# Patient Record
Sex: MALE | Race: WHITE | NOT HISPANIC OR LATINO | Employment: OTHER | URBAN - NONMETROPOLITAN AREA
[De-identification: names, ages, dates, MRNs, and addresses within clinical notes are randomized per-mention and may not be internally consistent; named-entity substitution may affect disease eponyms.]

---

## 2017-01-03 ENCOUNTER — HOSPITAL ENCOUNTER (OUTPATIENT)
Dept: PHYSICAL THERAPY | Facility: HOSPITAL | Age: 62
Setting detail: THERAPIES SERIES
Discharge: HOME | End: 2017-01-03

## 2017-01-03 DIAGNOSIS — G89.29 CHRONIC BILATERAL LOW BACK PAIN WITH BILATERAL SCIATICA: Primary | ICD-10-CM

## 2017-01-03 DIAGNOSIS — M51.36 LUMBAR DEGENERATIVE DISC DISEASE: ICD-10-CM

## 2017-01-03 DIAGNOSIS — M54.41 CHRONIC BILATERAL LOW BACK PAIN WITH BILATERAL SCIATICA: Primary | ICD-10-CM

## 2017-01-03 DIAGNOSIS — M54.42 CHRONIC BILATERAL LOW BACK PAIN WITH BILATERAL SCIATICA: Primary | ICD-10-CM

## 2017-01-03 PROCEDURE — 97140 MANUAL THERAPY 1/> REGIONS: CPT | Performed by: PHYSICAL THERAPIST

## 2017-01-03 NOTE — PROGRESS NOTES
Outpatient Physical Therapy Ortho Treatment Note  [facility]     Patient Name: Carlos Hayes  : 1955  MRN: [id number]  Today's Date: 1/3/2017      Visit Date: 2017    Visit Dx:    ICD-10-CM ICD-9-CM   1. Chronic bilateral low back pain with bilateral sciatica M54.42 724.2    M54.41 724.3    G89.29 338.29   2. Lumbar degenerative disc disease M51.36 722.52       There is no problem list on file for this patient.       Past Medical History   Diagnosis Date   • Arthritis    • Back pain    • Concussion    • COPD (chronic obstructive pulmonary disease)    • Hearing impaired    • Hypertension    • Obesity         Past Surgical History   Procedure Laterality Date   • Thoracic laminectomy with placement of dorsal column stimulator     • Back surgery       L3-4 fusion   • Back surgery       L3-S1 fusion   • Carpal tunnel release Bilateral    • Knee surgery     • Cholecystectomy                               PT Assessment/Plan       17 1700          PT Assessment    Assessment Comments his right hip was more guarded today for some reason; he walked out with less pain and stood more erect  -TB      PT Plan    PT Plan Comments continue with MFR and TrP work and improving flexibility through his hip flexors and adductor  -TB        User Key  (r) = Recorded By, (t) = Taken By, (c) = Cosigned By    Initials Name Provider Type    SILVA Hartmann, PT Physical Therapist                    Exercises       17 1522          Subjective Pain    Able to rate subjective pain? yes  -TB      Pre-Treatment Pain Level 6  -TB        User Key  (r) = Recorded By, (t) = Taken By, (c) = Cosigned By    Initials Name Provider Type    SILVA Hartmann, PT Physical Therapist                        Manual Rx (last 36 hours)      Manual Treatments       17 1700          Manual Rx 1    Manual Rx 1 Location lower abdomen  -TB      Manual Rx 1 Type MFR oliver  -TB      Manual Rx 1 Duration 10  -TB       Manual Rx 2    Manual Rx 2 Location oliver IP through whole muscle   -TB      Manual Rx 2 Type TrP release  -TB      Manual Rx 2 Grade gentle progressing to deeper  -TB      Manual Rx 2 Duration 40  -TB      Manual Rx 3    Manual Rx 3 Location oliver hip short adductors  -TB      Manual Rx 3 Type TrP release  -TB      Manual Rx 3 Duration 10  -TB      Manual Rx 4    Manual Rx 4 Location passive hip abduction/extension stretch  -TB      Manual Rx 4 Type hooklying  -TB      Manual Rx 4 Grade sustained gentle with some LAD  -TB      Manual Rx 4 Duration 10  -TB        User Key  (r) = Recorded By, (t) = Taken By, (c) = Cosigned By    Initials Name Provider Type    TB Juarez Hartmann, PT Physical Therapist                PT OP Goals       01/03/17 1700 12/27/16 1700 12/27/16 1500    PT Short Term Goals    STG Date to Achieve 01/27/17  -TB  01/27/17  -TB    STG 1 Relax muscle guarding on oliver hip flexors  -TB  Relax muscle guarding on oliver hip flexors  -TB    STG 1 Progress Ongoing  -TB  New  -TB    STG 1 Progress Comments he was really tight and sore but after, his pain was down and he could stand more erect  -TB      STG 2 Improve mobility of thoracic into extension  -TB  Improve mobility of thoracic into extension  -TB    STG 2 Progress New  -TB  New  -TB    Long Term Goals    LTG Date to Achieve 02/27/17  -TB  02/27/17  -TB    LTG 1 Able to stand x 5 min before needing to sit  -TB  Able to stand x 5 min before needing to sit  -TB    LTG 1 Progress New  -TB  New  -TB    LTG 2 Improve hip passive extension to 0 degrees  -TB  Improve hip passive extension to 0 degrees  -TB    LTG 2 Progress New  -TB  New  -TB    LTG 3 Improved core contraction held during functional tasks.  -TB  Improved core contraction held during functional tasks.  -TB    LTG 3 Progress New  -TB  New  -TB    LTG 4 Independent with HEP for flexibility and stability.   -TB  Independent with HEP for flexibility and stability.   -TB    LTG 4 Progress New  -TB   New  -TB    Time Calculation    PT Goal Re-Cert Due Date 01/26/17  -TB --  -TB 01/26/17  -TB      User Key  (r) = Recorded By, (t) = Taken By, (c) = Cosigned By    Initials Name Provider Type    SILVA Hartmann, PT Physical Therapist                Therapy Education       01/03/17 1700    Therapy Education    Given HEP;Posture/body mechanics  -TB    Program Reinforced  -TB    How Provided Verbal  -TB    Provided to Patient  -TB    Level of Understanding Verbalized  -TB      User Key  (r) = Recorded By, (t) = Taken By, (c) = Cosigned By    Initials Name Provider Type    SILVA Hartmann, PT Physical Therapist                Time Calculation:   Start Time: 1520  Stop Time: 1630  Time Calculation (min): 70 min  Total Timed Code Minutes- PT: 70 minute(s)    Therapy Charges for Today     Code Description Service Date Service Provider Modifiers Qty    28660997980 HC PT MANUAL THERAPY EA 15 MIN 1/3/2017 Juarez Hartmann, PT GP 5                    Juarez Hartmann, PT  1/3/2017

## 2017-01-10 ENCOUNTER — HOSPITAL ENCOUNTER (OUTPATIENT)
Dept: PHYSICAL THERAPY | Facility: HOSPITAL | Age: 62
Discharge: HOME | End: 2017-01-10
Admitting: NURSE PRACTITIONER

## 2017-01-10 DIAGNOSIS — M51.36 LUMBAR DEGENERATIVE DISC DISEASE: ICD-10-CM

## 2017-01-10 DIAGNOSIS — M54.42 CHRONIC BILATERAL LOW BACK PAIN WITH BILATERAL SCIATICA: Primary | ICD-10-CM

## 2017-01-10 DIAGNOSIS — M54.41 CHRONIC BILATERAL LOW BACK PAIN WITH BILATERAL SCIATICA: Primary | ICD-10-CM

## 2017-01-10 DIAGNOSIS — G89.29 CHRONIC BILATERAL LOW BACK PAIN WITH BILATERAL SCIATICA: Primary | ICD-10-CM

## 2017-01-10 PROCEDURE — 97140 MANUAL THERAPY 1/> REGIONS: CPT | Performed by: PHYSICAL THERAPIST

## 2017-01-10 NOTE — PROGRESS NOTES
Outpatient Physical Therapy Ortho Treatment Note  [facility]     Patient Name: Carlos Hayes  : 1955  MRN: [id number]  Today's Date: 1/10/2017      Visit Date: 01/10/2017    Visit Dx:    ICD-10-CM ICD-9-CM   1. Chronic bilateral low back pain with bilateral sciatica M54.42 724.2    M54.41 724.3    G89.29 338.29   2. Lumbar degenerative disc disease M51.36 722.52       There is no problem list on file for this patient.       Past Medical History   Diagnosis Date   • Arthritis    • Back pain    • Concussion    • COPD (chronic obstructive pulmonary disease)    • Hearing impaired    • Hypertension    • Obesity         Past Surgical History   Procedure Laterality Date   • Thoracic laminectomy with placement of dorsal column stimulator     • Back surgery       L3-4 fusion   • Back surgery       L3-S1 fusion   • Carpal tunnel release Bilateral    • Knee surgery     • Cholecystectomy                               PT Assessment/Plan       01/10/17 1700          PT Assessment    Assessment Comments He is consistently responding well to the MFR and hip distraction with stretching of his hip flexors and adductors  -TB      PT Plan    PT Plan Comments Continue with current regimen.   -TB        User Key  (r) = Recorded By, (t) = Taken By, (c) = Cosigned By    Initials Name Provider Type    SILVA Hartmann, PT Physical Therapist                    Exercises       01/10/17 7885          Subjective Comments    Subjective Comments He says he did really well after his last treatment, but every day, he would tighten up some. He went for a walk when it was cold. He walked on the treadmill for a mile in 30 minutes and says he is still hurting.   -TB        User Key  (r) = Recorded By, (t) = Taken By, (c) = Cosigned By    Initials Name Provider Type    SILVA Hartmann, PT Physical Therapist                        Manual Rx (last 36 hours)      Manual Treatments       01/10/17 1700          Manual Rx 1     Manual Rx 1 Location lower abdomen  -TB      Manual Rx 1 Type MFR oliver  -TB      Manual Rx 1 Duration 10  -TB      Manual Rx 2    Manual Rx 2 Location oliver IP through whole muscle   -TB      Manual Rx 2 Type TrP release  -TB      Manual Rx 2 Grade gentle progressing to deeper  -TB      Manual Rx 2 Duration 40  -TB      Manual Rx 3    Manual Rx 3 Location oliver hip short adductors  -TB      Manual Rx 3 Type TrP release  -TB      Manual Rx 3 Duration 10  -TB      Manual Rx 4    Manual Rx 4 Location passive hip abduction/extension stretch  -TB      Manual Rx 4 Type hooklying  -TB      Manual Rx 4 Grade sustained gentle with some LAD  -TB      Manual Rx 4 Duration 10  -TB        User Key  (r) = Recorded By, (t) = Taken By, (c) = Cosigned By    Initials Name Provider Type    TB Juarez Hartmann, PT Physical Therapist                PT OP Goals       01/10/17 1700 01/03/17 1700       PT Short Term Goals    STG Date to Achieve 01/27/17  -TB 01/27/17  -TB     STG 1 Relax muscle guarding on oliver hip flexors  -TB Relax muscle guarding on oliver hip flexors  -TB     STG 1 Progress Ongoing  -TB Ongoing  -TB     STG 1 Progress Comments they did well after his last treatment but were more guarded after walking maybe too far  -TB he was really tight and sore but after, his pain was down and he could stand more erect  -TB     STG 2 Improve mobility of thoracic into extension  -TB Improve mobility of thoracic into extension  -TB     STG 2 Progress New  -TB New  -TB     Long Term Goals    LTG Date to Achieve 02/27/17  -TB 02/27/17  -TB     LTG 1 Able to stand x 5 min before needing to sit  -TB Able to stand x 5 min before needing to sit  -TB     LTG 1 Progress New  -TB New  -TB     LTG 2 Improve hip passive extension to 0 degrees  -TB Improve hip passive extension to 0 degrees  -TB     LTG 2 Progress New  -TB New  -TB     LTG 3 Improved core contraction held during functional tasks.  -TB Improved core contraction held during functional  tasks.  -TB     LTG 3 Progress New  -TB New  -TB     LTG 4 Independent with HEP for flexibility and stability.   -TB Independent with HEP for flexibility and stability.   -TB     LTG 4 Progress New  -TB New  -TB     Time Calculation    PT Goal Re-Cert Due Date 01/26/17  -TB 01/26/17  -TB       User Key  (r) = Recorded By, (t) = Taken By, (c) = Cosigned By    Initials Name Provider Type    TB Juarez Hartmann PT Physical Therapist                Therapy Education       01/10/17 1700    Therapy Education    Given HEP;Posture/body mechanics  -TB    Program Reinforced  -TB    How Provided Verbal  -TB    Provided to Patient  -TB    Level of Understanding Verbalized  -TB      User Key  (r) = Recorded By, (t) = Taken By, (c) = Cosigned By    Initials Name Provider Type    TB Juarez Hartmann PT Physical Therapist                Time Calculation:   Start Time: 1545  Stop Time: 1700  Time Calculation (min): 75 min  Total Timed Code Minutes- PT: 75 minute(s)    Therapy Charges for Today     Code Description Service Date Service Provider Modifiers Qty    08118350765 HC PT MANUAL THERAPY EA 15 MIN 1/10/2017 Juarez Hartmann, PT GP 5                    Juarez Hartmann, PT  1/10/2017

## 2017-01-17 ENCOUNTER — HOSPITAL ENCOUNTER (OUTPATIENT)
Dept: PHYSICAL THERAPY | Facility: HOSPITAL | Age: 62
Setting detail: THERAPIES SERIES
Discharge: HOME | End: 2017-01-17

## 2017-01-17 DIAGNOSIS — G89.29 CHRONIC BILATERAL LOW BACK PAIN WITH BILATERAL SCIATICA: Primary | ICD-10-CM

## 2017-01-17 DIAGNOSIS — M51.36 LUMBAR DEGENERATIVE DISC DISEASE: ICD-10-CM

## 2017-01-17 DIAGNOSIS — M54.42 CHRONIC BILATERAL LOW BACK PAIN WITH BILATERAL SCIATICA: Primary | ICD-10-CM

## 2017-01-17 DIAGNOSIS — M54.41 CHRONIC BILATERAL LOW BACK PAIN WITH BILATERAL SCIATICA: Primary | ICD-10-CM

## 2017-01-17 PROCEDURE — G8979 MOBILITY GOAL STATUS: HCPCS | Performed by: PHYSICAL THERAPIST

## 2017-01-17 PROCEDURE — G8980 MOBILITY D/C STATUS: HCPCS | Performed by: PHYSICAL THERAPIST

## 2017-01-17 PROCEDURE — 97140 MANUAL THERAPY 1/> REGIONS: CPT | Performed by: PHYSICAL THERAPIST

## 2017-01-17 NOTE — THERAPY DISCHARGE NOTE
Outpatient Physical Therapy Ortho Treatment Note/Discharge Summary  [facility]     Patient Name: Carlos Hayes  : 1955  MRN: [id number]  Today's Date: 2017      Visit Date: 2017    Visit Dx:    ICD-10-CM ICD-9-CM   1. Chronic bilateral low back pain with bilateral sciatica M54.42 724.2    M54.41 724.3    G89.29 338.29   2. Lumbar degenerative disc disease M51.36 722.52       There is no problem list on file for this patient.       Past Medical History   Diagnosis Date   • Arthritis    • Back pain    • Concussion    • COPD (chronic obstructive pulmonary disease)    • Hearing impaired    • Hypertension    • Obesity         Past Surgical History   Procedure Laterality Date   • Thoracic laminectomy with placement of dorsal column stimulator     • Back surgery       L3-4 fusion   • Back surgery       L3-S1 fusion   • Carpal tunnel release Bilateral    • Knee surgery     • Cholecystectomy                               PT Assessment/Plan       17 1700          PT Assessment    Assessment Comments He is going back to [facility] so we weren't able to finish his visits. We were successful in his hips not tightening up and him losing  any ground he might have gained there.   -TB      PT Plan    PT Plan Comments DC to the therapists at [facility] to continue working with him.  -TB        User Key  (r) = Recorded By, (t) = Taken By, (c) = Cosigned By    Initials Name Provider Type    SILVA Hartmann, PT Physical Therapist                    Exercises       17 5180          Subjective Comments    Subjective Comments He says he feels like we can't take his leg into as much extension because for 3 days, he was miserable. He almost fell this morning getting up and turning.   -TB        User Key  (r) = Recorded By, (t) = Taken By, (c) = Cosigned By    Initials Name Provider Type    SILVA Hartmann, PT Physical Therapist                        Manual Rx (last 36 hours)      Manual Treatments        01/17/17 1700          Manual Rx 1    Manual Rx 1 Location lower abdomen  -TB      Manual Rx 1 Type MFR oliver  -TB      Manual Rx 1 Duration 10  -TB      Manual Rx 2    Manual Rx 2 Location oliver IP through whole muscle   -TB      Manual Rx 2 Type TrP release  -TB      Manual Rx 2 Grade gentle progressing to deeper  -TB      Manual Rx 2 Duration 40  -TB      Manual Rx 3    Manual Rx 3 Location oliver hip short adductors  -TB      Manual Rx 3 Type TrP release  -TB      Manual Rx 3 Duration 10  -TB      Manual Rx 4    Manual Rx 4 Location supine manual cx traction sustained; this relieved his headache but his thoracic spasmed; he rolled to his side and I held pressure on it and this abolished this spasm  -TB        User Key  (r) = Recorded By, (t) = Taken By, (c) = Cosigned By    Initials Name Provider Type    TB Juarez Hartmann, PT Physical Therapist                PT OP Goals       01/17/17 1700 01/10/17 1700       PT Short Term Goals    STG Date to Achieve 01/27/17  -TB 01/27/17  -TB     STG 1 Relax muscle guarding on oliver hip flexors  -TB Relax muscle guarding on oliver hip flexors  -TB     STG 1 Progress Met  -TB Ongoing  -TB     STG 1 Progress Comments they were more relaxed than they had been; still guarded but better  -TB they did well after his last treatment but were more guarded after walking maybe too far  -TB     STG 2 Improve mobility of thoracic into extension  -TB Improve mobility of thoracic into extension  -TB     STG 2 Progress Not Met  -TB New  -TB     STG 2 Progress Comments did not focus on thoracic mobility this time due to focus on hip mobility  -TB      Long Term Goals    LTG Date to Achieve 02/27/17  -TB 02/27/17  -TB     LTG 1 Able to stand x 5 min before needing to sit  -TB Able to stand x 5 min before needing to sit  -TB     LTG 1 Progress Partially Met  -TB New  -TB     LTG 1 Progress Comments he tried at [employer] but his back pain was flared by the time he got to the checkout  -TB      LTG 2  Improve hip passive extension to 0 degrees  -TB Improve hip passive extension to 0 degrees  -TB     LTG 2 Progress Partially Met  -TB New  -TB     LTG 2 Progress Comments about -10 deg oliver  -TB      LTG 3 Improved core contraction held during functional tasks.  -TB Improved core contraction held during functional tasks.  -TB     LTG 3 Progress Met  -TB New  -TB     LTG 3 Progress Comments we discussed this and he was working on contaction of his core with deep breathing  -TB      LTG 4 Independent with HEP for flexibility and stability.   -TB Independent with HEP for flexibility and stability.   -TB     LTG 4 Progress Met  -TB New  -TB     Time Calculation    PT Goal Re-Cert Due Date  01/26/17  -TB       User Key  (r) = Recorded By, (t) = Taken By, (c) = Cosigned By    Initials Name Provider Type    SILVA Hartmann PT Physical Therapist                Therapy Education       01/17/17 1700    Therapy Education    Given HEP;Posture/body mechanics  -TB    Program Reinforced  -TB    How Provided Verbal  -TB    Provided to Patient  -TB    Level of Understanding Verbalized  -TB      User Key  (r) = Recorded By, (t) = Taken By, (c) = Cosigned By    Initials Name Provider Type    SILVA Hartmann PT Physical Therapist                Outcome Measures       01/17/17 1700          Modified Oswestry    Modified Oswestry Score/Comments 68%  -TB      Functional Assessment    Outcome Measure Options Modifed Owestry  -TB        User Key  (r) = Recorded By, (t) = Taken By, (c) = Cosigned By    Initials Name Provider Type    SILVA Hartmann PT Physical Therapist            Time Calculation:   Start Time: 0345  Stop Time: 0500  Time Calculation (min): 75 min  Total Timed Code Minutes- PT: 75 minute(s)    Therapy Charges for Today     Code Description Service Date Service Provider Modifiers Qty    58848188493 HC PT MOBILITY PROJECTED 1/17/2017 Juarez Hartmann, PT GP, CJ 1    36328696204 HC PT MOBILITY DISCHARGE 1/17/2017  Juarez Hartmann, PT GP, CL 1    1957323 HC PT MANUAL THERAPY EA 15 MIN 1/17/2017 Juarez Hartmann, PT GP 5          PT G-Codes  Outcome Measure Options: Yakov Alaniz  Score: 68%  Functional Limitation: Mobility: Walking and moving around  Mobility: Walking and Moving Around Goal Status (): At least 20 percent but less than 40 percent impaired, limited or restricted  Mobility: Walking and Moving Around Discharge Status (): At least 60 percent but less than 80 percent impaired, limited or restricted     OP PT Discharge Summary  Date of Discharge: 01/17/17  Reason for Discharge: Patient/Caregiver request  Outcomes Achieved: Patient able to partially acheive established goals  Discharge Destination: Home with home program  Discharge Instructions: He was seen here in the interim while home from his [facility] weight loss program that included PT. Our main focus was on his hip flexor spasms that he was having and trying to loosen his hip joints to allow him to stand more erect. We found a little stretching and LAD helped but too much made his back pain much worse.      Juarez Hartmann, PT  1/17/2017

## 2017-01-25 ENCOUNTER — APPOINTMENT (OUTPATIENT)
Dept: PHYSICAL THERAPY | Facility: HOSPITAL | Age: 62
End: 2017-01-25

## 2017-01-31 ENCOUNTER — APPOINTMENT (OUTPATIENT)
Dept: PHYSICAL THERAPY | Facility: HOSPITAL | Age: 62
End: 2017-01-31

## 2017-02-07 ENCOUNTER — APPOINTMENT (OUTPATIENT)
Dept: PHYSICAL THERAPY | Facility: HOSPITAL | Age: 62
End: 2017-02-07

## 2017-02-14 ENCOUNTER — APPOINTMENT (OUTPATIENT)
Dept: PHYSICAL THERAPY | Facility: HOSPITAL | Age: 62
End: 2017-02-14

## 2017-02-21 ENCOUNTER — APPOINTMENT (OUTPATIENT)
Dept: PHYSICAL THERAPY | Facility: HOSPITAL | Age: 62
End: 2017-02-21

## 2017-02-28 ENCOUNTER — APPOINTMENT (OUTPATIENT)
Dept: PHYSICAL THERAPY | Facility: HOSPITAL | Age: 62
End: 2017-02-28

## 2017-03-29 ENCOUNTER — HOSPITAL ENCOUNTER (OUTPATIENT)
Dept: PHYSICAL THERAPY | Facility: HOSPITAL | Age: 62
Setting detail: THERAPIES SERIES
Discharge: HOME | End: 2017-03-29

## 2017-03-29 ENCOUNTER — TRANSCRIBE ORDERS (OUTPATIENT)
Dept: PHYSICAL THERAPY | Facility: HOSPITAL | Age: 62
End: 2017-03-29

## 2017-03-29 DIAGNOSIS — M54.42 CHRONIC BILATERAL LOW BACK PAIN WITH BILATERAL SCIATICA: Primary | ICD-10-CM

## 2017-03-29 DIAGNOSIS — M54.5 LOW BACK PAIN, UNSPECIFIED BACK PAIN LATERALITY, UNSPECIFIED CHRONICITY, WITH SCIATICA PRESENCE UNSPECIFIED: Primary | ICD-10-CM

## 2017-03-29 DIAGNOSIS — G89.29 CHRONIC BILATERAL LOW BACK PAIN WITH BILATERAL SCIATICA: Primary | ICD-10-CM

## 2017-03-29 DIAGNOSIS — M51.36 LUMBAR DEGENERATIVE DISC DISEASE: ICD-10-CM

## 2017-03-29 DIAGNOSIS — M54.41 CHRONIC BILATERAL LOW BACK PAIN WITH BILATERAL SCIATICA: Primary | ICD-10-CM

## 2017-03-29 PROCEDURE — 97110 THERAPEUTIC EXERCISES: CPT | Performed by: PHYSICAL THERAPIST

## 2017-03-29 PROCEDURE — G8978 MOBILITY CURRENT STATUS: HCPCS | Performed by: PHYSICAL THERAPIST

## 2017-03-29 PROCEDURE — 97140 MANUAL THERAPY 1/> REGIONS: CPT | Performed by: PHYSICAL THERAPIST

## 2017-03-29 PROCEDURE — G8979 MOBILITY GOAL STATUS: HCPCS | Performed by: PHYSICAL THERAPIST

## 2017-03-29 PROCEDURE — 97163 PT EVAL HIGH COMPLEX 45 MIN: CPT | Performed by: PHYSICAL THERAPIST

## 2017-03-29 NOTE — PROGRESS NOTES
Outpatient Physical Therapy Ortho Initial Evaluation  [facility]     Patient Name: Carlos Hayes  : 1955  MRN: [id number]  Today's Date: 3/29/2017      Visit Date: 2017    There is no problem list on file for this patient.       Past Medical History:   Diagnosis Date   • Arthritis    • Back pain    • Concussion    • COPD (chronic obstructive pulmonary disease)    • Hearing impaired    • Hypertension    • Obesity         Past Surgical History:   Procedure Laterality Date   • BACK SURGERY      L3-4 fusion   • BACK SURGERY      L3-S1 fusion   • CARPAL TUNNEL RELEASE Bilateral    • CHOLECYSTECTOMY     • KNEE SURGERY     • THORACIC LAMINECTOMY WITH PLACEMENT OF DORSAL COLUMN STIMULATOR         Visit Dx:     ICD-10-CM ICD-9-CM   1. Chronic bilateral low back pain with bilateral sciatica M54.42 724.2    M54.41 724.3    G89.29 338.29   2. Lumbar degenerative disc disease M51.36 722.52             Patient History       17 1215          History    Chief Complaint Pain  -TB      Type of Pain Back pain  -TB      Date Current Problem(s) Began 17  -TB      Brief Description of Current Complaint We had seen Edin several times for back pain with radicular pain. He has been going to [facility] for medically supervised weight loss. His pain has been too bad so he hasn't been able to exercise there to lose weight. About a month ago, his left 5th toes was hurting and now it's numb. His left leg is now more numb than his right leg. They had given him meds for pain when he went to the hospital due to pain. Before coming to the hospital, he had reached a point he couldn't sit up after lying supine. He was walking between 1.5 to 3 miles a day. He was also walking in the pool and felt he had overdone it which might have flared his back.   -TB      What clinical tests have you had for this problem? Myelogram  -TB      Results of Clinical Tests stenosis in upper lumbar with osteophyte formation  bridging vertebra  -TB      Pain     Pain Location Back  -TB      Pain at Present 6  -TB      Pain at Best 5  -TB      Pain at Worst 10  -TB      Pain Frequency Constant/continuous  -TB      Pain Description Burning;Sharp;Spasm;Stabbing;Aching  -TB      What Performance Factors Make the Current Problem(s) WORSE? standing, walking  -TB      What Performance Factors Make the Current Problem(s) BETTER? lying down  -TB      Pain Comments pain radiates from upper lumbar around to flank and lower abdomen; occasionally into groin and upper thighs  -TB      Fall Risk Assessment    Any falls in the past year: Yes  -TB      Factors that contributed to the fall: Lost balance  -TB      Services    Prior Rehab/Home Health Experiences Yes  -TB      Where was the prior experience with Rehab/Home Health in [facility]  -TB      Do you plan to receive Home Health services in the near future No  -TB      Daily Activities    Primary Language English  -TB      How does patient learn best? Listening  -TB      Teaching needs identified Management of Condition  -TB      Patient is concerned about/has problems with Difficulty with self care (i.e. bathing, dressing, toileting:  -TB      Does patient have problems with the following? Depression;Anxiety  -TB      Barriers to learning Hearing  -TB      Pt Participated in POC and Goals Yes  -TB      Safety    Are you being hurt, hit, or frightened by anyone at home or in your life? No  -TB      Are you being neglected by a caregiver No  -TB        User Key  (r) = Recorded By, (t) = Taken By, (c) = Cosigned By    Initials Name Provider Type    TB Juarez Hartmann, PT Physical Therapist                PT Ortho       03/29/17 1200    Posture/Observations    Alignment Options Forward head;Thoracic kyphosis;Rounded shoulders  -TB    Forward Head Severe;Increased  -TB    Thoracic Kyphosis Severe;Increased  -TB    Rounded Shoulders Severe;Increased  -TB    Posture/Observations Comments He stands and  walks in a very severe kyphotic posture and struggles to keep his head up when sitting; he has to drop his head occasionally to rest it. His lumbar is fused in neutral. In standing his trunk is sidebent to the left  -TB    Special Tests/Palpation    Special Tests/Palpation Lumbar/SI  -TB    Thoracic Accessory Motions    Thoracic Accessory Motions Tested? Yes  -TB    Pa glide- Upper thoracic Hypomobile  -TB    Pa glide- Middle thoracic Hypomobile  -TB    Pa glide- Lower thoracic Hypomobile  -TB    Lumbosacral Palpation    SI Bilateral:;Tender  -TB    Piriformis Left:;Tender;Guarded/taut  -TB    Quadratus Lumborum Bilateral:;Guarded/taut  -TB    Pelvic Floor --   tender closer to pubis  -TB    Iliopsoas Bilateral:;Tender;Guarded/taut;Trigger point   left worse  -TB    Lumbosacral Palpation? Yes  -TB    Lumbosacral Accessory Motions    Lumbosacral Accessory Motions Tested? Yes  -TB    PA Glide- L1 Hypomobile  -TB    PA [place]- L2 Hypermobile  -TB    Lumbar/SI Special Tests    SLR (Neural Tension) Bilateral:;Negative  -TB    ROM (Range of Motion)    General ROM Detail unable to stand erect to reach neutral due to pain; flexion 50% of trunk; oliver hips lack 25% of mobility all planes  -TB    MMT (Manual Muscle Testing)    General MMT Assessment Detail grossly 4-/5 hip; knees 5/5  -TB    Pathomechanics    Spine Pathomechanics Bends knees with attempted lumbar extension;Hinges into extension at one segment in lumbar;Excessive thoracic kyphosis with forward bend  -TB      User Key  (r) = Recorded By, (t) = Taken By, (c) = Cosigned By    Initials Name Provider Type    TB Juarez Hartmann, PT Physical Therapist                            Therapy Education       03/29/17 1200          Therapy Education    Given HEP;Posture/body mechanics  -TB      Program Reinforced  -TB      How Provided Verbal  -TB      Provided to Patient  -TB      Level of Understanding Verbalized  -TB        User Key  (r) = Recorded By, (t) = Taken By, (c) =  Cosigned By    Initials Name Provider Type    TB Juarez Hartmann, PT Physical Therapist                PT OP Goals       03/29/17 1200       PT Short Term Goals    STG Date to Achieve 01/27/17  -TB     STG 1 Relax muscle guarding on oliver hip flexors  -TB     STG 1 Progress New  -TB     STG 2 Improve mobility of thoracic into extension  -TB     STG 2 Progress New  -TB     Long Term Goals    LTG Date to Achieve 02/27/17  -TB     LTG 1 Able to stand x 5 min before needing to sit  -TB     LTG 1 Progress New  -TB     LTG 2 Improve hip passive extension to 0 degrees  -TB     LTG 2 Progress New  -TB     LTG 3 Improved core contraction held during functional tasks.  -TB     LTG 3 Progress New  -TB     LTG 4 Independent with HEP for flexibility and stability.   -TB     LTG 4 Progress New  -TB     Time Calculation    PT Goal Re-Cert Due Date 04/28/17  -TB       User Key  (r) = Recorded By, (t) = Taken By, (c) = Cosigned By    Initials Name Provider Type    TB Juarez Hartmann, PT Physical Therapist                PT Assessment/Plan       03/29/17 1200       PT Assessment    Functional Limitations Impaired gait;Impaired locomotion;Limitation in home management;Performance in leisure activities;Limitations in functional capacity and performance  -TB     Impairments Balance;Gait;Pain;Muscle strength;Motor function;Joint mobility;Impaired muscle length;Range of motion;Posture;Impaired flexibility  -TB     Assessment Comments He is here to continue his PT while he is home to consult a back surgeon. He is still showing the difficulties with mobility as his back pain won't allow him to stand erect. I recommended he might get some comfort from wearing rigid back brace like an [place] LSO or even a TLSO to support his thorax as well. I had a brace here (that was too small for him) that I was able to hold against him for support and he said it felt good to have that pressure on it.   -TB     Please refer to paper survey for additional  self-reported information Yes  -TB     Rehab Potential Good  -TB     Patient/caregiver participated in establishment of treatment plan and goals Yes  -TB     Patient would benefit from skilled therapy intervention Yes  -TB     PT Plan    PT Frequency 1x/week;2x/week  -TB     Predicted Duration of Therapy Intervention (days/wks) 2 month  -TB     Planned CPT's? PT EVAL HIGH COMPLEXITY: 56544;PT THER PROC EA 15 MIN: 11190;PT THER ACT EA 15 MIN: 83638;PT MANUAL THERAPY EA 15 MIN: 35026;PT GAIT TRAINING EA 15 MIN: 90065;PT ELECTRICAL STIM UNATTEND: ;PT ELECTRICAL STIM ATTD EA 15 MIN: 06518;PT ULTRASOUND EA 15 MIN: 84544  -TB     PT Plan Comments We will continue with the manual therapy to relax muscle spasms and improve mobility through his posture and hips. We will progress to strengthening as he is able. If he does obtain an LSO/TLSO, we will work with fitting it to him.  -TB       User Key  (r) = Recorded By, (t) = Taken By, (c) = Cosigned By    Initials Name Provider Type    SILVA Hartmann PT Physical Therapist                  Exercises       03/29/17 1200          Exercise 1    Exercise Name 1 After evaluation, educated on bracing options and printed pictures for him to ask his primary care MD  -TB        User Key  (r) = Recorded By, (t) = Taken By, (c) = Cosigned By    Initials Name Provider Type    SIVLA Hartmann PT Physical Therapist           Manual Rx (last 36 hours)      Manual Treatments       03/29/17 1200          Manual Rx 1    Manual Rx 1 Location right sidelying STM left lumbar and flank  -TB      Manual Rx 2    Manual Rx 2 Location hooklying TrP oliver IP and pelvic floor  -TB        User Key  (r) = Recorded By, (t) = Taken By, (c) = Cosigned By    Initials Name Provider Type    SILVA Hartmann PT Physical Therapist                            Time Calculation:   Start Time: 1200  Stop Time: 1345  Time Calculation (min): 105 min  Total Timed Code Minutes- PT: 45 minute(s)     Therapy  Charges for Today     Code Description Service Date Service Provider Modifiers Qty    93156226711 HC PT MOBILITY CURRENT 3/29/2017 Juarez Hartmann, PT GP, CL 1    05006968801 HC PT MOBILITY PROJECTED 3/29/2017 Juarez Hartmann, PT GP, CJ 1    46288522418 HC PT EVAL HIGH COMPLEXITY 4 3/29/2017 Juarez Hartmann, PT GP 1    61023301559 HC PT THER PROC EA 15 MIN 3/29/2017 Juarez Hartmann, PT GP 1    34761084169 HC PT MANUAL THERAPY EA 15 MIN 3/29/2017 Juarez Hartmann, PT GP 2          PT G-Codes  PT Professional Judgement Used?: Yes  Functional Limitation: Mobility: Walking and moving around  Mobility: Walking and Moving Around Current Status (): At least 60 percent but less than 80 percent impaired, limited or restricted  Mobility: Walking and Moving Around Goal Status (): At least 20 percent but less than 40 percent impaired, limited or restricted         Juarez Hartmann, PT  3/29/2017

## 2017-03-30 ENCOUNTER — HOSPITAL ENCOUNTER (OUTPATIENT)
Dept: PHYSICAL THERAPY | Facility: HOSPITAL | Age: 62
Setting detail: THERAPIES SERIES
Discharge: HOME | End: 2017-03-30

## 2017-03-30 DIAGNOSIS — M51.36 LUMBAR DEGENERATIVE DISC DISEASE: ICD-10-CM

## 2017-03-30 DIAGNOSIS — M54.42 CHRONIC BILATERAL LOW BACK PAIN WITH BILATERAL SCIATICA: Primary | ICD-10-CM

## 2017-03-30 DIAGNOSIS — G89.29 CHRONIC BILATERAL LOW BACK PAIN WITH BILATERAL SCIATICA: Primary | ICD-10-CM

## 2017-03-30 DIAGNOSIS — M54.41 CHRONIC BILATERAL LOW BACK PAIN WITH BILATERAL SCIATICA: Primary | ICD-10-CM

## 2017-03-30 PROCEDURE — 97110 THERAPEUTIC EXERCISES: CPT | Performed by: PHYSICAL THERAPIST

## 2017-03-30 PROCEDURE — 97140 MANUAL THERAPY 1/> REGIONS: CPT | Performed by: PHYSICAL THERAPIST

## 2017-03-30 NOTE — PROGRESS NOTES
Outpatient Physical Therapy Ortho Treatment Note  [facility]     Patient Name: Carlos Hayes  : 1955  MRN: [id number]  Today's Date: 3/30/2017      Visit Date: 2017    Visit Dx:    ICD-10-CM ICD-9-CM   1. Chronic bilateral low back pain with bilateral sciatica M54.42 724.2    M54.41 724.3    G89.29 338.29   2. Lumbar degenerative disc disease M51.36 722.52       There is no problem list on file for this patient.       Past Medical History:   Diagnosis Date   • Arthritis    • Back pain    • Concussion    • COPD (chronic obstructive pulmonary disease)    • Hearing impaired    • Hypertension    • Obesity         Past Surgical History:   Procedure Laterality Date   • BACK SURGERY      L3-4 fusion   • BACK SURGERY      L3-S1 fusion   • CARPAL TUNNEL RELEASE Bilateral    • CHOLECYSTECTOMY     • KNEE SURGERY     • THORACIC LAMINECTOMY WITH PLACEMENT OF DORSAL COLUMN STIMULATOR               PT Ortho       17 1200    Posture/Observations    Alignment Options Forward head;Thoracic kyphosis;Rounded shoulders  -TB    Forward Head Severe;Increased  -TB    Thoracic Kyphosis Severe;Increased  -TB    Rounded Shoulders Severe;Increased  -TB    Posture/Observations Comments He stands and walks in a very severe kyphotic posture and struggles to keep his head up when sitting; he has to drop his head occasionally to rest it. His lumbar is fused in neutral. In standing his trunk is sidebent to the left  -TB    Special Tests/Palpation    Special Tests/Palpation Lumbar/SI  -TB    Thoracic Accessory Motions    Thoracic Accessory Motions Tested? Yes  -TB    Pa glide- Upper thoracic Hypomobile  -TB    Pa glide- Middle thoracic Hypomobile  -TB    Pa glide- Lower thoracic Hypomobile  -TB    Lumbosacral Palpation    SI Bilateral:;Tender  -TB    Piriformis Left:;Tender;Guarded/taut  -TB    Quadratus Lumborum Bilateral:;Guarded/taut  -TB    Pelvic Floor --   tender closer to pubis  -TB    Iliopsoas  Bilateral:;Tender;Guarded/taut;Trigger point   left worse  -TB    Lumbosacral Palpation? Yes  -TB    Lumbosacral Accessory Motions    Lumbosacral Accessory Motions Tested? Yes  -TB    PA Glide- L1 Hypomobile  -TB    PA [place]- L2 Hypermobile  -TB    Lumbar/SI Special Tests    SLR (Neural Tension) Bilateral:;Negative  -TB    ROM (Range of Motion)    General ROM Detail unable to stand erect to reach neutral due to pain; flexion 50% of trunk; oliver hips lack 25% of mobility all planes  -TB    MMT (Manual Muscle Testing)    General MMT Assessment Detail grossly 4-/5 hip; knees 5/5  -TB    Pathomechanics    Spine Pathomechanics Bends knees with attempted lumbar extension;Hinges into extension at one segment in lumbar;Excessive thoracic kyphosis with forward bend  -TB      User Key  (r) = Recorded By, (t) = Taken By, (c) = Cosigned By    Initials Name Provider Type    TB Juarez Hartmann, PT Physical Therapist                            PT Assessment/Plan       03/30/17 1515       PT Assessment    Assessment Comments He had alot of questions about the myelogram CT so I spent some time going over this with his to explain the anatomy of the report. I worked more on his thoracic to loose this to try to keep pressure off his lumbar.  -TB     PT Plan    PT Plan Comments may work on opening his left rib cage to make his trunk more symmetrical in standing  -TB       User Key  (r) = Recorded By, (t) = Taken By, (c) = Cosigned By    Initials Name Provider Type    TB Juarez Hartmann, PT Physical Therapist                    Exercises       03/30/17 1514          Subjective Comments    Subjective Comments He says his hips were really sore after yesterday.   -TB      Subjective Pain    Pre-Treatment Pain Level 6  -TB      Exercise 1    Exercise Name 1 began with going over his CT report and explaining the anatomy of what it was showing; I was able to get him signed up for [facility] to [facility] to find these results  -TB      Exercise 2     Exercise Name 2 went over different stretches he could try to open his left thoracic and side with right sidelying with left arm overhead and left leg dropped  -TB        User Key  (r) = Recorded By, (t) = Taken By, (c) = Cosigned By    Initials Name Provider Type    SILVA Hartmann, PT Physical Therapist                        Manual Rx (last 36 hours)      Manual Treatments       03/30/17 1515 03/29/17 1200       Manual Rx 1    Manual Rx 1 Location prone with 2 pillows under hips: thoracic foam roll  -TB right sidelying STM left lumbar and flank  -TB     Manual Rx 1 Type extension  -TB      Manual Rx 1 Duration 10  -TB      Manual Rx 2    Manual Rx 2 Location right T4-6  -TB hooklying TrP oliver IP and pelvic floor  -TB     Manual Rx 2 Type repetitive PA and TrP release  -TB      Manual Rx 2 Duration 10  -TB      Manual Rx 3    Manual Rx 3 Location thoracolumbar manual distraction  -TB      Manual Rx 3 Grade sustained 3  -TB      Manual Rx 3 Duration 5  -TB        User Key  (r) = Recorded By, (t) = Taken By, (c) = Cosigned By    Initials Name Provider Type    TB Juarez Hartmann, PT Physical Therapist                PT OP Goals       03/30/17 1515       PT Short Term Goals    STG Date to Achieve 01/27/17  -TB     STG 1 Relax muscle guarding on oliver hip flexors  -TB     STG 1 Progress Ongoing  -TB     STG 2 Improve mobility of thoracic into extension  -TB     STG 2 Progress Ongoing  -TB     STG 2 Progress Comments worked on this today; difficult because it tends to stress his lumbar  -TB     Long Term Goals    LTG Date to Achieve 02/27/17  -TB     LTG 1 Able to stand x 5 min before needing to sit  -TB     LTG 1 Progress New  -TB     LTG 2 Improve hip passive extension to 0 degrees  -TB     LTG 2 Progress New  -TB     LTG 3 Improved core contraction held during functional tasks.  -TB     LTG 3 Progress New  -TB     LTG 4 Independent with HEP for flexibility and stability.   -TB     LTG 4 Progress New  -TB     Time  Calculation    PT Goal Re-Cert Due Date 04/28/17  -TB       User Key  (r) = Recorded By, (t) = Taken By, (c) = Cosigned By    Initials Name Provider Type    SILVA Hartmann PT Physical Therapist                Therapy Education       03/30/17 1515          Therapy Education    Given HEP;Posture/body mechanics  -TB      Program Reinforced  -TB      How Provided Verbal  -TB      Provided to Patient  -TB      Level of Understanding Verbalized  -TB        User Key  (r) = Recorded By, (t) = Taken By, (c) = Cosigned By    Initials Name Provider Type    SILVA Hartmann PT Physical Therapist                Time Calculation:   Start Time: 1515  Stop Time: 1615  Time Calculation (min): 60 min  Total Timed Code Minutes- PT: 60 minute(s)    Therapy Charges for Today     Code Description Service Date Service Provider Modifiers Qty    78308922214 HC PT MANUAL THERAPY EA 15 MIN 3/30/2017 Juarez Hartmann, PT GP 2    81779767742 HC PT THER PROC EA 15 MIN 3/30/2017 Juarez Hartmann PT GP 2                    Juarez Hartmann, PT  3/30/2017

## 2017-04-04 ENCOUNTER — HOSPITAL ENCOUNTER (OUTPATIENT)
Dept: PHYSICAL THERAPY | Facility: HOSPITAL | Age: 62
Setting detail: THERAPIES SERIES
Discharge: HOME | End: 2017-04-04

## 2017-04-04 DIAGNOSIS — M51.36 LUMBAR DEGENERATIVE DISC DISEASE: ICD-10-CM

## 2017-04-04 DIAGNOSIS — G89.29 CHRONIC BILATERAL LOW BACK PAIN WITH BILATERAL SCIATICA: Primary | ICD-10-CM

## 2017-04-04 DIAGNOSIS — M54.41 CHRONIC BILATERAL LOW BACK PAIN WITH BILATERAL SCIATICA: Primary | ICD-10-CM

## 2017-04-04 DIAGNOSIS — M54.42 CHRONIC BILATERAL LOW BACK PAIN WITH BILATERAL SCIATICA: Primary | ICD-10-CM

## 2017-04-04 PROCEDURE — 97140 MANUAL THERAPY 1/> REGIONS: CPT | Performed by: PHYSICAL THERAPIST

## 2017-04-04 NOTE — PROGRESS NOTES
Outpatient Physical Therapy Ortho Treatment Note  [facility]     Patient Name: Carlos Hayes  : 1955  MRN: [id number]  Today's Date: 2017      Visit Date: 2017    Visit Dx:    ICD-10-CM ICD-9-CM   1. Chronic bilateral low back pain with bilateral sciatica M54.42 724.2    M54.41 724.3    G89.29 338.29   2. Lumbar degenerative disc disease M51.36 722.52       There is no problem list on file for this patient.       Past Medical History:   Diagnosis Date   • Arthritis    • Back pain    • Concussion    • COPD (chronic obstructive pulmonary disease)    • Hearing impaired    • Hypertension    • Obesity         Past Surgical History:   Procedure Laterality Date   • BACK SURGERY      L3-4 fusion   • BACK SURGERY      L3-S1 fusion   • CARPAL TUNNEL RELEASE Bilateral    • CHOLECYSTECTOMY     • KNEE SURGERY     • THORACIC LAMINECTOMY WITH PLACEMENT OF DORSAL COLUMN STIMULATOR                               PT Assessment/Plan       17 1405       PT Assessment    Assessment Comments he was struggling with constipation and difficulty urinating and said his abdomen felt really tight; after the MFR to this area, he said he felt much better; I encouraged him to walk to improve motility  -TB     PT Plan    PT Plan Comments Continue with hip mobility and thoracic mobility if able to the next visit  -TB       User Key  (r) = Recorded By, (t) = Taken By, (c) = Cosigned By    Initials Name Provider Type    SILVA Hartmann, PT Physical Therapist                    Exercises       17 1405          Subjective Comments    Subjective Comments He was 20 minutes late. He though his appt was at 2pm instead of 1:45. He says he's having trouble with urinating and BM's. He just had a massage.  -TB      Subjective Pain    Pre-Treatment Pain Level 8  -TB        User Key  (r) = Recorded By, (t) = Taken By, (c) = Cosigned By    Initials Name Provider Type    SILVA Hartmann, PT Physical  Therapist                        Manual Rx (last 36 hours)      Manual Treatments       04/04/17 1500          Manual Rx 1    Manual Rx 1 Location hookyling MFR to abdomen working superficially and then going deeper; worked along his colon to try to improve motility  -TB      Manual Rx 1 Duration 30  -TB      Manual Rx 2    Manual Rx 2 Location Pelvic floor deep pressure oliver  -TB      Manual Rx 2 Duration 15  -TB        User Key  (r) = Recorded By, (t) = Taken By, (c) = Cosigned By    Initials Name Provider Type    TB Juarez Hartmann, PT Physical Therapist                PT OP Goals       04/04/17 1405       PT Short Term Goals    STG Date to Achieve 01/27/17  -TB     STG 1 Relax muscle guarding on oliver hip flexors  -TB     STG 1 Progress Ongoing  -TB     STG 1 Progress Comments more guarded and tight through abdoment today  -TB     STG 2 Improve mobility of thoracic into extension  -TB     STG 2 Progress Ongoing  -TB     Long Term Goals    LTG Date to Achieve 02/27/17  -TB     LTG 1 Able to stand x 5 min before needing to sit  -TB     LTG 1 Progress New  -TB     LTG 2 Improve hip passive extension to 0 degrees  -TB     LTG 2 Progress New  -TB     LTG 3 Improved core contraction held during functional tasks.  -TB     LTG 3 Progress New  -TB     LTG 4 Independent with HEP for flexibility and stability.   -TB     LTG 4 Progress New  -TB     Time Calculation    PT Goal Re-Cert Due Date 04/28/17  -TB       User Key  (r) = Recorded By, (t) = Taken By, (c) = Cosigned By    Initials Name Provider Type    SILVA Hartmann, PT Physical Therapist                Therapy Education       04/04/17 1405          Therapy Education    Given HEP;Posture/body mechanics   encouraged to walk  -TB      Program Reinforced  -TB      How Provided Verbal  -TB      Provided to Patient  -TB      Level of Understanding Verbalized  -TB        User Key  (r) = Recorded By, (t) = Taken By, (c) = Cosigned By    Initials Name Provider Type    TB  Juarez Hartmann, PT Physical Therapist                Time Calculation:   Start Time: 1405  Stop Time: 1455  Time Calculation (min): 50 min  Total Timed Code Minutes- PT: 45 minute(s)    Therapy Charges for Today     Code Description Service Date Service Provider Modifiers Qty    49409948118 HC PT MANUAL THERAPY EA 15 MIN 4/4/2017 Juarez Hartmann, PT GP 3                    Juarez Hartmann, PT  4/4/2017

## 2017-04-11 ENCOUNTER — HOSPITAL ENCOUNTER (OUTPATIENT)
Dept: PHYSICAL THERAPY | Facility: HOSPITAL | Age: 62
Setting detail: THERAPIES SERIES
Discharge: HOME | End: 2017-04-11

## 2017-04-11 DIAGNOSIS — G89.29 CHRONIC BILATERAL LOW BACK PAIN WITH BILATERAL SCIATICA: Primary | ICD-10-CM

## 2017-04-11 DIAGNOSIS — M51.36 LUMBAR DEGENERATIVE DISC DISEASE: ICD-10-CM

## 2017-04-11 DIAGNOSIS — M54.42 CHRONIC BILATERAL LOW BACK PAIN WITH BILATERAL SCIATICA: Primary | ICD-10-CM

## 2017-04-11 DIAGNOSIS — M54.41 CHRONIC BILATERAL LOW BACK PAIN WITH BILATERAL SCIATICA: Primary | ICD-10-CM

## 2017-04-11 PROCEDURE — 97140 MANUAL THERAPY 1/> REGIONS: CPT | Performed by: PHYSICAL THERAPIST
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
## 2017-04-18 ENCOUNTER — HOSPITAL ENCOUNTER (OUTPATIENT)
Dept: PHYSICAL THERAPY | Facility: HOSPITAL | Age: 62
Setting detail: THERAPIES SERIES
Discharge: HOME | End: 2017-04-18

## 2017-04-18 DIAGNOSIS — M51.36 LUMBAR DEGENERATIVE DISC DISEASE: ICD-10-CM

## 2017-04-18 DIAGNOSIS — M54.42 CHRONIC BILATERAL LOW BACK PAIN WITH BILATERAL SCIATICA: Primary | ICD-10-CM

## 2017-04-18 DIAGNOSIS — M54.41 CHRONIC BILATERAL LOW BACK PAIN WITH BILATERAL SCIATICA: Primary | ICD-10-CM

## 2017-04-18 DIAGNOSIS — G89.29 CHRONIC BILATERAL LOW BACK PAIN WITH BILATERAL SCIATICA: Primary | ICD-10-CM

## 2017-04-18 PROCEDURE — G8979 MOBILITY GOAL STATUS: HCPCS | Performed by: PHYSICAL THERAPIST

## 2017-04-18 PROCEDURE — G8980 MOBILITY D/C STATUS: HCPCS | Performed by: PHYSICAL THERAPIST

## 2017-04-18 PROCEDURE — 97140 MANUAL THERAPY 1/> REGIONS: CPT | Performed by: PHYSICAL THERAPIST

## 2017-04-18 NOTE — THERAPY DISCHARGE NOTE
Outpatient Physical Therapy Ortho Treatment Note/Discharge Summary  [facility]     Patient Name: Carlos Hayes  : 1955  MRN: [id number]  Today's Date: 2017      Visit Date: 2017    Visit Dx:    ICD-10-CM ICD-9-CM   1. Chronic bilateral low back pain with bilateral sciatica M54.42 724.2    M54.41 724.3    G89.29 338.29   2. Lumbar degenerative disc disease M51.36 722.52       There is no problem list on file for this patient.       Past Medical History:   Diagnosis Date   • Arthritis    • Back pain    • Concussion    • COPD (chronic obstructive pulmonary disease)    • Hearing impaired    • Hypertension    • Obesity         Past Surgical History:   Procedure Laterality Date   • BACK SURGERY      L3-4 fusion   • BACK SURGERY      L3-S1 fusion   • CARPAL TUNNEL RELEASE Bilateral    • CHOLECYSTECTOMY     • KNEE SURGERY     • THORACIC LAMINECTOMY WITH PLACEMENT OF DORSAL COLUMN STIMULATOR                               PT Assessment/Plan       17 1500       PT Assessment    Assessment Comments He is returning to [facility] so we will d/c from PT. He did improve and meet most of his goals for this go around. He still is in much pain and has flares but hopefully he will be able to lose the weight and continue to get stronger.  -TB     PT Plan    PT Plan Comments d/c  -TB       User Key  (r) = Recorded By, (t) = Taken By, (c) = Cosigned By    Initials Name Provider Type    SILVA Hartmann, PT Physical Therapist                    Exercises       17 1500          Subjective Comments    Subjective Comments He is going to [place] tomorrow to work again on losing weight. He says he's been depressed and needs to get back to losing weight. He says he sleeping better since he got a new Tempur Pedic mattress.   -TB      Subjective Pain    Pre-Treatment Pain Level 5  -TB        User Key  (r) = Recorded By, (t) = Taken By, (c) = Cosigned By    Initials Name Provider Type    SILVA CORTEZ  Mary Kate, PT Physical Therapist                        Manual Rx (last 36 hours)      Manual Treatments       04/18/17 1500          Manual Rx 1    Manual Rx 1 Location right sidelying  -TB      Manual Rx 1 Type STM left thoracolumbar  -TB      Manual Rx 1 Duration 15  -TB      Manual Rx 2    Manual Rx 2 Location left ribs  -TB      Manual Rx 2 Type sidelying PA/AP mobs  -TB      Manual Rx 2 Grade repetitive  -TB      Manual Rx 2 Duration 10  -TB      Manual Rx 3    Manual Rx 3 Location right sidelying left LS/Upper trap  -TB      Manual Rx 3 Type TrP release  -TB      Manual Rx 3 Grade sustained 3   -TB      Manual Rx 3 Duration 10  -TB      Manual Rx 4    Manual Rx 4 Location prone right glute  -TB      Manual Rx 4 Type deep tissue release  -TB      Manual Rx 4 Duration 15  -TB        User Key  (r) = Recorded By, (t) = Taken By, (c) = Cosigned By    Initials Name Provider Type    TB Juarez Hartmann, PT Physical Therapist                PT OP Goals       04/18/17 1500       PT Short Term Goals    STG Date to Achieve 01/27/17  -TB     STG 1 Relax muscle guarding on oliver hip flexors  -TB     STG 1 Progress Met  -TB     STG 1 Progress Comments able to stand more erect with no pain or spasms into his hip flexors  -TB     STG 2 Improve mobility of thoracic into extension  -TB     STG 2 Progress Partially Met  -TB     STG 2 Progress Comments on the last visit able to stand more erect than he had the whole episode since returning; still SB to left but not as bad  -TB     Long Term Goals    LTG Date to Achieve 02/27/17  -TB     LTG 1 Able to stand x 5 min before needing to sit  -TB     LTG 1 Progress Met  -TB     LTG 1 Progress Comments able to walk from his mom's house to his own house >5  min  -TB     LTG 2 Improve hip passive extension to 0 degrees  -TB     LTG 2 Progress Partially Met  -TB     LTG 2 Progress Comments still about -10 deg but not painful  -TB     LTG 3 Improved core contraction held during functional  tasks.  -TB     LTG 3 Progress Met  -TB     LTG 3 Progress Comments he worked on holding his TA contraction with amb  -TB     LTG 4 Independent with HEP for flexibility and stability.   -TB     LTG 4 Progress Met  -TB     LTG 4 Progress Comments he understood his HEP for flexibility and posture  -TB       User Key  (r) = Recorded By, (t) = Taken By, (c) = Cosigned By    Initials Name Provider Type    TB Juarez Hartmann PT Physical Therapist                Therapy Education       04/18/17 1500          Therapy Education    Given HEP;Posture/body mechanics   encouraged to walk  -TB      Program Reinforced  -TB      How Provided Verbal  -TB      Provided to Patient  -TB      Level of Understanding Verbalized  -TB        User Key  (r) = Recorded By, (t) = Taken By, (c) = Cosigned By    Initials Name Provider Type    TB Juarez Hartmann PT Physical Therapist                Time Calculation:   Start Time: 1500  Stop Time: 1615  Time Calculation (min): 75 min  PT Non-Billable Time (min): 15 min  Total Timed Code Minutes- PT: 60 minute(s)    Therapy Charges for Today     Code Description Service Date Service Provider Modifiers Qty    59121782833 HC PT MOBILITY PROJECTED 4/18/2017 Juarez Hartmann, PT GP, CJ 1    39491772799 HC PT MOBILITY DISCHARGE 4/18/2017 Juarez Hartmann, PT GP, CK 1    88524257527 HC PT THER SUPP EA 15 MIN 4/18/2017 Juarez Hartmann PT GP, KX 1    31450088212 HC PT MANUAL THERAPY EA 15 MIN 4/18/2017 Juarez Hartmann, PT GP, KX 4          PT G-Codes  PT Professional Judgement Used?: Yes  Functional Limitation: Mobility: Walking and moving around  Mobility: Walking and Moving Around Goal Status (): At least 20 percent but less than 40 percent impaired, limited or restricted  Mobility: Walking and Moving Around Discharge Status (): At least 40 percent but less than 60 percent impaired, limited or restricted     OP PT Discharge Summary  Date of Discharge: 04/18/17  Reason for Discharge: Maximum  functional potential achieved, Patient/Caregiver request (flying back to [place] tomorrow)  Outcomes Achieved: Patient able to partially acheive established goals  Discharge Destination: Home with home program  Discharge Instructions: We worked initially on relaxing the spasms through his hip flexors. He struggled with some lower abdominal pain and constipation so I added some visceral MFR 1-2 times for relief. At the end, his pain and mobility started to improve. He switch beds to a memory foam which seemed to help. I had advised he try getting a LSO to support his back but insurance wouldn't cover it without him seeing an MD. He may pursue this in [place]..      Juarez Hartmann, PT  4/18/2017

## 2017-05-11 ENCOUNTER — HOSPITAL ENCOUNTER (OUTPATIENT)
Dept: PHYSICAL THERAPY | Facility: HOSPITAL | Age: 62
Setting detail: THERAPIES SERIES
Discharge: HOME | End: 2017-05-11

## 2017-05-11 DIAGNOSIS — M51.36 LUMBAR DEGENERATIVE DISC DISEASE: ICD-10-CM

## 2017-05-11 DIAGNOSIS — M54.41 CHRONIC BILATERAL LOW BACK PAIN WITH BILATERAL SCIATICA: Primary | ICD-10-CM

## 2017-05-11 DIAGNOSIS — G89.29 CHRONIC BILATERAL LOW BACK PAIN WITH BILATERAL SCIATICA: Primary | ICD-10-CM

## 2017-05-11 DIAGNOSIS — M54.42 CHRONIC BILATERAL LOW BACK PAIN WITH BILATERAL SCIATICA: Primary | ICD-10-CM

## 2017-05-11 PROCEDURE — 97140 MANUAL THERAPY 1/> REGIONS: CPT | Performed by: PHYSICAL THERAPIST

## 2017-05-11 PROCEDURE — G8979 MOBILITY GOAL STATUS: HCPCS | Performed by: PHYSICAL THERAPIST

## 2017-05-11 PROCEDURE — 97163 PT EVAL HIGH COMPLEX 45 MIN: CPT | Performed by: PHYSICAL THERAPIST

## 2017-05-11 PROCEDURE — G8978 MOBILITY CURRENT STATUS: HCPCS | Performed by: PHYSICAL THERAPIST

## 2017-05-12 ENCOUNTER — TRANSCRIBE ORDERS (OUTPATIENT)
Dept: PHYSICAL THERAPY | Facility: HOSPITAL | Age: 62
End: 2017-05-12

## 2017-05-12 DIAGNOSIS — M54.16 LUMBAR RADICULOPATHY: Primary | ICD-10-CM

## 2017-05-15 ENCOUNTER — HOSPITAL ENCOUNTER (OUTPATIENT)
Dept: PHYSICAL THERAPY | Facility: HOSPITAL | Age: 62
Setting detail: THERAPIES SERIES
Discharge: HOME | End: 2017-05-15

## 2017-05-15 DIAGNOSIS — M54.42 CHRONIC BILATERAL LOW BACK PAIN WITH BILATERAL SCIATICA: Primary | ICD-10-CM

## 2017-05-15 DIAGNOSIS — G89.29 CHRONIC BILATERAL LOW BACK PAIN WITH BILATERAL SCIATICA: Primary | ICD-10-CM

## 2017-05-15 DIAGNOSIS — M51.36 LUMBAR DEGENERATIVE DISC DISEASE: ICD-10-CM

## 2017-05-15 DIAGNOSIS — M54.41 CHRONIC BILATERAL LOW BACK PAIN WITH BILATERAL SCIATICA: Primary | ICD-10-CM

## 2017-05-15 PROCEDURE — 97140 MANUAL THERAPY 1/> REGIONS: CPT | Performed by: PHYSICAL THERAPIST

## 2017-05-19 ENCOUNTER — HOSPITAL ENCOUNTER (OUTPATIENT)
Dept: PHYSICAL THERAPY | Facility: HOSPITAL | Age: 62
Setting detail: THERAPIES SERIES
Discharge: HOME | End: 2017-05-19

## 2017-05-19 DIAGNOSIS — M54.41 CHRONIC BILATERAL LOW BACK PAIN WITH BILATERAL SCIATICA: Primary | ICD-10-CM

## 2017-05-19 DIAGNOSIS — G89.29 CHRONIC BILATERAL LOW BACK PAIN WITH BILATERAL SCIATICA: Primary | ICD-10-CM

## 2017-05-19 DIAGNOSIS — M51.36 LUMBAR DEGENERATIVE DISC DISEASE: ICD-10-CM

## 2017-05-19 DIAGNOSIS — M54.42 CHRONIC BILATERAL LOW BACK PAIN WITH BILATERAL SCIATICA: Primary | ICD-10-CM

## 2017-05-19 PROCEDURE — 97140 MANUAL THERAPY 1/> REGIONS: CPT | Performed by: PHYSICAL THERAPIST

## 2017-05-19 PROCEDURE — 97110 THERAPEUTIC EXERCISES: CPT | Performed by: PHYSICAL THERAPIST

## 2017-05-23 ENCOUNTER — HOSPITAL ENCOUNTER (OUTPATIENT)
Dept: PHYSICAL THERAPY | Facility: HOSPITAL | Age: 62
Setting detail: THERAPIES SERIES
Discharge: HOME | End: 2017-05-23

## 2017-05-23 DIAGNOSIS — M54.41 CHRONIC BILATERAL LOW BACK PAIN WITH BILATERAL SCIATICA: Primary | ICD-10-CM

## 2017-05-23 DIAGNOSIS — G89.29 CHRONIC BILATERAL LOW BACK PAIN WITH BILATERAL SCIATICA: Primary | ICD-10-CM

## 2017-05-23 DIAGNOSIS — M51.36 LUMBAR DEGENERATIVE DISC DISEASE: ICD-10-CM

## 2017-05-23 DIAGNOSIS — M54.42 CHRONIC BILATERAL LOW BACK PAIN WITH BILATERAL SCIATICA: Primary | ICD-10-CM

## 2017-05-23 PROCEDURE — 97140 MANUAL THERAPY 1/> REGIONS: CPT | Performed by: PHYSICAL THERAPIST

## 2017-05-31 ENCOUNTER — HOSPITAL ENCOUNTER (OUTPATIENT)
Dept: PHYSICAL THERAPY | Facility: HOSPITAL | Age: 62
Setting detail: THERAPIES SERIES
Discharge: HOME | End: 2017-05-31

## 2017-05-31 DIAGNOSIS — M54.42 CHRONIC BILATERAL LOW BACK PAIN WITH BILATERAL SCIATICA: Primary | ICD-10-CM

## 2017-05-31 DIAGNOSIS — M54.41 CHRONIC BILATERAL LOW BACK PAIN WITH BILATERAL SCIATICA: Primary | ICD-10-CM

## 2017-05-31 DIAGNOSIS — G89.29 CHRONIC BILATERAL LOW BACK PAIN WITH BILATERAL SCIATICA: Primary | ICD-10-CM

## 2017-05-31 DIAGNOSIS — M51.36 LUMBAR DEGENERATIVE DISC DISEASE: ICD-10-CM

## 2017-05-31 PROCEDURE — 97140 MANUAL THERAPY 1/> REGIONS: CPT | Performed by: PHYSICAL THERAPIST

## 2017-05-31 PROCEDURE — 97110 THERAPEUTIC EXERCISES: CPT | Performed by: PHYSICAL THERAPIST

## 2017-06-07 ENCOUNTER — HOSPITAL ENCOUNTER (OUTPATIENT)
Dept: PHYSICAL THERAPY | Facility: HOSPITAL | Age: 62
Setting detail: THERAPIES SERIES
Discharge: HOME | End: 2017-06-07

## 2017-06-07 DIAGNOSIS — M54.42 CHRONIC BILATERAL LOW BACK PAIN WITH BILATERAL SCIATICA: Primary | ICD-10-CM

## 2017-06-07 DIAGNOSIS — M51.36 LUMBAR DEGENERATIVE DISC DISEASE: ICD-10-CM

## 2017-06-07 DIAGNOSIS — G89.29 CHRONIC BILATERAL LOW BACK PAIN WITH BILATERAL SCIATICA: Primary | ICD-10-CM

## 2017-06-07 DIAGNOSIS — M54.41 CHRONIC BILATERAL LOW BACK PAIN WITH BILATERAL SCIATICA: Primary | ICD-10-CM

## 2017-06-07 PROCEDURE — G8979 MOBILITY GOAL STATUS: HCPCS | Performed by: PHYSICAL THERAPIST

## 2017-06-07 PROCEDURE — G8978 MOBILITY CURRENT STATUS: HCPCS | Performed by: PHYSICAL THERAPIST

## 2017-06-07 PROCEDURE — 97140 MANUAL THERAPY 1/> REGIONS: CPT | Performed by: PHYSICAL THERAPIST

## 2017-06-07 PROCEDURE — 97110 THERAPEUTIC EXERCISES: CPT | Performed by: PHYSICAL THERAPIST

## 2017-06-07 NOTE — THERAPY PROGRESS REPORT/RE-CERT
Outpatient Physical Therapy Ortho Progress Note  [facility]     Patient Name: Carlos Hayes  : 1955  MRN: [id number]  Today's Date: 2017      Visit Date: 2017    Visit Dx:    ICD-10-CM ICD-9-CM   1. Chronic bilateral low back pain with bilateral sciatica M54.42 724.2    M54.41 724.3    G89.29 338.29   2. Lumbar degenerative disc disease M51.36 722.52       There is no problem list on file for this patient.       Past Medical History:   Diagnosis Date   • Arthritis    • Back pain    • Concussion    • COPD (chronic obstructive pulmonary disease)    • Hearing impaired    • Hypertension    • Obesity         Past Surgical History:   Procedure Laterality Date   • BACK SURGERY      L3-4 fusion   • BACK SURGERY      L3-S1 fusion   • CARPAL TUNNEL RELEASE Bilateral    • CHOLECYSTECTOMY     • KNEE SURGERY     • THORACIC LAMINECTOMY WITH PLACEMENT OF DORSAL COLUMN STIMULATOR                               PT Assessment/Plan       17 1600       PT Assessment    Functional Limitations Impaired gait;Impaired locomotion;Limitation in home management;Performance in leisure activities;Limitations in functional capacity and performance  -TB     Impairments Balance;Gait;Pain;Muscle strength;Motor function;Joint mobility;Impaired muscle length;Range of motion;Posture;Impaired flexibility  -TB     Assessment Comments He is planning to go back to [facility] for his weight loss. He is better this week than he has been and was able to walk in without his cane but he is still using back brace which he feels helps him quite a bit. Our emphasis has been on improving his hip mobility. We have been limited on being able to progress prior to this because his radicular pain has been so high.   -TB     PT Plan    PT Frequency 1x/week  -TB     Predicted Duration of Therapy Intervention (days/wks) 4 weeks  -TB     Planned CPT's? PT THER PROC EA 15 MIN: 72335;PT THER ACT EA 15 MIN: 92573;PT MANUAL THERAPY EA 15  MIN: 98453;PT ELECTRICAL STIM UNATTEND: ;PT ELECTRICAL STIM ATTD EA 15 MIN: 28782;PT ULTRASOUND EA 15 MIN: 06880;PT GAIT TRAINING EA 15 MIN: 05729  -TB     PT Plan Comments See one more time and then probably d/c as he is planning to go to [facility] again. Hopefully be able to progress some more of his flexibility.  -TB       User Key  (r) = Recorded By, (t) = Taken By, (c) = Cosigned By    Initials Name Provider Type    TB Juarez Hartmann, PT Physical Therapist                    Exercises       06/07/17 1545          Subjective Comments    Subjective Comments He says he has noticed that his right knee has started grinding and hurting more. He notices it when he squats to sit and hurts in his posterior knee. He has pain under his patella when he does this. He walked in today without his cane but was wearing his back brace.  -TB      Subjective Pain    Pre-Treatment Pain Level 4  -TB      Exercise 1    Exercise Name 1 began with assessing the crepitus in his oliver knees; explained he may have patella chondromalasia as well as distal ITB friction syndrome. His right peroneals were guarded and tender--difficult to tell if it's from radicular symptoms vs stress from the eversion with his pes planus and toe out gait  -TB      Exercise 2    Exercise Name 2 prone T's with cues to just barely lifting chest and arms and head off table--his scapula and back would start to cramp so we would stop then try again  -TB      Reps 2 5  -TB      Time (Seconds) 2 10  -TB      Exercise 3    Exercise Name 3 prone hip extension active oliver  -TB      Reps 3 3  -TB      Time (Seconds) 3 5  -TB        User Key  (r) = Recorded By, (t) = Taken By, (c) = Cosigned By    Initials Name Provider Type    TB Juarez Hartmann PT Physical Therapist                        Manual Rx (last 36 hours)      Manual Treatments       06/07/17 1545          Manual Rx 1    Manual Rx 1 Location prone thoracic extension mobs   -TB      Manual Rx 1 Type manual  -TB       Manual Rx 1 Grade 2-3  -TB      Manual Rx 1 Duration 10  -TB      Manual Rx 2    Manual Rx 2 Location passive prone knee flexion stretch  -TB      Manual Rx 2 Type gentle repetitive OP oliver  -TB      Manual Rx 2 Duration 10  -TB      Manual Rx 3    Manual Rx 3 Location prone passive oliver hip extension with OP at posterior hip  -TB      Manual Rx 3 Duration 2 min each  -TB      Manual Rx 4    Manual Rx 4 Location trigger point release oliver hip flexors, adductors, and pelvic floor in LAUREL position  -TB        User Key  (r) = Recorded By, (t) = Taken By, (c) = Cosigned By    Initials Name Provider Type    TB Juarez Hartmann, PT Physical Therapist                PT OP Goals       06/07/17 1600       PT Short Term Goals    STG Date to Achieve 01/27/17  -TB     STG 1 Relax muscle guarding on oliver hip flexors  -TB     STG 1 Progress Progressing  -TB     STG 1 Progress Comments he wasn't nearly as guarded today as he has been; only a couple of minor tender spots  -TB     STG 2 Improve mobility of thoracic into extension  -TB     STG 2 Progress Progressing  -TB     STG 2 Progress Comments was able to work on thoracic mobility today as his pain was less  -TB     Long Term Goals    LTG Date to Achieve 02/27/17  -TB     LTG 1 Able to stand x 5 min before needing to sit  -TB     LTG 1 Progress Met  -TB     LTG 1 Progress Comments he has been able to walk more with the brace and now without his cane today  -TB     LTG 2 Improve hip passive extension to 0 degrees  -TB     LTG 2 Progress Ongoing  -TB     LTG 2 Progress Comments worked on trying to loosen today; he still struggles to get past neutral  -TB     LTG 3 Improved core contraction held during functional tasks.  -TB     LTG 3 Progress Progressing  -TB     LTG 3 Progress Comments He is working on tightening his TA while walking without his brace at home to keep control  -TB     LTG 4 Independent with HEP for flexibility and stability.   -TB     LTG 4 Progress Progressing   -TB     LTG 4 Progress Comments added prone thoracic and hip extension with scapular retraction  -TB     Time Calculation    PT Goal Re-Cert Due Date 07/07/17  -TB       User Key  (r) = Recorded By, (t) = Taken By, (c) = Cosigned By    Initials Name Provider Type    TB Juarez Hartmann PT Physical Therapist                Therapy Education       06/07/17 1700          Therapy Education    Given HEP;Posture/body mechanics   encouraged to walk  -TB      Program Progressed   prone I's, T's  -TB      How Provided Verbal;Written  -TB      Provided to Patient  -TB      Level of Understanding Verbalized  -TB        User Key  (r) = Recorded By, (t) = Taken By, (c) = Cosigned By    Initials Name Provider Type    TB Juarez Hartmann PT Physical Therapist                Time Calculation:   Start Time: 1545  Stop Time: 1645  Time Calculation (min): 60 min  Total Timed Code Minutes- PT: 60 minute(s)    Therapy Charges for Today     Code Description Service Date Service Provider Modifiers Qty    05294601182 HC PT MOBILITY CURRENT 6/7/2017 Juarez Hartmann, PT GP, CK 1    14286744841 HC PT MOBILITY PROJECTED 6/7/2017 Juarez Hartmann, PT GP, CJ 1    70894494393 HC PT MANUAL THERAPY EA 15 MIN 6/7/2017 Juarez Hartmann, PT GP 3    73960154656 HC PT THER PROC EA 15 MIN 6/7/2017 Juarez Hartmann, PT GP 1          PT G-Codes  PT Professional Judgement Used?: Yes  Functional Limitation: Mobility: Walking and moving around  Mobility: Walking and Moving Around Current Status (): At least 40 percent but less than 60 percent impaired, limited or restricted  Mobility: Walking and Moving Around Goal Status (): At least 20 percent but less than 40 percent impaired, limited or restricted         Juarez Hartmann, PT  6/7/2017

## 2017-06-14 ENCOUNTER — HOSPITAL ENCOUNTER (OUTPATIENT)
Dept: PHYSICAL THERAPY | Facility: HOSPITAL | Age: 62
Setting detail: THERAPIES SERIES
Discharge: HOME | End: 2017-06-14

## 2017-06-14 DIAGNOSIS — G89.29 CHRONIC BILATERAL LOW BACK PAIN WITH BILATERAL SCIATICA: Primary | ICD-10-CM

## 2017-06-14 DIAGNOSIS — M51.36 LUMBAR DEGENERATIVE DISC DISEASE: ICD-10-CM

## 2017-06-14 DIAGNOSIS — M54.42 CHRONIC BILATERAL LOW BACK PAIN WITH BILATERAL SCIATICA: Primary | ICD-10-CM

## 2017-06-14 DIAGNOSIS — M54.41 CHRONIC BILATERAL LOW BACK PAIN WITH BILATERAL SCIATICA: Primary | ICD-10-CM

## 2017-06-14 PROCEDURE — 97110 THERAPEUTIC EXERCISES: CPT | Performed by: PHYSICAL THERAPIST

## 2017-06-14 PROCEDURE — G8979 MOBILITY GOAL STATUS: HCPCS | Performed by: PHYSICAL THERAPIST

## 2017-06-14 PROCEDURE — G8980 MOBILITY D/C STATUS: HCPCS | Performed by: PHYSICAL THERAPIST

## 2017-06-14 PROCEDURE — 97140 MANUAL THERAPY 1/> REGIONS: CPT | Performed by: PHYSICAL THERAPIST

## 2017-06-14 NOTE — THERAPY DISCHARGE NOTE
Outpatient Physical Therapy Ortho Treatment Note/Discharge Summary  [facility]     Patient Name: Carlos Hayes  : 1955  MRN: [id number]  Today's Date: 2017      Visit Date: 2017    Visit Dx:    ICD-10-CM ICD-9-CM   1. Chronic bilateral low back pain with bilateral sciatica M54.42 724.2    M54.41 724.3    G89.29 338.29   2. Lumbar degenerative disc disease M51.36 722.52       There is no problem list on file for this patient.       Past Medical History:   Diagnosis Date   • Arthritis    • Back pain    • Concussion    • COPD (chronic obstructive pulmonary disease)    • Hearing impaired    • Hypertension    • Obesity         Past Surgical History:   Procedure Laterality Date   • BACK SURGERY      L3-4 fusion   • BACK SURGERY      L3-S1 fusion   • CARPAL TUNNEL RELEASE Bilateral    • CHOLECYSTECTOMY     • KNEE SURGERY     • THORACIC LAMINECTOMY WITH PLACEMENT OF DORSAL COLUMN STIMULATOR                               PT Assessment/Plan       17 2228       PT Assessment    Assessment Comments He is returning to [facility]. He has progressed with his mobility and his pain was less than when he started. He has met or partially met most of his goals.  -TB     PT Plan    PT Plan Comments We will d/c as he is returning to [facility] for up to 3 months.  -TB       User Key  (r) = Recorded By, (t) = Taken By, (c) = Cosigned By    Initials Name Provider Type    TB Juarez Hartmann, PT Physical Therapist                    Exercises       17 4021          Subjective Comments    Subjective Comments He says  he was hurting worse from being on his belly the last session. His thoracic was really sore. He is going to [facility] next week.   -TB      Subjective Pain    Subjective Pain Comment his right leg and glute have been tighter.   -TB      Exercise 1    Exercise Name 1 passive oliver piri stretch--gentle repetitive  -TB      Sets 1 2  -TB      Exercise 2    Exercise Name 2 passive oliver hamstring  stretch--gentle repetitive  -TB      Sets 2 2  -TB        User Key  (r) = Recorded By, (t) = Taken By, (c) = Cosigned By    Initials Name Provider Type    TB Juarez Hartmann, PT Physical Therapist                        Manual Rx (last 36 hours)      Manual Treatments       06/14/17 1535          Manual Rx 1    Manual Rx 1 Location left sidelying, oliver thoracic, right lumbar, right glute/piriformis, right hamstring  -TB      Manual Rx 1 Type STM  -TB      Manual Rx 1 Grade his hamstring started cramping toward the end to moved to supine  -TB      Manual Rx 1 Duration 45  -TB        User Key  (r) = Recorded By, (t) = Taken By, (c) = Cosigned By    Initials Name Provider Type    TB Juarez Hartmann, PT Physical Therapist                PT OP Goals       06/14/17 1535       PT Short Term Goals    STG Date to Achieve 01/27/17  -TB     STG 1 Relax muscle guarding on oliver hip flexors  -TB     STG 1 Progress Met  -TB     STG 1 Progress Comments his hip flexors weren't the problem the last couple of visits; it was more his glute  -TB     STG 2 Improve mobility of thoracic into extension  -TB     STG 2 Progress Not Met  -TB     STG 2 Progress Comments worked on mobilizations and stretching but it's still quite stiff; he was too sore to try prone again today  -TB     Long Term Goals    LTG Date to Achieve 02/27/17  -TB     LTG 1 Able to stand x 5 min before needing to sit  -TB     LTG 1 Progress Met  -TB     LTG 1 Progress Comments he has been able to walk more with the brace and now without his cane today  -TB     LTG 2 Improve hip passive extension to 0 degrees  -TB     LTG 2 Progress Partially Met  -TB     LTG 2 Progress Comments worked on loosening; he was able to stand more upright but hip flexors still tight  -TB     LTG 3 Improved core contraction held during functional tasks.  -TB     LTG 3 Progress Partially Met  -TB     LTG 3 Progress Comments educated and he demonstrated TA contracion with gait  -TB     LTG 4  Independent with HEP for flexibility and stability.   -TB     LTG 4 Progress Met  -TB     LTG 4 Progress Comments educated on hamstring and hip flexibility and core exercises through written HEP  -TB       User Key  (r) = Recorded By, (t) = Taken By, (c) = Cosigned By    Initials Name Provider Type    SILVA Hartmann PT Physical Therapist                Therapy Education       06/14/17 1600          Therapy Education    Given HEP;Posture/body mechanics   encouraged to walk  -TB      Program Reinforced   prone I's, T's  -TB      How Provided Verbal;Written  -TB      Provided to Patient  -TB      Level of Understanding Verbalized  -TB        User Key  (r) = Recorded By, (t) = Taken By, (c) = Cosigned By    Initials Name Provider Type    TB Juarez Hartmann PT Physical Therapist                Time Calculation:   Start Time: 1535  Stop Time: 1640  Time Calculation (min): 65 min  Total Timed Code Minutes- PT: 65 minute(s)    Therapy Charges for Today     Code Description Service Date Service Provider Modifiers Qty    19967368012 HC PT MOBILITY PROJECTED 6/14/2017 Juarez Hartmann, PT GP, CJ 1    35503459779 HC PT MOBILITY DISCHARGE 6/14/2017 Juarez Hartmann, PT GP, CK 1    40762766545 HC PT MANUAL THERAPY EA 15 MIN 6/14/2017 Juarez Hartmann PT GP 3    69703616322 HC PT THER PROC EA 15 MIN 6/14/2017 Juarez Hartmann, PT GP 1          PT G-Codes  PT Professional Judgement Used?: Yes  Functional Limitation: Mobility: Walking and moving around  Mobility: Walking and Moving Around Goal Status (): At least 20 percent but less than 40 percent impaired, limited or restricted  Mobility: Walking and Moving Around Discharge Status (): At least 40 percent but less than 60 percent impaired, limited or restricted     OP PT Discharge Summary  Date of Discharge: 06/14/17  Reason for Discharge: Maximum functional potential achieved, Patient/Caregiver request  Outcomes Achieved: Patient able to partially acheive established  goals  Discharge Destination: Home with home program  Discharge Instructions: We focused again on mobility of his thoracic and hips. His pain was quite high when he started and over time he was able to move better. The TLSO he obtained is helping quite a bit with stability and pain control. He is returning to [facility] for more weight loss.      Juarez Hartmann, PT  6/14/2017

## 2017-06-21 ENCOUNTER — APPOINTMENT (OUTPATIENT)
Dept: PHYSICAL THERAPY | Facility: HOSPITAL | Age: 62
End: 2017-06-21

## 2017-11-07 ENCOUNTER — TRANSCRIBE ORDERS (OUTPATIENT)
Dept: PHYSICAL THERAPY | Facility: HOSPITAL | Age: 62
End: 2017-11-07

## 2017-11-07 DIAGNOSIS — M51.37 DEGENERATION OF LUMBAR OR LUMBOSACRAL INTERVERTEBRAL DISC: Primary | ICD-10-CM

## 2017-11-14 ENCOUNTER — OFFICE VISIT (OUTPATIENT)
Dept: NEUROLOGY | Age: 62
End: 2017-11-14
Payer: MEDICARE

## 2017-11-14 VITALS
HEIGHT: 76 IN | BODY MASS INDEX: 34.7 KG/M2 | DIASTOLIC BLOOD PRESSURE: 75 MMHG | HEART RATE: 69 BPM | SYSTOLIC BLOOD PRESSURE: 123 MMHG | WEIGHT: 285 LBS

## 2017-11-14 DIAGNOSIS — G47.31 COMPLEX SLEEP APNEA SYNDROME: ICD-10-CM

## 2017-11-14 DIAGNOSIS — Z99.89 BIPAP (BIPHASIC POSITIVE AIRWAY PRESSURE) DEPENDENCE: ICD-10-CM

## 2017-11-14 PROCEDURE — G8427 DOCREV CUR MEDS BY ELIG CLIN: HCPCS | Performed by: PHYSICIAN ASSISTANT

## 2017-11-14 PROCEDURE — 3017F COLORECTAL CA SCREEN DOC REV: CPT | Performed by: PHYSICIAN ASSISTANT

## 2017-11-14 PROCEDURE — G8417 CALC BMI ABV UP PARAM F/U: HCPCS | Performed by: PHYSICIAN ASSISTANT

## 2017-11-14 PROCEDURE — 99213 OFFICE O/P EST LOW 20 MIN: CPT | Performed by: PHYSICIAN ASSISTANT

## 2017-11-14 PROCEDURE — 1036F TOBACCO NON-USER: CPT | Performed by: PHYSICIAN ASSISTANT

## 2017-11-14 PROCEDURE — G8484 FLU IMMUNIZE NO ADMIN: HCPCS | Performed by: PHYSICIAN ASSISTANT

## 2017-11-14 RX ORDER — OXYCODONE HCL 10 MG/1
15 TABLET, FILM COATED, EXTENDED RELEASE ORAL EVERY 8 HOURS
COMMUNITY
End: 2022-04-04

## 2017-11-14 RX ORDER — TIZANIDINE 4 MG/1
4 TABLET ORAL EVERY 8 HOURS PRN
COMMUNITY
End: 2021-01-29 | Stop reason: ALTCHOICE

## 2017-11-14 RX ORDER — ALBUTEROL SULFATE 2.5 MG/3ML
2.5 SOLUTION RESPIRATORY (INHALATION) EVERY 6 HOURS PRN
Status: ON HOLD | COMMUNITY
End: 2018-07-30

## 2017-11-14 RX ORDER — MECLIZINE HYDROCHLORIDE CHEWABLE TABLETS 25 MG/1
25 TABLET, CHEWABLE ORAL 3 TIMES DAILY PRN
Status: ON HOLD | COMMUNITY
End: 2018-07-30

## 2017-11-14 RX ORDER — PROCHLORPERAZINE MALEATE 5 MG/1
5 TABLET ORAL EVERY 6 HOURS PRN
Status: ON HOLD | COMMUNITY
End: 2018-07-30

## 2017-11-14 NOTE — PATIENT INSTRUCTIONS
Mask suggestions:  - [employer]med Airfit N20 (Nasal) or F20 (Full face mask). They conform to your face, thus decreasing the potential for mask leakage. You might like the [employer] (full face mask). It has a \"memory foam\" like cushion. You might also like to try a nasal mask called a Dreamwear nasal mask or the Dreamwear nasal pillow. One other suggestion is the [facility], it is a minimal contact full face mask. The Osorio [employer] incredible under the nose design makes it the only full face mask that won't cause red marks on the bridge of your nose when compared to other Full Face Masks    \"So Clean\" is the device that cleans the CPAP/BiPAP/NiPPV. What is sleep apnea?  Sleep apnea is a condition that makes you stop breathing for short periods while you are asleep. There are 2 types of sleep apnea. One is called \"obstructive sleep apnea,\" and the other is called \"central sleep apnea. \"  In obstructive sleep apnea, you stop breathing because your throat narrows or closes. In central sleep apnea, you stop breathing because your brain does not send the right signals to your muscles to make you breathe. When people talk about sleep apnea, they are usually referring to obstructive sleep apnea, which is what this article is about. People with sleep apnea do not know that they stop breathing when they are asleep. But they do sometimes wake up startled or gasping for breath. They also often hear from loved ones that they snore. What are the symptoms of sleep apnea?  The main symptoms of sleep apnea are loud snoring, tiredness, and daytime sleepiness. Other symptoms can include:  ?Restless sleep  ? Waking up choking or gasping  ? Morning headaches, dry mouth, or sore throat  ? Waking up often to urinate  ? Waking up feeling unrested or groggy  ? Trouble thinking clearly or remembering things  Some people with sleep apnea don't have symptoms, or they don't know they have them.  They might figure that it's normal to be tired airway open while you sleep. This device is only recommended for mild cases of sleep apnea. It may not be covered by your insurance. In rare cases, when nothing else helps, doctors recommend surgery to keep the airway open. Surgery to do this is not always effective, and even when it is, the problem can come back. Is sleep apnea dangerous?  It can be. People with sleep apnea do not get good-quality sleep, so they are often tired and not alert. This puts them at risk for car accidents and other types of accidents. Plus, studies show that people with sleep apnea are more likely than others to have high blood pressure, heart attacks, and other serious heart problems. In people with severe sleep apnea, getting treated (for example, with a CPAP machine) can help prevent some of these problems. Important information:  Medicare/private insurance CPAP/BiPAP/APAP requirements:  Medicare/private insurance has specific requirements for PAP compliance that must be met during the first 90 days of use to continue coverage for CPAP/BiPAP/APAP  from day 91 and beyond. The policy requires that patients use a PAP device 4 hours per 24 hour period, at least 70% of the time over a 30 day period. This data must be downloaded as a report direct from the PAP devices. This is called a compliance download. Your PAP supplier will assist you in this matter. Reminder:  Please bring a copy of the compliance download to your next office visit or have your supplier fax it to our office prior to your office visit. Note:  Where applicable, we will utilize PAP device efficiency reports, additional testing, and face-to-face  clinical evaluation subsequent to any treatment, changes in treatment, and continued treatment. Caution:  Please abstain from driving or engaging in other activities which may be hazardous in the presence of diminished alertness or daytime drowsiness.  And avoid the use of sedatives or alcohol, which can and go to all appointments, and call your doctor if you are having problems. It's also a good idea to keep a list of the medicines you take. Ask your doctor when you can expect to have your test results. Where can you learn more? Go to [email or web address] and sign in to your [employer] account. Enter V519 in the CreatorBox box to learn more about \"Sleep Studies: About This Test.\"     If you do not have an account, please click on the \"Sign Up Now\" link. Current as of: March 25, 2017  Content Version: 11.3  © 3189-2968 [employer]. Care instructions adapted under license by [facility] ([facility]). If you have questions about a medical condition or this instruction, always ask your healthcare professional. [address] any warranty or liability for your use of this information.

## 2017-11-14 NOTE — PROGRESS NOTES
simplex     History of Juan's esophagus     Hyperlipidemia     Hypersomnia     Hypertension     Hypotestosteronism     Lumbar stenosis     Memory loss     Obesity     Obstructive sleep apnea     Initial PS; AHI:  31.2 per split-night PSG, 10/2014    Pneumonia     Potential difficult airway on pre-intubation assessment     PTSD (post-traumatic stress disorder)     Sleep apnea     Bi-pap at night    Vertigo        Past Surgical History:   Procedure Laterality Date    APPENDECTOMY      BACK SURGERY      three    CARDIAC CATHETERIZATION      COLONOSCOPY      Vinnie Lasso    COLONOSCOPY  16    Dr Mary Jane Fish, normal, 5 yr recall    FRACTURE SURGERY      wrist    HERNIA REPAIR      KNEE ARTHROSCOPY      RHINOPLASTY      SPINE SURGERY      x 3/Stimulator implant    UPPER GASTROINTESTINAL ENDOSCOPY      Indira Jerez UPPER GASTROINTESTINAL ENDOSCOPY  2013    BE surveillance. pos BE / neg dysplasia. retained gastric contents    UPPER GASTROINTESTINAL ENDOSCOPY N/A 2016    Dr Mary Jane Fish, Susy (-), Barretts (+), 3 yr recall       Recent Hospitalizations  ·     Significant Injuries  ·     Family History   Problem Relation Age of Onset    Colon Polyps Mother     Colon Polyps Maternal Grandmother     Colon Cancer Neg Hx     Esophageal Cancer Neg Hx     Liver Cancer Neg Hx     Liver Disease Neg Hx     Rectal Cancer Neg Hx     Stomach Cancer Neg Hx        Social History  History   Smoking Status    Former Smoker    Packs/day: 1.00    Years: 35.00    Types: Cigarettes    Quit date: 3/16/2016   Smokeless Tobacco    Never Used     Comment: pt states he has quit smokimg again for 3 weeks     History   Alcohol Use    Yes     Comment: a drink once per month     History   Drug Use No         Current Outpatient Prescriptions   Medication Sig Dispense Refill    Lorcaserin HCl (BELVIQ) 10 MG TABS Take by mouth .       albuterol (PROVENTIL) (2.5 MG/3ML) 0.083% nebulizer solution Take 2.5 mg by nebulization every 6 hours as needed for Wheezing      Tiotropium Bromide Monohydrate (SPIRIVA HANDIHALER IN) Inhale into the lungs      tiZANidine (ZANAFLEX) 4 MG tablet Take 4 mg by mouth every 8 hours as needed      TADALAFIL PO Take 7 mg by mouth      meclizine (ANTIVERT) 25 MG CHEW Take 25 mg by mouth 3 times daily as needed      prochlorperazine (COMPAZINE) 5 MG tablet Take 5 mg by mouth every 6 hours as needed for Nausea      oxyCODONE (OXYCONTIN) 10 MG extended release tablet Take 10 mg by mouth every 12 hours .  omeprazole (PRILOSEC) 10 MG capsule Take 10 mg by mouth daily      ranitidine (ZANTAC) 150 MG tablet Take 2 tablets by mouth nightly as needed for Heartburn 30 tablet 5    Vardenafil HCl 10 MG TBDP Take by mouth      Krill Oil Omega-3 300 MG CAPS Take by mouth      ketorolac (TORADOL) 10 MG tablet Take 10 mg by mouth every 6 hours as needed for Pain      varenicline (CHANTIX) 1 MG tablet Take 1 mg by mouth 2 times daily      azelastine (ASTELIN) 0.1 % nasal spray 1 spray by Nasal route 2 times daily Use in each nostril as directed      ipratropium-albuterol (DUONEB) 0.5-2.5 (3) MG/3ML SOLN nebulizer solution Inhale 1 vial into the lungs every 4 hours.  testosterone cypionate (DEPOTESTOTERONE CYPIONATE) 200 MG/ML injection Inject 50 mg into the muscle once.  DULoxetine (CYMBALTA) 60 MG capsule Take 60 mg by mouth daily.  OxyCODONE HCl ER (OXYCONTIN) 60 MG T12A Take  by mouth.  ALPRAZolam (XANAX) 1 MG tablet Take 1 mg by mouth 2 times daily as needed.  gabapentin (NEURONTIN) 800 MG tablet Take 600 mg by mouth 3 times daily 600 four times a day      esomeprazole Magnesium (NEXIUM) 40 MG PACK Take 40 mg by mouth daily.  traZODone (DESYREL) 50 MG tablet Take 50 mg by mouth nightly.  oxyCODONE-acetaminophen (PERCOCET)  MG per tablet Take 1 tablet by mouth every 6 hours as needed.       cyclobenzaprine (FLEXERIL) 10 MG tablet Take 10 mg by and Neck supple with midline trachea  Heart[de-identified]  regular rate and rhythm, S1, S2 normal, no murmur, click, rub or gallop  Lungs:  clear to auscultation bilaterally  Extremities:  extremities normal, atraumatic, no cyanosis or edema  Neurologic:  Extraocular movements are intact without nystagmus. Visual fields are full to confrontation. Facial movements are symmetrical and normal.  Speech is precise. Extremity strength is normal in both uppers and lowers. Deep tendon reflexes are intact and symmetrical.  Rapid alternating movements are unimpaired. Finger-to-nose testing is performed well, without dysmetria. Gait is normal.    I reviewed the following studies:    Compliance download:  He has an NiPPV set at a pressure 23cm/16cm/10. Compliance download shows that he uses device:  86% of the time;  percentage of days with usage >=4 hours:  47%. AHI:  1.6 (9/18/17-10/17/17)    [place] Sleepiness Scale: 17    Assessment:       ICD-10-CM ICD-9-CM    1. Complex sleep apnea syndrome G47.31 327.21 Sleep Study with PAP Titration      [facility]   2. BiPAP (biphasic positive airway pressure) dependence Z99.89 V46.8 Sleep Study with PAP Titration      [facility]          []  :  Stable     []  :  Improved                       []  :  Well controlled              []  :  Resolving     []  :  Resolved     [x]  :  Inadequately controlled-due to non-compliance with NiPPV     []  :  Worsening      Plan:     No orders of the defined types were placed in this encounter. 1.   Patient advised of the etiology,  pathophysiology, diagnosis, treatment options, and risks of untreated KENNETH. Risks may include, but are not limited to  hypertension, coronary artery disease, diabetes, stroke, weight gain, impaired cognition, daytime somnolence,  and motor vehicle accidents. Advised to abstain from driving or operating heavy machinery when drowsy and the use of respiratory suppressants.  He is on chronic pain

## 2017-11-16 ENCOUNTER — APPOINTMENT (OUTPATIENT)
Dept: PHYSICAL THERAPY | Facility: HOSPITAL | Age: 62
End: 2017-11-16
Attending: FAMILY MEDICINE

## 2017-11-21 ENCOUNTER — APPOINTMENT (OUTPATIENT)
Dept: PHYSICAL THERAPY | Facility: HOSPITAL | Age: 62
End: 2017-11-21

## 2017-11-22 ENCOUNTER — HOSPITAL ENCOUNTER (OUTPATIENT)
Dept: PHYSICAL THERAPY | Facility: HOSPITAL | Age: 62
Setting detail: THERAPIES SERIES
Discharge: HOME | End: 2017-11-22
Attending: FAMILY MEDICINE

## 2017-11-22 DIAGNOSIS — M51.36 LUMBAR DEGENERATIVE DISC DISEASE: ICD-10-CM

## 2017-11-22 DIAGNOSIS — M54.42 CHRONIC BILATERAL LOW BACK PAIN WITH BILATERAL SCIATICA: Primary | ICD-10-CM

## 2017-11-22 DIAGNOSIS — M54.2 NECK PAIN: ICD-10-CM

## 2017-11-22 DIAGNOSIS — M54.41 CHRONIC BILATERAL LOW BACK PAIN WITH BILATERAL SCIATICA: Primary | ICD-10-CM

## 2017-11-22 DIAGNOSIS — G89.29 CHRONIC BILATERAL LOW BACK PAIN WITH BILATERAL SCIATICA: Primary | ICD-10-CM

## 2017-11-22 PROCEDURE — G8979 MOBILITY GOAL STATUS: HCPCS | Performed by: PHYSICAL THERAPIST

## 2017-11-22 PROCEDURE — 97163 PT EVAL HIGH COMPLEX 45 MIN: CPT | Performed by: PHYSICAL THERAPIST

## 2017-11-22 PROCEDURE — G8978 MOBILITY CURRENT STATUS: HCPCS | Performed by: PHYSICAL THERAPIST

## 2017-11-23 NOTE — THERAPY EVALUATION
Outpatient Physical Therapy Ortho Initial Evaluation  [facility]     Patient Name: Carlos Hayes  : 1955  MRN: [id number]  Today's Date: 2017      Visit Date: 2017    There is no problem list on file for this patient.       Past Medical History:   Diagnosis Date   • Arthritis    • Back pain    • Concussion    • COPD (chronic obstructive pulmonary disease)    • Hearing impaired    • Hypertension    • Obesity         Past Surgical History:   Procedure Laterality Date   • BACK SURGERY      L3-4 fusion   • BACK SURGERY      L3-S1 fusion   • CARPAL TUNNEL RELEASE Bilateral    • CHOLECYSTECTOMY     • KNEE SURGERY     • THORACIC LAMINECTOMY WITH PLACEMENT OF DORSAL COLUMN STIMULATOR         Visit Dx:     ICD-10-CM ICD-9-CM   1. Chronic bilateral low back pain with bilateral sciatica M54.42 724.2    M54.41 724.3    G89.29 338.29   2. Lumbar degenerative disc disease M51.36 722.52   3. Neck pain M54.2 723.1             Patient History       17 1100          History    Chief Complaint Pain  -TB      Type of Pain Back pain  -TB      Date Current Problem(s) Began 17  -TB      Brief Description of Current Complaint It's been several months since we have seen Edin. He has been in [place] for weight loss and has continued with PT while there. He has lost 106# and has progressed to the point of being able to walk a couple of miles a day and swim for a long time. He is able to walk without his Rollator for short distances. He had facet joint injections in his neck in early October. He says he had been having vertigo prior to this. He is still having pain in his back and right hip, but his headaches and neck pain have flared recently.  His vertigo flared recently as well to the point of needing someone to drive him. He feels it when he gets OOB to go to the bathroom. He has had falls several times. He can't remember if it's from the vertigo or the back problems. He still  struggles with his memory as well.   -TB      Previous treatment for THIS PROBLEM Massage;Pain Management;Rehabilitation;Medication;Injections  -TB      Patient/Caregiver Goals Relieve pain;Improve mobility;Improve strength  -TB      Patient's Rating of General Health Good  -TB      Hand Dominance right-handed  -TB      Occupation/sports/leisure activities disabled  -TB      Patient seeing anyone else for problem(s)? Yes  -TB      How has patient tried to help current problem? Pain management clinic, [facility] residential facit[facility], PT while in [place].  -TB      What clinical tests have you had for this problem? CT scan;X-ray;Nerve Conduction Test  -TB      Results of Clinical Tests stenosis in upper lumbar with osteophyte formation bridging vertebra; degeneration and stenosis of lower cervical spine  -TB      What clinical tests have you had for this problem? Myelogram   during his last episode of care; no new ones  -TB      Fall Risk Assessment    Any falls in the past year: Yes  -TB      Factors that contributed to the fall: Lost balance  -TB      Does patient have a fear of falling No  -TB      Services    Prior Rehab/Home Health Experiences Yes  -TB      Where was the prior experience with Rehab/[facility] Health here at [facility]  -TB      Do you plan to receive Home Health services in the near future No  -TB      Daily Activities    Primary Language English  -TB      How does patient learn best? Listening  -TB      Teaching needs identified Management of Condition  -TB      Patient is concerned about/has problems with Difficulty with self care (i.e. bathing, dressing, toileting:  -TB      Does patient have problems with the following? Depression;Anxiety  -TB      Barriers to learning Hearing  -TB      Pt Participated in POC and Goals Yes  -TB      Safety    Are you being hurt, hit, or frightened by anyone at home or in your life? No  -TB      Are you being neglected by a caregiver No  -TB        User Key  (r) =  Recorded By, (t) = Taken By, (c) = Cosigned By    Initials Name Provider Type    TB Juarez Hartmann, PT Physical Therapist                PT Ortho       11/22/17 1100    Pain Assessment    Pain Assessment 0-10  -TB    Pain Score 6  -TB    Pain Type Chronic pain  -TB    Pain Location Back  -TB    Pain Orientation Right;Left;Mid;Lower;Posterior  -TB    Pain Radiating Towards he has pain radiating down oliver legs and into his groin, right worse than the left; his feet will hurt  -TB    Multiple Pain Sites Yes  -TB    Pain 2    Pain Score 2 6  -TB    Pre Tx Pain Score 2 6  -TB    Pain Type 2 Chronic pain  -TB    Pain Location 2 Neck  -TB    Pain Orientation 2 Right;Left;Mid;Lower;Posterior  -TB    Pain Radiating Towards 2 pain radiates into head and occasionally into right arm  -TB    Pain Frequency 2 Constant/continuous  -TB    Response to Interventions 2 he was able to turn his head to the right and into extension better after the injections and was equal to the left; starting to stiffen up again  -TB    Posture/Observations    Alignment Options Forward head;Thoracic kyphosis;Rounded shoulders  -TB    Forward Head Severe;Increased  -TB    Thoracic Kyphosis Severe;Increased  -TB    Rounded Shoulders Severe;Increased  -TB    Posture/Observations Comments He stands and walks in a very severe kyphotic posture and struggles to keep his head up when sitting; he has to drop his head occasionally to rest it. His lumbar is fused in neutral. In standing his trunk is sidebent to the left  -TB    Quarter Clearing    Quarter Clearing Upper Quarter Clearing  -TB    Special Tests/Palpation    Special Tests/Palpation Lumbar/SI  -TB    Cervical Palpation    Cervical Palpation- Location? Suboccipital;Upper traps  -TB    Cervical Accessory Motions    Cervical Accessory Motions Tested? Yes  -TB    OA Flexion Hypomobile  -TB    Thoracic Accessory Motions    Thoracic Accessory Motions Tested? --  -TB    Pa glide- Upper thoracic Hypomobile   -TB    Pa glide- Middle thoracic Hypomobile  -TB    Pa glide- Lower thoracic Hypomobile  -TB    Lumbosacral Palpation    SI Tender;Right:  -TB    Piriformis --  -TB    Quadratus Lumborum Guarded/taut;Right:  -TB    Pelvic Floor Right:   tender closer to pubis and levator ani  -TB    Iliopsoas Right:;Tender  -TB    Lumbosacral Palpation? Yes  -TB    Lumbosacral Accessory Motions    Lumbosacral Accessory Motions Tested? Yes  -TB    PA Glide- L1 Hypomobile  -TB    PA [place]- L2 Hypermobile  -TB    PA Glide- L3 Immobile  -TB    PA Glide- L4 Immobile  -TB    PA Glide- L5 Immobile  -TB    PA glide- Sacral base Immobile  -TB    Lumbar/SI Special Tests    SLR (Neural Tension) Bilateral:;Negative  -TB    Left Hip    Flexion PROM Deficit 120 deg  -TB    Extension PROM Deficit -10 deg  -TB    Internal Rotation PROM Deficit 30  -TB    External Rotation PROM Deficit 45  -TB    Right Hip    Flexion PROM Deficit 110 deg  -TB    Extension PROM Deficit -10 deg  -TB    Internal Rotation PROM Deficit 5 with pain in posterior hip  -TB    External Rotation PROM Deficit 45  -TB    MMT (Manual Muscle Testing)    General MMT Assessment Detail 4/5 oliver hip abd  -TB    Pathomechanics    Spine Pathomechanics Bends knees with attempted lumbar extension;Hinges into extension at one segment in lumbar;Excessive thoracic kyphosis with forward bend;Hinges into extension in lower cervical;Limited upper thoracic motion with cervical ROM;Limited opposite AA rotation with cervical sidebending  -TB    Gait Assessment/Treatment    Gait, Comment walked in today with no assistive device; he uses his Rollator for longer distances when he might have to sit; he walks with a forward stooped posture  -TB      User Key  (r) = Recorded By, (t) = Taken By, (c) = Cosigned By    Initials Name Provider Type    TB Juarez Hartmann PT Physical Therapist                            Therapy Education       11/22/17 1100          Therapy Education    Education Details  anatomy of pelvic floor  -TB      Given HEP;Symptoms/condition management   encouraged to walk  -TB      Program New  -TB      How Provided Verbal;Written  -TB      Provided to Patient  -TB      Level of Understanding Verbalized  -TB        User Key  (r) = Recorded By, (t) = Taken By, (c) = Cosigned By    Initials Name Provider Type    TB Juarez Hartmann, PT Physical Therapist                PT OP Goals       11/22/17 1100       PT Short Term Goals    STG Date to Achieve 01/27/17  -TB     STG 1 Relax muscle guarding on oliver hip flexors  -TB     STG 1 Progress New  -TB     STG 2 Improve mobility of thoracic into extension  -TB     STG 2 Progress New  -TB     STG 3 Improve right hip IR to 30 deg  -TB     STG 3 Progress New  -TB     Long Term Goals    LTG Date to Achieve 02/27/17  -TB     LTG 1 Able to walk community distances without an assistive device or only a cane without severe flare of pain  -TB     LTG 1 Progress New  -TB     LTG 2 Improve hip passive extension to 10 degrees  -TB     LTG 2 Progress New  -TB     LTG 3 Improved core contraction held during functional tasks.  -TB     LTG 3 Progress New  -TB     LTG 4 Independent with HEP for flexibility and stability.   -TB     LTG 4 Progress New  -TB     LTG 5 Reports no difficulty with voiding bladder or BM's  -TB     LTG 5 Progress New  -TB     Time Calculation    PT Goal Re-Cert Due Date 12/22/17  -TB       User Key  (r) = Recorded By, (t) = Taken By, (c) = Cosigned By    Initials Name Provider Type    TB Juarez Hartmann PT Physical Therapist                PT Assessment/Plan       11/22/17 1100       PT Assessment    Functional Limitations Impaired gait;Impaired locomotion;Limitation in home management;Performance in leisure activities;Limitations in functional capacity and performance  -TB     Impairments Balance;Gait;Pain;Muscle strength;Motor function;Joint mobility;Impaired muscle length;Range of motion;Posture;Impaired flexibility  -TB     Assessment  Comments Edin has made so much progress while being in [place], especially with losing over a 100 pounds. He is walking farther and moving better. He still has some areas he is struggling with. His hips, particular his right hip are limited in PROM. He is starting to learn control over his core muscles from working with the PT at [facility] but struggles maintaining this contraction through functional movements as his TA mms tend to relax as he breathes. As we were talking, he mentioned how he struggles emptying his bladder and how walking or having a BM will help him to release this. He also struggles completely clearing a BM at times unless he sits and fully flexes. He also c/o intermittent numbness over his penis. This, along with the tenderness and pain around his right hip makes me think there could be a pelvic floor issue to go along with his back pain. His back pain focuses around L2/3 just above his fusion and he has shown radicular pain from here before into his groin and proximal thigh but this pain appears more local. It would not be unusual for there to be pelvic floor guarding considering his multiple back surgeries and the radicular pain he's had. Tightness over his obturator internus could cause some compression over the pudendal nerve which would lead to some of the numbness he has over his penis. He also c/o neck pain and headaches that would all be tied to his forward slouched posture which would perpetuate the stress to his low back. If we can improve his hip mobility and continue to strengthen his core, he has a chance to move better with less risk of falling and further injury. He doesn't plan to go back to [facility] so we will be seeing him for an extended period of time. It will be important that we are consistent with his PT to prevent him from weakening or stiffening up and reverting farther back. If we can stay consistent for an extended period of time, he has a better chance of avoided more issues later.     -TB     Please refer to paper survey for additional self-reported information Yes  -TB     Rehab Potential Good  -TB     Patient/caregiver participated in establishment of treatment plan and goals Yes  -TB     Patient would benefit from skilled therapy intervention Yes  -TB     PT Plan    PT Frequency 1x/week;2x/week  -TB     Predicted Duration of Therapy Intervention (days/wks) 3 months  -TB     Planned CPT's? PT EVAL HIGH COMPLEXITY: 77901;PT THER PROC EA 15 MIN: 42414;PT THER ACT EA 15 MIN: 86360;PT MANUAL THERAPY EA 15 MIN: 44707;PT GAIT TRAINING EA 15 MIN: 45895;PT ELECTRICAL STIM UNATTEND: ;PT ELECTRICAL STIM ATTD EA 15 MIN: 60679;PT ULTRASOUND EA 15 MIN: 46234  -TB     PT Plan Comments We will work on myofascial and soft tissue release early. We will work through his pelvic floor to see if a release her will help. I started some preliminary soft tissue release and he gained about 20 deg of IR of his right hip almost immediately. I spoke with him about the possibiltiy of an internal pelvic floor assessment particular to assess any trigger points through the levator ani or obturator internus. He is going to think about it but is open to it if it will help him progress farther ahead. As his mobility improves, we will progres his core stability and incorporate it into functional activiites. We will work on relaxation techniques for pain relief as well as how to relax for the pelvic floor to aid in voiding.   -TB       User Key  (r) = Recorded By, (t) = Taken By, (c) = Cosigned By    Initials Name Provider Type    TB Juarez Hartmann PT Physical Therapist                                    Time Calculation:   Start Time: 1100  Stop Time: 1300  Time Calculation (min): 120 min     Therapy Charges for Today     Code Description Service Date Service Provider Modifiers Qty    20530411616 HC PT MOBILITY CURRENT 11/22/2017 Juarez Hartmann PT GP, CK 1    12935190907 HC PT MOBILITY PROJECTED 11/22/2017 Juarez CORTEZ  Mary Kate, PT GP, CI 1    30699152316 HC PT EVAL HIGH COMPLEXITY 4 11/22/2017 Juarez Hartmann, PT GP 1          PT G-Codes  Functional Limitation: Mobility: Walking and moving around  Mobility: Walking and Moving Around Current Status (): At least 40 percent but less than 60 percent impaired, limited or restricted  Mobility: Walking and Moving Around Goal Status (): At least 1 percent but less than 20 percent impaired, limited or restricted         Juarez Hartmann, PT  11/22/2017

## 2017-11-29 ENCOUNTER — HOSPITAL ENCOUNTER (OUTPATIENT)
Dept: PHYSICAL THERAPY | Facility: HOSPITAL | Age: 62
Setting detail: THERAPIES SERIES
Discharge: HOME | End: 2017-11-29

## 2017-11-29 DIAGNOSIS — M51.36 LUMBAR DEGENERATIVE DISC DISEASE: ICD-10-CM

## 2017-11-29 DIAGNOSIS — M54.42 CHRONIC BILATERAL LOW BACK PAIN WITH BILATERAL SCIATICA: Primary | ICD-10-CM

## 2017-11-29 DIAGNOSIS — G89.29 CHRONIC BILATERAL LOW BACK PAIN WITH BILATERAL SCIATICA: Primary | ICD-10-CM

## 2017-11-29 DIAGNOSIS — M54.41 CHRONIC BILATERAL LOW BACK PAIN WITH BILATERAL SCIATICA: Primary | ICD-10-CM

## 2017-11-29 DIAGNOSIS — M54.2 NECK PAIN: ICD-10-CM

## 2017-11-29 PROCEDURE — 97140 MANUAL THERAPY 1/> REGIONS: CPT | Performed by: PHYSICAL THERAPIST

## 2017-11-29 NOTE — THERAPY TREATMENT NOTE
Outpatient Physical Therapy Ortho Treatment Note  [facility]     Patient Name: Carlos Hayes  : 1955  MRN: [id number]  Today's Date: 2017      Visit Date: 2017    Visit Dx:    ICD-10-CM ICD-9-CM   1. Chronic bilateral low back pain with bilateral sciatica M54.42 724.2    M54.41 724.3    G89.29 338.29   2. Lumbar degenerative disc disease M51.36 722.52   3. Neck pain M54.2 723.1       There is no problem list on file for this patient.       Past Medical History:   Diagnosis Date   • Arthritis    • Back pain    • Concussion    • COPD (chronic obstructive pulmonary disease)    • Hearing impaired    • Hypertension    • Obesity         Past Surgical History:   Procedure Laterality Date   • BACK SURGERY      L3-4 fusion   • BACK SURGERY      L3-S1 fusion   • CARPAL TUNNEL RELEASE Bilateral    • CHOLECYSTECTOMY     • KNEE SURGERY     • THORACIC LAMINECTOMY WITH PLACEMENT OF DORSAL COLUMN STIMULATOR                               PT Assessment/Plan       17 1115       PT Assessment    Assessment Comments Today I did an internal pelvic floor exam after explaining this thoroughly to him at the [facility] and asked him again today if he wanted to try it. I didn't feel any significant TrP and definitely referred pain that would reproduce his symptoms but we will see if he can tell a difference in voiding his bladder tonight if there was any relaxation from it. His neck and headaches were his biggest c/o today. He also c/o pain along his left L5 dermatome which he described as sharp intermittently. I would assume if he's fused that this level wouldn't impinge on the nerve in his back but he's concerned that his lumbar never fused as there was enough movement for the pedicle screw to break.  -TB     PT Plan    PT Plan Comments Assess his voiding and hip pain to see if any change. I don't anticipate needing to do another internal pelvic floor work but we may work externally if needed.    -TB       User Key  (r) = Recorded By, (t) = Taken By, (c) = Cosigned By    Initials Name Provider Type    SILVA Hartmann PT Physical Therapist                    Exercises       11/29/17 1115          Subjective Comments    Subjective Comments He c/c today is his neck pain and headaches. His pain worsens when he is up walking.  -TB      Subjective Pain    Pre-Treatment Pain Level 6  -TB        User Key  (r) = Recorded By, (t) = Taken By, (c) = Cosigned By    Initials Name Provider Type    SILVA Hartmann PT Physical Therapist                        Manual Rx (last 36 hours)      Manual Treatments       11/29/17 1115          Manual Rx 1    Manual Rx 1 Location hooklying manual cx traction and subocc release  -TB      Manual Rx 1 Type pressure and distraction on left relieved left eye pain  -TB      Manual Rx 1 Grade sustained  -TB      Manual Rx 1 Duration 20  -TB      Manual Rx 2    Manual Rx 2 Location hooklying right UT TrP pressure  -TB      Manual Rx 2 Duration 15  -TB      Manual Rx 3    Manual Rx 3 Location internal pelvic floor exam and TrP pressure  -TB      Manual Rx 3 Type left sidelying with pillow between knees  -TB      Manual Rx 3 Grade found tender point on pelvic floor close to left pubic that referred to his penis; right obturator internus did not have active TrP and no significant tenderness or referred pain. Coccygeal muscle on left some tender comparted to right; levator ani immediately surrounding rectum not tender  -TB      Manual Rx 3 Duration 20  -TB      Manual Rx 4    Manual Rx 4 Location PROM oliver hips all planes in hooklying with emphasis on hip flexion/add/ER and IR  -TB      Manual Rx 4 Type his hip IR was 20 degrees immediately following pelvic floor work  -TB      Manual Rx 4 Duration 10  -TB        User Key  (r) = Recorded By, (t) = Taken By, (c) = Cosigned By    Initials Name Provider Type    SILVA Hartmann PT Physical Therapist                PT OP Goals        11/29/17 1115       PT Short Term Goals    STG Date to Achieve 01/27/17  -TB     STG 1 Relax muscle guarding on oliver hip flexors  -TB     STG 1 Progress Ongoing  -TB     STG 1 Progress Comments not as tender today but does have intermittent guarding  -TB     STG 2 Improve mobility of thoracic into extension  -TB     STG 2 Progress Ongoing  -TB     STG 3 Improve right hip IR to 30 deg  -TB     STG 3 Progress Ongoing  -TB     STG 3 Progress Comments 20 deg after pelvic floor work  -TB     Long Term Goals    LTG Date to Achieve 02/27/17  -TB     LTG 1 Able to walk community distances without an assistive device or only a cane without severe flare of pain  -TB     LTG 1 Progress Ongoing  -TB     LTG 2 Improve hip passive extension to 10 degrees  -TB     LTG 2 Progress Ongoing  -TB     LTG 3 Improved core contraction held during functional tasks.  -TB     LTG 3 Progress Ongoing  -TB     LTG 4 Independent with HEP for flexibility and stability.   -TB     LTG 4 Progress Ongoing  -TB     LTG 5 Reports no difficulty with voiding bladder or BM's  -TB     LTG 5 Progress Ongoing  -TB     Time Calculation    PT Goal Re-Cert Due Date 12/22/17  -TB       User Key  (r) = Recorded By, (t) = Taken By, (c) = Cosigned By    Initials Name Provider Type    SILVA Hartmann, CHARITY Physical Therapist                Therapy Education       11/29/17 1115          Therapy Education    Given HEP;Symptoms/condition management   encouraged to walk  -TB      Program Reinforced  -TB      How Provided Verbal;Written  -TB      Provided to Patient  -TB      Level of Understanding Verbalized  -TB        User Key  (r) = Recorded By, (t) = Taken By, (c) = Cosigned By    Initials Name Provider Type    SILVA Hartmann PT Physical Therapist                Time Calculation:   Start Time: 1115  Stop Time: 1230  Time Calculation (min): 75 min  Total Timed Code Minutes- PT: 65 minute(s)    Therapy Charges for Today     Code Description Service Date Service  Provider Modifiers Qty    [id number]  PT MANUAL THERAPY EA 15 MIN 11/29/2017 Juarez Hartmann, PT KX, GP 4                    Juarez Hartmann, PT  11/29/2017

## 2017-12-06 ENCOUNTER — HOSPITAL ENCOUNTER (OUTPATIENT)
Dept: SLEEP CENTER | Age: 62
Discharge: HOME | End: 2017-12-06
Payer: MEDICARE

## 2017-12-06 ENCOUNTER — HOSPITAL ENCOUNTER (OUTPATIENT)
Dept: PHYSICAL THERAPY | Facility: HOSPITAL | Age: 62
Setting detail: THERAPIES SERIES
Discharge: HOME | End: 2017-12-06

## 2017-12-06 DIAGNOSIS — M54.41 CHRONIC BILATERAL LOW BACK PAIN WITH BILATERAL SCIATICA: Primary | ICD-10-CM

## 2017-12-06 DIAGNOSIS — M54.42 CHRONIC BILATERAL LOW BACK PAIN WITH BILATERAL SCIATICA: Primary | ICD-10-CM

## 2017-12-06 DIAGNOSIS — M54.2 NECK PAIN: ICD-10-CM

## 2017-12-06 DIAGNOSIS — M51.36 LUMBAR DEGENERATIVE DISC DISEASE: ICD-10-CM

## 2017-12-06 DIAGNOSIS — G89.29 CHRONIC BILATERAL LOW BACK PAIN WITH BILATERAL SCIATICA: Primary | ICD-10-CM

## 2017-12-06 PROCEDURE — 95811 POLYSOM 6/>YRS CPAP 4/> PARM: CPT | Performed by: PSYCHIATRY & NEUROLOGY

## 2017-12-06 PROCEDURE — 97140 MANUAL THERAPY 1/> REGIONS: CPT | Performed by: PHYSICAL THERAPIST

## 2017-12-06 PROCEDURE — 95811 POLYSOM 6/>YRS CPAP 4/> PARM: CPT

## 2017-12-06 PROCEDURE — 97110 THERAPEUTIC EXERCISES: CPT | Performed by: PHYSICAL THERAPIST

## 2017-12-06 NOTE — THERAPY TREATMENT NOTE
Outpatient Physical Therapy Ortho Treatment Note  [facility]     Patient Name: Carlos Hayes  : 1955  MRN: [id number]  Today's Date: 2017      Visit Date: 2017    Visit Dx:    ICD-10-CM ICD-9-CM   1. Chronic bilateral low back pain with bilateral sciatica M54.42 724.2    M54.41 724.3    G89.29 338.29   2. Lumbar degenerative disc disease M51.36 722.52   3. Neck pain M54.2 723.1       There is no problem list on file for this patient.       Past Medical History:   Diagnosis Date   • Arthritis    • Back pain    • Concussion    • COPD (chronic obstructive pulmonary disease)    • Hearing impaired    • Hypertension    • Obesity         Past Surgical History:   Procedure Laterality Date   • BACK SURGERY      L3-4 fusion   • BACK SURGERY      L3-S1 fusion   • CARPAL TUNNEL RELEASE Bilateral    • CHOLECYSTECTOMY     • KNEE SURGERY     • THORACIC LAMINECTOMY WITH PLACEMENT OF DORSAL COLUMN STIMULATOR                               PT Assessment/Plan       17 1115       PT Assessment    Assessment Comments The internal pelvic floor release appears it could have been beneficial to freeing up his urine stream. We can discuss other relaxation techiques for him for this area as well. He responded well to the manual traction for his neck and said it was the best he had felt in a week.   -TB     PT Plan    PT Plan Comments Continue to assess his hip mobility and work to improve core stability and tone of his abominals. Continue to try to steal mobility from his thoracic and hips to keep the stress off his L2/3 and lower cervical spine.   -TB       User Key  (r) = Recorded By, (t) = Taken By, (c) = Cosigned By    Initials Name Provider Type    TB Juarez Hartmann, PT Physical Therapist                    Exercises       17 1115          Subjective Comments    Subjective Comments He went to [place] and had to use a w/c to be able to walk through the airport. He had to come back early  because of a family emergency but says this past week, his neck has been really hurting him. He says after the last session where we did the internal assess and TrP release of his pelvic floor, he was able to urninate much easier without work to relax or have a BM first.   -TB      Subjective Pain    Pre-Treatment Pain Level 7  -TB      Exercise 1    Exercise Name 1 I spent time explaining why he needs a rolling walker at times to walk distances. I explained that because of the lumbar fusion from S1 up to L3 (as well as him being told by a surgeon that the rods were too straight and not lordotic enough), and the hyperkyphosis of his entire thoracic with the stiffness as well, that when he tries to stand erect, all of the extension was happening at L2/3. It was causing a retrolisthesis of L2 as increasing the amount of stenosis of the intervertebral foramen which was leading to worsening radicular symptoms. I had advised him  a couple of years ago to start using the rolling walking in order to allow him to walk but decrease the amount of stress and strain on his back. We have since focused on his hip mobility as this was his best chance to gain mobility and being able to walk better. His losing so much weight has also made a big difference for him and he is now able to walk short distances with a cane or no assistive device. I told him my preference would be to walk more frequent short distances with a cane, but if he wants to walk for distance for conditioning or to get his heart rate up, to use the rolling walker to prevent him from flaring his back pain. I also explained that the same type of hyperextension from the hyperkyphosis of his thoracic would also cause stress on his lower cervical and suboccipital which could lead to the neck pain and guarding, the headaches and some radicular symptoms if it progressed too far. The round posture from the hyperkyphosis could also cause shoulder impingement which he  reported having issues with in the past needing an injection and also started feeling more recently. I told him that looking at the Xray a couple of years ago of a lateral view of his thoracic spine with the amount of degenerative disc, he could be very limited on how much thoracic extension he might be able to get.   -TB      Time (Minutes) 1 25  -TB      Additional Comments I used a model of a spine to show him the anatomy of the stress to L2/3 and his lower neck and how there should be a 2:1 ratio of his thoracic kyphosis to his lumbar lordosis.   -TB        User Key  (r) = Recorded By, (t) = Taken By, (c) = Cosigned By    Initials Name Provider Type    TB Juarez Hartmann, PT Physical Therapist                        Manual Rx (last 36 hours)      Manual Treatments       12/06/17 1115          Manual Rx 1    Manual Rx 1 Location hooklying oliver lower cx extension mobs  -TB      Manual Rx 1 Type sustained  -TB      Manual Rx 1 Grade 3  -TB      Manual Rx 1 Duration 8 min each side  -TB      Manual Rx 2    Manual Rx 2 Location hooklying manual cervical traction  -TB      Manual Rx 2 Type alternated hands as needed due to fatigue  -TB      Manual Rx 2 Grade he fell asleep during this; I noted he was showing sleep apnea while asleep and every 1-2 minutes, he would stop breathing for about 5-10 seconds, then gasp for air  -TB      Manual Rx 2 Duration 20  -TB        User Key  (r) = Recorded By, (t) = Taken By, (c) = Cosigned By    Initials Name Provider Type    TB Juarez Hartmann, PT Physical Therapist                PT OP Goals       12/06/17 1115       PT Short Term Goals    STG Date to Achieve 01/27/17  -TB     STG 1 Relax muscle guarding on oliver hip flexors  -TB     STG 1 Progress Ongoing  -TB     STG 2 Improve mobility of thoracic into extension  -TB     STG 2 Progress Ongoing  -TB     STG 3 Improve right hip IR to 30 deg  -TB     STG 3 Progress Ongoing  -TB     Long Term Goals    LTG Date to Achieve 02/27/17  -TB      LTG 1 Able to walk community distances without an assistive device or only a cane without severe flare of pain  -TB     LTG 1 Progress Ongoing  -TB     LTG 2 Improve hip passive extension to 10 degrees  -TB     LTG 2 Progress Ongoing  -TB     LTG 3 Improved core contraction held during functional tasks.  -TB     LTG 3 Progress Ongoing  -TB     LTG 4 Independent with HEP for flexibility and stability.   -TB     LTG 4 Progress Ongoing  -TB     LTG 5 Reports no difficulty with voiding bladder or BM's  -TB     LTG 5 Progress Ongoing  -TB     LTG 5 Progress Comments he showed a significant improvement after the pelvic floor release  -TB     Time Calculation    PT Goal Re-Cert Due Date 12/22/17  -TB       User Key  (r) = Recorded By, (t) = Taken By, (c) = Cosigned By    Initials Name Provider Type    SILVA Hartmann PT Physical Therapist                Therapy Education       12/06/17 1115          Therapy Education    Given HEP;Symptoms/condition management  -TB      Program Reinforced  -TB      How Provided Verbal;Written  -TB      Provided to Patient  -TB      Level of Understanding Verbalized  -TB        User Key  (r) = Recorded By, (t) = Taken By, (c) = Cosigned By    Initials Name Provider Type    SILVA Hartmann PT Physical Therapist                Time Calculation:   Start Time: 1115  Stop Time: 1215  Time Calculation (min): 60 min  Total Timed Code Minutes- PT: 60 minute(s)    Therapy Charges for Today     Code Description Service Date Service Provider Modifiers Qty    00794087050 HC PT MANUAL THERAPY EA 15 MIN 12/6/2017 Juarez Hartmann PT KX, GP 2    77613287999 HC PT THER PROC EA 15 MIN 12/6/2017 Juarez Hartmann PT KX, GP 2                    Juarez Hartmann, PT  12/6/2017

## 2017-12-10 DIAGNOSIS — G47.33 OBSTRUCTIVE SLEEP APNEA: Primary | ICD-10-CM

## 2017-12-13 ENCOUNTER — HOSPITAL ENCOUNTER (OUTPATIENT)
Dept: PHYSICAL THERAPY | Facility: HOSPITAL | Age: 62
Setting detail: THERAPIES SERIES
Discharge: HOME | End: 2017-12-13

## 2017-12-13 DIAGNOSIS — G89.29 CHRONIC BILATERAL LOW BACK PAIN WITH BILATERAL SCIATICA: Primary | ICD-10-CM

## 2017-12-13 DIAGNOSIS — M54.41 CHRONIC BILATERAL LOW BACK PAIN WITH BILATERAL SCIATICA: Primary | ICD-10-CM

## 2017-12-13 DIAGNOSIS — M54.2 NECK PAIN: ICD-10-CM

## 2017-12-13 DIAGNOSIS — M51.36 LUMBAR DEGENERATIVE DISC DISEASE: ICD-10-CM

## 2017-12-13 DIAGNOSIS — M54.42 CHRONIC BILATERAL LOW BACK PAIN WITH BILATERAL SCIATICA: Primary | ICD-10-CM

## 2017-12-13 PROCEDURE — 97140 MANUAL THERAPY 1/> REGIONS: CPT | Performed by: PHYSICAL THERAPIST

## 2017-12-13 NOTE — THERAPY TREATMENT NOTE
Outpatient Physical Therapy Ortho Treatment Note  [facility]     Patient Name: Carlos Hayes  : 1955  MRN: [id number]  Today's Date: 2017      Visit Date: 2017    Visit Dx:    ICD-10-CM ICD-9-CM   1. Chronic bilateral low back pain with bilateral sciatica M54.42 724.2    M54.41 724.3    G89.29 338.29   2. Lumbar degenerative disc disease M51.36 722.52   3. Neck pain M54.2 723.1       There is no problem list on file for this patient.       Past Medical History:   Diagnosis Date   • Arthritis    • Back pain    • Concussion    • COPD (chronic obstructive pulmonary disease)    • Hearing impaired    • Hypertension    • Obesity         Past Surgical History:   Procedure Laterality Date   • BACK SURGERY      L3-4 fusion   • BACK SURGERY      L3-S1 fusion   • CARPAL TUNNEL RELEASE Bilateral    • CHOLECYSTECTOMY     • KNEE SURGERY     • THORACIC LAMINECTOMY WITH PLACEMENT OF DORSAL COLUMN STIMULATOR                               PT Assessment/Plan       17 1200       PT Assessment    Assessment Comments He had a flare likely from sitting in the hospital room with his mom for so long.   -TB     PT Plan    PT Plan Comments Continue to emphasize hip flexibility and manual traction and progress to standing core stabiltiy.  -TB       User Key  (r) = Recorded By, (t) = Taken By, (c) = Cosigned By    Initials Name Provider Type    TB Juarez Hartmann, PT Physical Therapist                    Exercises       17 1120          Subjective Comments    Subjective Comments He had 2 pain injections yesterday due to having a bad flare of neck and shoulder pain. He says it's been getting progressing worse since he came back home. There was no particular cause of the flare. He's been at the hospital the last couple of days with his mom. Most of his pain is in his lumbar and hips and headaches.   -TB      Subjective Pain    Pre-Treatment Pain Level 6  -TB        User Key  (r) = Recorded  By, (t) = Taken By, (c) = Cosigned By    Initials Name Provider Type    TB Juarez Hartmann, PT Physical Therapist                        Manual Rx (last 36 hours)      Manual Treatments       12/13/17 1120          Manual Rx 1    Manual Rx 1 Location hooklying oliver lower cx extension mobs  -TB      Manual Rx 1 Type sustained  -TB      Manual Rx 1 Grade 3  -TB      Manual Rx 1 Duration 5 min each side  -TB      Manual Rx 2    Manual Rx 2 Location hooklying manual cervical traction  -TB      Manual Rx 2 Type alternated hands as needed due to fatigue  -TB      Manual Rx 2 Grade he fell asleep during this; I noted he was showing sleep apnea while asleep and every 1-2 minutes, he would stop breathing for about 5-10 seconds, then gasp for air  -TB      Manual Rx 2 Duration 15  -TB      Manual Rx 3    Manual Rx 3 Location hooklying oliver UT STM  -TB      Manual Rx 3 Type also did passive oliver pect minor stretching  -TB      Manual Rx 3 Grade he c/o right arm numbness after lying here  -TB      Manual Rx 3 Duration 15  -TB      Manual Rx 4    Manual Rx 4 Location PROM oliver hips all planes in hooklying with emphasis on hip flexion/add/ER and IR  -TB      Manual Rx 4 Type his hip IR was 20 degrees immediately following pelvic floor work  -TB      Manual Rx 4 Grade sustained gentle with some LAD  -TB      Manual Rx 4 Duration 10 min each leg  -TB        User Key  (r) = Recorded By, (t) = Taken By, (c) = Cosigned By    Initials Name Provider Type    TB Juarez Hartmann, PT Physical Therapist                PT OP Goals       12/13/17 1200       PT Short Term Goals    STG Date to Achieve 01/27/17  -TB     STG 1 Relax muscle guarding on oliver hip flexors  -TB     STG 1 Progress Ongoing  -TB     STG 2 Improve mobility of thoracic into extension  -TB     STG 2 Progress Ongoing  -TB     STG 3 Improve right hip IR to 30 deg  -TB     STG 3 Progress Ongoing  -TB     STG 3 Progress Comments 15 deg without stretching  -TB     Long Term Goals     LTG Date to Achieve 02/27/17  -TB     LTG 1 Able to walk community distances without an assistive device or only a cane without severe flare of pain  -TB     LTG 1 Progress Ongoing  -TB     LTG 1 Progress Comments walking in the clinic without a cane or wx  -TB     LTG 2 Improve hip passive extension to 10 degrees  -TB     LTG 2 Progress Ongoing  -TB     LTG 3 Improved core contraction held during functional tasks.  -TB     LTG 3 Progress Ongoing  -TB     LTG 4 Independent with HEP for flexibility and stability.   -TB     LTG 4 Progress Ongoing  -TB     LTG 5 Reports no difficulty with voiding bladder or BM's  -TB     LTG 5 Progress Ongoing  -TB     LTG 5 Progress Comments he says that this is still easier since the pelvic floor release  -TB     Time Calculation    PT Goal Re-Cert Due Date 12/22/17  -TB       User Key  (r) = Recorded By, (t) = Taken By, (c) = Cosigned By    Initials Name Provider Type    TB Juarez Hartmann, PT Physical Therapist          Therapy Education  Given: HEP, Symptoms/condition management  Program: Reinforced  How Provided: Verbal, Written  Provided to: Patient  Level of Understanding: Verbalized              Time Calculation:   Start Time: 1120  Stop Time: 1215  Time Calculation (min): 55 min  Total Timed Code Minutes- PT: 55 minute(s)    Therapy Charges for Today     Code Description Service Date Service Provider Modifiers Qty    10485971826 HC PT MANUAL THERAPY EA 15 MIN 12/13/2017 Juarez Hartmann, PT KX, GP 4                    Juarez Hartmann, PT  12/13/2017

## 2017-12-19 ENCOUNTER — TELEPHONE (OUTPATIENT)
Dept: NEUROSURGERY | Age: 62
End: 2017-12-19

## 2017-12-19 NOTE — TELEPHONE ENCOUNTER
----- Message from Odalys Kamara MD sent at 12/18/2017  5:44 PM CST -----  That is correct. No central apnea documented so no need for NiPPV and no way to get insurance to cover one      ----- Message -----  From: Adele Lopez  Sent: 12/18/2017   9:53 AM  To: MD Dr. Howard Chavira wanted me to send you a message regarding this patient. She said patient is questioning the new DME order. Caesar Caraballo says that patient has been on a NIPPV for many years and the new study shows he only needed a Bipap. Just let me know. Thanks Annemarie Dailey

## 2017-12-19 NOTE — TELEPHONE ENCOUNTER
Called and spoke with Caesar Caraballo at [facility] to let her know what Dr. Reagan Diego had said about this patient DME machine order. She will call the patient to let him this information.

## 2017-12-20 ENCOUNTER — HOSPITAL ENCOUNTER (OUTPATIENT)
Dept: PHYSICAL THERAPY | Facility: HOSPITAL | Age: 62
Setting detail: THERAPIES SERIES
Discharge: HOME | End: 2017-12-20

## 2017-12-20 DIAGNOSIS — M51.36 LUMBAR DEGENERATIVE DISC DISEASE: ICD-10-CM

## 2017-12-20 DIAGNOSIS — G89.29 CHRONIC BILATERAL LOW BACK PAIN WITH BILATERAL SCIATICA: Primary | ICD-10-CM

## 2017-12-20 DIAGNOSIS — M54.2 NECK PAIN: ICD-10-CM

## 2017-12-20 DIAGNOSIS — M54.42 CHRONIC BILATERAL LOW BACK PAIN WITH BILATERAL SCIATICA: Primary | ICD-10-CM

## 2017-12-20 DIAGNOSIS — M54.41 CHRONIC BILATERAL LOW BACK PAIN WITH BILATERAL SCIATICA: Primary | ICD-10-CM

## 2017-12-20 PROCEDURE — 97140 MANUAL THERAPY 1/> REGIONS: CPT | Performed by: PHYSICAL THERAPIST

## 2017-12-20 NOTE — THERAPY PROGRESS REPORT/RE-CERT
Outpatient Physical Therapy Ortho Progress Note  [facility]     Patient Name: Carlos Hayes  : 1955  MRN: [id number]  Today's Date: 2017      Visit Date: 2017    Visit Dx:    ICD-10-CM ICD-9-CM   1. Chronic bilateral low back pain with bilateral sciatica M54.42 724.2    M54.41 724.3    G89.29 338.29   2. Lumbar degenerative disc disease M51.36 722.52   3. Neck pain M54.2 723.1       There is no problem list on file for this patient.       Past Medical History:   Diagnosis Date   • Arthritis    • Back pain    • Concussion    • COPD (chronic obstructive pulmonary disease)    • Hearing impaired    • Hypertension    • Obesity         Past Surgical History:   Procedure Laterality Date   • BACK SURGERY      L3-4 fusion   • BACK SURGERY      L3-S1 fusion   • CARPAL TUNNEL RELEASE Bilateral    • CHOLECYSTECTOMY     • KNEE SURGERY     • THORACIC LAMINECTOMY WITH PLACEMENT OF DORSAL COLUMN STIMULATOR                               PT Assessment/Plan       17 1130       PT Assessment    Functional Limitations Impaired gait;Impaired locomotion;Limitation in home management;Performance in leisure activities;Limitations in functional capacity and performance  -TB     Impairments Balance;Gait;Pain;Muscle strength;Motor function;Joint mobility;Impaired muscle length;Range of motion;Posture;Impaired flexibility  -TB     Assessment Comments He has been doing better, particularly in his neck since he got the injections. He continues to improve his walking distance while holding in a tight core. A couple of visits ago, I did an internal pelvic floor release as he was c/o difficulty voiding all his urine until he had a BM. This release helped his urine flow quite a bit. Today, we worked again more on his pelvic floor and he was able to stand and move easier.   -TB     Rehab Potential Good  -TB     Patient/caregiver participated in establishment of treatment plan and goals Yes  -TB      Patient would benefit from skilled therapy intervention Yes  -TB     PT Plan    PT Frequency 1x/week;2x/week  -TB     Predicted Duration of Therapy Intervention (days/wks) 2 months  -TB     Planned CPT's? PT THER PROC EA 15 MIN: 96383;PT THER ACT EA 15 MIN: 22347;PT MANUAL THERAPY EA 15 MIN: 14663;PT GAIT TRAINING EA 15 MIN: 00946;PT ELECTRICAL STIM UNATTEND: ;PT ELECTRICAL STIM ATTD EA 15 MIN: 35881;PT ULTRASOUND EA 15 MIN: 71900  -TB     PT Plan Comments We will continue to work on mobility through his upper thoracic and neck and shoulder girdle and hips to allow him to stand more erect and walk with less stress to his back. We will progress with more core stability as his flexibility improves and his pain diminishes.   -TB       User Key  (r) = Recorded By, (t) = Taken By, (c) = Cosigned By    Initials Name Provider Type    SILVA Hartmann, PT Physical Therapist                    Exercises       12/20/17 1115          Subjective Comments    Subjective Comments He says he's doing OK today. His mom has been put in a NH to recover from her pneumonia.   -TB      Subjective Pain    Pre-Treatment Pain Level 5  -TB        User Key  (r) = Recorded By, (t) = Taken By, (c) = Cosigned By    Initials Name Provider Type    SILVA Hartmann, PT Physical Therapist                        Manual Rx (last 36 hours)      Manual Treatments       12/20/17 1130          Manual Rx 1    Manual Rx 1 Location hooklying oliver lower cx extension mobs  -TB      Manual Rx 1 Type sustained  -TB      Manual Rx 1 Grade 3  -TB      Manual Rx 1 Duration 5 min each side  -TB      Manual Rx 3    Manual Rx 3 Location hooklying oliver UT STM  -TB      Manual Rx 3 Type also did passive oliver pect minor stretching  -TB      Manual Rx 3 Grade he c/o right arm numbness after lying here  -TB      Manual Rx 3 Duration 15  -TB      Manual Rx 4    Manual Rx 4 Location oliver piriformis/deep hip external rotators  -TB      Manual Rx 4 Type deep pressure  -TB       Manual Rx 4 Duration 5 min each  -TB      Manual Rx 5    Manual Rx 5 Location Pelvic floor oliver  -TB      Manual Rx 5 Type hooklying with hip externally rotated  -TB      Manual Rx 5 Grade deep pressure oliver   -TB      Manual Rx 5 Duration 15  -TB      Manual Rx 6    Manual Rx 6 Location lower abdomen  -TB      Manual Rx 6 Type MFR  -TB      Manual Rx 6 Duration 10  -TB        User Key  (r) = Recorded By, (t) = Taken By, (c) = Cosigned By    Initials Name Provider Type    TB Juarez Hartmann, PT Physical Therapist                PT OP Goals       12/20/17 1130       PT Short Term Goals    STG Date to Achieve 01/27/17  -TB     STG 1 Relax muscle guarding on oliver hip flexors  -TB     STG 1 Progress Ongoing  -TB     STG 1 Progress Comments his hip flexors are not as guarded  -TB     STG 2 Improve mobility of thoracic into extension  -TB     STG 2 Progress Ongoing  -TB     STG 2 Progress Comments working on CT junction extension mobility  -TB     STG 3 Improve right hip IR to 30 deg  -TB     STG 3 Progress Ongoing  -TB     STG 3 Progress Comments 15 deg without stretching  -TB     Long Term Goals    LTG Date to Achieve 02/27/17  -TB     LTG 1 Able to walk community distances without an assistive device or only a cane without severe flare of pain  -TB     LTG 1 Progress Ongoing  -TB     LTG 1 Progress Comments he is walking short distances without an assistive device; for longer distances (a mile), he uses a rwx  -TB     LTG 2 Improve hip passive extension to 10 degrees  -TB     LTG 2 Progress Ongoing  -TB     LTG 2 Progress Comments to neutral  -TB     LTG 3 Improved core contraction held during functional tasks.  -TB     LTG 3 Progress Ongoing  -TB     LTG 3 Progress Comments he is constantly working on this as he walks  -TB     LTG 4 Independent with HEP for flexibility and stability.   -TB     LTG 4 Progress Ongoing  -TB     LTG 4 Progress Comments he is working on core stability and posture daily  -TB     LTG 5  Reports no difficulty with voiding bladder or BM's  -TB     LTG 5 Progress Ongoing  -TB     LTG 5 Progress Comments he has done better since the internal pelvic floor release, it had started to tighten a bit but not as bad  -TB     Time Calculation    PT Goal Re-Cert Due Date 01/19/18  -TB       User Key  (r) = Recorded By, (t) = Taken By, (c) = Cosigned By    Initials Name Provider Type    TB Juarez Hartmann, PT Physical Therapist          Therapy Education  Given: HEP, Symptoms/condition management  Program: Reinforced  How Provided: Verbal, Written  Provided to: Patient  Level of Understanding: Verbalized              Time Calculation:   Start Time: 1130  Stop Time: 1230  Time Calculation (min): 60 min  Total Timed Code Minutes- PT: 60 minute(s)    Therapy Charges for Today     Code Description Service Date Service Provider Modifiers Qty    78339799398 HC PT MANUAL THERAPY EA 15 MIN 12/20/2017 Juarez Hartmann, PT KX, GP 4                    Juarez Hartmann, PT  12/20/2017

## 2017-12-27 ENCOUNTER — APPOINTMENT (OUTPATIENT)
Dept: PHYSICAL THERAPY | Facility: HOSPITAL | Age: 62
End: 2017-12-27

## 2017-12-29 ENCOUNTER — TELEPHONE (OUTPATIENT)
Dept: NEUROSURGERY | Age: 62
End: 2017-12-29

## 2017-12-29 NOTE — TELEPHONE ENCOUNTER
Called patient to let him know that his appointment for 01-16-18 has been rescheduled due to provider is on call. NO answer. Left a VM regarding when I have him rescheduled.

## 2018-01-03 ENCOUNTER — HOSPITAL ENCOUNTER (OUTPATIENT)
Dept: PHYSICAL THERAPY | Facility: HOSPITAL | Age: 63
Setting detail: THERAPIES SERIES
Discharge: HOME | End: 2018-01-03

## 2018-01-03 DIAGNOSIS — G89.29 CHRONIC BILATERAL LOW BACK PAIN WITH BILATERAL SCIATICA: Primary | ICD-10-CM

## 2018-01-03 DIAGNOSIS — M54.41 CHRONIC BILATERAL LOW BACK PAIN WITH BILATERAL SCIATICA: Primary | ICD-10-CM

## 2018-01-03 DIAGNOSIS — M51.36 LUMBAR DEGENERATIVE DISC DISEASE: ICD-10-CM

## 2018-01-03 DIAGNOSIS — M54.2 NECK PAIN: ICD-10-CM

## 2018-01-03 DIAGNOSIS — M54.42 CHRONIC BILATERAL LOW BACK PAIN WITH BILATERAL SCIATICA: Primary | ICD-10-CM

## 2018-01-03 PROCEDURE — 97140 MANUAL THERAPY 1/> REGIONS: CPT | Performed by: PHYSICAL THERAPIST

## 2018-01-03 NOTE — THERAPY TREATMENT NOTE
Outpatient Physical Therapy Ortho Treatment Note  [facility]     Patient Name: Carlos Hayes  : 1955  MRN: [id number]  Today's Date: 1/3/2018      Visit Date: 2018    Visit Dx:    ICD-10-CM ICD-9-CM   1. Chronic bilateral low back pain with bilateral sciatica M54.42 724.2    M54.41 724.3    G89.29 338.29   2. Lumbar degenerative disc disease M51.36 722.52   3. Neck pain M54.2 723.1       There is no problem list on file for this patient.       Past Medical History:   Diagnosis Date   • Arthritis    • Back pain    • Concussion    • COPD (chronic obstructive pulmonary disease)    • Hearing impaired    • Hypertension    • Obesity         Past Surgical History:   Procedure Laterality Date   • BACK SURGERY      L3-4 fusion   • BACK SURGERY      L3-S1 fusion   • CARPAL TUNNEL RELEASE Bilateral    • CHOLECYSTECTOMY     • KNEE SURGERY     • THORACIC LAMINECTOMY WITH PLACEMENT OF DORSAL COLUMN STIMULATOR                               PT Assessment/Plan       18 1200       PT Assessment    Assessment Comments He wasn't seen last week due to my being out of town which may explain why he tightened up more. He was also sick and had a lot of family business to deal with. He responded very well to the internal pelvic floor release so we will see if this relaxes it again.  -TB     PT Plan    PT Plan Comments Assess effectiveness of release and progress more to thoracic mobility and core stability.  -TB       User Key  (r) = Recorded By, (t) = Taken By, (c) = Cosigned By    Initials Name Provider Type    TB Juarez Hartmann, PT Physical Therapist                    Exercises       18 1115          Subjective Comments    Subjective Comments He says his left LB and flank have been his c/c of pain lately along with his pelvic floor tightening up again. He has noticed that during sex, he had pain in his pelvic floor and had to stop to get it to relax. He also noticed his urine stream has  slowed again unless he has a BM. His neck still hurts but his back and pelvis have been the bigger problem.  -TB      Subjective Pain    Pre-Treatment Pain Level 6  -TB        User Key  (r) = Recorded By, (t) = Taken By, (c) = Cosigned By    Initials Name Provider Type    SILVA Hartmann, PT Physical Therapist                        Manual Rx (last 36 hours)      Manual Treatments       01/03/18 1115          Manual Rx 3    Manual Rx 3 Location right sidelying left lumbar, upper glute and TL paraspinals  -TB      Manual Rx 3 Type STM and deep pressure  -TB      Manual Rx 3 Grade most tender in TL area on left which referred pain into his left lumbar and upper glute  -TB      Manual Rx 3 Duration 15  -TB      Manual Rx 4    Manual Rx 4 Location right sidelying internal pelvic floor MFR  -TB      Manual Rx 4 Type was guarded throughout his levator ani; tender over his left obturator internus  -TB      Manual Rx 4 Duration 15  -TB      Manual Rx 5    Manual Rx 5 Location Pelvic floor oliver  -TB      Manual Rx 5 Type hooklying with hip externally rotated  -TB      Manual Rx 5 Grade deep pressure oliver   -TB      Manual Rx 5 Duration 15  -TB      Manual Rx 6    Manual Rx 6 Location lower abdomen right at insertion onto pubis  -TB      Manual Rx 6 Type MFR  -TB      Manual Rx 6 Duration 5  -TB        User Key  (r) = Recorded By, (t) = Taken By, (c) = Cosigned By    Initials Name Provider Type    SILVA Hartmann, PT Physical Therapist                PT OP Goals       01/03/18 1200       PT Short Term Goals    STG Date to Achieve 01/27/17  -TB     STG 1 Relax muscle guarding on oliver hip flexors  -TB     STG 1 Progress Ongoing  -TB     STG 1 Progress Comments he was more guarded today than he's been in awhile.   -TB     STG 2 Improve mobility of thoracic into extension  -TB     STG 2 Progress Ongoing  -TB     STG 3 Improve right hip IR to 30 deg  -TB     STG 3 Progress Ongoing  -TB     Long Term Goals    LTG Date to Achieve  02/27/17  -TB     LTG 1 Able to walk community distances without an assistive device or only a cane without severe flare of pain  -TB     LTG 1 Progress Ongoing  -TB     LTG 2 Improve hip passive extension to 10 degrees  -TB     LTG 2 Progress Ongoing  -TB     LTG 3 Improved core contraction held during functional tasks.  -TB     LTG 3 Progress Ongoing  -TB     LTG 4 Independent with HEP for flexibility and stability.   -TB     LTG 4 Progress Ongoing  -TB     LTG 5 Reports no difficulty with voiding bladder or BM's  -TB     LTG 5 Progress Ongoing  -TB     LTG 5 Progress Comments his urine stream had slowed down this past week  -TB     Time Calculation    PT Goal Re-Cert Due Date 01/19/18  -TB       User Key  (r) = Recorded By, (t) = Taken By, (c) = Cosigned By    Initials Name Provider Type    TB Juarez Hartmann, PT Physical Therapist          Therapy Education  Given: HEP, Symptoms/condition management  Program: Reinforced  How Provided: Verbal, Written  Provided to: Patient  Level of Understanding: Verbalized              Time Calculation:   Start Time: 1115  Stop Time: 1215  Time Calculation (min): 60 min  Total Timed Code Minutes- PT: 60 minute(s)    Therapy Charges for Today     Code Description Service Date Service Provider Modifiers Qty    16300353876 HC PT MANUAL THERAPY EA 15 MIN 1/3/2018 Juarez Hartmann, PT GP 4                    Juarez Hartmann, PT  1/3/2018

## 2018-01-08 ENCOUNTER — APPOINTMENT (OUTPATIENT)
Dept: PHYSICAL THERAPY | Facility: HOSPITAL | Age: 63
End: 2018-01-08

## 2018-01-15 ENCOUNTER — HOSPITAL ENCOUNTER (OUTPATIENT)
Dept: PHYSICAL THERAPY | Facility: HOSPITAL | Age: 63
Setting detail: THERAPIES SERIES
Discharge: HOME | End: 2018-01-15

## 2018-01-15 DIAGNOSIS — M54.41 CHRONIC BILATERAL LOW BACK PAIN WITH BILATERAL SCIATICA: Primary | ICD-10-CM

## 2018-01-15 DIAGNOSIS — M54.42 CHRONIC BILATERAL LOW BACK PAIN WITH BILATERAL SCIATICA: Primary | ICD-10-CM

## 2018-01-15 DIAGNOSIS — M54.2 NECK PAIN: ICD-10-CM

## 2018-01-15 DIAGNOSIS — G89.29 CHRONIC BILATERAL LOW BACK PAIN WITH BILATERAL SCIATICA: Primary | ICD-10-CM

## 2018-01-15 DIAGNOSIS — M51.36 LUMBAR DEGENERATIVE DISC DISEASE: ICD-10-CM

## 2018-01-15 PROCEDURE — 97140 MANUAL THERAPY 1/> REGIONS: CPT | Performed by: PHYSICAL THERAPIST

## 2018-01-15 NOTE — THERAPY PROGRESS REPORT/RE-CERT
Outpatient Physical Therapy Ortho Progress Note  [facility]     Patient Name: Carlos Hayes  : 1955  MRN: [id number]  Today's Date: 1/15/2018      Visit Date: 01/15/2018    Visit Dx:    ICD-10-CM ICD-9-CM   1. Chronic bilateral low back pain with bilateral sciatica M54.42 724.2    M54.41 724.3    G89.29 338.29   2. Lumbar degenerative disc disease M51.36 722.52   3. Neck pain M54.2 723.1       There is no problem list on file for this patient.       Past Medical History:   Diagnosis Date   • Arthritis    • Back pain    • Concussion    • COPD (chronic obstructive pulmonary disease)    • Hearing impaired    • Hypertension    • Obesity         Past Surgical History:   Procedure Laterality Date   • BACK SURGERY      L3-4 fusion   • BACK SURGERY      L3-S1 fusion   • CARPAL TUNNEL RELEASE Bilateral    • CHOLECYSTECTOMY     • KNEE SURGERY     • THORACIC LAMINECTOMY WITH PLACEMENT OF DORSAL COLUMN STIMULATOR                               PT Assessment/Plan       01/15/18 1700       PT Assessment    Functional Limitations Impaired gait;Impaired locomotion;Limitation in home management;Performance in leisure activities;Limitations in functional capacity and performance  -TB     Impairments Balance;Gait;Pain;Muscle strength;Motor function;Joint mobility;Impaired muscle length;Range of motion;Posture;Impaired flexibility  -TB     Assessment Comments He is still moving better although today his neck was more flared from driving in snow for 2 hours and sitting [place] the [place]. He missed last week due to sickness. The time prior, he had noticed that his urine was starting to slow and be difficult again. We did another internal pelvic floor release and he said he's done very well in this area since.   -TB     Rehab Potential Good  -TB     Patient/caregiver participated in establishment of treatment plan and goals Yes  -TB     Patient would benefit from skilled therapy intervention Yes  -TB      PT Plan    PT Frequency 1x/week;2x/week  -TB     Predicted Duration of Therapy Intervention (days/wks) 2 months  -TB     Planned CPT's? PT THER PROC EA 15 MIN: 71729;PT THER ACT EA 15 MIN: 60162;PT MANUAL THERAPY EA 15 MIN: 80735;PT GAIT TRAINING EA 15 MIN: 88383;PT ELECTRICAL STIM UNATTEND: ;PT ELECTRICAL STIM ATTD EA 15 MIN: 81572;PT ULTRASOUND EA 15 MIN: 35839  -TB     PT Plan Comments We will continue to improve mobility of his hips and posture and progress his stability through his core as well as work on relaxation techniques and manual therapy for his pelvic floor.   -TB       User Key  (r) = Recorded By, (t) = Taken By, (c) = Cosigned By    Initials Name Provider Type    SILVA Hartmann, PT Physical Therapist                    Exercises       01/15/18 1544          Subjective Comments    Subjective Comments His neck is his c/c today. His back is still sore but his neck is the worst.   -TB      Subjective Pain    Pre-Treatment Pain Level 5  -TB        User Key  (r) = Recorded By, (t) = Taken By, (c) = Cosigned By    Initials Name Provider Type    TB Juarez Hartmann, PT Physical Therapist                        Manual Rx (last 36 hours)      Manual Treatments       01/15/18 1545          Manual Rx 1    Manual Rx 1 Location hooklying and left sidelying oliver lower cx extension mobs  -TB      Manual Rx 1 Type sustained  -TB      Manual Rx 1 Grade 3  -TB      Manual Rx 1 Duration 10 min each side; start  -TB      Manual Rx 2    Manual Rx 2 Location hooklying left pect minor stretch  -TB      Manual Rx 2 Type --  -TB      Manual Rx 2 Grade he fell asleep during this and started hurting  -TB      Manual Rx 2 Duration 10  -TB      Manual Rx 3    Manual Rx 3 Location hooklying and left sidelying left 1st rib depressioni  -TB      Manual Rx 3 Type --  -TB      Manual Rx 3 Grade 3 sustained  -TB      Manual Rx 3 Duration 10  -TB      Manual Rx 4    Manual Rx 4 Location left sidelying right UT/LS/rhomboid  -TB       Manual Rx 4 Type --  -TB      Manual Rx 4 Grade --  -TB      Manual Rx 4 Duration 15  -TB      Manual Rx 5    Manual Rx 5 Location --  -TB      Manual Rx 5 Type --  -TB      Manual Rx 5 Grade --  -TB      Manual Rx 5 Duration --  -TB      Manual Rx 6    Manual Rx 6 Location --  -TB      Manual Rx 6 Type --  -TB      Manual Rx 6 Duration --  -TB        User Key  (r) = Recorded By, (t) = Taken By, (c) = Cosigned By    Initials Name Provider Type    TB Juarez Hartmann, PT Physical Therapist                PT OP Goals       01/15/18 1600       PT Short Term Goals    STG Date to Achieve 01/27/17  -TB     STG 1 Relax muscle guarding on oliver hip flexors  -TB     STG 1 Progress Ongoing  -TB     STG 1 Progress Comments this area wasn't a primary pain for him today; his hip flexors haven't been as guarded  -TB     STG 2 Improve mobility of thoracic into extension  -TB     STG 2 Progress Ongoing  -TB     STG 2 Progress Comments he is working on stretching for this  -TB     STG 3 Improve right hip IR to 30 deg  -TB     STG 3 Progress Ongoing  -TB     Long Term Goals    LTG Date to Achieve 02/27/17  -TB     LTG 1 Able to walk community distances without an assistive device or only a cane without severe flare of pain  -TB     LTG 1 Progress Ongoing  -TB     LTG 1 Progress Comments still using his Rollator for longer distances but no asst device for short distances  -TB     LTG 2 Improve hip passive extension to 10 degrees  -TB     LTG 2 Progress Ongoing  -TB     LTG 2 Progress Comments to neutral  -TB     LTG 3 Improved core contraction held during functional tasks.  -TB     LTG 3 Progress Ongoing  -TB     LTG 3 Progress Comments he works on this daily when brushing his teeth  -TB     LTG 4 Independent with HEP for flexibility and stability.   -TB     LTG 4 Progress Ongoing  -TB     LTG 4 Progress Comments focusing on stability as flexibility through his hips  -TB     LTG 5 Reports no difficulty with voiding bladder or BM's   -TB     LTG 5 Progress Ongoing  -TB     LTG 5 Progress Comments this has improved with focus on the pelvic floor releases  -TB     Time Calculation    PT Goal Re-Cert Due Date 02/14/18  -TB       User Key  (r) = Recorded By, (t) = Taken By, (c) = Cosigned By    Initials Name Provider Type    TB Juarez Hartmann, PT Physical Therapist          Therapy Education  Given: HEP, Symptoms/condition management  Program: Reinforced  How Provided: Verbal, Written  Provided to: Patient  Level of Understanding: Verbalized              Time Calculation:   Start Time: 1545  Stop Time: 1645  Time Calculation (min): 60 min  Total Timed Code Minutes- PT: 60 minute(s)    Therapy Charges for Today     Code Description Service Date Service Provider Modifiers Qty    28608018889 HC PT MANUAL THERAPY EA 15 MIN 1/15/2018 Juarez Hartmann, PT GP 4                    Juarez Hartmann, PT  1/15/2018

## 2018-01-17 ENCOUNTER — OFFICE VISIT (OUTPATIENT)
Dept: NEUROSURGERY | Facility: CLINIC | Age: 63
End: 2018-01-17

## 2018-01-17 ENCOUNTER — PREP FOR SURGERY (OUTPATIENT)
Dept: OTHER | Facility: HOSPITAL | Age: 63
End: 2018-01-17

## 2018-01-17 VITALS
HEIGHT: 76 IN | BODY MASS INDEX: 35.31 KG/M2 | WEIGHT: 290 LBS | DIASTOLIC BLOOD PRESSURE: 80 MMHG | SYSTOLIC BLOOD PRESSURE: 158 MMHG

## 2018-01-17 DIAGNOSIS — Z45.42 BATTERY END OF LIFE OF SPINAL CORD STIMULATOR: Primary | ICD-10-CM

## 2018-01-17 DIAGNOSIS — Z78.9 NONSMOKER: ICD-10-CM

## 2018-01-17 DIAGNOSIS — T85.698A MALFUNCTION OF DEVICE, INITIAL ENCOUNTER: Primary | ICD-10-CM

## 2018-01-17 PROCEDURE — 99204 OFFICE O/P NEW MOD 45 MIN: CPT | Performed by: NURSE PRACTITIONER

## 2018-01-17 RX ORDER — GABAPENTIN 600 MG/1
600 TABLET ORAL
COMMUNITY
Start: 2017-02-16 | End: 2018-02-16

## 2018-01-17 RX ORDER — OMEGA-3 FATTY ACIDS/FISH OIL 300-1000MG
1 CAPSULE ORAL DAILY
COMMUNITY
End: 2021-11-29

## 2018-01-17 RX ORDER — ALBUTEROL SULFATE 90 UG/1
AEROSOL, METERED RESPIRATORY (INHALATION)
Status: ON HOLD | COMMUNITY
End: 2020-10-31

## 2018-01-17 RX ORDER — OXYCODONE HCL 10 MG/1
15 TABLET, FILM COATED, EXTENDED RELEASE ORAL EVERY 12 HOURS
COMMUNITY
Start: 2017-04-13 | End: 2018-05-18 | Stop reason: DRUGHIGH

## 2018-01-17 RX ORDER — ESOMEPRAZOLE MAGNESIUM 40 MG/1
40 FOR SUSPENSION ORAL 3 TIMES DAILY
COMMUNITY
Start: 2017-02-16

## 2018-01-17 RX ORDER — ALPRAZOLAM 1 MG/1
1 TABLET ORAL DAILY
COMMUNITY
Start: 2017-02-16

## 2018-01-17 RX ORDER — IPRATROPIUM BROMIDE AND ALBUTEROL SULFATE 2.5; .5 MG/3ML; MG/3ML
3 SOLUTION RESPIRATORY (INHALATION)
Status: ON HOLD | COMMUNITY
End: 2020-10-31 | Stop reason: SDUPTHER

## 2018-01-17 RX ORDER — AZELASTINE 1 MG/ML
1 SPRAY, METERED NASAL 2 TIMES DAILY
COMMUNITY
Start: 2017-02-16 | End: 2021-11-29

## 2018-01-17 RX ORDER — TIZANIDINE 4 MG/1
4 TABLET ORAL EVERY 8 HOURS PRN
COMMUNITY
Start: 2016-07-19 | End: 2022-02-07

## 2018-01-17 RX ORDER — TESTOSTERONE CYPIONATE 200 MG/ML
200 INJECTION, SOLUTION INTRAMUSCULAR
COMMUNITY
Start: 2017-02-16 | End: 2018-07-12

## 2018-01-17 RX ORDER — RANITIDINE 150 MG/1
150 TABLET ORAL 2 TIMES DAILY
Status: ON HOLD | COMMUNITY
Start: 2017-02-16 | End: 2020-10-31

## 2018-01-17 RX ORDER — TRAZODONE HYDROCHLORIDE 50 MG/1
300 TABLET ORAL NIGHTLY
COMMUNITY
Start: 2017-03-15

## 2018-01-17 RX ORDER — KETOROLAC TROMETHAMINE 10 MG/1
10 TABLET, FILM COATED ORAL EVERY 6 HOURS PRN
Status: ON HOLD | COMMUNITY
Start: 2016-10-24 | End: 2020-10-31

## 2018-01-17 NOTE — PROGRESS NOTES
Chief complaint:   Chief Complaint   Patient presents with   • BATTERY REPLACEMENT OF STIMULATOR       Subjective     HPI: This is a 62-year-old male gentleman who comes in today as a referral from Dr. Tito Elliott for end-of-life of his spinal cord stimulator.  He is here for evaluation today.  He says that he did have a spinal cord placement in 2011.  He says that he has gotten good relief with this stimulator and has noticed that his frequency of charging it has become more frequent.  He has the generator reviewed by [employer] rep and was found that the stimulator is ready for a generator change.  He says that he notices pain more in his legs whenever the battery does dye and as long as he can keep it in charge tingling he does like he does get a good benefit from the stimulator.  The patient does have chronic back pain.  Denies any bowel or bladder incontinence.  Rates the pain a scale 0-10 at a 6.  He does have leg pain as well but it is manageable with the stimulator.    Review of Systems   HENT: Positive for hearing loss and tinnitus.    Respiratory: Positive for apnea.    Genitourinary: Positive for difficulty urinating, flank pain, frequency and urgency.   Musculoskeletal: Positive for back pain, gait problem, neck pain and neck stiffness.   Neurological: Positive for dizziness, tremors, light-headedness, numbness and headaches.   Psychiatric/Behavioral: Positive for sleep disturbance. The patient is nervous/anxious.    All other systems reviewed and are negative.       Past Medical History:   Diagnosis Date   • Arthritis    • Back pain    • Concussion    • COPD (chronic obstructive pulmonary disease)    • Hearing impaired    • Hypertension    • Obesity      Past Surgical History:   Procedure Laterality Date   • BACK SURGERY  1994    L3-4 fusion   • BACK SURGERY  2009    L3-S1 fusion   • CARPAL TUNNEL RELEASE Bilateral    • CHOLECYSTECTOMY     • KNEE SURGERY     • THORACIC LAMINECTOMY WITH PLACEMENT OF  "DORSAL COLUMN STIMULATOR  2010     Family History   Problem Relation Age of Onset   • Stroke Mother      Social History   Substance Use Topics   • Smoking status: Former Smoker     Packs/day: 0.75     Types: Cigarettes     Start date: 1/1/1975     Quit date: 3/23/2016   • Smokeless tobacco: Never Used   • Alcohol use Yes      Comment: x1/month       (Not in a hospital admission)  Allergies:  Levofloxacin    Objective      Vital Signs  /80  Ht 193 cm (76\")  Wt 132 kg (290 lb)  BMI 35.3 kg/m2    Physical Exam   Constitutional: He is oriented to person, place, and time. He appears well-developed and well-nourished.   HENT:   Head: Normocephalic.   Eyes: Conjunctivae, EOM and lids are normal. Pupils are equal, round, and reactive to light.   Neck: Normal range of motion.   Cardiovascular: Normal rate, regular rhythm and normal heart sounds.    Pulmonary/Chest: Effort normal and breath sounds normal.   Abdominal: Normal appearance.   Musculoskeletal: Normal range of motion.        Lumbar back: He exhibits pain.   Neurological: He is alert and oriented to person, place, and time. He has normal strength and normal reflexes. He displays normal reflexes. No cranial nerve deficit or sensory deficit. GCS eye subscore is 4. GCS verbal subscore is 5. GCS motor subscore is 6.   Skin: Skin is warm.   Psychiatric: He has a normal mood and affect. His speech is normal and behavior is normal. Thought content normal. Cognition and memory are normal.       Results Review: No new imaging          Assessment/Plan: This patient was discussed with Dr. Hall.  It is felt patient would benefit from a generator change and leaving the leads.  Patient is agreeable to this.  Dr. Hall did come in and discuss the risks and benefits of the surgery with the patient.  I am also Hope the patient for an x-ray of his thoracic and lumbar spine.  BMI shows that is very overweight.  BMI chart was given the patient.  He is a " nonsmoker.      Carlos was seen today for battery replacement of stimulator.    Diagnoses and all orders for this visit:    Malfunction of device, initial encounter  -     XR Spine Thoracic 2 View  -     XR Spine Lumbar 4+ View    BMI 35.0-35.9,adult    Nonsmoker          I discussed the patients findings and my recommendations with patient    Luis Eduardo Coles, APRN  01/17/18  3:24 PM

## 2018-01-18 ENCOUNTER — HOSPITAL ENCOUNTER (OUTPATIENT)
Dept: GENERAL RADIOLOGY | Facility: HOSPITAL | Age: 63
Discharge: HOME | End: 2018-01-18
Admitting: NURSE PRACTITIONER

## 2018-01-18 DIAGNOSIS — Z45.42 BATTERY END OF LIFE OF SPINAL CORD STIMULATOR: ICD-10-CM

## 2018-01-18 PROCEDURE — 72110 X-RAY EXAM L-2 SPINE 4/>VWS: CPT

## 2018-01-18 PROCEDURE — 72070 X-RAY EXAM THORAC SPINE 2VWS: CPT

## 2018-01-18 PROCEDURE — 71046 X-RAY EXAM CHEST 2 VIEWS: CPT

## 2018-01-22 ENCOUNTER — HOSPITAL ENCOUNTER (OUTPATIENT)
Dept: PHYSICAL THERAPY | Facility: HOSPITAL | Age: 63
Setting detail: THERAPIES SERIES
Discharge: HOME | End: 2018-01-22

## 2018-01-22 DIAGNOSIS — M51.36 LUMBAR DEGENERATIVE DISC DISEASE: ICD-10-CM

## 2018-01-22 DIAGNOSIS — M54.42 CHRONIC BILATERAL LOW BACK PAIN WITH BILATERAL SCIATICA: Primary | ICD-10-CM

## 2018-01-22 DIAGNOSIS — G89.29 CHRONIC BILATERAL LOW BACK PAIN WITH BILATERAL SCIATICA: Primary | ICD-10-CM

## 2018-01-22 DIAGNOSIS — M54.2 NECK PAIN: ICD-10-CM

## 2018-01-22 DIAGNOSIS — M54.41 CHRONIC BILATERAL LOW BACK PAIN WITH BILATERAL SCIATICA: Primary | ICD-10-CM

## 2018-01-22 PROCEDURE — 97140 MANUAL THERAPY 1/> REGIONS: CPT | Performed by: PHYSICAL THERAPIST

## 2018-01-22 NOTE — THERAPY TREATMENT NOTE
Outpatient Physical Therapy Ortho Treatment Note  [facility]     Patient Name: Carlos Hayes  : 1955  MRN: [id number]  Today's Date: 2018      Visit Date: 2018    Visit Dx:    ICD-10-CM ICD-9-CM   1. Chronic bilateral low back pain with bilateral sciatica M54.42 724.2    M54.41 724.3    G89.29 338.29   2. Lumbar degenerative disc disease M51.36 722.52   3. Neck pain M54.2 723.1       There is no problem list on file for this patient.       Past Medical History:   Diagnosis Date   • Arthritis    • Back pain    • Concussion    • COPD (chronic obstructive pulmonary disease)    • Hearing impaired    • Hypertension    • Obesity         Past Surgical History:   Procedure Laterality Date   • BACK SURGERY      L3-4 fusion   • BACK SURGERY      L3-S1 fusion   • CARPAL TUNNEL RELEASE Bilateral    • CHOLECYSTECTOMY     • KNEE SURGERY     • THORACIC LAMINECTOMY WITH PLACEMENT OF DORSAL COLUMN STIMULATOR                               PT Assessment/Plan       18 1200       PT Assessment    Assessment Comments Our focus today was on his CT area and the muscle guarding in his UT/LS. This tends to stay guarding likely either from guarding against the strain in his neck or from his forward head posture where these muscles never tend to turn off. He is still pursuing having his pain stimulator replaced but wants to wait until he returns from his trip to [place].  -TB     PT Plan    PT Plan Comments Cont to work on pain relief and mobility through his hips and spine and progress with more stability. We may back off to once every 2 weeks if his pain stays stable.   -TB       User Key  (r) = Recorded By, (t) = Taken By, (c) = Cosigned By    Initials Name Provider Type    TB Juarez Hartmann, PT Physical Therapist                    Exercises       18 1117          Subjective Comments    Subjective Comments He says yesterday, his neck and legs were really flared. Today, it's not as bad.   -TB       Subjective Pain    Pre-Treatment Pain Level 5  -TB        User Key  (r) = Recorded By, (t) = Taken By, (c) = Cosigned By    Initials Name Provider Type    SILVA Hartmann, PT Physical Therapist                        Manual Rx (last 36 hours)      Manual Treatments       01/22/18 1115          Manual Rx 1    Manual Rx 1 Location hooklying oliver lower cx extension mobs  -TB      Manual Rx 1 Type sustained  -TB      Manual Rx 1 Grade 3  -TB      Manual Rx 1 Duration 10 min each side  -TB      Manual Rx 2    Manual Rx 2 Location --  -TB      Manual Rx 2 Type --  -TB      Manual Rx 2 Grade --  -TB      Manual Rx 2 Duration --  -TB      Manual Rx 3    Manual Rx 3 Location hooklying oliver UT/LS  -TB      Manual Rx 3 Type STM and deep pressure  -TB      Manual Rx 3 Grade 3 sustained  -TB      Manual Rx 3 Duration 10 min each side  -TB      Manual Rx 4    Manual Rx 4 Location --  -TB      Manual Rx 4 Type --  -TB      Manual Rx 4 Grade --  -TB      Manual Rx 4 Duration --  -TB      Manual Rx 5    Manual Rx 5 Location --  -TB      Manual Rx 5 Type --  -TB      Manual Rx 5 Grade --  -TB      Manual Rx 5 Duration --  -TB      Manual Rx 6    Manual Rx 6 Location --  -TB      Manual Rx 6 Type --  -TB      Manual Rx 6 Duration 5  -TB        User Key  (r) = Recorded By, (t) = Taken By, (c) = Cosigned By    Initials Name Provider Type    SILVA Hartmann, PT Physical Therapist                PT OP Goals       01/22/18 1115       PT Short Term Goals    STG Date to Achieve 01/27/17  -TB     STG 1 Relax muscle guarding on oliver hip flexors  -TB     STG 1 Progress Ongoing  -TB     STG 2 Improve mobility of thoracic into extension  -TB     STG 2 Progress Ongoing  -TB     STG 2 Progress Comments worked on upper thoracic and lower cx mostly today as this was his primary pain  -TB     STG 3 Improve right hip IR to 30 deg  -TB     STG 3 Progress Ongoing  -TB     Long Term Goals    LTG Date to Achieve 02/27/17  -TB     LTG 1 Able to walk  community distances without an assistive device or only a cane without severe flare of pain  -TB     LTG 1 Progress Ongoing  -TB     LTG 2 Improve hip passive extension to 10 degrees  -TB     LTG 2 Progress Ongoing  -TB     LTG 3 Improved core contraction held during functional tasks.  -TB     LTG 3 Progress Ongoing  -TB     LTG 4 Independent with HEP for flexibility and stability.   -TB     LTG 4 Progress Ongoing  -TB     LTG 5 Reports no difficulty with voiding bladder or BM's  -TB     LTG 5 Progress Ongoing  -TB     Time Calculation    PT Goal Re-Cert Due Date 02/14/18  -TB       User Key  (r) = Recorded By, (t) = Taken By, (c) = Cosigned By    Initials Name Provider Type    TB Juarez Hartmann, PT Physical Therapist          Therapy Education  Given: HEP, Symptoms/condition management  Program: Reinforced  How Provided: Verbal, Written  Provided to: Patient  Level of Understanding: Verbalized              Time Calculation:   Start Time: 1115  Stop Time: 1210  Time Calculation (min): 55 min  Total Timed Code Minutes- PT: 55 minute(s)    Therapy Charges for Today     Code Description Service Date Service Provider Modifiers Qty    44070097273 HC PT MANUAL THERAPY EA 15 MIN 1/22/2018 Juarez Hartmann, PT GP 4                    Juarez Hartmann, PT  1/22/2018

## 2018-01-24 PROBLEM — Z45.42 BATTERY END OF LIFE OF SPINAL CORD STIMULATOR: Status: ACTIVE | Noted: 2018-01-24

## 2018-01-29 ENCOUNTER — HOSPITAL ENCOUNTER (OUTPATIENT)
Dept: PHYSICAL THERAPY | Facility: HOSPITAL | Age: 63
Setting detail: THERAPIES SERIES
Discharge: HOME | End: 2018-01-29

## 2018-01-29 DIAGNOSIS — M51.36 LUMBAR DEGENERATIVE DISC DISEASE: ICD-10-CM

## 2018-01-29 DIAGNOSIS — G89.29 CHRONIC BILATERAL LOW BACK PAIN WITH BILATERAL SCIATICA: Primary | ICD-10-CM

## 2018-01-29 DIAGNOSIS — M54.2 NECK PAIN: ICD-10-CM

## 2018-01-29 DIAGNOSIS — M54.41 CHRONIC BILATERAL LOW BACK PAIN WITH BILATERAL SCIATICA: Primary | ICD-10-CM

## 2018-01-29 DIAGNOSIS — M54.42 CHRONIC BILATERAL LOW BACK PAIN WITH BILATERAL SCIATICA: Primary | ICD-10-CM

## 2018-01-29 PROCEDURE — 97140 MANUAL THERAPY 1/> REGIONS: CPT | Performed by: PHYSICAL THERAPIST

## 2018-01-29 NOTE — THERAPY TREATMENT NOTE
Outpatient Physical Therapy Ortho Treatment Note  [facility]     Patient Name: Carlos Hayes  : 1955  MRN: [id number]  Today's Date: 2018      Visit Date: 2018    Visit Dx:    ICD-10-CM ICD-9-CM   1. Chronic bilateral low back pain with bilateral sciatica M54.42 724.2    M54.41 724.3    G89.29 338.29   2. Lumbar degenerative disc disease M51.36 722.52   3. 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        Past Medical History:   Diagnosis Date   • Arthritis    • Back pain    • Concussion    • COPD (chronic obstructive pulmonary disease)    • Hearing impaired    • Hypertension    • Obesity         Past Surgical History:   Procedure Laterality Date   • BACK SURGERY      L3-4 fusion   • BACK SURGERY      L3-S1 fusion   • CARPAL TUNNEL RELEASE Bilateral    • CHOLECYSTECTOMY     • KNEE SURGERY     • THORACIC LAMINECTOMY WITH PLACEMENT OF DORSAL COLUMN STIMULATOR                               PT Assessment/Plan       18 1200       PT Assessment    Assessment Comments He says he's gained some weight so I cautioned him about not letting that get away from him. We discussed keeping active and mobility and not letting the pain overwhelm him again.  -TB     PT Plan    PT Plan Comments Cont to work on mobility.  -TB       User Key  (r) = Recorded By, (t) = Taken By, (c) = Cosigned By    Initials Name Provider Type    TB Juarez Hartmann, PT Physical Therapist                    Exercises       18 1110          Subjective Comments    Subjective Comments He says he's been feeling pain in both of his shoulders as well as leg pain. He was walking around the corner in his kitchen and his back locked up. He's been adjusting his stimulator  quite a bit. He started back on the treadmill once last week at a slow pace. He was cleaning out his closets and reaching overhead quite a bit which he thinks flared his  shoulder pain.He says his neck is doing better. He's having pain radiating into his left anterior thig.  -TB      Subjective Pain    Pre-Treatment Pain Level 6  -TB        User Key  (r) = Recorded By, (t) = Taken By, (c) = Cosigned By    Initials Name Provider Type    SILVA Hartmann, PT Physical Therapist                        Manual Rx (last 36 hours)      Manual Treatments       01/29/18 1110          Manual Rx 1    Manual Rx 1 Location sidelying oliver TL and lumbar paraspinals  -TB      Manual Rx 1 Type STM  -TB      Manual Rx 1 Grade started on right side and switched to left side due to arm pain  -TB      Manual Rx 1 Duration 20  -TB      Manual Rx 2    Manual Rx 2 Location left sidelying UT/LS and CT extension mob  -TB      Manual Rx 2 Type oliver  -TB      Manual Rx 2 Grade 3 sustained  -TB      Manual Rx 2 Duration 20  -TB      Manual Rx 3    Manual Rx 3 Location supine LAD each leg  -TB      Manual Rx 3 Type bolster under knees with gait belt   -TB      Manual Rx 3 Grade 3 sustained  -TB      Manual Rx 3 Duration 8 min each leg  -TB        User Key  (r) = Recorded By, (t) = Taken By, (c) = Cosigned By    Initials Name Provider Type    SILVA Hartmann, PT Physical Therapist                PT OP Goals       01/29/18 1200       PT Short Term Goals    STG Date to Achieve 01/27/17  -TB     STG 1 Relax muscle guarding on oliver hip flexors  -TB     STG 1 Progress Ongoing  -TB     STG 2 Improve mobility of thoracic into extension  -TB     STG 2 Progress Ongoing  -TB     STG 3 Improve right hip IR to 30 deg  -TB     STG 3 Progress Ongoing  -TB     Long Term Goals    LTG Date to Achieve 02/27/17  -TB     LTG 1 Able to walk community distances without an assistive device or only a cane without severe flare of pain  -TB     LTG 1 Progress Ongoing  -TB     LTG 2 Improve hip passive extension to 10 degrees  -TB     LTG 2 Progress Ongoing  -TB     LTG 3 Improved core contraction held during functional tasks.  -TB     LTG  3 Progress Ongoing  -TB     LTG 4 Independent with HEP for flexibility and stability.   -TB     LTG 4 Progress Ongoing  -TB     LTG 5 Reports no difficulty with voiding bladder or BM's  -TB     LTG 5 Progress Ongoing  -TB     LTG 5 Progress Comments had trouble today until he had a BM   -TB     Time Calculation    PT Goal Re-Cert Due Date 02/14/18  -TB       User Key  (r) = Recorded By, (t) = Taken By, (c) = Cosigned By    Initials Name Provider Type    TB Juarez Hartmann, PT Physical Therapist          Therapy Education  Education Details: keeping active and pain control  Given: HEP, Symptoms/condition management  Program: Reinforced  How Provided: Verbal  Provided to: Patient  Level of Understanding: Verbalized              Time Calculation:   Start Time: 1110  Stop Time: 1210  Time Calculation (min): 60 min  Total Timed Code Minutes- PT: 60 minute(s)    Therapy Charges for Today     Code Description Service Date Service Provider Modifiers Qty    76930572306 HC PT MANUAL THERAPY EA 15 MIN 1/29/2018 Juarez Hartmann, PT GP 4                    Juarez Hartmann, PT  1/29/2018

## 2018-02-01 DIAGNOSIS — Z45.42 BATTERY END OF LIFE OF SPINAL CORD STIMULATOR: Primary | ICD-10-CM

## 2018-02-05 ENCOUNTER — HOSPITAL ENCOUNTER (OUTPATIENT)
Dept: PHYSICAL THERAPY | Facility: HOSPITAL | Age: 63
Setting detail: THERAPIES SERIES
Discharge: HOME | End: 2018-02-05

## 2018-02-05 DIAGNOSIS — M54.41 CHRONIC BILATERAL LOW BACK PAIN WITH BILATERAL SCIATICA: Primary | ICD-10-CM

## 2018-02-05 DIAGNOSIS — M54.2 NECK PAIN: ICD-10-CM

## 2018-02-05 DIAGNOSIS — M54.42 CHRONIC BILATERAL LOW BACK PAIN WITH BILATERAL SCIATICA: Primary | ICD-10-CM

## 2018-02-05 DIAGNOSIS — G89.29 CHRONIC BILATERAL LOW BACK PAIN WITH BILATERAL SCIATICA: Primary | ICD-10-CM

## 2018-02-05 DIAGNOSIS — M51.36 LUMBAR DEGENERATIVE DISC DISEASE: ICD-10-CM

## 2018-02-05 PROCEDURE — 97140 MANUAL THERAPY 1/> REGIONS: CPT | Performed by: PHYSICAL THERAPIST

## 2018-02-05 PROCEDURE — G8979 MOBILITY GOAL STATUS: HCPCS | Performed by: PHYSICAL THERAPIST

## 2018-02-05 PROCEDURE — G8978 MOBILITY CURRENT STATUS: HCPCS | Performed by: PHYSICAL THERAPIST

## 2018-02-05 NOTE — THERAPY PROGRESS REPORT/RE-CERT
Outpatient Physical Therapy Ortho Progress Note/G code   [place]     Patient Name: Carlos Hayes  : 1955  MRN: [id number]  Today's Date: 2018      Visit Date: 2018    Visit Dx:    ICD-10-CM ICD-9-CM   1. Chronic bilateral low back pain with bilateral sciatica M54.42 724.2    M54.41 724.3    G89.29 338.29   2. Lumbar degenerative disc disease M51.36 722.52   3. 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        Past Medical History:   Diagnosis Date   • Arthritis    • Back pain    • Concussion    • COPD (chronic obstructive pulmonary disease)    • Hearing impaired    • Hypertension    • Obesity         Past Surgical History:   Procedure Laterality Date   • BACK SURGERY      L3-4 fusion   • BACK SURGERY      L3-S1 fusion   • CARPAL TUNNEL RELEASE Bilateral    • CHOLECYSTECTOMY     • KNEE SURGERY     • THORACIC LAMINECTOMY WITH PLACEMENT OF DORSAL COLUMN STIMULATOR                               PT Assessment/Plan       18 1700       PT Assessment    Functional Limitations Impaired gait;Impaired locomotion;Limitation in home management;Performance in leisure activities;Limitations in functional capacity and performance  -TB     Impairments Balance;Gait;Pain;Muscle strength;Motor function;Joint mobility;Impaired muscle length;Range of motion;Posture;Impaired flexibility  -TB     Assessment Comments He has resumed exercises. He had become more sedintary and then got sick last week. We have gone back and forth between his neck pain when it flares and working on his hip mobility and pelvic floor tone. Today his pain was 5/10 and he was moving better.   -TB     Rehab Potential Good  -TB     Patient/caregiver participated in establishment of treatment plan and goals Yes  -TB     Patient would benefit from skilled therapy intervention Yes  -TB     PT Plan    PT Frequency 1x/week;2x/week  -TB     Predicted  Duration of Therapy Intervention (days/wks) 2 month  -TB     Planned CPT's? PT THER PROC EA 15 MIN: 36964;PT THER ACT EA 15 MIN: 42457;PT MANUAL THERAPY EA 15 MIN: 49147;PT GAIT TRAINING EA 15 MIN: 94513;PT ELECTRICAL STIM UNATTEND: ;PT ELECTRICAL STIM ATTD EA 15 MIN: 42467;PT ULTRASOUND EA 15 MIN: 22848  -TB     PT Plan Comments If he continues to do well, progress with standing hip stability.   -TB       User Key  (r) = Recorded By, (t) = Taken By, (c) = Cosigned By    Initials Name Provider Type    TB Juarez Hartmann, PT Physical Therapist                    Exercises       02/05/18 1535          Subjective Comments    Subjective Comments He says he's doing OK today. He's been sick the last week with a sinus infection. He says his pelvic floor has been tighter lately and his urination has become more difficult. He has been resting more. Today he got on the treadmill for 15 minutes this morning.   -TB      Subjective Pain    Pre-Treatment Pain Level 5  -TB        User Key  (r) = Recorded By, (t) = Taken By, (c) = Cosigned By    Initials Name Provider Type    TB Juarez Hartmann, PT Physical Therapist                        Manual Rx (last 36 hours)      Manual Treatments       02/05/18 1700          Manual Rx 3    Manual Rx 3 Location supine LAD each leg  -TB      Manual Rx 3 Type bolster under knees with gait belt   -TB      Manual Rx 3 Grade 3 sustained  -TB      Manual Rx 3 Duration 3 min each leg  -TB      Manual Rx 4    Manual Rx 4 Location hooklying oliver hip flexor and short adductors  -TB      Manual Rx 4 Type MFR  -TB      Manual Rx 4 Duration 10 min each side  -TB      Manual Rx 5    Manual Rx 5 Location Pelvic floor oliver  -TB      Manual Rx 5 Type hooklying with hip externally rotated  -TB      Manual Rx 5 Grade deep pressure oliver   -TB      Manual Rx 5 Duration 25  -TB      Manual Rx 6    Manual Rx 6 Location lower abdomen right at insertion onto pubis  -TB      Manual Rx 6 Type MFR  -TB      Manual Rx  6 Duration 5  -TB        User Key  (r) = Recorded By, (t) = Taken By, (c) = Cosigned By    Initials Name Provider Type    TB Juarez Hartmann, PT Physical Therapist                PT OP Goals       02/05/18 1700       PT Short Term Goals    STG Date to Achieve 01/27/17  -TB     STG 1 Relax muscle guarding on oliver hip flexors  -TB     STG 1 Progress Ongoing  -TB     STG 1 Progress Comments he was more guarded today but not as bad as he has been  -TB     STG 2 Improve mobility of thoracic into extension  -TB     STG 2 Progress Ongoing  -TB     STG 2 Progress Comments he started stooping over more so is working to be more active  -TB     STG 3 Improve right hip IR to 30 deg  -TB     STG 3 Progress Ongoing  -TB     STG 3 Progress Comments to 15 deg oliver  -TB     Long Term Goals    LTG Date to Achieve 02/27/17  -TB     LTG 1 Able to walk community distances without an assistive device or only a cane without severe flare of pain  -TB     LTG 1 Progress Ongoing  -TB     LTG 1 Progress Comments walked 15 minutes today on treatment  -TB     LTG 2 Improve hip passive extension to 10 degrees  -TB     LTG 2 Progress Ongoing  -TB     LTG 2 Progress Comments to neutral  -TB     LTG 3 Improved core contraction held during functional tasks.  -TB     LTG 3 Progress Ongoing  -TB     LTG 3 Progress Comments he practices this throughout the day  -TB     LTG 4 Independent with HEP for flexibility and stability.   -TB     LTG 4 Progress Ongoing  -TB     LTG 4 Progress Comments he is working on conditioning and core stability  -TB     LTG 5 Reports no difficulty with voiding bladder or BM's  -TB     LTG 5 Progress Ongoing  -TB     LTG 5 Progress Comments he does better after the pelvic floor release consistently  -TB     Time Calculation    PT Goal Re-Cert Due Date 02/14/18  -TB       User Key  (r) = Recorded By, (t) = Taken By, (c) = Cosigned By    Initials Name Provider Type    SILVA Hartmann, PT Physical Therapist          Therapy  Education  Given: HEP, Symptoms/condition management  Program: Reinforced  How Provided: Verbal  Provided to: Patient  Level of Understanding: Verbalized              Time Calculation:   Start Time: 1540  Stop Time: 1635  Time Calculation (min): 55 min  Total Timed Code Minutes- PT: 55 minute(s)    Therapy Charges for Today     Code Description Service Date Service Provider Modifiers Qty    91515867026 HC PT MOBILITY CURRENT 2/5/2018 Juarez Hartmann, PT GP, CK 1    68762146913 HC PT MOBILITY PROJECTED 2/5/2018 Juarez Hartmann, PT GP, CI 1    75007717100 HC PT MANUAL THERAPY EA 15 MIN 2/5/2018 Juarez Hartmann, PT GP 4          PT G-Codes  PT Professional Judgement Used?: Yes  Functional Limitation: Mobility: Walking and moving around  Mobility: Walking and Moving Around Current Status (): At least 40 percent but less than 60 percent impaired, limited or restricted  Mobility: Walking and Moving Around Goal Status (): At least 1 percent but less than 20 percent impaired, limited or restricted         Juarez Hartmann, PT  2/5/2018

## 2018-02-07 ENCOUNTER — HOSPITAL ENCOUNTER (OUTPATIENT)
Dept: GENERAL RADIOLOGY | Facility: HOSPITAL | Age: 63
Discharge: HOME | End: 2018-02-07
Admitting: PHYSICIAN ASSISTANT

## 2018-02-07 ENCOUNTER — TRANSCRIBE ORDERS (OUTPATIENT)
Dept: ADMINISTRATIVE | Facility: HOSPITAL | Age: 63
End: 2018-02-07

## 2018-02-07 DIAGNOSIS — J06.9 UPPER RESPIRATORY INFECTION, ACUTE: ICD-10-CM

## 2018-02-07 DIAGNOSIS — J06.9 UPPER RESPIRATORY INFECTION, ACUTE: Primary | ICD-10-CM

## 2018-02-07 PROCEDURE — 71046 X-RAY EXAM CHEST 2 VIEWS: CPT

## 2018-02-12 ENCOUNTER — HOSPITAL ENCOUNTER (OUTPATIENT)
Dept: PHYSICAL THERAPY | Facility: HOSPITAL | Age: 63
Setting detail: THERAPIES SERIES
Discharge: HOME | End: 2018-02-12

## 2018-02-12 DIAGNOSIS — M54.41 CHRONIC BILATERAL LOW BACK PAIN WITH BILATERAL SCIATICA: Primary | ICD-10-CM

## 2018-02-12 DIAGNOSIS — M51.36 LUMBAR DEGENERATIVE DISC DISEASE: ICD-10-CM

## 2018-02-12 DIAGNOSIS — G89.29 CHRONIC BILATERAL LOW BACK PAIN WITH BILATERAL SCIATICA: Primary | ICD-10-CM

## 2018-02-12 DIAGNOSIS — M54.2 NECK PAIN: ICD-10-CM

## 2018-02-12 DIAGNOSIS — M54.42 CHRONIC BILATERAL LOW BACK PAIN WITH BILATERAL SCIATICA: Primary | ICD-10-CM

## 2018-02-12 PROCEDURE — 97110 THERAPEUTIC EXERCISES: CPT | Performed by: PHYSICAL THERAPIST

## 2018-02-12 NOTE — THERAPY TREATMENT NOTE
Outpatient Physical Therapy Ortho Treatment Note  [facility]     Patient Name: Carlos Hayes  : 1955  MRN: [id number]  Today's Date: 2018      Visit Date: 2018    Visit Dx:    ICD-10-CM ICD-9-CM   1. Chronic bilateral low back pain with bilateral sciatica M54.42 724.2    M54.41 724.3    G89.29 338.29   2. Lumbar degenerative disc disease M51.36 722.52   3. 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        Past Medical History:   Diagnosis Date   • Arthritis    • Back pain    • Concussion    • COPD (chronic obstructive pulmonary disease)    • Hearing impaired    • Hypertension    • Obesity         Past Surgical History:   Procedure Laterality Date   • BACK SURGERY      L3-4 fusion   • BACK SURGERY      L3-S1 fusion   • CARPAL TUNNEL RELEASE Bilateral    • CHOLECYSTECTOMY     • KNEE SURGERY     • THORACIC LAMINECTOMY WITH PLACEMENT OF DORSAL COLUMN STIMULATOR                               PT Assessment/Plan       18 1200       PT Assessment    Assessment Comments Today was the first day in a long time we have been able to work on this amount of stability. Usually his pain is so high we can't work on it. His shoulders are the biggest limitation on some exs but otherwise, he did well.   -TB     PT Plan    PT Plan Comments Cont to progress flexibility as core stability as long as he avoids flares.   -TB       User Key  (r) = Recorded By, (t) = Taken By, (c) = Cosigned By    Initials Name Provider Type    TB Juarez Hartmann, PT Physical Therapist                    Exercises       18 1115          Subjective Comments    Subjective Comments He says he thinks he was allergic to his sleeping medicine which had side effects that looked like flu symptoms. He has switched back and is sleeping OK. He is feeling better now.  He walked on the treadmill yesterday and walked a mile and a half so he's sore today.   -TB      Subjective Pain    Pre-Treatment Pain Level 6  -TB      Exercise 1    Exercise Name 1 seated Cybex lat pulls oliver  -TB      Sets 1 2  -TB      Time (Minutes) 1 2 min against 40#  -TB      Additional Comments seated on large swiss ball  -TB      Exercise 2    Exercise Name 2 seated Cybex rows oliver  -TB      Cueing 2 Verbal;Demo  -TB      Sets 2 2  -TB      Time (Minutes) 2 2 min against 40#  -TB      Additional Comments on large swiss ball; cued on posture and keeping scapula from rising  -TB      Exercise 3    Exercise Name 3 shoulder clocks standing against 45cm swiss ball  -TB      Time (Minutes) 3 5 min  -TB      Additional Comments against green tband  -TB      Exercise 4    Exercise Name 4 standing hip extension and abduction unilateral   -TB      Sets 4 2  -TB      Time (Minutes) 4 2 min each  -TB      Additional Comments against red tband; he c/o right hip fatigue faster than left  -TB      Exercise 5    Exercise Name 5 standing right hip airplane 50% of the motion with min HHA  -TB      Time (Minutes) 5 5  -TB      Exercise 6    Exercise Name 6 seated isometric trunk rotation against green tband first, then manual resistance with mod OP  -TB      Time (Minutes) 6 4  -TB      Time (Seconds) 6 held each direction 3-5 secs  -TB      Exercise 7    Exercise Name 7 UBE switch directions every 1 min  -TB      Cueing 7 Demo  -TB      Time (Minutes) 7 10 switching direction every min  -TB      Additional Comments L3; cues for moving his trunk and scapula and not just movingn his arms  -TB        User Key  (r) = Recorded By, (t) = Taken By, (c) = Cosigned By    Initials Name Provider Type    TB Juarez Hartmann, PT Physical Therapist                               PT OP Goals       02/12/18 1115       PT Short Term Goals    STG Date to Achieve 01/27/17  -TB     STG 1 Relax muscle guarding on oliver hip flexors  -TB     STG 1 Progress Ongoing  -TB     STG 2 Improve mobility of thoracic into extension  -TB     STG 2  Progress Ongoing  -TB     STG 3 Improve right hip IR to 30 deg  -TB     STG 3 Progress Ongoing  -TB     Long Term Goals    LTG Date to Achieve 02/27/17  -TB     LTG 1 Able to walk community distances without an assistive device or only a cane without severe flare of pain  -TB     LTG 1 Progress Ongoing  -TB     LTG 1 Progress Comments walking a mile at home  -TB     LTG 2 Improve hip passive extension to 10 degrees  -TB     LTG 2 Progress Ongoing  -TB     LTG 3 Improved core contraction held during functional tasks.  -TB     LTG 3 Progress Ongoing  -TB     LTG 4 Independent with HEP for flexibility and stability.   -TB     LTG 4 Progress Ongoing  -TB     LTG 4 Progress Comments added hip airplanes for hip stabiltiy  -TB     LTG 5 Reports no difficulty with voiding bladder or BM's  -TB     LTG 5 Progress Ongoing  -TB     Time Calculation    PT Goal Re-Cert Due Date 02/14/18  -TB       User Key  (r) = Recorded By, (t) = Taken By, (c) = Cosigned By    Initials Name Provider Type    TB Juarez Hartmann, PT Physical Therapist          Therapy Education  Education Details: add hip airplanes  Given: HEP, Symptoms/condition management  Program: Reinforced  How Provided: Verbal  Provided to: Patient  Level of Understanding: Verbalized              Time Calculation:   Start Time: 1115  Stop Time: 1215  Time Calculation (min): 60 min  Total Timed Code Minutes- PT: 60 minute(s)    Therapy Charges for Today     Code Description Service Date Service Provider Modifiers Qty    78075607640 HC PT THER PROC EA 15 MIN 2/12/2018 Juarez Hartmann, PT GP 4                    Juarez Hartmann, PT  2/12/2018

## 2018-02-13 ENCOUNTER — OFFICE VISIT (OUTPATIENT)
Dept: NEUROLOGY | Age: 63
End: 2018-02-13
Payer: MEDICARE

## 2018-02-13 VITALS
SYSTOLIC BLOOD PRESSURE: 137 MMHG | HEIGHT: 76 IN | DIASTOLIC BLOOD PRESSURE: 77 MMHG | WEIGHT: 311 LBS | BODY MASS INDEX: 37.87 KG/M2 | HEART RATE: 67 BPM | OXYGEN SATURATION: 96 %

## 2018-02-13 DIAGNOSIS — G47.33 OBSTRUCTIVE SLEEP APNEA: Primary | ICD-10-CM

## 2018-02-13 PROCEDURE — 99213 OFFICE O/P EST LOW 20 MIN: CPT | Performed by: PSYCHIATRY & NEUROLOGY

## 2018-02-13 PROCEDURE — G8417 CALC BMI ABV UP PARAM F/U: HCPCS | Performed by: PSYCHIATRY & NEUROLOGY

## 2018-02-13 PROCEDURE — 1036F TOBACCO NON-USER: CPT | Performed by: PSYCHIATRY & NEUROLOGY

## 2018-02-13 PROCEDURE — G8484 FLU IMMUNIZE NO ADMIN: HCPCS | Performed by: PSYCHIATRY & NEUROLOGY

## 2018-02-13 PROCEDURE — G8427 DOCREV CUR MEDS BY ELIG CLIN: HCPCS | Performed by: PSYCHIATRY & NEUROLOGY

## 2018-02-13 PROCEDURE — 3017F COLORECTAL CA SCREEN DOC REV: CPT | Performed by: PSYCHIATRY & NEUROLOGY

## 2018-02-13 NOTE — PROGRESS NOTES
traZODone (DESYREL) 50 MG tablet Take 50 mg by mouth nightly       oxyCODONE-acetaminophen (PERCOCET)  MG per tablet Take 1 tablet by mouth every 6 hours as needed. No current facility-administered medications for this visit. Allergies: Allergies as of 02/13/2018 - Review Complete 02/13/2018   Allergen Reaction Noted    Lunesta [eszopiclone] Other (See Comments) 02/13/2018    Levofloxacin  04/08/2017       Review of Systems:  General ROS: negative for - chills or fever  Psychological ROS: negative for - anxiety or depression  ENT ROS: negative for - headaches or sinus pain  Hematological and Lymphatic ROS: negative for - bleeding problems, bruising or swollen lymph nodes  Respiratory ROS: negative for - cough, hemoptysis or shortness of breath  Cardiovascular ROS: negative for - chest pain or palpitations  Gastrointestinal ROS: negative for - blood in stools, constipation, diarrhea or nausea/vomiting  Genito-Urinary ROS: negative for - hematuria or urinary frequency/urgency  Musculoskeletal ROS: negative for - joint pain, joint stiffness or joint swelling  Neurological ROS: negative for - memory loss, numbness/tingling or weakness     Examination:  Vitals:  /77   Pulse 67   Ht 6' 4\" (1.93 m)   Wt (!) 311 lb (141.1 kg)   SpO2 96%   BMI 37.86 kg/m²   General appearance:  alert and cooperative with exam  HEENT:  Sclera clear, anicteric, Oropharynx clear, no lesions, Neck supple with midline trachea, Thyroid without masses and Trachea midline  Mallampati 3  Heart[de-identified]  regular rate and rhythm, S1, S2 normal, no murmur, click, rub or gallop  Lungs:  clear to auscultation bilaterally  Extremities:  edema mild ankle  Neurologic:  Extraocular movements are intact without nystagmus. Visual fields are full to confrontation. Facial movements are symmetrical and normal.  Speech is precise. Extremity strength is normal in both uppers and lowers.   Deep tendon reflexes are intact and symmetrical.

## 2018-02-19 ENCOUNTER — APPOINTMENT (OUTPATIENT)
Dept: PHYSICAL THERAPY | Facility: HOSPITAL | Age: 63
End: 2018-02-19

## 2018-02-26 ENCOUNTER — APPOINTMENT (OUTPATIENT)
Dept: PHYSICAL THERAPY | Facility: HOSPITAL | Age: 63
End: 2018-02-26

## 2018-03-02 ENCOUNTER — APPOINTMENT (OUTPATIENT)
Dept: PREADMISSION TESTING | Facility: HOSPITAL | Age: 63
End: 2018-03-02

## 2018-03-06 ENCOUNTER — APPOINTMENT (OUTPATIENT)
Dept: PHYSICAL THERAPY | Facility: HOSPITAL | Age: 63
End: 2018-03-06

## 2018-03-12 ENCOUNTER — APPOINTMENT (OUTPATIENT)
Dept: PHYSICAL THERAPY | Facility: HOSPITAL | Age: 63
End: 2018-03-12

## 2018-03-12 ENCOUNTER — HOSPITAL ENCOUNTER (OUTPATIENT)
Dept: PHYSICAL THERAPY | Facility: HOSPITAL | Age: 63
Setting detail: THERAPIES SERIES
Discharge: HOME | End: 2018-03-12

## 2018-03-12 DIAGNOSIS — M54.41 CHRONIC BILATERAL LOW BACK PAIN WITH BILATERAL SCIATICA: Primary | ICD-10-CM

## 2018-03-12 DIAGNOSIS — M54.2 NECK PAIN: ICD-10-CM

## 2018-03-12 DIAGNOSIS — G89.29 CHRONIC BILATERAL LOW BACK PAIN WITH BILATERAL SCIATICA: Primary | ICD-10-CM

## 2018-03-12 DIAGNOSIS — M51.36 LUMBAR DEGENERATIVE DISC DISEASE: ICD-10-CM

## 2018-03-12 DIAGNOSIS — M54.42 CHRONIC BILATERAL LOW BACK PAIN WITH BILATERAL SCIATICA: Primary | ICD-10-CM

## 2018-03-12 PROCEDURE — 97140 MANUAL THERAPY 1/> REGIONS: CPT | Performed by: PHYSICAL THERAPIST

## 2018-03-12 NOTE — THERAPY PROGRESS REPORT/RE-CERT
Outpatient Physical Therapy Ortho Treatment Note  [facility]     Patient Name: Carlos Hayes  : 1955  MRN: [id number]  Today's Date: 3/12/2018      Visit Date: 2018    Visit Dx:    ICD-10-CM ICD-9-CM   1. Chronic bilateral low back pain with bilateral sciatica M54.42 724.2    M54.41 724.3    G89.29 338.29   2. Lumbar degenerative disc disease M51.36 722.52   3. 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        Past Medical History:   Diagnosis Date   • Arthritis    • Back pain    • Concussion    • COPD (chronic obstructive pulmonary disease)    • Hearing impaired    • Hypertension    • Obesity         Past Surgical History:   Procedure Laterality Date   • BACK SURGERY      L3-4 fusion   • BACK SURGERY      L3-S1 fusion   • CARPAL TUNNEL RELEASE Bilateral    • CHOLECYSTECTOMY     • KNEE SURGERY     • THORACIC LAMINECTOMY WITH PLACEMENT OF DORSAL COLUMN STIMULATOR                               PT Assessment/Plan     Row Name 18 1600          PT Assessment    Functional Limitations Impaired gait;Impaired locomotion;Limitation in home management;Performance in leisure activities;Limitations in functional capacity and performance  -TB     Impairments Balance;Gait;Pain;Muscle strength;Motor function;Joint mobility;Impaired muscle length;Range of motion;Posture;Impaired flexibility  -TB     Assessment Comments It's been 2.5 months since we did an internal pelvic floor release. He noticed that he was struggling more with  emptying his bladder and this has been successful so far. He has gained quite a bit of weight over the last month and is working to get that off. His losing the weight seemed to go a long way in improving his mobility and helping his pain. We had progressed to working in the gym on hip and postural stability. He is walking more at home.   -TB     Rehab Potential Good  -TB     Patient/caregiver  participated in establishment of treatment plan and goals Yes  -TB     Patient would benefit from skilled therapy intervention Yes  -TB        PT Plan    PT Frequency 1x/week;2x/week  -TB     Predicted Duration of Therapy Intervention (OT Eval) 3 months  -TB     Planned CPT's? PT THER PROC EA 15 MIN: 02965;PT THER ACT EA 15 MIN: 39447;PT MANUAL THERAPY EA 15 MIN: 72143;PT ELECTRICAL STIM UNATTEND: ;PT ELECTRICAL STIM ATTD EA 15 MIN: 08851;PT ULTRASOUND EA 15 MIN: 96347  -TB     PT Plan Comments We will resume hip mobility and trunk and core stability and progress him to a HEP working on a pelvic floor release as needed.  -TB       User Key  (r) = Recorded By, (t) = Taken By, (c) = Cosigned By    Initials Name Provider Type    SILVA Hartmann, PT Physical Therapist                    Exercises     Row Name 03/12/18 1440             Subjective Comments    Subjective Comments He says his right posterior forearm has been aching and going numb when he tries to sleep. He's been on vacation and has also been sick. He called today to see if he'd be able to get in saying he's having pelvic floor issues.He's noticed the longer he goes without the pelvic floor release, the harder it gets for him to start a flow urination or to empty his bladder. He's was put on steroids for his chest cold. He's gained 20# since being home from vacation.  -TB         Subjective Pain    Pre-Treatment Pain Level 6  -TB        User Key  (r) = Recorded By, (t) = Taken By, (c) = Cosigned By    Initials Name Provider Type    SILVA Hartmann, PT Physical Therapist                        Manual Rx (last 36 hours)      Manual Treatments     Row Name 03/12/18 1500             Manual Rx 1    Manual Rx 1 Location sidelying internal pelvic floor release  -TB      Manual Rx 1 Type left sidelying  -TB      Manual Rx 1 Grade included levator ani and obturator internus oliver  -TB      Manual Rx 1 Duration 25  -TB         Manual Rx 5    Manual Rx 5  Location Pelvic floor oliver and lower abdomen  -TB      Manual Rx 5 Type hooklying with hip externally rotated  -TB      Manual Rx 5 Grade deep pressure oliver   -TB      Manual Rx 5 Duration 25  -TB         Manual Rx 6    Manual Rx 6 Location supine manual cervical traction  -TB      Manual Rx 6 Type felt radicular pain down his right arm  -TB      Manual Rx 6 Grade I flexed his neck and this pain went away  -TB      Manual Rx 6 Duration 10  -TB        User Key  (r) = Recorded By, (t) = Taken By, (c) = Cosigned By    Initials Name Provider Type    TB Juarez Hartmann, PT Physical Therapist                PT OP Goals     Row Name 03/12/18 1440          PT Short Term Goals    STG Date to Achieve 01/27/17  -TB     STG 1 Relax muscle guarding on oliver hip flexors  -TB     STG 1 Progress Ongoing  -TB     STG 1 Progress Comments left not as guarded  -TB     STG 2 Improve mobility of thoracic into extension  -TB     STG 2 Progress Ongoing  -TB     STG 2 Progress Comments still kyphotic  -TB     STG 3 Improve right hip IR to 30 deg  -TB     STG 3 Progress Ongoing  -TB     STG 3 Progress Comments to 15 deg oliver  -TB        Long Term Goals    LTG Date to Achieve 02/27/17  -TB     LTG 1 Able to walk community distances without an assistive device or only a cane without severe flare of pain  -TB     LTG 1 Progress Ongoing  -TB     LTG 1 Progress Comments walked 3 miles on a treadmill yesterday  -TB     LTG 2 Improve hip passive extension to 10 degrees  -TB     LTG 2 Progress Ongoing  -TB     LTG 2 Progress Comments to neutral  -TB     LTG 3 Improved core contraction held during functional tasks.  -TB     LTG 3 Progress Ongoing  -TB     LTG 3 Progress Comments he works on this quite a bit  -TB     LTG 4 Independent with HEP for flexibility and stability.   -TB     LTG 4 Progress Ongoing  -TB     LTG 4 Progress Comments he has started walking again  -TB     LTG 5 Reports no difficulty with voiding bladder or BM's  -TB     LTG 5 Progress  Ongoing  -TB     LTG 5 Progress Comments he has started having more trouble voiding over the last couple of weeks  -TB        Time Calculation    PT Goal Re-Cert Due Date 02/14/18  -TB       User Key  (r) = Recorded By, (t) = Taken By, (c) = Cosigned By    Initials Name Provider Type    TB Juarez Hartmann, PT Physical Therapist          Therapy Education  Given: HEP, Symptoms/condition management  Program: Reinforced  How Provided: Verbal  Provided to: Patient  Level of Understanding: Verbalized              Time Calculation:   Start Time: 1440  Stop Time: 1540  Time Calculation (min): 60 min  Total Timed Code Minutes- PT: 60 minute(s)    Therapy Charges for Today     Code Description Service Date Service Provider Modifiers Qty    32531998980 HC PT MANUAL THERAPY EA 15 MIN 3/12/2018 Juarez Hartmann, PT GP 4                    Juarez Hartmann, PT  3/12/2018

## 2018-03-15 ENCOUNTER — TRANSCRIBE ORDERS (OUTPATIENT)
Dept: ADMINISTRATIVE | Facility: HOSPITAL | Age: 63
End: 2018-03-15

## 2018-03-15 ENCOUNTER — HOSPITAL ENCOUNTER (OUTPATIENT)
Dept: GENERAL RADIOLOGY | Facility: HOSPITAL | Age: 63
Discharge: HOME | End: 2018-03-15
Admitting: NURSE PRACTITIONER

## 2018-03-15 DIAGNOSIS — J40 BRONCHITIS: Primary | ICD-10-CM

## 2018-03-15 DIAGNOSIS — J40 BRONCHITIS: ICD-10-CM

## 2018-03-15 PROCEDURE — 71046 X-RAY EXAM CHEST 2 VIEWS: CPT

## 2018-03-19 ENCOUNTER — APPOINTMENT (OUTPATIENT)
Dept: PHYSICAL THERAPY | Facility: HOSPITAL | Age: 63
End: 2018-03-19

## 2018-03-26 ENCOUNTER — HOSPITAL ENCOUNTER (OUTPATIENT)
Dept: PHYSICAL THERAPY | Facility: HOSPITAL | Age: 63
Setting detail: THERAPIES SERIES
Discharge: HOME | End: 2018-03-26

## 2018-03-26 DIAGNOSIS — M54.2 NECK PAIN: ICD-10-CM

## 2018-03-26 DIAGNOSIS — M54.42 CHRONIC BILATERAL LOW BACK PAIN WITH BILATERAL SCIATICA: Primary | ICD-10-CM

## 2018-03-26 DIAGNOSIS — M51.36 LUMBAR DEGENERATIVE DISC DISEASE: ICD-10-CM

## 2018-03-26 DIAGNOSIS — M54.41 CHRONIC BILATERAL LOW BACK PAIN WITH BILATERAL SCIATICA: Primary | ICD-10-CM

## 2018-03-26 DIAGNOSIS — G89.29 CHRONIC BILATERAL LOW BACK PAIN WITH BILATERAL SCIATICA: Primary | ICD-10-CM

## 2018-03-26 PROCEDURE — 97140 MANUAL THERAPY 1/> REGIONS: CPT | Performed by: PHYSICAL THERAPIST

## 2018-03-26 NOTE — THERAPY TREATMENT NOTE
Outpatient Physical Therapy Ortho Treatment Note  [facility]     Patient Name: Carlos Hayes  : 1955  MRN: [id number]  Today's Date: 3/26/2018      Visit Date: 2018    Visit Dx:    ICD-10-CM ICD-9-CM   1. Chronic bilateral low back pain with bilateral sciatica M54.42 724.2    M54.41 724.3    G89.29 338.29   2. Lumbar degenerative disc disease M51.36 722.52   3. 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        Past Medical History:   Diagnosis Date   • Arthritis    • Back pain    • Concussion    • COPD (chronic obstructive pulmonary disease)    • Hearing impaired    • Hypertension    • Obesity         Past Surgical History:   Procedure Laterality Date   • BACK SURGERY      L3-4 fusion   • BACK SURGERY      L3-S1 fusion   • CARPAL TUNNEL RELEASE Bilateral    • CHOLECYSTECTOMY     • KNEE SURGERY     • THORACIC LAMINECTOMY WITH PLACEMENT OF DORSAL COLUMN STIMULATOR                               PT Assessment/Plan     Row Name 18 1600          PT Assessment    Assessment Comments He c/o some nausea and dizziness with some palpation and mobs of his upper neck but this subsided when I stopped.  I'm happy he is trying to walk more and be more active. He is also watching his diet closer knowing he's gained weight since taking the steroids. He might have overdone it a bit with the activity but it's better than the alternative of not moving.  -TB        PT Plan    PT Plan Comments Progress to more hip/lumbar and postural stability if his lungs are doing better.  -TB       User Key  (r) = Recorded By, (t) = Taken By, (c) = Cosigned By    Initials Name Provider Type    TB Juarez Hartmann, PT Physical Therapist                    Exercises     Row Name 18 4471             Subjective Comments    Subjective Comments He says he found out he has asthma which is why he was so sick. He walked 2 miles on the treadmill  in 30 minutes but today, his headache is more flared.  He says his back is sore but his hips feel OK today.  -TB         Subjective Pain    Pre-Treatment Pain Level 6  -TB      Subjective Pain Comment pain mostly neck and head and LB  -TB        User Key  (r) = Recorded By, (t) = Taken By, (c) = Cosigned By    Initials Name Provider Type    TB Juarez Hartmann, PT Physical Therapist                        Manual Rx (last 36 hours)      Manual Treatments     Row Name 03/26/18 1535 03/26/18 1500          Manual Rx 1    Manual Rx 1 Location right sidelying lower cervical extension mob  -TB --  -TB     Manual Rx 1 Type  -- --  -TB     Manual Rx 1 Grade 3 sustained  -TB  --     Manual Rx 1 Duration 15  -TB  --        Manual Rx 2    Manual Rx 2 Location lower cervical and subocc   -TB  --     Manual Rx 2 Type STM  -TB  --     Manual Rx 2 Duration 15  -TB  --        Manual Rx 4    Manual Rx 4 Location right sidelying subocc release  -TB  --     Manual Rx 4 Duration 10 sustained  -TB  --        Manual Rx 5    Manual Rx 5 Location right sidelying C1 anterior mob  -TB  --     Manual Rx 5 Grade 3 sustained  -TB  --     Manual Rx 5 Duration 15  -TB  --       User Key  (r) = Recorded By, (t) = Taken By, (c) = Cosigned By    Initials Name Provider Type    TB Juarez Hartmann, PT Physical Therapist                PT OP Goals     Row Name 03/26/18 1600          PT Short Term Goals    STG Date to Achieve 01/27/17  -TB     STG 1 Relax muscle guarding on oliver hip flexors  -TB     STG 1 Progress Ongoing  -TB     STG 2 Improve mobility of thoracic into extension  -TB     STG 2 Progress Ongoing  -TB     STG 3 Improve right hip IR to 30 deg  -TB     STG 3 Progress Ongoing  -TB        Long Term Goals    LTG Date to Achieve 02/27/17  -TB     LTG 1 Able to walk community distances without an assistive device or only a cane without severe flare of pain  -TB     LTG 1 Progress Ongoing  -TB     LTG 2 Improve hip passive extension to 10 degrees   -TB     LTG 2 Progress Ongoing  -TB     LTG 3 Improved core contraction held during functional tasks.  -TB     LTG 3 Progress Ongoing  -TB     LTG 4 Independent with HEP for flexibility and stability.   -TB     LTG 4 Progress Ongoing  -TB     LTG 4 Progress Comments trying to walk more  -TB     LTG 5 Reports no difficulty with voiding bladder or BM's  -TB     LTG 5 Progress Ongoing  -TB        Time Calculation    PT Goal Re-Cert Due Date 04/11/18  -TB       User Key  (r) = Recorded By, (t) = Taken By, (c) = Cosigned By    Initials Name Provider Type    TB Juarez Hartmann, PT Physical Therapist          Therapy Education  Given: HEP, Symptoms/condition management  Program: Reinforced  How Provided: Verbal  Provided to: Patient  Level of Understanding: Verbalized              Time Calculation:   Start Time: 1535  Stop Time: 1635  Time Calculation (min): 60 min  Total Timed Code Minutes- PT: 60 minute(s)    Therapy Charges for Today     Code Description Service Date Service Provider Modifiers Qty    65158574129 HC PT MANUAL THERAPY EA 15 MIN 3/26/2018 Juarez Hartmann, PT GP 4                    Juarez Hartmann, PT  3/26/2018

## 2018-04-02 ENCOUNTER — APPOINTMENT (OUTPATIENT)
Dept: PHYSICAL THERAPY | Facility: HOSPITAL | Age: 63
End: 2018-04-02

## 2018-04-02 ENCOUNTER — OFFICE VISIT (OUTPATIENT)
Dept: NEUROSURGERY | Facility: CLINIC | Age: 63
End: 2018-04-02

## 2018-04-02 VITALS
SYSTOLIC BLOOD PRESSURE: 164 MMHG | DIASTOLIC BLOOD PRESSURE: 110 MMHG | WEIGHT: 315 LBS | HEIGHT: 76 IN | BODY MASS INDEX: 38.36 KG/M2

## 2018-04-02 DIAGNOSIS — M54.12 CERVICAL RADICULOPATHY: ICD-10-CM

## 2018-04-02 DIAGNOSIS — Z78.9 NON-SMOKER: ICD-10-CM

## 2018-04-02 DIAGNOSIS — M54.2 CERVICALGIA: Primary | ICD-10-CM

## 2018-04-02 DIAGNOSIS — G56.01 CARPAL TUNNEL SYNDROME OF RIGHT WRIST: ICD-10-CM

## 2018-04-02 PROCEDURE — 99213 OFFICE O/P EST LOW 20 MIN: CPT | Performed by: NURSE PRACTITIONER

## 2018-04-02 NOTE — PATIENT INSTRUCTIONS

## 2018-04-02 NOTE — PROGRESS NOTES
"    Chief complaint:   Chief Complaint   Patient presents with   • Neck Pain     now complaining of neck pain, RUE pain, headaches; he states that he has numbness in his RUE when he stands, sits, lays, and the therapist can manipulate his neck and cause this discomfort.        Subjective     HPI: This is a 63-year-old mill gentleman who we are seeing today for neck and right arm pain.  He is here to be evaluated today.  We have been seeing the patient the past for end-of-life of his spinal cord stimulator and he is currently set up for a battery change.  He complains of chronic neck pain is been going on for several years.  Says the pain in his neck is constant.  Its worse with activity and better when is lying down.  Says he will a pain that radiates down into his right upper extremity in a radicular fashion however the pain has become significantly worse from his elbow down to his hand in the last 6 months.  He is also complaining numbness and tingling in the first 3 fingers of his right hand.  This is become constant as well.  Denies any bowel or bladder incontinence.  Rates the pain on scale 0-10 at a 7.  He says it does interfere with his activities of daily living.  He has been through a dedicated course of physical therapy without any meaningful relief in his pain.  He is also work with pain management and no recent chiropractic care.    Review of Systems   Musculoskeletal: Positive for arthralgias, back pain and neck pain.   Neurological: Positive for numbness.         Objective      Vital Signs  BP (!) 164/110   Ht 193 cm (76\")   Wt (!) 148 kg (326 lb 9.6 oz)   BMI 39.76 kg/m²     Physical Exam   Constitutional: He is oriented to person, place, and time. He appears well-developed and well-nourished.   HENT:   Head: Normocephalic.   Eyes: EOM are normal. Pupils are equal, round, and reactive to light.   Neck: Normal range of motion.   Pulmonary/Chest: Effort normal.   Musculoskeletal: Normal range of " motion.        Cervical back: He exhibits pain.        Lumbar back: He exhibits pain.   Neurological: He is alert and oriented to person, place, and time. He has normal strength and normal reflexes. No cranial nerve deficit or sensory deficit. Gait normal. GCS eye subscore is 4. GCS verbal subscore is 5. GCS motor subscore is 6.   Skin: Skin is warm.   Psychiatric: He has a normal mood and affect. His speech is normal and behavior is normal. Thought content normal.       Results Review: Previous CT scan of his cervical spine show degenerative changes in his cervical spine          Assessment/Plan: At this point I am going to send the patient for a CT myelogram of his cervical spine to see if there is anything from a surgical point that needs to be addressed.  In addition of that I will send him for an EMG/NCS of his right upper extremity to see why the pain has settled become worse from his elbow down a mature there is no ulnar neuropathy versus carpal tunnel syndrome.  We will have him follow-up with Dr. Hall once this imaging is completed.  BMI shows that is very overweight.  BMI chart was given the patient.  He is a nonsmoker.         Carlos was seen today for neck pain.    Diagnoses and all orders for this visit:    Cervicalgia  -     CT cervical spine w contrast; Future  -     CT cervical spine without contrast; Future  -     IR MYELOGRAM CERVICAL SPINE; Future    Cervical radiculopathy  -     CT cervical spine w contrast; Future  -     CT cervical spine without contrast; Future  -     IR MYELOGRAM CERVICAL SPINE; Future    Carpal tunnel syndrome of right wrist  -     EMG & Nerve Conduction Test; Future    Non-smoker    BMI 39.0-39.9,adult        I discussed the patients findings and my recommendations with patient  Luis Eduardo Coles, [email or web address]  04/02/18  3:38 PM

## 2018-04-03 ENCOUNTER — HOSPITAL ENCOUNTER (OUTPATIENT)
Dept: PAIN MANAGEMENT | Age: 63
Discharge: HOME | End: 2018-04-03
Payer: MEDICARE

## 2018-04-03 VITALS
OXYGEN SATURATION: 93 % | SYSTOLIC BLOOD PRESSURE: 165 MMHG | TEMPERATURE: 97.7 F | DIASTOLIC BLOOD PRESSURE: 93 MMHG | WEIGHT: 315 LBS | HEART RATE: 63 BPM | HEIGHT: 76 IN | BODY MASS INDEX: 38.36 KG/M2 | RESPIRATION RATE: 18 BRPM

## 2018-04-03 PROCEDURE — 99204 OFFICE O/P NEW MOD 45 MIN: CPT

## 2018-04-03 RX ORDER — VERAPAMIL HYDROCHLORIDE 240 MG/1
1 TABLET, FILM COATED, EXTENDED RELEASE ORAL DAILY
COMMUNITY
End: 2019-06-17

## 2018-04-03 RX ORDER — MONTELUKAST SODIUM 10 MG/1
10 TABLET ORAL NIGHTLY
COMMUNITY
End: 2019-06-17

## 2018-04-03 RX ORDER — LUBIPROSTONE 24 UG/1
24 CAPSULE, GELATIN COATED ORAL 2 TIMES DAILY WITH MEALS
Status: ON HOLD | COMMUNITY
End: 2018-07-30

## 2018-04-03 RX ORDER — DIPHENOXYLATE HYDROCHLORIDE AND ATROPINE SULFATE 2.5; .025 MG/1; MG/1
1 TABLET ORAL 4 TIMES DAILY PRN
COMMUNITY

## 2018-04-03 ASSESSMENT — PAIN DESCRIPTION - ORIENTATION
ORIENTATION_2: LOWER;LEFT;RIGHT
ORIENTATION: LOWER;UPPER

## 2018-04-03 ASSESSMENT — PAIN DESCRIPTION - LOCATION
LOCATION_2: FOOT
LOCATION: NECK;HEAD

## 2018-04-03 ASSESSMENT — PAIN DESCRIPTION - ONSET
ONSET: ON-GOING
ONSET_2: ON-GOING

## 2018-04-03 ASSESSMENT — PAIN DESCRIPTION - DURATION: DURATION_2: CONTINUOUS

## 2018-04-03 ASSESSMENT — PAIN DESCRIPTION - DESCRIPTORS
DESCRIPTORS_2: ACHING;CONSTANT;RADIATING
DESCRIPTORS: ACHING;CONSTANT;THROBBING;RADIATING

## 2018-04-03 ASSESSMENT — PAIN DESCRIPTION - INTENSITY: RATING_2: 6

## 2018-04-03 ASSESSMENT — PAIN DESCRIPTION - PROGRESSION
CLINICAL_PROGRESSION_2: NOT CHANGED
CLINICAL_PROGRESSION: NOT CHANGED

## 2018-04-03 ASSESSMENT — PAIN SCALES - GENERAL: PAINLEVEL_OUTOF10: 5

## 2018-04-03 ASSESSMENT — PAIN DESCRIPTION - FREQUENCY: FREQUENCY: CONTINUOUS

## 2018-04-03 ASSESSMENT — PAIN DESCRIPTION - PAIN TYPE
TYPE: CHRONIC PAIN
TYPE_2: CHRONIC PAIN

## 2018-04-03 ASSESSMENT — ACTIVITIES OF DAILY LIVING (ADL): EFFECT OF PAIN ON DAILY ACTIVITIES: LIMITS ACTIVITIES

## 2018-04-09 ENCOUNTER — APPOINTMENT (OUTPATIENT)
Dept: PHYSICAL THERAPY | Facility: HOSPITAL | Age: 63
End: 2018-04-09

## 2018-04-16 ENCOUNTER — HOSPITAL ENCOUNTER (OUTPATIENT)
Dept: PHYSICAL THERAPY | Facility: HOSPITAL | Age: 63
Setting detail: THERAPIES SERIES
Discharge: HOME | End: 2018-04-16

## 2018-04-16 DIAGNOSIS — M54.41 CHRONIC BILATERAL LOW BACK PAIN WITH BILATERAL SCIATICA: Primary | ICD-10-CM

## 2018-04-16 DIAGNOSIS — M54.2 NECK PAIN: ICD-10-CM

## 2018-04-16 DIAGNOSIS — M54.42 CHRONIC BILATERAL LOW BACK PAIN WITH BILATERAL SCIATICA: Primary | ICD-10-CM

## 2018-04-16 DIAGNOSIS — M51.36 LUMBAR DEGENERATIVE DISC DISEASE: ICD-10-CM

## 2018-04-16 DIAGNOSIS — G89.29 CHRONIC BILATERAL LOW BACK PAIN WITH BILATERAL SCIATICA: Primary | ICD-10-CM

## 2018-04-16 PROCEDURE — 97140 MANUAL THERAPY 1/> REGIONS: CPT | Performed by: PHYSICAL THERAPIST

## 2018-04-16 NOTE — THERAPY PROGRESS REPORT/RE-CERT
Outpatient Physical Therapy Ortho Progress Note   [facility]     Patient Name: Carlos Hayes  : 1955  MRN: [id number]  Today's Date: 2018      Visit Date: 2018    Visit Dx:    ICD-10-CM ICD-9-CM   1. Chronic bilateral low back pain with bilateral sciatica M54.42 724.2    M54.41 724.3    G89.29 338.29   2. Lumbar degenerative disc disease M51.36 722.52   3. 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   • BMI 39.0-39.9,adult   • Non-smoker   • Cervicalgia   • Cervical radiculopathy   • Carpal tunnel syndrome of right wrist        Past Medical History:   Diagnosis Date   • Arthritis    • Back pain    • Concussion    • COPD (chronic obstructive pulmonary disease)    • Hearing impaired    • Hypertension    • Obesity         Past Surgical History:   Procedure Laterality Date   • BACK SURGERY      L3-4 fusion   • BACK SURGERY      L3-S1 fusion   • CARPAL TUNNEL RELEASE Bilateral    • CHOLECYSTECTOMY     • KNEE SURGERY     • THORACIC LAMINECTOMY WITH PLACEMENT OF DORSAL COLUMN STIMULATOR                               PT Assessment/Plan     Row Name 18 1545          PT Assessment    Functional Limitations Impaired gait;Impaired locomotion;Limitation in home management;Performance in leisure activities;Limitations in functional capacity and performance  -TB     Impairments Balance;Gait;Pain;Muscle strength;Motor function;Joint mobility;Impaired muscle length;Range of motion;Posture;Impaired flexibility  -TB     Assessment Comments His back was much more flared today. He is still having right arm radicular symptoms. He hasn't been able to walk as far due to his increased pain since his vacation. His primary pain today appears to stem from his left iliopsoas which was in a spams with trigger points as well. He was able to walk better with less pain after the release here. He said that his pelvic floor was  "\"tightening\" up making BM's and sex more difficult while he was on vacation. He was doing really well until this flare and I was trying to decrease his frequency to more of a HEP but this may show the need to extend his visits to try to get it back under control.    -TB     Rehab Potential Good  -TB     Patient/caregiver participated in establishment of treatment plan and goals Yes  -TB     Patient would benefit from skilled therapy intervention Yes  -TB        PT Plan    PT Frequency 1x/week;2x/week  -TB     Predicted Duration of Therapy Intervention (OT Eval) 2 months  -TB     Planned CPT's? PT THER PROC EA 15 MIN: 40948;PT THER ACT EA 15 MIN: 76395;PT MANUAL THERAPY EA 15 MIN: 61115;PT ELECTRICAL STIM UNATTEND: ;PT ELECTRICAL STIM ATTD EA 15 MIN: 88176;PT ULTRASOUND EA 15 MIN: 61320  -TB     PT Plan Comments Work on pain relief and progress back to core and postural stability exercises. Assess the tightness of his PF again and if he needs an internal release again. Otherwise, try to progress to more active exercises again.  -TB       User Key  (r) = Recorded By, (t) = Taken By, (c) = Cosigned By    Initials Name Provider Type    TB Juarez Hartmann, PT Physical Therapist                    Exercises     Row Name 04/16/18 0771             Subjective Comments    Subjective Comments He says his back pain has been flared since he started his vacation. He needed a w/c to be able to walk back onto the airplane. He was walking in his house to get here and dragged his toe and pulled on his back. He says his back pain is really flared up. He's betting a myelogram of his neck.   -TB         Subjective Pain    Pre-Treatment Pain Level 8  -TB      Post-Treatment Pain Level 5  -TB         Exercise 1    Exercise Name 1 after pelvic floor release did oliver LAUREL stretch wtih knees on bolster  -TB      Time 1 3 min  -TB        User Key  (r) = Recorded By, (t) = Taken By, (c) = Cosigned By    Initials Name Provider Type    TB " Juarez Hartmann, PT Physical Therapist                        Manual Rx (last 36 hours)      Manual Treatments     Row Name 04/16/18 1700             Manual Rx 1    Manual Rx 1 Location right sidelying lumbar oliver  -TB      Manual Rx 1 Type focused mostly on mid to upper lumbar  -TB      Manual Rx 1 Grade STM  -TB      Manual Rx 1 Duration 25  -TB         Manual Rx 2    Manual Rx 2 Location hooklying left IP STM and trigger point release  -TB      Manual Rx 2 Type started at psoas and worked down to insertion  -TB      Manual Rx 2 Grade he had a couple of trigger points with referred pain  -TB      Manual Rx 2 Duration 20  -TB         Manual Rx 3    Manual Rx 3 Location hooklying left glute medius  -TB      Manual Rx 3 Type STM  -TB      Manual Rx 3 Duration 10  -TB         Manual Rx 4    Manual Rx 4 Location hooklying oliver pelvic floor  -TB      Manual Rx 4 Type deep pressure release  -TB      Manual Rx 4 Grade sustained pressure through levator ani as well as short hip adductors  -TB      Manual Rx 4 Duration 25  -TB         Manual Rx 5    Manual Rx 5 Location right sidelying C1 anterior mob  -TB      Manual Rx 5 Type hooklying with hip externally rotated  -TB      Manual Rx 5 Grade 3 sustained  -TB      Manual Rx 5 Duration 15  -TB        User Key  (r) = Recorded By, (t) = Taken By, (c) = Cosigned By    Initials Name Provider Type    TB Juarez Hartmann, PT Physical Therapist                PT OP Goals     Row Name 04/16/18 1545          PT Short Term Goals    STG Date to Achieve 01/27/17  -TB     STG 1 Relax muscle guarding on oliver hip flexors  -TB     STG 1 Progress Ongoing  -TB     STG 1 Progress Comments was doing well until left spasmed today while he was leaving to come here; he tripped on his left leg which flared it  -TB     STG 2 Improve mobility of thoracic into extension  -TB     STG 2 Progress Ongoing  -TB     STG 2 Progress Comments did not assess today due to pain  -TB     STG 3 Improve right hip IR to  30 deg  -TB     STG 3 Progress Ongoing  -TB     STG 3 Progress Comments 15 deg  -TB        Long Term Goals    LTG Date to Achieve 02/27/17  -TB     LTG 1 Able to walk community distances without an assistive device or only a cane without severe flare of pain  -TB     LTG 1 Progress Ongoing  -TB     LTG 1 Progress Comments flared and hasn't been able to walk as far due to pain.  -TB     LTG 2 Improve hip passive extension to 10 degrees  -TB     LTG 2 Progress Ongoing  -TB     LTG 2 Progress Comments still to neutral  -TB     LTG 3 Improved core contraction held during functional tasks.  -TB     LTG 3 Progress Ongoing  -TB     LTG 3 Progress Comments didn't not focus on this today due to pain  -TB     LTG 4 Independent with HEP for flexibility and stability.   -TB     LTG 4 Progress Ongoing  -TB     LTG 4 Progress Comments he is still trying to hold his core in with walking but he wasn't able to stretch much  -TB     LTG 5 Reports no difficulty with voiding bladder or BM's  -TB     LTG 5 Progress Ongoing  -TB     LTG 5 Progress Comments this has worsened since he's been away from PT for 3 weeks and on vacation  -TB        Time Calculation    PT Goal Re-Cert Due Date 05/16/18  -TB       User Key  (r) = Recorded By, (t) = Taken By, (c) = Cosigned By    Initials Name Provider Type    TB Juarez Hartmann, PT Physical Therapist          Therapy Education  Given: HEP, Symptoms/condition management  Program: Reinforced  How Provided: Verbal  Provided to: Patient  Level of Understanding: Verbalized              Time Calculation:   Start Time: 1545  Stop Time: 1715  Time Calculation (min): 90 min  Total Timed Code Minutes- PT: 85 minute(s)    Therapy Charges for Today     Code Description Service Date Service Provider Modifiers Qty    [id number]  PT MANUAL THERAPY EA 15 MIN 4/16/2018 Juarez Hartmann, PT GP 6                    Juarez Hartmann, PT  4/16/2018

## 2018-04-17 ENCOUNTER — TELEPHONE (OUTPATIENT)
Dept: NEUROSURGERY | Facility: CLINIC | Age: 63
End: 2018-04-17

## 2018-04-17 DIAGNOSIS — M51.37 DEGENERATION OF LUMBAR OR LUMBOSACRAL INTERVERTEBRAL DISC: Primary | ICD-10-CM

## 2018-04-17 NOTE — TELEPHONE ENCOUNTER
Edin called the office today stating he is down with his back again and is bed ridden, he is scheduled for a MYL/CT of the cervical on Thursday of this week and DEMANDED that at the same time he have a MYL/CT of his lumbar as well, after speaking with Luis Eduardo and he oked this and also called scheduling and spoke with Viv to see if they can do both on Thursday or if they were booked and couldn't do it, she stated they should be able to do both, so Luis Eduardo put the order in with special instructions that this myelogram and CT scan be done at the same day and time as the cervical.  Edin also stated if he gets there Thursday and they will not do both, he will leave and not have the test done.

## 2018-04-18 DIAGNOSIS — M54.50 CHRONIC BILATERAL LOW BACK PAIN WITHOUT SCIATICA: ICD-10-CM

## 2018-04-18 DIAGNOSIS — M54.12 CERVICAL RADICULOPATHY: Primary | ICD-10-CM

## 2018-04-18 DIAGNOSIS — G89.29 CHRONIC BILATERAL LOW BACK PAIN WITHOUT SCIATICA: ICD-10-CM

## 2018-04-19 ENCOUNTER — HOSPITAL ENCOUNTER (OUTPATIENT)
Dept: GENERAL RADIOLOGY | Facility: HOSPITAL | Age: 63
Discharge: HOME | End: 2018-04-19

## 2018-04-19 ENCOUNTER — APPOINTMENT (OUTPATIENT)
Dept: GENERAL RADIOLOGY | Facility: HOSPITAL | Age: 63
End: 2018-04-19

## 2018-04-19 ENCOUNTER — HOSPITAL ENCOUNTER (OUTPATIENT)
Dept: CT IMAGING | Facility: HOSPITAL | Age: 63
Discharge: HOME | End: 2018-04-19
Admitting: NURSE PRACTITIONER

## 2018-04-19 VITALS
DIASTOLIC BLOOD PRESSURE: 78 MMHG | TEMPERATURE: 97.8 F | WEIGHT: 315 LBS | BODY MASS INDEX: 47.74 KG/M2 | HEIGHT: 68 IN | RESPIRATION RATE: 16 BRPM | OXYGEN SATURATION: 94 % | HEART RATE: 54 BPM | SYSTOLIC BLOOD PRESSURE: 136 MMHG

## 2018-04-19 DIAGNOSIS — M54.50 CHRONIC BILATERAL LOW BACK PAIN WITHOUT SCIATICA: ICD-10-CM

## 2018-04-19 DIAGNOSIS — M54.12 CERVICAL RADICULOPATHY: ICD-10-CM

## 2018-04-19 DIAGNOSIS — G89.29 CHRONIC BILATERAL LOW BACK PAIN WITHOUT SCIATICA: ICD-10-CM

## 2018-04-19 DIAGNOSIS — M51.37 DEGENERATION OF LUMBAR OR LUMBOSACRAL INTERVERTEBRAL DISC: ICD-10-CM

## 2018-04-19 DIAGNOSIS — M54.2 CERVICALGIA: ICD-10-CM

## 2018-04-19 LAB
APTT PPP: 30 SECONDS (ref 24.1–34.8)
INR PPP: 1.03 (ref 0.91–1.09)
PLATELET # BLD AUTO: 178 10*3/MM3 (ref 130–400)
PROTHROMBIN TIME: 13.8 SECONDS (ref 11.9–14.6)

## 2018-04-19 PROCEDURE — 36415 COLL VENOUS BLD VENIPUNCTURE: CPT

## 2018-04-19 PROCEDURE — 72270 MYELOGPHY 2/> SPINE REGIONS: CPT

## 2018-04-19 PROCEDURE — 72132 CT LUMBAR SPINE W/DYE: CPT

## 2018-04-19 PROCEDURE — 85730 THROMBOPLASTIN TIME PARTIAL: CPT | Performed by: NEUROLOGICAL SURGERY

## 2018-04-19 PROCEDURE — 85049 AUTOMATED PLATELET COUNT: CPT | Performed by: NEUROLOGICAL SURGERY

## 2018-04-19 PROCEDURE — 85610 PROTHROMBIN TIME: CPT | Performed by: NEUROLOGICAL SURGERY

## 2018-04-19 PROCEDURE — 25010000002 IOPAMIDOL 61 % SOLUTION: Performed by: NEUROLOGICAL SURGERY

## 2018-04-19 PROCEDURE — 72126 CT NECK SPINE W/DYE: CPT

## 2018-04-19 RX ORDER — LIDOCAINE HYDROCHLORIDE 10 MG/ML
5 INJECTION, SOLUTION INFILTRATION; PERINEURAL ONCE
Status: COMPLETED | OUTPATIENT
Start: 2018-04-19 | End: 2018-04-19

## 2018-04-19 RX ADMIN — LIDOCAINE HYDROCHLORIDE 5 ML: 10 INJECTION, SOLUTION INFILTRATION; PERINEURAL at 10:30

## 2018-04-19 RX ADMIN — IOPAMIDOL 15 ML: 612 INJECTION, SOLUTION INTRATHECAL at 10:30

## 2018-04-20 ENCOUNTER — APPOINTMENT (OUTPATIENT)
Dept: PREADMISSION TESTING | Facility: HOSPITAL | Age: 63
End: 2018-04-20

## 2018-04-23 ENCOUNTER — APPOINTMENT (OUTPATIENT)
Dept: PREADMISSION TESTING | Facility: HOSPITAL | Age: 63
End: 2018-04-23

## 2018-04-23 ENCOUNTER — HOSPITAL ENCOUNTER (OUTPATIENT)
Dept: PHYSICAL THERAPY | Facility: HOSPITAL | Age: 63
Setting detail: THERAPIES SERIES
Discharge: HOME | End: 2018-04-23

## 2018-04-23 VITALS
BODY MASS INDEX: 41.75 KG/M2 | HEIGHT: 73 IN | DIASTOLIC BLOOD PRESSURE: 87 MMHG | OXYGEN SATURATION: 93 % | WEIGHT: 315 LBS | SYSTOLIC BLOOD PRESSURE: 175 MMHG | RESPIRATION RATE: 18 BRPM | HEART RATE: 62 BPM

## 2018-04-23 DIAGNOSIS — G89.29 CHRONIC BILATERAL LOW BACK PAIN WITH BILATERAL SCIATICA: Primary | ICD-10-CM

## 2018-04-23 DIAGNOSIS — M54.41 CHRONIC BILATERAL LOW BACK PAIN WITH BILATERAL SCIATICA: Primary | ICD-10-CM

## 2018-04-23 DIAGNOSIS — M54.2 NECK PAIN: ICD-10-CM

## 2018-04-23 DIAGNOSIS — M54.42 CHRONIC BILATERAL LOW BACK PAIN WITH BILATERAL SCIATICA: Primary | ICD-10-CM

## 2018-04-23 DIAGNOSIS — M51.36 LUMBAR DEGENERATIVE DISC DISEASE: ICD-10-CM

## 2018-04-23 LAB
ANION GAP SERPL CALCULATED.3IONS-SCNC: 9 MMOL/L (ref 4–13)
BUN BLD-MCNC: 20 MG/DL (ref 5–21)
BUN/CREAT SERPL: 17.5 (ref 7–25)
CALCIUM SPEC-SCNC: 9.8 MG/DL (ref 8.4–10.4)
CHLORIDE SERPL-SCNC: 99 MMOL/L (ref 98–110)
CO2 SERPL-SCNC: 31 MMOL/L (ref 24–31)
CREAT BLD-MCNC: 1.14 MG/DL (ref 0.5–1.4)
DEPRECATED RDW RBC AUTO: 41.8 FL (ref 40–54)
ERYTHROCYTE [DISTWIDTH] IN BLOOD BY AUTOMATED COUNT: 12.7 % (ref 12–15)
GFR SERPL CREATININE-BSD FRML MDRD: 65 ML/MIN/1.73
GLUCOSE BLD-MCNC: 87 MG/DL (ref 70–100)
HCT VFR BLD AUTO: 50.4 % (ref 40–52)
HGB BLD-MCNC: 17.1 G/DL (ref 14–18)
MCH RBC QN AUTO: 30.3 PG (ref 28–32)
MCHC RBC AUTO-ENTMCNC: 33.9 G/DL (ref 33–36)
MCV RBC AUTO: 89.4 FL (ref 82–95)
PLATELET # BLD AUTO: 183 10*3/MM3 (ref 130–400)
PMV BLD AUTO: 10 FL (ref 6–12)
POTASSIUM BLD-SCNC: 4.6 MMOL/L (ref 3.5–5.3)
RBC # BLD AUTO: 5.64 10*6/MM3 (ref 4.8–5.9)
SODIUM BLD-SCNC: 139 MMOL/L (ref 135–145)
WBC NRBC COR # BLD: 3.94 10*3/MM3 (ref 4.8–10.8)

## 2018-04-23 PROCEDURE — 80048 BASIC METABOLIC PNL TOTAL CA: CPT | Performed by: NEUROLOGICAL SURGERY

## 2018-04-23 PROCEDURE — 85027 COMPLETE CBC AUTOMATED: CPT | Performed by: NEUROLOGICAL SURGERY

## 2018-04-23 PROCEDURE — 97140 MANUAL THERAPY 1/> REGIONS: CPT | Performed by: PHYSICAL THERAPIST

## 2018-04-23 PROCEDURE — 93010 ELECTROCARDIOGRAM REPORT: CPT | Performed by: INTERNAL MEDICINE

## 2018-04-23 PROCEDURE — 36415 COLL VENOUS BLD VENIPUNCTURE: CPT

## 2018-04-23 PROCEDURE — 93005 ELECTROCARDIOGRAM TRACING: CPT

## 2018-04-23 RX ORDER — DULOXETIN HYDROCHLORIDE 60 MG/1
60 CAPSULE, DELAYED RELEASE ORAL DAILY
COMMUNITY

## 2018-04-23 RX ORDER — VERAPAMIL HYDROCHLORIDE 200 MG/1
200 CAPSULE, EXTENDED RELEASE ORAL NIGHTLY
COMMUNITY
End: 2018-05-18 | Stop reason: DRUGHIGH

## 2018-04-23 RX ORDER — PROCHLORPERAZINE MALEATE 10 MG
10 TABLET ORAL EVERY 6 HOURS
Status: ON HOLD | COMMUNITY
End: 2020-10-31

## 2018-04-23 RX ORDER — TADALAFIL 20 MG/1
10 TABLET ORAL DAILY PRN
COMMUNITY

## 2018-04-23 RX ORDER — DIPHENOXYLATE HYDROCHLORIDE AND ATROPINE SULFATE 2.5; .025 MG/1; MG/1
1 TABLET ORAL 4 TIMES DAILY PRN
Status: ON HOLD | COMMUNITY
End: 2020-10-31

## 2018-04-23 RX ORDER — OXYCODONE AND ACETAMINOPHEN 10; 325 MG/1; MG/1
1 TABLET ORAL EVERY 6 HOURS PRN
COMMUNITY
End: 2018-05-18 | Stop reason: DRUGHIGH

## 2018-04-23 RX ORDER — MONTELUKAST SODIUM 10 MG/1
10 TABLET ORAL NIGHTLY
COMMUNITY
End: 2018-07-12

## 2018-04-23 RX ORDER — GABAPENTIN 600 MG/1
600 TABLET ORAL 4 TIMES DAILY
COMMUNITY
End: 2018-05-18 | Stop reason: DRUGHIGH

## 2018-04-23 RX ORDER — FLUTICASONE PROPIONATE 50 MCG
1 SPRAY, SUSPENSION (ML) NASAL DAILY
COMMUNITY
End: 2018-07-12

## 2018-04-23 RX ORDER — OMEPRAZOLE 20 MG/1
20 CAPSULE, DELAYED RELEASE ORAL DAILY
COMMUNITY
End: 2020-10-31 | Stop reason: HOSPADM

## 2018-04-23 RX ORDER — SODIUM PHOSPHATE,MONO-DIBASIC 19G-7G/118
ENEMA (ML) RECTAL DAILY
COMMUNITY
End: 2021-12-14

## 2018-04-23 RX ORDER — MECLIZINE HYDROCHLORIDE 25 MG/1
25 TABLET ORAL 4 TIMES DAILY PRN
Status: ON HOLD | COMMUNITY
End: 2020-10-31

## 2018-04-23 RX ORDER — PROCHLORPERAZINE MALEATE 5 MG/1
5 TABLET ORAL DAILY PRN
COMMUNITY
End: 2018-05-24

## 2018-04-23 RX ORDER — CLARITHROMYCIN 500 MG/1
500 TABLET, COATED ORAL EVERY 12 HOURS SCHEDULED
Status: ON HOLD | COMMUNITY
End: 2018-05-02

## 2018-04-23 RX ORDER — VARENICLINE TARTRATE 1 MG/1
1 TABLET, FILM COATED ORAL 2 TIMES DAILY
COMMUNITY
End: 2021-12-14

## 2018-04-23 RX ORDER — LUBIPROSTONE 24 UG/1
24 CAPSULE ORAL 2 TIMES DAILY WITH MEALS
COMMUNITY
End: 2018-07-12

## 2018-04-23 NOTE — THERAPY TREATMENT NOTE
Outpatient Physical Therapy Ortho Treatment Note   [facility]     Patient Name: Carlos Hayes  : 1955  MRN: [id number]  Today's Date: 2018      Visit Date: 2018    Visit Dx:    ICD-10-CM ICD-9-CM   1. Chronic bilateral low back pain with bilateral sciatica M54.42 724.2    M54.41 724.3    G89.29 338.29   2. Lumbar degenerative disc disease M51.36 722.52   3. 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   • BMI 39.0-39.9,adult   • Non-smoker   • Cervicalgia   • Cervical radiculopathy   • Carpal tunnel syndrome of right wrist        Past Medical History:   Diagnosis Date   • Acute bronchitis    • Amnesia    • Anxiety    • Arthritis    • Asthma    • Back pain    • Chest pain    • CKD (chronic kidney disease)    • Concussion    • Contusion of chest wall    • COPD (chronic obstructive pulmonary disease)    • Degeneration of intervertebral disc of lumbar region    • Fall    • Gait disturbance    • Gastro-esophageal reflux    • Headache    • Hearing impaired    • Hypersomnia    • Hypertension    • Hypotestosteronemia    • Insomnia    • Leg cramp    • Lumbar stenosis    • Obesity    • Obesity    • KATLYN (obstructive sleep apnea)    • PTSD (post-traumatic stress disorder)    • Radiculopathy of lumbosacral region    • Sinusitis    • Testicular hypofunction    • Tremor         Past Surgical History:   Procedure Laterality Date   • BACK SURGERY      L3-4 fusion   • BACK SURGERY      L3-S1 fusion   • CARPAL TUNNEL RELEASE Bilateral    • CHOLECYSTECTOMY     • HERNIA REPAIR     • KNEE SURGERY     • THORACIC LAMINECTOMY WITH PLACEMENT OF DORSAL COLUMN STIMULATOR                               PT Assessment/Plan     Row Name 18 1700          PT Assessment    Assessment Comments His flare from last week appears to be related to an SI strain. This would make sense given it occured when he caught his left toe while walking and  immiately had pain all through his left pelvis, lumbar and down his leg. He may have pulled his left innomiate into an anterior tilt with secondary muscle spasms. By bringing his left knee to his chest, this reversed this and the SI belt stabilized it. His fusion would make his SI vulnerable to instability.   -TB        PT Plan    PT Plan Comments Assess how he did with the SI belt and hopefully progress back to hip stability and flexiblity like we were before his vacation.  -TB       User Key  (r) = Recorded By, (t) = Taken By, (c) = Cosigned By    Initials Name Provider Type    TB Juarez Hartmann, PT Physical Therapist                    Exercises     Row Name 04/23/18 1600             Subjective Comments    Subjective Comments He says his flare of his back pain finally settled down Sunday. He had a migraine after his last treatment. He had an EKG and was lying with his no pillow and this irritated his neck. He was late today because of finishing tests at the hospital. He says he can sometimes lie flat on his back and other times, he can't.   -TB         Subjective Pain    Pre-Treatment Pain Level 7  -TB      Subjective Pain Comment pain mostly in neck and interscapula; also with back pain  -TB         Exercise 1    Exercise Name 1 fit with SI belt and instructed on use and donning; he reported it felt better  -TB        User Key  (r) = Recorded By, (t) = Taken By, (c) = Cosigned By    Initials Name Provider Type    TB Juarez Hartmann, PT Physical Therapist                        Manual Rx (last 36 hours)      Manual Treatments     Row Name 04/23/18 1700             Manual Rx 1    Manual Rx 1 Location left sidelying right UT/LS  -TB      Manual Rx 1 Type TrP release    -TB      Manual Rx 1 Grade strong OP  -TB      Manual Rx 1 Duration 20  -TB         Manual Rx 2    Manual Rx 2 Location left sidelying CT junction  -TB      Manual Rx 2 Type extension mob  -TB      Manual Rx 2 Grade 3 sustained  -TB      Manual Rx 2  Duration 20  -TB         Manual Rx 3    Manual Rx 3 Location hooklying left IP   -TB      Manual Rx 3 Type Myofascial release  -TB      Manual Rx 3 Grade very guarded; pressure on pubic bone reproduced posterior pain  -TB      Manual Rx 3 Duration 15  -TB         Manual Rx 4    Manual Rx 4 Location left knee to chest  -TB      Manual Rx 4 Type gentle repetitive OP  -TB      Manual Rx 4 Grade eased left hip/back pain  -TB      Manual Rx 4 Duration 5  -TB        User Key  (r) = Recorded By, (t) = Taken By, (c) = Cosigned By    Initials Name Provider Type    TB Juraez Hartmann, PT Physical Therapist                PT OP Goals     Row Name 04/23/18 1700          PT Short Term Goals    STG Date to Achieve 01/27/17  -TB     STG 1 Relax muscle guarding on oliver hip flexors  -TB     STG 1 Progress Ongoing  -TB     STG 1 Progress Comments more guarded than it has been with spasms all through his left IP  -TB     STG 2 Improve mobility of thoracic into extension  -TB     STG 2 Progress Ongoing  -TB     STG 3 Improve right hip IR to 30 deg  -TB     STG 3 Progress Ongoing  -TB        Long Term Goals    LTG Date to Achieve 02/27/17  -TB     LTG 1 Able to walk community distances without an assistive device or only a cane without severe flare of pain  -TB     LTG 1 Progress Ongoing  -TB     LTG 2 Improve hip passive extension to 10 degrees  -TB     LTG 2 Progress Ongoing  -TB     LTG 3 Improved core contraction held during functional tasks.  -TB     LTG 3 Progress Ongoing  -TB     LTG 4 Independent with HEP for flexibility and stability.   -TB     LTG 4 Progress Ongoing  -TB     LTG 5 Reports no difficulty with voiding bladder or BM's  -TB     LTG 5 Progress Ongoing  -TB       User Key  (r) = Recorded By, (t) = Taken By, (c) = Cosigned By    Initials Name Provider Type    SILVA Hartmann, PT Physical Therapist          Therapy Education  Education Details: SI belt wearing schedule and donning  Given: HEP, Symptoms/condition  management  Program: Reinforced  How Provided: Verbal  Provided to: Patient  Level of Understanding: Verbalized              Time Calculation:   Start Time: 1600  Stop Time: 1705  Time Calculation (min): 65 min  Total Timed Code Minutes- PT: 65 minute(s)    Therapy Charges for Today     Code Description Service Date Service Provider Modifiers Qty    [id number] HC PT MANUAL THERAPY EA 15 MIN 4/23/2018 Juarez Hartmann, PT GP 4                    Juarez Hartmann, PT  4/23/2018

## 2018-04-23 NOTE — DISCHARGE INSTRUCTIONS
DAY OF SURGERY INSTRUCTIONS        YOUR SURGEON: Dr. Hall    PROCEDURE: Spinal Cord Stimulator Removal and Placement    DATE OF SURGERY: May 2, 2019    ARRIVAL TIME: AS DIRECTED BY OFFICE    DAY OF SURGERY TAKE ONLY THESE MEDICATIONS: NONE        MANAGING PAIN AFTER SURGERY    We know you are probably wondering what your pain will be like after surgery.  Following surgery it is unrealistic to expect you will not have pain.   Pain is how our bodies let us know that something is wrong or cautions us to be careful.  That said, our goal is to make your pain tolerable.    Methods we may use to treat your pain include (oral or IV medications, PCAs, epidurals, nerve blocks, etc.)   While some procedures require IV pain medications for a short time after surgery, transitioning to pain medications by mouth allows for better management of pain.   Your nurse will encourage you to take oral pain medications whenever possible.  IV medications work almost immediately, but only last a short while.  Taking medications by mouth allows for a more constant level of medication in your blood stream for a longer period of time.      Once your pain is out of control it is harder to get back under control.  It is important you are aware when your next dose of pain medication is due.  If you are admitted, your nurse may write the time of your next dose on the white board in your room to help you remember.      We are interested in your pain and encourage you to inform us about aggravating factors during your visit.   Many times a simple repositioning every few hours can make a big difference.    If your physician says it is okay, do not let your pain prevent you from getting out of bed. Be sure to call your nurse for assistance prior to getting up so you do not fall.      Before surgery, please decide your tolerable pain goal.  These faces help describe the pain ratings we use on a 0-10 scale.   Be prepared to tell us your goal and  whether or not you take pain or anxiety medications at home.          BEFORE YOU COME TO THE HOSPITAL  (Pre-op instructions)  • Do not eat, drink, smoke or chew gum after midnight the night before surgery.  This also includes no mints.  • Morning of surgery take only the medicines you have been instructed with a sip of water unless otherwise instructed  by your physician.  • Do not shave, wear makeup or dark nail polish.  • Remove all jewelry including rings.  • Leave anything you consider valuable at home.  • Leave your suitcase in the car until after your surgery.  • Bring the following with you if applicable:  o Picture ID and insurance, Medicare or Medicaid cards  o Co-pay/deductible required by insurance (cash, check, credit card)  o Copy of advance directive, living will or power-of- documents if not brought to PAT  o CPAP or BIPAP mask and tubing  o Relaxation aids (MP3 player, book, magazine)  • On the day of surgery check in at registration located at the main entrance of the hospital.       Outpatient Surgery Guidelines, Adult  Outpatient procedures are those for which the person having the procedure is allowed to go home the same day as the procedure. Various procedures are done on an outpatient basis. You should follow some general guidelines if you will be having an outpatient procedure.  LET YOUR HEALTH CARE PROVIDER KNOW ABOUT:  · Any allergies you have.  · All medicines you are taking, including vitamins, herbs, eye drops, creams, and over-the-counter medicines.  · Previous problems you or members of your family have had with the use of anesthetics.  · Any blood disorders you have.  · Previous surgeries you have had.  · Medical conditions you have.  RISKS AND COMPLICATIONS  Your health care provider will discuss possible risks and complications with you before surgery. Common risks and complications include:    · Problems due to the use of anesthetics.  · Blood loss and replacement (does not  apply to minor surgical procedures).  · Temporary increase in pain due to surgery.  · Uncorrected pain or problems that the surgery was meant to correct.  · Infection.  · New damage.  BEFORE THE PROCEDURE  · Ask your health care provider about changing or stopping your regular medicines. You may need to stop taking certain medicines in the days or weeks before the procedure.  · Stop smoking at least 2 weeks before surgery. This lowers your risk for complications during and after surgery. Ask your health care provider for help with this if needed.  · Eat your usual meals and a light supper the day before surgery. Continue fluid intake. Do not drink alcohol.  · Do not eat or drink after midnight the night before your surgery.   · Arrange for someone to take you home and to stay with you for 24 hours after the procedure. Medicine given for your procedure may affect your ability to drive or to care for yourself.  · Call your health care provider's office if you develop an illness or problem that may prevent you from safely having your procedure.  AFTER THE PROCEDURE  After surgery, you will be taken to a recovery area, where your progress will be monitored. If there are no complications, you will be allowed to go home when you are awake, stable, and taking fluids well. You may have numbness around the surgical site. Healing will take some time. You will have tenderness at the surgical site and may have some swelling and bruising. You may also have some nausea.  HOME CARE INSTRUCTIONS  · Do not drive for 24 hours, or as directed by your health care provider. Do not drive while taking prescription pain medicines.  · Do not drink alcohol for 24 hours.  · Do not make important decisions or sign legal documents for 24 hours.  · You may resume a normal diet and activities as directed.  · Do not lift anything heavier than 10 pounds (4.5 kg) or play contact sports until your health care provider says it is okay.  · Change your  bandages (dressings) as directed.  · Only take over-the-counter or prescription medicines as directed by your health care provider.  · Follow up with your health care provider as directed.  SEEK MEDICAL CARE IF:  · You have increased bleeding (more than a small spot) from the surgical site.  · You have redness, swelling, or increasing pain in the wound.  · You see pus coming from the wound.  · You have a fever.  · You notice a bad smell coming from the wound or dressing.  · You feel lightheaded or faint.  · You develop a rash.  · You have trouble breathing.  · You develop allergies.  MAKE SURE YOU:  · Understand these instructions.  · Will watch your condition.  · Will get help right away if you are not doing well or get worse.     This information is not intended to replace advice given to you by your health care provider. Make sure you discuss any questions you have with your health care provider.     Document Released: 09/12/2002 Document Revised: 05/03/2016 Document Reviewed: 05/22/2014  [employer] Interactive Patient Education ©2016 [employer]       Fall Prevention in Hospitals, Adult  As a hospital patient, your condition and the treatments you receive can increase your risk for falls. Some additional risk factors for falls in a hospital include:  · Being in an unfamiliar environment.  · Being on bed rest.  · Your surgery.  · Taking certain medicines.  · Your tubing requirements, such as intravenous (IV) therapy or catheters.  It is important that you learn how to decrease fall risks while at the hospital. Below are important tips that can help prevent falls.  SAFETY TIPS FOR PREVENTING FALLS  Talk about your risk of falling.  · Ask your health care provider why you are at risk for falling. Is it your medicine, illness, tubing placement, or something else?  · Make a plan with your health care provider to keep you safe from falls.  · Ask your health care provider or pharmacist about side effects of your  medicines. Some medicines can make you dizzy or affect your coordination.  Ask for help.  · Ask for help before getting out of bed. You may need to press your call button.  · Ask for assistance in getting safely to the toilet.  · Ask for a walker or cane to be put at your bedside. Ask that most of the side rails on your bed be placed up before your health care provider leaves the room.  · Ask family or friends to sit with you.  · Ask for things that are out of your reach, such as your glasses, hearing aids, telephone, bedside table, or call button.  Follow these tips to avoid falling:  · Stay lying or seated, rather than standing, while waiting for help.  · Wear rubber-soled slippers or shoes whenever you walk in the hospital.  · Avoid quick, sudden movements.  ¨ Change positions slowly.  ¨ Sit on the side of your bed before standing.  ¨ Stand up slowly and wait before you start to walk.  · Let your health care provider know if there is a spill on the floor.  · Pay careful attention to the medical equipment, electrical cords, and tubes around you.  · When you need help, use your call button by your bed or in the bathroom. Wait for one of your health care providers to help you.  · If you feel dizzy or unsure of your footing, return to bed and wait for assistance.  · Avoid being distracted by the TV, telephone, or another person in your room.  · Do not lean or support yourself on rolling objects, such as IV poles or bedside tables.     This information is not intended to replace advice given to you by your health care provider. Make sure you discuss any questions you have with your health care provider.     Document Released: 12/15/2001 Document Revised: 01/08/2016 Document Reviewed: 08/25/2013  [employer] Interactive Patient Education ©2016 [employer]       Surgical Site Infections FAQs  What is a Surgical Site Infection (SSI)?  A surgical site infection is an infection that occurs after surgery in the part of the  body where the surgery took place. Most patients who have surgery do not develop an infection. However, infections develop in about 1 to 3 out of every 100 patients who have surgery.  Some of the common symptoms of a surgical site infection are:  · Redness and pain around the area where you had surgery  · Drainage of cloudy fluid from your surgical wound  · Fever  Can SSIs be treated?  Yes. Most surgical site infections can be treated with antibiotics. The antibiotic given to you depends on the bacteria (germs) causing the infection. Sometimes patients with SSIs also need another surgery to treat the infection.  What are some of the things that hospitals are doing to prevent SSIs?  To prevent SSIs, doctors, nurses, and other healthcare providers:  · Clean their hands and arms up to their elbows with an antiseptic agent just before the surgery.  · Clean their hands with soap and water or an alcohol-based hand rub before and after caring for each patient.  · May remove some of your hair immediately before your surgery using electric clippers if the hair is in the same area where the procedure will occur. They should not shave you with a razor.  · Wear special hair covers, masks, gowns, and gloves during surgery to keep the surgery area clean.  · Give you antibiotics before your surgery starts. In most cases, you should get antibiotics within 60 minutes before the surgery starts and the antibiotics should be stopped within 24 hours after surgery.  · Clean the skin at the site of your surgery with a special soap that kills germs.  What can I do to help prevent SSIs?  Before your surgery:  · Tell your doctor about other medical problems you may have. Health problems such as allergies, diabetes, and obesity could affect your surgery and your treatment.  · Quit smoking. Patients who smoke get more infections. Talk to your doctor about how you can quit before your surgery.  · Do not shave near where you will have surgery.  Shaving with a razor can irritate your skin and make it easier to develop an infection.  At the time of your surgery:  · Speak up if someone tries to shave you with a razor before surgery. Ask why you need to be shaved and talk with your surgeon if you have any concerns.  · Ask if you will get antibiotics before surgery.  After your surgery:  · Make sure that your healthcare providers clean their hands before examining you, either with soap and water or an alcohol-based hand rub.  · If you do not see your providers clean their hands, please ask them to do so.  · Family and friends who visit you should not touch the surgical wound or dressings.  · Family and friends should clean their hands with soap and water or an alcohol-based hand rub before and after visiting you. If you do not see them clean their hands, ask them to clean their hands.  What do I need to do when I go home from the hospital?  · Before you go home, your doctor or nurse should explain everything you need to know about taking care of your wound. Make sure you understand how to care for your wound before you leave the hospital.  · Always clean your hands before and after caring for your wound.  · Before you go home, make sure you know who to contact if you have questions or problems after you get home.  · If you have any symptoms of an infection, such as redness and pain at the surgery site, drainage, or fever, call your doctor immediately.  If you have additional questions, please ask your doctor or nurse.  Developed and co-sponsored by The Society for Healthcare Epidemiology of [place] (SHEA); Infectious Diseases Society of [place] (IDSA); American Hospital Association; Association for Professionals in Infection Control and Epidemiology (APIC); Centers for Disease Control and Prevention (CDC); and The Joint Commission.     This information is not intended to replace advice given to you by your health care provider. Make sure you discuss any  questions you have with your health care provider.     Document Released: 12/23/2014 Document Revised: 01/08/2016 Document Reviewed: 03/02/2016  [employer] Interactive Patient Education ©2016 [employer].       [facility]  CHG 4% Patient Instruction Sheet    Preparing the Skin Before Surgery  Preparing or “prepping” skin before surgery can reduce the risk of infection at the surgical site. To make the process easier,[facility] has chosen 4% Chlorhexidine Gluconate (CHG) antiseptic solution.   The steps below outline the prepping process and should be carefully followed.                                                                                                                                                      Prep the skin at the following time(s):                                                      We recommend you shower the night before surgery, and again the morning of surgery with the 4% CHG antiseptic solution using half of the bottle and a cloth each time.  Dress in clean clothes/sleepwear after showering.  See instructions below for application.          Do not apply any lotions or moisturizers.       Do not shave the area to be prepped for at least 2 days prior to surgery.    Clipping the hair may be done immediately prior to your surgery at the hospital    if needed.    Directions:  Thoroughly rinse your body with water.  Apply 4% CHG to a cloth and wash skin gently, paying special attention to the operative site.  Rinse again thoroughly.  Once you have begun using this product do not apply anything else to your skin. If itching or redness persists, rinse affected areas and discontinue use.    When using this product:  • Keep out of eyes, ears, and mouth.  • If solution should contact these areas, rinse out promptly and thoroughly with water.  • For external use only.  • Do not use in genital area, on your face or head.      PATIENT/FAMILY/RESPONSIBLE PARTY VERBALIZES UNDERSTANDING OF ABOVE  EDUCATION.  COPY OF PAIN SCALE GIVEN AND REVIEWED WITH VERBALIZED UNDERSTANDING.

## 2018-04-24 ENCOUNTER — OFFICE VISIT (OUTPATIENT)
Dept: NEUROSURGERY | Facility: CLINIC | Age: 63
End: 2018-04-24

## 2018-04-24 VITALS
BODY MASS INDEX: 41.75 KG/M2 | WEIGHT: 315 LBS | HEIGHT: 73 IN | SYSTOLIC BLOOD PRESSURE: 160 MMHG | DIASTOLIC BLOOD PRESSURE: 90 MMHG

## 2018-04-24 DIAGNOSIS — Z78.9 NON-SMOKER: ICD-10-CM

## 2018-04-24 DIAGNOSIS — M48.062 SPINAL STENOSIS, LUMBAR REGION, WITH NEUROGENIC CLAUDICATION: ICD-10-CM

## 2018-04-24 DIAGNOSIS — M54.2 CERVICALGIA: Primary | ICD-10-CM

## 2018-04-24 DIAGNOSIS — M54.12 CERVICAL RADICULOPATHY: ICD-10-CM

## 2018-04-24 DIAGNOSIS — Z45.42 BATTERY END OF LIFE OF SPINAL CORD STIMULATOR: ICD-10-CM

## 2018-04-24 PROCEDURE — 99213 OFFICE O/P EST LOW 20 MIN: CPT | Performed by: NURSE PRACTITIONER

## 2018-04-24 NOTE — PROGRESS NOTES
"    Chief complaint:   Chief Complaint   Patient presents with   • Back and neck pain     Edin has returned today for the results of a recent myelogram and CT scan of his cervical and lumbar.  He states he has been in a lot of back pain but is some better today.  He is scheduled for a spinal cord stimulator replacement on May 2nd.          Subjective     HPI: This is a 63-year-old male gentleman who we have been following for neck and back pain.  He is here in evaluation today after having a CT myelogram done.  He says that he continues to have pain in his neck but his biggest complaint by far now is pain in his low back and that is going into his left lower extremity.  He does have pain in his right leg but is more prominent in his left lower extremity.  Pain is constant and worse when is walking.  It is interfering with his activities of daily living.  Rates the pain a scale 0-10 at a 9.  Denies any bowel or bladder incontinence.  He has noticed more issues with him standing up straighter because of the pain.  He is set up to have a spinal cord stimulator battery change done on May 2 of this year.  He had a CT myelogram done and is here for these results today.    Review of Systems   Musculoskeletal: Positive for back pain and myalgias.   Neurological: Positive for weakness and numbness.         Objective      Vital Signs  /90 (BP Location: Right arm, Patient Position: Sitting)   Ht 186 cm (73.23\")   Wt (!) 144 kg (318 lb 6.4 oz)   BMI 41.74 kg/m²     Physical Exam   Constitutional: He is oriented to person, place, and time. He appears well-developed and well-nourished.   HENT:   Head: Normocephalic.   Eyes: EOM are normal. Pupils are equal, round, and reactive to light.   Neck: Normal range of motion.   Pulmonary/Chest: Effort normal.   Musculoskeletal: Normal range of motion.        Cervical back: He exhibits pain.        Lumbar back: He exhibits pain.   Neurological: He is alert and oriented to " person, place, and time. He has normal strength and normal reflexes. No cranial nerve deficit or sensory deficit. Gait normal. GCS eye subscore is 4. GCS verbal subscore is 5. GCS motor subscore is 6.   Skin: Skin is warm.   Psychiatric: He has a normal mood and affect. His speech is normal and behavior is normal. Thought content normal.       Results Review: CT scan of the cervical spine shows some spondylosis throughout his cervical spine.  The dye was unable to reach the cervical region as the dye was hindered from severe lumbar stenosis and his lumbar spine at the L2-3 region.  There does appear to be a significant amount of adjacent level degeneration at this part and stenosis present.          Assessment/Plan: At this point we are going to continue with the current plan of having the battery change for the spinal cord stimulator and then follow-up the patient once this is completed to see how is doing regarding his back and leg pain.  Should he still have a tremendous amount of pain in his back and his leg we can talk about extending the fusion up to L2-3.  We will follow-up again with him after the battery has change and discuss further options at that time.  BMI shows that he is very overweight.  BMI chart was given the patient.  He is a nonsmoker.         Carlos was seen today for back and neck pain.    Diagnoses and all orders for this visit:    Cervicalgia    Cervical radiculopathy    Battery end of life of spinal cord stimulator    Spinal stenosis, lumbar region, with neurogenic claudication    Non-smoker    BMI 40.0-44.9, adult        I discussed the patients findings and my recommendations with patient  Luis Eduardo Coles, [email or web address]  04/24/18  11:42 AM

## 2018-05-02 ENCOUNTER — ANESTHESIA (OUTPATIENT)
Dept: PERIOP | Facility: HOSPITAL | Age: 63
End: 2018-05-02

## 2018-05-02 ENCOUNTER — ANESTHESIA EVENT (OUTPATIENT)
Dept: PERIOP | Facility: HOSPITAL | Age: 63
End: 2018-05-02

## 2018-05-02 ENCOUNTER — HOSPITAL ENCOUNTER (OUTPATIENT)
Facility: HOSPITAL | Age: 63
Discharge: HOME | End: 2018-05-02
Attending: NEUROLOGICAL SURGERY | Admitting: NEUROLOGICAL SURGERY

## 2018-05-02 VITALS
RESPIRATION RATE: 16 BRPM | DIASTOLIC BLOOD PRESSURE: 87 MMHG | HEART RATE: 48 BPM | OXYGEN SATURATION: 97 % | TEMPERATURE: 97.7 F | SYSTOLIC BLOOD PRESSURE: 155 MMHG

## 2018-05-02 DIAGNOSIS — G56.01 CARPAL TUNNEL SYNDROME OF RIGHT WRIST: ICD-10-CM

## 2018-05-02 LAB
ABO GROUP BLD: NORMAL
BLD GP AB SCN SERPL QL: NEGATIVE
RH BLD: NEGATIVE
T&S EXPIRATION DATE: NORMAL

## 2018-05-02 PROCEDURE — C1787 PATIENT PROGR, NEUROSTIM: HCPCS | Performed by: NEUROLOGICAL SURGERY

## 2018-05-02 PROCEDURE — 86900 BLOOD TYPING SEROLOGIC ABO: CPT | Performed by: NURSE PRACTITIONER

## 2018-05-02 PROCEDURE — 25010000002 ONDANSETRON PER 1 MG: Performed by: NURSE ANESTHETIST, CERTIFIED REGISTERED

## 2018-05-02 PROCEDURE — 25010000002 DEXAMETHASONE PER 1 MG: Performed by: ANESTHESIOLOGY

## 2018-05-02 PROCEDURE — 63685 INS/RPLC SPI NPG/RCVR POCKET: CPT | Performed by: NEUROLOGICAL SURGERY

## 2018-05-02 PROCEDURE — 25010000002 MIDAZOLAM PER 1 MG: Performed by: ANESTHESIOLOGY

## 2018-05-02 PROCEDURE — 25010000002 DEXAMETHASONE PER 1 MG: Performed by: NURSE ANESTHETIST, CERTIFIED REGISTERED

## 2018-05-02 PROCEDURE — 86850 RBC ANTIBODY SCREEN: CPT | Performed by: NURSE PRACTITIONER

## 2018-05-02 PROCEDURE — C1820 GENERATOR NEURO RECHG BAT SY: HCPCS | Performed by: NEUROLOGICAL SURGERY

## 2018-05-02 PROCEDURE — 25010000002 PROPOFOL 10 MG/ML EMULSION: Performed by: NURSE ANESTHETIST, CERTIFIED REGISTERED

## 2018-05-02 PROCEDURE — 25010000002 FENTANYL CITRATE (PF) 250 MCG/5ML SOLUTION: Performed by: NURSE ANESTHETIST, CERTIFIED REGISTERED

## 2018-05-02 PROCEDURE — 25010000002 VANCOMYCIN PER 500 MG: Performed by: NURSE ANESTHETIST, CERTIFIED REGISTERED

## 2018-05-02 PROCEDURE — 25010000002 SUCCINYLCHOLINE PER 20 MG: Performed by: NURSE ANESTHETIST, CERTIFIED REGISTERED

## 2018-05-02 PROCEDURE — L8689 EXTERNAL RECHARG SYS INTERN: HCPCS | Performed by: NEUROLOGICAL SURGERY

## 2018-05-02 PROCEDURE — 86901 BLOOD TYPING SEROLOGIC RH(D): CPT | Performed by: NURSE PRACTITIONER

## 2018-05-02 DEVICE — NEUROSTM INTELLIS SURESCAN MRI W/ADAPTIVE STIM RECHG: Type: IMPLANTABLE DEVICE | Status: FUNCTIONAL

## 2018-05-02 RX ORDER — FENTANYL CITRATE 50 UG/ML
INJECTION, SOLUTION INTRAMUSCULAR; INTRAVENOUS AS NEEDED
Status: DISCONTINUED | OUTPATIENT
Start: 2018-05-02 | End: 2018-05-02 | Stop reason: SURG

## 2018-05-02 RX ORDER — METOCLOPRAMIDE HYDROCHLORIDE 5 MG/ML
5 INJECTION INTRAMUSCULAR; INTRAVENOUS
Status: DISCONTINUED | OUTPATIENT
Start: 2018-05-02 | End: 2018-05-02 | Stop reason: HOSPADM

## 2018-05-02 RX ORDER — CLONIDINE HYDROCHLORIDE 0.1 MG/1
0.1 TABLET ORAL AS NEEDED
COMMUNITY
End: 2018-07-12

## 2018-05-02 RX ORDER — SODIUM CHLORIDE, SODIUM LACTATE, POTASSIUM CHLORIDE, CALCIUM CHLORIDE 600; 310; 30; 20 MG/100ML; MG/100ML; MG/100ML; MG/100ML
100 INJECTION, SOLUTION INTRAVENOUS CONTINUOUS
Status: DISCONTINUED | OUTPATIENT
Start: 2018-05-02 | End: 2018-05-02 | Stop reason: HOSPADM

## 2018-05-02 RX ORDER — MIDAZOLAM HYDROCHLORIDE 1 MG/ML
1 INJECTION INTRAMUSCULAR; INTRAVENOUS
Status: DISCONTINUED | OUTPATIENT
Start: 2018-05-02 | End: 2018-05-02 | Stop reason: HOSPADM

## 2018-05-02 RX ORDER — OXYCODONE AND ACETAMINOPHEN 7.5; 325 MG/1; MG/1
2 TABLET ORAL ONCE AS NEEDED
Status: DISCONTINUED | OUTPATIENT
Start: 2018-05-02 | End: 2018-05-02 | Stop reason: HOSPADM

## 2018-05-02 RX ORDER — SODIUM CHLORIDE 0.9 % (FLUSH) 0.9 %
1-10 SYRINGE (ML) INJECTION AS NEEDED
Status: DISCONTINUED | OUTPATIENT
Start: 2018-05-02 | End: 2018-05-02 | Stop reason: HOSPADM

## 2018-05-02 RX ORDER — FAMOTIDINE 10 MG/ML
20 INJECTION, SOLUTION INTRAVENOUS
Status: DISCONTINUED | OUTPATIENT
Start: 2018-05-02 | End: 2018-05-02 | Stop reason: HOSPADM

## 2018-05-02 RX ORDER — MAGNESIUM HYDROXIDE 1200 MG/15ML
LIQUID ORAL AS NEEDED
Status: DISCONTINUED | OUTPATIENT
Start: 2018-05-02 | End: 2018-05-02 | Stop reason: HOSPADM

## 2018-05-02 RX ORDER — SODIUM CHLORIDE, SODIUM LACTATE, POTASSIUM CHLORIDE, CALCIUM CHLORIDE 600; 310; 30; 20 MG/100ML; MG/100ML; MG/100ML; MG/100ML
1000 INJECTION, SOLUTION INTRAVENOUS CONTINUOUS
Status: DISCONTINUED | OUTPATIENT
Start: 2018-05-02 | End: 2018-05-02 | Stop reason: HOSPADM

## 2018-05-02 RX ORDER — MEPERIDINE HYDROCHLORIDE 25 MG/ML
12.5 INJECTION INTRAMUSCULAR; INTRAVENOUS; SUBCUTANEOUS
Status: DISCONTINUED | OUTPATIENT
Start: 2018-05-02 | End: 2018-05-02 | Stop reason: HOSPADM

## 2018-05-02 RX ORDER — LIDOCAINE HYDROCHLORIDE 40 MG/ML
SOLUTION TOPICAL AS NEEDED
Status: DISCONTINUED | OUTPATIENT
Start: 2018-05-02 | End: 2018-05-02 | Stop reason: SURG

## 2018-05-02 RX ORDER — MIDAZOLAM HYDROCHLORIDE 1 MG/ML
2 INJECTION INTRAMUSCULAR; INTRAVENOUS
Status: DISCONTINUED | OUTPATIENT
Start: 2018-05-02 | End: 2018-05-02 | Stop reason: HOSPADM

## 2018-05-02 RX ORDER — OXYCODONE AND ACETAMINOPHEN 10; 325 MG/1; MG/1
1 TABLET ORAL ONCE AS NEEDED
Status: COMPLETED | OUTPATIENT
Start: 2018-05-02 | End: 2018-05-02

## 2018-05-02 RX ORDER — ACETAMINOPHEN 500 MG
1000 TABLET ORAL ONCE
Status: COMPLETED | OUTPATIENT
Start: 2018-05-02 | End: 2018-05-02

## 2018-05-02 RX ORDER — DEXAMETHASONE SODIUM PHOSPHATE 4 MG/ML
INJECTION, SOLUTION INTRA-ARTICULAR; INTRALESIONAL; INTRAMUSCULAR; INTRAVENOUS; SOFT TISSUE AS NEEDED
Status: DISCONTINUED | OUTPATIENT
Start: 2018-05-02 | End: 2018-05-02 | Stop reason: SURG

## 2018-05-02 RX ORDER — IBUPROFEN 600 MG/1
600 TABLET ORAL ONCE AS NEEDED
Status: DISCONTINUED | OUTPATIENT
Start: 2018-05-02 | End: 2018-05-02 | Stop reason: HOSPADM

## 2018-05-02 RX ORDER — SODIUM CHLORIDE 9 MG/ML
INJECTION, SOLUTION INTRAVENOUS AS NEEDED
Status: DISCONTINUED | OUTPATIENT
Start: 2018-05-02 | End: 2018-05-02 | Stop reason: HOSPADM

## 2018-05-02 RX ORDER — VASOPRESSIN 20 U/ML
INJECTION PARENTERAL AS NEEDED
Status: DISCONTINUED | OUTPATIENT
Start: 2018-05-02 | End: 2018-05-02 | Stop reason: SURG

## 2018-05-02 RX ORDER — LABETALOL HYDROCHLORIDE 5 MG/ML
5 INJECTION, SOLUTION INTRAVENOUS
Status: DISCONTINUED | OUTPATIENT
Start: 2018-05-02 | End: 2018-05-02 | Stop reason: HOSPADM

## 2018-05-02 RX ORDER — OXYCODONE HYDROCHLORIDE 15 MG/1
15 TABLET, FILM COATED, EXTENDED RELEASE ORAL ONCE
Status: COMPLETED | OUTPATIENT
Start: 2018-05-02 | End: 2018-05-02

## 2018-05-02 RX ORDER — DEXAMETHASONE SODIUM PHOSPHATE 4 MG/ML
4 INJECTION, SOLUTION INTRA-ARTICULAR; INTRALESIONAL; INTRAMUSCULAR; INTRAVENOUS; SOFT TISSUE ONCE AS NEEDED
Status: COMPLETED | OUTPATIENT
Start: 2018-05-02 | End: 2018-05-02

## 2018-05-02 RX ORDER — ROCURONIUM BROMIDE 10 MG/ML
INJECTION, SOLUTION INTRAVENOUS AS NEEDED
Status: DISCONTINUED | OUTPATIENT
Start: 2018-05-02 | End: 2018-05-02 | Stop reason: SURG

## 2018-05-02 RX ORDER — GLYCOPYRROLATE 0.2 MG/ML
INJECTION INTRAMUSCULAR; INTRAVENOUS AS NEEDED
Status: DISCONTINUED | OUTPATIENT
Start: 2018-05-02 | End: 2018-05-02 | Stop reason: SURG

## 2018-05-02 RX ORDER — ONDANSETRON 2 MG/ML
4 INJECTION INTRAMUSCULAR; INTRAVENOUS ONCE AS NEEDED
Status: DISCONTINUED | OUTPATIENT
Start: 2018-05-02 | End: 2018-05-02 | Stop reason: HOSPADM

## 2018-05-02 RX ORDER — SODIUM CHLORIDE 0.9 % (FLUSH) 0.9 %
3 SYRINGE (ML) INJECTION AS NEEDED
Status: DISCONTINUED | OUTPATIENT
Start: 2018-05-02 | End: 2018-05-02 | Stop reason: HOSPADM

## 2018-05-02 RX ORDER — PROPOFOL 10 MG/ML
VIAL (ML) INTRAVENOUS AS NEEDED
Status: DISCONTINUED | OUTPATIENT
Start: 2018-05-02 | End: 2018-05-02 | Stop reason: SURG

## 2018-05-02 RX ORDER — LIDOCAINE HYDROCHLORIDE 20 MG/ML
INJECTION, SOLUTION INFILTRATION; PERINEURAL AS NEEDED
Status: DISCONTINUED | OUTPATIENT
Start: 2018-05-02 | End: 2018-05-02 | Stop reason: SURG

## 2018-05-02 RX ORDER — IPRATROPIUM BROMIDE AND ALBUTEROL SULFATE 2.5; .5 MG/3ML; MG/3ML
3 SOLUTION RESPIRATORY (INHALATION) ONCE AS NEEDED
Status: DISCONTINUED | OUTPATIENT
Start: 2018-05-02 | End: 2018-05-02 | Stop reason: HOSPADM

## 2018-05-02 RX ORDER — ONDANSETRON 2 MG/ML
INJECTION INTRAMUSCULAR; INTRAVENOUS AS NEEDED
Status: DISCONTINUED | OUTPATIENT
Start: 2018-05-02 | End: 2018-05-02 | Stop reason: SURG

## 2018-05-02 RX ORDER — FENTANYL CITRATE 50 UG/ML
25 INJECTION, SOLUTION INTRAMUSCULAR; INTRAVENOUS AS NEEDED
Status: DISCONTINUED | OUTPATIENT
Start: 2018-05-02 | End: 2018-05-02 | Stop reason: HOSPADM

## 2018-05-02 RX ORDER — SUCCINYLCHOLINE CHLORIDE 20 MG/ML
INJECTION INTRAMUSCULAR; INTRAVENOUS AS NEEDED
Status: DISCONTINUED | OUTPATIENT
Start: 2018-05-02 | End: 2018-05-02 | Stop reason: SURG

## 2018-05-02 RX ORDER — NALOXONE HCL 0.4 MG/ML
0.4 VIAL (ML) INJECTION AS NEEDED
Status: DISCONTINUED | OUTPATIENT
Start: 2018-05-02 | End: 2018-05-02 | Stop reason: HOSPADM

## 2018-05-02 RX ADMIN — VASOPRESSIN 1 UNITS: 20 INJECTION INTRAVENOUS at 08:00

## 2018-05-02 RX ADMIN — OXYCODONE HYDROCHLORIDE AND ACETAMINOPHEN 1 TABLET: 10; 325 TABLET ORAL at 09:34

## 2018-05-02 RX ADMIN — FENTANYL CITRATE 100 MCG: 50 INJECTION INTRAMUSCULAR; INTRAVENOUS at 08:15

## 2018-05-02 RX ADMIN — DEXAMETHASONE SODIUM PHOSPHATE 4 MG: 4 INJECTION, SOLUTION INTRAMUSCULAR; INTRAVENOUS at 07:53

## 2018-05-02 RX ADMIN — FAMOTIDINE 20 MG: 10 INJECTION INTRAVENOUS at 07:22

## 2018-05-02 RX ADMIN — VASOPRESSIN 1 UNITS: 20 INJECTION INTRAVENOUS at 08:20

## 2018-05-02 RX ADMIN — PROPOFOL 200 MG: 10 INJECTION, EMULSION INTRAVENOUS at 07:53

## 2018-05-02 RX ADMIN — GLYCOPYRROLATE 0.4 MG: 0.2 INJECTION, SOLUTION INTRAMUSCULAR; INTRAVENOUS at 07:57

## 2018-05-02 RX ADMIN — LIDOCAINE HYDROCHLORIDE 50 MG: 20 INJECTION, SOLUTION INFILTRATION; PERINEURAL at 07:53

## 2018-05-02 RX ADMIN — ONDANSETRON HYDROCHLORIDE 4 MG: 2 SOLUTION INTRAMUSCULAR; INTRAVENOUS at 07:53

## 2018-05-02 RX ADMIN — FENTANYL CITRATE 150 MCG: 50 INJECTION INTRAMUSCULAR; INTRAVENOUS at 07:53

## 2018-05-02 RX ADMIN — ACETAMINOPHEN 1000 MG: 500 TABLET ORAL at 07:21

## 2018-05-02 RX ADMIN — SODIUM CHLORIDE, POTASSIUM CHLORIDE, SODIUM LACTATE AND CALCIUM CHLORIDE 1000 ML: 600; 310; 30; 20 INJECTION, SOLUTION INTRAVENOUS at 06:51

## 2018-05-02 RX ADMIN — OXYCODONE HYDROCHLORIDE 15 MG: 15 TABLET, FILM COATED, EXTENDED RELEASE ORAL at 07:30

## 2018-05-02 RX ADMIN — VANCOMYCIN HYDROCHLORIDE 1 G: 1 INJECTION, POWDER, LYOPHILIZED, FOR SOLUTION INTRAVENOUS at 07:58

## 2018-05-02 RX ADMIN — MIDAZOLAM HYDROCHLORIDE 2 MG: 1 INJECTION, SOLUTION INTRAMUSCULAR; INTRAVENOUS at 07:43

## 2018-05-02 RX ADMIN — LIDOCAINE HYDROCHLORIDE 1 EACH: 40 SOLUTION TOPICAL at 07:55

## 2018-05-02 RX ADMIN — ROCURONIUM BROMIDE 10 MG: 10 INJECTION INTRAVENOUS at 07:53

## 2018-05-02 RX ADMIN — SUCCINYLCHOLINE CHLORIDE 100 MG: 20 INJECTION, SOLUTION INTRAMUSCULAR; INTRAVENOUS at 07:53

## 2018-05-02 RX ADMIN — DEXAMETHASONE SODIUM PHOSPHATE 4 MG: 4 INJECTION, SOLUTION INTRAMUSCULAR; INTRAVENOUS at 07:22

## 2018-05-02 NOTE — ANESTHESIA PROCEDURE NOTES
Airway  Urgency: elective    Airway not difficult    General Information and Staff    Patient location during procedure: OR  CRNA: NATHAN BARON    Indications and Patient Condition  Indications for airway management: airway protection    Preoxygenated: yes  MILS maintained throughout  Mask difficulty assessment: 1 - vent by mask    Final Airway Details  Final airway type: endotracheal airway      Successful airway: ETT  Cuffed: yes   Successful intubation technique: direct laryngoscopy  Endotracheal tube insertion site: oral  Blade: Fontana  Blade size: #2  ETT size: 8.0 mm  Cormack-Lehane Classification: grade I - full view of glottis  Placement verified by: chest auscultation and capnometry   Cuff volume (mL): 6  Measured from: lips  ETT to lips (cm): 22  Number of attempts at approach: 1

## 2018-05-02 NOTE — NURSING NOTE
Chrissy from [facility] at bedside talking to patient. Chrissy turned onspinal cord stimulator. Chrissy took  to wife in waiting room.

## 2018-05-02 NOTE — OP NOTE
Procedure Note  Preop Diagnosis: Battery end of life of spinal cord stimulator [Z46.2]    Post-Op Diagnosis Codes:     * Battery end of life of spinal cord stimulator [Z46.2]     Procedure Name: Replacement of dorsal column stimulator battery    Indications:  A clinical exam and telemetry revealed findings of end-of-life dorsal column stimulator battery. The patient now presents for replacement of battery after discussing therapeutic alternatives.          Surgeon: Miguel Hall MD     Assistants: None    Anesthesia: General endotracheal anesthesia    ASA Class: 3    Procedure Details   After obtaining informed consent, having the risks and benefits of the procedure explained including but not limited to infection, damage to the leads, bleeding, possible replacement of the entire system, stroke, coma, and death.  The patient was brought to the operating.  He was given general anesthesia via an endotracheal tube.  He was flipped prone onto a Karan axis table.  The previous battery system was identified using palpation in the left hip area.  The preplanned incision was marked with indelible marker.  The patient was then prepped and draped in standard sterile fashion.  The preplanned incision was infiltrated Marcaine and epinephrine.  A 10 blade scalpel used to make incisions the dermis and epidermis and scar tissue.  Metzenbaum scissors were then used to sharply and bluntly dissect around the previous battery and explanted from the pocket.  The battery was then detached the new battery was then attached the leads.  It was inserted into the pocket.  Telemetry demonstrated good signals in good resistance.  The of battery was then anchored to this running fashion using an Ethibond suture.  The wound was then copiously irrigated with an antibiotic solution, it  was inspected for hemostasis.  The subcutaneous tissues were closed using a series of inverted interrupted 2-0 Vicryl sutures.  The skin was closed using a  running 4-0 Monocryl subcuticular.  All sponge needle and insulin counts were correct at the end of the procedure.  The patient was extubated in stable condition returned recovery with 5 mL of blood loss    Findings:  End-of-life dorsal column stimulator battery]    Estimated Blood Loss:  5           Drains: None           Total IV Fluids: l           Specimens: None           Implants:   Implant Name Type Inv. Item Serial No.  Lot No. LRB No. Used   NEUROSTM INTELLIS SURESCAN MRI W/ADAPTIVE STIM RECHG - [id number] - [id number] Implant NEUROSTM INTELLIS SURESCAN MRI W/ADAPTIVE STIM RECHG [id number] [employer]   N/A 1              Complications:  None           Disposition: PACU - hemodynamically stable.           Condition: stable        Miguel Hall MD

## 2018-05-02 NOTE — ANESTHESIA POSTPROCEDURE EVALUATION
Patient: Carlos Hayes    Procedure Summary     Date:  05/02/18 Room / Location:  [facility] OR  /  [facility] OR    Anesthesia Start:  0751 Anesthesia Stop:  0904    Procedure:  SPINAL CORD STIMULATOR REMOVAL Removal of generator and placment of new generator (N/A Back) Diagnosis:       Battery end of life of spinal cord stimulator      (Battery end of life of spinal cord stimulator [Z46.2])    Surgeon:  Miguel Hall MD Provider:  Carlos Hawk CRNA    Anesthesia Type:  general ASA Status:  3          Anesthesia Type: general  Last vitals  BP   155/87 (05/02/18 1045)   Temp   97.7 °F (36.5 °C) (05/02/18 0930)   Pulse   (!) 48 (05/02/18 1045)   Resp   16 (05/02/18 1045)     SpO2   97 % (05/02/18 1045)     Post Anesthesia Care and Evaluation    Patient location during evaluation: PACU  Patient participation: complete - patient participated  Level of consciousness: awake and alert  Pain management: adequate  Airway patency: patent  Anesthetic complications: No anesthetic complications  PONV Status: none  Cardiovascular status: acceptable and hemodynamically stable  Respiratory status: acceptable  Hydration status: acceptable    Comments: Blood pressure 155/87, pulse (!) 48, temperature 97.7 °F (36.5 °C), temperature source Temporal Artery , resp. rate 16, SpO2 97 %.    Patient discharged from PACU based upon Lola score. Please see RN notes for further details

## 2018-05-02 NOTE — DISCHARGE INSTRUCTIONS

## 2018-05-02 NOTE — ANESTHESIA PREPROCEDURE EVALUATION
Anesthesia Evaluation     Patient summary reviewed and Nursing notes reviewed   no history of anesthetic complications:  NPO Solid Status: > 8 hours  NPO Liquid Status: > 8 hours           Airway   Mallampati: II  TM distance: >3 FB  Neck ROM: full  No difficulty expected  Dental      Pulmonary     breath sounds clear to auscultation  (+) a smoker (quit two years ago) Former, COPD, asthma, sleep apnea (uses bipap),   Cardiovascular   Exercise tolerance: poor (<4 METS) (Limited by back)    ECG reviewed  Rhythm: regular    (+) hypertension,     ROS comment: Pt reports h/o chemical stress test within past two years that was ok    Neuro/Psych  (-) seizures, TIA, CVA  GI/Hepatic/Renal/Endo    (+) morbid obesity, GERD,    (-) liver disease, no renal disease, diabetes    Musculoskeletal     (+) back pain, neck pain,   Abdominal    Substance History      OB/GYN          Other   (+) arthritis                     Anesthesia Plan    ASA 3     general     intravenous induction   Anesthetic plan and risks discussed with patient.

## 2018-05-07 ENCOUNTER — TELEPHONE (OUTPATIENT)
Dept: NEUROSURGERY | Facility: CLINIC | Age: 63
End: 2018-05-07

## 2018-05-07 NOTE — TELEPHONE ENCOUNTER
Patient calls stating he has mulitiple concerns and questions:    1. He is to have 5 levels of his cervical spine injected on Thursday 5/10/18 but is concerned b/c he isn't sure that he should be having them without having had a cervical myelogram (which he had on 4/19/18 @ [facility]).  He says he also looked at all his imaging and even though he isn't a doctor he has looked at enough images in his life to know his lumbar spine has significant changes that are going to require something to be done - they are bad.    2. Should he have the injections on Thursday?  Another concern is that he has is that if he has them he may not be able to proceed with his next surgery for his neck & back b/c of the steroids that will be used in the injections.    3. EMG/NCV results - he has this testing done at [facility] but states he requested the technician (Pablo) give him the results but the technician refused.      4. Follow up appt in our office - he states you told him in recovery that his follow up with only be with you and not with Luis Eduardo.  However, when he got home he realized that his paperwork states his appt for wound check is with Luis Eduardo and he knows Luis Eduardo cannot take care of every issue he has.    5. Trouble with urination since surgery - he says he can take him multiple trips and can take several mins to go - and then he doesn't feel like he emptied completely.  He does state that he urinated 8 pounds of urine after surgery due to all the fluid that he was given during surgery.  He goes on to state that he thinks they did something to him during surgery, possibly the way he laid on the bed or something and now he is having this problem.    6. He states that he knows spinal fluid in your lumbar spine is to flow b/c he has read up on this; however, he knows his fluid doesn't flow at all and thinks this could be causing him a problem.    Will forward all this to Dr Hall for his recommendations/instructions -      dinora  nuha [facility]

## 2018-05-08 NOTE — TELEPHONE ENCOUNTER
Per Dr Hall: will discuss all the patient's questions at his postop visit on 5/21/18 at 12:30 pm.    I called & spoke w/patient and he agreed.      dinora breen CMA

## 2018-05-10 ENCOUNTER — HOSPITAL ENCOUNTER (OUTPATIENT)
Dept: PAIN MANAGEMENT | Age: 63
Discharge: HOME | End: 2018-05-10
Payer: MEDICARE

## 2018-05-10 VITALS
OXYGEN SATURATION: 93 % | BODY MASS INDEX: 39.17 KG/M2 | WEIGHT: 315 LBS | SYSTOLIC BLOOD PRESSURE: 129 MMHG | TEMPERATURE: 97.7 F | DIASTOLIC BLOOD PRESSURE: 60 MMHG | HEIGHT: 75 IN | HEART RATE: 56 BPM | RESPIRATION RATE: 20 BRPM

## 2018-05-10 DIAGNOSIS — M47.812 CERVICAL FACET JOINT SYNDROME: Chronic | ICD-10-CM

## 2018-05-10 DIAGNOSIS — M54.12 CERVICAL NEURALGIA: ICD-10-CM

## 2018-05-10 PROCEDURE — 99152 MOD SED SAME PHYS/QHP 5/>YRS: CPT

## 2018-05-10 PROCEDURE — 64490 INJ PARAVERT F JNT C/T 1 LEV: CPT

## 2018-05-10 PROCEDURE — 2500000003 HC RX 250 WO HCPCS

## 2018-05-10 PROCEDURE — 6360000002 HC RX W HCPCS

## 2018-05-10 PROCEDURE — 64492 INJ PARAVERT F JNT C/T 3 LEV: CPT

## 2018-05-10 PROCEDURE — 3209999900 FLUORO FOR SURGICAL PROCEDURES

## 2018-05-10 PROCEDURE — 64491 INJ PARAVERT F JNT C/T 2 LEV: CPT

## 2018-05-10 RX ORDER — MIDAZOLAM HYDROCHLORIDE 1 MG/ML
INJECTION INTRAMUSCULAR; INTRAVENOUS
Status: COMPLETED | OUTPATIENT
Start: 2018-05-10 | End: 2018-05-10

## 2018-05-10 RX ORDER — FENTANYL CITRATE 50 UG/ML
INJECTION, SOLUTION INTRAMUSCULAR; INTRAVENOUS
Status: COMPLETED | OUTPATIENT
Start: 2018-05-10 | End: 2018-05-10

## 2018-05-10 RX ORDER — TRIAMCINOLONE ACETONIDE 40 MG/ML
INJECTION, SUSPENSION INTRA-ARTICULAR; INTRAMUSCULAR
Status: COMPLETED | OUTPATIENT
Start: 2018-05-10 | End: 2018-05-10

## 2018-05-10 RX ORDER — BUPIVACAINE HYDROCHLORIDE 5 MG/ML
INJECTION, SOLUTION EPIDURAL; INTRACAUDAL
Status: COMPLETED | OUTPATIENT
Start: 2018-05-10 | End: 2018-05-10

## 2018-05-10 RX ORDER — CLONIDINE HYDROCHLORIDE 0.1 MG/1
0.1 TABLET ORAL EVERY 6 HOURS PRN
COMMUNITY

## 2018-05-10 RX ADMIN — MIDAZOLAM HYDROCHLORIDE 2 MG: 1 INJECTION INTRAMUSCULAR; INTRAVENOUS at 16:36

## 2018-05-10 RX ADMIN — TRIAMCINOLONE ACETONIDE 2 MG: 40 INJECTION, SUSPENSION INTRA-ARTICULAR; INTRAMUSCULAR at 16:47

## 2018-05-10 RX ADMIN — FENTANYL CITRATE 50 MCG: 50 INJECTION, SOLUTION INTRAMUSCULAR; INTRAVENOUS at 16:42

## 2018-05-10 RX ADMIN — BUPIVACAINE HYDROCHLORIDE 0.5 ML: 5 INJECTION, SOLUTION EPIDURAL; INTRACAUDAL at 16:46

## 2018-05-10 ASSESSMENT — ACTIVITIES OF DAILY LIVING (ADL): EFFECT OF PAIN ON DAILY ACTIVITIES: LIMITS ACTIVITIES

## 2018-05-10 ASSESSMENT — PAIN DESCRIPTION - DESCRIPTORS
DESCRIPTORS: CONSTANT
DESCRIPTORS: SHOOTING

## 2018-05-10 ASSESSMENT — PAIN - FUNCTIONAL ASSESSMENT: PAIN_FUNCTIONAL_ASSESSMENT: 0-10

## 2018-05-17 ENCOUNTER — HOSPITAL ENCOUNTER (OUTPATIENT)
Dept: PHYSICAL THERAPY | Facility: HOSPITAL | Age: 63
Setting detail: THERAPIES SERIES
Discharge: HOME | End: 2018-05-17

## 2018-05-17 DIAGNOSIS — G89.29 CHRONIC BILATERAL LOW BACK PAIN WITH BILATERAL SCIATICA: Primary | ICD-10-CM

## 2018-05-17 DIAGNOSIS — M54.2 CERVICALGIA: ICD-10-CM

## 2018-05-17 DIAGNOSIS — M48.062 SPINAL STENOSIS, LUMBAR REGION, WITH NEUROGENIC CLAUDICATION: ICD-10-CM

## 2018-05-17 DIAGNOSIS — G56.01 CARPAL TUNNEL SYNDROME OF RIGHT WRIST: ICD-10-CM

## 2018-05-17 DIAGNOSIS — M54.2 NECK PAIN: ICD-10-CM

## 2018-05-17 DIAGNOSIS — M54.42 CHRONIC BILATERAL LOW BACK PAIN WITH BILATERAL SCIATICA: Primary | ICD-10-CM

## 2018-05-17 DIAGNOSIS — M54.12 CERVICAL RADICULOPATHY: ICD-10-CM

## 2018-05-17 DIAGNOSIS — M51.36 LUMBAR DEGENERATIVE DISC DISEASE: ICD-10-CM

## 2018-05-17 DIAGNOSIS — M54.41 CHRONIC BILATERAL LOW BACK PAIN WITH BILATERAL SCIATICA: Primary | ICD-10-CM

## 2018-05-17 PROCEDURE — 97140 MANUAL THERAPY 1/> REGIONS: CPT | Performed by: PHYSICAL THERAPIST

## 2018-05-17 NOTE — THERAPY PROGRESS REPORT/RE-CERT
Outpatient Physical Therapy Ortho Progress Note   [place]     Patient Name: Carlos Hayes  : 1955  MRN: [id number]  Today's Date: 2018      Visit Date: 2018    Visit Dx:    ICD-10-CM ICD-9-CM   1. Chronic bilateral low back pain with bilateral sciatica M54.42 724.2    M54.41 724.3    G89.29 338.29   2. Lumbar degenerative disc disease M51.36 722.52   3. Neck pain M54.2 723.1   4. Spinal stenosis, lumbar region, with neurogenic claudication M48.062 724.03   5. Cervicalgia M54.2 723.1   6. Cervical radiculopathy M54.12 723.4   7. Carpal tunnel syndrome of right wrist G56.01 354.0       Patient Active Problem List   Diagnosis   • Battery end of life of spinal cord stimulator   • BMI 40.0-44.9, adult   • Non-smoker   • Cervicalgia   • Cervical radiculopathy   • Carpal tunnel syndrome of right wrist   • Spinal stenosis, lumbar region, with neurogenic claudication        Past Medical History:   Diagnosis Date   • Acute bronchitis    • Amnesia    • Anxiety    • Arthritis    • Asthma    • Back pain    • Chest pain    • CKD (chronic kidney disease)    • Concussion    • Contusion of chest wall    • COPD (chronic obstructive pulmonary disease)    • Degeneration of intervertebral disc of lumbar region    • Fall    • Gait disturbance    • Gastro-esophageal reflux    • Headache    • Hearing impaired    • Hypersomnia    • Hypertension    • Hypotestosteronemia    • Insomnia    • Leg cramp    • Lumbar stenosis    • Obesity    • Obesity    • KATLYN (obstructive sleep apnea)    • PTSD (post-traumatic stress disorder)    • Radiculopathy of lumbosacral region    • Sinusitis    • Testicular hypofunction    • Tremor         Past Surgical History:   Procedure Laterality Date   • BACK SURGERY      L3-4 fusion   • BACK SURGERY      L3-S1 fusion   • CARPAL TUNNEL RELEASE Bilateral    • CHOLECYSTECTOMY     • HERNIA REPAIR     • KNEE SURGERY     • SPINAL CORD STIMULATOR REMOVAL N/A 2018    Procedure: SPINAL  CORD STIMULATOR REMOVAL Removal of generator and placment of new generator;  Surgeon: Miguel Hall MD;  Location: [facility];  Service: Neurosurgery   • THORACIC LAMINECTOMY WITH PLACEMENT OF DORSAL COLUMN STIMULATOR  2010                             PT Assessment/Plan     Row Name 05/17/18 1800          PT Assessment    Functional Limitations Impaired gait;Impaired locomotion;Limitation in home management;Performance in leisure activities;Limitations in functional capacity and performance  -TB     Impairments Balance;Gait;Pain;Muscle strength;Motor function;Joint mobility;Impaired muscle length;Range of motion;Posture;Impaired flexibility  -TB     Assessment Comments His difficulties could be consistent wtih a cauda equina syndrome and/or pudendal nerve impingement. During sex, he may be tightening his OI impinging on the pudendal. We discussed relaxation techniques and strategies to try to help. He said he could feel his lower abdomen and PF relax and felt much better leaving today.   -TB     Rehab Potential Good  -TB     Patient/caregiver participated in establishment of treatment plan and goals Yes  -TB     Patient would benefit from skilled therapy intervention Yes  -TB        PT Plan    PT Frequency 1x/week;2x/week  -TB     Predicted Duration of Therapy Intervention (OT Eval) 2 months  -TB     Planned CPT's? PT THER PROC EA 15 MIN: 00980;PT THER ACT EA 15 MIN: 27973;PT MANUAL THERAPY EA 15 MIN: 62242;PT ELECTRICAL STIM UNATTEND: ;PT ELECTRICAL STIM ATTD EA 15 MIN: 16211;PT ULTRASOUND EA 15 MIN: 56772  -TB     PT Plan Comments Assess effects of PF release. Try to progress to more strengthening if his pain allow and depending on what Dr. Hall decides.   -TB       User Key  (r) = Recorded By, (t) = Taken By, (c) = Cosigned By    Initials Name Provider Type    TB Juarez Hartmann, PT Physical Therapist                    Exercises     Row Name 05/17/18 1600             Subjective Comments    Subjective  Comments He said he had injections in his thoracic spine. He also had his DCS replaced but is upset because it has to be charged everyday and he didn't know this. His old one had to be charged weekly until the battery weakened. He said that he is having difficult climaxing during sex because he will lose sensation in his penis. He also is having trouble voiding unless he has a BM.   -TB         Subjective Pain    Pre-Treatment Pain Level 7  -TB         Exercise 1    Exercise Name 1 spent a good deal of time discussing the difficulties he's having with sex including climaxing. He said he would lose sensation in his penis during sex. He showed me the his old tests and the results of the CT myelogram that showed the dye didn't go past L2/3.   -TB        User Key  (r) = Recorded By, (t) = Taken By, (c) = Cosigned By    Initials Name Provider Type    SILVA Hartmann, PT Physical Therapist                        Manual Rx (last 36 hours)      Manual Treatments     Row Name 05/17/18 1700             Manual Rx 1    Manual Rx 1 Location left sidelying internal PF release  -TB      Manual Rx 1 Type included levator ani and OI  -TB      Manual Rx 1 Duration 30  -TB         Manual Rx 2    Manual Rx 2 Location 3/4 supine oliver IP and hip adductors  -TB      Manual Rx 2 Type STM and deep pressure  -TB      Manual Rx 2 Duration 30  -TB        User Key  (r) = Recorded By, (t) = Taken By, (c) = Cosigned By    Initials Name Provider Type    SILVA Hartmann, PT Physical Therapist                PT OP Goals     Row Name 05/17/18 1800          PT Short Term Goals    STG Date to Achieve 01/27/17  -TB     STG 1 Relax muscle guarding on oliver hip flexors  -TB     STG 1 Progress Ongoing  -TB     STG 1 Progress Comments he was very tight today, but more relaxed after  -TB     STG 2 Improve mobility of thoracic into extension  -TB     STG 2 Progress Ongoing  -TB     STG 2 Progress Comments not trying this lately because of his  retrolisthesis  -TB     STG 3 Improve right hip IR to 30 deg  -TB     STG 3 Progress Ongoing  -TB     STG 3 Progress Comments limited more because of his flare  -TB        Long Term Goals    LTG Date to Achieve 02/27/17  -TB     LTG 1 Able to walk community distances without an assistive device or only a cane without severe flare of pain  -TB     LTG 1 Progress Ongoing  -TB     LTG 1 Progress Comments using a rwx and trying to walk more  -TB     LTG 2 Improve hip passive extension to 10 degrees  -TB     LTG 2 Progress Ongoing  -TB     LTG 2 Progress Comments not today due to flare  -TB     LTG 3 Improved core contraction held during functional tasks.  -TB     LTG 3 Progress Ongoing  -TB     LTG 4 Independent with HEP for flexibility and stability.   -TB     LTG 4 Progress Ongoing  -TB     LTG 4 Progress Comments hip stretching  -TB     LTG 5 Reports no difficulty with voiding bladder or BM's  -TB     LTG 5 Progress Ongoing  -TB     LTG 5 Progress Comments had a very difficult time  -TB        Time Calculation    PT Goal Re-Cert Due Date 06/16/18  -TB       User Key  (r) = Recorded By, (t) = Taken By, (c) = Cosigned By    Initials Name Provider Type    TB Juarez Hartmann, PT Physical Therapist          Therapy Education  Given: HEP, Symptoms/condition management  Program: Reinforced  How Provided: Verbal  Provided to: Patient  Level of Understanding: Verbalized              Time Calculation:   Start Time: 1545  Stop Time: 1745  Time Calculation (min): 120 min  Total Timed Code Minutes- PT: 60 minute(s)    Therapy Charges for Today     Code Description Service Date Service Provider Modifiers Qty    02204947953 HC PT MANUAL THERAPY EA 15 MIN 5/17/2018 Juarez Hartmann, PT GP 4    89152391278 HC PT THER SUPP EA 15 MIN 5/17/2018 Juarez Hartmann, PT GP 4                    Juarez Hartmann, PT  5/17/2018

## 2018-05-18 ENCOUNTER — OFFICE VISIT (OUTPATIENT)
Dept: NEUROSURGERY | Facility: CLINIC | Age: 63
End: 2018-05-18

## 2018-05-18 VITALS
WEIGHT: 315 LBS | DIASTOLIC BLOOD PRESSURE: 90 MMHG | HEIGHT: 76 IN | BODY MASS INDEX: 38.36 KG/M2 | SYSTOLIC BLOOD PRESSURE: 150 MMHG

## 2018-05-18 DIAGNOSIS — M54.2 CERVICALGIA: Primary | ICD-10-CM

## 2018-05-18 DIAGNOSIS — M48.062 SPINAL STENOSIS, LUMBAR REGION, WITH NEUROGENIC CLAUDICATION: ICD-10-CM

## 2018-05-18 DIAGNOSIS — M54.12 CERVICAL RADICULOPATHY: ICD-10-CM

## 2018-05-18 DIAGNOSIS — Z78.9 NON-SMOKER: ICD-10-CM

## 2018-05-18 DIAGNOSIS — N32.9 URINARY BLADDER DISORDER: ICD-10-CM

## 2018-05-18 PROCEDURE — 99214 OFFICE O/P EST MOD 30 MIN: CPT | Performed by: NEUROLOGICAL SURGERY

## 2018-05-18 RX ORDER — VERAPAMIL HYDROCHLORIDE 300 MG/1
CAPSULE, EXTENDED RELEASE ORAL
Refills: 3 | COMMUNITY
Start: 2018-03-30 | End: 2020-10-31 | Stop reason: HOSPADM

## 2018-05-18 RX ORDER — OXYCODONE AND ACETAMINOPHEN 10; 325 MG/1; MG/1
1 TABLET ORAL
Status: ON HOLD | COMMUNITY
Start: 2016-05-21 | End: 2020-10-31

## 2018-05-18 RX ORDER — GABAPENTIN 800 MG/1
600 TABLET ORAL 4 TIMES DAILY
COMMUNITY
End: 2022-02-07

## 2018-05-18 RX ORDER — OXYCODONE HYDROCHLORIDE 15 MG/1
15 TABLET, FILM COATED, EXTENDED RELEASE ORAL 3 TIMES DAILY
Refills: 0 | COMMUNITY
Start: 2018-04-27
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
## 2018-05-18 NOTE — PATIENT INSTRUCTIONS
PATIENT TO CONTINUE HIS YEARLY EXAMS WITH HIS PRIMARY CARE PROVIDER TO ENSURE COMPLETE HEALTH MAINTENANCE.      BMI for Adults  Body mass index (BMI) is a number that is calculated from a person's weight and height. In most adults, the number is used to find how much of an adult's weight is made up of fat. BMI is not as accurate as a direct measure of body fat.  How is BMI calculated?  BMI is calculated by dividing weight in kilograms by height in meters squared. It can also be calculated by dividing weight in pounds by height in inches squared, then multiplying the resulting number by 703. Charts are available to help you find your BMI quickly and easily without doing this calculation.  How is BMI interpreted?  Health care professionals use BMI charts to identify whether an adult is underweight, at a normal weight, or overweight based on the following guidelines:  · Underweight: BMI less than 18.5.  · Normal weight: BMI between 18.5 and 24.9.  · Overweight: BMI between 25 and 29.9.  · Obese: BMI of 30 and above.  BMI is usually interpreted the same for males and females.  Weight includes both fat and muscle, so someone with a muscular build, such as an athlete, may have a BMI that is higher than 24.9. In cases like these, BMI may not accurately depict body fat. To determine if excess body fat is the cause of a BMI of 25 or higher, further assessments may need to be done by a health care provider.  Why is BMI a useful tool?  BMI is used to identify a possible weight problem that may be related to a medical problem or may increase the risk for medical problems. BMI can also be used to promote changes to reach a healthy weight.  This information is not intended to replace advice given to you by your health care provider. Make sure you discuss any questions you have with your health care provider.  Document Released: 08/29/2005 Document Revised: 04/27/2017 Document Reviewed: 05/15/2015  Elsevier Interactive Patient  Education © 2017 [employer]

## 2018-05-21 ENCOUNTER — HOSPITAL ENCOUNTER (OUTPATIENT)
Dept: PHYSICAL THERAPY | Facility: HOSPITAL | Age: 63
Setting detail: THERAPIES SERIES
Discharge: HOME | End: 2018-05-21

## 2018-05-21 DIAGNOSIS — M54.41 CHRONIC BILATERAL LOW BACK PAIN WITH BILATERAL SCIATICA: Primary | ICD-10-CM

## 2018-05-21 DIAGNOSIS — M54.2 CERVICALGIA: ICD-10-CM

## 2018-05-21 DIAGNOSIS — M54.2 NECK PAIN: ICD-10-CM

## 2018-05-21 DIAGNOSIS — M48.062 SPINAL STENOSIS, LUMBAR REGION, WITH NEUROGENIC CLAUDICATION: ICD-10-CM

## 2018-05-21 DIAGNOSIS — M51.36 LUMBAR DEGENERATIVE DISC DISEASE: ICD-10-CM

## 2018-05-21 DIAGNOSIS — G89.29 CHRONIC BILATERAL LOW BACK PAIN WITH BILATERAL SCIATICA: Primary | ICD-10-CM

## 2018-05-21 DIAGNOSIS — M54.42 CHRONIC BILATERAL LOW BACK PAIN WITH BILATERAL SCIATICA: Primary | ICD-10-CM

## 2018-05-21 DIAGNOSIS — M54.12 CERVICAL RADICULOPATHY: ICD-10-CM

## 2018-05-21 PROCEDURE — 97140 MANUAL THERAPY 1/> REGIONS: CPT | Performed by: PHYSICAL THERAPIST

## 2018-05-21 NOTE — THERAPY TREATMENT NOTE
Outpatient Physical Therapy Ortho Treatment Note  [facility]     Patient Name: Carlos Hayes  : 1955  MRN: [id number]  Today's Date: 2018      Visit Date: 2018    Visit Dx:    ICD-10-CM ICD-9-CM   1. Chronic bilateral low back pain with bilateral sciatica M54.42 724.2    M54.41 724.3    G89.29 338.29   2. Lumbar degenerative disc disease M51.36 722.52   3. Neck pain M54.2 723.1   4. Spinal stenosis, lumbar region, with neurogenic claudication M48.062 724.03   5. Cervicalgia M54.2 723.1   6. Cervical radiculopathy M54.12 723.4       Patient Active Problem List   Diagnosis   • Battery end of life of spinal cord stimulator   • BMI 40.0-44.9, adult   • Non-smoker   • Cervicalgia   • Cervical radiculopathy   • Carpal tunnel syndrome of right wrist   • Spinal stenosis, lumbar region, with neurogenic claudication   • BMI 39.0-39.9,adult        Past Medical History:   Diagnosis Date   • Acute bronchitis    • Amnesia    • Anxiety    • Arthritis    • Asthma    • Back pain    • Chest pain    • CKD (chronic kidney disease)    • Concussion    • Contusion of chest wall    • COPD (chronic obstructive pulmonary disease)    • Degeneration of intervertebral disc of lumbar region    • Fall    • Gait disturbance    • Gastro-esophageal reflux    • Headache    • Hearing impaired    • Hypersomnia    • Hypertension    • Hypotestosteronemia    • Insomnia    • Leg cramp    • Lumbar stenosis    • Obesity    • Obesity    • KATLYN (obstructive sleep apnea)    • PTSD (post-traumatic stress disorder)    • Radiculopathy of lumbosacral region    • Sinusitis    • Testicular hypofunction    • Tremor         Past Surgical History:   Procedure Laterality Date   • BACK SURGERY      L3-4 fusion   • BACK SURGERY      L3-S1 fusion   • CARPAL TUNNEL RELEASE Bilateral    • CHOLECYSTECTOMY     • HERNIA REPAIR     • KNEE SURGERY     • SPINAL CORD STIMULATOR REMOVAL N/A 2018    Procedure: SPINAL CORD STIMULATOR REMOVAL Removal  of generator and placment of new generator;  Surgeon: Miguel Hall MD;  Location: [facility];  Service: Neurosurgery   • THORACIC LAMINECTOMY WITH PLACEMENT OF DORSAL COLUMN STIMULATOR  2010                             PT Assessment/Plan     Row Name 05/21/18 1700          PT Assessment    Assessment Comments His right hip loosened quite a bit after but his left was still tight when he walked out. He hasn't seen if the PF release would affect ejaculation yet but his urination was better. I concur it could be a pudendal nerve impingment but I'm not sure what keep exacerbating this other than the stress around his hips and pelvis.   -TB        PT Plan    PT Plan Comments Assess effects of stretching along with the STM.  -TB       User Key  (r) = Recorded By, (t) = Taken By, (c) = Cosigned By    Initials Name Provider Type    TB Juarez Hartmann, PT Physical Therapist                    Exercises     Row Name 05/21/18 1540             Subjective Comments    Subjective Comments He met with Dr. Hall. Edin felt he was told and what I gathered from the note was Dr. Hall says he may need surgery but Dr. Hall wouldn't be the one to do it given the complexity and high risk.  He says he's urinating better.   -TB         Subjective Pain    Pre-Treatment Pain Level 6  -TB        User Key  (r) = Recorded By, (t) = Taken By, (c) = Cosigned By    Initials Name Provider Type    SILVA Hartmann, PT Physical Therapist                        Manual Rx (last 36 hours)      Manual Treatments     Row Name 05/21/18 1535             Manual Rx 1    Manual Rx 1 Location hooklying oliver IP, lower abdominal, short hip adductors  -TB      Manual Rx 1 Type STM and deep pressure  -TB      Manual Rx 1 Grade positioned hip in LAUREL to stretch during hip addutor STM  -TB      Manual Rx 1 Duration 15 min each side  -TB         Manual Rx 2    Manual Rx 2 Location hooklying piriformis and short hip external rotators  -TB      Manual Rx 2 Type  deep pressure  -TB      Manual Rx 2 Duration 5 min each side  -TB         Manual Rx 3    Manual Rx 3 Location passive stretch oliver hips after STM  -TB      Manual Rx 3 Type piriformis, horizontal adduction, IR at 90  -TB      Manual Rx 3 Grade low load prolonged stretch oliver  -TB      Manual Rx 3 Duration 10 min each  -TB        User Key  (r) = Recorded By, (t) = Taken By, (c) = Cosigned By    Initials Name Provider Type    TB Juarez Hartmann, PT Physical Therapist                PT OP Goals     Row Name 05/21/18 1700          PT Short Term Goals    STG Date to Achieve 01/27/17  -TB     STG 1 Relax muscle guarding on oliver hip flexors  -TB     STG 1 Progress Ongoing  -TB     STG 1 Progress Comments tight but not as painful today  -TB     STG 2 Improve mobility of thoracic into extension  -TB     STG 2 Progress Ongoing  -TB     STG 3 Improve right hip IR to 30 deg  -TB     STG 3 Progress Ongoing  -TB        Long Term Goals    LTG Date to Achieve 02/27/17  -TB     LTG 1 Able to walk community distances without an assistive device or only a cane without severe flare of pain  -TB     LTG 1 Progress Ongoing  -TB     LTG 2 Improve hip passive extension to 10 degrees  -TB     LTG 2 Progress Ongoing  -TB     LTG 3 Improved core contraction held during functional tasks.  -TB     LTG 3 Progress Ongoing  -TB     LTG 4 Independent with HEP for flexibility and stability.   -TB     LTG 4 Progress Ongoing  -TB     LTG 5 Reports no difficulty with voiding bladder or BM's  -TB     LTG 5 Progress Ongoing  -TB        Time Calculation    PT Goal Re-Cert Due Date 06/16/18  -TB       User Key  (r) = Recorded By, (t) = Taken By, (c) = Cosigned By    Initials Name Provider Type    SILVA Hartmann, CHARITY Physical Therapist          Therapy Education  Given: HEP, Symptoms/condition management  Program: Reinforced  How Provided: Verbal  Provided to: Patient  Level of Understanding: Verbalized              Time Calculation:   Start Time:  1535  Stop Time: 1650  Time Calculation (min): 75 min  PT Non-Billable Time (min): 15 min  Total Timed Code Minutes- PT: 60 minute(s)    Therapy Charges for Today     Code Description Service Date Service Provider Modifiers Qty    38924420606 HC PT MANUAL THERAPY EA 15 MIN 5/21/2018 Juarez Hartmann, PT GP 4    89597807562 HC PT THER SUPP EA 15 MIN 5/21/2018 Juarez Hartmann, PT GP 1                    Juarez Hartmann, PT  5/21/2018

## 2018-05-23 NOTE — PROGRESS NOTES
Mr. Hayes is 63 y.o. male    CHIEF COMPLAINT: I am here for lower urinary tract symptoms.    HPI  He has multiple urologic complaints.    He complains of straining with urinary stream.  He has intermittency with this.  This is been going on for about 6 months.  The course is worsening.  The onset was acute.  There is no associated dysuria or hematuria.  He does say that he has improvement in his stream after bowel movements.  Context of this would be in a patient with severe lumbosacral disc disease.    He also has significant difficulties quality of erections.  This is also occurring with use of PDE 5 inhibitor such as Cialis.  He does say that his Cialis still works and he has improvement in his urine stream but it is becoming cost prohibitive.    He is also complaining of bilateral inguinal pain that responds to physical therapy the lower pelvis.  He is unable to ejaculate which he feels is secondary to his back injury.        The following portions of the patient's history were reviewed and updated as appropriate: allergies, current medications, past family history, past medical history, past social history, past surgical history and problem list.      Review of Systems   Constitutional: Negative for appetite change and fever.   HENT: Negative for hearing loss and sore throat.    Eyes: Negative for pain and redness.   Respiratory: Negative for cough and shortness of breath.    Cardiovascular: Negative for chest pain and leg swelling.   Gastrointestinal: Positive for constipation. Negative for anal bleeding, nausea and vomiting.   Endocrine: Negative for cold intolerance and heat intolerance.   Genitourinary: Negative for dysuria, flank pain and hematuria.   Musculoskeletal: Positive for back pain. Negative for joint swelling and myalgias.   Skin: Negative for color change and rash.   Allergic/Immunologic: Negative for immunocompromised state.   Neurological: Positive for weakness. Negative for dizziness and  speech difficulty.   Hematological: Negative for adenopathy. Does not bruise/bleed easily.   Psychiatric/Behavioral: Negative for dysphoric mood and suicidal ideas.           Current Outpatient Prescriptions:   •  ALPRAZolam (XANAX) 1 MG tablet, Take 1 mg by mouth 2 (Two) Times a Day., Disp: , Rfl:   •  azelastine (ASTELIN) 0.1 % nasal spray, 1 spray into each nostril., Disp: , Rfl:   •  clarithromycin (BIAXIN) 500 MG tablet, Take 500 mg by mouth 2 (Two) Times a Day., Disp: , Rfl:   •  CloNIDine (CATAPRES) 0.1 MG tablet, Take 0.1 mg by mouth As Needed for High Blood Pressure (for b/p > 160 q 6 hours)., Disp: , Rfl:   •  diphenoxylate-atropine (LOMOTIL) 2.5-0.025 MG per tablet, Take 1 tablet by mouth 4 (Four) Times a Day As Needed for Diarrhea., Disp: , Rfl:   •  DULoxetine (CYMBALTA) 60 MG capsule, Take 60 mg by mouth Daily., Disp: , Rfl:   •  esomeprazole (NEXIUM) 40 MG packet, Take 40 mg by mouth Daily., Disp: , Rfl:   •  fluticasone (FLONASE) 50 MCG/ACT nasal spray, 1 spray into each nostril Daily., Disp: , Rfl:   •  gabapentin (NEURONTIN) 800 MG tablet, Take 600 mg by mouth., Disp: , Rfl:   •  glucosamine sulfate 500 MG capsule capsule, Take  by mouth 3 (Three) Times a Day With Meals., Disp: , Rfl:   •  ketorolac (TORADOL) 10 MG tablet, Take 10 mg by mouth Every 6 (Six) Hours As Needed for Mild Pain ., Disp: , Rfl:   •  Lorcaserin HCl (BELVIQ) 10 MG tablet, Take  by mouth 2 (Two) Times a Day., Disp: , Rfl:   •  lubiprostone (AMITIZA) 24 MCG capsule, Take 24 mcg by mouth 2 (Two) Times a Day With Meals., Disp: , Rfl:   •  lubiprostone (AMITIZA) 24 MCG capsule, Take 24 mcg by mouth 2 (Two) Times a Day With Meals., Disp: , Rfl:   •  meclizine (ANTIVERT) 25 MG tablet, Take 25 mg by mouth 4 (Four) Times a Day As Needed for dizziness., Disp: , Rfl:   •  mometasone (ASMANEX TWISTHALER) inhaler 110 mcg/inhalation, Inhale 2 puffs Daily., Disp: , Rfl:   •  montelukast (SINGULAIR) 10 MG tablet, Take 10 mg by mouth Every  Night., Disp: , Rfl:   •  Multiple Vitamins-Minerals (MULTI COMPLETE PO), Take  by mouth Daily., Disp: , Rfl:   •  Omega 3 1000 MG capsule, Take 1 g by mouth Daily., Disp: , Rfl:   •  omeprazole (priLOSEC) 20 MG capsule, Take 20 mg by mouth Daily., Disp: , Rfl:   •  oxyCODONE-acetaminophen (PERCOCET)  MG per tablet, 1 tablet., Disp: , Rfl:   •  OXYCONTIN 15 MG tablet extended-release 12 hour, , Disp: , Rfl: 0  •  prochlorperazine (COMPAZINE) 10 MG tablet, Take 10 mg by mouth Every 6 (Six) Hours., Disp: , Rfl:   •  raNITIdine (ZANTAC) 150 MG tablet, Take 150 mg by mouth 2 (Two) Times a Day., Disp: , Rfl:   •  tadalafil (ADCIRCA) 20 MG tablet tablet, Take 7 mg by mouth Daily., Disp: , Rfl:   •  Testosterone Cypionate (DEPO-TESTOSTERONE) 200 MG/ML injection, Inject 200 mg into the shoulder, thigh, or buttocks Every 14 (Fourteen) Days., Disp: , Rfl:   •  tiZANidine (ZANAFLEX) 4 MG tablet, Take 4 mg by mouth Every 8 (Eight) Hours As Needed for Muscle Spasms., Disp: , Rfl:   •  traZODone (DESYREL) 50 MG tablet, 150 mg at bedtime, Disp: , Rfl:   •  varenicline (CHANTIX) 1 MG tablet, Take 1 mg by mouth 2 (Two) Times a Day., Disp: , Rfl:   •  verapamil (VERELAN) 300 MG capsule sustained-release 24 hr 24 hr capsule, , Disp: , Rfl: 3  •  albuterol (PROVENTIL HFA;VENTOLIN HFA) 108 (90 Base) MCG/ACT inhaler, Inhale., Disp: , Rfl:   •  ipratropium-albuterol (DUO-NEB) 0.5-2.5 mg/mL nebulizer, Inhale 3 mL 4 (Four) Times a Day., Disp: , Rfl:   •  tamsulosin (FLOMAX) 0.4 MG capsule 24 hr capsule, Take 1 capsule by mouth Every Night for 30 days., Disp: 30 capsule, Rfl: 11    Past Medical History:   Diagnosis Date   • Acute bronchitis    • Amnesia    • Anxiety    • Arthritis    • Asthma    • Back pain    • Chest pain    • CKD (chronic kidney disease)    • Concussion    • Contusion of chest wall    • COPD (chronic obstructive pulmonary disease)    • Degeneration of intervertebral disc of lumbar region    • Fall    • Gait  "disturbance    • Gastro-esophageal reflux    • Headache    • Hearing impaired    • Hypersomnia    • Hypertension    • Hypotestosteronemia    • Insomnia    • Leg cramp    • Lumbar stenosis    • Obesity    • Obesity    • KATLYN (obstructive sleep apnea)    • PTSD (post-traumatic stress disorder)    • Radiculopathy of lumbosacral region    • Sinusitis    • Testicular hypofunction    • Tremor        Past Surgical History:   Procedure Laterality Date   • BACK SURGERY  1994    L3-4 fusion   • BACK SURGERY  2009    L3-S1 fusion   • CARPAL TUNNEL RELEASE Bilateral    • CHOLECYSTECTOMY     • HERNIA REPAIR     • KNEE SURGERY     • SPINAL CORD STIMULATOR REMOVAL N/A 5/2/2018    Procedure: SPINAL CORD STIMULATOR REMOVAL Removal of generator and placment of new generator;  Surgeon: Miguel Hall MD;  Location: [facility] OR;  Service: Neurosurgery   • THORACIC LAMINECTOMY WITH PLACEMENT OF DORSAL COLUMN STIMULATOR  2010       Social History     Social History   • Marital status:      Social History Main Topics   • Smoking status: Former Smoker     Packs/day: 0.75     Types: Cigarettes     Start date: 1/1/1975     Quit date: 3/23/2016   • Smokeless tobacco: Never Used   • Alcohol use Yes      Comment: x1/month   • Drug use: No   • Sexual activity: Defer     Other Topics Concern   • Not on file       Family History   Problem Relation Age of Onset   • Stroke Mother          /70   Temp 97.4 °F (36.3 °C)   Ht 193 cm (76\")   Wt 99.3 kg (219 lb)   BMI 26.66 kg/m²       Physical Exam  Constitutional: Well nourished, Well developed; No apparent distress  Psychiatric: Appropriate affect; Alert and oriented  Eyes: Unremarkable  Musculoskeletal: Normal gait and station  GI: Abdomen is soft, non-tender  Respiratory: No distress; Unlabored movement; No accessory musculature needed with symmetric movements  Skin: No pallor or diaphoresis  ; Penis and testicles are normal; Prostate 25 mL without nodule          Data  Results for " orders placed or performed in visit on 05/24/18   POC Urinalysis Dipstick, Automated   Result Value Ref Range    Color Yellow Yellow, Straw, Dark Yellow, Diane    Clarity, UA Clear Clear    Glucose, UA Negative Negative, 1000 mg/dL (3+) mg/dL    Bilirubin Negative Negative    Ketones, UA Negative Negative    Specific Gravity  1.020 1.005 - 1.030    Blood, UA Negative Negative    pH, Urine 5.0 5.0 - 8.0    Protein, POC Negative Negative mg/dL    Urobilinogen, UA Normal Normal    Leukocytes Negative Negative    Nitrite, UA Negative Negative           Assessment and Plan  Diagnoses and all orders for this visit:    Testicular pain, Bilateral neuropathic  -     POC Urinalysis Dipstick, Automated    Poor urinary stream  -     tamsulosin (FLOMAX) 0.4 MG capsule 24 hr capsule; Take 1 capsule by mouth Every Night for 30 days.    Epididymal cyst, left    Ejaculation, retrograde      #1.  His testicular pain is neuropathic.  He responds to physical therapy and is on appropriate medication to manage this.  There is a small epididymal cyst which is a common finding in patients and typically asymptomatic.  He is not tender to palpation in fact I only can feel the area where the ultrasound describes it.  It certainly does not need surgical management.  #2.  I started him on tamsulosin this day to see if he gets any symptom relief.  I will reassess him in a month.  We will get a bladder scan at that point.  #3.  It is possible that he has a neurogenic retrograde ejaculation or an ejaculation.  There is really nothing that can be done about this.    This gentleman is voiding symptoms could be of a complex nature giving all of his spinal injury.  I would recommend that he be referred to an institution that is able to do sophisticated urodynamics on him that would include EMG and possibly using video with fluoroscopy.  This would be the only way dyssynergia could be ruled out.        Delfino Bassett MD  05/24/18  9:46 PM      Cc:  Carlos Elliott MD

## 2018-05-24 ENCOUNTER — OFFICE VISIT (OUTPATIENT)
Dept: UROLOGY | Facility: CLINIC | Age: 63
End: 2018-05-24

## 2018-05-24 ENCOUNTER — TELEPHONE (OUTPATIENT)
Dept: UROLOGY | Facility: CLINIC | Age: 63
End: 2018-05-24

## 2018-05-24 VITALS
TEMPERATURE: 97.4 F | HEIGHT: 76 IN | DIASTOLIC BLOOD PRESSURE: 70 MMHG | BODY MASS INDEX: 26.67 KG/M2 | WEIGHT: 219 LBS | SYSTOLIC BLOOD PRESSURE: 170 MMHG

## 2018-05-24 DIAGNOSIS — N53.14 EJACULATION, RETROGRADE: ICD-10-CM

## 2018-05-24 DIAGNOSIS — R39.12 POOR URINARY STREAM: ICD-10-CM

## 2018-05-24 DIAGNOSIS — N50.819 TESTICULAR PAIN, UNSPECIFIED: Primary | ICD-10-CM

## 2018-05-24 DIAGNOSIS — N50.3 EPIDIDYMAL CYST: ICD-10-CM

## 2018-05-24 LAB
BILIRUB BLD-MCNC: NEGATIVE MG/DL
CLARITY, POC: CLEAR
COLOR UR: YELLOW
GLUCOSE UR STRIP-MCNC: NEGATIVE MG/DL
KETONES UR QL: NEGATIVE
LEUKOCYTE EST, POC: NEGATIVE
NITRITE UR-MCNC: NEGATIVE MG/ML
PH UR: 5 [PH] (ref 5–8)
PROT UR STRIP-MCNC: NEGATIVE MG/DL
RBC # UR STRIP: NEGATIVE /UL
SP GR UR: 1.02 (ref 1–1.03)
UROBILINOGEN UR QL: NORMAL

## 2018-05-24 PROCEDURE — 99204 OFFICE O/P NEW MOD 45 MIN: CPT | Performed by: UROLOGY

## 2018-05-24 PROCEDURE — 81003 URINALYSIS AUTO W/O SCOPE: CPT | Performed by: UROLOGY

## 2018-05-24 RX ORDER — TAMSULOSIN HYDROCHLORIDE 0.4 MG/1
1 CAPSULE ORAL NIGHTLY
Qty: 30 CAPSULE | Refills: 11 | Status: SHIPPED | OUTPATIENT
Start: 2018-05-24 | End: 2018-06-23

## 2018-05-24 RX ORDER — LUBIPROSTONE 24 UG/1
24 CAPSULE ORAL 2 TIMES DAILY WITH MEALS
COMMUNITY
End: 2018-07-12

## 2018-05-24 RX ORDER — CLARITHROMYCIN 500 MG/1
500 TABLET, COATED ORAL 2 TIMES DAILY
COMMUNITY
End: 2018-07-12

## 2018-05-24 NOTE — TELEPHONE ENCOUNTER
"----- Message from Delfino Bassett MD sent at 5/24/2018 12:56 PM CDT -----  Regarding: Follow up appointment  I wanted to see him back in one month but I checked PRN. Please let him know I made a mistake and would like to see him after he tries the medication. Please put on his \"notes\" for the schedule \"needs bladder scan\" at next visit.   "

## 2018-05-24 NOTE — TELEPHONE ENCOUNTER
I spoke with the patient to schedule his appointment for in a month. He is going out of town and wasn't sure of his schedule. He said he will call back to schedule the appointment.

## 2018-05-24 NOTE — PATIENT INSTRUCTIONS

## 2018-05-31 ENCOUNTER — TELEPHONE (OUTPATIENT)
Dept: NEUROSURGERY | Facility: CLINIC | Age: 63
End: 2018-05-31

## 2018-05-31 NOTE — TELEPHONE ENCOUNTER
Patient called stating he hasn't been able to get in touch with Bambi about re-programming his DCS, etc.  I reached out to Bambi and found out she is out of town this week for business.  She is going to check her schedule and see what days she is available next week to meet with the patient & will contact him.  I called the patient back to let him know this but he didn't answer so I left him a voicemail.    dinora breen CMA

## 2018-06-04 ENCOUNTER — HOSPITAL ENCOUNTER (OUTPATIENT)
Dept: PHYSICAL THERAPY | Facility: HOSPITAL | Age: 63
Setting detail: THERAPIES SERIES
Discharge: HOME | End: 2018-06-04

## 2018-06-04 ENCOUNTER — TELEPHONE (OUTPATIENT)
Dept: NEUROSURGERY | Facility: CLINIC | Age: 63
End: 2018-06-04

## 2018-06-04 DIAGNOSIS — M54.41 CHRONIC BILATERAL LOW BACK PAIN WITH BILATERAL SCIATICA: Primary | ICD-10-CM

## 2018-06-04 DIAGNOSIS — G89.29 CHRONIC BILATERAL LOW BACK PAIN WITH BILATERAL SCIATICA: Primary | ICD-10-CM

## 2018-06-04 DIAGNOSIS — M51.36 LUMBAR DEGENERATIVE DISC DISEASE: ICD-10-CM

## 2018-06-04 DIAGNOSIS — M54.2 NECK PAIN: ICD-10-CM

## 2018-06-04 DIAGNOSIS — M48.062 SPINAL STENOSIS, LUMBAR REGION, WITH NEUROGENIC CLAUDICATION: ICD-10-CM

## 2018-06-04 DIAGNOSIS — M54.12 CERVICAL RADICULOPATHY: ICD-10-CM

## 2018-06-04 DIAGNOSIS — M54.2 CERVICALGIA: ICD-10-CM

## 2018-06-04 DIAGNOSIS — M54.42 CHRONIC BILATERAL LOW BACK PAIN WITH BILATERAL SCIATICA: Primary | ICD-10-CM

## 2018-06-04 PROCEDURE — 97140 MANUAL THERAPY 1/> REGIONS: CPT | Performed by: PHYSICAL THERAPIST

## 2018-06-04 NOTE — THERAPY TREATMENT NOTE
Outpatient Physical Therapy Ortho Treatment Note  [facility]     Patient Name: Carlos Hayes  : 1955  MRN: [id number]  Today's Date: 2018      Visit Date: 2018    Visit Dx:    ICD-10-CM ICD-9-CM   1. Chronic bilateral low back pain with bilateral sciatica M54.42 724.2    M54.41 724.3    G89.29 338.29   2. Lumbar degenerative disc disease M51.36 722.52   3. Neck pain M54.2 723.1   4. Spinal stenosis, lumbar region, with neurogenic claudication M48.062 724.03   5. Cervicalgia M54.2 723.1   6. Cervical radiculopathy M54.12 723.4       Patient Active Problem List   Diagnosis   • Battery end of life of spinal cord stimulator   • BMI 40.0-44.9, adult   • Non-smoker   • Cervicalgia   • Cervical radiculopathy   • Carpal tunnel syndrome of right wrist   • Spinal stenosis, lumbar region, with neurogenic claudication   • BMI 39.0-39.9,adult        Past Medical History:   Diagnosis Date   • Acute bronchitis    • Amnesia    • Anxiety    • Arthritis    • Asthma    • Back pain    • Chest pain    • CKD (chronic kidney disease)    • Concussion    • Contusion of chest wall    • COPD (chronic obstructive pulmonary disease)    • Degeneration of intervertebral disc of lumbar region    • Fall    • Gait disturbance    • Gastro-esophageal reflux    • Headache    • Hearing impaired    • Hypersomnia    • Hypertension    • Hypotestosteronemia    • Insomnia    • Leg cramp    • Lumbar stenosis    • Obesity    • Obesity    • KATLYN (obstructive sleep apnea)    • PTSD (post-traumatic stress disorder)    • Radiculopathy of lumbosacral region    • Sinusitis    • Testicular hypofunction    • Tremor         Past Surgical History:   Procedure Laterality Date   • BACK SURGERY      L3-4 fusion   • BACK SURGERY      L3-S1 fusion   • CARPAL TUNNEL RELEASE Bilateral    • CHOLECYSTECTOMY     • HERNIA REPAIR     • KNEE SURGERY     • SPINAL CORD STIMULATOR REMOVAL N/A 2018    Procedure: SPINAL CORD STIMULATOR REMOVAL Removal of  generator and placment of new generator;  Surgeon: Miguel Hall MD;  Location: [facility];  Service: Neurosurgery   • THORACIC LAMINECTOMY WITH PLACEMENT OF DORSAL COLUMN STIMULATOR  2010                             PT Assessment/Plan     Row Name 06/04/18 1500          PT Assessment    Assessment Comments He's still struggling with his DCS. We focused on his back pain as it was more flared today.   -TB        PT Plan    PT Plan Comments Progress to more flexibility as stability exercises as his pain diminishes and he can tolerate.  -TB       User Key  (r) = Recorded By, (t) = Taken By, (c) = Cosigned By    Initials Name Provider Type    SILVA Hartmann, PT Physical Therapist                    Exercises     Row Name 06/04/18 3625             Subjective Comments    Subjective Comments He says he's increased his pain meds from 15 to 20. He's adjusted to the increase and it helps him.  He says his DCS is not working well at all.   -TB         Subjective Pain    Subjective Pain Comment most of his pain is in his LB.   -TB        User Key  (r) = Recorded By, (t) = Taken By, (c) = Cosigned By    Initials Name Provider Type    SILVA Hartmann, PT Physical Therapist                        Manual Rx (last 36 hours)      Manual Treatments     Row Name 06/04/18 1500             Manual Rx 1    Manual Rx 1 Location left sidelying lumbar   -TB      Manual Rx 1 Type STM  -TB      Manual Rx 1 Duration 25  -TB         Manual Rx 2    Manual Rx 2 Location left sidelying left piriformis  -TB      Manual Rx 2 Type deep tissue release  -TB      Manual Rx 2 Duration 25  -TB        User Key  (r) = Recorded By, (t) = Taken By, (c) = Cosigned By    Initials Name Provider Type    SILVA Hartmann PT Physical Therapist                PT OP Goals     Row Name 06/04/18 1500          PT Short Term Goals    STG Date to Achieve 01/27/17  -TB     STG 1 Relax muscle guarding on oliver hip flexors  -TB     STG 1 Progress Ongoing  -TB     STG  2 Improve mobility of thoracic into extension  -TB     STG 2 Progress Ongoing  -TB     STG 3 Improve right hip IR to 30 deg  -TB     STG 3 Progress Ongoing  -TB        Long Term Goals    LTG Date to Achieve 02/27/17  -TB     LTG 1 Able to walk community distances without an assistive device or only a cane without severe flare of pain  -TB     LTG 1 Progress Ongoing  -TB     LTG 1 Progress Comments trying to walk more but pain increased today  -TB     LTG 2 Improve hip passive extension to 10 degrees  -TB     LTG 2 Progress Ongoing  -TB     LTG 3 Improved core contraction held during functional tasks.  -TB     LTG 3 Progress Ongoing  -TB     LTG 4 Independent with HEP for flexibility and stability.   -TB     LTG 4 Progress Ongoing  -TB     LTG 5 Reports no difficulty with voiding bladder or BM's  -TB     LTG 5 Progress Ongoing  -TB        Time Calculation    PT Goal Re-Cert Due Date 06/16/18  -TB       User Key  (r) = Recorded By, (t) = Taken By, (c) = Cosigned By    Initials Name Provider Type    TB Juarez Hartmann, PT Physical Therapist          Therapy Education  Given: HEP, Symptoms/condition management  Program: Reinforced  How Provided: Verbal  Provided to: Patient  Level of Understanding: Verbalized              Time Calculation:   Start Time: 1455  Stop Time: 1545  Time Calculation (min): 50 min  Total Timed Code Minutes- PT: 50 minute(s)    Therapy Charges for Today     Code Description Service Date Service Provider Modifiers Qty    60684952588 HC PT MANUAL THERAPY EA 15 MIN 6/4/2018 Juarez Hartmann, PT GP 3                    Juarez Hartmann, PT  6/4/2018

## 2018-06-04 NOTE — TELEPHONE ENCOUNTER
"Patient left a voicemail asking me to call him back.    He has several questions:  He wants to know what [employer] device there is that he can get in replace of the DCS he has now b/c this one doesn't stay charged long enough.    He also says he doesn't want to pay anything out of pocket for this DCS - he says he shouldn't have to b/c he wasn't told that this device didn't stay charged like his other one.  He also stated that when he couldn't get in touch w/Bambi he called the corporate office and they are \"throwing Dr Hall under the bus\" stating that he knew about this b/c he is the one who ordered the device.    He is going to meet with Bambi in our office on Thursday at 1:00 to discuss all of this.      dinora breen CMA      Of note: the patient stated that he \"was mad at me at his last office visit but he has gotten over it, he likes me now, and says that I have been nothing but very professional with him during all of this\".      dinora breen CMA  "

## 2018-06-11 ENCOUNTER — HOSPITAL ENCOUNTER (OUTPATIENT)
Dept: PHYSICAL THERAPY | Facility: HOSPITAL | Age: 63
Setting detail: THERAPIES SERIES
Discharge: HOME | End: 2018-06-11

## 2018-06-11 DIAGNOSIS — M51.36 LUMBAR DEGENERATIVE DISC DISEASE: ICD-10-CM

## 2018-06-11 DIAGNOSIS — M48.062 SPINAL STENOSIS, LUMBAR REGION, WITH NEUROGENIC CLAUDICATION: ICD-10-CM

## 2018-06-11 DIAGNOSIS — M54.2 CERVICALGIA: ICD-10-CM

## 2018-06-11 DIAGNOSIS — G89.29 CHRONIC BILATERAL LOW BACK PAIN WITH BILATERAL SCIATICA: Primary | ICD-10-CM

## 2018-06-11 DIAGNOSIS — M54.2 NECK PAIN: ICD-10-CM

## 2018-06-11 DIAGNOSIS — M54.41 CHRONIC BILATERAL LOW BACK PAIN WITH BILATERAL SCIATICA: Primary | ICD-10-CM

## 2018-06-11 DIAGNOSIS — M54.12 CERVICAL RADICULOPATHY: ICD-10-CM

## 2018-06-11 DIAGNOSIS — M54.42 CHRONIC BILATERAL LOW BACK PAIN WITH BILATERAL SCIATICA: Primary | ICD-10-CM

## 2018-06-11 PROCEDURE — G8979 MOBILITY GOAL STATUS: HCPCS | Performed by: PHYSICAL THERAPIST

## 2018-06-11 PROCEDURE — G8978 MOBILITY CURRENT STATUS: HCPCS | Performed by: PHYSICAL THERAPIST

## 2018-06-11 PROCEDURE — 97140 MANUAL THERAPY 1/> REGIONS: CPT | Performed by: PHYSICAL THERAPIST

## 2018-06-11 NOTE — THERAPY PROGRESS REPORT/RE-CERT
Outpatient Physical Therapy Ortho Treatment Note  [facility]     Patient Name: Carlos Hayes  : 1955  MRN: [id number]  Today's Date: 2018      Visit Date: 2018    Visit Dx:    ICD-10-CM ICD-9-CM   1. Chronic bilateral low back pain with bilateral sciatica M54.42 724.2    M54.41 724.3    G89.29 338.29   2. Lumbar degenerative disc disease M51.36 722.52   3. Neck pain M54.2 723.1   4. Spinal stenosis, lumbar region, with neurogenic claudication M48.062 724.03   5. Cervicalgia M54.2 723.1   6. Cervical radiculopathy M54.12 723.4       Patient Active Problem List   Diagnosis   • Battery end of life of spinal cord stimulator   • BMI 40.0-44.9, adult   • Non-smoker   • Cervicalgia   • Cervical radiculopathy   • Carpal tunnel syndrome of right wrist   • Spinal stenosis, lumbar region, with neurogenic claudication   • BMI 39.0-39.9,adult        Past Medical History:   Diagnosis Date   • Acute bronchitis    • Amnesia    • Anxiety    • Arthritis    • Asthma    • Back pain    • Chest pain    • CKD (chronic kidney disease)    • Concussion    • Contusion of chest wall    • COPD (chronic obstructive pulmonary disease)    • Degeneration of intervertebral disc of lumbar region    • Fall    • Gait disturbance    • Gastro-esophageal reflux    • Headache    • Hearing impaired    • Hypersomnia    • Hypertension    • Hypotestosteronemia    • Insomnia    • Leg cramp    • Lumbar stenosis    • Obesity    • Obesity    • KATLYN (obstructive sleep apnea)    • PTSD (post-traumatic stress disorder)    • Radiculopathy of lumbosacral region    • Sinusitis    • Testicular hypofunction    • Tremor         Past Surgical History:   Procedure Laterality Date   • BACK SURGERY      L3-4 fusion   • BACK SURGERY      L3-S1 fusion   • CARPAL TUNNEL RELEASE Bilateral    • CHOLECYSTECTOMY     • HERNIA REPAIR     • KNEE SURGERY     • SPINAL CORD STIMULATOR REMOVAL N/A 2018    Procedure: SPINAL CORD STIMULATOR REMOVAL Removal  of generator and placment of new generator;  Surgeon: Miguel Hall MD;  Location: [facility];  Service: Neurosurgery   • THORACIC LAMINECTOMY WITH PLACEMENT OF DORSAL COLUMN STIMULATOR  2010                             PT Assessment/Plan     Row Name 06/11/18 1400          PT Assessment    Functional Limitations Impaired gait;Impaired locomotion;Limitation in home management;Performance in leisure activities;Limitations in functional capacity and performance  -TB     Impairments Balance;Gait;Pain;Muscle strength;Motor function;Joint mobility;Impaired muscle length;Range of motion;Posture;Impaired flexibility  -TB     Assessment Comments He is still struggling with his DCS and pursuing getting it exchanged. He has only been able to walk a couple of time due to this not working well. He doesn't like that he has to charge every day. [place] He struggles standing still. He did have a successful ejaculation after his last treatment of his pelvic floor.   -TB     Rehab Potential Good  -TB     Patient/caregiver participated in establishment of treatment plan and goals Yes  -TB     Patient would benefit from skilled therapy intervention Yes  -TB        PT Plan    PT Frequency 1x/week;2x/week  -TB     Predicted Duration of Therapy Intervention (OT Eval) 1 month  -TB     Planned CPT's? PT THER PROC EA 15 MIN: 92241;PT THER ACT EA 15 MIN: 69402;PT MANUAL THERAPY EA 15 MIN: 40229;PT ELECTRICAL STIM UNATTEND: ;PT ELECTRICAL STIM ATTD EA 15 MIN: 06872;PT ULTRASOUND EA 15 MIN: 40817  -TB     PT Plan Comments Continue to work on improve mobility of his and pelvic floor and progress again to hip and core stability as he has his DCS adjusted and is able to tolerate.  -TB       User Key  (r) = Recorded By, (t) = Taken By, (c) = Cosigned By    Initials Name Provider Type    TB Juarez Hartmann, PT Physical Therapist                    Exercises     Row Name 06/11/18 6303              Subjective Comments    Subjective Comments He says he spent many hours with the [employer] rep trying to get his DCS adjusted. He struggled walking after this. He has an appointment with the surgeon to discuss changing it.   -TB         Subjective Pain    Pre-Treatment Pain Level 7  -TB        User Key  (r) = Recorded By, (t) = Taken By, (c) = Cosigned By    Initials Name Provider Type    TB Juarez Hartmann, PT Physical Therapist                        Manual Rx (last 36 hours)      Manual Treatments     Row Name 06/11/18 1435             Manual Rx 1    Manual Rx 1 Location oliver sidelying lumbar   -TB      Manual Rx 1 Type STM  -TB      Manual Rx 1 Grade --  -TB      Manual Rx 1 Duration 10 min oliver  -TB         Manual Rx 2    Manual Rx 2 Location left sidelying left piriformis and deep gluteals  -TB      Manual Rx 2 Type deep tissue release  -TB      Manual Rx 2 Grade --  -TB      Manual Rx 2 Duration 10 min each  -TB         Manual Rx 3    Manual Rx 3 Location passive stretch oliver hips after STM  -TB      Manual Rx 3 Type piriformis, horizontal adduction, IR at 90  -TB      Manual Rx 3 Grade low load prolonged stretch oliver  -TB      Manual Rx 3 Duration 3min ea  -TB         Manual Rx 4    Manual Rx 4 Location hooklying PF deep release  -TB      Manual Rx 4 Type to supine hip flexor release  -TB      Manual Rx 4 Grade more tenderand guarded on right  -TB      Manual Rx 4 Duration 10 mi each  -TB         Manual Rx 5    Manual Rx 5 Location --  -TB      Manual Rx 5 Type --  -TB      Manual Rx 5 Grade --  -TB      Manual Rx 5 Duration --  -TB         Manual Rx 6    Manual Rx 6 Location --  -TB      Manual Rx 6 Type --  -TB      Manual Rx 6 Grade --  -TB      Manual Rx 6 Duration --  -TB        User Key  (r) = Recorded By, (t) = Taken By, (c) = Cosigned By    Initials Name Provider Type    TB Juarez Hartmann, PT Physical Therapist                PT OP Goals     Row Name 06/11/18 1400          PT Short Term Goals    STG  Date to Achieve 01/27/17  -TB     STG 1 Relax muscle guarding on oliver hip flexors  -TB     STG 1 Progress Ongoing  -TB     STG 1 Progress Comments he has good days and other days when he's more flared.   -TB     STG 2 Improve mobility of thoracic into extension  -TB     STG 2 Progress Ongoing  -TB     STG 3 Improve right hip IR to 30 deg  -TB     STG 3 Progress Ongoing  -TB     STG 3 Progress Comments 25 deg oliver after hip release  -TB        Long Term Goals    LTG Date to Achieve 02/27/17  -TB     LTG 1 Able to walk community distances without an assistive device or only a cane without severe flare of pain  -TB     LTG 1 Progress Ongoing  -TB     LTG 1 Progress Comments hasn't been able to walk as far due to problems with his DCS, but is going to start again.  -TB     LTG 2 Improve hip passive extension to 10 degrees  -TB     LTG 2 Progress Ongoing  -TB     LTG 2 Progress Comments to 0 oliver  -TB     LTG 3 Improved core contraction held during functional tasks.  -TB     LTG 3 Progress Met  -TB     LTG 3 Progress Comments better at controlling TA  -TB     LTG 4 Independent with HEP for flexibility and stability.   -TB     LTG 4 Progress Ongoing  -TB     LTG 4 Progress Comments he's stretching his hips and working on core comtrol  -TB     LTG 5 Reports no difficulty with voiding bladder or BM's  -TB     LTG 5 Progress Ongoing  -TB     LTG 5 Progress Comments this has been better over the last couple of visits working on PF.   -TB        Time Calculation    PT Goal Re-Cert Due Date 07/11/18  -TB       User Key  (r) = Recorded By, (t) = Taken By, (c) = Cosigned By    Initials Name Provider Type    TB Juarez Hartmann, PT Physical Therapist          Therapy Education  Given: HEP, Symptoms/condition management  Program: Reinforced  How Provided: Verbal  Provided to: Patient  Level of Understanding: Verbalized              Time Calculation:   Start Time: 1435  Stop Time: 1545  Time Calculation (min): 70 min  Total Timed Code  Minutes- PT: 65 minute(s)    Therapy Charges for Today     Code Description Service Date Service Provider Modifiers Qty    15949233567 HC PT MOBILITY CURRENT 6/11/2018 Juarez Hartmann, PT GP, CK 1    96696530350 HC PT MOBILITY PROJECTED 6/11/2018 Juarez Hartmann, PT GP, CI 1    88684741117 HC PT MANUAL THERAPY EA 15 MIN 6/11/2018 Juarez Hartmann, PT GP, KX 4          PT G-Codes  PT Professional Judgement Used?: Yes  Functional Limitation: Mobility: Walking and moving around  Mobility: Walking and Moving Around Current Status (): At least 40 percent but less than 60 percent impaired, limited or restricted  Mobility: Walking and Moving Around Goal Status (): At least 1 percent but less than 20 percent impaired, limited or restricted         Juarez Hartmann, PT  6/11/2018

## 2018-06-18 ENCOUNTER — HOSPITAL ENCOUNTER (OUTPATIENT)
Dept: PHYSICAL THERAPY | Facility: HOSPITAL | Age: 63
Setting detail: THERAPIES SERIES
Discharge: HOME | End: 2018-06-18

## 2018-06-18 DIAGNOSIS — M54.2 CERVICALGIA: ICD-10-CM

## 2018-06-18 DIAGNOSIS — M48.062 SPINAL STENOSIS, LUMBAR REGION, WITH NEUROGENIC CLAUDICATION: ICD-10-CM

## 2018-06-18 DIAGNOSIS — M54.12 CERVICAL RADICULOPATHY: ICD-10-CM

## 2018-06-18 DIAGNOSIS — M54.42 CHRONIC BILATERAL LOW BACK PAIN WITH BILATERAL SCIATICA: Primary | ICD-10-CM

## 2018-06-18 DIAGNOSIS — M51.36 LUMBAR DEGENERATIVE DISC DISEASE: ICD-10-CM

## 2018-06-18 DIAGNOSIS — M54.2 NECK PAIN: ICD-10-CM

## 2018-06-18 DIAGNOSIS — G89.29 CHRONIC BILATERAL LOW BACK PAIN WITH BILATERAL SCIATICA: Primary | ICD-10-CM

## 2018-06-18 DIAGNOSIS — M54.41 CHRONIC BILATERAL LOW BACK PAIN WITH BILATERAL SCIATICA: Primary | ICD-10-CM

## 2018-06-18 PROCEDURE — 97140 MANUAL THERAPY 1/> REGIONS: CPT | Performed by: PHYSICAL THERAPIST

## 2018-06-18 NOTE — THERAPY TREATMENT NOTE
Outpatient Physical Therapy Ortho Treatment Note  [facility]     Patient Name: Carlos Hayes  : 1955  MRN: [id number]  Today's Date: 2018      Visit Date: 2018    Visit Dx:    ICD-10-CM ICD-9-CM   1. Chronic bilateral low back pain with bilateral sciatica M54.42 724.2    M54.41 724.3    G89.29 338.29   2. Lumbar degenerative disc disease M51.36 722.52   3. Neck pain M54.2 723.1   4. Spinal stenosis, lumbar region, with neurogenic claudication M48.062 724.03   5. Cervicalgia M54.2 723.1   6. Cervical radiculopathy M54.12 723.4       Patient Active Problem List   Diagnosis   • Battery end of life of spinal cord stimulator   • BMI 40.0-44.9, adult   • Non-smoker   • Cervicalgia   • Cervical radiculopathy   • Carpal tunnel syndrome of right wrist   • Spinal stenosis, lumbar region, with neurogenic claudication   • BMI 39.0-39.9,adult        Past Medical History:   Diagnosis Date   • Acute bronchitis    • Amnesia    • Anxiety    • Arthritis    • Asthma    • Back pain    • Chest pain    • CKD (chronic kidney disease)    • Concussion    • Contusion of chest wall    • COPD (chronic obstructive pulmonary disease)    • Degeneration of intervertebral disc of lumbar region    • Fall    • Gait disturbance    • Gastro-esophageal reflux    • Headache    • Hearing impaired    • Hypersomnia    • Hypertension    • Hypotestosteronemia    • Insomnia    • Leg cramp    • Lumbar stenosis    • Obesity    • Obesity    • KATLYN (obstructive sleep apnea)    • PTSD (post-traumatic stress disorder)    • Radiculopathy of lumbosacral region    • Sinusitis    • Testicular hypofunction    • Tremor         Past Surgical History:   Procedure Laterality Date   • BACK SURGERY      L3-4 fusion   • BACK SURGERY      L3-S1 fusion   • CARPAL TUNNEL RELEASE Bilateral    • CHOLECYSTECTOMY     • HERNIA REPAIR     • KNEE SURGERY     • SPINAL CORD STIMULATOR REMOVAL N/A 2018    Procedure: SPINAL CORD STIMULATOR REMOVAL Removal  of generator and placment of new generator;  Surgeon: Miguel Hall MD;  Location: [facility];  Service: Neurosurgery   • THORACIC LAMINECTOMY WITH PLACEMENT OF DORSAL COLUMN STIMULATOR  2010                             PT Assessment/Plan     Row Name 06/18/18 1700          PT Assessment    Assessment Comments He was quite tender over his greater trochanter and ITB with guarding around his hips. The foam roll relaxed hips quite a bit.  -TB        PT Plan    PT Plan Comments Continue with foam roll.   -TB       User Key  (r) = Recorded By, (t) = Taken By, (c) = Cosigned By    Initials Name Provider Type    TB Juarez Hartmann, PT Physical Therapist                    Exercises     Row Name 06/18/18 1700 06/18/18 1540          Subjective Comments    Subjective Comments  -- He says he's realized how much he can't function without pain meds. He struggles getting out of the bed in the morning when his meds have worn off. He says his right flank and hip are more tight today.   -TB        Subjective Pain    Pre-Treatment Pain Level  -- 6  -TB        Total Minutes    35721 - PT Manual Therapy Minutes 60  -TB  --       User Key  (r) = Recorded By, (t) = Taken By, (c) = Cosigned By    Initials Name Provider Type    TB Juarez Hartmann, PT Physical Therapist                        Manual Rx (last 36 hours)      Manual Treatments     Row Name 06/18/18 1700             Manual Rx 1    Manual Rx 1 Location left sidelying right upper glute, lower lumbar and piriformis  -TB      Manual Rx 1 Type STM and deep pressure  -TB      Manual Rx 1 Duration 30  -TB         Manual Rx 2    Manual Rx 2 Location left sidelying ITB oliver  -TB      Manual Rx 2 Type IASTM foam roll  -TB      Manual Rx 2 Duration 15 each side  -TB        User Key  (r) = Recorded By, (t) = Taken By, (c) = Cosigned By    Initials Name Provider Type    SILVA Hartmann, PT Physical Therapist                PT OP Goals     Row Name 06/18/18 1700          PT Short Term  Goals    STG Date to Achieve 01/27/17  -TB     STG 1 Relax muscle guarding on oliver hip flexors  -TB     STG 1 Progress Ongoing  -TB     STG 2 Improve mobility of thoracic into extension  -TB     STG 2 Progress Ongoing  -TB     STG 3 Improve right hip IR to 30 deg  -TB     STG 3 Progress Ongoing  -TB        Long Term Goals    LTG Date to Achieve 02/27/17  -TB     LTG 1 Able to walk community distances without an assistive device or only a cane without severe flare of pain  -TB     LTG 1 Progress Ongoing  -TB     LTG 1 Progress Comments moving slower today  -TB     LTG 2 Improve hip passive extension to 10 degrees  -TB     LTG 2 Progress Ongoing  -TB     LTG 3 Improved core contraction held during functional tasks.  -TB     LTG 3 Progress Met  -TB     LTG 4 Independent with HEP for flexibility and stability.   -TB     LTG 4 Progress Ongoing  -TB     LTG 5 Reports no difficulty with voiding bladder or BM's  -TB     LTG 5 Progress Ongoing  -TB        Time Calculation    PT Goal Re-Cert Due Date 07/11/18  -TB       User Key  (r) = Recorded By, (t) = Taken By, (c) = Cosigned By    Initials Name Provider Type    TB Juarez Hartmann, PT Physical Therapist          Therapy Education  Given: HEP, Symptoms/condition management  Program: Reinforced  How Provided: Verbal  Provided to: Patient  Level of Understanding: Verbalized              Time Calculation:   Start Time: 1535  Stop Time: 1635  Time Calculation (min): 60 min  Total Timed Code Minutes- PT: 60 minute(s)  Therapy Suggested Charges     Code   Minutes Charges    71250 (CPT®) Hc Pt Neuromusc Re Education Ea 15 Min      39067 (CPT®) Hc Pt Ther Proc Ea 15 Min      19458 (CPT®) Hc Gait Training Ea 15 Min      66072 (CPT®) Hc Pt Therapeutic Act Ea 15 Min      75637 (CPT®) Hc Pt Manual Therapy Ea 15 Min 60 4    87039 (CPT®) Hc Pt Ther Massage- Per 15 Min      60800 (CPT®) Hc Pt Iontophoresis Ea 15 Min      10624 (CPT®) Hc Pt Elec Stim Ea-Per 15 Min      50365 (CPT®) Hc Pt  Ultrasound Ea 15 Min      61561 (CPT®) Hc Pt Self Care/Mgmt/Train Ea 15 Min      Total  60 4        Therapy Charges for Today     Code Description Service Date Service Provider Modifiers Qty    26510129706 HC PT MANUAL THERAPY EA 15 MIN 6/18/2018 Juarez Hartmann, PT GP 4                    Juarez Hartmann, PT  6/18/2018

## 2018-06-20 ENCOUNTER — TRANSCRIBE ORDERS (OUTPATIENT)
Dept: ADMINISTRATIVE | Facility: HOSPITAL | Age: 63
End: 2018-06-20

## 2018-06-20 DIAGNOSIS — R79.82 ELEVATED C-REACTIVE PROTEIN (CRP): Primary | ICD-10-CM

## 2018-06-25 ENCOUNTER — APPOINTMENT (OUTPATIENT)
Dept: PHYSICAL THERAPY | Facility: HOSPITAL | Age: 63
End: 2018-06-25

## 2018-07-02 ENCOUNTER — APPOINTMENT (OUTPATIENT)
Dept: PHYSICAL THERAPY | Facility: HOSPITAL | Age: 63
End: 2018-07-02

## 2018-07-05 ENCOUNTER — OFFICE VISIT (OUTPATIENT)
Dept: NEUROSURGERY | Facility: CLINIC | Age: 63
End: 2018-07-05

## 2018-07-05 VITALS
SYSTOLIC BLOOD PRESSURE: 142 MMHG | WEIGHT: 315 LBS | DIASTOLIC BLOOD PRESSURE: 90 MMHG | BODY MASS INDEX: 38.36 KG/M2 | HEIGHT: 76 IN

## 2018-07-05 DIAGNOSIS — Z78.9 NON-SMOKER: ICD-10-CM

## 2018-07-05 DIAGNOSIS — M54.2 CERVICALGIA: ICD-10-CM

## 2018-07-05 DIAGNOSIS — M54.12 CERVICAL RADICULOPATHY: ICD-10-CM

## 2018-07-05 DIAGNOSIS — T85.192D MALFUNCTION OF SPINAL CORD STIMULATOR, SUBSEQUENT ENCOUNTER: Primary | ICD-10-CM

## 2018-07-05 DIAGNOSIS — M48.062 SPINAL STENOSIS, LUMBAR REGION, WITH NEUROGENIC CLAUDICATION: ICD-10-CM

## 2018-07-05 PROBLEM — T85.192A MALFUNCTION OF SPINAL CORD STIMULATOR: Status: ACTIVE | Noted: 2018-07-05

## 2018-07-05 PROCEDURE — 99213 OFFICE O/P EST LOW 20 MIN: CPT | Performed by: NEUROLOGICAL SURGERY

## 2018-07-05 NOTE — H&P
SUBJECTIVE:  Patient ID: Carlos Hayes is a 63 y.o. male is here today for follow-up.    Chief Complaint:  Chief Complaint   Patient presents with   • Pain     patient is here to discuss changing his DCS battery due to the new one not holding a charge for multiple days like his old one did.       HPI  64 yo male with history of chronic leg and back pain. He has a dorsal column stimulatory that was replaced due to battery end of life 05/02/2018.  The battery was replaced with a next generation latest model device.  Since the new device is been implanted he is been very clear that the charging has to be done daily almost hourly.  And he is very clear that this device does not give him the same coverage that his previous device stated.  His leads were not changed during the last surgery.  He has had multiple sessions with the [employer] technical support people to try to re-program the stimulator and they have been unable to satisfy his pain control and reproduce the coverage he was getting with his old device.  In addition they have been unable to program the device to extend his battery life.    The following portions of the patient's history were reviewed and updated as appropriate: allergies, current medications, past family history, past medical history, past social history, past surgical history and problem list.    OBJECTIVE:    Review of Systems   Musculoskeletal: Positive for back pain.   All other systems reviewed and are negative.         Physical Exam   Constitutional: He is oriented to person, place, and time. He appears well-developed and well-nourished.   HENT:   Head: Normocephalic and atraumatic.   Right Ear: Hearing normal.   Left Ear: Hearing normal.   Eyes: EOM are normal. Pupils are equal, round, and reactive to light.   Neck: Normal range of motion.   Neurological: He is alert and oriented to person, place, and time. He has normal strength and normal reflexes. No cranial nerve deficit or sensory  deficit. He displays a negative Romberg sign. GCS eye subscore is 4. GCS verbal subscore is 5. GCS motor subscore is 6. He displays no Babinski's sign on the right side. He displays no Babinski's sign on the left side.   Psychiatric: His speech is normal. Judgment normal. Cognition and memory are normal.       Neurologic Exam     Mental Status   Oriented to person, place, and time.   Speech: speech is normal     Cranial Nerves     CN III, IV, VI   Pupils are equal, round, and reactive to light.  Extraocular motions are normal.     Motor Exam     Strength   Strength 5/5 throughout.       Independent Review of Radiographic Studies:       ASSESSMENT/PLAN:  The patient likely has a malfunctioning spinal cord stimulator device.  We discussed the options with him which would include taking back to surgery and implanting an older model device that is more similar to his previous device.  The hope would be that this would give him a longer battery life and give us a programming option that would reproduce his previous coverage.  He is amenable to this.  We discussed the relative risks and benefits which include infection bleeding stroke coma and death.  In addition we are very clear with the patient that there is no guarantee of what his battery performance will be with a new device.  In addition we were very clear with the patient that there is no guarantee what his pain relief and when his pain stimulator coverage will be with the new device.  He acknowledged understanding of this.  His questions concerns were addressed.  We will get him prepped and scheduled as soon as possible for replacement of his malfunction device.      1. Malfunction of spinal cord stimulator, subsequent encounter    2. Cervicalgia    3. Cervical radiculopathy    4. Spinal stenosis, lumbar region, with neurogenic claudication    5. Non-smoker    6. BMI 39.0-39.9,adult            Return for postoperatively.      Miguel Hall MD

## 2018-07-05 NOTE — PROGRESS NOTES
SUBJECTIVE:  Patient ID: Carlos Hayes is a 63 y.o. male is here today for follow-up.    Chief Complaint:  Chief Complaint   Patient presents with   • Pain     patient is here to discuss changing his DCS battery due to the new one not holding a charge for multiple days like his old one did.       HPI  62 yo male with history of chronic leg and back pain. He has a dorsal column stimulatory that was replaced due to battery end of life 05/02/2018.  The battery was replaced with a next generation latest model device.  Since the new device is been implanted he is been very clear that the charging has to be done daily almost hourly.  And he is very clear that this device does not give him the same coverage that his previous device stated.  His leads were not changed during the last surgery.  He has had multiple sessions with the [employer] technical support people to try to re-program the stimulator and they have been unable to satisfy his pain control and reproduce the coverage he was getting with his old device.  In addition they have been unable to program the device to extend his battery life.    The following portions of the patient's history were reviewed and updated as appropriate: allergies, current medications, past family history, past medical history, past social history, past surgical history and problem list.    OBJECTIVE:    Review of Systems   Musculoskeletal: Positive for back pain.   All other systems reviewed and are negative.         Physical Exam   Constitutional: He is oriented to person, place, and time. He appears well-developed and well-nourished.   HENT:   Head: Normocephalic and atraumatic.   Right Ear: Hearing normal.   Left Ear: Hearing normal.   Eyes: EOM are normal. Pupils are equal, round, and reactive to light.   Neck: Normal range of motion.   Neurological: He is alert and oriented to person, place, and time. He has normal strength and normal reflexes. No cranial nerve deficit or sensory  deficit. He displays a negative Romberg sign. GCS eye subscore is 4. GCS verbal subscore is 5. GCS motor subscore is 6. He displays no Babinski's sign on the right side. He displays no Babinski's sign on the left side.   Psychiatric: His speech is normal. Judgment normal. Cognition and memory are normal.       Neurologic Exam     Mental Status   Oriented to person, place, and time.   Speech: speech is normal     Cranial Nerves     CN III, IV, VI   Pupils are equal, round, and reactive to light.  Extraocular motions are normal.     Motor Exam     Strength   Strength 5/5 throughout.       Independent Review of Radiographic Studies:       ASSESSMENT/PLAN:  The patient likely has a malfunctioning spinal cord stimulator device.  We discussed the options with him which would include taking back to surgery and implanting an older model device that is more similar to his previous device.  The hope would be that this would give him a longer battery life and give us a programming option that would reproduce his previous coverage.  He is amenable to this.  We discussed the relative risks and benefits which include infection bleeding stroke coma and death.  In addition we are very clear with the patient that there is no guarantee of what his battery performance will be with a new device.  In addition we were very clear with the patient that there is no guarantee what his pain relief and when his pain stimulator coverage will be with the new device.  He acknowledged understanding of this.  His questions concerns were addressed.  We will get him prepped and scheduled as soon as possible for replacement of his malfunction device.      1. Malfunction of spinal cord stimulator, subsequent encounter    2. Cervicalgia    3. Cervical radiculopathy    4. Spinal stenosis, lumbar region, with neurogenic claudication    5. Non-smoker    6. BMI 39.0-39.9,adult            Return for postoperatively.      Miguel Hall MD

## 2018-07-05 NOTE — PATIENT INSTRUCTIONS
PATIENT TO CONTINUE TO FOLLOW UP WITH HIS PRIMARY CARE PROVIDER FOR YEARLY PHYSICAL EXAMS TO ENSURE COMPLETE HEALTH MAINTENANCE.      BMI for Adults  Body mass index (BMI) is a number that is calculated from a person's weight and height. In most adults, the number is used to find how much of an adult's weight is made up of fat. BMI is not as accurate as a direct measure of body fat.  How is BMI calculated?  BMI is calculated by dividing weight in kilograms by height in meters squared. It can also be calculated by dividing weight in pounds by height in inches squared, then multiplying the resulting number by 703. Charts are available to help you find your BMI quickly and easily without doing this calculation.  How is BMI interpreted?  Health care professionals use BMI charts to identify whether an adult is underweight, at a normal weight, or overweight based on the following guidelines:  · Underweight: BMI less than 18.5.  · Normal weight: BMI between 18.5 and 24.9.  · Overweight: BMI between 25 and 29.9.  · Obese: BMI of 30 and above.    BMI is usually interpreted the same for males and females.  Weight includes both fat and muscle, so someone with a muscular build, such as an athlete, may have a BMI that is higher than 24.9. In cases like these, BMI may not accurately depict body fat. To determine if excess body fat is the cause of a BMI of 25 or higher, further assessments may need to be done by a health care provider.  Why is BMI a useful tool?  BMI is used to identify a possible weight problem that may be related to a medical problem or may increase the risk for medical problems. BMI can also be used to promote changes to reach a healthy weight.  This information is not intended to replace advice given to you by your health care provider. Make sure you discuss any questions you have with your health care provider.  Document Released: 08/29/2005 Document Revised: 04/27/2017 Document Reviewed: 05/15/2015  [employer]  Interactive Patient Education © 2018 [employer]

## 2018-07-09 ENCOUNTER — HOSPITAL ENCOUNTER (OUTPATIENT)
Dept: PHYSICAL THERAPY | Facility: HOSPITAL | Age: 63
Setting detail: THERAPIES SERIES
Discharge: HOME | End: 2018-07-09

## 2018-07-09 ENCOUNTER — TRANSCRIBE ORDERS (OUTPATIENT)
Dept: ADMINISTRATIVE | Facility: HOSPITAL | Age: 63
End: 2018-07-09

## 2018-07-09 DIAGNOSIS — M54.2 NECK PAIN: ICD-10-CM

## 2018-07-09 DIAGNOSIS — M54.41 CHRONIC BILATERAL LOW BACK PAIN WITH BILATERAL SCIATICA: Primary | ICD-10-CM

## 2018-07-09 DIAGNOSIS — R79.82 ELEVATED C-REACTIVE PROTEIN (CRP): Primary | ICD-10-CM

## 2018-07-09 DIAGNOSIS — M51.36 LUMBAR DEGENERATIVE DISC DISEASE: ICD-10-CM

## 2018-07-09 DIAGNOSIS — M48.062 SPINAL STENOSIS, LUMBAR REGION, WITH NEUROGENIC CLAUDICATION: ICD-10-CM

## 2018-07-09 DIAGNOSIS — M54.12 CERVICAL RADICULOPATHY: ICD-10-CM

## 2018-07-09 DIAGNOSIS — G89.29 CHRONIC BILATERAL LOW BACK PAIN WITH BILATERAL SCIATICA: Primary | ICD-10-CM

## 2018-07-09 DIAGNOSIS — M54.42 CHRONIC BILATERAL LOW BACK PAIN WITH BILATERAL SCIATICA: Primary | ICD-10-CM

## 2018-07-09 DIAGNOSIS — M54.2 CERVICALGIA: ICD-10-CM

## 2018-07-09 PROCEDURE — 97140 MANUAL THERAPY 1/> REGIONS: CPT | Performed by: PHYSICAL THERAPIST

## 2018-07-09 NOTE — THERAPY PROGRESS REPORT/RE-CERT
Outpatient Physical Therapy Ortho Progress Note  [facility]     Patient Name: Carlos Hayes  : 1955  MRN: [id number]  Today's Date: 2018      Visit Date: 2018    Visit Dx:    ICD-10-CM ICD-9-CM   1. Chronic bilateral low back pain with bilateral sciatica M54.42 724.2    M54.41 724.3    G89.29 338.29   2. Lumbar degenerative disc disease M51.36 722.52   3. Neck pain M54.2 723.1   4. Spinal stenosis, lumbar region, with neurogenic claudication M48.062 724.03   5. Cervicalgia M54.2 723.1   6. Cervical radiculopathy M54.12 723.4       Patient Active Problem List   Diagnosis   • Battery end of life of spinal cord stimulator   • BMI 40.0-44.9, adult (CMS/[facility])   • Non-smoker   • Cervicalgia   • Cervical radiculopathy   • Carpal tunnel syndrome of right wrist   • Spinal stenosis, lumbar region, with neurogenic claudication   • BMI 39.0-39.9,adult   • Malfunction of spinal cord stimulator (CMS/[facility])        Past Medical History:   Diagnosis Date   • Acute bronchitis    • Amnesia    • Anxiety    • Arthritis    • Asthma    • Back pain    • Chest pain    • CKD (chronic kidney disease)    • Concussion    • Contusion of chest wall    • COPD (chronic obstructive pulmonary disease) (CMS/[facility])    • Degeneration of intervertebral disc of lumbar region    • Fall    • Gait disturbance    • Gastro-esophageal reflux    • Headache    • Hearing impaired    • Hypersomnia    • Hypertension    • Hypotestosteronemia    • Insomnia    • Leg cramp    • Lumbar stenosis    • Obesity    • Obesity    • KATLYN (obstructive sleep apnea)    • PTSD (post-traumatic stress disorder)    • Radiculopathy of lumbosacral region    • Sinusitis    • Testicular hypofunction    • Tremor         Past Surgical History:   Procedure Laterality Date   • BACK SURGERY      L3-4 fusion   • BACK SURGERY      L3-S1 fusion   • CARPAL TUNNEL RELEASE Bilateral    • CHOLECYSTECTOMY     • HERNIA REPAIR     • KNEE SURGERY     • SPINAL CORD STIMULATOR  REMOVAL N/A 5/2/2018    Procedure: SPINAL CORD STIMULATOR REMOVAL Removal of generator and placment of new generator;  Surgeon: Miguel Hall MD;  Location: [facility];  Service: Neurosurgery   • THORACIC LAMINECTOMY WITH PLACEMENT OF DORSAL COLUMN STIMULATOR  2010                             PT Assessment/Plan     Row Name 07/09/18 1600 07/09/18 1535       PT Assessment    Functional Limitations Impaired gait;Impaired locomotion;Limitation in home management;Performance in leisure activities;Limitations in functional capacity and performance  -TB  --    Impairments Balance;Gait;Pain;Muscle strength;Motor function;Joint mobility;Impaired muscle length;Range of motion;Posture;Impaired flexibility  -TB  --    Assessment Comments His right hip and SI were quite flared and guarded after his deep squat on the boat. After today, he was able to stand more erect walking out than he could before. He was able to walk well before this flare. Hopefully getting his DCS changed out will help as well.    -TB He likely sprained his right SI/hip when he squatted so low so he is really sore now. Today, we focused more on pain relief.  He is getting the DCS replaced next week.   -TB    Rehab Potential Good  -TB  --    Patient/caregiver participated in establishment of treatment plan and goals Yes  -TB  --    Patient would benefit from skilled therapy intervention Yes  -TB  --       PT Plan    PT Frequency 1x/week;2x/week  -TB  --    Predicted Duration of Therapy Intervention (Therapy Eval) 1 month  -TB  --    Planned CPT's? PT THER PROC EA 15 MIN: 46089;PT THER ACT EA 15 MIN: 75771;PT MANUAL THERAPY EA 15 MIN: 40806;PT ELECTRICAL STIM UNATTEND: ;PT ELECTRICAL STIM ATTD EA 15 MIN: 31597;PT ULTRASOUND EA 15 MIN: 36376  -TB  --    PT Plan Comments Progress to more hip stability and flexibility as he is able.   -TB Resume mobility and hip stability.   -TB      User Key  (r) = Recorded By, (t) = Taken By, (c) = Cosigned By     Initials Name Provider Type    TB Juarez Hartmann, PT Physical Therapist                    Exercises     Row Name 07/09/18 1535             Subjective Comments    Subjective Comments His feet were so numb, he couldn't feel the blisters in his feet. He was on a dive boat and was trying to get off it. He was told to jump off and he couldn't do that and then told to sit and slide off. He squatted 1/2 down. The , pulled his hand off the rail and it pulled on his right knee. His right hip was hurting all night after that. Today's the first day, he's not taken muscle relaxers. This was last Monday July 2nd.   -TB         Subjective Pain    Pre-Treatment Pain Level 7  -TB         Total Minutes    14143 - PT Manual Therapy Minutes 55  -TB        User Key  (r) = Recorded By, (t) = Taken By, (c) = Cosigned By    Initials Name Provider Type    TB Juarez Hartmann PT Physical Therapist                        Manual Rx (last 36 hours)      Manual Treatments     Row Name 07/09/18 1535             Manual Rx 1    Manual Rx 1 Location left sidelying right glute/lumbar  -TB      Manual Rx 1 Type STM  -TB      Manual Rx 1 Duration 25  -TB         Manual Rx 2    Manual Rx 2 Location hooklying right hip flexor/pelvic floor  -TB      Manual Rx 2 Type STM/release  -TB      Manual Rx 2 Duration 20  -TB         Manual Rx 3    Manual Rx 3 Location hooklying  -TB      Manual Rx 3 Type right leg LAD  -TB      Manual Rx 3 Grade sustained  -TB      Manual Rx 3 Duration 5  -TB         Manual Rx 4    Manual Rx 4 Location supine right hip flexion/ER stretch  -TB      Manual Rx 4 Type gentle sustained  -TB      Manual Rx 4 Duration 5  -TB        User Key  (r) = Recorded By, (t) = Taken By, (c) = Cosigned By    Initials Name Provider Type    TB Juarez Hartmann PT Physical Therapist                PT OP Goals     Row Name 07/09/18 1535          PT Short Term Goals    STG Date to Achieve 01/27/17  -TB     STG 1 Relax muscle guarding on oliver  hip flexors  -TB     STG 1 Progress Ongoing  -TB     STG 1 Progress Comments more guarded today after doing the deep squat  -TB     STG 2 Improve mobility of thoracic into extension  -TB     STG 2 Progress Ongoing  -TB     STG 2 Progress Comments we've back off on this as it was irritating his upper lumbar  -TB     STG 3 Improve right hip IR to 30 deg  -TB     STG 3 Progress Ongoing  -TB     STG 3 Progress Comments 25 deg oliver  -TB        Long Term Goals    LTG Date to Achieve 02/27/17  -TB     LTG 1 Able to walk community distances without an assistive device or only a cane without severe flare of pain  -TB     LTG 1 Progress Ongoing  -TB     LTG 1 Progress Comments he hasn't been able to walk due to pain  -TB     LTG 2 Improve hip passive extension to 10 degrees  -TB     LTG 2 Progress Ongoing  -TB     LTG 2 Progress Comments to 0 deg  -TB     LTG 3 Improved core contraction held during functional tasks.  -TB     LTG 3 Progress Met  -TB     LTG 4 Independent with HEP for flexibility and stability.   -TB     LTG 4 Progress Ongoing  -TB     LTG 4 Progress Comments pain too high recently to do this  -TB     LTG 5 Reports no difficulty with voiding bladder or BM's  -TB     LTG 5 Progress Ongoing  -TB     LTG 5 Progress Comments this has not been good for him, even prior to his flare on the boat  -TB        Time Calculation    PT Goal Re-Cert Due Date 08/08/18  -TB       User Key  (r) = Recorded By, (t) = Taken By, (c) = Cosigned By    Initials Name Provider Type    TB Juarez Hartmann, PT Physical Therapist          Therapy Education  Given: HEP, Symptoms/condition management  Program: Reinforced  How Provided: Verbal  Provided to: Patient  Level of Understanding: Verbalized              Time Calculation:   Start Time: 1535  Stop Time: 1630  Time Calculation (min): 55 min  Total Timed Code Minutes- PT: 55 minute(s)  Therapy Suggested Charges     Code   Minutes Charges    37467 (CPT®) Hc Pt Neuromusc Re Education Ea 15  Min      31147 (CPT®) Hc Pt Ther Proc Ea 15 Min      82821 (CPT®) Hc Gait Training Ea 15 Min      67512 (CPT®) Hc Pt Therapeutic Act Ea 15 Min      10829 (CPT®) Hc Pt Manual Therapy Ea 15 Min 55 4    69795 (CPT®) Hc Pt Ther Massage- Per 15 Min      02193 (CPT®) Hc Pt Iontophoresis Ea 15 Min      99245 (CPT®) Hc Pt Elec Stim Ea-Per 15 Min      78047 (CPT®) Hc Pt Ultrasound Ea 15 Min      27500 (CPT®) Hc Pt Self Care/Mgmt/Train Ea 15 Min      Total  55 4        Therapy Charges for Today     Code Description Service Date Service Provider Modifiers Qty    [id number] HC PT MANUAL THERAPY EA 15 MIN 7/9/2018 Juarez Hartmann, PT GP 4                    Juarez Hartmann, PT  7/9/2018

## 2018-07-12 ENCOUNTER — TRANSCRIBE ORDERS (OUTPATIENT)
Dept: ADMINISTRATIVE | Facility: HOSPITAL | Age: 63
End: 2018-07-12

## 2018-07-12 ENCOUNTER — HOSPITAL ENCOUNTER (OUTPATIENT)
Dept: CARDIOLOGY | Facility: HOSPITAL | Age: 63
Discharge: HOME | End: 2018-07-12
Attending: FAMILY MEDICINE

## 2018-07-12 ENCOUNTER — APPOINTMENT (OUTPATIENT)
Dept: PREADMISSION TESTING | Facility: HOSPITAL | Age: 63
End: 2018-07-12

## 2018-07-12 VITALS
RESPIRATION RATE: 16 BRPM | WEIGHT: 315 LBS | HEART RATE: 62 BPM | SYSTOLIC BLOOD PRESSURE: 137 MMHG | OXYGEN SATURATION: 94 % | BODY MASS INDEX: 39.17 KG/M2 | HEIGHT: 75 IN | DIASTOLIC BLOOD PRESSURE: 71 MMHG

## 2018-07-12 VITALS — DIASTOLIC BLOOD PRESSURE: 62 MMHG | HEART RATE: 56 BPM | SYSTOLIC BLOOD PRESSURE: 130 MMHG

## 2018-07-12 DIAGNOSIS — Z45.42 BATTERY END OF LIFE OF SPINAL CORD STIMULATOR: ICD-10-CM

## 2018-07-12 DIAGNOSIS — T85.192D MALFUNCTION OF SPINAL CORD STIMULATOR, SUBSEQUENT ENCOUNTER: ICD-10-CM

## 2018-07-12 DIAGNOSIS — R79.82 ELEVATED C-REACTIVE PROTEIN (CRP): ICD-10-CM

## 2018-07-12 DIAGNOSIS — R07.9 CHEST PAIN, UNSPECIFIED TYPE: Primary | ICD-10-CM

## 2018-07-12 LAB
ALBUMIN SERPL-MCNC: 4.4 G/DL (ref 3.5–5)
ALBUMIN/GLOB SERPL: 1.8 G/DL (ref 1.1–2.5)
ALP SERPL-CCNC: 84 U/L (ref 24–120)
ALT SERPL W P-5'-P-CCNC: 55 U/L (ref 0–54)
ANION GAP SERPL CALCULATED.3IONS-SCNC: 11 MMOL/L (ref 4–13)
AST SERPL-CCNC: 50 U/L (ref 7–45)
BILIRUB SERPL-MCNC: 0.6 MG/DL (ref 0.1–1)
BILIRUB UR QL STRIP: NEGATIVE
BUN BLD-MCNC: 19 MG/DL (ref 5–21)
BUN/CREAT SERPL: 15.4 (ref 7–25)
CALCIUM SPEC-SCNC: 9.5 MG/DL (ref 8.4–10.4)
CHLORIDE SERPL-SCNC: 98 MMOL/L (ref 98–110)
CLARITY UR: CLEAR
CO2 SERPL-SCNC: 30 MMOL/L (ref 24–31)
COLOR UR: YELLOW
CREAT BLD-MCNC: 1.23 MG/DL (ref 0.5–1.4)
DEPRECATED RDW RBC AUTO: 41.5 FL (ref 40–54)
ERYTHROCYTE [DISTWIDTH] IN BLOOD BY AUTOMATED COUNT: 12.9 % (ref 12–15)
GFR SERPL CREATININE-BSD FRML MDRD: 60 ML/MIN/1.73
GLOBULIN UR ELPH-MCNC: 2.5 GM/DL
GLUCOSE BLD-MCNC: 110 MG/DL (ref 70–100)
GLUCOSE UR STRIP-MCNC: NEGATIVE MG/DL
HCT VFR BLD AUTO: 46.3 % (ref 40–52)
HGB BLD-MCNC: 15.4 G/DL (ref 14–18)
HGB UR QL STRIP.AUTO: NEGATIVE
KETONES UR QL STRIP: NEGATIVE
LEUKOCYTE ESTERASE UR QL STRIP.AUTO: NEGATIVE
MCH RBC QN AUTO: 29.6 PG (ref 28–32)
MCHC RBC AUTO-ENTMCNC: 33.3 G/DL (ref 33–36)
MCV RBC AUTO: 89 FL (ref 82–95)
NITRITE UR QL STRIP: NEGATIVE
PH UR STRIP.AUTO: <=5 [PH] (ref 5–8)
PLATELET # BLD AUTO: 142 10*3/MM3 (ref 130–400)
PMV BLD AUTO: 9.5 FL (ref 6–12)
POTASSIUM BLD-SCNC: 4.2 MMOL/L (ref 3.5–5.3)
PROT SERPL-MCNC: 6.9 G/DL (ref 6.3–8.7)
PROT UR QL STRIP: NEGATIVE
RBC # BLD AUTO: 5.2 10*6/MM3 (ref 4.8–5.9)
SODIUM BLD-SCNC: 139 MMOL/L (ref 135–145)
SP GR UR STRIP: 1.02 (ref 1–1.03)
UROBILINOGEN UR QL STRIP: NORMAL
WBC NRBC COR # BLD: 3.47 10*3/MM3 (ref 4.8–10.8)

## 2018-07-12 PROCEDURE — 80053 COMPREHEN METABOLIC PANEL: CPT | Performed by: NEUROLOGICAL SURGERY

## 2018-07-12 PROCEDURE — 36415 COLL VENOUS BLD VENIPUNCTURE: CPT

## 2018-07-12 PROCEDURE — 93017 CV STRESS TEST TRACING ONLY: CPT

## 2018-07-12 PROCEDURE — A9500 TC99M SESTAMIBI: HCPCS | Performed by: FAMILY MEDICINE

## 2018-07-12 PROCEDURE — 78452 HT MUSCLE IMAGE SPECT MULT: CPT | Performed by: INTERNAL MEDICINE

## 2018-07-12 PROCEDURE — 81003 URINALYSIS AUTO W/O SCOPE: CPT | Performed by: NEUROLOGICAL SURGERY

## 2018-07-12 PROCEDURE — 93010 ELECTROCARDIOGRAM REPORT: CPT | Performed by: INTERNAL MEDICINE

## 2018-07-12 PROCEDURE — 93018 CV STRESS TEST I&R ONLY: CPT | Performed by: INTERNAL MEDICINE

## 2018-07-12 PROCEDURE — 0 TECHNETIUM SESTAMIBI: Performed by: FAMILY MEDICINE

## 2018-07-12 PROCEDURE — 85027 COMPLETE CBC AUTOMATED: CPT | Performed by: NEUROLOGICAL SURGERY

## 2018-07-12 PROCEDURE — 78452 HT MUSCLE IMAGE SPECT MULT: CPT

## 2018-07-12 PROCEDURE — 93005 ELECTROCARDIOGRAM TRACING: CPT

## 2018-07-12 RX ADMIN — TECHNETIUM TC 99M SESTAMIBI 1 DOSE: 1 INJECTION INTRAVENOUS at 10:25

## 2018-07-12 NOTE — DISCHARGE INSTRUCTIONS
DAY OF SURGERY INSTRUCTIONS        YOUR SURGEON: ***RASHIDA LINCOLN     PROCEDURE: ***SPINAL CORD STIMULATOR REMOVAL AND REPLACEMENT    DATE OF SURGERY: ***JULY 18,2018    ARRIVAL TIME: AS DIRECTED BY OFFICE    DAY OF SURGERY TAKE ONLY THESE MEDICATIONS UNLESS OTHERWISE INSTRUCTED BY YOUR PHYSICIAN: ***NONE        MANAGING PAIN AFTER SURGERY    We know you are probably wondering what your pain will be like after surgery.  Following surgery it is unrealistic to expect you will not have pain.   Pain is how our bodies let us know that something is wrong or cautions us to be careful.  That said, our goal is to make your pain tolerable.    Methods we may use to treat your pain include (oral or IV medications, PCAs, epidurals, nerve blocks, etc.)   While some procedures require IV pain medications for a short time after surgery, transitioning to pain medications by mouth allows for better management of pain.   Your nurse will encourage you to take oral pain medications whenever possible.  IV medications work almost immediately, but only last a short while.  Taking medications by mouth allows for a more constant level of medication in your blood stream for a longer period of time.      Once your pain is out of control it is harder to get back under control.  It is important you are aware when your next dose of pain medication is due.  If you are admitted, your nurse may write the time of your next dose on the white board in your room to help you remember.      We are interested in your pain and encourage you to inform us about aggravating factors during your visit.   Many times a simple repositioning every few hours can make a big difference.    If your physician says it is okay, do not let your pain prevent you from getting out of bed. Be sure to call your nurse for assistance prior to getting up so you do not fall.      Before surgery, please decide your tolerable pain goal.  These faces help describe the pain ratings we  use on a 0-10 scale.   Be prepared to tell us your goal and whether or not you take pain or anxiety medications at home.          BEFORE YOU COME TO THE HOSPITAL  (Pre-op instructions)  • Do not eat, drink, smoke or chew gum after midnight the night before surgery.  This also includes no mints.  • Morning of surgery take only the medicines you have been instructed with a sip of water unless otherwise instructed  by your physician.  • Do not shave, wear makeup or dark nail polish.  • Remove all jewelry including rings.  • Leave anything you consider valuable at home.  • Leave your suitcase in the car until after your surgery.  • Bring the following with you if applicable:  o Picture ID and insurance, Medicare or Medicaid cards  o Co-pay/deductible required by insurance (cash, check, credit card)  o Copy of advance directive, living will or power-of- documents if not brought to PAT  o CPAP or BIPAP mask and tubing  o Relaxation aids (MP3 player, book, magazine)  • On the day of surgery check in at registration located at the main entrance of the hospital.       Outpatient Surgery Guidelines, Adult  Outpatient procedures are those for which the person having the procedure is allowed to go home the same day as the procedure. Various procedures are done on an outpatient basis. You should follow some general guidelines if you will be having an outpatient procedure.  LET YOUR HEALTH CARE PROVIDER KNOW ABOUT:  · Any allergies you have.  · All medicines you are taking, including vitamins, herbs, eye drops, creams, and over-the-counter medicines.  · Previous problems you or members of your family have had with the use of anesthetics.  · Any blood disorders you have.  · Previous surgeries you have had.  · Medical conditions you have.  RISKS AND COMPLICATIONS  Your health care provider will discuss possible risks and complications with you before surgery. Common risks and complications include:    · Problems due to the  use of anesthetics.  · Blood loss and replacement (does not apply to minor surgical procedures).  · Temporary increase in pain due to surgery.  · Uncorrected pain or problems that the surgery was meant to correct.  · Infection.  · New damage.  BEFORE THE PROCEDURE  · Ask your health care provider about changing or stopping your regular medicines. You may need to stop taking certain medicines in the days or weeks before the procedure.  · Stop smoking at least 2 weeks before surgery. This lowers your risk for complications during and after surgery. Ask your health care provider for help with this if needed.  · Eat your usual meals and a light supper the day before surgery. Continue fluid intake. Do not drink alcohol.  · Do not eat or drink after midnight the night before your surgery.   · Arrange for someone to take you home and to stay with you for 24 hours after the procedure. Medicine given for your procedure may affect your ability to drive or to care for yourself.  · Call your health care provider's office if you develop an illness or problem that may prevent you from safely having your procedure.  AFTER THE PROCEDURE  After surgery, you will be taken to a recovery area, where your progress will be monitored. If there are no complications, you will be allowed to go home when you are awake, stable, and taking fluids well. You may have numbness around the surgical site. Healing will take some time. You will have tenderness at the surgical site and may have some swelling and bruising. You may also have some nausea.  HOME CARE INSTRUCTIONS  · Do not drive for 24 hours, or as directed by your health care provider. Do not drive while taking prescription pain medicines.  · Do not drink alcohol for 24 hours.  · Do not make important decisions or sign legal documents for 24 hours.  · You may resume a normal diet and activities as directed.  · Do not lift anything heavier than 10 pounds (4.5 kg) or play contact sports  until your health care provider says it is okay.  · Change your bandages (dressings) as directed.  · Only take over-the-counter or prescription medicines as directed by your health care provider.  · Follow up with your health care provider as directed.  SEEK MEDICAL CARE IF:  · You have increased bleeding (more than a small spot) from the surgical site.  · You have redness, swelling, or increasing pain in the wound.  · You see pus coming from the wound.  · You have a fever.  · You notice a bad smell coming from the wound or dressing.  · You feel lightheaded or faint.  · You develop a rash.  · You have trouble breathing.  · You develop allergies.  MAKE SURE YOU:  · Understand these instructions.  · Will watch your condition.  · Will get help right away if you are not doing well or get worse.     This information is not intended to replace advice given to you by your health care provider. Make sure you discuss any questions you have with your health care provider.     Document Released: 09/12/2002 Document Revised: 05/03/2016 Document Reviewed: 05/22/2014  [employer] Interactive Patient Education ©2016 [employer]       Fall Prevention in Hospitals, Adult  As a hospital patient, your condition and the treatments you receive can increase your risk for falls. Some additional risk factors for falls in a hospital include:  · Being in an unfamiliar environment.  · Being on bed rest.  · Your surgery.  · Taking certain medicines.  · Your tubing requirements, such as intravenous (IV) therapy or catheters.  It is important that you learn how to decrease fall risks while at the hospital. Below are important tips that can help prevent falls.  SAFETY TIPS FOR PREVENTING FALLS  Talk about your risk of falling.  · Ask your health care provider why you are at risk for falling. Is it your medicine, illness, tubing placement, or something else?  · Make a plan with your health care provider to keep you safe from falls.  · Ask your  health care provider or pharmacist about side effects of your medicines. Some medicines can make you dizzy or affect your coordination.  Ask for help.  · Ask for help before getting out of bed. You may need to press your call button.  · Ask for assistance in getting safely to the toilet.  · Ask for a walker or cane to be put at your bedside. Ask that most of the side rails on your bed be placed up before your health care provider leaves the room.  · Ask family or friends to sit with you.  · Ask for things that are out of your reach, such as your glasses, hearing aids, telephone, bedside table, or call button.  Follow these tips to avoid falling:  · Stay lying or seated, rather than standing, while waiting for help.  · Wear rubber-soled slippers or shoes whenever you walk in the hospital.  · Avoid quick, sudden movements.  ¨ Change positions slowly.  ¨ Sit on the side of your bed before standing.  ¨ Stand up slowly and wait before you start to walk.  · Let your health care provider know if there is a spill on the floor.  · Pay careful attention to the medical equipment, electrical cords, and tubes around you.  · When you need help, use your call button by your bed or in the bathroom. Wait for one of your health care providers to help you.  · If you feel dizzy or unsure of your footing, return to bed and wait for assistance.  · Avoid being distracted by the TV, telephone, or another person in your room.  · Do not lean or support yourself on rolling objects, such as IV poles or bedside tables.     This information is not intended to replace advice given to you by your health care provider. Make sure you discuss any questions you have with your health care provider.     Document Released: 12/15/2001 Document Revised: 01/08/2016 Document Reviewed: 08/25/2013  [employer] Interactive Patient Education ©2016 [employer]       Surgical Site Infections FAQs  What is a Surgical Site Infection (SSI)?  A surgical site infection  is an infection that occurs after surgery in the part of the body where the surgery took place. Most patients who have surgery do not develop an infection. However, infections develop in about 1 to 3 out of every 100 patients who have surgery.  Some of the common symptoms of a surgical site infection are:  · Redness and pain around the area where you had surgery  · Drainage of cloudy fluid from your surgical wound  · Fever  Can SSIs be treated?  Yes. Most surgical site infections can be treated with antibiotics. The antibiotic given to you depends on the bacteria (germs) causing the infection. Sometimes patients with SSIs also need another surgery to treat the infection.  What are some of the things that hospitals are doing to prevent SSIs?  To prevent SSIs, doctors, nurses, and other healthcare providers:  · Clean their hands and arms up to their elbows with an antiseptic agent just before the surgery.  · Clean their hands with soap and water or an alcohol-based hand rub before and after caring for each patient.  · May remove some of your hair immediately before your surgery using electric clippers if the hair is in the same area where the procedure will occur. They should not shave you with a razor.  · Wear special hair covers, masks, gowns, and gloves during surgery to keep the surgery area clean.  · Give you antibiotics before your surgery starts. In most cases, you should get antibiotics within 60 minutes before the surgery starts and the antibiotics should be stopped within 24 hours after surgery.  · Clean the skin at the site of your surgery with a special soap that kills germs.  What can I do to help prevent SSIs?  Before your surgery:  · Tell your doctor about other medical problems you may have. Health problems such as allergies, diabetes, and obesity could affect your surgery and your treatment.  · Quit smoking. Patients who smoke get more infections. Talk to your doctor about how you can quit before your  surgery.  · Do not shave near where you will have surgery. Shaving with a razor can irritate your skin and make it easier to develop an infection.  At the time of your surgery:  · Speak up if someone tries to shave you with a razor before surgery. Ask why you need to be shaved and talk with your surgeon if you have any concerns.  · Ask if you will get antibiotics before surgery.  After your surgery:  · Make sure that your healthcare providers clean their hands before examining you, either with soap and water or an alcohol-based hand rub.  · If you do not see your providers clean their hands, please ask them to do so.  · Family and friends who visit you should not touch the surgical wound or dressings.  · Family and friends should clean their hands with soap and water or an alcohol-based hand rub before and after visiting you. If you do not see them clean their hands, ask them to clean their hands.  What do I need to do when I go home from the hospital?  · Before you go home, your doctor or nurse should explain everything you need to know about taking care of your wound. Make sure you understand how to care for your wound before you leave the hospital.  · Always clean your hands before and after caring for your wound.  · Before you go home, make sure you know who to contact if you have questions or problems after you get home.  · If you have any symptoms of an infection, such as redness and pain at the surgery site, drainage, or fever, call your doctor immediately.  If you have additional questions, please ask your doctor or nurse.  Developed and co-sponsored by The Society for Healthcare Epidemiology of [place] (SHEA); Infectious Diseases Society of [place] (IDSA); American Hospital Association; Association for Professionals in Infection Control and Epidemiology (APIC); Centers for Disease Control and Prevention (CDC); and The Joint Commission.     This information is not intended to replace advice given to you by  your health care provider. Make sure you discuss any questions you have with your health care provider.     Document Released: 12/23/2014 Document Revised: 01/08/2016 Document Reviewed: 03/02/2016  [employer] Interactive Patient Education ©2016 [employer]       [facility]  CHG 4% Patient Instruction Sheet    Preparing the Skin Before Surgery  Preparing or “prepping” skin before surgery can reduce the risk of infection at the surgical site. To make the process easier,[facility] has chosen 4% Chlorhexidine Gluconate (CHG) antiseptic solution.   The steps below outline the prepping process and should be carefully followed.                                                                                                                                                      Prep the skin at the following time(s):                                                      We recommend you shower the night before surgery, and again the morning of surgery with the 4% CHG antiseptic solution using half of the bottle and a cloth each time.  Dress in clean clothes/sleepwear after showering.  See instructions below for application.          Do not apply any lotions or moisturizers.       Do not shave the area to be prepped for at least 2 days prior to surgery.    Clipping the hair may be done immediately prior to your surgery at the hospital    if needed.    Directions:  Thoroughly rinse your body with water.  Apply 4% CHG to a cloth and wash skin gently, paying special attention to the operative site.  Rinse again thoroughly.  Once you have begun using this product do not apply anything else to your skin. If itching or redness persists, rinse affected areas and discontinue use.    When using this product:  • Keep out of eyes, ears, and mouth.  • If solution should contact these areas, rinse out promptly and thoroughly with water.  • For external use only.  • Do not use in genital area, on your face or  head.      PATIENT/FAMILY/RESPONSIBLE PARTY VERBALIZES UNDERSTANDING OF ABOVE EDUCATION.  COPY OF PAIN SCALE GIVEN AND REVIEWED WITH VERBALIZED UNDERSTANDING.

## 2018-07-13 ENCOUNTER — HOSPITAL ENCOUNTER (OUTPATIENT)
Dept: CARDIOLOGY | Facility: HOSPITAL | Age: 63
Discharge: HOME | End: 2018-07-13
Attending: FAMILY MEDICINE

## 2018-07-13 LAB
BH CV NUCLEAR PRIOR STUDY: 3
BH CV STRESS BP STAGE 1: NORMAL
BH CV STRESS COMMENTS STAGE 1: NORMAL
BH CV STRESS DOSE REGADENOSON STAGE 1: 0.4
BH CV STRESS DURATION MIN STAGE 1: 0
BH CV STRESS DURATION SEC STAGE 1: 10
BH CV STRESS HR STAGE 1: 74
BH CV STRESS PROTOCOL 1: NORMAL
BH CV STRESS RECOVERY BP: NORMAL MMHG
BH CV STRESS RECOVERY HR: 58 BPM
BH CV STRESS STAGE 1: 1
LV EF NUC BP: 62 %
MAXIMAL PREDICTED HEART RATE: 159 BPM
PERCENT MAX PREDICTED HR: 46.54 %
STRESS BASELINE BP: NORMAL MMHG
STRESS BASELINE HR: 56 BPM
STRESS PERCENT HR: 55 %
STRESS POST EXERCISE DUR MIN: 0 MIN
STRESS POST EXERCISE DUR SEC: 10 SEC
STRESS POST PEAK BP: NORMAL MMHG
STRESS POST PEAK HR: 74 BPM
STRESS TARGET HR: 135 BPM

## 2018-07-13 PROCEDURE — 78452 HT MUSCLE IMAGE SPECT MULT: CPT

## 2018-07-13 PROCEDURE — 0 TECHNETIUM SESTAMIBI: Performed by: FAMILY MEDICINE

## 2018-07-13 PROCEDURE — A9500 TC99M SESTAMIBI: HCPCS | Performed by: FAMILY MEDICINE

## 2018-07-13 PROCEDURE — 93017 CV STRESS TEST TRACING ONLY: CPT

## 2018-07-13 RX ADMIN — TECHNETIUM TC 99M SESTAMIBI 1 DOSE: 1 INJECTION INTRAVENOUS at 12:01

## 2018-07-16 ENCOUNTER — HOSPITAL ENCOUNTER (OUTPATIENT)
Dept: PHYSICAL THERAPY | Facility: HOSPITAL | Age: 63
Setting detail: THERAPIES SERIES
Discharge: HOME | End: 2018-07-16

## 2018-07-16 DIAGNOSIS — M54.2 CERVICALGIA: ICD-10-CM

## 2018-07-16 DIAGNOSIS — G89.29 CHRONIC BILATERAL LOW BACK PAIN WITH BILATERAL SCIATICA: Primary | ICD-10-CM

## 2018-07-16 DIAGNOSIS — M48.062 SPINAL STENOSIS, LUMBAR REGION, WITH NEUROGENIC CLAUDICATION: ICD-10-CM

## 2018-07-16 DIAGNOSIS — M54.12 CERVICAL RADICULOPATHY: ICD-10-CM

## 2018-07-16 DIAGNOSIS — M54.2 NECK PAIN: ICD-10-CM

## 2018-07-16 DIAGNOSIS — G56.01 CARPAL TUNNEL SYNDROME OF RIGHT WRIST: ICD-10-CM

## 2018-07-16 DIAGNOSIS — M54.41 CHRONIC BILATERAL LOW BACK PAIN WITH BILATERAL SCIATICA: Primary | ICD-10-CM

## 2018-07-16 DIAGNOSIS — M54.42 CHRONIC BILATERAL LOW BACK PAIN WITH BILATERAL SCIATICA: Primary | ICD-10-CM

## 2018-07-16 DIAGNOSIS — M51.36 LUMBAR DEGENERATIVE DISC DISEASE: ICD-10-CM

## 2018-07-16 PROCEDURE — 97140 MANUAL THERAPY 1/> REGIONS: CPT | Performed by: PHYSICAL THERAPIST

## 2018-07-17 NOTE — THERAPY TREATMENT NOTE
Outpatient Physical Therapy Ortho Treatment Note  [facility]     Patient Name: Carlos Hayes  : 1955  MRN: [id number]  Today's Date: 2018      Visit Date: 2018    Visit Dx:    ICD-10-CM ICD-9-CM   1. Chronic bilateral low back pain with bilateral sciatica M54.42 724.2    M54.41 724.3    G89.29 338.29   2. Lumbar degenerative disc disease M51.36 722.52   3. Neck pain M54.2 723.1   4. Spinal stenosis, lumbar region, with neurogenic claudication M48.062 724.03   5. Cervicalgia M54.2 723.1   6. Cervical radiculopathy M54.12 723.4   7. Carpal tunnel syndrome of right wrist G56.01 354.0       Patient Active Problem List   Diagnosis   • Battery end of life of spinal cord stimulator   • BMI 40.0-44.9, adult (CMS/[facility])   • Non-smoker   • Cervicalgia   • Cervical radiculopathy   • Carpal tunnel syndrome of right wrist   • Spinal stenosis, lumbar region, with neurogenic claudication   • BMI 39.0-39.9,adult   • Malfunction of spinal cord stimulator (CMS/[facility])        Past Medical History:   Diagnosis Date   • Acute bronchitis    • Amnesia    • Anxiety    • Arthritis    • Asthma    • Back pain    • Chest pain    • CKD (chronic kidney disease)    • Concussion    • Contusion of chest wall    • COPD (chronic obstructive pulmonary disease) (CMS/[facility])    • Degeneration of intervertebral disc of lumbar region    • Fall    • Gait disturbance    • Gastro-esophageal reflux    • Headache    • Hearing impaired    • Hypersomnia    • Hypertension    • Hypotestosteronemia    • Insomnia    • Leg cramp    • Lumbar stenosis    • Obesity    • Obesity    • KATLYN (obstructive sleep apnea)    • PTSD (post-traumatic stress disorder)    • Radiculopathy of lumbosacral region    • Sinusitis    • Testicular hypofunction    • Tremor         Past Surgical History:   Procedure Laterality Date   • BACK SURGERY      L3-4 fusion   • BACK SURGERY      L3-S1 fusion   • CARPAL TUNNEL RELEASE Bilateral    • CHOLECYSTECTOMY     • FRACTURE  SURGERY      LEFT HAND   • HERNIA REPAIR     • KNEE SURGERY     • SPINAL CORD STIMULATOR REMOVAL N/A 5/2/2018    Procedure: SPINAL CORD STIMULATOR REMOVAL Removal of generator and placment of new generator;  Surgeon: Miguel Hall MD;  Location: [facility];  Service: Neurosurgery   • THORACIC LAMINECTOMY WITH PLACEMENT OF DORSAL COLUMN STIMULATOR  2010                             PT Assessment/Plan     Row Name 07/16/18 1600          PT Assessment    Assessment Comments He was flared but he responded well to the distraction today. He was concerned about his shoulder. Distraction here helped as well.   -TB        PT Plan    PT Plan Comments Assess need for continued PT on next visit.   -TB       User Key  (r) = Recorded By, (t) = Taken By, (c) = Cosigned By    Initials Name Provider Type    SILVA Hartmann, PT Physical Therapist                    Exercises     Row Name 07/16/18 1544 07/16/18 1540          Subjective Comments    Subjective Comments He says he's hurting everywhere today. His right hip and shoulder are really bothering him. He hasn't slept the last 2 nights. He's going to ask his MD about inflammation.   -TB  --        Subjective Pain    Pre-Treatment Pain Level 7  -TB  --        Total Minutes    81831 - PT Manual Therapy Minutes  -- 50  -TB       User Key  (r) = Recorded By, (t) = Taken By, (c) = Cosigned By    Initials Name Provider Type    SILVA Hartmann, PT Physical Therapist                        Manual Rx (last 36 hours)      Manual Treatments     Row Name 07/16/18 1540             Manual Rx 1    Manual Rx 1 Location left sidelying right glute/ITB  -TB      Manual Rx 1 Type foam roll   -TB      Manual Rx 1 Grade beginning gentle and adding pressure as tolerated  -TB      Manual Rx 1 Duration 30  -TB         Manual Rx 2    Manual Rx 2 Location hooklying right hip   -TB      Manual Rx 2 Type LAD  -TB      Manual Rx 2 Grade low load sustained  -TB      Manual Rx 2 Duration 10  -TB         User Key  (r) = Recorded By, (t) = Taken By, (c) = Cosigned By    Initials Name Provider Type    TB Juarez Hartmann, PT Physical Therapist              Therapy Education  Education Details: seated right shoulder distraction  Given: HEP, Symptoms/condition management  Program: Modified  How Provided: Verbal  Provided to: Patient  Level of Understanding: Verbalized              Time Calculation:   Start Time: 1540  Stop Time: 1630  Time Calculation (min): 50 min  Therapy Suggested Charges     Code   Minutes Charges    56740 (CPT®) Hc Pt Neuromusc Re Education Ea 15 Min      87164 (CPT®) Hc Pt Ther Proc Ea 15 Min      68170 (CPT®) Hc Gait Training Ea 15 Min      15890 (CPT®) Hc Pt Therapeutic Act Ea 15 Min      82810 (CPT®) Hc Pt Manual Therapy Ea 15 Min 50 3    28662 (CPT®) Hc Pt Ther Massage- Per 15 Min      72120 (CPT®) Hc Pt Iontophoresis Ea 15 Min      17913 (CPT®) Hc Pt Elec Stim Ea-Per 15 Min      39063 (CPT®) Hc Pt Ultrasound Ea 15 Min      44754 (CPT®) Hc Pt Self Care/Mgmt/Train Ea 15 Min      Total  50 3        Therapy Charges for Today     Code Description Service Date Service Provider Modifiers Qty    [id number] HC PT MANUAL THERAPY EA 15 MIN 7/16/2018 Juarez Hartmann, PT GP 3                    Juarez Hartmann, PT  7/17/2018

## 2018-07-18 ENCOUNTER — HOSPITAL ENCOUNTER (OUTPATIENT)
Facility: HOSPITAL | Age: 63
Setting detail: SURGERY ADMIT
Discharge: HOME | End: 2018-07-18
Attending: NEUROLOGICAL SURGERY | Admitting: NEUROLOGICAL SURGERY

## 2018-07-18 ENCOUNTER — ANESTHESIA EVENT (OUTPATIENT)
Dept: PERIOP | Facility: HOSPITAL | Age: 63
End: 2018-07-18

## 2018-07-18 ENCOUNTER — ANESTHESIA (OUTPATIENT)
Dept: PERIOP | Facility: HOSPITAL | Age: 63
End: 2018-07-18

## 2018-07-18 ENCOUNTER — APPOINTMENT (OUTPATIENT)
Dept: GENERAL RADIOLOGY | Facility: HOSPITAL | Age: 63
End: 2018-07-18

## 2018-07-18 VITALS
RESPIRATION RATE: 18 BRPM | OXYGEN SATURATION: 99 % | DIASTOLIC BLOOD PRESSURE: 69 MMHG | TEMPERATURE: 97.2 F | HEART RATE: 62 BPM | SYSTOLIC BLOOD PRESSURE: 116 MMHG

## 2018-07-18 DIAGNOSIS — T85.192D MALFUNCTION OF SPINAL CORD STIMULATOR, SUBSEQUENT ENCOUNTER: ICD-10-CM

## 2018-07-18 PROCEDURE — 25010000002 SUCCINYLCHOLINE PER 20 MG: Performed by: NURSE ANESTHETIST, CERTIFIED REGISTERED

## 2018-07-18 PROCEDURE — 86901 BLOOD TYPING SEROLOGIC RH(D): CPT | Performed by: NEUROLOGICAL SURGERY

## 2018-07-18 PROCEDURE — 86850 RBC ANTIBODY SCREEN: CPT | Performed by: NEUROLOGICAL SURGERY

## 2018-07-18 PROCEDURE — 25010000002 PROPOFOL 10 MG/ML EMULSION: Performed by: NURSE ANESTHETIST, CERTIFIED REGISTERED

## 2018-07-18 PROCEDURE — C1787 PATIENT PROGR, NEUROSTIM: HCPCS | Performed by: NEUROLOGICAL SURGERY

## 2018-07-18 PROCEDURE — 25010000002 NEOSTIGMINE PER 0.5 MG: Performed by: NURSE ANESTHETIST, CERTIFIED REGISTERED

## 2018-07-18 PROCEDURE — L8689 EXTERNAL RECHARG SYS INTERN: HCPCS | Performed by: NEUROLOGICAL SURGERY

## 2018-07-18 PROCEDURE — 25010000002 DEXAMETHASONE PER 1 MG: Performed by: NURSE ANESTHETIST, CERTIFIED REGISTERED

## 2018-07-18 PROCEDURE — 25010000002 ONDANSETRON PER 1 MG: Performed by: NURSE ANESTHETIST, CERTIFIED REGISTERED

## 2018-07-18 PROCEDURE — 25010000002 MIDAZOLAM PER 1 MG: Performed by: ANESTHESIOLOGY

## 2018-07-18 PROCEDURE — C1767 GENERATOR, NEURO NON-RECHARG: HCPCS | Performed by: NEUROLOGICAL SURGERY

## 2018-07-18 PROCEDURE — 25010000002 FENTANYL CITRATE (PF) 250 MCG/5ML SOLUTION: Performed by: NURSE ANESTHETIST, CERTIFIED REGISTERED

## 2018-07-18 PROCEDURE — 86900 BLOOD TYPING SEROLOGIC ABO: CPT | Performed by: NEUROLOGICAL SURGERY

## 2018-07-18 PROCEDURE — 63685 INS/RPLC SPI NPG/RCVR POCKET: CPT | Performed by: NEUROLOGICAL SURGERY

## 2018-07-18 DEVICE — NEUROSTIM RESTORE SENSOR SURESCAN MRI: Type: IMPLANTABLE DEVICE | Site: BACK | Status: FUNCTIONAL

## 2018-07-18 RX ORDER — ROCURONIUM BROMIDE 10 MG/ML
INJECTION, SOLUTION INTRAVENOUS AS NEEDED
Status: DISCONTINUED | OUTPATIENT
Start: 2018-07-18 | End: 2018-07-18 | Stop reason: SURG

## 2018-07-18 RX ORDER — VASOPRESSIN 20 U/ML
INJECTION PARENTERAL AS NEEDED
Status: DISCONTINUED | OUTPATIENT
Start: 2018-07-18 | End: 2018-07-18 | Stop reason: SURG

## 2018-07-18 RX ORDER — MIDAZOLAM HYDROCHLORIDE 1 MG/ML
1 INJECTION INTRAMUSCULAR; INTRAVENOUS
Status: DISCONTINUED | OUTPATIENT
Start: 2018-07-18 | End: 2018-07-18 | Stop reason: HOSPADM

## 2018-07-18 RX ORDER — SUCCINYLCHOLINE CHLORIDE 20 MG/ML
INJECTION INTRAMUSCULAR; INTRAVENOUS AS NEEDED
Status: DISCONTINUED | OUTPATIENT
Start: 2018-07-18 | End: 2018-07-18 | Stop reason: SURG

## 2018-07-18 RX ORDER — SODIUM CHLORIDE, SODIUM LACTATE, POTASSIUM CHLORIDE, CALCIUM CHLORIDE 600; 310; 30; 20 MG/100ML; MG/100ML; MG/100ML; MG/100ML
1000 INJECTION, SOLUTION INTRAVENOUS CONTINUOUS
Status: DISCONTINUED | OUTPATIENT
Start: 2018-07-18 | End: 2018-07-18 | Stop reason: HOSPADM

## 2018-07-18 RX ORDER — LABETALOL HYDROCHLORIDE 5 MG/ML
5 INJECTION, SOLUTION INTRAVENOUS
Status: DISCONTINUED | OUTPATIENT
Start: 2018-07-18 | End: 2018-07-18 | Stop reason: HOSPADM

## 2018-07-18 RX ORDER — SODIUM CHLORIDE 9 MG/ML
INJECTION, SOLUTION INTRAVENOUS AS NEEDED
Status: DISCONTINUED | OUTPATIENT
Start: 2018-07-18 | End: 2018-07-18 | Stop reason: HOSPADM

## 2018-07-18 RX ORDER — LIDOCAINE HYDROCHLORIDE 40 MG/ML
SOLUTION TOPICAL AS NEEDED
Status: DISCONTINUED | OUTPATIENT
Start: 2018-07-18 | End: 2018-07-18 | Stop reason: SURG

## 2018-07-18 RX ORDER — ONDANSETRON 2 MG/ML
INJECTION INTRAMUSCULAR; INTRAVENOUS AS NEEDED
Status: DISCONTINUED | OUTPATIENT
Start: 2018-07-18 | End: 2018-07-18 | Stop reason: SURG

## 2018-07-18 RX ORDER — METOCLOPRAMIDE HYDROCHLORIDE 5 MG/ML
5 INJECTION INTRAMUSCULAR; INTRAVENOUS
Status: DISCONTINUED | OUTPATIENT
Start: 2018-07-18 | End: 2018-07-18 | Stop reason: HOSPADM

## 2018-07-18 RX ORDER — BUPIVACAINE HYDROCHLORIDE AND EPINEPHRINE 2.5; 5 MG/ML; UG/ML
INJECTION, SOLUTION EPIDURAL; INFILTRATION; INTRACAUDAL; PERINEURAL AS NEEDED
Status: DISCONTINUED | OUTPATIENT
Start: 2018-07-18 | End: 2018-07-18 | Stop reason: HOSPADM

## 2018-07-18 RX ORDER — SODIUM CHLORIDE 0.9 % (FLUSH) 0.9 %
3 SYRINGE (ML) INJECTION AS NEEDED
Status: DISCONTINUED | OUTPATIENT
Start: 2018-07-18 | End: 2018-07-18 | Stop reason: HOSPADM

## 2018-07-18 RX ORDER — SODIUM CHLORIDE 0.9 % (FLUSH) 0.9 %
1-10 SYRINGE (ML) INJECTION AS NEEDED
Status: DISCONTINUED | OUTPATIENT
Start: 2018-07-18 | End: 2018-07-18 | Stop reason: HOSPADM

## 2018-07-18 RX ORDER — DEXAMETHASONE SODIUM PHOSPHATE 4 MG/ML
INJECTION, SOLUTION INTRA-ARTICULAR; INTRALESIONAL; INTRAMUSCULAR; INTRAVENOUS; SOFT TISSUE AS NEEDED
Status: DISCONTINUED | OUTPATIENT
Start: 2018-07-18 | End: 2018-07-18 | Stop reason: SURG

## 2018-07-18 RX ORDER — IBUPROFEN 600 MG/1
600 TABLET ORAL ONCE AS NEEDED
Status: DISCONTINUED | OUTPATIENT
Start: 2018-07-18 | End: 2018-07-18 | Stop reason: HOSPADM

## 2018-07-18 RX ORDER — ONDANSETRON 2 MG/ML
4 INJECTION INTRAMUSCULAR; INTRAVENOUS ONCE AS NEEDED
Status: DISCONTINUED | OUTPATIENT
Start: 2018-07-18 | End: 2018-07-18 | Stop reason: HOSPADM

## 2018-07-18 RX ORDER — NALOXONE HCL 0.4 MG/ML
0.4 VIAL (ML) INJECTION AS NEEDED
Status: DISCONTINUED | OUTPATIENT
Start: 2018-07-18 | End: 2018-07-18 | Stop reason: HOSPADM

## 2018-07-18 RX ORDER — FAMOTIDINE 10 MG/ML
20 INJECTION, SOLUTION INTRAVENOUS
Status: DISCONTINUED | OUTPATIENT
Start: 2018-07-18 | End: 2018-07-18 | Stop reason: HOSPADM

## 2018-07-18 RX ORDER — SODIUM CHLORIDE, SODIUM LACTATE, POTASSIUM CHLORIDE, CALCIUM CHLORIDE 600; 310; 30; 20 MG/100ML; MG/100ML; MG/100ML; MG/100ML
100 INJECTION, SOLUTION INTRAVENOUS CONTINUOUS
Status: DISCONTINUED | OUTPATIENT
Start: 2018-07-18 | End: 2018-07-18 | Stop reason: HOSPADM

## 2018-07-18 RX ORDER — ACETAMINOPHEN 500 MG
1000 TABLET ORAL ONCE
Status: COMPLETED | OUTPATIENT
Start: 2018-07-18 | End: 2018-07-18

## 2018-07-18 RX ORDER — CIPROFLOXACIN 500 MG/1
500 TABLET, FILM COATED ORAL 2 TIMES DAILY
Status: ON HOLD | COMMUNITY
End: 2020-10-31

## 2018-07-18 RX ORDER — GLYCOPYRROLATE 0.2 MG/ML
INJECTION INTRAMUSCULAR; INTRAVENOUS AS NEEDED
Status: DISCONTINUED | OUTPATIENT
Start: 2018-07-18 | End: 2018-07-18 | Stop reason: SURG

## 2018-07-18 RX ORDER — OXYCODONE AND ACETAMINOPHEN 7.5; 325 MG/1; MG/1
2 TABLET ORAL ONCE AS NEEDED
Status: COMPLETED | OUTPATIENT
Start: 2018-07-18 | End: 2018-07-18

## 2018-07-18 RX ORDER — MAGNESIUM HYDROXIDE 1200 MG/15ML
LIQUID ORAL AS NEEDED
Status: DISCONTINUED | OUTPATIENT
Start: 2018-07-18 | End: 2018-07-18 | Stop reason: HOSPADM

## 2018-07-18 RX ORDER — OXYCODONE AND ACETAMINOPHEN 10; 325 MG/1; MG/1
1 TABLET ORAL ONCE AS NEEDED
Status: DISCONTINUED | OUTPATIENT
Start: 2018-07-18 | End: 2018-07-18 | Stop reason: HOSPADM

## 2018-07-18 RX ORDER — PROPOFOL 10 MG/ML
VIAL (ML) INTRAVENOUS AS NEEDED
Status: DISCONTINUED | OUTPATIENT
Start: 2018-07-18 | End: 2018-07-18 | Stop reason: SURG

## 2018-07-18 RX ORDER — LIDOCAINE HYDROCHLORIDE 20 MG/ML
INJECTION, SOLUTION INFILTRATION; PERINEURAL AS NEEDED
Status: DISCONTINUED | OUTPATIENT
Start: 2018-07-18 | End: 2018-07-18 | Stop reason: SURG

## 2018-07-18 RX ORDER — FENTANYL CITRATE 50 UG/ML
INJECTION, SOLUTION INTRAMUSCULAR; INTRAVENOUS AS NEEDED
Status: DISCONTINUED | OUTPATIENT
Start: 2018-07-18 | End: 2018-07-18 | Stop reason: SURG

## 2018-07-18 RX ORDER — MIDAZOLAM HYDROCHLORIDE 1 MG/ML
2 INJECTION INTRAMUSCULAR; INTRAVENOUS
Status: DISCONTINUED | OUTPATIENT
Start: 2018-07-18 | End: 2018-07-18 | Stop reason: HOSPADM

## 2018-07-18 RX ORDER — IPRATROPIUM BROMIDE AND ALBUTEROL SULFATE 2.5; .5 MG/3ML; MG/3ML
3 SOLUTION RESPIRATORY (INHALATION) ONCE AS NEEDED
Status: DISCONTINUED | OUTPATIENT
Start: 2018-07-18 | End: 2018-07-18 | Stop reason: HOSPADM

## 2018-07-18 RX ORDER — MEPERIDINE HYDROCHLORIDE 50 MG/ML
12.5 INJECTION INTRAMUSCULAR; INTRAVENOUS; SUBCUTANEOUS
Status: DISCONTINUED | OUTPATIENT
Start: 2018-07-18 | End: 2018-07-18 | Stop reason: HOSPADM

## 2018-07-18 RX ORDER — FENTANYL CITRATE 50 UG/ML
25 INJECTION, SOLUTION INTRAMUSCULAR; INTRAVENOUS AS NEEDED
Status: DISCONTINUED | OUTPATIENT
Start: 2018-07-18 | End: 2018-07-18 | Stop reason: HOSPADM

## 2018-07-18 RX ORDER — VASOPRESSIN 20 U/ML
INJECTION PARENTERAL AS NEEDED
Status: DISCONTINUED | OUTPATIENT
Start: 2018-07-18 | End: 2018-07-18

## 2018-07-18 RX ADMIN — ONDANSETRON HYDROCHLORIDE 4 MG: 2 SOLUTION INTRAMUSCULAR; INTRAVENOUS at 15:15

## 2018-07-18 RX ADMIN — EPHEDRINE SULFATE 20 MG: 50 INJECTION INTRAMUSCULAR; INTRAVENOUS; SUBCUTANEOUS at 15:12

## 2018-07-18 RX ADMIN — FAMOTIDINE 20 MG: 10 INJECTION, SOLUTION INTRAVENOUS at 14:23

## 2018-07-18 RX ADMIN — EPHEDRINE SULFATE 10 MG: 50 INJECTION INTRAMUSCULAR; INTRAVENOUS; SUBCUTANEOUS at 15:10

## 2018-07-18 RX ADMIN — ACETAMINOPHEN 1000 MG: 500 TABLET, FILM COATED ORAL at 14:23

## 2018-07-18 RX ADMIN — ROCURONIUM BROMIDE 30 MG: 10 INJECTION INTRAVENOUS at 15:00

## 2018-07-18 RX ADMIN — VASOPRESSIN 1 UNITS: 20 INJECTION INTRAVENOUS at 15:15

## 2018-07-18 RX ADMIN — PROPOFOL 200 MG: 10 INJECTION, EMULSION INTRAVENOUS at 14:52

## 2018-07-18 RX ADMIN — SUCCINYLCHOLINE CHLORIDE 140 MG: 20 INJECTION, SOLUTION INTRAMUSCULAR; INTRAVENOUS at 14:52

## 2018-07-18 RX ADMIN — LIDOCAINE HYDROCHLORIDE 0.5 ML: 10 INJECTION, SOLUTION EPIDURAL; INFILTRATION; INTRACAUDAL; PERINEURAL at 13:02

## 2018-07-18 RX ADMIN — FENTANYL CITRATE 100 MCG: 50 INJECTION INTRAMUSCULAR; INTRAVENOUS at 14:58

## 2018-07-18 RX ADMIN — OXYCODONE HYDROCHLORIDE AND ACETAMINOPHEN 2 TABLET: 7.5; 325 TABLET ORAL at 16:11

## 2018-07-18 RX ADMIN — SODIUM CHLORIDE, POTASSIUM CHLORIDE, SODIUM LACTATE AND CALCIUM CHLORIDE: 600; 310; 30; 20 INJECTION, SOLUTION INTRAVENOUS at 15:30

## 2018-07-18 RX ADMIN — ROCURONIUM BROMIDE 10 MG: 10 INJECTION INTRAVENOUS at 14:52

## 2018-07-18 RX ADMIN — GLYCOPYRROLATE 0.4 MG: 0.2 INJECTION, SOLUTION INTRAMUSCULAR; INTRAVENOUS at 15:30

## 2018-07-18 RX ADMIN — EPHEDRINE SULFATE 10 MG: 50 INJECTION INTRAMUSCULAR; INTRAVENOUS; SUBCUTANEOUS at 15:00

## 2018-07-18 RX ADMIN — DEXAMETHASONE SODIUM PHOSPHATE 4 MG: 4 INJECTION, SOLUTION INTRAMUSCULAR; INTRAVENOUS at 15:15

## 2018-07-18 RX ADMIN — EPHEDRINE SULFATE 10 MG: 50 INJECTION INTRAMUSCULAR; INTRAVENOUS; SUBCUTANEOUS at 15:05

## 2018-07-18 RX ADMIN — MIDAZOLAM 2 MG: 1 INJECTION INTRAMUSCULAR; INTRAVENOUS at 14:23

## 2018-07-18 RX ADMIN — VASOPRESSIN 1 UNITS: 20 INJECTION INTRAVENOUS at 15:22

## 2018-07-18 RX ADMIN — Medication 3 MG: at 15:30

## 2018-07-18 RX ADMIN — LIDOCAINE HYDROCHLORIDE 1 EACH: 40 SOLUTION TOPICAL at 14:54

## 2018-07-18 RX ADMIN — GLYCOPYRROLATE 0.2 MG: 0.2 INJECTION, SOLUTION INTRAMUSCULAR; INTRAVENOUS at 14:59

## 2018-07-18 RX ADMIN — LIDOCAINE HYDROCHLORIDE 100 MG: 20 INJECTION, SOLUTION INFILTRATION; PERINEURAL at 14:52

## 2018-07-18 RX ADMIN — FENTANYL CITRATE 150 MCG: 50 INJECTION INTRAMUSCULAR; INTRAVENOUS at 14:52

## 2018-07-18 RX ADMIN — SODIUM CHLORIDE, POTASSIUM CHLORIDE, SODIUM LACTATE AND CALCIUM CHLORIDE 1000 ML: 600; 310; 30; 20 INJECTION, SOLUTION INTRAVENOUS at 13:01

## 2018-07-18 RX ADMIN — MIDAZOLAM 2 MG: 1 INJECTION INTRAMUSCULAR; INTRAVENOUS at 14:29

## 2018-07-18 NOTE — ANESTHESIA POSTPROCEDURE EVALUATION
Patient: Carlos Hayes    Procedure Summary     Date:  07/18/18 Room / Location:   [facility] OR  /  [facility] OR    Anesthesia Start:  1449 Anesthesia Stop:  1543    Procedure:  SPINAL CORD STIMULATOR BATTERY CHANGE (N/A Back) Diagnosis:       Malfunction of spinal cord stimulator, subsequent encounter      (Malfunction of spinal cord stimulator, subsequent encounter [T85.192D])    Surgeon:  Miguel Hall MD Provider:  Tashi Palma CRNA    Anesthesia Type:  general ASA Status:  3          Anesthesia Type: general  Last vitals  BP   128/69 (07/18/18 1234)   Temp   98.6 °F (37 °C) (07/18/18 1234)   Pulse   52 (07/18/18 1402)   Resp   16 (07/18/18 1402)     SpO2   94 % (07/18/18 1402)     Post Anesthesia Care and Evaluation    Patient location during evaluation: PACU  Patient participation: complete - patient participated  Level of consciousness: awake and alert  Pain management: adequate  Airway patency: patent  Anesthetic complications: No anesthetic complications    Cardiovascular status: acceptable  Respiratory status: acceptable  Hydration status: acceptable    Comments: Blood pressure 128/69, pulse 52, temperature 98.6 °F (37 °C), temperature source Temporal Artery , resp. rate 16, SpO2 94 %.    Pt discharged from PACU based on rbandin score >8

## 2018-07-18 NOTE — DISCHARGE INSTRUCTIONS

## 2018-07-18 NOTE — ANESTHESIA PROCEDURE NOTES
Airway  Urgency: elective    Airway not difficult    General Information and Staff    Patient location during procedure: OR    Indications and Patient Condition  Indications for airway management: airway protection    Preoxygenated: yes  Mask difficulty assessment: 1 - vent by mask    Final Airway Details  Final airway type: endotracheal airway      Successful airway: ETT  Cuffed: yes   Successful intubation technique: direct laryngoscopy  Endotracheal tube insertion site: oral  Blade: Beatriz  Blade size: #3  ETT size: 7.5 mm  Cormack-Lehane Classification: grade IIa - partial view of glottis  Placement verified by: chest auscultation, capnometry and palpation of cuff   Measured from: teeth  ETT to teeth (cm): 21  Number of attempts at approach: 1    Additional Comments  Atraumatic intubation. Positive ETCO2

## 2018-07-18 NOTE — OP NOTE
Procedure Note  Preop Diagnosis: Malfunction of spinal cord stimulator, subsequent encounter [T85.192D]    Post-Op Diagnosis Codes:     * Malfunction of spinal cord stimulator, subsequent encounter [T85.192D]     Procedure Name:Replacement, revision dorsal column stimulator    Indications:  A clinical evaluation revealed findings of malfunctioned spinal cord stimulator battery. The patient now presents for placement/revision after discussing therapeutic alternatives.          Surgeon: Miguel Hall MD     Assistants: None    Anesthesia: General endotracheal anesthesia    ASA Class: 3    Procedure Details   After obtaining informed consent, having the risks and benefits of the procedure explained including but not limited to infection, bleeding, paralysis, spinal fluid leak, bowel bladder incontinence, stroke, coma, and death.  The patient was brought to the operating.  He is given general anesthesia and endotracheal tube.  He was flipped prone onto a series of gel rolls.  The previous incision was identified and marked with indelible marker.  The patient was then prepped and draped in a standard sterile fashion.  The preplanned incision was infiltrated Marcaine and epinephrine.  A 10 blade scalpel used to make incisions the nerves epidermis.  Metzenbaum scissors then used to dissect the spinal cord stimulating battery free from this running scar tissue.  It was then delivered out of the wound.  It was disconnected from the leads.  A new battery was reconnected.  The spinal cord stimulator battery was then implanted into the previous pocket impedances were checked and found to be satisfactory.  The stimulator was then anchored to the surrounding scar tissue using a 4-0 Ethibond.  The wound was then copiously irrigated with an antibiotic solution.  It was inspected for hemostasis.  The subcutaneous tissues were closed using a series of inverted interrupted 2-0 Vicryl sutures.  The skin was closed using a running  4 Monocryl subcuticular.  All sponge needle and instrument counts were correct at the end the procedure.  The patient was extubated in stable condition returned recovery with minimal blood loss    Findings:  Malfunctioned spinal cord stimulator battery]    Estimated Blood Loss:  minimal           Drains: None           Total IV Fluids: ml           Specimens: None           Implants:   Implant Name Type Inv. Item Serial No.  Lot No. LRB No. Used   NEUROSTM INTELLIS SURESCAN MRI W/ADAPTIVE STIM RECHG - [id number] - [id number] Implant NEUROSTM INTELLIS SURESCAN MRI W/ADAPTIVE STIM RECHG [id number] [employer]  N/A 1   NEUROSTIM RESTORE SENSOR SURESCAN MRI - [id number] - [id number] Implant NEUROSTIM RESTORE SENSOR SURESCAN MRI [id number] [employer]   N/A 1              Complications:  None           Disposition: PACU - hemodynamically stable.           Condition: stable        Miguel Hall MD

## 2018-07-18 NOTE — H&P (VIEW-ONLY)
SUBJECTIVE:  Patient ID: Carlos Hayes is a 63 y.o. male is here today for follow-up.    Chief Complaint:  Chief Complaint   Patient presents with   • Pain     patient is here to discuss changing his DCS battery due to the new one not holding a charge for multiple days like his old one did.       HPI  64 yo male with history of chronic leg and back pain. He has a dorsal column stimulatory that was replaced due to battery end of life 05/02/2018.  The battery was replaced with a next generation latest model device.  Since the new device is been implanted he is been very clear that the charging has to be done daily almost hourly.  And he is very clear that this device does not give him the same coverage that his previous device stated.  His leads were not changed during the last surgery.  He has had multiple sessions with the [employer] technical support people to try to re-program the stimulator and they have been unable to satisfy his pain control and reproduce the coverage he was getting with his old device.  In addition they have been unable to program the device to extend his battery life.    The following portions of the patient's history were reviewed and updated as appropriate: allergies, current medications, past family history, past medical history, past social history, past surgical history and problem list.    OBJECTIVE:    Review of Systems   Musculoskeletal: Positive for back pain.   All other systems reviewed and are negative.         Physical Exam   Constitutional: He is oriented to person, place, and time. He appears well-developed and well-nourished.   HENT:   Head: Normocephalic and atraumatic.   Right Ear: Hearing normal.   Left Ear: Hearing normal.   Eyes: EOM are normal. Pupils are equal, round, and reactive to light.   Neck: Normal range of motion.   Neurological: He is alert and oriented to person, place, and time. He has normal strength and normal reflexes. No cranial nerve deficit or sensory  deficit. He displays a negative Romberg sign. GCS eye subscore is 4. GCS verbal subscore is 5. GCS motor subscore is 6. He displays no Babinski's sign on the right side. He displays no Babinski's sign on the left side.   Psychiatric: His speech is normal. Judgment normal. Cognition and memory are normal.       Neurologic Exam     Mental Status   Oriented to person, place, and time.   Speech: speech is normal     Cranial Nerves     CN III, IV, VI   Pupils are equal, round, and reactive to light.  Extraocular motions are normal.     Motor Exam     Strength   Strength 5/5 throughout.       Independent Review of Radiographic Studies:       ASSESSMENT/PLAN:  The patient likely has a malfunctioning spinal cord stimulator device.  We discussed the options with him which would include taking back to surgery and implanting an older model device that is more similar to his previous device.  The hope would be that this would give him a longer battery life and give us a programming option that would reproduce his previous coverage.  He is amenable to this.  We discussed the relative risks and benefits which include infection bleeding stroke coma and death.  In addition we are very clear with the patient that there is no guarantee of what his battery performance will be with a new device.  In addition we were very clear with the patient that there is no guarantee what his pain relief and when his pain stimulator coverage will be with the new device.  He acknowledged understanding of this.  His questions concerns were addressed.  We will get him prepped and scheduled as soon as possible for replacement of his malfunction device.      1. Malfunction of spinal cord stimulator, subsequent encounter    2. Cervicalgia    3. Cervical radiculopathy    4. Spinal stenosis, lumbar region, with neurogenic claudication    5. Non-smoker    6. BMI 39.0-39.9,adult            Return for postoperatively.      Miguel Hall MD

## 2018-07-18 NOTE — ANESTHESIA PREPROCEDURE EVALUATION
Anesthesia Evaluation     Patient summary reviewed and Nursing notes reviewed   no history of anesthetic complications:  NPO Solid Status: > 8 hours  NPO Liquid Status: > 8 hours           Airway   Mallampati: II  TM distance: >3 FB  Neck ROM: full  No difficulty expected  Dental      Pulmonary     breath sounds clear to auscultation  (+) a smoker (quit two years ago) Former, COPD, asthma, sleep apnea (uses bipap) on CPAP,     ROS comment: Left ear external infection, saw Dr. Elliott two days ago, received antibiotic shot and home abx  Cardiovascular   Exercise tolerance: poor (<4 METS) (Limited by back)    ECG reviewed  Rhythm: regular    (+) hypertension,     ROS comment: Recent negative stress test    Neuro/Psych  (-) seizures, TIA, CVA  GI/Hepatic/Renal/Endo    (+) morbid obesity, GERD,    (-) liver disease, no renal disease, diabetes    Musculoskeletal     (+) back pain, neck pain,   Abdominal    Substance History      OB/GYN          Other   (+) arthritis                       Anesthesia Plan    ASA 3     general     intravenous induction   Anesthetic plan and risks discussed with patient.

## 2018-07-23 ENCOUNTER — TELEPHONE (OUTPATIENT)
Dept: NEUROSURGERY | Facility: CLINIC | Age: 63
End: 2018-07-23

## 2018-07-23 ENCOUNTER — APPOINTMENT (OUTPATIENT)
Dept: PHYSICAL THERAPY | Facility: HOSPITAL | Age: 63
End: 2018-07-23

## 2018-07-23 DIAGNOSIS — M48.062 SPINAL STENOSIS, LUMBAR REGION, WITH NEUROGENIC CLAUDICATION: Primary | ICD-10-CM

## 2018-07-23 NOTE — TELEPHONE ENCOUNTER
Patient called requesting that we put a referral in for him to see Dr Bebeto Jenkins at the [facility] to discuss having his lumbar spine surgery.      dinora Cardoso per Dr Hall

## 2018-07-30 ENCOUNTER — APPOINTMENT (OUTPATIENT)
Dept: PHYSICAL THERAPY | Facility: HOSPITAL | Age: 63
End: 2018-07-30

## 2018-07-30 ENCOUNTER — APPOINTMENT (OUTPATIENT)
Dept: GENERAL RADIOLOGY | Age: 63
DRG: 392 | End: 2018-07-30
Attending: FAMILY MEDICINE
Payer: MEDICARE

## 2018-07-30 ENCOUNTER — HOSPITAL ENCOUNTER (INPATIENT)
Age: 63
LOS: 1 days | Discharge: HOME | DRG: 392 | End: 2018-08-01
Attending: FAMILY MEDICINE | Admitting: FAMILY MEDICINE
Payer: MEDICARE

## 2018-07-30 PROBLEM — E86.0 DEHYDRATION: Status: ACTIVE | Noted: 2018-07-30

## 2018-07-30 LAB
ALBUMIN SERPL-MCNC: 4.3 G/DL (ref 3.5–5.2)
ALP BLD-CCNC: 64 U/L (ref 40–130)
ALT SERPL-CCNC: 26 U/L (ref 5–41)
AMYLASE: 24 U/L (ref 28–100)
ANION GAP SERPL CALCULATED.3IONS-SCNC: 14 MMOL/L (ref 7–19)
AST SERPL-CCNC: 23 U/L (ref 5–40)
ATYPICAL LYMPHOCYTE RELATIVE PERCENT: 5 % (ref 0–8)
BANDED NEUTROPHILS RELATIVE PERCENT: 2 % (ref 0–5)
BASOPHILS ABSOLUTE: 0 K/UL (ref 0–0.2)
BASOPHILS MANUAL: 1 %
BASOPHILS RELATIVE PERCENT: 1 % (ref 0–1)
BILIRUB SERPL-MCNC: 0.9 MG/DL (ref 0.2–1.2)
BUN BLDV-MCNC: 44 MG/DL (ref 8–23)
C-REACTIVE PROTEIN: 8.59 MG/DL (ref 0–0.5)
CALCIUM IONIZED: 1.07 MMOL/L (ref 1.12–1.32)
CALCIUM SERPL-MCNC: 9 MG/DL (ref 8.8–10.2)
CHLORIDE BLD-SCNC: 99 MMOL/L (ref 98–111)
CO2: 21 MMOL/L (ref 22–29)
CREAT SERPL-MCNC: 3.1 MG/DL (ref 0.5–1.2)
EOSINOPHILS ABSOLUTE: 0.05 K/UL (ref 0–0.6)
EOSINOPHILS RELATIVE PERCENT: 1 % (ref 0–5)
GFR NON-AFRICAN AMERICAN: 21
GLUCOSE BLD-MCNC: 91 MG/DL (ref 74–109)
HCT VFR BLD CALC: 45 % (ref 42–52)
HEMOGLOBIN: 14.8 G/DL (ref 14–18)
INR BLD: 1.16 (ref 0.88–1.18)
LIPASE: 19 U/L (ref 13–60)
LYMPHOCYTES ABSOLUTE: 1.7 K/UL (ref 1.1–4.5)
LYMPHOCYTES RELATIVE PERCENT: 31 % (ref 20–40)
MAGNESIUM: 2 MG/DL (ref 1.6–2.4)
MCH RBC QN AUTO: 30.4 PG (ref 27–31)
MCHC RBC AUTO-ENTMCNC: 32.9 G/DL (ref 33–37)
MCV RBC AUTO: 92.4 FL (ref 80–94)
MONOCYTES ABSOLUTE: 0.9 K/UL (ref 0–0.9)
MONOCYTES RELATIVE PERCENT: 18 % (ref 0–10)
NEUTROPHILS ABSOLUTE: 2.1 K/UL (ref 1.5–7.5)
NEUTROPHILS MANUAL: 42 %
NEUTROPHILS RELATIVE PERCENT: 42 % (ref 50–65)
PDW BLD-RTO: 13.8 % (ref 11.5–14.5)
PHOSPHORUS: 4 MG/DL (ref 2.5–4.5)
PLATELET # BLD: 141 K/UL (ref 130–400)
PLATELET SLIDE REVIEW: ADEQUATE
PMV BLD AUTO: 9.5 FL (ref 9.4–12.4)
POTASSIUM SERPL-SCNC: 4.2 MMOL/L (ref 3.5–5)
PROTHROMBIN TIME: 14.7 SEC (ref 12–14.6)
RBC # BLD: 4.87 M/UL (ref 4.7–6.1)
RBC # BLD: NORMAL 10*6/UL
SEDIMENTATION RATE, ERYTHROCYTE: 8 MM/HR (ref 0–15)
SODIUM BLD-SCNC: 134 MMOL/L (ref 136–145)
TOTAL PROTEIN: 7.1 G/DL (ref 6.6–8.7)
WBC # BLD: 4.8 K/UL (ref 4.8–10.8)

## 2018-07-30 PROCEDURE — 74018 RADEX ABDOMEN 1 VIEW: CPT

## 2018-07-30 PROCEDURE — 71046 X-RAY EXAM CHEST 2 VIEWS: CPT

## 2018-07-30 PROCEDURE — 85025 COMPLETE CBC W/AUTO DIFF WBC: CPT

## 2018-07-30 PROCEDURE — 83735 ASSAY OF MAGNESIUM: CPT

## 2018-07-30 PROCEDURE — 80053 COMPREHEN METABOLIC PANEL: CPT

## 2018-07-30 PROCEDURE — 84100 ASSAY OF PHOSPHORUS: CPT

## 2018-07-30 PROCEDURE — 82150 ASSAY OF AMYLASE: CPT

## 2018-07-30 PROCEDURE — 83690 ASSAY OF LIPASE: CPT

## 2018-07-30 PROCEDURE — 85610 PROTHROMBIN TIME: CPT

## 2018-07-30 PROCEDURE — G0378 HOSPITAL OBSERVATION PER HR: HCPCS

## 2018-07-30 PROCEDURE — 93005 ELECTROCARDIOGRAM TRACING: CPT

## 2018-07-30 PROCEDURE — 82330 ASSAY OF CALCIUM: CPT

## 2018-07-30 PROCEDURE — 87040 BLOOD CULTURE FOR BACTERIA: CPT

## 2018-07-30 PROCEDURE — 96360 HYDRATION IV INFUSION INIT: CPT

## 2018-07-30 PROCEDURE — 85652 RBC SED RATE AUTOMATED: CPT

## 2018-07-30 PROCEDURE — 6370000000 HC RX 637 (ALT 250 FOR IP): Performed by: FAMILY MEDICINE

## 2018-07-30 PROCEDURE — 86140 C-REACTIVE PROTEIN: CPT

## 2018-07-30 PROCEDURE — 2580000003 HC RX 258: Performed by: FAMILY MEDICINE

## 2018-07-30 PROCEDURE — 36415 COLL VENOUS BLD VENIPUNCTURE: CPT

## 2018-07-30 PROCEDURE — 96361 HYDRATE IV INFUSION ADD-ON: CPT

## 2018-07-30 RX ORDER — MONTELUKAST SODIUM 10 MG/1
10 TABLET ORAL NIGHTLY
Status: DISCONTINUED | OUTPATIENT
Start: 2018-07-30 | End: 2018-08-01 | Stop reason: HOSPADM

## 2018-07-30 RX ORDER — ONDANSETRON 2 MG/ML
4 INJECTION INTRAMUSCULAR; INTRAVENOUS EVERY 6 HOURS PRN
Status: DISCONTINUED | OUTPATIENT
Start: 2018-07-30 | End: 2018-07-30

## 2018-07-30 RX ORDER — CYCLOBENZAPRINE HCL 10 MG
5 TABLET ORAL 3 TIMES DAILY
Status: DISCONTINUED | OUTPATIENT
Start: 2018-07-30 | End: 2018-08-01 | Stop reason: HOSPADM

## 2018-07-30 RX ORDER — FAMOTIDINE 20 MG/1
40 TABLET, FILM COATED ORAL 2 TIMES DAILY
Status: DISCONTINUED | OUTPATIENT
Start: 2018-07-30 | End: 2018-08-01 | Stop reason: HOSPADM

## 2018-07-30 RX ORDER — MAGNESIUM SULFATE 1 G/100ML
1 INJECTION INTRAVENOUS PRN
Status: DISCONTINUED | OUTPATIENT
Start: 2018-07-30 | End: 2018-08-01 | Stop reason: HOSPADM

## 2018-07-30 RX ORDER — FLUTICASONE PROPIONATE 50 MCG
1 SPRAY, SUSPENSION (ML) NASAL DAILY
COMMUNITY
End: 2021-01-29 | Stop reason: ALTCHOICE

## 2018-07-30 RX ORDER — OXYCODONE HCL 20 MG/1
20 TABLET, FILM COATED, EXTENDED RELEASE ORAL EVERY 12 HOURS
Status: DISCONTINUED | OUTPATIENT
Start: 2018-07-30 | End: 2018-08-01 | Stop reason: HOSPADM

## 2018-07-30 RX ORDER — POTASSIUM CHLORIDE 20 MEQ/1
40 TABLET, EXTENDED RELEASE ORAL PRN
Status: DISCONTINUED | OUTPATIENT
Start: 2018-07-30 | End: 2018-08-01 | Stop reason: HOSPADM

## 2018-07-30 RX ORDER — LOSARTAN POTASSIUM AND HYDROCHLOROTHIAZIDE 25; 100 MG/1; MG/1
1 TABLET ORAL DAILY
Status: DISCONTINUED | OUTPATIENT
Start: 2018-07-30 | End: 2018-07-30 | Stop reason: SDUPTHER

## 2018-07-30 RX ORDER — LOSARTAN POTASSIUM 100 MG/1
100 TABLET ORAL DAILY
Status: DISCONTINUED | OUTPATIENT
Start: 2018-07-30 | End: 2018-08-01 | Stop reason: HOSPADM

## 2018-07-30 RX ORDER — DULOXETIN HYDROCHLORIDE 60 MG/1
60 CAPSULE, DELAYED RELEASE ORAL DAILY
Status: DISCONTINUED | OUTPATIENT
Start: 2018-07-30 | End: 2018-08-01 | Stop reason: HOSPADM

## 2018-07-30 RX ORDER — POTASSIUM CHLORIDE 7.45 MG/ML
10 INJECTION INTRAVENOUS PRN
Status: DISCONTINUED | OUTPATIENT
Start: 2018-07-30 | End: 2018-08-01 | Stop reason: HOSPADM

## 2018-07-30 RX ORDER — ESZOPICLONE 3 MG/1
3 TABLET, FILM COATED ORAL NIGHTLY
Status: ON HOLD | COMMUNITY
End: 2019-06-19 | Stop reason: ALTCHOICE

## 2018-07-30 RX ORDER — HYDROMORPHONE HCL IN 0.9% NACL 0.5 MG/ML
1 SYRINGE (ML) INTRAVENOUS EVERY 4 HOURS PRN
Status: DISCONTINUED | OUTPATIENT
Start: 2018-07-30 | End: 2018-08-01 | Stop reason: HOSPADM

## 2018-07-30 RX ORDER — VARENICLINE TARTRATE 0.5 MG/1
1 TABLET, FILM COATED ORAL 2 TIMES DAILY
Status: DISCONTINUED | OUTPATIENT
Start: 2018-07-30 | End: 2018-08-01 | Stop reason: HOSPADM

## 2018-07-30 RX ORDER — TIZANIDINE 4 MG/1
4 TABLET ORAL EVERY 8 HOURS PRN
Status: DISCONTINUED | OUTPATIENT
Start: 2018-07-30 | End: 2018-08-01 | Stop reason: HOSPADM

## 2018-07-30 RX ORDER — HYDROCHLOROTHIAZIDE 25 MG/1
25 TABLET ORAL DAILY
Status: DISCONTINUED | OUTPATIENT
Start: 2018-07-30 | End: 2018-08-01 | Stop reason: HOSPADM

## 2018-07-30 RX ORDER — SODIUM CHLORIDE 0.9 % (FLUSH) 0.9 %
10 SYRINGE (ML) INJECTION PRN
Status: DISCONTINUED | OUTPATIENT
Start: 2018-07-30 | End: 2018-08-01 | Stop reason: HOSPADM

## 2018-07-30 RX ORDER — DIPHENOXYLATE HYDROCHLORIDE AND ATROPINE SULFATE 2.5; .025 MG/1; MG/1
1 TABLET ORAL 4 TIMES DAILY PRN
Status: DISCONTINUED | OUTPATIENT
Start: 2018-07-30 | End: 2018-08-01 | Stop reason: HOSPADM

## 2018-07-30 RX ORDER — ONDANSETRON 2 MG/ML
8 INJECTION INTRAMUSCULAR; INTRAVENOUS EVERY 6 HOURS PRN
Status: DISCONTINUED | OUTPATIENT
Start: 2018-07-30 | End: 2018-08-01 | Stop reason: HOSPADM

## 2018-07-30 RX ORDER — LORAZEPAM 0.5 MG/1
0.5 TABLET ORAL NIGHTLY
Status: DISCONTINUED | OUTPATIENT
Start: 2018-07-30 | End: 2018-08-01 | Stop reason: HOSPADM

## 2018-07-30 RX ORDER — CYCLOBENZAPRINE HCL 5 MG
10 TABLET ORAL 3 TIMES DAILY
COMMUNITY
End: 2021-06-28

## 2018-07-30 RX ORDER — PROMETHAZINE HYDROCHLORIDE 25 MG/ML
25 INJECTION, SOLUTION INTRAMUSCULAR; INTRAVENOUS EVERY 6 HOURS PRN
Status: DISCONTINUED | OUTPATIENT
Start: 2018-07-30 | End: 2018-08-01 | Stop reason: HOSPADM

## 2018-07-30 RX ORDER — SODIUM CHLORIDE 0.9 % (FLUSH) 0.9 %
10 SYRINGE (ML) INJECTION EVERY 12 HOURS SCHEDULED
Status: DISCONTINUED | OUTPATIENT
Start: 2018-07-30 | End: 2018-08-01 | Stop reason: HOSPADM

## 2018-07-30 RX ORDER — ALPRAZOLAM 1 MG/1
1 TABLET ORAL 2 TIMES DAILY PRN
Status: DISCONTINUED | OUTPATIENT
Start: 2018-07-30 | End: 2018-08-01 | Stop reason: HOSPADM

## 2018-07-30 RX ORDER — NICOTINE 21 MG/24HR
1 PATCH, TRANSDERMAL 24 HOURS TRANSDERMAL DAILY PRN
Status: DISCONTINUED | OUTPATIENT
Start: 2018-07-30 | End: 2018-08-01 | Stop reason: HOSPADM

## 2018-07-30 RX ORDER — AZELASTINE 1 MG/ML
1 SPRAY, METERED NASAL 2 TIMES DAILY
Status: DISCONTINUED | OUTPATIENT
Start: 2018-07-30 | End: 2018-07-30 | Stop reason: RX

## 2018-07-30 RX ORDER — FLUTICASONE PROPIONATE 50 MCG
1 SPRAY, SUSPENSION (ML) NASAL DAILY
Status: DISCONTINUED | OUTPATIENT
Start: 2018-07-30 | End: 2018-08-01 | Stop reason: HOSPADM

## 2018-07-30 RX ORDER — OXYCODONE AND ACETAMINOPHEN 10; 325 MG/1; MG/1
1 TABLET ORAL EVERY 6 HOURS PRN
Status: DISCONTINUED | OUTPATIENT
Start: 2018-07-30 | End: 2018-08-01 | Stop reason: HOSPADM

## 2018-07-30 RX ORDER — LOSARTAN POTASSIUM AND HYDROCHLOROTHIAZIDE 25; 100 MG/1; MG/1
1 TABLET ORAL DAILY
Status: ON HOLD | COMMUNITY
End: 2022-05-09 | Stop reason: ALTCHOICE

## 2018-07-30 RX ORDER — 0.9 % SODIUM CHLORIDE 0.9 %
1000 INTRAVENOUS SOLUTION INTRAVENOUS ONCE
Status: COMPLETED | OUTPATIENT
Start: 2018-07-30 | End: 2018-07-30

## 2018-07-30 RX ORDER — VALACYCLOVIR HYDROCHLORIDE 500 MG/1
500 TABLET, FILM COATED ORAL DAILY
COMMUNITY

## 2018-07-30 RX ORDER — POTASSIUM CHLORIDE 20MEQ/15ML
40 LIQUID (ML) ORAL PRN
Status: DISCONTINUED | OUTPATIENT
Start: 2018-07-30 | End: 2018-08-01 | Stop reason: HOSPADM

## 2018-07-30 RX ORDER — TAMSULOSIN HYDROCHLORIDE 0.4 MG/1
0.4 CAPSULE ORAL DAILY
Status: DISCONTINUED | OUTPATIENT
Start: 2018-07-30 | End: 2018-08-01 | Stop reason: HOSPADM

## 2018-07-30 RX ORDER — CELECOXIB 200 MG/1
200 CAPSULE ORAL DAILY
COMMUNITY
End: 2022-04-04

## 2018-07-30 RX ORDER — PANTOPRAZOLE SODIUM 40 MG/1
40 TABLET, DELAYED RELEASE ORAL
Status: DISCONTINUED | OUTPATIENT
Start: 2018-07-30 | End: 2018-08-01 | Stop reason: HOSPADM

## 2018-07-30 RX ORDER — CELECOXIB 200 MG/1
200 CAPSULE ORAL DAILY
Status: DISCONTINUED | OUTPATIENT
Start: 2018-07-30 | End: 2018-08-01 | Stop reason: HOSPADM

## 2018-07-30 RX ORDER — CLONIDINE HYDROCHLORIDE 0.1 MG/1
0.1 TABLET ORAL EVERY 6 HOURS PRN
Status: DISCONTINUED | OUTPATIENT
Start: 2018-07-30 | End: 2018-08-01 | Stop reason: HOSPADM

## 2018-07-30 RX ORDER — ESOMEPRAZOLE MAGNESIUM 40 MG/1
40 FOR SUSPENSION ORAL DAILY
Status: DISCONTINUED | OUTPATIENT
Start: 2018-07-30 | End: 2018-07-30

## 2018-07-30 RX ORDER — TESTOSTERONE CYPIONATE 200 MG/ML
50 INJECTION INTRAMUSCULAR
Status: DISCONTINUED | OUTPATIENT
Start: 2018-08-12 | End: 2018-08-01 | Stop reason: HOSPADM

## 2018-07-30 RX ORDER — TAMSULOSIN HYDROCHLORIDE 0.4 MG/1
0.4 CAPSULE ORAL DAILY
COMMUNITY

## 2018-07-30 RX ORDER — GABAPENTIN 600 MG/1
600 TABLET ORAL 4 TIMES DAILY
Status: DISCONTINUED | OUTPATIENT
Start: 2018-07-30 | End: 2018-08-01 | Stop reason: HOSPADM

## 2018-07-30 RX ORDER — SODIUM CHLORIDE 9 MG/ML
INJECTION, SOLUTION INTRAVENOUS CONTINUOUS
Status: DISCONTINUED | OUTPATIENT
Start: 2018-07-30 | End: 2018-08-01 | Stop reason: HOSPADM

## 2018-07-30 RX ORDER — OXYCODONE AND ACETAMINOPHEN 10; 325 MG/1; MG/1
1 TABLET ORAL EVERY 6 HOURS PRN
COMMUNITY
End: 2021-01-29 | Stop reason: ALTCHOICE

## 2018-07-30 RX ADMIN — SODIUM CHLORIDE 1000 ML: 9 INJECTION, SOLUTION INTRAVENOUS at 17:43

## 2018-07-30 RX ADMIN — OXYCODONE HYDROCHLORIDE AND ACETAMINOPHEN 1 TABLET: 10; 325 TABLET ORAL at 23:00

## 2018-07-30 RX ADMIN — LORAZEPAM 0.5 MG: 0.5 TABLET ORAL at 20:47

## 2018-07-30 RX ADMIN — SODIUM CHLORIDE: 9 INJECTION, SOLUTION INTRAVENOUS at 19:50

## 2018-07-30 RX ADMIN — TRAZODONE HYDROCHLORIDE 150 MG: 100 TABLET ORAL at 20:50

## 2018-07-30 RX ADMIN — GABAPENTIN 600 MG: 600 TABLET, FILM COATED ORAL at 20:46

## 2018-07-30 RX ADMIN — BECLOMETHASONE DIPROPIONATE HFA 4 PUFF: 80 AEROSOL, METERED RESPIRATORY (INHALATION) at 20:42

## 2018-07-30 RX ADMIN — FAMOTIDINE 40 MG: 20 TABLET ORAL at 20:44

## 2018-07-30 ASSESSMENT — PAIN DESCRIPTION - PAIN TYPE
TYPE: CHRONIC PAIN
TYPE: CHRONIC PAIN

## 2018-07-30 ASSESSMENT — PAIN DESCRIPTION - LOCATION
LOCATION: BACK
LOCATION: BACK

## 2018-07-30 ASSESSMENT — PAIN SCALES - GENERAL
PAINLEVEL_OUTOF10: 3
PAINLEVEL_OUTOF10: 3
PAINLEVEL_OUTOF10: 6
PAINLEVEL_OUTOF10: 6

## 2018-07-30 NOTE — PROGRESS NOTES
Pt refuses bed alarm and socks. Went over the importance of calling for help before he gets up. Will monitor.  Electronically signed by Maynor Estes LPN on 4/98/6288 at 9:82 PM

## 2018-07-30 NOTE — PROGRESS NOTES
Patient admitted to floor. Oriented to room. Went over fall contracts with the patient and he signed them. The patient did refuse to have his bed alarm on because he said when he has to get up and go to the bathroom he has to go \"now. \" Patient stated that he has a DNR and he asked his wife to bring a copy in; waiting on that copy to be brought to the patient. Patient stated he was hungry and ready for his medications; I explained that we had called Dr. Iyer Cage office and that we were waiting on a call back. Patient then stated he would call himself. Patient is currently in bed; fall risk sign on the door; call light within reach. Patient is in stable condition; will continue to monitor.

## 2018-07-30 NOTE — PROGRESS NOTES
[facility]  History and Physical    Patient:  Diana Rodriguez  MRN: [id number]    CHIEF COMPLAINT:  N/v/d      PCP: Chen Paul MD    HISTORY OF PRESENT ILLNESS:   The patient is a 66 y.o. male who presents with complaints of 24 hrs of intractable nausea, vomiting and diarrhea. No blood in stool, no fever, no recent ill contacts. No exposure to known contaminated foods. +recent travel outside the [address] [place] [address],[address] to [facility]. REVIEW OF SYSTEMS:    Past Medical History:      Diagnosis Date    Abnormal gait     Amnesia     Anxiety     Asthma     BiPAP (biphasic positive airway pressure) dependence     NiPPV 23cm/16cm/12cm    Cervical facet syndrome     Chronic back pain     Complex sleep apnea syndrome     Initial PS; AHI:  31.2 per PSG, 10/2014    Concussion     COPD (chronic obstructive pulmonary disease) (HCC)     DDD (degenerative disc disease)     Facial ringworm     PERCY (generalized anxiety disorder)     GERD (gastroesophageal reflux disease)     Headache     Herniated disc     Herpes simplex     History of Juan's esophagus     Hyperlipidemia     Hypersomnia     Hypertension     Hypotestosteronism     Lumbar stenosis     Memory loss     Obesity     Obstructive sleep apnea     Initial PS; AHI:  31.2 per split-night PSG, 10/2014    Pneumonia     Potential difficult airway on pre-intubation assessment     PTSD (post-traumatic stress disorder)     Sleep apnea     Bi-pap at night    Vertigo        Past Surgical History:      Procedure Laterality Date    APPENDECTOMY      BACK SURGERY      Three lumbar fusions L3-4, L3-4-5 & S1    CARDIAC CATHETERIZATION      COLONOSCOPY      Fayrene Half    COLONOSCOPY  16    Dr Segundo Duggan, normal, 5 yr recall    ENDOSCOPY, COLON, DIAGNOSTIC      FRACTURE SURGERY      wrist    HERNIA REPAIR      KNEE ARTHROSCOPY      OTHER SURGICAL HISTORY      nerve stimulator.  dcs battery replacement 18    RHINOPLASTY      SKIN BIOPSY      SPINE SURGERY      x 3/Stimulator implant    UPPER GASTROINTESTINAL ENDOSCOPY  2009    Aristeo Ryan UPPER GASTROINTESTINAL ENDOSCOPY  12/2013    BE surveillance. pos BE / neg dysplasia. retained gastric contents    UPPER GASTROINTESTINAL ENDOSCOPY N/A 4/12/2016    Dr Curtis Vivar, Susy (-), Barretts (+), 3 yr recall       Medications Prior to Admission:    Prior to Admission medications    Medication Sig Start Date End Date Taking? Authorizing Provider   oxyCODONE-acetaminophen (PERCOCET)  MG per tablet Take 1 tablet by mouth every 6 hours as needed for Pain. .   Yes Historical Provider, MD   celecoxib (CELEBREX) 200 MG capsule Take 200 mg by mouth daily   Yes Historical Provider, MD   cyclobenzaprine (FLEXERIL) 5 MG tablet Take 5 mg by mouth 3 times daily   Yes Historical Provider, MD   valACYclovir (VALTREX) 500 MG tablet Take 500 mg by mouth daily   Yes Historical Provider, MD   tamsulosin (FLOMAX) 0.4 MG capsule Take 0.4 mg by mouth daily   Yes Historical Provider, MD   losartan-hydrochlorothiazide (HYZAAR) 100-25 MG per tablet Take 1 tablet by mouth daily   Yes Historical Provider, MD   fluticasone (FLONASE) 50 MCG/ACT nasal spray 1 spray by Nasal route daily   Yes Historical Provider, MD   eszopiclone (LUNESTA) 3 MG TABS Take 3 mg by mouth nightly. .   Yes Historical Provider, MD   cloNIDine (CATAPRES) 0.1 MG tablet Take 0.1 mg by mouth every 6 hours as needed for High Blood Pressure    Yes Historical Provider, MD   verapamil (CALAN SR) 240 MG extended release tablet Take 1 tablet by mouth daily   Yes Historical Provider, MD   mometasone (ASMANEX 7 METERED DOSES) 110 MCG/INH AEPB Inhale 2 puffs into the lungs daily   Yes Historical Provider, MD   montelukast (SINGULAIR) 10 MG tablet Take 10 mg by mouth nightly   Yes Historical Provider, MD   diphenoxylate-atropine (LOMOTIL) 2.5-0.025 MG per tablet Take 1 tablet by mouth 4 times daily as needed for Diarrhea.  Yana Vergara Historical Provider, MD N/A     Occupational History    Not on file. Social History Main Topics    Smoking status: Former Smoker     Packs/day: 1.00     Years: 35.00     Types: Cigarettes     Quit date: 3/16/2016    Smokeless tobacco: Never Used      Comment: pt states he has quit smokimg again for 3 weeks    Alcohol use Yes      Comment: 2-3 mixed drinks / month     Drug use: No    Sexual activity: Yes     Partners: Female     Other Topics Concern    Not on file     Social History Narrative    No narrative on file       Family History:   Family History   Problem Relation Age of Onset    Colon Polyps Mother     Heart Attack Mother     Arrhythmia Mother     Stroke Mother     Colon Polyps Maternal Grandmother     Arrhythmia Father     Obesity Sister     Stroke Paternal Grandmother     Colon Cancer Neg Hx     Esophageal Cancer Neg Hx     Liver Cancer Neg Hx     Liver Disease Neg Hx     Rectal Cancer Neg Hx     Stomach Cancer Neg Hx       Review of systems:    Con: no fever/chills or significant weight changes   Heent: denies HA, change in vision or hearing. No significant sinus drainage. No             difficulties swallowing. No recent trauma noted. CV: denies CP, dyspnea on exertion, Palpitations. No change in exercise tolerance  Pulm: No cough, sputum production, denies hemoptysis   GI: denies changes in bowel habits, no diarrhea or significant constipation. No BRBPR       or melena. : no dysuria, hesitancy or dysuria. Denies hematuria. Derm: no rashes or new lesions of concern. Psych: no signs of depression, anxiety or significant mood changes.   Neuro: denies focal weakness or change in mentation  A full 14 point review of systems is otherwise negative outside listed above and HPI      Physical Exam:    Vitals: BP (!) 93/55   Pulse 50   Temp 97.7 °F (36.5 °C) (Temporal)   Resp 16   Ht 6' 4\" (1.93 m)   Wt (!) 304 lb (137.9 kg)   SpO2 94%   BMI 37.00 kg/m²     GENERAL: WD/WN not in acute distress, resting well in bed  HEENT: NC/AT PERRL, EOMI grossly, TM's clear, OP negative without sig erythema or exudate, neck is supple without masses or carotid bruit noted    CV: normal S1 and S2, no sig murmur, lift or gallop, normal PMI  CHEST: clear to auscultation both anterior and posterior, no rales rhonchi or wheeze appreciated. ABD: soft NT/ND with normoactive BS noted. No guarding or rebounding noted  /rectal: deferred  EXT: no cyanosis or clubbing noted, normal ROM  DERM: skin is warm and dry without sig rashes on exposed areas  PSYCH: appears mentally stable with no obvious abnormalities  NEURO: CN 2-12 apper grossly intact without sig deficits in motor or sensory       CBC: No results for input(s): WBC, HGB, PLT in the last 72 hours. BMP:  No results for input(s): NA, K, CL, CO2, BUN, CREATININE, GLUCOSE in the last 72 hours. Hepatic: No results for input(s): AST, ALT, ALB, BILITOT, ALKPHOS in the last 72 hours. Troponin T: No results for input(s): TROPONINI in the last 72 hours. Pro-BNP: No results for input(s): BNP in the last 72 hours. Lipids: No results for input(s): CHOL, HDL in the last 72 hours. Invalid input(s): LDLCALCU  ABGs: No results found for: PHART, PO2ART, [id number]  INR: No results for input(s): INR in the last 72 hours. ---------------------------------------------------------------  Radiology:     No results found. Assessment and Plan   1. Active Problems:    Dehydration  Resolved Problems:    * No resolved hospital problems.  *  INTRACTABLE N/V  INTRACTABLE DIARRHEA    PLAN:     ADMIT   IVF  IV ANTIEMETICS  IV ANLAGESIA  WORK UP FOR INFECTIOUS ETIOLOGY    Sadia Taylor MD,MPH  .  7/30/2018

## 2018-07-30 NOTE — PROGRESS NOTES
PHARMACY NOTE  Tatyana Brantley was ordered Astelin nasal spray. Per the [address], this medication is non-formulary and not stocked by pharmacy. It has been discontinued. The medication can be reordered at discharge.      Electronically signed by Nazario [facility] on 7/30/2018 at 5:33 PM

## 2018-07-31 ENCOUNTER — LAB REQUISITION (OUTPATIENT)
Dept: LAB | Facility: HOSPITAL | Age: 63
End: 2018-07-31

## 2018-07-31 DIAGNOSIS — Z00.00 ROUTINE GENERAL MEDICAL EXAMINATION AT A HEALTH CARE FACILITY: ICD-10-CM

## 2018-07-31 LAB
ANION GAP SERPL CALCULATED.3IONS-SCNC: 13 MMOL/L (ref 7–19)
ATYPICAL LYMPHOCYTE RELATIVE PERCENT: 1 % (ref 0–8)
BACTERIA: ABNORMAL /HPF
BASOPHILS ABSOLUTE: 0 K/UL (ref 0–0.2)
BASOPHILS MANUAL: 0 %
BASOPHILS RELATIVE PERCENT: 0 % (ref 0–1)
BILIRUBIN URINE: ABNORMAL
BLOOD, URINE: NEGATIVE
BUN BLDV-MCNC: 39 MG/DL (ref 8–23)
C DIFF TOX GENS STL QL NAA+PROBE: NEGATIVE
C DIFFICILE TOXIN, EIA: NORMAL
CALCIUM SERPL-MCNC: 8.3 MG/DL (ref 8.8–10.2)
CASTS: ABNORMAL /LPF
CHLORIDE BLD-SCNC: 99 MMOL/L (ref 98–111)
CLARITY: ABNORMAL
CLOSTRIDIUM DIFFICILE DNA AMPLIFICATION: NORMAL
CO2: 22 MMOL/L (ref 22–29)
COLOR: ABNORMAL
CREAT SERPL-MCNC: 2.3 MG/DL (ref 0.5–1.2)
EKG P AXIS: 60 DEGREES
EKG P-R INTERVAL: 158 MS
EKG Q-T INTERVAL: 450 MS
EKG QRS DURATION: 100 MS
EKG QTC CALCULATION (BAZETT): 429 MS
EKG T AXIS: 19 DEGREES
EOSINOPHILS ABSOLUTE: 0.12 K/UL (ref 0–0.6)
EOSINOPHILS RELATIVE PERCENT: 3 % (ref 0–5)
EPITHELIAL CELLS, UA: 4 /HPF (ref 0–5)
GFR NON-AFRICAN AMERICAN: 29
GLUCOSE BLD-MCNC: 82 MG/DL (ref 74–109)
GLUCOSE URINE: NEGATIVE MG/DL
HCT VFR BLD CALC: 41.8 % (ref 42–52)
HEMOGLOBIN: 13.9 G/DL (ref 14–18)
HYALINE CASTS: >87 /HPF (ref 0–8)
KETONES, URINE: NEGATIVE MG/DL
LEUKOCYTE ESTERASE, URINE: NEGATIVE
LYMPHOCYTES ABSOLUTE: 2.2 K/UL (ref 1.1–4.5)
LYMPHOCYTES RELATIVE PERCENT: 55 % (ref 20–40)
MCH RBC QN AUTO: 30.5 PG (ref 27–31)
MCHC RBC AUTO-ENTMCNC: 33.3 G/DL (ref 33–37)
MCV RBC AUTO: 91.9 FL (ref 80–94)
MONOCYTES ABSOLUTE: 0 K/UL (ref 0–0.9)
MONOCYTES RELATIVE PERCENT: 1 % (ref 0–10)
NEUTROPHILS ABSOLUTE: 1.6 K/UL (ref 1.5–7.5)
NEUTROPHILS MANUAL: 40 %
NEUTROPHILS RELATIVE PERCENT: 40 % (ref 50–65)
NITRITE, URINE: NEGATIVE
PDW BLD-RTO: 13.7 % (ref 11.5–14.5)
PH UA: 5
PLATELET # BLD: 123 K/UL (ref 130–400)
PLATELET SLIDE REVIEW: ABNORMAL
PMV BLD AUTO: 9.4 FL (ref 9.4–12.4)
POTASSIUM SERPL-SCNC: 3.6 MMOL/L (ref 3.5–5)
PROTEIN UA: 30 MG/DL
RBC # BLD: 4.55 M/UL (ref 4.7–6.1)
RBC UA: 10 /HPF (ref 0–4)
RBC UA: ABNORMAL /HPF (ref 0–2)
ROTAVIRUS ANTIGEN: NORMAL
SLIDE REVIEW: ABNORMAL
SODIUM BLD-SCNC: 134 MMOL/L (ref 136–145)
SPECIFIC GRAVITY UA: 1.03
UROBILINOGEN, URINE: 0.2 E.U./DL
WBC # BLD: 3.9 K/UL (ref 4.8–10.8)
WBC UA: 2 /HPF (ref 0–5)

## 2018-07-31 PROCEDURE — 80048 BASIC METABOLIC PNL TOTAL CA: CPT

## 2018-07-31 PROCEDURE — 87046 STOOL CULTR AEROBIC BACT EA: CPT

## 2018-07-31 PROCEDURE — 6370000000 HC RX 637 (ALT 250 FOR IP): Performed by: FAMILY MEDICINE

## 2018-07-31 PROCEDURE — 2580000003 HC RX 258: Performed by: FAMILY MEDICINE

## 2018-07-31 PROCEDURE — 87045 FECES CULTURE AEROBIC BACT: CPT

## 2018-07-31 PROCEDURE — 1210000000 HC MED SURG R&B

## 2018-07-31 PROCEDURE — 87335 E COLI 0157 AG IA: CPT

## 2018-07-31 PROCEDURE — 81001 URINALYSIS AUTO W/SCOPE: CPT

## 2018-07-31 PROCEDURE — 87324 CLOSTRIDIUM AG IA: CPT

## 2018-07-31 PROCEDURE — 87493 C DIFF AMPLIFIED PROBE: CPT

## 2018-07-31 PROCEDURE — 87425 ROTAVIRUS AG IA: CPT

## 2018-07-31 PROCEDURE — 36415 COLL VENOUS BLD VENIPUNCTURE: CPT

## 2018-07-31 PROCEDURE — 85025 COMPLETE CBC W/AUTO DIFF WBC: CPT

## 2018-07-31 RX ADMIN — TAMSULOSIN HYDROCHLORIDE 0.4 MG: 0.4 CAPSULE ORAL at 07:42

## 2018-07-31 RX ADMIN — PANTOPRAZOLE SODIUM 40 MG: 40 TABLET, DELAYED RELEASE ORAL at 16:45

## 2018-07-31 RX ADMIN — SODIUM CHLORIDE: 9 INJECTION, SOLUTION INTRAVENOUS at 13:49

## 2018-07-31 RX ADMIN — FAMOTIDINE 40 MG: 20 TABLET ORAL at 07:43

## 2018-07-31 RX ADMIN — DULOXETINE HYDROCHLORIDE 60 MG: 60 CAPSULE, DELAYED RELEASE ORAL at 07:43

## 2018-07-31 RX ADMIN — GABAPENTIN 600 MG: 600 TABLET, FILM COATED ORAL at 12:06

## 2018-07-31 RX ADMIN — CYCLOBENZAPRINE HYDROCHLORIDE 5 MG: 10 TABLET, FILM COATED ORAL at 21:00

## 2018-07-31 RX ADMIN — PANTOPRAZOLE SODIUM 40 MG: 40 TABLET, DELAYED RELEASE ORAL at 06:24

## 2018-07-31 RX ADMIN — SODIUM CHLORIDE: 9 INJECTION, SOLUTION INTRAVENOUS at 16:47

## 2018-07-31 RX ADMIN — GABAPENTIN 600 MG: 600 TABLET, FILM COATED ORAL at 16:45

## 2018-07-31 RX ADMIN — SODIUM CHLORIDE: 9 INJECTION, SOLUTION INTRAVENOUS at 03:50

## 2018-07-31 RX ADMIN — FLUTICASONE PROPIONATE 1 SPRAY: 50 SPRAY, METERED NASAL at 07:41

## 2018-07-31 RX ADMIN — CYCLOBENZAPRINE HYDROCHLORIDE 5 MG: 10 TABLET, FILM COATED ORAL at 12:06

## 2018-07-31 RX ADMIN — FAMOTIDINE 40 MG: 20 TABLET ORAL at 20:58

## 2018-07-31 RX ADMIN — OXYCODONE HYDROCHLORIDE AND ACETAMINOPHEN 1 TABLET: 10; 325 TABLET ORAL at 19:05

## 2018-07-31 RX ADMIN — OXYCODONE HYDROCHLORIDE AND ACETAMINOPHEN 1 TABLET: 10; 325 TABLET ORAL at 07:40

## 2018-07-31 RX ADMIN — HYDROCHLOROTHIAZIDE 25 MG: 25 TABLET ORAL at 07:42

## 2018-07-31 RX ADMIN — BECLOMETHASONE DIPROPIONATE HFA 4 PUFF: 80 AEROSOL, METERED RESPIRATORY (INHALATION) at 20:59

## 2018-07-31 RX ADMIN — VERAPAMIL HYDROCHLORIDE 240 MG: 120 TABLET, FILM COATED, EXTENDED RELEASE ORAL at 07:42

## 2018-07-31 RX ADMIN — CYCLOBENZAPRINE HYDROCHLORIDE 5 MG: 10 TABLET, FILM COATED ORAL at 07:41

## 2018-07-31 RX ADMIN — TIZANIDINE 4 MG: 4 TABLET ORAL at 07:39

## 2018-07-31 RX ADMIN — BECLOMETHASONE DIPROPIONATE HFA 4 PUFF: 80 AEROSOL, METERED RESPIRATORY (INHALATION) at 07:41

## 2018-07-31 RX ADMIN — CELECOXIB 200 MG: 200 CAPSULE ORAL at 07:42

## 2018-07-31 RX ADMIN — OXYCODONE HYDROCHLORIDE 20 MG: 20 TABLET, FILM COATED, EXTENDED RELEASE ORAL at 16:45

## 2018-07-31 RX ADMIN — LOSARTAN POTASSIUM 100 MG: 100 TABLET ORAL at 07:43

## 2018-07-31 RX ADMIN — VARENICLINE TARTRATE 1 MG: 0.5 TABLET, FILM COATED ORAL at 07:42

## 2018-07-31 RX ADMIN — LORAZEPAM 0.5 MG: 0.5 TABLET ORAL at 20:58

## 2018-07-31 RX ADMIN — GABAPENTIN 600 MG: 600 TABLET, FILM COATED ORAL at 20:58

## 2018-07-31 RX ADMIN — OXYCODONE HYDROCHLORIDE 20 MG: 20 TABLET, FILM COATED, EXTENDED RELEASE ORAL at 06:24

## 2018-07-31 RX ADMIN — OXYCODONE HYDROCHLORIDE AND ACETAMINOPHEN 1 TABLET: 10; 325 TABLET ORAL at 13:49

## 2018-07-31 RX ADMIN — GABAPENTIN 600 MG: 600 TABLET, FILM COATED ORAL at 07:42

## 2018-07-31 RX ADMIN — TRAZODONE HYDROCHLORIDE 150 MG: 100 TABLET ORAL at 20:58

## 2018-07-31 ASSESSMENT — PAIN SCALES - GENERAL
PAINLEVEL_OUTOF10: 7
PAINLEVEL_OUTOF10: 3
PAINLEVEL_OUTOF10: 3
PAINLEVEL_OUTOF10: 8
PAINLEVEL_OUTOF10: 6
PAINLEVEL_OUTOF10: 7

## 2018-07-31 ASSESSMENT — PAIN DESCRIPTION - PAIN TYPE: TYPE: CHRONIC PAIN

## 2018-07-31 ASSESSMENT — PAIN DESCRIPTION - LOCATION: LOCATION: BACK

## 2018-08-01 VITALS
DIASTOLIC BLOOD PRESSURE: 71 MMHG | TEMPERATURE: 97.5 F | BODY MASS INDEX: 37.02 KG/M2 | OXYGEN SATURATION: 95 % | HEART RATE: 61 BPM | HEIGHT: 76 IN | WEIGHT: 304 LBS | SYSTOLIC BLOOD PRESSURE: 132 MMHG | RESPIRATION RATE: 18 BRPM

## 2018-08-01 LAB
ANION GAP SERPL CALCULATED.3IONS-SCNC: 12 MMOL/L (ref 7–19)
ATYPICAL LYMPHOCYTE RELATIVE PERCENT: 5 % (ref 0–8)
BASOPHILS ABSOLUTE: 0 K/UL (ref 0–0.2)
BASOPHILS MANUAL: 0 %
BASOPHILS RELATIVE PERCENT: 0 % (ref 0–1)
BUN BLDV-MCNC: 21 MG/DL (ref 8–23)
CALCIUM SERPL-MCNC: 8.8 MG/DL (ref 8.8–10.2)
CHLORIDE BLD-SCNC: 105 MMOL/L (ref 98–111)
CO2: 26 MMOL/L (ref 22–29)
CREAT SERPL-MCNC: 1.1 MG/DL (ref 0.5–1.2)
EOSINOPHILS ABSOLUTE: 0.08 K/UL (ref 0–0.6)
EOSINOPHILS RELATIVE PERCENT: 2 % (ref 0–5)
GFR NON-AFRICAN AMERICAN: >60
GLUCOSE BLD-MCNC: 77 MG/DL (ref 74–109)
HCT VFR BLD CALC: 41.6 % (ref 42–52)
HEMOGLOBIN: 13.8 G/DL (ref 14–18)
LYMPHOCYTES ABSOLUTE: 2.3 K/UL (ref 1.1–4.5)
LYMPHOCYTES RELATIVE PERCENT: 52 % (ref 20–40)
MCH RBC QN AUTO: 30.9 PG (ref 27–31)
MCHC RBC AUTO-ENTMCNC: 33.2 G/DL (ref 33–37)
MCV RBC AUTO: 93.1 FL (ref 80–94)
MONOCYTES ABSOLUTE: 0.2 K/UL (ref 0–0.9)
MONOCYTES RELATIVE PERCENT: 4 % (ref 0–10)
NEUTROPHILS ABSOLUTE: 1.5 K/UL (ref 1.5–7.5)
NEUTROPHILS MANUAL: 37 %
NEUTROPHILS RELATIVE PERCENT: 37 % (ref 50–65)
PDW BLD-RTO: 13.6 % (ref 11.5–14.5)
PLATELET # BLD: 138 K/UL (ref 130–400)
PLATELET SLIDE REVIEW: ABNORMAL
PMV BLD AUTO: 9.5 FL (ref 9.4–12.4)
POTASSIUM SERPL-SCNC: 4 MMOL/L (ref 3.5–5)
RBC # BLD: 4.47 M/UL (ref 4.7–6.1)
SLIDE REVIEW: ABNORMAL
SODIUM BLD-SCNC: 143 MMOL/L (ref 136–145)
WBC # BLD: 4 K/UL (ref 4.8–10.8)

## 2018-08-01 PROCEDURE — 80048 BASIC METABOLIC PNL TOTAL CA: CPT

## 2018-08-01 PROCEDURE — 6370000000 HC RX 637 (ALT 250 FOR IP): Performed by: FAMILY MEDICINE

## 2018-08-01 PROCEDURE — 2580000003 HC RX 258: Performed by: FAMILY MEDICINE

## 2018-08-01 PROCEDURE — 36415 COLL VENOUS BLD VENIPUNCTURE: CPT

## 2018-08-01 PROCEDURE — 85025 COMPLETE CBC W/AUTO DIFF WBC: CPT

## 2018-08-01 RX ADMIN — CELECOXIB 200 MG: 200 CAPSULE ORAL at 08:12

## 2018-08-01 RX ADMIN — OXYCODONE HYDROCHLORIDE AND ACETAMINOPHEN 1 TABLET: 10; 325 TABLET ORAL at 17:30

## 2018-08-01 RX ADMIN — PANTOPRAZOLE SODIUM 40 MG: 40 TABLET, DELAYED RELEASE ORAL at 05:52

## 2018-08-01 RX ADMIN — TAMSULOSIN HYDROCHLORIDE 0.4 MG: 0.4 CAPSULE ORAL at 08:12

## 2018-08-01 RX ADMIN — LOSARTAN POTASSIUM 100 MG: 100 TABLET ORAL at 08:13

## 2018-08-01 RX ADMIN — BECLOMETHASONE DIPROPIONATE HFA 4 PUFF: 80 AEROSOL, METERED RESPIRATORY (INHALATION) at 08:12

## 2018-08-01 RX ADMIN — GABAPENTIN 600 MG: 600 TABLET, FILM COATED ORAL at 08:13

## 2018-08-01 RX ADMIN — DULOXETINE HYDROCHLORIDE 60 MG: 60 CAPSULE, DELAYED RELEASE ORAL at 08:13

## 2018-08-01 RX ADMIN — GABAPENTIN 600 MG: 600 TABLET, FILM COATED ORAL at 12:32

## 2018-08-01 RX ADMIN — OXYCODONE HYDROCHLORIDE 20 MG: 20 TABLET, FILM COATED, EXTENDED RELEASE ORAL at 16:15

## 2018-08-01 RX ADMIN — HYDROCHLOROTHIAZIDE 25 MG: 25 TABLET ORAL at 08:13

## 2018-08-01 RX ADMIN — FLUTICASONE PROPIONATE 1 SPRAY: 50 SPRAY, METERED NASAL at 08:12

## 2018-08-01 RX ADMIN — OXYCODONE HYDROCHLORIDE 20 MG: 20 TABLET, FILM COATED, EXTENDED RELEASE ORAL at 05:52

## 2018-08-01 RX ADMIN — OXYCODONE HYDROCHLORIDE AND ACETAMINOPHEN 1 TABLET: 10; 325 TABLET ORAL at 11:33

## 2018-08-01 RX ADMIN — CYCLOBENZAPRINE HYDROCHLORIDE 5 MG: 10 TABLET, FILM COATED ORAL at 12:32

## 2018-08-01 RX ADMIN — OXYCODONE HYDROCHLORIDE AND ACETAMINOPHEN 1 TABLET: 10; 325 TABLET ORAL at 04:21

## 2018-08-01 RX ADMIN — SODIUM CHLORIDE: 9 INJECTION, SOLUTION INTRAVENOUS at 11:33

## 2018-08-01 RX ADMIN — CYCLOBENZAPRINE HYDROCHLORIDE 5 MG: 10 TABLET, FILM COATED ORAL at 08:12

## 2018-08-01 RX ADMIN — FAMOTIDINE 40 MG: 20 TABLET ORAL at 08:12

## 2018-08-01 RX ADMIN — VARENICLINE TARTRATE 1 MG: 0.5 TABLET, FILM COATED ORAL at 08:12

## 2018-08-01 RX ADMIN — VERAPAMIL HYDROCHLORIDE 240 MG: 120 TABLET, FILM COATED, EXTENDED RELEASE ORAL at 08:12

## 2018-08-01 RX ADMIN — SODIUM CHLORIDE: 9 INJECTION, SOLUTION INTRAVENOUS at 04:22

## 2018-08-01 RX ADMIN — PANTOPRAZOLE SODIUM 40 MG: 40 TABLET, DELAYED RELEASE ORAL at 16:15

## 2018-08-01 RX ADMIN — GABAPENTIN 600 MG: 600 TABLET, FILM COATED ORAL at 16:15

## 2018-08-01 ASSESSMENT — PAIN SCALES - GENERAL
PAINLEVEL_OUTOF10: 3
PAINLEVEL_OUTOF10: 6
PAINLEVEL_OUTOF10: 7

## 2018-08-01 NOTE — PROGRESS NOTES
Nutrition Assessment    Type and Reason for Visit: Initial, Positive Nutrition Screen    Nutrition Recommendations: continue POC    Malnutrition Assessment:  · Malnutrition Status: No malnutrition  · Context: Acute illness or injury  · Findings of the 6 clinical characteristics of malnutrition (Minimum of 2 out of 6 clinical characteristics is required to make the diagnosis of moderate or severe Protein Calorie Malnutrition based on AND/ASPEN Guidelines):  1. Energy Intake-Less than or equal to 50%,  (2 days)    2. Weight Loss-2% loss or greater, in 1 week  3. Fat Loss-Unable to assess,    4. Muscle Loss-Unable to assess,    5. Fluid Accumulation-Unable to assess,    6.  Strength-Not measured    Nutrition Diagnosis:   · Problem: Inadequate oral intake  · Etiology: related to Nausea, Vomiting, Diarrhea     Signs and symptoms:  as evidenced by Patient report of (dehydration)    Nutrition Assessment:  · Subjective Assessment: +NS for wt loss and decreased po. Pt. states N/V since Sunday, became dehydrated, lost 10#. Hx of intention wt loss of 100# over past 1 1/2 years. Appetite returning, tolerating general diet, hoping for discharge.   · Nutrition-Focused Physical Findings:    · Wound Type:    · Current Nutrition Therapies:  · Oral Diet Orders: General   · Oral Diet intake: 51-75%  · Anthropometric Measures:  · Ht: 6' 4\" (193 cm)   · Current Body Wt:    · Admission Body Wt: 304 lb (137.9 kg)  · Usual Body Wt: 313 lb (142 kg)  · % Weight Change: 4%,  2 days  · Ideal Body Wt: 202 lb (91.6 kg), % Ideal Body    · BMI Classification: BMI 35.0 - 39.9 Obese Class II    Estimated Intake vs Estimated Needs: Intake Improving    Nutrition Risk Level: Low    Nutrition Interventions:   Continue current diet, ONS not indicated  Continued Inpatient Monitoring    Nutrition Evaluation:   · Evaluation: Goals set   · Goals: PO 50% or more    · Monitoring: Meal Intake, Weight, Diet Tolerance, Fluid Balance, Pertinent Labs,

## 2018-08-04 LAB
BLOOD CULTURE, ROUTINE: NORMAL
CULTURE, BLOOD 2: NORMAL

## 2018-08-05 LAB
CULTURE, STOOL: NORMAL
E COLI SHIGA TOXIN ASSAY: NORMAL

## 2018-08-13 ENCOUNTER — HOSPITAL ENCOUNTER (OUTPATIENT)
Dept: PHYSICAL THERAPY | Facility: HOSPITAL | Age: 63
Setting detail: THERAPIES SERIES
Discharge: HOME | End: 2018-08-13

## 2018-08-13 DIAGNOSIS — M54.12 CERVICAL RADICULOPATHY: ICD-10-CM

## 2018-08-13 DIAGNOSIS — M54.2 NECK PAIN: ICD-10-CM

## 2018-08-13 DIAGNOSIS — M51.36 LUMBAR DEGENERATIVE DISC DISEASE: ICD-10-CM

## 2018-08-13 DIAGNOSIS — G56.01 CARPAL TUNNEL SYNDROME OF RIGHT WRIST: ICD-10-CM

## 2018-08-13 DIAGNOSIS — M54.2 CERVICALGIA: ICD-10-CM

## 2018-08-13 DIAGNOSIS — M54.42 CHRONIC BILATERAL LOW BACK PAIN WITH BILATERAL SCIATICA: Primary | ICD-10-CM

## 2018-08-13 DIAGNOSIS — M48.062 SPINAL STENOSIS, LUMBAR REGION, WITH NEUROGENIC CLAUDICATION: ICD-10-CM

## 2018-08-13 DIAGNOSIS — M54.41 CHRONIC BILATERAL LOW BACK PAIN WITH BILATERAL SCIATICA: Primary | ICD-10-CM

## 2018-08-13 DIAGNOSIS — G89.29 CHRONIC BILATERAL LOW BACK PAIN WITH BILATERAL SCIATICA: Primary | ICD-10-CM

## 2018-08-13 PROCEDURE — 97140 MANUAL THERAPY 1/> REGIONS: CPT | Performed by: PHYSICAL THERAPIST

## 2018-08-13 NOTE — THERAPY PROGRESS REPORT/RE-CERT
Outpatient Physical Therapy Ortho Progress Note  [facility]     Patient Name: Carlos Hayes  : 1955  MRN: [id number]  Today's Date: 2018      Visit Date: 2018    Visit Dx:    ICD-10-CM ICD-9-CM   1. Chronic bilateral low back pain with bilateral sciatica M54.42 724.2    M54.41 724.3    G89.29 338.29   2. Lumbar degenerative disc disease M51.36 722.52   3. Neck pain M54.2 723.1   4. Spinal stenosis, lumbar region, with neurogenic claudication M48.062 724.03   5. Cervicalgia M54.2 723.1   6. Cervical radiculopathy M54.12 723.4   7. Carpal tunnel syndrome of right wrist G56.01 354.0       Patient Active Problem List   Diagnosis   • Battery end of life of spinal cord stimulator   • BMI 40.0-44.9, adult (CMS/[facility])   • Non-smoker   • Cervicalgia   • Cervical radiculopathy   • Carpal tunnel syndrome of right wrist   • Spinal stenosis, lumbar region, with neurogenic claudication   • BMI 39.0-39.9,adult   • Malfunction of spinal cord stimulator (CMS/[facility])        Past Medical History:   Diagnosis Date   • Acute bronchitis    • Amnesia    • Anxiety    • Arthritis    • Asthma    • Back pain    • Chest pain    • CKD (chronic kidney disease)    • Concussion    • Contusion of chest wall    • COPD (chronic obstructive pulmonary disease) (CMS/[facility])    • Degeneration of intervertebral disc of lumbar region    • Fall    • Gait disturbance    • Gastro-esophageal reflux    • Headache    • Hearing impaired    • Hypersomnia    • Hypertension    • Hypotestosteronemia    • Insomnia    • Leg cramp    • Lumbar stenosis    • Obesity    • Obesity    • KATLYN (obstructive sleep apnea)    • PTSD (post-traumatic stress disorder)    • Radiculopathy of lumbosacral region    • Sinusitis    • Testicular hypofunction    • Tremor         Past Surgical History:   Procedure Laterality Date   • BACK SURGERY      L3-4 fusion   • BACK SURGERY      L3-S1 fusion   • CARPAL TUNNEL RELEASE Bilateral    • CHOLECYSTECTOMY     • FRACTURE  SURGERY      LEFT HAND   • HERNIA REPAIR     • KNEE SURGERY     • SPINAL CORD STIMULATOR REMOVAL N/A 5/2/2018    Procedure: SPINAL CORD STIMULATOR REMOVAL Removal of generator and placment of new generator;  Surgeon: Miguel Hall MD;  Location:  [facility];  Service: Neurosurgery   • SPINAL CORD STIMULATOR REMOVAL N/A 7/18/2018    Procedure: SPINAL CORD STIMULATOR BATTERY CHANGE;  Surgeon: Miguel Hall MD;  Location:  [facility] OR;  Service: Neurosurgery   • THORACIC LAMINECTOMY WITH PLACEMENT OF DORSAL COLUMN STIMULATOR  2010                             PT Assessment/Plan     Row Name 08/13/18 1700          PT Assessment    Functional Limitations Impaired gait;Impaired locomotion;Limitation in home management;Performance in leisure activities;Limitations in functional capacity and performance  -TB     Impairments Balance;Gait;Pain;Muscle strength;Motor function;Joint mobility;Impaired muscle length;Range of motion;Posture;Impaired flexibility  -TB     Assessment Comments I haven't seen Edin in the last month due to vacation time on both sides. He has struggled at times with mobility with car transfers and with walking but today, he's moving better. He is still having some trouble with his pelvic floor with emptying his bladder and also having trouble with having an orgasm. Deep release to his PF has helped this in the past.  Our emphasis is still on improving his hip mobility and trying to progress his core stability to try to protect his back and radicular symptoms. If he misses for an extended period of time, he will tend to decline.   -TB     Rehab Potential Good  -TB     Patient/caregiver participated in establishment of treatment plan and goals Yes  -TB     Patient would benefit from skilled therapy intervention Yes  -TB        PT Plan    PT Frequency 1x/week;2x/week  -TB     Predicted Duration of Therapy Intervention (Therapy Eval) 2 months  -TB     Planned CPT's? PT THER PROC EA 15 MIN: 20611;PT THER ACT  EA 15 MIN: 36426;PT MANUAL THERAPY EA 15 MIN: 71894;PT ELECTRICAL STIM UNATTEND: ;PT ELECTRICAL STIM ATTD EA 15 MIN: 76935;PT ULTRASOUND EA 15 MIN: 60949  -TB     PT Plan Comments Continue to assess his pelvic floor tone and work on releasing as needed. Work on hip flexibility, particularly his anterior hips and progress to core stability as he is able to tolerate.   -TB       User Key  (r) = Recorded By, (t) = Taken By, (c) = Cosigned By    Initials Name Provider Type    TB Juarez Hartmann, PT Physical Therapist                    Exercises     Row Name 08/13/18 1700 08/13/18 1540          Subjective Pain    Pre-Treatment Pain Level  -- 7  -TB        Total Minutes    56805 - PT Manual Therapy Minutes 60  -TB  --       User Key  (r) = Recorded By, (t) = Taken By, (c) = Cosigned By    Initials Name Provider Type    TB Juarez Hartmann, PT Physical Therapist                        Manual Rx (last 36 hours)      Manual Treatments     Row Name 08/13/18 1700             Manual Rx 1    Manual Rx 1 Location left sidelying, right glute/lumbar  -TB      Manual Rx 1 Type STM  -TB      Manual Rx 1 Duration 30  -TB         Manual Rx 2    Manual Rx 2 Location hooklying PF bi  -TB      Manual Rx 2 Type deep pressure to to PF including obturator internus externally under ischial tub  -TB      Manual Rx 2 Duration 15 min each side  -TB        User Key  (r) = Recorded By, (t) = Taken By, (c) = Cosigned By    Initials Name Provider Type    Juarez Franklin, PT Physical Therapist                PT OP Goals     Row Name 08/13/18 1700          PT Short Term Goals    STG Date to Achieve 09/13/18  -TB     STG 1 Relax muscle guarding on oliver hip flexors  -TB     STG 1 Progress Ongoing  -TB     STG 1 Progress Comments hip flexors aren't has guarded as they were  -TB     STG 2 Improve mobility of thoracic into extension  -TB     STG 2 Progress Ongoing  -TB     STG 2 Progress Comments haven't emphasized this as much due to rigidity  and risk to upper lumbar  -TB     STG 3 Improve right hip IR to 30 deg  -TB     STG 3 Progress Ongoing  -TB     STG 3 Progress Comments 25 deg oliver  -TB        Long Term Goals    LTG Date to Achieve 10/13/18  -TB     LTG 1 Able to walk community distances without an assistive device or only a cane without severe flare of pain  -TB     LTG 1 Progress Ongoing  -TB     LTG 1 Progress Comments needed a cane a couple of weeks ago due to flare of pain  -TB     LTG 2 Improve hip passive extension to 10 degrees  -TB     LTG 2 Progress Ongoing  -TB     LTG 2 Progress Comments to 0 deg  -TB     LTG 3 Improved core contraction held during functional tasks.  -TB     LTG 3 Progress Met  -TB     LTG 4 Independent with HEP for flexibility and stability.   -TB     LTG 4 Progress Ongoing  -TB     LTG 4 Progress Comments standing hip extension stretch  -TB     LTG 5 Reports no difficulty with voiding bladder or BM's  -TB     LTG 5 Progress Ongoing  -TB     LTG 5 Progress Comments at times, can void bladder but other times, takes several attempts  -TB        Time Calculation    PT Goal Re-Cert Due Date 09/12/18  -TB       User Key  (r) = Recorded By, (t) = Taken By, (c) = Cosigned By    Initials Name Provider Type    TB Juarez Hartmann, PT Physical Therapist          Therapy Education  Given: HEP, Symptoms/condition management  Program: Modified  How Provided: Verbal  Provided to: Patient  Level of Understanding: Verbalized              Time Calculation:   Start Time: 1540  Stop Time: 1650  Time Calculation (min): 70 min  Total Timed Code Minutes- PT: 60 minute(s)  Therapy Suggested Charges     Code   Minutes Charges    30402 (CPT®) Hc Pt Neuromusc Re Education Ea 15 Min      63233 (CPT®) Hc Pt Ther Proc Ea 15 Min      46269 (CPT®) Hc Gait Training Ea 15 Min      76818 (CPT®) Hc Pt Therapeutic Act Ea 15 Min      80739 (CPT®) Hc Pt Manual Therapy Ea 15 Min 60 4    19434 (CPT®) Hc Pt Ther Massage- Per 15 Min      76495 (CPT®) Hc Pt  Iontophoresis Ea 15 Min      54858 (CPT®) Hc Pt Elec Stim Ea-Per 15 Min      97767 (CPT®) Hc Pt Ultrasound Ea 15 Min      33627 (CPT®) Hc Pt Self Care/Mgmt/Train Ea 15 Min      Total  60 4        Therapy Charges for Today     Code Description Service Date Service Provider Modifiers Qty    23233059905 HC PT MANUAL THERAPY EA 15 MIN 8/13/2018 Juarez Hartmann, PT GP 4                    Juarez Hartmann, PT  8/13/2018

## 2018-08-14 ENCOUNTER — OFFICE VISIT (OUTPATIENT)
Dept: NEUROLOGY | Age: 63
End: 2018-08-14
Payer: MEDICARE

## 2018-08-14 VITALS
HEIGHT: 76 IN | HEART RATE: 52 BPM | BODY MASS INDEX: 38.36 KG/M2 | DIASTOLIC BLOOD PRESSURE: 68 MMHG | SYSTOLIC BLOOD PRESSURE: 123 MMHG | WEIGHT: 315 LBS

## 2018-08-14 DIAGNOSIS — G47.33 OBSTRUCTIVE SLEEP APNEA: Primary | ICD-10-CM

## 2018-08-14 PROCEDURE — 1111F DSCHRG MED/CURRENT MED MERGE: CPT | Performed by: PSYCHIATRY & NEUROLOGY

## 2018-08-14 PROCEDURE — 1036F TOBACCO NON-USER: CPT | Performed by: PSYCHIATRY & NEUROLOGY

## 2018-08-14 PROCEDURE — G8417 CALC BMI ABV UP PARAM F/U: HCPCS | Performed by: PSYCHIATRY & NEUROLOGY

## 2018-08-14 PROCEDURE — 99213 OFFICE O/P EST LOW 20 MIN: CPT | Performed by: PSYCHIATRY & NEUROLOGY

## 2018-08-14 PROCEDURE — G8427 DOCREV CUR MEDS BY ELIG CLIN: HCPCS | Performed by: PSYCHIATRY & NEUROLOGY

## 2018-08-14 PROCEDURE — 3017F COLORECTAL CA SCREEN DOC REV: CPT | Performed by: PSYCHIATRY & NEUROLOGY

## 2018-08-14 NOTE — PROGRESS NOTES
[facility]  [address] [place] [address] Drive, [address], [place]  Phone [phone]  Fax [phone]     [address], [address] Follow Up Encounter  2018 2:40 PM    Information:   Patient Name: Charlotte Angela  :   1955  Age:   66 y.o. MRN:   [id number]  Account #:  [de-identified]  Today:  18    Provider: Parker Rubio M.D. Chief Complaint:   Chief Complaint   Patient presents with    6 Month Follow-Up     not sleeping , wakes up alot at night       Subjective:   Charlotte Angela is a 66 y.o. man with a history of obstructive sleep apnea, multiple back surgeries, and chronic back pain who is following up. He has residual spinal stenosis and has an appointment in [place] to see a spine surgeon. He sleeps with his NiPPV device every night. He rests well.         Objective:     Past Medical History:  Past Medical History:   Diagnosis Date    Abnormal gait     Amnesia     Anxiety     Asthma     BiPAP (biphasic positive airway pressure) dependence     NiPPV 23cm/16cm/12cm    Cervical facet syndrome (HCC)     Chronic back pain     Complex sleep apnea syndrome     Initial PS; AHI:  31.2 per PSG, 10/2014    Concussion     COPD (chronic obstructive pulmonary disease) (HCC)     DDD (degenerative disc disease)     Facial ringworm     PERCY (generalized anxiety disorder)     GERD (gastroesophageal reflux disease)     Headache     Herniated disc     Herpes simplex     History of Juan's esophagus     Hyperlipidemia     Hypersomnia     Hypertension     Hypotestosteronism     Lumbar stenosis     Memory loss     Obesity     Obstructive sleep apnea     Initial PS; AHI:  31.2 per split-night PSG, 10/2014    Pneumonia     Potential difficult airway on pre-intubation assessment     PTSD (post-traumatic stress disorder)     Sleep apnea     Bi-pap at night    Vertigo        Past Surgical History:   Procedure Laterality Date    APPENDECTOMY      BACK SURGERY      Three lumbar fusions L3-4, L3-4-5 & S1    CARDIAC CATHETERIZATION      COLONOSCOPY  2009    Neena Guzman    COLONOSCOPY  5/16/16    Dr Estiven Valle, normal, 5 yr recall    ENDOSCOPY, COLON, DIAGNOSTIC      FRACTURE SURGERY      wrist    HERNIA REPAIR      KNEE ARTHROSCOPY      OTHER SURGICAL HISTORY      nerve stimulator. dcs battery replacement 5/2/18    RHINOPLASTY      SKIN BIOPSY      SPINE SURGERY      x 3/Stimulator implant    UPPER GASTROINTESTINAL ENDOSCOPY  2009    Neena Guzman    UPPER GASTROINTESTINAL ENDOSCOPY  12/2013    BE surveillance. pos BE / neg dysplasia. retained gastric contents    UPPER GASTROINTESTINAL ENDOSCOPY N/A 4/12/2016    Dr Estiven Valle, Susy (-), Barretts (+), 3 yr recall       Recent Hospitalizations  · None    Significant Injuries  · None    Habits  Linda Hdez reports that he quit smoking about 2 years ago. His smoking use included Cigarettes. He has a 35.00 pack-year smoking history. He has never used smokeless tobacco. He reports that he drinks alcohol. He reports that he does not use drugs. Family History   Problem Relation Age of Onset    Colon Polyps Mother     Heart Attack Mother     Arrhythmia Mother     Stroke Mother     Colon Polyps Maternal Grandmother     Arrhythmia Father     Obesity Sister     Stroke Paternal Grandmother     Colon Cancer Neg Hx     Esophageal Cancer Neg Hx     Liver Cancer Neg Hx     Liver Disease Neg Hx     Rectal Cancer Neg Hx     Stomach Cancer Neg Hx        Social History  Linda Hdez is , lives in [place], [place], and is retired. Medications:  Current Outpatient Prescriptions   Medication Sig Dispense Refill    oxyCODONE-acetaminophen (PERCOCET)  MG per tablet Take 1 tablet by mouth every 6 hours as needed for Pain. [facility] celecoxib (CELEBREX) 200 MG capsule Take 200 mg by mouth daily      cyclobenzaprine (FLEXERIL) 5 MG tablet Take 5 mg by mouth 3 times daily      valACYclovir (VALTREX) 500 MG tablet Take (NEURONTIN) 800 MG tablet Take 600 mg by mouth 4 times daily 600 four times a day.  esomeprazole Magnesium (NEXIUM) 40 MG PACK Take 40 mg by mouth daily.  traZODone (DESYREL) 50 MG tablet Take 150 mg by mouth nightly        No current facility-administered medications for this visit. Allergies: Allergies as of 08/14/2018 - Review Complete 08/14/2018   Allergen Reaction Noted    Lunesta [eszopiclone] Other (See Comments) 02/13/2018    Levofloxacin  04/08/2017       Review of Systems:  General ROS: negative for - chills or fever  Psychological ROS: negative for - anxiety or depression  ENT ROS: negative for - headaches or sinus pain  Hematological and Lymphatic ROS: negative for - bleeding problems, bruising or swollen lymph nodes  Respiratory ROS: negative for - cough, hemoptysis or shortness of breath  Cardiovascular ROS: negative for - chest pain or palpitations  Gastrointestinal ROS: negative for - blood in stools, constipation, diarrhea or nausea/vomiting  Genito-Urinary ROS: negative for - hematuria or urinary frequency/urgency  Musculoskeletal ROS: negative for - joint pain, joint stiffness or joint swelling  Neurological ROS: negative for - memory loss, numbness/tingling or weakness     Examination:  Vitals:  /68   Pulse 52   Ht 6' 4\" (1.93 m)   Wt (!) 318 lb 6.4 oz (144.4 kg)   BMI 38.76 kg/m²   General appearance:  alert and cooperative with exam  HEENT:  Sclera clear, anicteric, Oropharynx clear, no lesions, Neck supple with midline trachea, Thyroid without masses and Trachea midline  Mallampati 3  Heart[de-identified]  regular rate and rhythm, S1, S2 normal, no murmur, click, rub or gallop  Lungs:  clear to auscultation bilaterally  Extremities:  edema mild ankle  Neurologic:  Extraocular movements are intact without nystagmus. Visual fields are full to confrontation. Facial movements are symmetrical and normal.  Speech is precise. Extremity strength is normal in both uppers and lowers.

## 2018-08-20 ENCOUNTER — TELEPHONE (OUTPATIENT)
Dept: NEUROSURGERY | Facility: CLINIC | Age: 63
End: 2018-08-20

## 2018-08-20 NOTE — TELEPHONE ENCOUNTER
Patient left me a voicemail stating he was having surgery in [place] on 8/24/18 and won't be able to keep his appt on 30th with Dr Hall so he wanted to cancel for now and would call us later to reschedule.  He did also want to mention that the physicians in [place] told him that they may have to disturb the wiring to his DCS during the back surgery b/c the wires were in the way & he just wanted to let us know.      dinora breen CMA

## 2018-08-21 ENCOUNTER — HOSPITAL ENCOUNTER (OUTPATIENT)
Dept: PHYSICAL THERAPY | Facility: HOSPITAL | Age: 63
Setting detail: THERAPIES SERIES
Discharge: HOME | End: 2018-08-21

## 2018-08-21 DIAGNOSIS — M54.42 CHRONIC BILATERAL LOW BACK PAIN WITH BILATERAL SCIATICA: Primary | ICD-10-CM

## 2018-08-21 DIAGNOSIS — M54.12 CERVICAL RADICULOPATHY: ICD-10-CM

## 2018-08-21 DIAGNOSIS — G89.29 CHRONIC BILATERAL LOW BACK PAIN WITH BILATERAL SCIATICA: Primary | ICD-10-CM

## 2018-08-21 DIAGNOSIS — M54.2 NECK PAIN: ICD-10-CM

## 2018-08-21 DIAGNOSIS — M54.41 CHRONIC BILATERAL LOW BACK PAIN WITH BILATERAL SCIATICA: Primary | ICD-10-CM

## 2018-08-21 DIAGNOSIS — M54.2 CERVICALGIA: ICD-10-CM

## 2018-08-21 DIAGNOSIS — M48.062 SPINAL STENOSIS, LUMBAR REGION, WITH NEUROGENIC CLAUDICATION: ICD-10-CM

## 2018-08-21 DIAGNOSIS — M51.36 LUMBAR DEGENERATIVE DISC DISEASE: ICD-10-CM

## 2018-08-21 PROCEDURE — 97535 SELF CARE MNGMENT TRAINING: CPT | Performed by: PHYSICAL THERAPIST

## 2018-08-21 PROCEDURE — 97140 MANUAL THERAPY 1/> REGIONS: CPT | Performed by: PHYSICAL THERAPIST

## 2018-08-21 NOTE — THERAPY TREATMENT NOTE
Outpatient Physical Therapy Ortho Treatment Note  [facility]     Patient Name: Carlos Hayes  : 1955  MRN: [id number]  Today's Date: 2018      Visit Date: 2018    Visit Dx:    ICD-10-CM ICD-9-CM   1. Chronic bilateral low back pain with bilateral sciatica M54.42 724.2    M54.41 724.3    G89.29 338.29   2. Lumbar degenerative disc disease M51.36 722.52   3. Neck pain M54.2 723.1   4. Spinal stenosis, lumbar region, with neurogenic claudication M48.062 724.03   5. Cervicalgia M54.2 723.1   6. Cervical radiculopathy M54.12 723.4       Patient Active Problem List   Diagnosis   • Battery end of life of spinal cord stimulator   • BMI 40.0-44.9, adult (CMS/[facility])   • Non-smoker   • Cervicalgia   • Cervical radiculopathy   • Carpal tunnel syndrome of right wrist   • Spinal stenosis, lumbar region, with neurogenic claudication   • BMI 39.0-39.9,adult   • Malfunction of spinal cord stimulator (CMS/[facility])        Past Medical History:   Diagnosis Date   • Acute bronchitis    • Amnesia    • Anxiety    • Arthritis    • Asthma    • Back pain    • Chest pain    • CKD (chronic kidney disease)    • Concussion    • Contusion of chest wall    • COPD (chronic obstructive pulmonary disease) (CMS/[facility])    • Degeneration of intervertebral disc of lumbar region    • Fall    • Gait disturbance    • Gastro-esophageal reflux    • Headache    • Hearing impaired    • Hypersomnia    • Hypertension    • Hypotestosteronemia    • Insomnia    • Leg cramp    • Lumbar stenosis    • Obesity    • Obesity    • KATLYN (obstructive sleep apnea)    • PTSD (post-traumatic stress disorder)    • Radiculopathy of lumbosacral region    • Sinusitis    • Testicular hypofunction    • Tremor         Past Surgical History:   Procedure Laterality Date   • BACK SURGERY      L3-4 fusion   • BACK SURGERY      L3-S1 fusion   • CARPAL TUNNEL RELEASE Bilateral    • CHOLECYSTECTOMY     • FRACTURE SURGERY      LEFT HAND   • HERNIA REPAIR     • KNEE  SURGERY     • SPINAL CORD STIMULATOR REMOVAL N/A 5/2/2018    Procedure: SPINAL CORD STIMULATOR REMOVAL Removal of generator and placment of new generator;  Surgeon: Miguel Hall MD;  Location:  [facility];  Service: Neurosurgery   • SPINAL CORD STIMULATOR REMOVAL N/A 7/18/2018    Procedure: SPINAL CORD STIMULATOR BATTERY CHANGE;  Surgeon: Miguel Hall MD;  Location:  [facility];  Service: Neurosurgery   • THORACIC LAMINECTOMY WITH PLACEMENT OF DORSAL COLUMN STIMULATOR  2010                             PT Assessment/Plan     Row Name 08/21/18 1310          PT Assessment    Assessment Comments I'm glad he is having his fusion. His concern about losing more mobility is valid but the risk the MD explained of losing B/B control and other nerve damage is real.   -TB        PT Plan    PT Plan Comments Cont to help control pain and work to prepare for surgery.   -TB       User Key  (r) = Recorded By, (t) = Taken By, (c) = Cosigned By    Initials Name Provider Type    TB Juarez Hartmann, PT Physical Therapist                    Exercises     Row Name 08/21/18 1310             Subjective Comments    Subjective Comments He is having surgey next month. He was going to have surgery this week but he was on meds that needed to be cleared out. He had questions about a fusion vs a disc replacement  -TB         Subjective Pain    Pre-Treatment Pain Level 7  -TB         Total Minutes    35177 - PT Manual Therapy Minutes 60  -TB        User Key  (r) = Recorded By, (t) = Taken By, (c) = Cosigned By    Initials Name Provider Type    TB Juarez Hartmann, PT Physical Therapist                        Manual Rx (last 36 hours)      Manual Treatments     Row Name 08/21/18 1310             Manual Rx 1    Manual Rx 1 Location left sidelying, right glute/lumbar  -TB      Manual Rx 1 Type STM  -TB      Manual Rx 1 Duration 30  -TB         Manual Rx 2    Manual Rx 2 Location left sidelying oliver posterior pelvic floor  -TB      Manual Rx 2  Duration 30 min  -TB        User Key  (r) = Recorded By, (t) = Taken By, (c) = Cosigned By    Initials Name Provider Type    Juarez Franklin PT Physical Therapist                PT OP Goals     Row Name 08/21/18 1310          PT Short Term Goals    STG Date to Achieve 09/13/18  -TB     STG 1 Relax muscle guarding on oliver hip flexors  -TB     STG 1 Progress Ongoing  -TB     STG 2 Improve mobility of thoracic into extension  -TB     STG 2 Progress Ongoing  -TB     STG 3 Improve right hip IR to 30 deg  -TB     STG 3 Progress Ongoing  -TB        Long Term Goals    LTG Date to Achieve 10/13/18  -TB     LTG 1 Able to walk community distances without an assistive device or only a cane without severe flare of pain  -TB     LTG 1 Progress Ongoing  -TB     LTG 2 Improve hip passive extension to 10 degrees  -TB     LTG 2 Progress Ongoing  -TB     LTG 3 Improved core contraction held during functional tasks.  -TB     LTG 3 Progress Met  -TB     LTG 4 Independent with HEP for flexibility and stability.   -TB     LTG 4 Progress Ongoing  -TB     LTG 5 Reports no difficulty with voiding bladder or BM's  -TB     LTG 5 Progress Ongoing  -TB     LTG 5 Progress Comments has some difficulty at time voiding bladder  -TB        Time Calculation    PT Goal Re-Cert Due Date 09/12/18  -TB       User Key  (r) = Recorded By, (t) = Taken By, (c) = Cosigned By    Initials Name Provider Type    Juarez Franklin, PT Physical Therapist          Therapy Education  Education Details: educated on what a disc replacement was vs traditional fusions and that he should speak with his surgeon about any questions he might have  Given: HEP, Symptoms/condition management  Program: Modified  How Provided: Verbal  Provided to: Patient  Level of Understanding: Verbalized  09848 - PT Self Care/Mgmt Minutes: 10              Time Calculation:   Start Time: 1310  Stop Time: 1420  Time Calculation (min): 70 min  Total Timed Code Minutes- PT: 70  minute(s)  Therapy Suggested Charges     Code   Minutes Charges    33273 (CPT®) Hc Pt Neuromusc Re Education Ea 15 Min      43209 (CPT®) Hc Pt Ther Proc Ea 15 Min      02329 (CPT®) Hc Gait Training Ea 15 Min      33201 (CPT®) Hc Pt Therapeutic Act Ea 15 Min      71843 (CPT®) Hc Pt Manual Therapy Ea 15 Min 60 4    81122 (CPT®) Hc Pt Ther Massage- Per 15 Min      93475 (CPT®) Hc Pt Iontophoresis Ea 15 Min      78315 (CPT®) Hc Pt Elec Stim Ea-Per 15 Min      90243 (CPT®) Hc Pt Ultrasound Ea 15 Min      14097 (CPT®) Hc Pt Self Care/Mgmt/Train Ea 15 Min 10 1    12596 (CPT®) Hc Pt Prosthetic (S) Train Initial Encounter, Each 15 Min      48933 (CPT®) Hc Orthotic(S) Mgmt/Train Initial Encounter, Each 15min      07268 (CPT®) Hc Pt Aquatic Therapy Ea 15 Min      96442 (CPT®) Hc Pt Orthotic(S)/Prosthetic(S) Encounter, Each 15 Min      Total  70 5        Therapy Charges for Today     Code Description Service Date Service Provider Modifiers Qty    90153057395 HC PT MANUAL THERAPY EA 15 MIN 8/21/2018 Juarez Hartmann, PT GP 4    90794175540 HC PT SELF CARE/MGMT/TRAIN EA 15 MIN 8/21/2018 Juarez Hartmann, PT GP 1                    Juarez Hartmann, PT  8/21/2018

## 2018-08-28 ENCOUNTER — HOSPITAL ENCOUNTER (OUTPATIENT)
Dept: PHYSICAL THERAPY | Facility: HOSPITAL | Age: 63
Setting detail: THERAPIES SERIES
Discharge: HOME | End: 2018-08-28

## 2018-08-28 DIAGNOSIS — G89.29 CHRONIC BILATERAL LOW BACK PAIN WITH BILATERAL SCIATICA: Primary | ICD-10-CM

## 2018-08-28 DIAGNOSIS — M54.12 CERVICAL RADICULOPATHY: ICD-10-CM

## 2018-08-28 DIAGNOSIS — M54.2 CERVICALGIA: ICD-10-CM

## 2018-08-28 DIAGNOSIS — G56.01 CARPAL TUNNEL SYNDROME OF RIGHT WRIST: ICD-10-CM

## 2018-08-28 DIAGNOSIS — M54.42 CHRONIC BILATERAL LOW BACK PAIN WITH BILATERAL SCIATICA: Primary | ICD-10-CM

## 2018-08-28 DIAGNOSIS — M51.36 LUMBAR DEGENERATIVE DISC DISEASE: ICD-10-CM

## 2018-08-28 DIAGNOSIS — M48.062 SPINAL STENOSIS, LUMBAR REGION, WITH NEUROGENIC CLAUDICATION: ICD-10-CM

## 2018-08-28 DIAGNOSIS — M54.2 NECK PAIN: ICD-10-CM

## 2018-08-28 DIAGNOSIS — M54.41 CHRONIC BILATERAL LOW BACK PAIN WITH BILATERAL SCIATICA: Primary | ICD-10-CM

## 2018-08-28 PROCEDURE — 97140 MANUAL THERAPY 1/> REGIONS: CPT | Performed by: PHYSICAL THERAPIST

## 2018-08-28 NOTE — THERAPY TREATMENT NOTE
Outpatient Physical Therapy Ortho Treatment Note  [facility]     Patient Name: Carlos Hayes  : 1955  MRN: [id number]  Today's Date: 2018      Visit Date: 2018    Visit Dx:    ICD-10-CM ICD-9-CM   1. Chronic bilateral low back pain with bilateral sciatica M54.42 724.2    M54.41 724.3    G89.29 338.29   2. Lumbar degenerative disc disease M51.36 722.52   3. Neck pain M54.2 723.1   4. Spinal stenosis, lumbar region, with neurogenic claudication M48.062 724.03   5. Cervicalgia M54.2 723.1   6. Cervical radiculopathy M54.12 723.4   7. Carpal tunnel syndrome of right wrist G56.01 354.0       Patient Active Problem List   Diagnosis   • Battery end of life of spinal cord stimulator   • BMI 40.0-44.9, adult (CMS/[facility])   • Non-smoker   • Cervicalgia   • Cervical radiculopathy   • Carpal tunnel syndrome of right wrist   • Spinal stenosis, lumbar region, with neurogenic claudication   • BMI 39.0-39.9,adult   • Malfunction of spinal cord stimulator (CMS/[facility])        Past Medical History:   Diagnosis Date   • Acute bronchitis    • Amnesia    • Anxiety    • Arthritis    • Asthma    • Back pain    • Chest pain    • CKD (chronic kidney disease)    • Concussion    • Contusion of chest wall    • COPD (chronic obstructive pulmonary disease) (CMS/[facility])    • Degeneration of intervertebral disc of lumbar region    • Fall    • Gait disturbance    • Gastro-esophageal reflux    • Headache    • Hearing impaired    • Hypersomnia    • Hypertension    • Hypotestosteronemia    • Insomnia    • Leg cramp    • Lumbar stenosis    • Obesity    • Obesity    • KATLYN (obstructive sleep apnea)    • PTSD (post-traumatic stress disorder)    • Radiculopathy of lumbosacral region    • Sinusitis    • Testicular hypofunction    • Tremor         Past Surgical History:   Procedure Laterality Date   • BACK SURGERY      L3-4 fusion   • BACK SURGERY      L3-S1 fusion   • CARPAL TUNNEL RELEASE Bilateral    • CHOLECYSTECTOMY     • FRACTURE  "SURGERY      LEFT HAND   • HERNIA REPAIR     • KNEE SURGERY     • SPINAL CORD STIMULATOR REMOVAL N/A 5/2/2018    Procedure: SPINAL CORD STIMULATOR REMOVAL Removal of generator and placment of new generator;  Surgeon: Miguel Hall MD;  Location:  [facility] OR;  Service: Neurosurgery   • SPINAL CORD STIMULATOR REMOVAL N/A 7/18/2018    Procedure: SPINAL CORD STIMULATOR BATTERY CHANGE;  Surgeon: Miguel Hall MD;  Location:  [facility] OR;  Service: Neurosurgery   • THORACIC LAMINECTOMY WITH PLACEMENT OF DORSAL COLUMN STIMULATOR  2010                             PT Assessment/Plan     Row Name 08/28/18 1545          PT Assessment    Assessment Comments He felt better after loosening the muscle guarding  -TB        PT Plan    PT Plan Comments He goes to [place] next week and is having his fusion 9/17. Cont to work on pain relief and mobility to prepare for surgery.  -TB       User Key  (r) = Recorded By, (t) = Taken By, (c) = Cosigned By    Initials Name Provider Type    TB Juarez Hartmann, PT Physical Therapist                    Exercises     Row Name 08/28/18 1700 08/28/18 1534          Subjective Comments    Subjective Comments  -- His c/c today is a \"crick\" in his neck and his back.   -TB        Subjective Pain    Pre-Treatment Pain Level  -- 7  -TB        Total Minutes    17791 - PT Manual Therapy Minutes 60  -TB  --       User Key  (r) = Recorded By, (t) = Taken By, (c) = Cosigned By    Initials Name Provider Type    Juarez Franklin, PT Physical Therapist                        Manual Rx (last 36 hours)      Manual Treatments     Row Name 08/28/18 1545             Manual Rx 1    Manual Rx 1 Location left sidelying right cervical  -TB      Manual Rx 1 Type CT junction extension mob  -TB      Manual Rx 1 Grade right UT/LS STM  -TB      Manual Rx 1 Duration 30  -TB         Manual Rx 2    Manual Rx 2 Location left sidelying right lumbar STM  -TB      Manual Rx 2 Type deep pressure right piri  -TB      Manual Rx 2 " Grade felt his legs go numb with deep piri pressure  -TB      Manual Rx 2 Duration 30  -TB        User Key  (r) = Recorded By, (t) = Taken By, (c) = Cosigned By    Initials Name Provider Type    Juarez Franklin, PT Physical Therapist                PT OP Goals     Row Name 08/28/18 1700          PT Short Term Goals    STG Date to Achieve 09/13/18  -TB     STG 1 Relax muscle guarding on oliver hip flexors  -TB     STG 1 Progress Ongoing  -TB     STG 1 Progress Comments piri was more guarded today  -TB     STG 2 Improve mobility of thoracic into extension  -TB     STG 2 Progress Ongoing  -TB     STG 3 Improve right hip IR to 30 deg  -TB     STG 3 Progress Ongoing  -TB        Long Term Goals    LTG Date to Achieve 10/13/18  -TB     LTG 1 Able to walk community distances without an assistive device or only a cane without severe flare of pain  -TB     LTG 1 Progress Ongoing  -TB     LTG 2 Improve hip passive extension to 10 degrees  -TB     LTG 2 Progress Ongoing  -TB     LTG 3 Improved core contraction held during functional tasks.  -TB     LTG 3 Progress Met  -TB     LTG 4 Independent with HEP for flexibility and stability.   -TB     LTG 4 Progress Ongoing  -TB     LTG 5 Reports no difficulty with voiding bladder or BM's  -TB     LTG 5 Progress Ongoing  -TB        Time Calculation    PT Goal Re-Cert Due Date 09/12/18  -TB       User Key  (r) = Recorded By, (t) = Taken By, (c) = Cosigned By    Initials Name Provider Type    TB Juarez Hartmann, PT Physical Therapist          Therapy Education  Given: HEP, Symptoms/condition management  Program: Modified  How Provided: Verbal  Provided to: Patient  Level of Understanding: Verbalized              Time Calculation:   Start Time: 1545  Stop Time: 1645  Time Calculation (min): 60 min  Total Timed Code Minutes- PT: 60 minute(s)  Therapy Suggested Charges     Code   Minutes Charges    53848 (CPT®) Hc Pt Neuromusc Re Education Ea 15 Min      81441 (CPT®) Hc Pt Ther Proc Ea 15  Min      32059 (CPT®) Hc Gait Training Ea 15 Min      19297 (CPT®) Hc Pt Therapeutic Act Ea 15 Min      21043 (CPT®) Hc Pt Manual Therapy Ea 15 Min 60 4    81883 (CPT®) Hc Pt Ther Massage- Per 15 Min      42933 (CPT®) Hc Pt Iontophoresis Ea 15 Min      03007 (CPT®) Hc Pt Elec Stim Ea-Per 15 Min      53983 (CPT®) Hc Pt Ultrasound Ea 15 Min      52678 (CPT®) Hc Pt Self Care/Mgmt/Train Ea 15 Min      62313 (CPT®) Hc Pt Prosthetic (S) Train Initial Encounter, Each 15 Min      54142 (CPT®) Hc Orthotic(S) Mgmt/Train Initial Encounter, Each 15min      68194 (CPT®) Hc Pt Aquatic Therapy Ea 15 Min      73959 (CPT®) Hc Pt Orthotic(S)/Prosthetic(S) Encounter, Each 15 Min      Total  60 4        Therapy Charges for Today     Code Description Service Date Service Provider Modifiers Qty    27967762415 HC PT MANUAL THERAPY EA 15 MIN 8/28/2018 Juarez Hartmann, PT GP 4                    Juarez Hartmann, PT  8/28/2018

## 2018-08-29 PROBLEM — E86.0 DEHYDRATION: Status: RESOLVED | Noted: 2018-07-30 | Resolved: 2018-08-29

## 2018-09-04 ENCOUNTER — HOSPITAL ENCOUNTER (OUTPATIENT)
Dept: PHYSICAL THERAPY | Facility: HOSPITAL | Age: 63
Setting detail: THERAPIES SERIES
Discharge: HOME | End: 2018-09-04

## 2018-09-04 DIAGNOSIS — M54.41 CHRONIC BILATERAL LOW BACK PAIN WITH BILATERAL SCIATICA: Primary | ICD-10-CM

## 2018-09-04 DIAGNOSIS — G56.01 CARPAL TUNNEL SYNDROME OF RIGHT WRIST: ICD-10-CM

## 2018-09-04 DIAGNOSIS — M54.42 CHRONIC BILATERAL LOW BACK PAIN WITH BILATERAL SCIATICA: Primary | ICD-10-CM

## 2018-09-04 DIAGNOSIS — M51.36 LUMBAR DEGENERATIVE DISC DISEASE: ICD-10-CM

## 2018-09-04 DIAGNOSIS — M54.12 CERVICAL RADICULOPATHY: ICD-10-CM

## 2018-09-04 DIAGNOSIS — M54.2 NECK PAIN: ICD-10-CM

## 2018-09-04 DIAGNOSIS — M54.2 CERVICALGIA: ICD-10-CM

## 2018-09-04 DIAGNOSIS — G89.29 CHRONIC BILATERAL LOW BACK PAIN WITH BILATERAL SCIATICA: Primary | ICD-10-CM

## 2018-09-04 DIAGNOSIS — M48.062 SPINAL STENOSIS, LUMBAR REGION, WITH NEUROGENIC CLAUDICATION: ICD-10-CM

## 2018-09-04 PROCEDURE — 97140 MANUAL THERAPY 1/> REGIONS: CPT | Performed by: PHYSICAL THERAPIST

## 2018-09-04 NOTE — THERAPY TREATMENT NOTE
Outpatient Physical Therapy Ortho Treatment Note  [facility]     Patient Name: Carlos Hayes  : 1955  MRN: [id number]  Today's Date: 2018      Visit Date: 2018    Visit Dx:    ICD-10-CM ICD-9-CM   1. Chronic bilateral low back pain with bilateral sciatica M54.42 724.2    M54.41 724.3    G89.29 338.29   2. Lumbar degenerative disc disease M51.36 722.52   3. Neck pain M54.2 723.1   4. Spinal stenosis, lumbar region, with neurogenic claudication M48.062 724.03   5. Cervicalgia M54.2 723.1   6. Cervical radiculopathy M54.12 723.4   7. Carpal tunnel syndrome of right wrist G56.01 354.0       Patient Active Problem List   Diagnosis   • Battery end of life of spinal cord stimulator   • BMI 40.0-44.9, adult (CMS/[facility])   • Non-smoker   • Cervicalgia   • Cervical radiculopathy   • Carpal tunnel syndrome of right wrist   • Spinal stenosis, lumbar region, with neurogenic claudication   • BMI 39.0-39.9,adult   • Malfunction of spinal cord stimulator (CMS/[facility])        Past Medical History:   Diagnosis Date   • Acute bronchitis    • Amnesia    • Anxiety    • Arthritis    • Asthma    • Back pain    • Chest pain    • CKD (chronic kidney disease)    • Concussion    • Contusion of chest wall    • COPD (chronic obstructive pulmonary disease) (CMS/[facility])    • Degeneration of intervertebral disc of lumbar region    • Fall    • Gait disturbance    • Gastro-esophageal reflux    • Headache    • Hearing impaired    • Hypersomnia    • Hypertension    • Hypotestosteronemia    • Insomnia    • Leg cramp    • Lumbar stenosis    • Obesity    • Obesity    • KATLYN (obstructive sleep apnea)    • PTSD (post-traumatic stress disorder)    • Radiculopathy of lumbosacral region    • Sinusitis    • Testicular hypofunction    • Tremor         Past Surgical History:   Procedure Laterality Date   • BACK SURGERY      L3-4 fusion   • BACK SURGERY      L3-S1 fusion   • CARPAL TUNNEL RELEASE Bilateral    • CHOLECYSTECTOMY     • FRACTURE  SURGERY      LEFT HAND   • HERNIA REPAIR     • KNEE SURGERY     • SPINAL CORD STIMULATOR REMOVAL N/A 5/2/2018    Procedure: SPINAL CORD STIMULATOR REMOVAL Removal of generator and placment of new generator;  Surgeon: Miguel Hall MD;  Location:  [facility];  Service: Neurosurgery   • SPINAL CORD STIMULATOR REMOVAL N/A 7/18/2018    Procedure: SPINAL CORD STIMULATOR BATTERY CHANGE;  Surgeon: Miguel Hall MD;  Location:  [facility];  Service: Neurosurgery   • THORACIC LAMINECTOMY WITH PLACEMENT OF DORSAL COLUMN STIMULATOR  2010                             PT Assessment/Plan     Row Name 09/04/18 1600          PT Assessment    Assessment Comments He has cut back on the pain meds he's taking when he first gets up and this has helped him be able to orgasm when he wakes up. His spasms haven't been as bad and we are trying to not overdo it because I don't want to flare him before his surgery.  -TB        PT Plan    PT Plan Comments Cont to prepare him for surgery wtih pain control and mobility.  -TB       User Key  (r) = Recorded By, (t) = Taken By, (c) = Cosigned By    Initials Name Provider Type    TB Juarez Hartmann, PT Physical Therapist                    Exercises     Row Name 09/04/18 1600 09/04/18 1534          Subjective Comments    Subjective Comments  -- C/c is LBP, especially leaning over. His surgery is in 2 weeks (9/17).  He is still waking up with spasms and having to take muscle relaxers. The pain isn't as bad but more frequent.   -TB        Subjective Pain    Pre-Treatment Pain Level  -- 6  -TB        Total Minutes    83945 - PT Manual Therapy Minutes 50  -TB  --       User Key  (r) = Recorded By, (t) = Taken By, (c) = Cosigned By    Initials Name Provider Type    Juarez Franklin, PT Physical Therapist                        Manual Rx (last 36 hours)      Manual Treatments     Row Name 09/04/18 6335             Manual Rx 1    Manual Rx 1 Location sidelying oliver lumbar/upper glute  -TB      Manual Rx  1 Type STM  -TB      Manual Rx 1 Grade min to deep pressure; avoided left glute due to have testosterone pellets inserted  -TB      Manual Rx 1 Duration 30  -TB         Manual Rx 2    Manual Rx 2 Location hooklying oliver PF and short hip adductors  -TB      Manual Rx 2 Type deep pressure  -TB      Manual Rx 2 Duration 15  -TB        User Key  (r) = Recorded By, (t) = Taken By, (c) = Cosigned By    Initials Name Provider Type    TB Juarez Hartmann, PT Physical Therapist                PT OP Goals     Row Name 09/04/18 1600          PT Short Term Goals    STG Date to Achieve 09/13/18  -TB     STG 1 Relax muscle guarding on oliver hip flexors  -TB     STG 1 Progress Ongoing  -TB     STG 1 Progress Comments not as guarded today  -TB     STG 2 Improve mobility of thoracic into extension  -TB     STG 2 Progress Ongoing  -TB     STG 3 Improve right hip IR to 30 deg  -TB     STG 3 Progress Ongoing  -TB        Long Term Goals    LTG Date to Achieve 10/13/18  -TB     LTG 1 Able to walk community distances without an assistive device or only a cane without severe flare of pain  -TB     LTG 1 Progress Ongoing  -TB     LTG 2 Improve hip passive extension to 10 degrees  -TB     LTG 2 Progress Ongoing  -TB     LTG 3 Improved core contraction held during functional tasks.  -TB     LTG 3 Progress Met  -TB     LTG 4 Independent with HEP for flexibility and stability.   -TB     LTG 4 Progress Ongoing  -TB     LTG 5 Reports no difficulty with voiding bladder or BM's  -TB     LTG 5 Progress Ongoing  -TB        Time Calculation    PT Goal Re-Cert Due Date 09/12/18  -TB       User Key  (r) = Recorded By, (t) = Taken By, (c) = Cosigned By    Initials Name Provider Type    TB Juarez Hartmann, PT Physical Therapist          Therapy Education  Given: HEP, Symptoms/condition management  Program: Reinforced  How Provided: Verbal  Provided to: Patient  Level of Understanding: Verbalized              Time Calculation:   Start Time: 1545  Stop Time:  1635  Time Calculation (min): 50 min  Total Timed Code Minutes- PT: 50 minute(s)  Therapy Suggested Charges     Code   Minutes Charges    27296 (CPT®) Hc Pt Neuromusc Re Education Ea 15 Min      77688 (CPT®) Hc Pt Ther Proc Ea 15 Min      27596 (CPT®) Hc Gait Training Ea 15 Min      52491 (CPT®) Hc Pt Therapeutic Act Ea 15 Min      48533 (CPT®) Hc Pt Manual Therapy Ea 15 Min 50 3    24259 (CPT®) Hc Pt Ther Massage- Per 15 Min      50201 (CPT®) Hc Pt Iontophoresis Ea 15 Min      86950 (CPT®) Hc Pt Elec Stim Ea-Per 15 Min      84197 (CPT®) Hc Pt Ultrasound Ea 15 Min      14307 (CPT®) Hc Pt Self Care/Mgmt/Train Ea 15 Min      90396 (CPT®) Hc Pt Prosthetic (S) Train Initial Encounter, Each 15 Min      32601 (CPT®) Hc Orthotic(S) Mgmt/Train Initial Encounter, Each 15min      42537 (CPT®) Hc Pt Aquatic Therapy Ea 15 Min      28991 (CPT®) Hc Pt Orthotic(S)/Prosthetic(S) Encounter, Each 15 Min      Total  50 3        Therapy Charges for Today     Code Description Service Date Service Provider Modifiers Qty    13783959739 HC PT MANUAL THERAPY EA 15 MIN 9/4/2018 Juarez Hartmann, PT KX, GP 3                    Juarez Hartmann, PT  9/4/2018

## 2018-09-10 ENCOUNTER — HOSPITAL ENCOUNTER (OUTPATIENT)
Dept: PHYSICAL THERAPY | Facility: HOSPITAL | Age: 63
Setting detail: THERAPIES SERIES
Discharge: HOME | End: 2018-09-10

## 2018-09-10 DIAGNOSIS — M51.36 LUMBAR DEGENERATIVE DISC DISEASE: ICD-10-CM

## 2018-09-10 DIAGNOSIS — M54.42 CHRONIC BILATERAL LOW BACK PAIN WITH BILATERAL SCIATICA: Primary | ICD-10-CM

## 2018-09-10 DIAGNOSIS — M54.41 CHRONIC BILATERAL LOW BACK PAIN WITH BILATERAL SCIATICA: Primary | ICD-10-CM

## 2018-09-10 DIAGNOSIS — M54.2 NECK PAIN: ICD-10-CM

## 2018-09-10 DIAGNOSIS — G56.01 CARPAL TUNNEL SYNDROME OF RIGHT WRIST: ICD-10-CM

## 2018-09-10 DIAGNOSIS — M54.2 CERVICALGIA: ICD-10-CM

## 2018-09-10 DIAGNOSIS — M48.062 SPINAL STENOSIS, LUMBAR REGION, WITH NEUROGENIC CLAUDICATION: ICD-10-CM

## 2018-09-10 DIAGNOSIS — M54.12 CERVICAL RADICULOPATHY: ICD-10-CM

## 2018-09-10 DIAGNOSIS — G89.29 CHRONIC BILATERAL LOW BACK PAIN WITH BILATERAL SCIATICA: Primary | ICD-10-CM

## 2018-09-10 PROCEDURE — 97140 MANUAL THERAPY 1/> REGIONS: CPT | Performed by: PHYSICAL THERAPIST

## 2018-09-10 NOTE — THERAPY TREATMENT NOTE
Outpatient Physical Therapy Ortho Treatment Note  [facility]     Patient Name: Carlos Hayes  : 1955  MRN: [id number]  Today's Date: 9/10/2018      Visit Date: 09/10/2018    Visit Dx:    ICD-10-CM ICD-9-CM   1. Chronic bilateral low back pain with bilateral sciatica M54.42 724.2    M54.41 724.3    G89.29 338.29   2. Lumbar degenerative disc disease M51.36 722.52   3. Neck pain M54.2 723.1   4. Spinal stenosis, lumbar region, with neurogenic claudication M48.062 724.03   5. Cervical radiculopathy M54.12 723.4   6. Cervicalgia M54.2 723.1   7. Carpal tunnel syndrome of right wrist G56.01 354.0       Patient Active Problem List   Diagnosis   • Battery end of life of spinal cord stimulator   • BMI 40.0-44.9, adult (CMS/[facility])   • Non-smoker   • Cervicalgia   • Cervical radiculopathy   • Carpal tunnel syndrome of right wrist   • Spinal stenosis, lumbar region, with neurogenic claudication   • BMI 39.0-39.9,adult   • Malfunction of spinal cord stimulator (CMS/[facility])        Past Medical History:   Diagnosis Date   • Acute bronchitis    • Amnesia    • Anxiety    • Arthritis    • Asthma    • Back pain    • Chest pain    • CKD (chronic kidney disease)    • Concussion    • Contusion of chest wall    • COPD (chronic obstructive pulmonary disease) (CMS/[facility])    • Degeneration of intervertebral disc of lumbar region    • Fall    • Gait disturbance    • Gastro-esophageal reflux    • Headache    • Hearing impaired    • Hypersomnia    • Hypertension    • Hypotestosteronemia    • Insomnia    • Leg cramp    • Lumbar stenosis    • Obesity    • Obesity    • KATLYN (obstructive sleep apnea)    • PTSD (post-traumatic stress disorder)    • Radiculopathy of lumbosacral region    • Sinusitis    • Testicular hypofunction    • Tremor         Past Surgical History:   Procedure Laterality Date   • BACK SURGERY      L3-4 fusion   • BACK SURGERY      L3-S1 fusion   • CARPAL TUNNEL RELEASE Bilateral    • CHOLECYSTECTOMY     • FRACTURE  SURGERY      LEFT HAND   • HERNIA REPAIR     • KNEE SURGERY     • SPINAL CORD STIMULATOR REMOVAL N/A 5/2/2018    Procedure: SPINAL CORD STIMULATOR REMOVAL Removal of generator and placment of new generator;  Surgeon: Miguel Hall MD;  Location:  [facility];  Service: Neurosurgery   • SPINAL CORD STIMULATOR REMOVAL N/A 7/18/2018    Procedure: SPINAL CORD STIMULATOR BATTERY CHANGE;  Surgeon: Miguel Hall MD;  Location:  [facility];  Service: Neurosurgery   • THORACIC LAMINECTOMY WITH PLACEMENT OF DORSAL COLUMN STIMULATOR  2010                             PT Assessment/Plan     Row Name 09/10/18 1600          PT Assessment    Assessment Comments He is having surgery next week to fuse L2/3. I'm not sure when he might be returning. Overall, he is moving better but because of the increased stenosis and risk of loss of bladder control, it's time to get it fixed.   -TB        PT Plan    PT Plan Comments Hold on PT to make sure he is able to have the surgery and then discharge. Plan to do a new eval when the MD is ready for him to return to PT.  -TB       User Key  (r) = Recorded By, (t) = Taken By, (c) = Cosigned By    Initials Name Provider Type    TB Juarez Hartmann, PT Physical Therapist                    Exercises     Row Name 09/10/18 1600             Subjective Comments    Subjective Comments c/c today is his right lateral glute/hip. He also said his right foot and knee was hurting. I explained this could be more from mechanical stress from the way he's walking. He's having surgery next week on his back.   -TB         Subjective Pain    Pre-Treatment Pain Level 6  -TB         Total Minutes    93008 - PT Manual Therapy Minutes 55  -TB        User Key  (r) = Recorded By, (t) = Taken By, (c) = Cosigned By    Initials Name Provider Type    Juarez Franklin, PT Physical Therapist                        Manual Rx (last 36 hours)      Manual Treatments     Row Name 09/10/18 1500             Manual Rx 1    Manual Rx  1 Location right glute/ITB  -TB      Manual Rx 1 Type IASTM with foam roll, left sidelying  -TB      Manual Rx 1 Grade strong OP  -TB      Manual Rx 1 Duration 20  -TB         Manual Rx 2    Manual Rx 2 Location STM right lumbar  -TB      Manual Rx 2 Duration 15  -TB         Manual Rx 3    Manual Rx 3 Location deep tissue release right glute  -TB      Manual Rx 3 Type focused on lateral posterior hip  -TB      Manual Rx 3 Duration 20  -TB        User Key  (r) = Recorded By, (t) = Taken By, (c) = Cosigned By    Initials Name Provider Type    TB Juarez Hartmann, PT Physical Therapist                PT OP Goals     Row Name 09/10/18 1600          PT Short Term Goals    STG Date to Achieve 09/13/18  -TB     STG 1 Relax muscle guarding on oliver hip flexors  -TB     STG 1 Progress Ongoing  -TB     STG 1 Progress Comments right lateral hip/glute more guarded  -TB     STG 2 Improve mobility of thoracic into extension  -TB     STG 2 Progress Ongoing  -TB     STG 3 Improve right hip IR to 30 deg  -TB     STG 3 Progress Ongoing  -TB        Long Term Goals    LTG Date to Achieve 10/13/18  -TB     LTG 1 Able to walk community distances without an assistive device or only a cane without severe flare of pain  -TB     LTG 1 Progress Ongoing  -TB     LTG 2 Improve hip passive extension to 10 degrees  -TB     LTG 2 Progress Ongoing  -TB     LTG 3 Improved core contraction held during functional tasks.  -TB     LTG 3 Progress Met  -TB     LTG 4 Independent with HEP for flexibility and stability.   -TB     LTG 4 Progress Ongoing  -TB     LTG 5 Reports no difficulty with voiding bladder or BM's  -TB     LTG 5 Progress Ongoing  -TB        Time Calculation    PT Goal Re-Cert Due Date 09/12/18  -TB       User Key  (r) = Recorded By, (t) = Taken By, (c) = Cosigned By    Initials Name Provider Type    TB Juarez Harmtann, PT Physical Therapist          Therapy Education  Given: HEP, Symptoms/condition management  Program: Reinforced  How  Provided: Verbal  Provided to: Patient  Level of Understanding: Verbalized              Time Calculation:   Start Time: 1545  Stop Time: 1640  Time Calculation (min): 55 min  Total Timed Code Minutes- PT: 55 minute(s)  Therapy Suggested Charges     Code   Minutes Charges    06959 (CPT®) Hc Pt Neuromusc Re Education Ea 15 Min      27488 (CPT®) Hc Pt Ther Proc Ea 15 Min      57438 (CPT®) Hc Gait Training Ea 15 Min      80240 (CPT®) Hc Pt Therapeutic Act Ea 15 Min      69963 (CPT®) Hc Pt Manual Therapy Ea 15 Min 55 4    19657 (CPT®) Hc Pt Ther Massage- Per 15 Min      93219 (CPT®) Hc Pt Iontophoresis Ea 15 Min      38426 (CPT®) Hc Pt Elec Stim Ea-Per 15 Min      28456 (CPT®) Hc Pt Ultrasound Ea 15 Min      74126 (CPT®) Hc Pt Self Care/Mgmt/Train Ea 15 Min      38387 (CPT®) Hc Pt Prosthetic (S) Train Initial Encounter, Each 15 Min      54888 (CPT®) Hc Orthotic(S) Mgmt/Train Initial Encounter, Each 15min      46950 (CPT®) Hc Pt Aquatic Therapy Ea 15 Min      78705 (CPT®) Hc Pt Orthotic(S)/Prosthetic(S) Encounter, Each 15 Min      Total  55 4        Therapy Charges for Today     Code Description Service Date Service Provider Modifiers Qty    49161965294 HC PT MANUAL THERAPY EA 15 MIN 9/10/2018 Juarez Hartmann, PT GP 4                    Juarez Hartmann, PT  9/10/2018

## 2018-09-17 ENCOUNTER — APPOINTMENT (OUTPATIENT)
Dept: PHYSICAL THERAPY | Facility: HOSPITAL | Age: 63
End: 2018-09-17

## 2018-11-27 ENCOUNTER — DOCUMENTATION (OUTPATIENT)
Dept: PHYSICAL THERAPY | Facility: HOSPITAL | Age: 63
End: 2018-11-27

## 2018-11-27 DIAGNOSIS — M54.41 CHRONIC BILATERAL LOW BACK PAIN WITH BILATERAL SCIATICA: Primary | ICD-10-CM

## 2018-11-27 DIAGNOSIS — M54.2 NECK PAIN: ICD-10-CM

## 2018-11-27 DIAGNOSIS — G56.01 CARPAL TUNNEL SYNDROME OF RIGHT WRIST: ICD-10-CM

## 2018-11-27 DIAGNOSIS — M48.062 SPINAL STENOSIS, LUMBAR REGION, WITH NEUROGENIC CLAUDICATION: ICD-10-CM

## 2018-11-27 DIAGNOSIS — M54.12 CERVICAL RADICULOPATHY: ICD-10-CM

## 2018-11-27 DIAGNOSIS — M54.42 CHRONIC BILATERAL LOW BACK PAIN WITH BILATERAL SCIATICA: Primary | ICD-10-CM

## 2018-11-27 DIAGNOSIS — M51.36 LUMBAR DEGENERATIVE DISC DISEASE: ICD-10-CM

## 2018-11-27 DIAGNOSIS — G89.29 CHRONIC BILATERAL LOW BACK PAIN WITH BILATERAL SCIATICA: Primary | ICD-10-CM

## 2018-11-27 DIAGNOSIS — M54.2 CERVICALGIA: ICD-10-CM

## 2018-11-27 NOTE — THERAPY DISCHARGE NOTE
Outpatient Physical Therapy Discharge Summary         Patient Name: Carlos Hayes  : 1955  MRN: [id number]    Today's Date: 2018    Visit Dx:    ICD-10-CM ICD-9-CM   1. Chronic bilateral low back pain with bilateral sciatica M54.42 724.2    M54.41 724.3    G89.29 338.29   2. Lumbar degenerative disc disease M51.36 722.52   3. Neck pain M54.2 723.1   4. Spinal stenosis, lumbar region, with neurogenic claudication M48.062 724.03   5. Cervical radiculopathy M54.12 723.4   6. Cervicalgia M54.2 723.1   7. Carpal tunnel syndrome of right wrist G56.01 354.0       PT OP Goals     Row Name 18 1100          PT Short Term Goals    STG Date to Achieve  18  -TB     STG 1  Relax muscle guarding on oliver hip flexors  -TB     STG 1 Progress  Partially Met  -TB     STG 1 Progress Comments  right lateral hip/glute more guarded  -TB     STG 2  Improve mobility of thoracic into extension  -TB     STG 2 Progress  Not Met  -TB     STG 2 Progress Comments  haven't emphasized this as much due to rigidity and risk to upper lumbar  -TB     STG 3  Improve right hip IR to 30 deg  -TB     STG 3 Progress  Partially Met  -TB     STG 3 Progress Comments  25 deg oliver  -TB        Long Term Goals    LTG Date to Achieve  10/13/18  -TB     LTG 1  Able to walk community distances without an assistive device or only a cane without severe flare of pain  -TB     LTG 1 Progress  Partially Met  -TB     LTG 1 Progress Comments  was able to walk community distances at times but he would still have bouts of flares of pain and need a cane   -TB     LTG 2  Improve hip passive extension to 10 degrees  -TB     LTG 2 Progress  Partially Met  -TB     LTG 2 Progress Comments  to 0 deg  -TB     LTG 3  Improved core contraction held during functional tasks.  -TB     LTG 3 Progress  Met  -TB     LTG 3 Progress Comments  able to hold his TA core muscles for functional tasks, he made a point to try to do this  -TB     LTG 4  Independent with  HEP for flexibility and stability.   -TB     LTG 4 Progress  Met  -TB     LTG 4 Progress Comments  he worked on hip flexibility and core stability  -TB     LTG 5  Reports no difficulty with voiding bladder or BM's  -TB     LTG 5 Progress  Partially Met  -TB     LTG 5 Progress Comments  he would have times where he could void well, especially after a PF release. Other times, he would struggle particularly if his PF was more painful and guarded  -TB       User Key  (r) = Recorded By, (t) = Taken By, (c) = Cosigned By    Initials Name Provider Type    Juarez Franklin, PT Physical Therapist          OP PT Discharge Summary  Date of Discharge: 11/27/18  Reason for Discharge: Change in medical status  Outcomes Achieved: Patient able to partially acheive established goals  Discharge Destination: Home with home program  Discharge Instructions/Additional Comments: We saw Edin for a long time for his myriad of problems. He was better this round of PT compared to last time when could barely walk. He had lost weight which appeared to help. Our emphasis was on trying to improve his mobility through his thoracic and hips to try to distribute movement away from his lumbar just above his fusion where he was showing stenosis. This appeared to be affecting the tone of his pelvic floor causing problems with emptying his bladder. We would do a pelvic floor release to stretch it and try to relax it and this would help temporarily. We would work on his neck intermittently for neck pain and radicular pain. He had surgery on his lumbar to fusion his L2/3 segment which had been giving him problems. We had held PT in case he couldnt' have the surgery and needed to come back.       Time Calculation:        Therapy Suggested Charges     Code   Minutes Charges    None                       Juarez Hartmann, PT  11/27/2018

## 2018-11-30 ENCOUNTER — TRANSCRIBE ORDERS (OUTPATIENT)
Dept: ADMINISTRATIVE | Facility: HOSPITAL | Age: 63
End: 2018-11-30

## 2018-11-30 DIAGNOSIS — R06.09 DOE (DYSPNEA ON EXERTION): Primary | ICD-10-CM

## 2018-12-04 ENCOUNTER — TRANSCRIBE ORDERS (OUTPATIENT)
Dept: ADMINISTRATIVE | Facility: HOSPITAL | Age: 63
End: 2018-12-04

## 2018-12-04 DIAGNOSIS — R06.09 DOE (DYSPNEA ON EXERTION): Primary | ICD-10-CM

## 2018-12-06 ENCOUNTER — HOSPITAL ENCOUNTER (OUTPATIENT)
Dept: CARDIOLOGY | Facility: HOSPITAL | Age: 63
Discharge: HOME | End: 2018-12-06

## 2018-12-06 VITALS
RESPIRATION RATE: 20 BRPM | WEIGHT: 315 LBS | DIASTOLIC BLOOD PRESSURE: 70 MMHG | HEART RATE: 55 BPM | BODY MASS INDEX: 38.36 KG/M2 | SYSTOLIC BLOOD PRESSURE: 118 MMHG | HEIGHT: 76 IN

## 2018-12-06 DIAGNOSIS — R06.09 DOE (DYSPNEA ON EXERTION): ICD-10-CM

## 2018-12-06 PROCEDURE — 93017 CV STRESS TEST TRACING ONLY: CPT

## 2018-12-06 PROCEDURE — 0 TECHNETIUM SESTAMIBI: Performed by: FAMILY MEDICINE

## 2018-12-06 PROCEDURE — A9500 TC99M SESTAMIBI: HCPCS | Performed by: FAMILY MEDICINE

## 2018-12-06 PROCEDURE — 25010000002 REGADENOSON 0.4 MG/5ML SOLUTION: Performed by: INTERNAL MEDICINE

## 2018-12-06 PROCEDURE — 78452 HT MUSCLE IMAGE SPECT MULT: CPT | Performed by: INTERNAL MEDICINE

## 2018-12-06 PROCEDURE — 93018 CV STRESS TEST I&R ONLY: CPT | Performed by: INTERNAL MEDICINE

## 2018-12-06 PROCEDURE — 78452 HT MUSCLE IMAGE SPECT MULT: CPT

## 2018-12-06 RX ADMIN — TECHNETIUM TC 99M SESTAMIBI 1 DOSE: 1 INJECTION INTRAVENOUS at 11:20

## 2018-12-06 RX ADMIN — REGADENOSON 0.4 MG: 0.08 INJECTION, SOLUTION INTRAVENOUS at 11:17

## 2018-12-06 RX ADMIN — TECHNETIUM TC 99M SESTAMIBI 1 DOSE: 1 INJECTION INTRAVENOUS at 10:00

## 2018-12-07 ENCOUNTER — HOSPITAL ENCOUNTER (OUTPATIENT)
Dept: CT IMAGING | Facility: HOSPITAL | Age: 63
Discharge: HOME | End: 2018-12-07
Attending: FAMILY MEDICINE

## 2018-12-07 ENCOUNTER — HOSPITAL ENCOUNTER (OUTPATIENT)
Dept: CARDIOLOGY | Facility: HOSPITAL | Age: 63
Discharge: HOME | End: 2018-12-07
Attending: FAMILY MEDICINE | Admitting: FAMILY MEDICINE

## 2018-12-07 ENCOUNTER — HOSPITAL ENCOUNTER (OUTPATIENT)
Dept: PULMONOLOGY | Facility: HOSPITAL | Age: 63
Discharge: HOME | End: 2018-12-07
Attending: FAMILY MEDICINE

## 2018-12-07 VITALS
SYSTOLIC BLOOD PRESSURE: 125 MMHG | WEIGHT: 315 LBS | HEIGHT: 76 IN | DIASTOLIC BLOOD PRESSURE: 73 MMHG | BODY MASS INDEX: 38.36 KG/M2

## 2018-12-07 DIAGNOSIS — R06.09 DOE (DYSPNEA ON EXERTION): ICD-10-CM

## 2018-12-07 LAB
BH CV ECHO MEAS - AO MAX PG (FULL): 2.1 MMHG
BH CV ECHO MEAS - AO MAX PG: 8.2 MMHG
BH CV ECHO MEAS - AO MEAN PG (FULL): 1 MMHG
BH CV ECHO MEAS - AO MEAN PG: 4 MMHG
BH CV ECHO MEAS - AO ROOT AREA (BSA CORRECTED): 1.3
BH CV ECHO MEAS - AO ROOT AREA: 9.1 CM^2
BH CV ECHO MEAS - AO ROOT DIAM: 3.4 CM
BH CV ECHO MEAS - AO V2 MAX: 143 CM/SEC
BH CV ECHO MEAS - AO V2 MEAN: 98 CM/SEC
BH CV ECHO MEAS - AO V2 VTI: 31.6 CM
BH CV ECHO MEAS - AVA(I,A): 4.2 CM^2
BH CV ECHO MEAS - AVA(I,D): 4.2 CM^2
BH CV ECHO MEAS - AVA(V,A): 3.9 CM^2
BH CV ECHO MEAS - AVA(V,D): 3.9 CM^2
BH CV ECHO MEAS - BSA(HAYCOCK): 2.9 M^2
BH CV ECHO MEAS - BSA: 2.7 M^2
BH CV ECHO MEAS - BZI_BMI: 41 KILOGRAMS/M^2
BH CV ECHO MEAS - BZI_METRIC_HEIGHT: 190.5 CM
BH CV ECHO MEAS - BZI_METRIC_WEIGHT: 148.8 KG
BH CV ECHO MEAS - EDV(CUBED): 172.8 ML
BH CV ECHO MEAS - EDV(MOD-SP4): 168 ML
BH CV ECHO MEAS - EDV(TEICH): 151.8 ML
BH CV ECHO MEAS - EF(CUBED): 59.2 %
BH CV ECHO MEAS - EF(MOD-SP4): 68.8 %
BH CV ECHO MEAS - EF(TEICH): 50.2 %
BH CV ECHO MEAS - ESV(CUBED): 70.4 ML
BH CV ECHO MEAS - ESV(MOD-SP4): 52.5 ML
BH CV ECHO MEAS - ESV(TEICH): 75.5 ML
BH CV ECHO MEAS - FS: 25.9 %
BH CV ECHO MEAS - IVS/LVPW: 1.1
BH CV ECHO MEAS - IVSD: 1.5 CM
BH CV ECHO MEAS - LA DIMENSION: 4.1 CM
BH CV ECHO MEAS - LA/AO: 1.2
BH CV ECHO MEAS - LAT PEAK E' VEL: 8.6 CM/SEC
BH CV ECHO MEAS - LV DIASTOLIC VOL/BSA (35-75): 62 ML/M^2
BH CV ECHO MEAS - LV MASS(C)D: 350 GRAMS
BH CV ECHO MEAS - LV MASS(C)DI: 129.2 GRAMS/M^2
BH CV ECHO MEAS - LV MAX PG: 6.1 MMHG
BH CV ECHO MEAS - LV MEAN PG: 3 MMHG
BH CV ECHO MEAS - LV SYSTOLIC VOL/BSA (12-30): 19.4 ML/M^2
BH CV ECHO MEAS - LV V1 MAX: 123 CM/SEC
BH CV ECHO MEAS - LV V1 MEAN: 79.6 CM/SEC
BH CV ECHO MEAS - LV V1 VTI: 29.1 CM
BH CV ECHO MEAS - LVIDD: 5.6 CM
BH CV ECHO MEAS - LVIDS: 4.1 CM
BH CV ECHO MEAS - LVLD AP4: 10.1 CM
BH CV ECHO MEAS - LVLS AP4: 8 CM
BH CV ECHO MEAS - LVOT AREA (M): 4.5 CM^2
BH CV ECHO MEAS - LVOT AREA: 4.5 CM^2
BH CV ECHO MEAS - LVOT DIAM: 2.4 CM
BH CV ECHO MEAS - LVPWD: 1.4 CM
BH CV ECHO MEAS - MED PEAK E' VEL: 7.18 CM/SEC
BH CV ECHO MEAS - MV A MAX VEL: 64.1 CM/SEC
BH CV ECHO MEAS - MV DEC TIME: 0.41 SEC
BH CV ECHO MEAS - MV E MAX VEL: 54.9 CM/SEC
BH CV ECHO MEAS - MV E/A: 0.86
BH CV ECHO MEAS - PA MAX PG: 3.5 MMHG
BH CV ECHO MEAS - PA V2 MAX: 93.6 CM/SEC
BH CV ECHO MEAS - RAP SYSTOLE: 5 MMHG
BH CV ECHO MEAS - RVSP: 31.8 MMHG
BH CV ECHO MEAS - SI(AO): 106 ML/M^2
BH CV ECHO MEAS - SI(CUBED): 37.8 ML/M^2
BH CV ECHO MEAS - SI(LVOT): 48.6 ML/M^2
BH CV ECHO MEAS - SI(MOD-SP4): 42.7 ML/M^2
BH CV ECHO MEAS - SI(TEICH): 28.2 ML/M^2
BH CV ECHO MEAS - SV(AO): 286.9 ML
BH CV ECHO MEAS - SV(CUBED): 102.4 ML
BH CV ECHO MEAS - SV(LVOT): 131.6 ML
BH CV ECHO MEAS - SV(MOD-SP4): 115.5 ML
BH CV ECHO MEAS - SV(TEICH): 76.3 ML
BH CV ECHO MEAS - TR MAX VEL: 259 CM/SEC
BH CV ECHO MEASUREMENTS AVERAGE E/E' RATIO: 6.96
BH CV NUCLEAR PRIOR STUDY: 3
BH CV STRESS BP STAGE 1: NORMAL
BH CV STRESS COMMENTS STAGE 1: NORMAL
BH CV STRESS DOSE REGADENOSON STAGE 1: 0.4
BH CV STRESS DURATION MIN STAGE 1: 0
BH CV STRESS DURATION SEC STAGE 1: 10
BH CV STRESS HR STAGE 1: 70
BH CV STRESS PROTOCOL 1: NORMAL
BH CV STRESS RECOVERY BP: NORMAL MMHG
BH CV STRESS RECOVERY HR: 58 BPM
BH CV STRESS STAGE 1: 1
CREAT BLDA-MCNC: 1.6 MG/DL (ref 0.6–1.3)
LEFT ATRIUM VOLUME INDEX: 30.2 ML/M2
LEFT ATRIUM VOLUME: 81.9 CM3
LV EF NUC BP: 63 %
MAXIMAL PREDICTED HEART RATE: 159 BPM
MAXIMAL PREDICTED HEART RATE: 159 BPM
PERCENT MAX PREDICTED HR: 44.03 %
STRESS BASELINE BP: NORMAL MMHG
STRESS BASELINE HR: 60 BPM
STRESS PERCENT HR: 52 %
STRESS POST EXERCISE DUR MIN: 0 MIN
STRESS POST EXERCISE DUR SEC: 10 SEC
STRESS POST PEAK BP: NORMAL MMHG
STRESS POST PEAK HR: 70 BPM
STRESS TARGET HR: 135 BPM
STRESS TARGET HR: 135 BPM

## 2018-12-07 PROCEDURE — 94010 BREATHING CAPACITY TEST: CPT

## 2018-12-07 PROCEDURE — 94729 DIFFUSING CAPACITY: CPT | Performed by: INTERNAL MEDICINE

## 2018-12-07 PROCEDURE — 71275 CT ANGIOGRAPHY CHEST: CPT

## 2018-12-07 PROCEDURE — 25010000002 PERFLUTREN 6.52 MG/ML SUSPENSION: Performed by: FAMILY MEDICINE

## 2018-12-07 PROCEDURE — 93306 TTE W/DOPPLER COMPLETE: CPT

## 2018-12-07 PROCEDURE — 94010 BREATHING CAPACITY TEST: CPT | Performed by: INTERNAL MEDICINE

## 2018-12-07 PROCEDURE — 94727 GAS DIL/WSHOT DETER LNG VOL: CPT

## 2018-12-07 PROCEDURE — 94727 GAS DIL/WSHOT DETER LNG VOL: CPT | Performed by: INTERNAL MEDICINE

## 2018-12-07 PROCEDURE — 0 IOPAMIDOL PER 1 ML: Performed by: FAMILY MEDICINE

## 2018-12-07 PROCEDURE — 82565 ASSAY OF CREATININE: CPT

## 2018-12-07 PROCEDURE — 93306 TTE W/DOPPLER COMPLETE: CPT | Performed by: INTERNAL MEDICINE

## 2018-12-07 PROCEDURE — 94729 DIFFUSING CAPACITY: CPT

## 2018-12-07 RX ADMIN — IOPAMIDOL 150 ML: 755 INJECTION, SOLUTION INTRAVENOUS at 08:17

## 2018-12-07 RX ADMIN — PERFLUTREN 9.78 MG: 6.52 INJECTION, SUSPENSION INTRAVENOUS at 07:55

## 2018-12-11 ENCOUNTER — TELEPHONE (OUTPATIENT)
Dept: CARDIOLOGY | Facility: CLINIC | Age: 63
End: 2018-12-11

## 2018-12-11 NOTE — TELEPHONE ENCOUNTER
No - he has an appt to see me tomorrow and then once I've seen him will order heart cath if appropriate

## 2018-12-11 NOTE — TELEPHONE ENCOUNTER
Dr Elliott's office called and asked if you were still planning on ordering a cardiac cath to be done on this patient tomorrow. Please advise.   Thanks!

## 2018-12-12 ENCOUNTER — PREP FOR SURGERY (OUTPATIENT)
Dept: OTHER | Facility: HOSPITAL | Age: 63
End: 2018-12-12

## 2018-12-12 ENCOUNTER — OFFICE VISIT (OUTPATIENT)
Dept: CARDIOLOGY | Facility: CLINIC | Age: 63
End: 2018-12-12

## 2018-12-12 VITALS
HEIGHT: 76 IN | SYSTOLIC BLOOD PRESSURE: 124 MMHG | BODY MASS INDEX: 38.36 KG/M2 | HEART RATE: 55 BPM | WEIGHT: 315 LBS | DIASTOLIC BLOOD PRESSURE: 78 MMHG

## 2018-12-12 DIAGNOSIS — R07.89 OTHER CHEST PAIN: ICD-10-CM

## 2018-12-12 DIAGNOSIS — R94.39 ABNORMAL STRESS TEST: Primary | ICD-10-CM

## 2018-12-12 DIAGNOSIS — I10 ESSENTIAL HYPERTENSION: ICD-10-CM

## 2018-12-12 PROCEDURE — 93000 ELECTROCARDIOGRAM COMPLETE: CPT | Performed by: INTERNAL MEDICINE

## 2018-12-12 PROCEDURE — 99204 OFFICE O/P NEW MOD 45 MIN: CPT | Performed by: INTERNAL MEDICINE

## 2018-12-12 RX ORDER — LOSARTAN POTASSIUM AND HYDROCHLOROTHIAZIDE 25; 100 MG/1; MG/1
1 TABLET ORAL DAILY
COMMUNITY
End: 2022-02-07

## 2018-12-12 RX ORDER — NITROGLYCERIN 0.4 MG/1
TABLET SUBLINGUAL
Qty: 30 TABLET | Refills: 11 | Status: SHIPPED | OUTPATIENT
Start: 2018-12-12 | End: 2018-12-12

## 2018-12-12 RX ORDER — TAMSULOSIN HYDROCHLORIDE 0.4 MG/1
1 CAPSULE ORAL NIGHTLY
COMMUNITY
End: 2019-02-19 | Stop reason: SDUPTHER

## 2018-12-12 RX ORDER — DILTIAZEM HYDROCHLORIDE 240 MG/1
240 CAPSULE, COATED, EXTENDED RELEASE ORAL DAILY
COMMUNITY

## 2018-12-12 RX ORDER — SODIUM CHLORIDE 9 MG/ML
1-3 INJECTION, SOLUTION INTRAVENOUS CONTINUOUS
Status: CANCELLED | OUTPATIENT
Start: 2018-12-12

## 2018-12-12 RX ORDER — ASPIRIN 81 MG/1
81 TABLET ORAL DAILY
Status: CANCELLED | OUTPATIENT
Start: 2018-12-13

## 2018-12-12 RX ORDER — CELECOXIB 200 MG/1
200 CAPSULE ORAL DAILY
COMMUNITY

## 2018-12-12 RX ORDER — CYCLOBENZAPRINE HCL 5 MG
5 TABLET ORAL 3 TIMES DAILY PRN
Status: ON HOLD | COMMUNITY
End: 2020-10-31

## 2018-12-12 RX ORDER — SODIUM CHLORIDE 0.9 % (FLUSH) 0.9 %
3-10 SYRINGE (ML) INJECTION AS NEEDED
Status: CANCELLED | OUTPATIENT
Start: 2018-12-12

## 2018-12-12 RX ORDER — SODIUM CHLORIDE 0.9 % (FLUSH) 0.9 %
3 SYRINGE (ML) INJECTION EVERY 12 HOURS SCHEDULED
Status: CANCELLED | OUTPATIENT
Start: 2018-12-12

## 2018-12-12 RX ORDER — ASPIRIN 81 MG/1
324 TABLET, CHEWABLE ORAL ONCE
Status: CANCELLED | OUTPATIENT
Start: 2018-12-12 | End: 2018-12-12

## 2018-12-12 RX ORDER — FLUTICASONE PROPIONATE 50 MCG
2 SPRAY, SUSPENSION (ML) NASAL DAILY
Status: ON HOLD | COMMUNITY
End: 2020-10-31

## 2018-12-12 RX ORDER — VALACYCLOVIR HYDROCHLORIDE 500 MG/1
500 TABLET, FILM COATED ORAL DAILY
COMMUNITY
End: 2020-10-31 | Stop reason: HOSPADM

## 2018-12-12 NOTE — H&P (VIEW-ONLY)
"    [facility] - CARDIOLOGY  New Patient Initial Outpatient [facility]    Primary Care Physician: Carlos Elliott MD    Subjective     Chief Complaint: abnormal stress test    History of Present Illness  63-year-old male kindly referred to me by Dr. Elliott for a recent abnormal stress test.  Patient reports that he has started having chest pain after sex on 11/30. Associated with dyspnea. Pin-point location on left chest described as \"ache\" that is consistent that lasted for up to 20 minutes. No associated palpitations, nausea, or diaphoresis. Fiancee reports overall breathing has been worse since back surgery.   Patient reports he's also had discomfort the last few mornings that have woken him up from sleep - has hx of GERD but states this is distinctly different than his usual heartburn.  He walked 1/2 mile last night and reports having both the same chest discomfort and dyspnea. He has HTN but no hyperlipidemia or diabetes.    Reports having a heart cath with Dr. Garcia for CP about 10 years ago and was told it was ok.    Review of Systems   HENT: Negative for nosebleeds.    Cardiovascular: Positive for chest pain and dyspnea on exertion. Negative for claudication, irregular heartbeat, leg swelling, near-syncope, orthopnea, palpitations, paroxysmal nocturnal dyspnea and syncope.   Respiratory: Negative for cough, shortness of breath and wheezing.    Hematologic/Lymphatic: Negative for bleeding problem. Does not bruise/bleed easily.   Musculoskeletal: Positive for back pain and neck pain.   Gastrointestinal: Negative for dysphagia, hematemesis, hematochezia and melena.   Genitourinary: Negative for hematuria and non-menstrual bleeding.   Neurological:        +neuropathic BLE pain    Otherwise complete ROS reviewed and negative except as mentioned in the HPI.      Past Medical History:   Past Medical History:   Diagnosis Date   • Acute bronchitis    • Amnesia    • Anxiety    • Arthritis    • Asthma    • Back pain   "   • Chest pain    • CKD (chronic kidney disease)    • Concussion    • Contusion of chest wall    • COPD (chronic obstructive pulmonary disease) (CMS/[facility])    • Degeneration of intervertebral disc of lumbar region    • Fall    • Gait disturbance    • Gastro-esophageal reflux    • Headache    • Hearing impaired    • Hypersomnia    • Hypertension    • Hypotestosteronemia    • Insomnia    • Leg cramp    • Lumbar stenosis    • Obesity    • Obesity    • KATLYN (obstructive sleep apnea)    • PTSD (post-traumatic stress disorder)    • Radiculopathy of lumbosacral region    • Sinusitis    • Testicular hypofunction    • Tremor        Past Surgical History:  Past Surgical History:   Procedure Laterality Date   • BACK SURGERY  1994    L3-4 fusion   • BACK SURGERY  2009    L3-S1 fusion   • CARDIAC CATHETERIZATION     • CARPAL TUNNEL RELEASE Bilateral    • CHOLECYSTECTOMY     • FRACTURE SURGERY      LEFT HAND   • HERNIA REPAIR     • KNEE SURGERY     • THORACIC LAMINECTOMY WITH PLACEMENT OF DORSAL COLUMN STIMULATOR  2010       Family History: family history includes Arrhythmia in his father and mother; Stroke in his mother.    Social History:  reports that he quit smoking about 2 years ago. His smoking use included cigarettes. He started smoking about 43 years ago. He smoked 0.75 packs per day. he has never used smokeless tobacco. He reports that he drinks alcohol. He reports that he does not use drugs.    Medications:  Prior to Admission medications    Medication Sig Start Date End Date Taking? Authorizing Provider   albuterol (PROVENTIL HFA;VENTOLIN HFA) 108 (90 Base) MCG/ACT inhaler Inhale.    Aung Gonzalez MD   ALPRAZolam (XANAX) 1 MG tablet Take 1 mg by mouth 2 (Two) Times a Day. 2/16/17   Aung Gonzalez MD   azelastine (ASTELIN) 0.1 % nasal spray 1 spray into each nostril. 2/16/17   Aung Gonzalez MD   ciprofloxacin (CIPRO) 500 MG tablet Take 500 mg by mouth 2 (Two) Times a Day.    Aung Gonzalez MD    diphenoxylate-atropine (LOMOTIL) 2.5-0.025 MG per tablet Take 1 tablet by mouth 4 (Four) Times a Day As Needed for Diarrhea.    Aung Gonzalez MD   DULoxetine (CYMBALTA) 60 MG capsule Take 60 mg by mouth Daily.    Aung Gonzalez MD   esomeprazole (NEXIUM) 40 MG packet Take 40 mg by mouth Daily. 2/16/17   Aung Gonzalez MD   gabapentin (NEURONTIN) 800 MG tablet Take 600 mg by mouth.    Aung Gonzalez MD   glucosamine sulfate 500 MG capsule capsule Take  by mouth 3 (Three) Times a Day With Meals.    Aung Gonzalez MD   ipratropium-albuterol (DUO-NEB) 0.5-2.5 mg/mL nebulizer Inhale 3 mL 4 (Four) Times a Day.    Aung Gonzalez MD   ketorolac (TORADOL) 10 MG tablet Take 10 mg by mouth Every 6 (Six) Hours As Needed for Mild Pain . 10/24/16   Aung Gonzalez MD   Lorcaserin HCl (BELVIQ) 10 MG tablet Take  by mouth 2 (Two) Times a Day.    Aung Gonzalez MD   meclizine (ANTIVERT) 25 MG tablet Take 25 mg by mouth 4 (Four) Times a Day As Needed for dizziness.    Aung Gonzalez MD   mometasone (ASMANEX TWISTHALER) inhaler 110 mcg/inhalation Inhale 2 puffs Daily.    Aung Gonzalez MD   Multiple Vitamins-Minerals (MULTI COMPLETE PO) Take  by mouth Daily.    Aung Gonzalez MD   Omega 3 1000 MG capsule Take 1 g by mouth Daily.    Aung Gonzalez MD   omeprazole (priLOSEC) 20 MG capsule Take 20 mg by mouth Daily.    Aung Gonzalez MD   oxyCODONE-acetaminophen (PERCOCET)  MG per tablet 1 tablet. 5/21/16   Aung Gonzalez MD   OXYCONTIN 15 MG tablet extended-release 12 hour Take 20 mg by mouth Every 12 (Twelve) Hours. 4/27/18   Aung Gonzalez MD   prochlorperazine (COMPAZINE) 10 MG tablet Take 10 mg by mouth Every 6 (Six) Hours.    Aung Gonzalez MD   raNITIdine (ZANTAC) 150 MG tablet Take 150 mg by mouth 2 (Two) Times a Day. 2/16/17   Aung Gonzalez MD   tadalafil (ADCIRCA) 20 MG tablet tablet Take 7 mg by mouth  "Daily.    ProviderAung MD   tiZANidine (ZANAFLEX) 4 MG tablet Take 4 mg by mouth Every 8 (Eight) Hours As Needed for Muscle Spasms. 7/19/16   Aung Gonzalez MD   traZODone (DESYREL) 50 MG tablet 150 mg at bedtime 3/15/17   Aung Gonzalez MD   varenicline (CHANTIX) 1 MG tablet Take 1 mg by mouth 2 (Two) Times a Day.    Aung Gonzalez MD   verapamil (VERELAN) 300 MG capsule sustained-release 24 hr 24 hr capsule  3/30/18   Aung Gonzalez MD     Allergies:  Allergies   Allergen Reactions   • Eszopiclone Other (See Comments)     Flu like symptoms    • Levofloxacin Rash       Objective     Vital Signs: /78 (BP Location: Right arm, Patient Position: Sitting)   Pulse 55   Ht 193 cm (76\")   Wt (!) 150 kg (330 lb)   BMI 40.17 kg/m²     Physical Exam   Constitutional: No distress.   HENT:   Nose: Nose normal.   Mouth/Throat: Pharynx is normal.   Neck: Neck supple. No JVD present.   Cardiovascular: Normal rate, regular rhythm, S1 normal, S2 normal, normal heart sounds, intact distal pulses and normal pulses.  No extrasystoles are present. PMI is not displaced.   Pulmonary/Chest: Effort normal and breath sounds normal.   Abdominal: Soft. He exhibits no distension. There is no splenomegaly or hepatomegaly. There is no tenderness.   Musculoskeletal: He exhibits no edema or tenderness.   Neurological: He is alert and oriented to person, place, and time.   Skin: Skin is warm and dry.       Results Reviewed:      ECG 12 Lead  Date/Time: 12/12/2018 5:34 PM  Performed by: Lizandro Quiroz MD  Authorized by: Lizandro Quiroz MD   Comparison: not compared with previous ECG   Rhythm: sinus bradycardia  Rate: normal  BPM: 55  ST Segments: ST segments normal  T Waves: T waves normal  QRS axis: normal  Other: no other findings  Clinical impression: normal ECG                  Assessment / Plan        Problem List Items Addressed This Visit        Cardiovascular and Mediastinum    Abnormal stress " test - Primary    Relevant Orders    ECG 12 Lead    Hypertension    Relevant Medications    losartan-hydrochlorothiazide (HYZAAR) 100-25 MG per tablet    diltiaZEM CD (CARDIZEM CD) 240 MG 24 hr capsule       Digestive    BMI 40.0-44.9, adult (CMS/HCC)       Nervous and Auditory    Other chest pain        Plans: given symptoms and risk factors for CAD, along with abnormal stress, will proceed with cardiac catheterization via radial approach.  I advised that the patient start taking ASA 81mg daily. I was going to provide Rx for SL NTG to be used PRN, but he is on daily PDE5 inhibitor (tadalafil) so this was cancelled. I did advise him to seek treatment in the ED if pain returned and worsened prior to cath.    Lizandro Quiroz MD   12/12/18   9:59 PM

## 2018-12-12 NOTE — PROGRESS NOTES
"    [facility] - CARDIOLOGY  New Patient Initial Outpatient [facility]    Primary Care Physician: Carlos Elliott MD    Subjective     Chief Complaint: abnormal stress test    History of Present Illness  63-year-old male kindly referred to me by Dr. Elliott for a recent abnormal stress test.  Patient reports that he has started having chest pain after sex on 11/30. Associated with dyspnea. Pin-point location on left chest described as \"ache\" that is consistent that lasted for up to 20 minutes. No associated palpitations, nausea, or diaphoresis. Fiancee reports overall breathing has been worse since back surgery.   Patient reports he's also had discomfort the last few mornings that have woken him up from sleep - has hx of GERD but states this is distinctly different than his usual heartburn.  He walked 1/2 mile last night and reports having both the same chest discomfort and dyspnea. He has HTN but no hyperlipidemia or diabetes.    Reports having a heart cath with Dr. Garcia for CP about 10 years ago and was told it was ok.    Review of Systems   HENT: Negative for nosebleeds.    Cardiovascular: Positive for chest pain and dyspnea on exertion. Negative for claudication, irregular heartbeat, leg swelling, near-syncope, orthopnea, palpitations, paroxysmal nocturnal dyspnea and syncope.   Respiratory: Negative for cough, shortness of breath and wheezing.    Hematologic/Lymphatic: Negative for bleeding problem. Does not bruise/bleed easily.   Musculoskeletal: Positive for back pain and neck pain.   Gastrointestinal: Negative for dysphagia, hematemesis, hematochezia and melena.   Genitourinary: Negative for hematuria and non-menstrual bleeding.   Neurological:        +neuropathic BLE pain    Otherwise complete ROS reviewed and negative except as mentioned in the HPI.      Past Medical History:   Past Medical History:   Diagnosis Date   • Acute bronchitis    • Amnesia    • Anxiety    • Arthritis    • Asthma    • Back pain  "   • Chest pain    • CKD (chronic kidney disease)    • Concussion    • Contusion of chest wall    • COPD (chronic obstructive pulmonary disease) (CMS/[facility])    • Degeneration of intervertebral disc of lumbar region    • Fall    • Gait disturbance    • Gastro-esophageal reflux    • Headache    • Hearing impaired    • Hypersomnia    • Hypertension    • Hypotestosteronemia    • Insomnia    • Leg cramp    • Lumbar stenosis    • Obesity    • Obesity    • KATLYN (obstructive sleep apnea)    • PTSD (post-traumatic stress disorder)    • Radiculopathy of lumbosacral region    • Sinusitis    • Testicular hypofunction    • Tremor        Past Surgical History:  Past Surgical History:   Procedure Laterality Date   • BACK SURGERY  1994    L3-4 fusion   • BACK SURGERY  2009    L3-S1 fusion   • CARDIAC CATHETERIZATION     • CARPAL TUNNEL RELEASE Bilateral    • CHOLECYSTECTOMY     • FRACTURE SURGERY      LEFT HAND   • HERNIA REPAIR     • KNEE SURGERY     • THORACIC LAMINECTOMY WITH PLACEMENT OF DORSAL COLUMN STIMULATOR  2010       Family History: family history includes Arrhythmia in his father and mother; Stroke in his mother.    Social History:  reports that he quit smoking about 2 years ago. His smoking use included cigarettes. He started smoking about 43 years ago. He smoked 0.75 packs per day. he has never used smokeless tobacco. He reports that he drinks alcohol. He reports that he does not use drugs.    Medications:  Prior to Admission medications    Medication Sig Start Date End Date Taking? Authorizing Provider   albuterol (PROVENTIL HFA;VENTOLIN HFA) 108 (90 Base) MCG/ACT inhaler Inhale.    Aung Gonzalez MD   ALPRAZolam (XANAX) 1 MG tablet Take 1 mg by mouth 2 (Two) Times a Day. 2/16/17   Aung Gonzalez MD   azelastine (ASTELIN) 0.1 % nasal spray 1 spray into each nostril. 2/16/17   Aung Gonzalez MD   ciprofloxacin (CIPRO) 500 MG tablet Take 500 mg by mouth 2 (Two) Times a Day.    Aung Gonzalez MD    diphenoxylate-atropine (LOMOTIL) 2.5-0.025 MG per tablet Take 1 tablet by mouth 4 (Four) Times a Day As Needed for Diarrhea.    Aung Gonzalez MD   DULoxetine (CYMBALTA) 60 MG capsule Take 60 mg by mouth Daily.    Aung Gonzalez MD   esomeprazole (NEXIUM) 40 MG packet Take 40 mg by mouth Daily. 2/16/17   Aung Gonzalez MD   gabapentin (NEURONTIN) 800 MG tablet Take 600 mg by mouth.    Aung Gonzalez MD   glucosamine sulfate 500 MG capsule capsule Take  by mouth 3 (Three) Times a Day With Meals.    Aung Gonzalez MD   ipratropium-albuterol (DUO-NEB) 0.5-2.5 mg/mL nebulizer Inhale 3 mL 4 (Four) Times a Day.    Aung Gonzalez MD   ketorolac (TORADOL) 10 MG tablet Take 10 mg by mouth Every 6 (Six) Hours As Needed for Mild Pain . 10/24/16   Aung Gonzalez MD   Lorcaserin HCl (BELVIQ) 10 MG tablet Take  by mouth 2 (Two) Times a Day.    Aung Gonzalez MD   meclizine (ANTIVERT) 25 MG tablet Take 25 mg by mouth 4 (Four) Times a Day As Needed for dizziness.    Aung Gonzalez MD   mometasone (ASMANEX TWISTHALER) inhaler 110 mcg/inhalation Inhale 2 puffs Daily.    Aung Gonzalez MD   Multiple Vitamins-Minerals (MULTI COMPLETE PO) Take  by mouth Daily.    Aung Gonzalez MD   Omega 3 1000 MG capsule Take 1 g by mouth Daily.    Aung Gonzalez MD   omeprazole (priLOSEC) 20 MG capsule Take 20 mg by mouth Daily.    Aung Gonzalez MD   oxyCODONE-acetaminophen (PERCOCET)  MG per tablet 1 tablet. 5/21/16   Aung Gonzalez MD   OXYCONTIN 15 MG tablet extended-release 12 hour Take 20 mg by mouth Every 12 (Twelve) Hours. 4/27/18   Aung Gonzalez MD   prochlorperazine (COMPAZINE) 10 MG tablet Take 10 mg by mouth Every 6 (Six) Hours.    Aung Gonzalez MD   raNITIdine (ZANTAC) 150 MG tablet Take 150 mg by mouth 2 (Two) Times a Day. 2/16/17   Aung Gonzalez MD   tadalafil (ADCIRCA) 20 MG tablet tablet Take 7 mg by mouth  "Daily.    ProviderAung MD   tiZANidine (ZANAFLEX) 4 MG tablet Take 4 mg by mouth Every 8 (Eight) Hours As Needed for Muscle Spasms. 7/19/16   Aung Gonzalez MD   traZODone (DESYREL) 50 MG tablet 150 mg at bedtime 3/15/17   Aung Gonzalez MD   varenicline (CHANTIX) 1 MG tablet Take 1 mg by mouth 2 (Two) Times a Day.    Aung Gonzalez MD   verapamil (VERELAN) 300 MG capsule sustained-release 24 hr 24 hr capsule  3/30/18   Aung Gonzalez MD     Allergies:  Allergies   Allergen Reactions   • Eszopiclone Other (See Comments)     Flu like symptoms    • Levofloxacin Rash       Objective     Vital Signs: /78 (BP Location: Right arm, Patient Position: Sitting)   Pulse 55   Ht 193 cm (76\")   Wt (!) 150 kg (330 lb)   BMI 40.17 kg/m²     Physical Exam   Constitutional: No distress.   HENT:   Nose: Nose normal.   Mouth/Throat: Pharynx is normal.   Neck: Neck supple. No JVD present.   Cardiovascular: Normal rate, regular rhythm, S1 normal, S2 normal, normal heart sounds, intact distal pulses and normal pulses.  No extrasystoles are present. PMI is not displaced.   Pulmonary/Chest: Effort normal and breath sounds normal.   Abdominal: Soft. He exhibits no distension. There is no splenomegaly or hepatomegaly. There is no tenderness.   Musculoskeletal: He exhibits no edema or tenderness.   Neurological: He is alert and oriented to person, place, and time.   Skin: Skin is warm and dry.       Results Reviewed:      ECG 12 Lead  Date/Time: 12/12/2018 5:34 PM  Performed by: Lizandro Quiroz MD  Authorized by: Lizandro Quiroz MD   Comparison: not compared with previous ECG   Rhythm: sinus bradycardia  Rate: normal  BPM: 55  ST Segments: ST segments normal  T Waves: T waves normal  QRS axis: normal  Other: no other findings  Clinical impression: normal ECG                  Assessment / Plan        Problem List Items Addressed This Visit        Cardiovascular and Mediastinum    Abnormal stress " test - Primary    Relevant Orders    ECG 12 Lead    Hypertension    Relevant Medications    losartan-hydrochlorothiazide (HYZAAR) 100-25 MG per tablet    diltiaZEM CD (CARDIZEM CD) 240 MG 24 hr capsule       Digestive    BMI 40.0-44.9, adult (CMS/HCC)       Nervous and Auditory    Other chest pain        Plans: given symptoms and risk factors for CAD, along with abnormal stress, will proceed with cardiac catheterization via radial approach.  I advised that the patient start taking ASA 81mg daily. I was going to provide Rx for SL NTG to be used PRN, but he is on daily PDE5 inhibitor (tadalafil) so this was cancelled. I did advise him to seek treatment in the ED if pain returned and worsened prior to cath.    Lizandro Quiroz MD   12/12/18   9:59 PM

## 2018-12-14 ENCOUNTER — HOSPITAL ENCOUNTER (OUTPATIENT)
Facility: HOSPITAL | Age: 63
Discharge: HOME | End: 2018-12-14
Attending: INTERNAL MEDICINE | Admitting: INTERNAL MEDICINE

## 2018-12-14 VITALS
OXYGEN SATURATION: 96 % | SYSTOLIC BLOOD PRESSURE: 93 MMHG | HEIGHT: 70 IN | DIASTOLIC BLOOD PRESSURE: 48 MMHG | WEIGHT: 315 LBS | RESPIRATION RATE: 18 BRPM | TEMPERATURE: 98.3 F | BODY MASS INDEX: 45.1 KG/M2 | HEART RATE: 49 BPM

## 2018-12-14 DIAGNOSIS — R94.39 ABNORMAL STRESS TEST: ICD-10-CM

## 2018-12-14 LAB
ANION GAP SERPL CALCULATED.3IONS-SCNC: 11 MMOL/L (ref 4–13)
BUN BLD-MCNC: 19 MG/DL (ref 5–21)
BUN/CREAT SERPL: 14.5 (ref 7–25)
CALCIUM SPEC-SCNC: 8.9 MG/DL (ref 8.4–10.4)
CHLORIDE SERPL-SCNC: 99 MMOL/L (ref 98–110)
CO2 SERPL-SCNC: 29 MMOL/L (ref 24–31)
CREAT BLD-MCNC: 1.31 MG/DL (ref 0.5–1.4)
DEPRECATED RDW RBC AUTO: 41.8 FL (ref 40–54)
ERYTHROCYTE [DISTWIDTH] IN BLOOD BY AUTOMATED COUNT: 14.2 % (ref 12–15)
GFR SERPL CREATININE-BSD FRML MDRD: 56 ML/MIN/1.73
GLUCOSE BLD-MCNC: 95 MG/DL (ref 70–100)
HCT VFR BLD AUTO: 43.9 % (ref 40–52)
HGB BLD-MCNC: 14 G/DL (ref 14–18)
MCH RBC QN AUTO: 26.3 PG (ref 28–32)
MCHC RBC AUTO-ENTMCNC: 31.9 G/DL (ref 33–36)
MCV RBC AUTO: 82.4 FL (ref 82–95)
PLATELET # BLD AUTO: 202 10*3/MM3 (ref 130–400)
PMV BLD AUTO: 9.9 FL (ref 6–12)
POTASSIUM BLD-SCNC: 3.9 MMOL/L (ref 3.5–5.3)
RBC # BLD AUTO: 5.33 10*6/MM3 (ref 4.8–5.9)
SODIUM BLD-SCNC: 139 MMOL/L (ref 135–145)
WBC NRBC COR # BLD: 3.27 10*3/MM3 (ref 4.8–10.8)

## 2018-12-14 PROCEDURE — 25010000002 HEPARIN (PORCINE) PER 1000 UNITS: Performed by: INTERNAL MEDICINE

## 2018-12-14 PROCEDURE — 36415 COLL VENOUS BLD VENIPUNCTURE: CPT | Performed by: INTERNAL MEDICINE

## 2018-12-14 PROCEDURE — 25010000002 MIDAZOLAM PER 1 MG: Performed by: INTERNAL MEDICINE

## 2018-12-14 PROCEDURE — 93458 L HRT ARTERY/VENTRICLE ANGIO: CPT | Performed by: INTERNAL MEDICINE

## 2018-12-14 PROCEDURE — 80048 BASIC METABOLIC PNL TOTAL CA: CPT | Performed by: INTERNAL MEDICINE

## 2018-12-14 PROCEDURE — C1769 GUIDE WIRE: HCPCS | Performed by: INTERNAL MEDICINE

## 2018-12-14 PROCEDURE — 25010000002 FENTANYL CITRATE (PF) 100 MCG/2ML SOLUTION: Performed by: INTERNAL MEDICINE

## 2018-12-14 PROCEDURE — C1894 INTRO/SHEATH, NON-LASER: HCPCS | Performed by: INTERNAL MEDICINE

## 2018-12-14 PROCEDURE — 0 IOPAMIDOL PER 1 ML: Performed by: INTERNAL MEDICINE

## 2018-12-14 PROCEDURE — 25010000002 DIPHENHYDRAMINE PER 50 MG: Performed by: INTERNAL MEDICINE

## 2018-12-14 PROCEDURE — 85027 COMPLETE CBC AUTOMATED: CPT | Performed by: INTERNAL MEDICINE

## 2018-12-14 PROCEDURE — 99152 MOD SED SAME PHYS/QHP 5/>YRS: CPT | Performed by: INTERNAL MEDICINE

## 2018-12-14 RX ORDER — DIPHENHYDRAMINE HYDROCHLORIDE 50 MG/ML
INJECTION INTRAMUSCULAR; INTRAVENOUS AS NEEDED
Status: DISCONTINUED | OUTPATIENT
Start: 2018-12-14 | End: 2018-12-14 | Stop reason: HOSPADM

## 2018-12-14 RX ORDER — FENTANYL CITRATE 50 UG/ML
INJECTION, SOLUTION INTRAMUSCULAR; INTRAVENOUS AS NEEDED
Status: DISCONTINUED | OUTPATIENT
Start: 2018-12-14 | End: 2018-12-14 | Stop reason: HOSPADM

## 2018-12-14 RX ORDER — ASPIRIN 81 MG/1
81 TABLET ORAL DAILY
Status: DISCONTINUED | OUTPATIENT
Start: 2018-12-15 | End: 2018-12-14 | Stop reason: HOSPADM

## 2018-12-14 RX ORDER — SODIUM CHLORIDE 9 MG/ML
50 INJECTION, SOLUTION INTRAVENOUS CONTINUOUS
Status: DISCONTINUED | OUTPATIENT
Start: 2018-12-14 | End: 2018-12-14 | Stop reason: HOSPADM

## 2018-12-14 RX ORDER — SODIUM CHLORIDE 0.9 % (FLUSH) 0.9 %
3 SYRINGE (ML) INJECTION EVERY 12 HOURS SCHEDULED
Status: DISCONTINUED | OUTPATIENT
Start: 2018-12-14 | End: 2018-12-14 | Stop reason: HOSPADM

## 2018-12-14 RX ORDER — ASPIRIN 81 MG/1
324 TABLET, CHEWABLE ORAL ONCE
Status: COMPLETED | OUTPATIENT
Start: 2018-12-14 | End: 2018-12-14

## 2018-12-14 RX ORDER — SODIUM CHLORIDE 9 MG/ML
1-3 INJECTION, SOLUTION INTRAVENOUS CONTINUOUS
Status: DISCONTINUED | OUTPATIENT
Start: 2018-12-14 | End: 2018-12-14

## 2018-12-14 RX ORDER — SODIUM CHLORIDE 0.9 % (FLUSH) 0.9 %
3-10 SYRINGE (ML) INJECTION AS NEEDED
Status: DISCONTINUED | OUTPATIENT
Start: 2018-12-14 | End: 2018-12-14 | Stop reason: HOSPADM

## 2018-12-14 RX ORDER — LIDOCAINE HYDROCHLORIDE 20 MG/ML
INJECTION, SOLUTION INFILTRATION; PERINEURAL AS NEEDED
Status: DISCONTINUED | OUTPATIENT
Start: 2018-12-14 | End: 2018-12-14 | Stop reason: HOSPADM

## 2018-12-14 RX ORDER — SODIUM CHLORIDE 9 MG/ML
100 INJECTION, SOLUTION INTRAVENOUS CONTINUOUS
Status: CANCELLED | OUTPATIENT
Start: 2018-12-14

## 2018-12-14 RX ORDER — MIDAZOLAM HYDROCHLORIDE 1 MG/ML
INJECTION INTRAMUSCULAR; INTRAVENOUS AS NEEDED
Status: DISCONTINUED | OUTPATIENT
Start: 2018-12-14 | End: 2018-12-14 | Stop reason: HOSPADM

## 2018-12-14 RX ADMIN — SODIUM CHLORIDE 50 ML/HR: 9 INJECTION, SOLUTION INTRAVENOUS at 08:02

## 2018-12-14 RX ADMIN — TICAGRELOR 180 MG: 90 TABLET ORAL at 08:00

## 2018-12-14 RX ADMIN — ASPIRIN 81 MG 324 MG: 81 TABLET ORAL at 08:01

## 2018-12-14 NOTE — INTERVAL H&P NOTE
H&P reviewed. The patient was examined and there are no changes to the H&P.      I discussed cardiac catheterization, the procedure, risks (including bleeding, infection, vascular damage [including minor oozing, bruising, bleeding, and up to and including the need for vascular surgery], contrast reaction, renal failure, respiratory failure, heart attack, stroke, arrhythmia and even death), benefits, and alternatives and the patient has voiced understanding and is willing to proceed.

## 2018-12-19 ENCOUNTER — APPOINTMENT (OUTPATIENT)
Dept: CARDIOLOGY | Facility: HOSPITAL | Age: 63
End: 2018-12-19
Attending: FAMILY MEDICINE

## 2018-12-19 ENCOUNTER — APPOINTMENT (OUTPATIENT)
Dept: PULMONOLOGY | Facility: HOSPITAL | Age: 63
End: 2018-12-19
Attending: FAMILY MEDICINE

## 2018-12-19 ENCOUNTER — APPOINTMENT (OUTPATIENT)
Dept: CT IMAGING | Facility: HOSPITAL | Age: 63
End: 2018-12-19
Attending: FAMILY MEDICINE

## 2018-12-26 ENCOUNTER — TRANSCRIBE ORDERS (OUTPATIENT)
Dept: PHYSICAL THERAPY | Facility: HOSPITAL | Age: 63
End: 2018-12-26

## 2018-12-26 DIAGNOSIS — M50.30 DDD (DEGENERATIVE DISC DISEASE), CERVICAL: Primary | ICD-10-CM

## 2018-12-28 ENCOUNTER — HOSPITAL ENCOUNTER (OUTPATIENT)
Dept: PHYSICAL THERAPY | Facility: HOSPITAL | Age: 63
Setting detail: THERAPIES SERIES
Discharge: HOME | End: 2018-12-28

## 2018-12-28 DIAGNOSIS — G89.29 CHRONIC BILATERAL LOW BACK PAIN WITH BILATERAL SCIATICA: Primary | ICD-10-CM

## 2018-12-28 DIAGNOSIS — M51.36 LUMBAR DEGENERATIVE DISC DISEASE: ICD-10-CM

## 2018-12-28 DIAGNOSIS — M54.42 CHRONIC BILATERAL LOW BACK PAIN WITH BILATERAL SCIATICA: Primary | ICD-10-CM

## 2018-12-28 DIAGNOSIS — M48.062 SPINAL STENOSIS, LUMBAR REGION, WITH NEUROGENIC CLAUDICATION: ICD-10-CM

## 2018-12-28 DIAGNOSIS — M54.41 CHRONIC BILATERAL LOW BACK PAIN WITH BILATERAL SCIATICA: Primary | ICD-10-CM

## 2018-12-28 PROCEDURE — G8978 MOBILITY CURRENT STATUS: HCPCS | Performed by: PHYSICAL THERAPIST

## 2018-12-28 PROCEDURE — G8979 MOBILITY GOAL STATUS: HCPCS | Performed by: PHYSICAL THERAPIST

## 2018-12-28 PROCEDURE — 97162 PT EVAL MOD COMPLEX 30 MIN: CPT | Performed by: PHYSICAL THERAPIST

## 2018-12-28 NOTE — THERAPY EVALUATION
Outpatient Physical Therapy Ortho Initial Evaluation  [facility]     Patient Name: Carlos Hayes  : 1955  MRN: [id number]  Today's Date: 2018      Visit Date: 2018    Patient Active Problem List   Diagnosis   • Battery end of life of spinal cord stimulator   • BMI 40.0-44.9, adult (CMS/[facility])   • Non-smoker   • Cervicalgia   • Cervical radiculopathy   • Carpal tunnel syndrome of right wrist   • Spinal stenosis, lumbar region, with neurogenic claudication   • BMI 39.0-39.9,adult   • Malfunction of spinal cord stimulator (CMS/[facility])   • Abnormal stress test   • Other chest pain   • Hypertension        Past Medical History:   Diagnosis Date   • Acute bronchitis    • Amnesia    • Anxiety    • Arthritis    • Asthma    • Back pain    • Chest pain    • CKD (chronic kidney disease)    • Concussion    • Contusion of chest wall    • COPD (chronic obstructive pulmonary disease) (CMS/[facility])    • Degeneration of intervertebral disc of lumbar region    • Fall    • Gait disturbance    • Gastro-esophageal reflux    • Headache    • Hearing impaired    • Hypersomnia    • Hypertension    • Hypotestosteronemia    • Insomnia    • Leg cramp    • Lumbar stenosis    • Obesity    • Obesity    • KATLYN (obstructive sleep apnea)    • PTSD (post-traumatic stress disorder)    • Radiculopathy of lumbosacral region    • Sinusitis    • Testicular hypofunction    • Tremor         Past Surgical History:   Procedure Laterality Date   • BACK SURGERY      L3-4 fusion   • BACK SURGERY      L3-S1 fusion   • CARDIAC CATHETERIZATION     • CARDIAC CATHETERIZATION N/A 2018    Procedure: Coronary angiography;  Surgeon: Lizandro Quiroz MD;  Location:  [facility] CATH [facility];  Service: Cardiology   • CARDIAC CATHETERIZATION N/A 2018    Procedure: Percutaneous Coronary Intervention;  Surgeon: Lizandro Quiroz MD;  Location:  [facility] CATH INVASIVE [facility];  Service: Cardiology   • CARPAL TUNNEL RELEASE Bilateral    •  CHOLECYSTECTOMY     • FRACTURE SURGERY      LEFT HAND   • HERNIA REPAIR     • KNEE SURGERY     • SPINAL CORD STIMULATOR REMOVAL N/A 5/2/2018    Procedure: SPINAL CORD STIMULATOR REMOVAL Removal of generator and placment of new generator;  Surgeon: Miguel Hall MD;  Location:  [facility] OR;  Service: Neurosurgery   • SPINAL CORD STIMULATOR REMOVAL N/A 7/18/2018    Procedure: SPINAL CORD STIMULATOR BATTERY CHANGE;  Surgeon: Miguel Hall MD;  Location:  [facility] OR;  Service: Neurosurgery   • THORACIC LAMINECTOMY WITH PLACEMENT OF DORSAL COLUMN STIMULATOR  2010       Visit Dx:     ICD-10-CM ICD-9-CM   1. Chronic bilateral low back pain with bilateral sciatica M54.42 724.2    M54.41 724.3    G89.29 338.29   2. Lumbar degenerative disc disease M51.36 722.52   3. Spinal stenosis, lumbar region, with neurogenic claudication M48.062 724.03       Patient History     Row Name 12/28/18 1445             History    Chief Complaint  Pain  -TB      Type of Pain  Back pain  -TB      Date Current Problem(s) Began  12/14/18  -TB      Brief Description of Current Complaint  He had back surgery on 9/17/2018 to fuse the L2/3 level which had degenerated to the point of causing spinal stenosis and nerve problems. In order to keep the DCS in, they only puts rods on one side of his lumbar. He's done very well since the surgery until the last couple of weeks when he started having pain again radiating around his left flank. It's not into his left groin yet. Prior to his surgery, we had seen him in PT to try to work on flexibiliity and prepare him for surgery. He was told he has to wear a back brace a year (he believes). He says he is less sore when he does use the brace. He is also c/o right knee pain with walking.  -TB      Previous treatment for THIS PROBLEM  Massage;Pain Management;Rehabilitation;Medication;Injections;Surgery  -TB      Patient/Caregiver Goals  Relieve pain;Improve mobility;Improve strength  -TB      Patient's Rating of  General Health  Good  -TB      Hand Dominance  right-handed  -TB      Occupation/sports/leisure activities  disabled  -TB      How has patient tried to help current problem?  Pain management clinic, [facility] facitility, PT while in [place].  -TB      What clinical tests have you had for this problem?  CT scan;X-ray;Nerve Conduction Test  -TB      Results of Clinical Tests  stenosis in upper lumbar with osteophyte formation bridging vertebra; degeneration and stenosis of lower cervical spine  -TB      Patient seeing anyone else for problem(s)?  Yes  -TB      What clinical tests have you had for this problem?  -- during his last episode of care; no new ones  -TB         Pain     Pain Location  Back  -TB      Pain at Present  0  -TB      Pain at Best  0  -TB      Pain at Worst  5  -TB      Pain Frequency  Intermittent  -TB      What Performance Factors Make the Current Problem(s) WORSE?  reaching  -TB      What Performance Factors Make the Current Problem(s) BETTER?  lying with brace  -TB         Fall Risk Assessment    Any falls in the past year:  Yes  -TB      Factors that contributed to the fall:  Lost balance  -TB      Does patient have a fear of falling  No  -TB         Services    Prior Rehab/Home Health Experiences  Yes  -TB      When was the prior experience with Rehab/Home Health  summer into fall of 2018  -TB      Where was the prior experience with Rehab/Home Health  here at [facility]  -TB      Do you plan to receive Home Health services in the near future  No  -TB         Daily Activities    Primary Language  English  -TB      How does patient learn best?  Listening  -TB      Teaching needs identified  Management of Condition  -TB      Patient is concerned about/has problems with  Difficulty with self care (i.e. bathing, dressing, toileting:  -TB      Does patient have problems with the following?  Depression;Anxiety  -TB      Barriers to learning  Hearing  -TB      Pt Participated in POC and  Goals  Yes  -TB         Safety    Are you being hurt, hit, or frightened by anyone at home or in your life?  No  -TB      Are you being neglected by a caregiver  No  -TB        User Key  (r) = Recorded By, (t) = Taken By, (c) = Cosigned By    Initials Name Provider Type    TB Juarez Hartmann PT Physical Therapist          PT Ortho     Row Name 12/28/18 1600       Posture/Observations    Posture/Observations Comments  healed midline scar through lumbar; severe thoracic hyperkyphosis; wearing an Aspen LSO.  -TB       Quarter Clearing    Quarter Clearing  Lower Quarter Clearing  -TB       DTR- Lower Quarter Clearing    Patellar tendon (L2-4)  Right:;3- Slightly hyperactive response;Left:;2- Normal response  -TB    Achilles tendon (S1-2)  Right:;3- Slightly hyperactive response;Left:;2- Normal response  -TB       Sensory Screen for Light Touch- Lower Quarter Clearing    L1 (inguinal area)  Bilateral:;Intact  -TB    L2 (anterior mid thigh)  Bilateral:;Intact  -TB    L3 (distal anterior thigh)  Bilateral:;Intact  -TB    L4 (medial lower leg/foot)  Right:;Diminished;Left:;Intact  -TB    L5 (lateral lower leg/great toe)  Right:;Diminished;Left:;Intact  -TB    S1 (bottom of foot)  Right:;Diminished;Left:;Intact  -TB       Myotomal Screen- Lower Quarter Clearing    Hip flexion (L2)  -- not tested due to surgery  -TB    Knee extension (L3)  Bilateral:;WNL  -TB    Ankle DF (L4)  Bilateral:;WNL  -TB    Great toe extension (L5)  Bilateral:;WNL  -TB    Ankle PF (S1)  Bilateral:;WNL  -TB    Knee flexion (S2)  Bilateral:;WNL  -TB       Lumbar ROM Screen- Lower Quarter Clearing    Lumbar Flexion  Unable to perform  -TB    Lumbar Extension  Unable to perform  -TB       Thoracic Accessory Motions    Pa glide- Upper thoracic  Hypomobile  -TB    Pa glide- Middle thoracic  Hypomobile  -TB    Pa glide- Lower thoracic  Hypomobile  -TB       Lumbosacral Accessory Motions    PA Melbeta- L2  Immobile  -TB    PA Glide- L3  Immobile  -TB    PA  Glide- L4  Immobile  -TB    PA Glide- L5  Immobile  -TB    PA glide- Sacral base  Immobile  -TB       Lumbar/SI Special Tests    Trendelenburg Test (Gluteus Medius Weakness)  Bilateral:;Positive  -TB       Lumbosacral Palpation    SI  Right:;Bilateral:  -TB    Piriformis  -- not tender  -TB    Quadratus Lumborum  -- not guarded  -TB    Pelvic Floor  -- not tested  -TB    Iliopsoas  -- not tested  -TB       Knee Special Tests    Anterior drawer (ACL lesion)  Right:;Negative  -TB    Lachman’s (ACL lesion)  Right:;Negative  -TB    Valgus stress (MCL lesion)  Right:;Negative  -TB    Varus stress (LCL lesion)  Right:;Negative  -TB       General ROM    RT Lower Ext  Rt Hip Extension;Rt Hip External Rotation;Rt Hip Internal Rotation  -TB    LT Lower Ext  Lt Hip Extension;Lt Hip External Rotation;Lt Hip Internal Rotation  -TB       Right Lower Ext    Rt Hip Extension PROM  -25 deg  -TB    Rt Hip External Rotation PROM  40 deg  -TB    Rt Hip Internal Rotation PROM  15 deg  -TB       Left Lower Ext    Lt Hip Extension PROM  -25 deg  -TB    Lt Hip External Rotation PROM  40 deg  -TB    Lt Hip Internal Rotation PROM  15 deg  -TB       Pathomechanics    Lower Extremity Pathomechanics  Heel whip with terminal stance;Excessive hip flexion with swing through  -TB       Gait/Stairs Assessment/Training    Comment (Gait/Stairs)  He walks with a wide DIONY with his right foot in a toe out position. He walks stooped forward with oliver hips and knees staying in a flexed postion.   -TB      User Key  (r) = Recorded By, (t) = Taken By, (c) = Cosigned By    Initials Name Provider Type    TB Juarez Hartmann, PT Physical Therapist                      Therapy Education  Education Details: standing lunge hip flexor stretch; walk wtih shorter steps less distance  Given: HEP, Symptoms/condition management  Program: New  How Provided: Verbal, Demonstration  Provided to: Patient  Level of Understanding: Verbalized, Demonstrated     PT OP Goals      Row Name 12/28/18 1600          PT Short Term Goals    STG Date to Achieve  01/25/19  -TB     STG 1  Improve oliver hip ext to 0 deg  -TB     STG 1 Progress  New  -TB     STG 2  Able to walk with right foot position symmetrical to left  -TB     STG 2 Progress  New  -TB     STG 3  Improve oliver hip IR to 30 deg  -TB     STG 3 Progress  New  -TB        Long Term Goals    LTG Date to Achieve  10/13/18  -TB     LTG 1  Able to walk community distances without an assistive device or only a cane without severe flare of pain  -TB     LTG 1 Progress  New  -TB     LTG 2  Improve hip passive extension to 10 degrees  -TB     LTG 2 Progress  New  -TB     LTG 3  Improved core contraction held during functional tasks.  -TB     LTG 3 Progress  New  -TB     LTG 4  Independent with HEP for flexibility and stability.   -TB     LTG 4 Progress  New  -TB     LTG 5  -  -TB        Time Calculation    PT Goal Re-Cert Due Date  01/27/19  -TB       User Key  (r) = Recorded By, (t) = Taken By, (c) = Cosigned By    Initials Name Provider Type    TB Juarez Hartmann, PT Physical Therapist          PT Assessment/Plan     Row Name 12/28/18 1600          PT Assessment    Functional Limitations  Impaired gait;Impaired locomotion;Limitation in home management;Performance in leisure activities;Limitations in functional capacity and performance  -TB     Impairments  Balance;Gait;Pain;Muscle strength;Motor function;Joint mobility;Impaired muscle length;Range of motion;Posture;Impaired flexibility  -TB     Assessment Comments  He really looks great compared to how he was before his spinal fusion. The surgery appears to have made a significant difference in his pelvic floor issues as he is now able to empty his bladder easier and intercourse is more successful. His left flank pain may be more of rib pain where his rib is being pressed inside his pelvis. He says this pain doesn't feel like the radicular pain he was having before, and I didn't appreciate any  spasms in his back. The main issue we are dealing with now are the mechanics issues he has from trying to avoid pain for so long. He has oliver hip flexion contractures and the squatting he is doing with walking is causing extra stress in his right knee and weakness in his hips. I think he will do well with some work on flexibility through his hips which should improve his mechanics and allow him to be able to walk farther more efficiently.   -TB     Please refer to paper survey for additional self-reported information  Yes  -TB     Rehab Potential  Good  -TB     Patient/caregiver participated in establishment of treatment plan and goals  Yes  -TB     Patient would benefit from skilled therapy intervention  Yes  -TB        PT Plan    PT Frequency  1x/week;2x/week  -TB     Predicted Duration of Therapy Intervention (Therapy Eval)  6-8 weeks  -TB     Planned CPT's?  PT EVAL MOD COMPLELITY: 28432;PT THER PROC EA 15 MIN: 56408;PT THER ACT EA 15 MIN: 37828;PT MANUAL THERAPY EA 15 MIN: 04029;PT GAIT TRAINING EA 15 MIN: 30935;PT ELECTRICAL STIM UNATTEND: ;PT ELECTRICAL STIM ATTD EA 15 MIN: 69500;PT ULTRASOUND EA 15 MIN: 83787  -TB     PT Plan Comments  We will focus on mobility of his hips while still protecting his back fusion. As his hip flexibility improves, we will progress to more hip stability exercises and gait training to improve his gait mechanics. His HEP will reflect this as well.   -TB       User Key  (r) = Recorded By, (t) = Taken By, (c) = Cosigned By    Initials Name Provider Type    TB Juarez Hartmann, PT Physical Therapist                                        Time Calculation:     Therapy Suggested Charges     Code   Minutes Charges    None             Start Time: 1450  Stop Time: 1600  Time Calculation (min): 70 min     Therapy Charges for Today     Code Description Service Date Service Provider Modifiers Qty    62956626720 HC PT MOBILITY CURRENT 12/28/2018 Juarez Hartmann, PT GP, CJ 1     01944147741 HC PT MOBILITY PROJECTED 12/28/2018 Juarez Hartmann, PT GP, CI 1    23825578665 HC PT EVAL MOD COMPLEXITY 4 12/28/2018 Juarez Hartmann, PT KX, GP 1          PT G-Codes  PT Professional Judgement Used?: Yes  Functional Limitation: Mobility: Walking and moving around  Mobility: Walking and Moving Around Current Status (): At least 20 percent but less than 40 percent impaired, limited or restricted  Mobility: Walking and Moving Around Goal Status (): At least 1 percent but less than 20 percent impaired, limited or restricted         Juarez Hartmann, PT  12/28/2018

## 2019-01-03 ENCOUNTER — APPOINTMENT (OUTPATIENT)
Dept: PHYSICAL THERAPY | Facility: HOSPITAL | Age: 64
End: 2019-01-03

## 2019-01-04 ENCOUNTER — INFUSION (OUTPATIENT)
Dept: ONCOLOGY | Facility: HOSPITAL | Age: 64
End: 2019-01-04

## 2019-01-04 ENCOUNTER — TRANSCRIBE ORDERS (OUTPATIENT)
Dept: ADMINISTRATIVE | Facility: HOSPITAL | Age: 64
End: 2019-01-04

## 2019-01-04 ENCOUNTER — APPOINTMENT (OUTPATIENT)
Dept: LAB | Facility: HOSPITAL | Age: 64
End: 2019-01-04

## 2019-01-04 ENCOUNTER — HOSPITAL ENCOUNTER (OUTPATIENT)
Dept: CT IMAGING | Facility: HOSPITAL | Age: 64
Discharge: HOME | End: 2019-01-04
Attending: FAMILY MEDICINE | Admitting: FAMILY MEDICINE

## 2019-01-04 VITALS
BODY MASS INDEX: 38.36 KG/M2 | DIASTOLIC BLOOD PRESSURE: 84 MMHG | SYSTOLIC BLOOD PRESSURE: 164 MMHG | WEIGHT: 315 LBS | OXYGEN SATURATION: 96 % | RESPIRATION RATE: 20 BRPM | TEMPERATURE: 98.8 F | HEIGHT: 76 IN | HEART RATE: 57 BPM

## 2019-01-04 DIAGNOSIS — E86.0 DEHYDRATION: ICD-10-CM

## 2019-01-04 DIAGNOSIS — R11.2 NON-INTRACTABLE VOMITING WITH NAUSEA, UNSPECIFIED VOMITING TYPE: Primary | ICD-10-CM

## 2019-01-04 LAB
ALBUMIN SERPL-MCNC: 4.6 G/DL (ref 3.5–5)
ALBUMIN/GLOB SERPL: 1.5 G/DL (ref 1.1–2.5)
ALP SERPL-CCNC: 74 U/L (ref 24–120)
ALT SERPL W P-5'-P-CCNC: 38 U/L (ref 0–54)
AMYLASE SERPL-CCNC: 53 U/L (ref 30–110)
ANION GAP SERPL CALCULATED.3IONS-SCNC: 10 MMOL/L (ref 4–13)
AST SERPL-CCNC: 49 U/L (ref 7–45)
BASOPHILS # BLD AUTO: 0.02 10*3/MM3 (ref 0–0.2)
BASOPHILS NFR BLD AUTO: 0.5 % (ref 0–2)
BILIRUB SERPL-MCNC: 0.4 MG/DL (ref 0.1–1)
BUN BLD-MCNC: 21 MG/DL (ref 5–21)
BUN/CREAT SERPL: 16.3 (ref 7–25)
CALCIUM SPEC-SCNC: 9.2 MG/DL (ref 8.4–10.4)
CHLORIDE SERPL-SCNC: 99 MMOL/L (ref 98–110)
CO2 SERPL-SCNC: 30 MMOL/L (ref 24–31)
CREAT BLD-MCNC: 1.29 MG/DL (ref 0.5–1.4)
DEPRECATED RDW RBC AUTO: 46.1 FL (ref 40–54)
EOSINOPHIL # BLD AUTO: 0.08 10*3/MM3 (ref 0–0.7)
EOSINOPHIL NFR BLD AUTO: 1.8 % (ref 0–4)
ERYTHROCYTE [DISTWIDTH] IN BLOOD BY AUTOMATED COUNT: 15.9 % (ref 12–15)
GFR SERPL CREATININE-BSD FRML MDRD: 57 ML/MIN/1.73
GLOBULIN UR ELPH-MCNC: 3 GM/DL
GLUCOSE BLD-MCNC: 94 MG/DL (ref 70–100)
HCT VFR BLD AUTO: 44.6 % (ref 40–52)
HGB BLD-MCNC: 14.1 G/DL (ref 14–18)
IMM GRANULOCYTES # BLD AUTO: 0.01 10*3/MM3 (ref 0–0.03)
IMM GRANULOCYTES NFR BLD AUTO: 0.2 % (ref 0–5)
LIPASE SERPL-CCNC: 65 U/L (ref 23–203)
LYMPHOCYTES # BLD AUTO: 1.2 10*3/MM3 (ref 0.72–4.86)
LYMPHOCYTES NFR BLD AUTO: 27.3 % (ref 15–45)
MCH RBC QN AUTO: 25.8 PG (ref 28–32)
MCHC RBC AUTO-ENTMCNC: 31.6 G/DL (ref 33–36)
MCV RBC AUTO: 81.7 FL (ref 82–95)
MONOCYTES # BLD AUTO: 0.54 10*3/MM3 (ref 0.19–1.3)
MONOCYTES NFR BLD AUTO: 12.3 % (ref 4–12)
NEUTROPHILS # BLD AUTO: 2.55 10*3/MM3 (ref 1.87–8.4)
NEUTROPHILS NFR BLD AUTO: 57.9 % (ref 39–78)
NRBC BLD AUTO-RTO: 0 /100 WBC (ref 0–0)
PLATELET # BLD AUTO: 180 10*3/MM3 (ref 130–400)
PMV BLD AUTO: 9.8 FL (ref 6–12)
POTASSIUM BLD-SCNC: 4.5 MMOL/L (ref 3.5–5.3)
PROT SERPL-MCNC: 7.6 G/DL (ref 6.3–8.7)
RBC # BLD AUTO: 5.46 10*6/MM3 (ref 4.8–5.9)
SODIUM BLD-SCNC: 139 MMOL/L (ref 135–145)
WBC NRBC COR # BLD: 4.4 10*3/MM3 (ref 4.8–10.8)

## 2019-01-04 PROCEDURE — 82150 ASSAY OF AMYLASE: CPT | Performed by: FAMILY MEDICINE

## 2019-01-04 PROCEDURE — 85025 COMPLETE CBC W/AUTO DIFF WBC: CPT | Performed by: FAMILY MEDICINE

## 2019-01-04 PROCEDURE — 83690 ASSAY OF LIPASE: CPT | Performed by: FAMILY MEDICINE

## 2019-01-04 PROCEDURE — 96360 HYDRATION IV INFUSION INIT: CPT

## 2019-01-04 PROCEDURE — 25010000002 IOPAMIDOL 61 % SOLUTION: Performed by: FAMILY MEDICINE

## 2019-01-04 PROCEDURE — 80053 COMPREHEN METABOLIC PANEL: CPT | Performed by: FAMILY MEDICINE

## 2019-01-04 PROCEDURE — 74177 CT ABD & PELVIS W/CONTRAST: CPT

## 2019-01-04 PROCEDURE — 96361 HYDRATE IV INFUSION ADD-ON: CPT

## 2019-01-04 PROCEDURE — 36415 COLL VENOUS BLD VENIPUNCTURE: CPT

## 2019-01-04 RX ORDER — SODIUM CHLORIDE 9 MG/ML
500 INJECTION, SOLUTION INTRAVENOUS CONTINUOUS
Status: DISPENSED | OUTPATIENT
Start: 2019-01-04 | End: 2019-01-04

## 2019-01-04 RX ADMIN — IOPAMIDOL 98 ML: 612 INJECTION, SOLUTION INTRAVENOUS at 16:39

## 2019-01-04 RX ADMIN — SODIUM CHLORIDE 500 ML/HR: 9 INJECTION, SOLUTION INTRAVENOUS at 14:37

## 2019-01-04 NOTE — PROGRESS NOTES
Patient discharged to CT patient reports time to take his pain medicine. Patient has a bloodpressure cuff at home and can monitor. Dont know if elevated due to pain, ct scan contrast or other contributions. Instructed patient to call Dr. Taylor on call physician if doesn't resolve. Or can go to ER. Did tell Ct scan workers about this. Renata Emanuel RN

## 2019-01-11 ENCOUNTER — HOSPITAL ENCOUNTER (OUTPATIENT)
Dept: PHYSICAL THERAPY | Facility: HOSPITAL | Age: 64
Setting detail: THERAPIES SERIES
Discharge: HOME | End: 2019-01-11

## 2019-01-11 DIAGNOSIS — M48.062 SPINAL STENOSIS, LUMBAR REGION, WITH NEUROGENIC CLAUDICATION: ICD-10-CM

## 2019-01-11 DIAGNOSIS — M54.41 CHRONIC BILATERAL LOW BACK PAIN WITH BILATERAL SCIATICA: Primary | ICD-10-CM

## 2019-01-11 DIAGNOSIS — G56.01 CARPAL TUNNEL SYNDROME OF RIGHT WRIST: ICD-10-CM

## 2019-01-11 DIAGNOSIS — M54.2 CERVICALGIA: ICD-10-CM

## 2019-01-11 DIAGNOSIS — M51.36 LUMBAR DEGENERATIVE DISC DISEASE: ICD-10-CM

## 2019-01-11 DIAGNOSIS — M54.2 NECK PAIN: ICD-10-CM

## 2019-01-11 DIAGNOSIS — G89.29 CHRONIC BILATERAL LOW BACK PAIN WITH BILATERAL SCIATICA: Primary | ICD-10-CM

## 2019-01-11 DIAGNOSIS — M54.12 CERVICAL RADICULOPATHY: ICD-10-CM

## 2019-01-11 DIAGNOSIS — M54.42 CHRONIC BILATERAL LOW BACK PAIN WITH BILATERAL SCIATICA: Primary | ICD-10-CM

## 2019-01-11 PROCEDURE — 97140 MANUAL THERAPY 1/> REGIONS: CPT | Performed by: PHYSICAL THERAPIST

## 2019-01-11 NOTE — THERAPY TREATMENT NOTE
Outpatient Physical Therapy Ortho Treatment Note  [facility]     Patient Name: Carlos Hayes  : 1955  MRN: [id number]  Today's Date: 2019      Visit Date: 2019    Visit Dx:    ICD-10-CM ICD-9-CM   1. Chronic bilateral low back pain with bilateral sciatica M54.42 724.2    M54.41 724.3    G89.29 338.29   2. Lumbar degenerative disc disease M51.36 722.52   3. Spinal stenosis, lumbar region, with neurogenic claudication M48.062 724.03   4. Neck pain M54.2 723.1   5. Cervical radiculopathy M54.12 723.4   6. Cervicalgia M54.2 723.1   7. Carpal tunnel syndrome of right wrist G56.01 354.0       Patient Active Problem List   Diagnosis   • Battery end of life of spinal cord stimulator   • BMI 40.0-44.9, adult (CMS/[facility])   • Non-smoker   • Cervicalgia   • Cervical radiculopathy   • Carpal tunnel syndrome of right wrist   • Spinal stenosis, lumbar region, with neurogenic claudication   • BMI 39.0-39.9,adult   • Malfunction of spinal cord stimulator (CMS/[facility])   • Abnormal stress test   • Other chest pain   • Hypertension   • Dehydration        Past Medical History:   Diagnosis Date   • Acute bronchitis    • Amnesia    • Anxiety    • Arthritis    • Asthma    • Back pain    • Chest pain    • CKD (chronic kidney disease)    • Concussion    • Contusion of chest wall    • COPD (chronic obstructive pulmonary disease) (CMS/[facility])    • Degeneration of intervertebral disc of lumbar region    • Fall    • Gait disturbance    • Gastro-esophageal reflux    • Headache    • Hearing impaired    • Hypersomnia    • Hypertension    • Hypotestosteronemia    • Insomnia    • Leg cramp    • Lumbar stenosis    • Obesity    • Obesity    • KATLYN (obstructive sleep apnea)    • PTSD (post-traumatic stress disorder)    • Radiculopathy of lumbosacral region    • Sinusitis    • Testicular hypofunction    • Tremor         Past Surgical History:   Procedure Laterality Date   • BACK SURGERY      L3-4 fusion   • BACK SURGERY      L3-S1  fusion   • CARDIAC CATHETERIZATION     • CARDIAC CATHETERIZATION N/A 12/14/2018    Procedure: Coronary angiography;  Surgeon: Lizandro Quiroz MD;  Location:  [facility] CATH INV[facility];  Service: Cardiology   • CARDIAC CATHETERIZATION N/A 12/14/2018    Procedure: Percutaneous Coronary Intervention;  Surgeon: Lizandro Quiroz MD;  Location:  [facility] CATH INVASIVE LOC[facility];  Service: Cardiology   • CARPAL TUNNEL RELEASE Bilateral    • CHOLECYSTECTOMY     • FRACTURE SURGERY      LEFT HAND   • HERNIA REPAIR     • KNEE SURGERY     • SPINAL CORD STIMULATOR REMOVAL N/A 5/2/2018    Procedure: SPINAL CORD STIMULATOR REMOVAL Removal of generator and placment of new generator;  Surgeon: Miguel Hall MD;  Location:  [facility] OR;  Service: Neurosurgery   • SPINAL CORD STIMULATOR REMOVAL N/A 7/18/2018    Procedure: SPINAL CORD STIMULATOR BATTERY CHANGE;  Surgeon: Miguel Hall MD;  Location:  [facility] OR;  Service: Neurosurgery   • THORACIC LAMINECTOMY WITH PLACEMENT OF DORSAL COLUMN STIMULATOR  2010                       PT Assessment/Plan     Row Name 01/11/19 1400          PT Assessment    Assessment Comments  He is certainly moving better. Slowing down his gait has made a big difference on his back. His oliver hip flexors are still really tight. Loosening them some will help to have him stand more erect and take longer steps.   -TB        PT Plan    PT Plan Comments  Cont to work on hip mobiliyt.   -TB       User Key  (r) = Recorded By, (t) = Taken By, (c) = Cosigned By    Initials Name Provider Type    TB Juarez Hartmann, PT Physical Therapist              Exercises     Row Name 01/11/19 1400 01/11/19 1355          Subjective Comments    Subjective Comments  --  He's been losing weight and cutting back on his speed and distance and incline he's walking and feels better.   -TB        Subjective Pain    Pre-Treatment Pain Level  --  5  -TB        Total Minutes    34688 - PT Manual Therapy Minutes  60  -TB  --       User Key   (r) = Recorded By, (t) = Taken By, (c) = Cosigned By    Initials Name Provider Type    TB Juarez Hartmann, PT Physical Therapist                        Manual Rx (last 36 hours)      Manual Treatments     Row Name 01/11/19 1400             Manual Rx 1    Manual Rx 1 Location  prone oliver hamstrings  -TB      Manual Rx 1 Type  hard vibrating roller to inhibit  -TB      Manual Rx 1 Duration  10 min each  -TB         Manual Rx 2    Manual Rx 2 Location  prone passive quad stretch  -TB      Manual Rx 2 Type  gentle repetitive  -TB      Manual Rx 2 Grade  right knee limited by stiff knee joint more than quad  -TB      Manual Rx 2 Duration  5 min each  -TB         Manual Rx 3    Manual Rx 3 Location  supine oliver IP stretch  -TB      Manual Rx 3 Type  off edge of mat  -TB      Manual Rx 3 Grade  leg supported with gentle sustained stretch and other leg in hooklying  -TB      Manual Rx 3 Duration  10 min each hip  -TB         Manual Rx 4    Manual Rx 4 Location  supine oliver hamstring passive stretch  -TB      Manual Rx 4 Grade  sustained  -TB      Manual Rx 4 Duration  5 min each  -TB        User Key  (r) = Recorded By, (t) = Taken By, (c) = Cosigned By    Initials Name Provider Type    TB Juarez Hartmann, PT Physical Therapist          PT OP Goals     Row Name 01/11/19 1400          PT Short Term Goals    STG Date to Achieve  01/25/19  -TB     STG 1  Improve oliver hip ext to 0 deg  -TB     STG 1 Progress  Ongoing  -TB     STG 1 Progress Comments  no change  -TB     STG 2  Able to walk with right foot position symmetrical to left  -TB     STG 2 Progress  Ongoing  -TB     STG 3  Improve oliver hip IR to 30 deg  -TB     STG 3 Progress  Ongoing  -TB        Long Term Goals    LTG Date to Achieve  10/13/18  -TB     LTG 1  Able to walk community distances without an assistive device or only a cane without severe flare of pain  -TB     LTG 1 Progress  New  -TB     LTG 2  Improve hip passive extension to 10 degrees  -TB     LTG 2 Progress   New  -TB     LTG 3  Improved core contraction held during functional tasks.  -TB     LTG 3 Progress  New  -TB     LTG 4  Independent with HEP for flexibility and stability.   -TB     LTG 4 Progress  New  -TB     LTG 5  -  -TB     LTG 5 Progress  Partially Met  -TB        Time Calculation    PT Goal Re-Cert Due Date  01/27/19  -TB       User Key  (r) = Recorded By, (t) = Taken By, (c) = Cosigned By    Initials Name Provider Type    TB Juarez Hartmann, PT Physical Therapist          Therapy Education  Given: HEP, Symptoms/condition management  Program: Reinforced  How Provided: Verbal, Demonstration  Provided to: Patient  Level of Understanding: Verbalized, Demonstrated              Time Calculation:   Start Time: 1400  Stop Time: 1500  Time Calculation (min): 60 min  Therapy Suggested Charges     Code   Minutes Charges    24253 (CPT®) Hc Pt Neuromusc Re Education Ea 15 Min      79698 (CPT®) Hc Pt Ther Proc Ea 15 Min      83371 (CPT®) Hc Gait Training Ea 15 Min      51046 (CPT®) Hc Pt Therapeutic Act Ea 15 Min      79117 (CPT®) Hc Pt Manual Therapy Ea 15 Min 60 4    00709 (CPT®) Hc Pt Ther Massage- Per 15 Min      21598 (CPT®) Hc Pt Iontophoresis Ea 15 Min      46511 (CPT®) Hc Pt Elec Stim Ea-Per 15 Min      85556 (CPT®) Hc Pt Ultrasound Ea 15 Min      66667 (CPT®) Hc Pt Self Care/Mgmt/Train Ea 15 Min      26784 (CPT®) Hc Pt Prosthetic (S) Train Initial Encounter, Each 15 Min      72085 (CPT®) Hc Orthotic(S) Mgmt/Train Initial Encounter, Each 15min      74151 (CPT®) Hc Pt Aquatic Therapy Ea 15 Min      84255 (CPT®) Hc Pt Orthotic(S)/Prosthetic(S) Encounter, Each 15 Min       (CPT®) Hc Pt Electrical Stim Unattended      Total  60 4        Therapy Charges for Today     Code Description Service Date Service Provider Modifiers Qty    99790788373 HC PT MANUAL THERAPY EA 15 MIN 1/11/2019 Juarez Hartmann, PT GP 4                    Juarez Hartmann, PT  1/11/2019

## 2019-01-15 ENCOUNTER — HOSPITAL ENCOUNTER (OUTPATIENT)
Dept: PHYSICAL THERAPY | Facility: HOSPITAL | Age: 64
Setting detail: THERAPIES SERIES
Discharge: HOME | End: 2019-01-15

## 2019-01-15 DIAGNOSIS — M54.12 CERVICAL RADICULOPATHY: ICD-10-CM

## 2019-01-15 DIAGNOSIS — M54.41 CHRONIC BILATERAL LOW BACK PAIN WITH BILATERAL SCIATICA: Primary | ICD-10-CM

## 2019-01-15 DIAGNOSIS — M54.2 CERVICALGIA: ICD-10-CM

## 2019-01-15 DIAGNOSIS — M51.36 LUMBAR DEGENERATIVE DISC DISEASE: ICD-10-CM

## 2019-01-15 DIAGNOSIS — M54.42 CHRONIC BILATERAL LOW BACK PAIN WITH BILATERAL SCIATICA: Primary | ICD-10-CM

## 2019-01-15 DIAGNOSIS — M48.062 SPINAL STENOSIS, LUMBAR REGION, WITH NEUROGENIC CLAUDICATION: ICD-10-CM

## 2019-01-15 DIAGNOSIS — G56.01 CARPAL TUNNEL SYNDROME OF RIGHT WRIST: ICD-10-CM

## 2019-01-15 DIAGNOSIS — M54.2 NECK PAIN: ICD-10-CM

## 2019-01-15 DIAGNOSIS — G89.29 CHRONIC BILATERAL LOW BACK PAIN WITH BILATERAL SCIATICA: Primary | ICD-10-CM

## 2019-01-15 PROCEDURE — 97140 MANUAL THERAPY 1/> REGIONS: CPT | Performed by: PHYSICAL THERAPIST

## 2019-01-15 NOTE — THERAPY TREATMENT NOTE
Outpatient Physical Therapy Ortho Treatment Note  [facility]     Patient Name: Carlos Hayes  : 1955  MRN: [id number]  Today's Date: 1/15/2019      Visit Date: 01/15/2019    Visit Dx:    ICD-10-CM ICD-9-CM   1. Chronic bilateral low back pain with bilateral sciatica M54.42 724.2    M54.41 724.3    G89.29 338.29   2. Lumbar degenerative disc disease M51.36 722.52   3. Spinal stenosis, lumbar region, with neurogenic claudication M48.062 724.03   4. Neck pain M54.2 723.1   5. Cervical radiculopathy M54.12 723.4   6. Cervicalgia M54.2 723.1   7. Carpal tunnel syndrome of right wrist G56.01 354.0       Patient Active Problem List   Diagnosis   • Battery end of life of spinal cord stimulator   • BMI 40.0-44.9, adult (CMS/[facility])   • Non-smoker   • Cervicalgia   • Cervical radiculopathy   • Carpal tunnel syndrome of right wrist   • Spinal stenosis, lumbar region, with neurogenic claudication   • BMI 39.0-39.9,adult   • Malfunction of spinal cord stimulator (CMS/[facility])   • Abnormal stress test   • Other chest pain   • Hypertension   • Dehydration        Past Medical History:   Diagnosis Date   • Acute bronchitis    • Amnesia    • Anxiety    • Arthritis    • Asthma    • Back pain    • Chest pain    • CKD (chronic kidney disease)    • Concussion    • Contusion of chest wall    • COPD (chronic obstructive pulmonary disease) (CMS/[facility])    • Degeneration of intervertebral disc of lumbar region    • Fall    • Gait disturbance    • Gastro-esophageal reflux    • Headache    • Hearing impaired    • Hypersomnia    • Hypertension    • Hypotestosteronemia    • Insomnia    • Leg cramp    • Lumbar stenosis    • Obesity    • Obesity    • KATLYN (obstructive sleep apnea)    • PTSD (post-traumatic stress disorder)    • Radiculopathy of lumbosacral region    • Sinusitis    • Testicular hypofunction    • Tremor         Past Surgical History:   Procedure Laterality Date   • BACK SURGERY      L3-4 fusion   • BACK SURGERY      L3-S1  fusion   • CARDIAC CATHETERIZATION     • CARDIAC CATHETERIZATION N/A 12/14/2018    Procedure: Coronary angiography;  Surgeon: Lizandro Quiroz MD;  Location:  [facility]ATH [facility];  Service: Cardiology   • CARDIAC CATHETERIZATION N/A 12/14/2018    Procedure: Percutaneous Coronary Intervention;  Surgeon: Lizandro Quiroz MD;  Location:  [facility] CATH [facility];  Service: Cardiology   • CARPAL TUNNEL RELEASE Bilateral    • CHOLECYSTECTOMY     • FRACTURE SURGERY      LEFT HAND   • HERNIA REPAIR     • KNEE SURGERY     • SPINAL CORD STIMULATOR REMOVAL N/A 5/2/2018    Procedure: SPINAL CORD STIMULATOR REMOVAL Removal of generator and placment of new generator;  Surgeon: Miguel Hall MD;  Location:  [facility] OR;  Service: Neurosurgery   • SPINAL CORD STIMULATOR REMOVAL N/A 7/18/2018    Procedure: SPINAL CORD STIMULATOR BATTERY CHANGE;  Surgeon: Miguel Hall MD;  Location:  [facility] OR;  Service: Neurosurgery   • THORACIC LAMINECTOMY WITH PLACEMENT OF DORSAL COLUMN STIMULATOR  2010                       PT Assessment/Plan     Row Name 01/15/19 1000          PT Assessment    Assessment Comments  He is still moving better. His right groin pain was actually more of a quad tightness.  He is going out of town for a couple of weeks. He may not need PT when he returns if he continues to do well.   -TB        PT Plan    PT Plan Comments  Resume PT for focus on hip flexibility if he feels he needs to return, otherwise, DC.  -TB       User Key  (r) = Recorded By, (t) = Taken By, (c) = Cosigned By    Initials Name Provider Type    TB Juarez Hartmann, PT Physical Therapist              Exercises     Row Name 01/15/19 1600 01/15/19 1005          Subjective Comments    Subjective Comments  --  He says his right anterior hip is more sore today.   -TB        Subjective Pain    Pre-Treatment Pain Level  --  5  -TB        Total Minutes    80779 - PT Manual Therapy Minutes  60  -TB  --       User Key  (r) = Recorded By, (t) =  Taken By, (c) = Cosigned By    Initials Name Provider Type    TB Juarez Hartmann, PT Physical Therapist                        Manual Rx (last 36 hours)      Manual Treatments     Row Name 01/15/19 1000             Manual Rx 1    Manual Rx 1 Location  prone oliver hamstrings  -TB      Manual Rx 1 Type  hard vibrating roller to inhibit  -TB      Manual Rx 1 Grade  strong OP  -TB      Manual Rx 1 Duration  10 min each  -TB         Manual Rx 4    Manual Rx 4 Location  supine oliver hamstring passive stretch  -TB      Manual Rx 4 Type  gentle sustained  -TB      Manual Rx 4 Grade  sustained  -TB      Manual Rx 4 Duration  10 min each  -TB         Manual Rx 5    Manual Rx 5 Location  supine right IP/prox quad  -TB      Manual Rx 5 Type  started with manual, then changed to IASTM with hard vibrating roller  -TB      Manual Rx 5 Duration  15  -TB        User Key  (r) = Recorded By, (t) = Taken By, (c) = Cosigned By    Initials Name Provider Type    TB Juarez Hartmann, PT Physical Therapist          PT OP Goals     Row Name 01/15/19 1600          PT Short Term Goals    STG Date to Achieve  01/25/19  -TB     STG 1  Improve oliver hip ext to 0 deg  -TB     STG 1 Progress  Ongoing  -TB     STG 1 Progress Comments  no change  -TB     STG 2  Able to walk with right foot position symmetrical to left  -TB     STG 2 Progress  Ongoing  -TB     STG 3  Improve oliver hip IR to 30 deg  -TB     STG 3 Progress  Ongoing  -TB        Long Term Goals    LTG Date to Achieve  10/13/18  -TB     LTG 1  Able to walk community distances without an assistive device or only a cane without severe flare of pain  -TB     LTG 1 Progress  New  -TB     LTG 2  Improve hip passive extension to 10 degrees  -TB     LTG 2 Progress  New  -TB     LTG 3  Improved core contraction held during functional tasks.  -TB     LTG 3 Progress  New  -TB     LTG 4  Independent with HEP for flexibility and stability.   -TB     LTG 4 Progress  New  -TB     LTG 5  -  -TB     LTG 5  Progress  Partially Met  -TB        Time Calculation    PT Goal Re-Cert Due Date  01/27/19  -TB       User Key  (r) = Recorded By, (t) = Taken By, (c) = Cosigned By    Initials Name Provider Type    TB Juarez Hartmann, PT Physical Therapist          Therapy Education  Given: HEP, Symptoms/condition management  Program: Reinforced  How Provided: Verbal, Demonstration  Provided to: Patient  Level of Understanding: Verbalized, Demonstrated              Time Calculation:   Start Time: 1000  Stop Time: 1100  Time Calculation (min): 60 min  Total Timed Code Minutes- PT: 60 minute(s)  Therapy Suggested Charges     Code   Minutes Charges    12441 (CPT®) Hc Pt Neuromusc Re Education Ea 15 Min      77101 (CPT®) Hc Pt Ther Proc Ea 15 Min      96840 (CPT®) Hc Gait Training Ea 15 Min      44455 (CPT®) Hc Pt Therapeutic Act Ea 15 Min      97066 (CPT®) Hc Pt Manual Therapy Ea 15 Min 60 4    39471 (CPT®) Hc Pt Ther Massage- Per 15 Min      97065 (CPT®) Hc Pt Iontophoresis Ea 15 Min      43988 (CPT®) Hc Pt Elec Stim Ea-Per 15 Min      03299 (CPT®) Hc Pt Ultrasound Ea 15 Min      87313 (CPT®) Hc Pt Self Care/Mgmt/Train Ea 15 Min      59381 (CPT®) Hc Pt Prosthetic (S) Train Initial Encounter, Each 15 Min      41706 (CPT®) Hc Orthotic(S) Mgmt/Train Initial Encounter, Each 15min      45772 (CPT®) Hc Pt Aquatic Therapy Ea 15 Min      93390 (CPT®) Hc Pt Orthotic(S)/Prosthetic(S) Encounter, Each 15 Min       (CPT®) Hc Pt Electrical Stim Unattended      Total  60 4        Therapy Charges for Today     Code Description Service Date Service Provider Modifiers Qty    85442554688 HC PT MANUAL THERAPY EA 15 MIN 1/15/2019 Juarez Hartmann, PT GP 4                    Juarez Hartmann, PT  1/15/2019

## 2019-02-19 DIAGNOSIS — N40.1 BPH WITH OBSTRUCTION/LOWER URINARY TRACT SYMPTOMS: Primary | ICD-10-CM

## 2019-02-19 DIAGNOSIS — N13.8 BPH WITH OBSTRUCTION/LOWER URINARY TRACT SYMPTOMS: Primary | ICD-10-CM

## 2019-02-19 RX ORDER — TAMSULOSIN HYDROCHLORIDE 0.4 MG/1
1 CAPSULE ORAL NIGHTLY
Qty: 90 CAPSULE | Refills: 3 | Status: SHIPPED | OUTPATIENT
Start: 2019-02-19 | End: 2020-01-24

## 2019-03-25 ENCOUNTER — HOSPITAL ENCOUNTER (OUTPATIENT)
Dept: PHYSICAL THERAPY | Facility: HOSPITAL | Age: 64
Setting detail: THERAPIES SERIES
Discharge: HOME | End: 2019-03-25

## 2019-03-25 DIAGNOSIS — G56.01 CARPAL TUNNEL SYNDROME OF RIGHT WRIST: ICD-10-CM

## 2019-03-25 DIAGNOSIS — M54.2 CERVICALGIA: ICD-10-CM

## 2019-03-25 DIAGNOSIS — M51.36 LUMBAR DEGENERATIVE DISC DISEASE: ICD-10-CM

## 2019-03-25 DIAGNOSIS — M54.12 CERVICAL RADICULOPATHY: ICD-10-CM

## 2019-03-25 DIAGNOSIS — G89.29 CHRONIC BILATERAL LOW BACK PAIN WITH BILATERAL SCIATICA: Primary | ICD-10-CM

## 2019-03-25 DIAGNOSIS — M54.2 NECK PAIN: ICD-10-CM

## 2019-03-25 DIAGNOSIS — M48.062 SPINAL STENOSIS, LUMBAR REGION, WITH NEUROGENIC CLAUDICATION: ICD-10-CM

## 2019-03-25 DIAGNOSIS — M54.42 CHRONIC BILATERAL LOW BACK PAIN WITH BILATERAL SCIATICA: Primary | ICD-10-CM

## 2019-03-25 DIAGNOSIS — M54.41 CHRONIC BILATERAL LOW BACK PAIN WITH BILATERAL SCIATICA: Primary | ICD-10-CM

## 2019-03-25 PROCEDURE — 97140 MANUAL THERAPY 1/> REGIONS: CPT | Performed by: PHYSICAL THERAPIST

## 2019-03-25 PROCEDURE — 97164 PT RE-EVAL EST PLAN CARE: CPT | Performed by: PHYSICAL THERAPIST

## 2019-03-25 NOTE — THERAPY RE-EVALUATION
Outpatient Physical Therapy Ortho Re-Evaluation  [facility]     Patient Name: Carlos Hayes  : 1955  MRN: [id number]  Today's Date: 3/25/2019      Visit Date: 2019    Patient Active Problem List   Diagnosis   • Battery end of life of spinal cord stimulator   • BMI 40.0-44.9, adult (CMS/[facility])   • Non-smoker   • Cervicalgia   • Cervical radiculopathy   • Carpal tunnel syndrome of right wrist   • Spinal stenosis, lumbar region, with neurogenic claudication   • BMI 39.0-39.9,adult   • Malfunction of spinal cord stimulator (CMS/[facility])   • Abnormal stress test   • Other chest pain   • Hypertension   • Dehydration        Past Medical History:   Diagnosis Date   • Acute bronchitis    • Amnesia    • Anxiety    • Arthritis    • Asthma    • Back pain    • Chest pain    • CKD (chronic kidney disease)    • Concussion    • Contusion of chest wall    • COPD (chronic obstructive pulmonary disease) (CMS/[facility])    • Degeneration of intervertebral disc of lumbar region    • Fall    • Gait disturbance    • Gastro-esophageal reflux    • Headache    • Hearing impaired    • Hypersomnia    • Hypertension    • Hypotestosteronemia    • Insomnia    • Leg cramp    • Lumbar stenosis    • Obesity    • Obesity    • KATLYN (obstructive sleep apnea)    • PTSD (post-traumatic stress disorder)    • Radiculopathy of lumbosacral region    • Sinusitis    • Testicular hypofunction    • Tremor         Past Surgical History:   Procedure Laterality Date   • BACK SURGERY      L3-4 fusion   • BACK SURGERY      L3-S1 fusion   • CARDIAC CATHETERIZATION     • CARDIAC CATHETERIZATION N/A 2018    Procedure: Coronary angiography;  Surgeon: Lizandro Quiroz MD;  Location:  [facility] CATH [facility];  Service: Cardiology   • CARDIAC CATHETERIZATION N/A 2018    Procedure: Percutaneous Coronary Intervention;  Surgeon: Lizandro Quiroz MD;  Location:  [facility] CATH INV[facility];  Service: Cardiology   • CARPAL TUNNEL RELEASE Bilateral     • CHOLECYSTECTOMY     • FRACTURE SURGERY      LEFT HAND   • HERNIA REPAIR     • KNEE SURGERY     • SPINAL CORD STIMULATOR REMOVAL N/A 5/2/2018    Procedure: SPINAL CORD STIMULATOR REMOVAL Removal of generator and placment of new generator;  Surgeon: Miguel Hall MD;  Location:  [facility] OR;  Service: Neurosurgery   • SPINAL CORD STIMULATOR REMOVAL N/A 7/18/2018    Procedure: SPINAL CORD STIMULATOR BATTERY CHANGE;  Surgeon: Miguel Hall MD;  Location:  [facility] OR;  Service: Neurosurgery   • THORACIC LAMINECTOMY WITH PLACEMENT OF DORSAL COLUMN STIMULATOR  2010       Visit Dx:     ICD-10-CM ICD-9-CM   1. Chronic bilateral low back pain with bilateral sciatica M54.42 724.2    M54.41 724.3    G89.29 338.29   2. Lumbar degenerative disc disease M51.36 722.52   3. Spinal stenosis, lumbar region, with neurogenic claudication M48.062 724.03   4. Neck pain M54.2 723.1   5. Cervical radiculopathy M54.12 723.4   6. Cervicalgia M54.2 723.1   7. Carpal tunnel syndrome of right wrist G56.01 354.0             PT Ortho     Row Name 03/25/19 1245       DTR- Lower Quarter Clearing    Patellar tendon (L2-4)  Right:;3- Slightly hyperactive response;Left:;1- Minimal response  -TB       Sensory Screen for Light Touch- Lower Quarter Clearing    L1 (inguinal area)  Bilateral:;Diminished R: 40%, L: 80%  -TB    L2 (anterior mid thigh)  Bilateral:;Intact R: 40%, L: 80%  -TB    L3 (distal anterior thigh)  Bilateral:;Intact R: 40%, L80%  -TB    L4 (medial lower leg/foot)  Bilateral:;Intact R: 40%, L: 80%  -TB    L5 (lateral lower leg/great toe)  Bilateral:;Intact R: 50%, L: 80%  -TB    S1 (bottom of foot)  Bilateral:;Intact R: 40%, L: 80%  -TB       Myotomal Screen- Lower Quarter Clearing    Hip flexion (L2)  Bilateral:;WNL  -TB    Knee extension (L3)  Bilateral:;WNL  -TB    Ankle DF (L4)  Bilateral:;WNL  -TB    Great toe extension (L5)  Bilateral:;WNL  -TB    Ankle PF (S1)  Bilateral:;WNL  -TB    Knee flexion (S2)  Bilateral:;WNL  -TB        Lumbar ROM Screen- Lower Quarter Clearing    Lumbar Flexion  Unable to perform lumbar is fused  -TB       Lumbar/SI Special Tests    Standing Flexion Test (SI Dysfunction)  Bilateral:;Negative  -TB    Stork Test (SI Dysfunction)  Bilateral:;Negative  -TB    Thigh Thrust/Posterior Shear (SI Dysfunction)  Right:;Positive;Left:;Negative  -TB    LAUREL (hip vs. SI Dysfunction)  Right:;Positive;Left:;Negative  -TB    FAIR Test (Piriformis Syndrome)  Right:;Positive;Left:;Negative  -TB       Lumbosacral Palpation    SI  Right:;Tender  -TB    Piriformis  Right:;Tender;Guarded/taut  -TB    Pelvic Floor  -- not guarded  -TB    Iliopsoas  Right:;Tender;Guarded/taut  -TB      User Key  (r) = Recorded By, (t) = Taken By, (c) = Cosigned By    Initials Name Provider Type    TB Juarez Hartmann, PT Physical Therapist                  [unfilled]    Therapy Education  Given: HEP, Symptoms/condition management  Program: Reinforced  How Provided: Verbal, Demonstration  Provided to: Patient  Level of Understanding: Verbalized, Demonstrated     PT OP Goals     Row Name 03/25/19 1240          PT Short Term Goals    STG Date to Achieve  01/25/19  -TB     STG 1  Improve oliver hip ext to 0 deg  -TB     STG 1 Progress  Ongoing  -TB     STG 1 Progress Comments  just to neutral  -TB     STG 2  Able to walk with right foot position symmetrical to left  -TB     STG 2 Progress  Ongoing  -TB     STG 2 Progress Comments  improved and more symmetrical  -TB     STG 3  Improve oliver hip IR to 30 deg  -TB     STG 3 Progress  Ongoing  -TB     STG 3 Progress Comments  right hip limited with great exacerbation of right LS pain  -TB        Long Term Goals    LTG Date to Achieve  10/13/18  -TB     LTG 1  Able to walk community distances without an assistive device or only a cane without severe flare of pain  -TB     LTG 1 Progress  Ongoing  -TB     LTG 1 Progress Comments  not using a cane but since his flare, not able to walk community distances  -TB   "   LTG 2  Improve hip passive extension to 10 degrees  -TB     LTG 2 Progress  Ongoing  -TB     LTG 2 Progress Comments  to neutral  -TB     LTG 3  Improved core contraction held during functional tasks.  -TB     LTG 3 Progress  Progressing  -TB     LTG 3 Progress Comments  he had been doing very well until his falre  -TB     LTG 4  Independent with HEP for flexibility and stability.   -TB     LTG 4 Progress  Ongoing  -TB     LTG 4 Progress Comments  restarted piri stretch with SI belt  -TB     LTG 5  -  -TB     LTG 5 Progress  Partially Met  -TB        Time Calculation    PT Goal Re-Cert Due Date  04/24/19  -TB       User Key  (r) = Recorded By, (t) = Taken By, (c) = Cosigned By    Initials Name Provider Type    TB Juarez Hartmann, PT Physical Therapist          PT Assessment/Plan     Row Name 03/25/19 3080          PT Assessment    Functional Limitations  Impaired gait;Impaired locomotion;Limitation in home management;Performance in leisure activities;Limitations in functional capacity and performance  -TB     Impairments  Balance;Gait;Pain;Muscle strength;Motor function;Joint mobility;Impaired muscle length;Range of motion;Posture;Impaired flexibility  -TB     Assessment Comments  He had been doing exceptionally well after his fusion. He was getting stronger, his muscle spasms had relaxed and his pain was controlled. While in [place] a few weeks ago, he accidentally stepped off a 10\" step landing hard and losing his balance. This greatly exacerbated his pain. He now c/o pain around his LS area with radicular pain in the right worse than the left as well as weakness in his legs. He had no change in his DTR's and still shows good strength. He appears to have strained his right SI joint, possibly with an upslip with secondary muscle guarding around this, including his piriformis. His weakness may be more from a piriformis syndrome with compression of his sciatica. I work on mobilizing his pelvis and relaxation of " "muscle guarding and his pain was less. Hopefully, he will be able to continue to stretch this out and may not need PT after this.   -TB     Please refer to paper survey for additional self-reported information  Yes  -TB     Rehab Potential  Good  -TB     Patient/caregiver participated in establishment of treatment plan and goals  Yes  -TB     Patient would benefit from skilled therapy intervention  Yes  -TB        PT Plan    PT Frequency  1x/week;2x/week  -TB     Predicted Duration of Therapy Intervention (Therapy Eval)  4-6 weeks  -TB     Planned CPT's?  PT THER PROC EA 15 MIN: 83105;PT THER ACT EA 15 MIN: 96888;PT MANUAL THERAPY EA 15 MIN: 29577;PT GAIT TRAINING EA 15 MIN: 60705;PT ELECTRICAL STIM UNATTEND: ;PT ELECTRICAL STIM ATTD EA 15 MIN: 64211;PT ULTRASOUND EA 15 MIN: 20293  -TB     PT Plan Comments  He is going out of town so assess the need for further PT when he returns in 3-4 weeks. If he does, emphasize hip mobility and pelvic alignment.   -TB       User Key  (r) = Recorded By, (t) = Taken By, (c) = Cosigned By    Initials Name Provider Type    TB Juarez Hartmann, PT Physical Therapist            Exercises     Row Name 03/25/19 1245 03/25/19 1240          Subjective Comments    Subjective Comments  While he was in [place], he stepped awkardly off a step and landed hard dropping about 10-12\". He didn't fall but he hit the concrete wall to his right catching himself with arm and knocked the breath out of him. Prior to that, he was doing really well and his strength and pain was under control. He had immediate pain in his lower lumbar area and went down on his knees for a moment. He stood up and was able to walk after that. He says the pain has stayed mostly in his knees and his feet (more on the right). He is using his DCS more to try to control the pain. He has noticed he has had more trouble urinating again. This isn't as mad as before.   -TB  --        Subjective Pain    Pre-Treatment Pain Level  8 "  -TB  --        Total Minutes    63969 - PT Manual Therapy Minutes  --  45  -TB       User Key  (r) = Recorded By, (t) = Taken By, (c) = Cosigned By    Initials Name Provider Type    Juarez Franklin PT Physical Therapist           Manual Rx (last 36 hours)      Manual Treatments     Row Name 03/25/19 1240             Total Minutes    35636 - PT Manual Therapy Minutes  45  -TB         Manual Rx 1    Manual Rx 1 Location  right piri, OI, deep hip external rotators and upper glute  -TB      Manual Rx 1 Type  left sidelying with pillow between knees  -TB      Manual Rx 1 Duration  30  -TB         Manual Rx 2    Manual Rx 2 Location  supine right LAD  -TB      Manual Rx 2 Type  sustained with 3-4 short light HVLA  -TB      Manual Rx 2 Duration  5  -TB         Manual Rx 3    Manual Rx 3 Location  passive stretch right hip for piriformis and OI  -TB      Manual Rx 3 Duration  5  -TB        User Key  (r) = Recorded By, (t) = Taken By, (c) = Cosigned By    Initials Name Provider Type    TB Juarez Hartmann PT Physical Therapist                                Time Calculation:     Start Time: 1240  Stop Time: 1400  Time Calculation (min): 80 min     Therapy Charges for Today     Code Description Service Date Service Provider Modifiers Qty    28615127884 HC PT MANUAL THERAPY EA 15 MIN 3/25/2019 Juarez Hartmann, PT GP 3    19398456175 HC PT RE-EVAL ESTABLISHED PLAN 2 3/25/2019 Juarez Hartmann, PT GP 1                    Juarez Hartmann, PT  3/25/2019

## 2019-04-23 ENCOUNTER — HOSPITAL ENCOUNTER (OUTPATIENT)
Dept: PHYSICAL THERAPY | Facility: HOSPITAL | Age: 64
Setting detail: THERAPIES SERIES
Discharge: HOME | End: 2019-04-23

## 2019-04-23 DIAGNOSIS — M54.2 NECK PAIN: ICD-10-CM

## 2019-04-23 DIAGNOSIS — M54.42 CHRONIC BILATERAL LOW BACK PAIN WITH BILATERAL SCIATICA: Primary | ICD-10-CM

## 2019-04-23 DIAGNOSIS — M54.2 CERVICALGIA: ICD-10-CM

## 2019-04-23 DIAGNOSIS — G56.01 CARPAL TUNNEL SYNDROME OF RIGHT WRIST: ICD-10-CM

## 2019-04-23 DIAGNOSIS — M48.062 SPINAL STENOSIS, LUMBAR REGION, WITH NEUROGENIC CLAUDICATION: ICD-10-CM

## 2019-04-23 DIAGNOSIS — M54.41 CHRONIC BILATERAL LOW BACK PAIN WITH BILATERAL SCIATICA: Primary | ICD-10-CM

## 2019-04-23 DIAGNOSIS — G89.29 CHRONIC BILATERAL LOW BACK PAIN WITH BILATERAL SCIATICA: Primary | ICD-10-CM

## 2019-04-23 DIAGNOSIS — M54.12 CERVICAL RADICULOPATHY: ICD-10-CM

## 2019-04-23 DIAGNOSIS — M51.36 LUMBAR DEGENERATIVE DISC DISEASE: ICD-10-CM

## 2019-04-23 PROCEDURE — 97140 MANUAL THERAPY 1/> REGIONS: CPT | Performed by: PHYSICAL THERAPIST

## 2019-04-23 PROCEDURE — 97535 SELF CARE MNGMENT TRAINING: CPT | Performed by: PHYSICAL THERAPIST

## 2019-04-23 NOTE — THERAPY PROGRESS REPORT/RE-CERT
Outpatient Physical Therapy Ortho Progress Note   [place]     Patient Name: Carlos Hayes  : 1955  MRN: [id number]  Today's Date: 2019      Visit Date: 2019    Visit Dx:    ICD-10-CM ICD-9-CM   1. Chronic bilateral low back pain with bilateral sciatica M54.42 724.2    M54.41 724.3    G89.29 338.29   2. Lumbar degenerative disc disease M51.36 722.52   3. Spinal stenosis, lumbar region, with neurogenic claudication M48.062 724.03   4. Neck pain M54.2 723.1   5. Cervical radiculopathy M54.12 723.4   6. Cervicalgia M54.2 723.1   7. Carpal tunnel syndrome of right wrist G56.01 354.0       Patient Active Problem List   Diagnosis   • Battery end of life of spinal cord stimulator   • BMI 40.0-44.9, adult (CMS/[facility])   • Non-smoker   • Cervicalgia   • Cervical radiculopathy   • Carpal tunnel syndrome of right wrist   • Spinal stenosis, lumbar region, with neurogenic claudication   • BMI 39.0-39.9,adult   • Malfunction of spinal cord stimulator (CMS/[facility])   • Abnormal stress test   • Other chest pain   • Hypertension   • Dehydration        Past Medical History:   Diagnosis Date   • Acute bronchitis    • Amnesia    • Anxiety    • Arthritis    • Asthma    • Back pain    • Chest pain    • CKD (chronic kidney disease)    • Concussion    • Contusion of chest wall    • COPD (chronic obstructive pulmonary disease) (CMS/[facility])    • Degeneration of intervertebral disc of lumbar region    • Fall    • Gait disturbance    • Gastro-esophageal reflux    • Headache    • Hearing impaired    • Hypersomnia    • Hypertension    • Hypotestosteronemia    • Insomnia    • Leg cramp    • Lumbar stenosis    • Obesity    • Obesity    • KATLYN (obstructive sleep apnea)    • PTSD (post-traumatic stress disorder)    • Radiculopathy of lumbosacral region    • Sinusitis    • Testicular hypofunction    • Tremor         Past Surgical History:   Procedure Laterality Date   • BACK SURGERY      L3-4 fusion   • BACK SURGERY      L3-S1  fusion   • CARDIAC CATHETERIZATION     • CARDIAC CATHETERIZATION N/A 12/14/2018    Procedure: Coronary angiography;  Surgeon: Lizandro Quiroz MD;  Location:  [facility];  Service: Cardiology   • CARDIAC CATHETERIZATION N/A 12/14/2018    Procedure: Percutaneous Coronary Intervention;  Surgeon: Lizandro Quiroz MD;  Location:  [facility] CATH [facility];  Service: Cardiology   • CARPAL TUNNEL RELEASE Bilateral    • CHOLECYSTECTOMY     • FRACTURE SURGERY      LEFT HAND   • HERNIA REPAIR     • KNEE SURGERY     • SPINAL CORD STIMULATOR REMOVAL N/A 5/2/2018    Procedure: SPINAL CORD STIMULATOR REMOVAL Removal of generator and placment of new generator;  Surgeon: Miguel Hall MD;  Location:  [facility] OR;  Service: Neurosurgery   • SPINAL CORD STIMULATOR REMOVAL N/A 7/18/2018    Procedure: SPINAL CORD STIMULATOR BATTERY CHANGE;  Surgeon: Miguel Hall MD;  Location:  [facility] OR;  Service: Neurosurgery   • THORACIC LAMINECTOMY WITH PLACEMENT OF DORSAL COLUMN STIMULATOR  2010                       PT Assessment/Plan     Row Name 04/23/19 1709          PT Assessment    Functional Limitations  Impaired gait;Impaired locomotion;Limitation in home management;Performance in leisure activities;Limitations in functional capacity and performance  -TB     Impairments  Balance;Gait;Pain;Muscle strength;Motor function;Joint mobility;Impaired muscle length;Range of motion;Posture;Impaired flexibility  -TB     Assessment Comments  His pain appears to be a strain of his right SI and hip joint capsule. He had some muscle guarding but no significant spasms. He is already better and I anticipate him continue to loosen over the next couple of weeks. I applied an SI belt to stabilize and advised he try a cane until he recovers.   -TB     Please refer to paper survey for additional self-reported information  Yes  -TB     Rehab Potential  Good  -TB     Patient/caregiver participated in establishment of treatment plan and  "goals  Yes  -TB     Patient would benefit from skilled therapy intervention  Yes  -TB        PT Plan    Predicted Duration of Therapy Intervention (Therapy Eval)  2-4x/month  -TB     Planned CPT's?  PT THER PROC EA 15 MIN: 91053;PT THER ACT EA 15 MIN: 35240;PT MANUAL THERAPY EA 15 MIN: 58265;PT GAIT TRAINING EA 15 MIN: 03481;PT ELECTRICAL STIM UNATTEND: ;PT ELECTRICAL STIM ATTD EA 15 MIN: 31956;PT ULTRASOUND EA 15 MIN: 59684  -TB     PT Plan Comments  Hold on PT for now and see how he recovers. If he needs, he can return and we can work on loosening his hip joint. If he is able to work this out on his own, we can maybe discharge.   -TB       User Key  (r) = Recorded By, (t) = Taken By, (c) = Cosigned By    Initials Name Provider Type    Juarez Franklin, PT Physical Therapist            Exercises     Row Name 04/23/19 1615             Subjective Comments    Subjective Comments  He was doing well over the last month and moving well. Yesterday, he stepped over a log in his yard and his right foot slipped, sliding forward and causing him to do the splits. He had immediate pain in his right groin and today the pain is more in his right SI area. He's moving better today than he was but is still moving slow.   -TB         Subjective Pain    Pre-Treatment Pain Level  8  -TB      Subjective Pain Comment  pain is worse when it \"twinges\".   -TB         Total Minutes    88986 - PT Manual Therapy Minutes  55  -TB        User Key  (r) = Recorded By, (t) = Taken By, (c) = Cosigned By    Initials Name Provider Type    Juarez Franklin, PT Physical Therapist                      Manual Rx (last 36 hours)      Manual Treatments     Row Name 04/23/19 1615             Total Minutes    71891 - PT Manual Therapy Minutes  55  -TB         Manual Rx 1    Manual Rx 1 Location  right glute/piri  -TB      Manual Rx 1 Type  STM/TrP pressure mostly around right glute med  -TB      Manual Rx 1 Duration  15  -TB         Manual Rx 2    " Manual Rx 2 Location  hooklying  -TB      Manual Rx 2 Type  gentle passive hip ROM  -TB      Manual Rx 2 Grade  including flexion/abd; had pain with IR; able to do piri stretch without severe exacerbation of pain  -TB      Manual Rx 2 Duration  15  -TB         Manual Rx 3    Manual Rx 3 Location  hooklying deep pressure   -TB      Manual Rx 3 Type  right PF, OI and hip adductors;   -TB      Manual Rx 3 Grade  OI and PF not spasmed; hip adductor insertion tender  -TB      Manual Rx 3 Duration  15  -TB         Manual Rx 4    Manual Rx 4 Location  supine subocc release  -TB      Manual Rx 4 Type  added right rhomboid sustained pressure after spasm  -TB      Manual Rx 4 Duration  10  -TB        User Key  (r) = Recorded By, (t) = Taken By, (c) = Cosigned By    Initials Name Provider Type    TB Juarez Hartmann, PT Physical Therapist          PT OP Goals     Row Name 04/23/19 1615          PT Short Term Goals    STG Date to Achieve  01/25/19  -TB     STG 1  Improve oliver hip ext to 0 deg  -TB     STG 1 Progress  Ongoing  -TB     STG 1 Progress Comments  -10 today with flare of pain  -TB     STG 2  Able to walk with right foot position symmetrical to left  -TB     STG 2 Progress  Ongoing  -TB     STG 2 Progress Comments  walking with antalgic gait on right due to strain  -TB     STG 3  Improve oliver hip IR to 30 deg  -TB     STG 3 Progress  Ongoing  -TB     STG 3 Progress Comments  to 0 today due to flare  -TB        Long Term Goals    LTG Date to Achieve  10/13/18  -TB     LTG 1  Able to walk community distances without an assistive device or only a cane without severe flare of pain  -TB     LTG 1 Progress  Ongoing  -TB     LTG 1 Progress Comments  was doing well but only walking household distances since strain  -TB     LTG 2  Improve hip passive extension to 10 degrees  -TB     LTG 2 Progress  Ongoing  -TB     LTG 2 Progress Comments  see STG  -TB     LTG 3  Improved core contraction held during functional tasks.  -TB      LTG 3 Progress  Progressing  -TB     LTG 3 Progress Comments  doing OK with this  -TB     LTG 4  Independent with HEP for flexibility and stability.   -TB     LTG 4 Progress  Ongoing  -TB     LTG 4 Progress Comments  he had been walking and stretchiing until strain  -TB     LTG 5  No difficulty voiding his bladder  -TB     LTG 5 Progress  Progressing  -TB     LTG 5 Progress Comments  This has done well. he struggled ejaculating partway through his vacation; may have been from overuse without stretching  -TB        Time Calculation    PT Goal Re-Cert Due Date  05/23/19  -TB       User Key  (r) = Recorded By, (t) = Taken By, (c) = Cosigned By    Initials Name Provider Type    TB Juarez Hartmann, PT Physical Therapist          Therapy Education  Education Details: use a cane; possible etiology of pain with hip strain; stretch and deep pressure to PF if struggling with ejaculation; gentle hip AROM within painfree limits  Given: HEP, Symptoms/condition management  Program: Modified  How Provided: Verbal, Demonstration  Provided to: Patient  Level of Understanding: Verbalized, Demonstrated  94953 - PT Self Care/Mgmt Minutes: 20              Time Calculation:   Start Time: 1615  Stop Time: 1730  Time Calculation (min): 75 min  Therapy Charges for Today     Code Description Service Date Service Provider Modifiers Qty    38460805137 HC PT MANUAL THERAPY EA 15 MIN 4/23/2019 Juarez Hartmann, PT GP 4    61799871849 HC PT SELF CARE/MGMT/TRAIN EA 15 MIN 4/23/2019 Juarez Hartmann, PT GP 1                    Juarez Hartmann, PT  4/23/2019

## 2019-05-02 ENCOUNTER — HOSPITAL ENCOUNTER (OUTPATIENT)
Dept: GENERAL RADIOLOGY | Facility: HOSPITAL | Age: 64
Discharge: HOME | End: 2019-05-02
Admitting: FAMILY MEDICINE

## 2019-05-02 ENCOUNTER — TRANSCRIBE ORDERS (OUTPATIENT)
Dept: ADMINISTRATIVE | Facility: HOSPITAL | Age: 64
End: 2019-05-02

## 2019-05-02 ENCOUNTER — HOSPITAL ENCOUNTER (OUTPATIENT)
Dept: GENERAL RADIOLOGY | Facility: HOSPITAL | Age: 64
Discharge: HOME | End: 2019-05-02

## 2019-05-02 DIAGNOSIS — M54.50 ACUTE RIGHT-SIDED LOW BACK PAIN WITHOUT SCIATICA: ICD-10-CM

## 2019-05-02 DIAGNOSIS — M25.551 RIGHT HIP PAIN: ICD-10-CM

## 2019-05-02 DIAGNOSIS — M25.551 RIGHT HIP PAIN: Primary | ICD-10-CM

## 2019-05-02 PROCEDURE — 73502 X-RAY EXAM HIP UNI 2-3 VIEWS: CPT

## 2019-05-02 PROCEDURE — 72110 X-RAY EXAM L-2 SPINE 4/>VWS: CPT

## 2019-05-20 ENCOUNTER — APPOINTMENT (OUTPATIENT)
Dept: PHYSICAL THERAPY | Facility: HOSPITAL | Age: 64
End: 2019-05-20

## 2019-05-23 ENCOUNTER — HOSPITAL ENCOUNTER (OUTPATIENT)
Dept: PHYSICAL THERAPY | Facility: HOSPITAL | Age: 64
Setting detail: THERAPIES SERIES
Discharge: HOME | End: 2019-05-23

## 2019-05-23 DIAGNOSIS — M51.36 LUMBAR DEGENERATIVE DISC DISEASE: ICD-10-CM

## 2019-05-23 DIAGNOSIS — G89.29 CHRONIC BILATERAL LOW BACK PAIN WITH BILATERAL SCIATICA: Primary | ICD-10-CM

## 2019-05-23 DIAGNOSIS — M54.41 CHRONIC BILATERAL LOW BACK PAIN WITH BILATERAL SCIATICA: Primary | ICD-10-CM

## 2019-05-23 DIAGNOSIS — M54.2 NECK PAIN: ICD-10-CM

## 2019-05-23 DIAGNOSIS — M54.2 CERVICALGIA: ICD-10-CM

## 2019-05-23 DIAGNOSIS — M54.12 CERVICAL RADICULOPATHY: ICD-10-CM

## 2019-05-23 DIAGNOSIS — G56.01 CARPAL TUNNEL SYNDROME OF RIGHT WRIST: ICD-10-CM

## 2019-05-23 DIAGNOSIS — M48.062 SPINAL STENOSIS, LUMBAR REGION, WITH NEUROGENIC CLAUDICATION: ICD-10-CM

## 2019-05-23 DIAGNOSIS — M54.42 CHRONIC BILATERAL LOW BACK PAIN WITH BILATERAL SCIATICA: Primary | ICD-10-CM

## 2019-05-23 PROCEDURE — 97140 MANUAL THERAPY 1/> REGIONS: CPT | Performed by: PHYSICAL THERAPIST

## 2019-05-23 PROCEDURE — 97164 PT RE-EVAL EST PLAN CARE: CPT | Performed by: PHYSICAL THERAPIST

## 2019-05-23 NOTE — THERAPY PROGRESS REPORT/RE-CERT
Outpatient Physical Therapy Ortho Progress Note   [place]     Patient Name: Carlos Hayes  : 1955  MRN: [id number]  Today's Date: 2019      Visit Date: 2019    Visit Dx:    ICD-10-CM ICD-9-CM   1. Chronic bilateral low back pain with bilateral sciatica M54.42 724.2    M54.41 724.3    G89.29 338.29   2. Lumbar degenerative disc disease M51.36 722.52   3. Spinal stenosis, lumbar region, with neurogenic claudication M48.062 724.03   4. Neck pain M54.2 723.1   5. Cervical radiculopathy M54.12 723.4   6. Cervicalgia M54.2 723.1   7. Carpal tunnel syndrome of right wrist G56.01 354.0       Patient Active Problem List   Diagnosis   • Battery end of life of spinal cord stimulator   • BMI 40.0-44.9, adult (CMS/[facility])   • Non-smoker   • Cervicalgia   • Cervical radiculopathy   • Carpal tunnel syndrome of right wrist   • Spinal stenosis, lumbar region, with neurogenic claudication   • BMI 39.0-39.9,adult   • Malfunction of spinal cord stimulator (CMS/[facility])   • Abnormal stress test   • Other chest pain   • Hypertension   • Dehydration        Past Medical History:   Diagnosis Date   • Acute bronchitis    • Amnesia    • Anxiety    • Arthritis    • Asthma    • Back pain    • Chest pain    • CKD (chronic kidney disease)    • Concussion    • Contusion of chest wall    • COPD (chronic obstructive pulmonary disease) (CMS/[facility])    • Degeneration of intervertebral disc of lumbar region    • Fall    • Gait disturbance    • Gastro-esophageal reflux    • Headache    • Hearing impaired    • Hypersomnia    • Hypertension    • Hypotestosteronemia    • Insomnia    • Leg cramp    • Lumbar stenosis    • Obesity    • Obesity    • KATLYN (obstructive sleep apnea)    • PTSD (post-traumatic stress disorder)    • Radiculopathy of lumbosacral region    • Sinusitis    • Testicular hypofunction    • Tremor         Past Surgical History:   Procedure Laterality Date   • BACK SURGERY      L3-4 fusion   • BACK SURGERY      L3-S1  fusion   • CARDIAC CATHETERIZATION     • CARDIAC CATHETERIZATION N/A 12/14/2018    Procedure: Coronary angiography;  Surgeon: Lizandro Quiroz MD;  Location:  [facility] C[facility];  Service: Cardiology   • CARDIAC CATHETERIZATION N/A 12/14/2018    Procedure: Percutaneous Coronary Intervention;  Surgeon: Lizandro Quiroz MD;  Location:  [facility];  Service: Cardiology   • CARPAL TUNNEL RELEASE Bilateral    • CHOLECYSTECTOMY     • FRACTURE SURGERY      LEFT HAND   • HERNIA REPAIR     • KNEE SURGERY     • SPINAL CORD STIMULATOR REMOVAL N/A 5/2/2018    Procedure: SPINAL CORD STIMULATOR REMOVAL Removal of generator and placment of new generator;  Surgeon: Miguel Hall MD;  Location:  [facility] OR;  Service: Neurosurgery   • SPINAL CORD STIMULATOR REMOVAL N/A 7/18/2018    Procedure: SPINAL CORD STIMULATOR BATTERY CHANGE;  Surgeon: Miguel Hall MD;  Location:  [facility] OR;  Service: Neurosurgery   • THORACIC LAMINECTOMY WITH PLACEMENT OF DORSAL COLUMN STIMULATOR  2010       PT Ortho     Row Name 05/23/19 0800       Lumbar ROM Screen- Lower Quarter Clearing    Lumbar Flexion  Impaired 25%  -TB    Lumbar Extension  Unable to perform unable to extend to neutral  -TB       Lumbosacral Accessory Motions    PA [place]- L2  Immobile  -TB    PA Glide- L3  Immobile  -TB    PA Glide- L4  Immobile  -TB    PA Glide- L5  Immobile  -TB    PA glide- Sacral base  Immobile  -TB       Lumbar/SI Special Tests    Standing Flexion Test (SI Dysfunction)  Bilateral:;Positive  -TB    Stork Test (SI Dysfunction)  Right:;Positive  -TB    Thigh Thrust/Posterior Shear (SI Dysfunction)  Unable to test too painful  -TB    LAUREL (hip vs. SI Dysfunction)  Unable to test too painful  -TB    FAIR Test (Piriformis Syndrome)  Unable to test too painful  -TB       Lumbosacral Palpation    SI  Right:;Tender  -TB    Piriformis  Right:;Tender;Guarded/taut  -TB    Pelvic Floor  Right:;Tender;Guarded/taut;Trigger point  -TB    Iliopsoas   Right:;Tender;Guarded/taut;Trigger point  -TB      User Key  (r) = Recorded By, (t) = Taken By, (c) = Cosigned By    Initials Name Provider Type    TB Juarez Hartmann, PT Physical Therapist                      PT Assessment/Plan     Row Name 05/23/19 1200          PT Assessment    Functional Limitations  Impaired gait;Impaired locomotion;Limitation in home management;Performance in leisure activities;Limitations in functional capacity and performance  -TB     Impairments  Balance;Gait;Pain;Muscle strength;Motor function;Joint mobility;Impaired muscle length;Range of motion;Posture;Impaired flexibility  -TB     Assessment Comments  I was hoping that when he came last month that the strain of his SI would be short term and he would bounce back quickly. However, he has really struggled over the last few weeks. His problem appears to be a strained, stuck right SI with secondary muscle guarding throughout his right pelvis including his piriformis, iliopsoas, and pelvic floor--all with trigger points referring pain into his lower abdomen and lumbar. His fusion through his lumbosacrum and tight hips would tend to make the SI joints more stressed and easily strained. I think he can respond well to PT but he may also benefit from some pain management for a possible injection to allow him to participate in PT better.   -TB     Rehab Potential  Good  -TB     Patient/caregiver participated in establishment of treatment plan and goals  Yes  -TB     Patient would benefit from skilled therapy intervention  Yes  -TB        PT Plan    PT Frequency  1x/week;2x/week  -TB     Predicted Duration of Therapy Intervention (Therapy Eval)  6 weeks  -TB     Planned CPT's?  PT EVAL MOD COMPLELITY: 09996;PT THER PROC EA 15 MIN: 37882;PT THER ACT EA 15 MIN: 13755;PT MANUAL THERAPY EA 15 MIN: 16616;PT GAIT TRAINING EA 15 MIN: 51037  -TB     PT Plan Comments  Work on muscle guarding and trigger point relaxation and slowly improve right pelvis  and hip mobility. Progress his to flexibility and stability as he is able to tolerate.   -TB       User Key  (r) = Recorded By, (t) = Taken By, (c) = Cosigned By    Initials Name Provider Type    TB Juarez Hartmann, PT Physical Therapist            Exercises     Row Name 05/23/19 0800             Subjective Comments    Subjective Comments  He called me earlier this week says he had a bad flare of back pain. He is struggling walking and will be OK but then he will turn and have a sharp pain again. He feels like the hernia on his right abdomen. The movements that cause the flare are pretty random. He says when he has to have a BM, he may have to go 2-3 times to empty.   -TB         Subjective Pain    Pre-Treatment Pain Level  7  -TB      Subjective Pain Comment  pain may be in right groin and sometime the pain is around his right lumbosacral area  -TB        User Key  (r) = Recorded By, (t) = Taken By, (c) = Cosigned By    Initials Name Provider Type    Juarez Franklin, PT Physical Therapist                       PT OP Goals     Row Name 05/23/19 0800          PT Short Term Goals    STG Date to Achieve  06/13/19  -TB     STG 1  Improve oliver hip ext to 0 deg  -TB     STG 1 Progress  Ongoing  -TB     STG 1 Progress Comments  unable to fully assess due to pain  -TB     STG 2  Able to walk with right foot position symmetrical to left  -TB     STG 2 Progress  Met  -TB     STG 3  Improve oliver hip IR to 30 deg  -TB     STG 3 Progress  Ongoing  -TB     STG 3 Progress Comments  limited today due to pain  -TB        Long Term Goals    LTG Date to Achieve  10/13/18  -TB     LTG 1  Able to walk community distances without an assistive device or only a cane without severe flare of pain  -TB     LTG 1 Progress  Ongoing  -TB     LTG 1 Progress Comments  was doing very well until his slip and reinjury  -TB     LTG 2  Improve hip passive extension to 10 degrees  -TB     LTG 2 Progress  Ongoing  -TB     LTG 2 Progress Comments  see  STG  -TB     LTG 3  Improved core contraction held during functional tasks.  -TB     LTG 3 Progress  Ongoing  -TB     LTG 3 Progress Comments  he can contract his abdominals well but hips too painful to assess  -TB     LTG 4  Independent with HEP for flexibility and stability.   -TB     LTG 4 Progress  Ongoing  -TB     LTG 4 Progress Comments  had to back off these due to pain  -TB     LTG 5  No difficulty voiding his bladder  -TB     LTG 5 Progress  Progressing  -TB     LTG 5 Progress Comments  he's had difficulty emptying his bowels and needs multiple attempts  -TB        Time Calculation    PT Goal Re-Cert Due Date  06/22/19  -TB       User Key  (r) = Recorded By, (t) = Taken By, (c) = Cosigned By    Initials Name Provider Type    TB Juarez Hartmann, PT Physical Therapist          Therapy Education  Education Details: SI belt use  Given: HEP, Symptoms/condition management  Program: Modified  How Provided: Verbal, Demonstration  Provided to: Patient  Level of Understanding: Verbalized, Demonstrated              Time Calculation:   Start Time: 0810  Stop Time: 0915  Time Calculation (min): 65 min  Therapy Charges for Today     Code Description Service Date Service Provider Modifiers Qty    46974329595 HC PT MANUAL THERAPY EA 15 MIN 5/23/2019 Juarez Hartmann, PT KX, GP 4                    Juarez Hartmann, PT  5/23/2019

## 2019-05-23 NOTE — THERAPY RE-EVALUATION
Outpatient Physical Therapy Ortho Re-Evaluation  [facility]     Patient Name: Carlos Hayes  : 1955  MRN: [id number]  Today's Date: 2019      Visit Date: 2019    Patient Active Problem List   Diagnosis   • Battery end of life of spinal cord stimulator   • BMI 40.0-44.9, adult (CMS/[facility])   • Non-smoker   • Cervicalgia   • Cervical radiculopathy   • Carpal tunnel syndrome of right wrist   • Spinal stenosis, lumbar region, with neurogenic claudication   • BMI 39.0-39.9,adult   • Malfunction of spinal cord stimulator (CMS/[facility])   • Abnormal stress test   • Other chest pain   • Hypertension   • Dehydration        Past Medical History:   Diagnosis Date   • Acute bronchitis    • Amnesia    • Anxiety    • Arthritis    • Asthma    • Back pain    • Chest pain    • CKD (chronic kidney disease)    • Concussion    • Contusion of chest wall    • COPD (chronic obstructive pulmonary disease) (CMS/[facility])    • Degeneration of intervertebral disc of lumbar region    • Fall    • Gait disturbance    • Gastro-esophageal reflux    • Headache    • Hearing impaired    • Hypersomnia    • Hypertension    • Hypotestosteronemia    • Insomnia    • Leg cramp    • Lumbar stenosis    • Obesity    • Obesity    • KATLYN (obstructive sleep apnea)    • PTSD (post-traumatic stress disorder)    • Radiculopathy of lumbosacral region    • Sinusitis    • Testicular hypofunction    • Tremor         Past Surgical History:   Procedure Laterality Date   • BACK SURGERY      L3-4 fusion   • BACK SURGERY      L3-S1 fusion   • CARDIAC CATHETERIZATION     • CARDIAC CATHETERIZATION N/A 2018    Procedure: Coronary angiography;  Surgeon: Lizandro Quiroz MD;  Location:  [facility] CATH [facility];  Service: Cardiology   • CARDIAC CATHETERIZATION N/A 2018    Procedure: Percutaneous Coronary Intervention;  Surgeon: Lizandro Quiroz MD;  Location:  [facility] CATH INVASIVE [facility];  Service: Cardiology   • CARPAL TUNNEL RELEASE Bilateral     • CHOLECYSTECTOMY     • FRACTURE SURGERY      LEFT HAND   • HERNIA REPAIR     • KNEE SURGERY     • SPINAL CORD STIMULATOR REMOVAL N/A 5/2/2018    Procedure: SPINAL CORD STIMULATOR REMOVAL Removal of generator and placment of new generator;  Surgeon: Miguel Hall MD;  Location:  [facility] OR;  Service: Neurosurgery   • SPINAL CORD STIMULATOR REMOVAL N/A 7/18/2018    Procedure: SPINAL CORD STIMULATOR BATTERY CHANGE;  Surgeon: Miguel Hall MD;  Location:  [facility] OR;  Service: Neurosurgery   • THORACIC LAMINECTOMY WITH PLACEMENT OF DORSAL COLUMN STIMULATOR  2010       Visit Dx:     ICD-10-CM ICD-9-CM   1. Chronic bilateral low back pain with bilateral sciatica M54.42 724.2    M54.41 724.3    G89.29 338.29   2. Lumbar degenerative disc disease M51.36 722.52   3. Spinal stenosis, lumbar region, with neurogenic claudication M48.062 724.03   4. Neck pain M54.2 723.1   5. Cervical radiculopathy M54.12 723.4   6. Cervicalgia M54.2 723.1   7. Carpal tunnel syndrome of right wrist G56.01 354.0             PT Ortho     Row Name 05/23/19 0800       Lumbar ROM Screen- Lower Quarter Clearing    Lumbar Flexion  Impaired 25%  -TB    Lumbar Extension  Unable to perform unable to extend to neutral  -TB       Lumbosacral Accessory Motions    PA [place]- L2  Immobile  -TB    PA Glide- L3  Immobile  -TB    PA Glide- L4  Immobile  -TB    PA Glide- L5  Immobile  -TB    PA glide- Sacral base  Immobile  -TB       Lumbar/SI Special Tests    Standing Flexion Test (SI Dysfunction)  Bilateral:;Positive  -TB    Stork Test (SI Dysfunction)  Right:;Positive  -TB    Thigh Thrust/Posterior Shear (SI Dysfunction)  Unable to test too painful  -TB    LAUREL (hip vs. SI Dysfunction)  Unable to test too painful  -TB    FAIR Test (Piriformis Syndrome)  Unable to test too painful  -TB       Lumbosacral Palpation    SI  Right:;Tender  -TB    Piriformis  Right:;Tender;Guarded/taut  -TB    Pelvic Floor  Right:;Tender;Guarded/taut;Trigger point  -TB     Iliopsoas  Right:;Tender;Guarded/taut;Trigger point  -TB      User Key  (r) = Recorded By, (t) = Taken By, (c) = Cosigned By    Initials Name Provider Type    TB Juarez Hartmann, PT Physical Therapist                      Therapy Education  Education Details: SI belt use  Given: HEP, Symptoms/condition management  Program: Modified  How Provided: Verbal, Demonstration  Provided to: Patient  Level of Understanding: Verbalized, Demonstrated     PT OP Goals     Row Name 05/23/19 0800          PT Short Term Goals    STG Date to Achieve  06/13/19  -TB     STG 1  Improve oliver hip ext to 0 deg  -TB     STG 1 Progress  Ongoing  -TB     STG 1 Progress Comments  unable to fully assess due to pain  -TB     STG 2  Able to walk with right foot position symmetrical to left  -TB     STG 2 Progress  Met  -TB     STG 3  Improve oliver hip IR to 30 deg  -TB     STG 3 Progress  Ongoing  -TB     STG 3 Progress Comments  limited today due to pain  -TB        Long Term Goals    LTG Date to Achieve  10/13/18  -TB     LTG 1  Able to walk community distances without an assistive device or only a cane without severe flare of pain  -TB     LTG 1 Progress  Ongoing  -TB     LTG 1 Progress Comments  was doing very well until his slip and reinjury  -TB     LTG 2  Improve hip passive extension to 10 degrees  -TB     LTG 2 Progress  Ongoing  -TB     LTG 2 Progress Comments  see STG  -TB     LTG 3  Improved core contraction held during functional tasks.  -TB     LTG 3 Progress  Ongoing  -TB     LTG 3 Progress Comments  he can contract his abdominals well but hips too painful to assess  -TB     LTG 4  Independent with HEP for flexibility and stability.   -TB     LTG 4 Progress  Ongoing  -TB     LTG 4 Progress Comments  had to back off these due to pain  -TB     LTG 5  No difficulty voiding his bladder  -TB     LTG 5 Progress  Progressing  -TB     LTG 5 Progress Comments  he's had difficulty emptying his bowels and needs multiple attempts  -TB         Time Calculation    PT Goal Re-Cert Due Date  06/22/19  -TB       User Key  (r) = Recorded By, (t) = Taken By, (c) = Cosigned By    Initials Name Provider Type    TB Juarez Hartmann, PT Physical Therapist          PT Assessment/Plan     Row Name 05/23/19 1200          PT Assessment    Functional Limitations  Impaired gait;Impaired locomotion;Limitation in home management;Performance in leisure activities;Limitations in functional capacity and performance  -TB     Impairments  Balance;Gait;Pain;Muscle strength;Motor function;Joint mobility;Impaired muscle length;Range of motion;Posture;Impaired flexibility  -TB     Assessment Comments  I was hoping that when he came last month that the strain of his SI would be short term and he would bounce back quickly. However, he has really struggled over the last few weeks. His problem appears to be a strained, stuck right SI with secondary muscle guarding throughout his right pelvis including his piriformis, iliopsoas, and pelvic floor--all with trigger points referring pain into his lower abdomen and lumbar. His fusion through his lumbosacrum and tight hips would tend to make the SI joints more stressed and easily strained. I think he can respond well to PT but he may also benefit from some pain management for a possible injection to allow him to participate in PT better.   -TB     Rehab Potential  Good  -TB     Patient/caregiver participated in establishment of treatment plan and goals  Yes  -TB     Patient would benefit from skilled therapy intervention  Yes  -TB        PT Plan    PT Frequency  1x/week;2x/week  -TB     Predicted Duration of Therapy Intervention (Therapy Eval)  6 weeks  -TB     Planned CPT's?  PT EVAL MOD COMPLELITY: 20404;PT THER PROC EA 15 MIN: 74023;PT THER ACT EA 15 MIN: 23375;PT MANUAL THERAPY EA 15 MIN: 27026;PT GAIT TRAINING EA 15 MIN: 02574  -TB     PT Plan Comments  Work on muscle guarding and trigger point relaxation and slowly improve right pelvis  and hip mobility. Progress his to flexibility and stability as he is able to tolerate.   -TB       User Key  (r) = Recorded By, (t) = Taken By, (c) = Cosigned By    Initials Name Provider Type    Juarez Franklin PT Physical Therapist            Exercises     Row Name 05/23/19 0800             Subjective Comments    Subjective Comments  He called me earlier this week says he had a bad flare of back pain. He is struggling walking and will be OK but then he will turn and have a sharp pain again. He feels like the hernia on his right abdomen. The movements that cause the flare are pretty random. He says when he has to have a BM, he may have to go 2-3 times to empty.   -TB         Subjective Pain    Pre-Treatment Pain Level  7  -TB      Subjective Pain Comment  pain may be in right groin and sometime the pain is around his right lumbosacral area  -TB         Total Minutes    67223 - PT Manual Therapy Minutes  25  -TB        User Key  (r) = Recorded By, (t) = Taken By, (c) = Cosigned By    Initials Name Provider Type    Juarez Franklin PT Physical Therapist           Manual Rx (last 36 hours)      Manual Treatments     Row Name 05/23/19 0800             Total Minutes    73451 - PT Manual Therapy Minutes  25  -TB         Manual Rx 1    Manual Rx 1 Location  sidelying right piri, IP, PF  -TB      Manual Rx 1 Type  deep sustained pressure  -TB      Manual Rx 1 Duration  20  -TB         Manual Rx 2    Manual Rx 2 Location  supine right leg LAD  -TB      Manual Rx 2 Type  pulled in loose packed from the knee  -TB      Manual Rx 2 Duration  5  -TB        User Key  (r) = Recorded By, (t) = Taken By, (c) = Cosigned By    Initials Name Provider Type    Juarez Franklin PT Physical Therapist                                Time Calculation:     Start Time: 0810  Stop Time: 0915  Time Calculation (min): 65 min     Therapy Charges for Today     Code Description Service Date Service Provider Modifiers Qty    06076506501  HC PT RE-EVAL ESTABLISHED PLAN 2 5/23/2019 Juarez Hartmann, PT GP 1    15623064245 HC PT MANUAL THERAPY EA 15 MIN 5/23/2019 Juarez Hartmann, PT KX, GP 2                    Juarez Hartmann, PT  5/23/2019

## 2019-05-24 ENCOUNTER — HOSPITAL ENCOUNTER (OUTPATIENT)
Dept: PHYSICAL THERAPY | Facility: HOSPITAL | Age: 64
Setting detail: THERAPIES SERIES
Discharge: HOME | End: 2019-05-24

## 2019-05-24 DIAGNOSIS — M48.062 SPINAL STENOSIS, LUMBAR REGION, WITH NEUROGENIC CLAUDICATION: ICD-10-CM

## 2019-05-24 DIAGNOSIS — M54.42 CHRONIC BILATERAL LOW BACK PAIN WITH BILATERAL SCIATICA: Primary | ICD-10-CM

## 2019-05-24 DIAGNOSIS — M54.12 CERVICAL RADICULOPATHY: ICD-10-CM

## 2019-05-24 DIAGNOSIS — M54.41 CHRONIC BILATERAL LOW BACK PAIN WITH BILATERAL SCIATICA: Primary | ICD-10-CM

## 2019-05-24 DIAGNOSIS — M54.2 CERVICALGIA: ICD-10-CM

## 2019-05-24 DIAGNOSIS — M54.2 NECK PAIN: ICD-10-CM

## 2019-05-24 DIAGNOSIS — G56.01 CARPAL TUNNEL SYNDROME OF RIGHT WRIST: ICD-10-CM

## 2019-05-24 DIAGNOSIS — M51.36 LUMBAR DEGENERATIVE DISC DISEASE: ICD-10-CM

## 2019-05-24 DIAGNOSIS — G89.29 CHRONIC BILATERAL LOW BACK PAIN WITH BILATERAL SCIATICA: Primary | ICD-10-CM

## 2019-05-24 PROCEDURE — 97140 MANUAL THERAPY 1/> REGIONS: CPT | Performed by: PHYSICAL THERAPIST

## 2019-05-24 NOTE — THERAPY TREATMENT NOTE
Outpatient Physical Therapy Ortho Treatment Note  [facility]     Patient Name: Carlos Hayes  : 1955  MRN: [id number]  Today's Date: 2019      Visit Date: 2019    Visit Dx:    ICD-10-CM ICD-9-CM   1. Chronic bilateral low back pain with bilateral sciatica M54.42 724.2    M54.41 724.3    G89.29 338.29   2. Lumbar degenerative disc disease M51.36 722.52   3. Spinal stenosis, lumbar region, with neurogenic claudication M48.062 724.03   4. Neck pain M54.2 723.1   5. Cervical radiculopathy M54.12 723.4   6. Cervicalgia M54.2 723.1   7. Carpal tunnel syndrome of right wrist G56.01 354.0       Patient Active Problem List   Diagnosis   • Battery end of life of spinal cord stimulator   • BMI 40.0-44.9, adult (CMS/[facility])   • Non-smoker   • Cervicalgia   • Cervical radiculopathy   • Carpal tunnel syndrome of right wrist   • Spinal stenosis, lumbar region, with neurogenic claudication   • BMI 39.0-39.9,adult   • Malfunction of spinal cord stimulator (CMS/[facility])   • Abnormal stress test   • Other chest pain   • Hypertension   • Dehydration        Past Medical History:   Diagnosis Date   • Acute bronchitis    • Amnesia    • Anxiety    • Arthritis    • Asthma    • Back pain    • Chest pain    • CKD (chronic kidney disease)    • Concussion    • Contusion of chest wall    • COPD (chronic obstructive pulmonary disease) (CMS/[facility])    • Degeneration of intervertebral disc of lumbar region    • Fall    • Gait disturbance    • Gastro-esophageal reflux    • Headache    • Hearing impaired    • Hypersomnia    • Hypertension    • Hypotestosteronemia    • Insomnia    • Leg cramp    • Lumbar stenosis    • Obesity    • Obesity    • KATLYN (obstructive sleep apnea)    • PTSD (post-traumatic stress disorder)    • Radiculopathy of lumbosacral region    • Sinusitis    • Testicular hypofunction    • Tremor         Past Surgical History:   Procedure Laterality Date   • BACK SURGERY      L3-4 fusion   • BACK SURGERY      L3-S1  fusion   • CARDIAC CATHETERIZATION     • CARDIAC CATHETERIZATION N/A 12/14/2018    Procedure: Coronary angiography;  Surgeon: Lizandro Quiroz MD;  Location:  [facility] CATH INVAS[facility] LOC[facility];  Service: Cardiology   • CARDIAC CATHETERIZATION N/A 12/14/2018    Procedure: Percutaneous Coronary Intervention;  Surgeon: Lizandro Quiroz MD;  Location:  [facility] CATH INVASIVE [facility];  Service: Cardiology   • CARPAL TUNNEL RELEASE Bilateral    • CHOLECYSTECTOMY     • FRACTURE SURGERY      LEFT HAND   • HERNIA REPAIR     • KNEE SURGERY     • SPINAL CORD STIMULATOR REMOVAL N/A 5/2/2018    Procedure: SPINAL CORD STIMULATOR REMOVAL Removal of generator and placment of new generator;  Surgeon: Miguel Hall MD;  Location:  [facility] OR;  Service: Neurosurgery   • SPINAL CORD STIMULATOR REMOVAL N/A 7/18/2018    Procedure: SPINAL CORD STIMULATOR BATTERY CHANGE;  Surgeon: Miguel Hall MD;  Location:  [facility] OR;  Service: Neurosurgery   • THORACIC LAMINECTOMY WITH PLACEMENT OF DORSAL COLUMN STIMULATOR  2010       PT Ortho     Row Name 05/23/19 0800       Lumbar ROM Screen- Lower Quarter Clearing    Lumbar Flexion  Impaired 25%  -TB    Lumbar Extension  Unable to perform unable to extend to neutral  -TB       Lumbosacral Accessory Motions    PA [place]- L2  Immobile  -TB    PA Glide- L3  Immobile  -TB    PA Glide- L4  Immobile  -TB    PA Glide- L5  Immobile  -TB    PA glide- Sacral base  Immobile  -TB       Lumbar/SI Special Tests    Standing Flexion Test (SI Dysfunction)  Bilateral:;Positive  -TB    Stork Test (SI Dysfunction)  Right:;Positive  -TB    Thigh Thrust/Posterior Shear (SI Dysfunction)  Unable to test too painful  -TB    LAUREL (hip vs. SI Dysfunction)  Unable to test too painful  -TB    FAIR Test (Piriformis Syndrome)  Unable to test too painful  -TB       Lumbosacral Palpation    SI  Right:;Tender  -TB    Piriformis  Right:;Tender;Guarded/taut  -TB    Pelvic Floor  Right:;Tender;Guarded/taut;Trigger point  -TB    Iliopsoas   Right:;Tender;Guarded/taut;Trigger point  -TB      User Key  (r) = Recorded By, (t) = Taken By, (c) = Cosigned By    Initials Name Provider Type    TB Juarez Hartmann, PT Physical Therapist                      PT Assessment/Plan     Row Name 05/24/19 0900 05/23/19 1200       PT Assessment    Functional Limitations  --  Impaired gait;Impaired locomotion;Limitation in home management;Performance in leisure activities;Limitations in functional capacity and performance  -TB    Impairments  --  Balance;Gait;Pain;Muscle strength;Motor function;Joint mobility;Impaired muscle length;Range of motion;Posture;Impaired flexibility  -TB    Assessment Comments  He walked about much better today than he was initially. His pain was down to 4. I was more gentle today and kept him in left sidelying and did more low load prolonged stretching to his IP with anterior mobs of the right pelvis.   -TB  I was hoping that when he came last month that the strain of his SI would be short term and he would bounce back quickly. However, he has really struggled over the last few weeks. His problem appears to be a strained, stuck right SI with secondary muscle guarding throughout his right pelvis including his piriformis, iliopsoas, and pelvic floor--all with trigger points referring pain into his lower abdomen and lumbar. His fusion through his lumbosacrum and tight hips would tend to make the SI joints more stressed and easily strained. I think he can respond well to PT but he may also benefit from some pain management for a possible injection to allow him to participate in PT better.   -TB    Rehab Potential  --  Good  -TB    Patient/caregiver participated in establishment of treatment plan and goals  --  Yes  -TB    Patient would benefit from skilled therapy intervention  --  Yes  -TB       PT Plan    PT Frequency  --  1x/week;2x/week  -TB    Predicted Duration of Therapy Intervention (Therapy Eval)  --  6 weeks  -TB    Planned CPT's?  --   PT EVAL MOD COMPLELITY: 85954;PT THER PROC EA 15 MIN: 24560;PT THER ACT EA 15 MIN: 80859;PT MANUAL THERAPY EA 15 MIN: 68953;PT GAIT TRAINING EA 15 MIN: 29393  -TB    PT Plan Comments  Continue with gentle pelvis and hip stretching and mobs.  -TB  Work on muscle guarding and trigger point relaxation and slowly improve right pelvis and hip mobility. Progress his to flexibility and stability as he is able to tolerate.   -TB      User Key  (r) = Recorded By, (t) = Taken By, (c) = Cosigned By    Initials Name Provider Type    TB Juarez Hartmann, PT Physical Therapist            Exercises     Row Name 05/24/19 0900             Subjective Comments    Subjective Comments  He was really in pain after he left and it got progressively worse. He took some muscle relaxers and sat on ice and this helped.   -TB         Subjective Pain    Pre-Treatment Pain Level  6  -TB      Post-Treatment Pain Level  4  -TB         Total Minutes    91288 - PT Manual Therapy Minutes  55  -TB        User Key  (r) = Recorded By, (t) = Taken By, (c) = Cosigned By    Initials Name Provider Type    TB Juarez Hartmann, PT Physical Therapist                      Manual Rx (last 36 hours)      Manual Treatments     Row Name 05/24/19 0900 05/23/19 0800          Total Minutes    34491 - PT Manual Therapy Minutes  55  -TB  25  -TB        Manual Rx 1    Manual Rx 1 Location  sidelying right piri, glute and lumbar, right IP  -TB  sidelying right piri, IP, PF  -TB     Manual Rx 1 Type  STM  -TB  deep sustained pressure  -TB     Manual Rx 1 Duration  40  -TB  20  -TB        Manual Rx 2    Manual Rx 2 Location  gentle right hip passive extension, sidelying with right lower leg on bolster  -TB  supine right leg LAD  -TB     Manual Rx 2 Type  contract/relax from IP used to stretch into extension  -TB  pulled in loose packed from the knee  -TB     Manual Rx 2 Grade  gave gentle anterior mob to right pelvis after stretch with gr 2 OP  -TB  --     Manual Rx 2  Duration  15  -TB  5  -TB       User Key  (r) = Recorded By, (t) = Taken By, (c) = Cosigned By    Initials Name Provider Type    TB Juarez Hartmann, PT Physical Therapist          PT OP Goals     Row Name 05/24/19 0900          PT Short Term Goals    STG Date to Achieve  06/13/19  -TB     STG 1  Improve oliver hip ext to 0 deg  -TB     STG 1 Progress  Ongoing  -TB     STG 1 Progress Comments  -20 due to pain and spasm  -TB     STG 2  Able to walk with right foot position symmetrical to left  -TB     STG 2 Progress  Met  -TB     STG 3  Improve oliver hip IR to 30 deg  -TB     STG 3 Progress  Ongoing  -TB        Long Term Goals    LTG Date to Achieve  10/13/18  -TB     LTG 1  Able to walk community distances without an assistive device or only a cane without severe flare of pain  -TB     LTG 1 Progress  Ongoing  -TB     LTG 2  Improve hip passive extension to 10 degrees  -TB     LTG 2 Progress  Ongoing  -TB     LTG 3  Improved core contraction held during functional tasks.  -TB     LTG 3 Progress  Ongoing  -TB     LTG 4  Independent with HEP for flexibility and stability.   -TB     LTG 4 Progress  Ongoing  -TB     LTG 5  No difficulty voiding his bladder  -TB     LTG 5 Progress  Progressing  -TB        Time Calculation    PT Goal Re-Cert Due Date  06/22/19  -TB       User Key  (r) = Recorded By, (t) = Taken By, (c) = Cosigned By    Initials Name Provider Type    TB Juarez Hartmann PT Physical Therapist          Therapy Education  Given: HEP, Symptoms/condition management  Program: Modified  How Provided: Verbal, Demonstration  Provided to: Patient  Level of Understanding: Verbalized, Demonstrated              Time Calculation:   Start Time: 0900  Stop Time: 1000  Time Calculation (min): 60 min  Therapy Charges for Today     Code Description Service Date Service Provider Modifiers Qty    24410093009 HC PT MANUAL THERAPY EA 15 MIN 5/24/2019 Juarez Hartmann, PT GP 4                    Juarez Hartmann PT  5/24/2019

## 2019-05-30 ENCOUNTER — HOSPITAL ENCOUNTER (OUTPATIENT)
Dept: PHYSICAL THERAPY | Facility: HOSPITAL | Age: 64
Setting detail: THERAPIES SERIES
Discharge: HOME | End: 2019-05-30

## 2019-05-30 DIAGNOSIS — M51.36 LUMBAR DEGENERATIVE DISC DISEASE: ICD-10-CM

## 2019-05-30 DIAGNOSIS — M54.2 NECK PAIN: ICD-10-CM

## 2019-05-30 DIAGNOSIS — M54.2 CERVICALGIA: ICD-10-CM

## 2019-05-30 DIAGNOSIS — G56.01 CARPAL TUNNEL SYNDROME OF RIGHT WRIST: ICD-10-CM

## 2019-05-30 DIAGNOSIS — G89.29 CHRONIC BILATERAL LOW BACK PAIN WITH BILATERAL SCIATICA: Primary | ICD-10-CM

## 2019-05-30 DIAGNOSIS — M54.42 CHRONIC BILATERAL LOW BACK PAIN WITH BILATERAL SCIATICA: Primary | ICD-10-CM

## 2019-05-30 DIAGNOSIS — M48.062 SPINAL STENOSIS, LUMBAR REGION, WITH NEUROGENIC CLAUDICATION: ICD-10-CM

## 2019-05-30 DIAGNOSIS — M54.12 CERVICAL RADICULOPATHY: ICD-10-CM

## 2019-05-30 DIAGNOSIS — M54.41 CHRONIC BILATERAL LOW BACK PAIN WITH BILATERAL SCIATICA: Primary | ICD-10-CM

## 2019-05-30 PROCEDURE — 97140 MANUAL THERAPY 1/> REGIONS: CPT | Performed by: PHYSICAL THERAPIST

## 2019-05-30 NOTE — THERAPY TREATMENT NOTE
Outpatient Physical Therapy Ortho Treatment Note  [facility]     Patient Name: Carlos Hayes  : 1955  MRN: [id number]  Today's Date: 2019      Visit Date: 2019    Visit Dx:    ICD-10-CM ICD-9-CM   1. Chronic bilateral low back pain with bilateral sciatica M54.42 724.2    M54.41 724.3    G89.29 338.29   2. Lumbar degenerative disc disease M51.36 722.52   3. Spinal stenosis, lumbar region, with neurogenic claudication M48.062 724.03   4. Neck pain M54.2 723.1   5. Cervical radiculopathy M54.12 723.4   6. Cervicalgia M54.2 723.1   7. Carpal tunnel syndrome of right wrist G56.01 354.0       Patient Active Problem List   Diagnosis   • Battery end of life of spinal cord stimulator   • BMI 40.0-44.9, adult (CMS/[facility])   • Non-smoker   • Cervicalgia   • Cervical radiculopathy   • Carpal tunnel syndrome of right wrist   • Spinal stenosis, lumbar region, with neurogenic claudication   • BMI 39.0-39.9,adult   • Malfunction of spinal cord stimulator (CMS/[facility])   • Abnormal stress test   • Other chest pain   • Hypertension   • Dehydration        Past Medical History:   Diagnosis Date   • Acute bronchitis    • Amnesia    • Anxiety    • Arthritis    • Asthma    • Back pain    • Chest pain    • CKD (chronic kidney disease)    • Concussion    • Contusion of chest wall    • COPD (chronic obstructive pulmonary disease) (CMS/[facility])    • Degeneration of intervertebral disc of lumbar region    • Fall    • Gait disturbance    • Gastro-esophageal reflux    • Headache    • Hearing impaired    • Hypersomnia    • Hypertension    • Hypotestosteronemia    • Insomnia    • Leg cramp    • Lumbar stenosis    • Obesity    • Obesity    • KATLYN (obstructive sleep apnea)    • PTSD (post-traumatic stress disorder)    • Radiculopathy of lumbosacral region    • Sinusitis    • Testicular hypofunction    • Tremor         Past Surgical History:   Procedure Laterality Date   • BACK SURGERY      L3-4 fusion   • BACK SURGERY      L3-S1  fusion   • CARDIAC CATHETERIZATION     • CARDIAC CATHETERIZATION N/A 12/14/2018    Procedure: Coronary angiography;  Surgeon: Lizandro Quiroz MD;  Location:  [facility];  Service: Cardiology   • CARDIAC CATHETERIZATION N/A 12/14/2018    Procedure: Percutaneous Coronary Intervention;  Surgeon: Lizandro Quiroz MD;  Location:  [facility] CATH [facility];  Service: Cardiology   • CARPAL TUNNEL RELEASE Bilateral    • CHOLECYSTECTOMY     • FRACTURE SURGERY      LEFT HAND   • HERNIA REPAIR     • KNEE SURGERY     • SPINAL CORD STIMULATOR REMOVAL N/A 5/2/2018    Procedure: SPINAL CORD STIMULATOR REMOVAL Removal of generator and placment of new generator;  Surgeon: Miguel Hall MD;  Location:  [facility] OR;  Service: Neurosurgery   • SPINAL CORD STIMULATOR REMOVAL N/A 7/18/2018    Procedure: SPINAL CORD STIMULATOR BATTERY CHANGE;  Surgeon: Miguel Hall MD;  Location:  [facility] OR;  Service: Neurosurgery   • THORACIC LAMINECTOMY WITH PLACEMENT OF DORSAL COLUMN STIMULATOR  2010                       PT Assessment/Plan     Row Name 05/30/19 1300          PT Assessment    Assessment Comments  He continues to do better. I wonder if his right hip joint capsule might have been strained as well.  -TB        PT Plan    PT Plan Comments  cont STM and gentle joint mobs/stretching  -TB       User Key  (r) = Recorded By, (t) = Taken By, (c) = Cosigned By    Initials Name Provider Type    Juarez Franklin, PT Physical Therapist            Exercises     Row Name 05/30/19 1300             Subjective Comments    Subjective Comments  He says his right hip is still waking him at 4am.   -TB         Subjective Pain    Pre-Treatment Pain Level  5  -TB      Subjective Pain Comment  mostly right glute  -TB         Total Minutes    44181 - PT Manual Therapy Minutes  80  -TB        User Key  (r) = Recorded By, (t) = Taken By, (c) = Cosigned By    Initials Name Provider Type    Juarez Franklin, PT Physical Therapist                       Manual Rx (last 36 hours)      Manual Treatments     Row Name 05/30/19 1300             Total Minutes    42398 - PT Manual Therapy Minutes  80  -TB         Manual Rx 1    Manual Rx 1 Location  sidelying right piri, glute and lumbar, right IP  -TB      Manual Rx 1 Type  foam roll STM  -TB      Manual Rx 1 Duration  55  -TB         Manual Rx 2    Manual Rx 2 Location  gentle right hip passive extension, sidelying with right lower leg on bolster  -TB      Manual Rx 2 Type  also hip ER stretch  -TB      Manual Rx 2 Grade  gave gentle anterior mob to right pelvis after stretch with gr 2 OP  -TB      Manual Rx 2 Duration  15  -TB        User Key  (r) = Recorded By, (t) = Taken By, (c) = Cosigned By    Initials Name Provider Type    TB Juarez Hartmann, PT Physical Therapist          PT OP Goals     Row Name 05/30/19 1300          PT Short Term Goals    STG Date to Achieve  06/13/19  -TB     STG 1  Improve oliver hip ext to 0 deg  -TB     STG 1 Progress  Ongoing  -TB     STG 2  Able to walk with right foot position symmetrical to left  -TB     STG 2 Progress  Met  -TB     STG 3  Improve oliver hip IR to 30 deg  -TB     STG 3 Progress  Ongoing  -TB        Long Term Goals    LTG Date to Achieve  10/13/18  -TB     LTG 1  Able to walk community distances without an assistive device or only a cane without severe flare of pain  -TB     LTG 1 Progress  Ongoing  -TB     LTG 1 Progress Comments  walking better  -TB     LTG 2  Improve hip passive extension to 10 degrees  -TB     LTG 2 Progress  Ongoing  -TB     LTG 3  Improved core contraction held during functional tasks.  -TB     LTG 3 Progress  Ongoing  -TB     LTG 4  Independent with HEP for flexibility and stability.   -TB     LTG 4 Progress  Ongoing  -TB     LTG 5  No difficulty voiding his bladder  -TB     LTG 5 Progress  Progressing  -TB       User Key  (r) = Recorded By, (t) = Taken By, (c) = Cosigned By    Initials Name Provider Type    TB Juarez Hartmann, PT  Physical Therapist          Therapy Education  Given: HEP, Symptoms/condition management  Program: Modified  How Provided: Verbal, Demonstration  Provided to: Patient  Level of Understanding: Verbalized, Demonstrated              Time Calculation:   Start Time: 1300  Stop Time: 1420  Time Calculation (min): 80 min  Therapy Charges for Today     Code Description Service Date Service Provider Modifiers Qty    68527778964 HC PT MANUAL THERAPY EA 15 MIN 5/30/2019 Juarez Hartmann, PT KX, GP 5                    Juarez Hartmann, PT  5/30/2019

## 2019-06-05 ENCOUNTER — HOSPITAL ENCOUNTER (OUTPATIENT)
Dept: PHYSICAL THERAPY | Facility: HOSPITAL | Age: 64
Setting detail: THERAPIES SERIES
Discharge: HOME | End: 2019-06-05

## 2019-06-05 DIAGNOSIS — M54.12 CERVICAL RADICULOPATHY: ICD-10-CM

## 2019-06-05 DIAGNOSIS — M51.36 LUMBAR DEGENERATIVE DISC DISEASE: ICD-10-CM

## 2019-06-05 DIAGNOSIS — M54.2 CERVICALGIA: ICD-10-CM

## 2019-06-05 DIAGNOSIS — M48.062 SPINAL STENOSIS, LUMBAR REGION, WITH NEUROGENIC CLAUDICATION: ICD-10-CM

## 2019-06-05 DIAGNOSIS — G56.01 CARPAL TUNNEL SYNDROME OF RIGHT WRIST: ICD-10-CM

## 2019-06-05 DIAGNOSIS — G89.29 CHRONIC BILATERAL LOW BACK PAIN WITH BILATERAL SCIATICA: Primary | ICD-10-CM

## 2019-06-05 DIAGNOSIS — M54.42 CHRONIC BILATERAL LOW BACK PAIN WITH BILATERAL SCIATICA: Primary | ICD-10-CM

## 2019-06-05 DIAGNOSIS — M54.2 NECK PAIN: ICD-10-CM

## 2019-06-05 DIAGNOSIS — M54.41 CHRONIC BILATERAL LOW BACK PAIN WITH BILATERAL SCIATICA: Primary | ICD-10-CM

## 2019-06-05 PROCEDURE — 97140 MANUAL THERAPY 1/> REGIONS: CPT | Performed by: PHYSICAL THERAPIST

## 2019-06-05 NOTE — THERAPY TREATMENT NOTE
Outpatient Physical Therapy Ortho Treatment Note  [facility]     Patient Name: Carlos Hayes  : 1955  MRN: [id number]  Today's Date: 2019      Visit Date: 2019    Visit Dx:    ICD-10-CM ICD-9-CM   1. Chronic bilateral low back pain with bilateral sciatica M54.42 724.2    M54.41 724.3    G89.29 338.29   2. Lumbar degenerative disc disease M51.36 722.52   3. Spinal stenosis, lumbar region, with neurogenic claudication M48.062 724.03   4. Neck pain M54.2 723.1   5. Cervical radiculopathy M54.12 723.4   6. Cervicalgia M54.2 723.1   7. Carpal tunnel syndrome of right wrist G56.01 354.0       Patient Active Problem List   Diagnosis   • Battery end of life of spinal cord stimulator   • BMI 40.0-44.9, adult (CMS/[facility])   • Non-smoker   • Cervicalgia   • Cervical radiculopathy   • Carpal tunnel syndrome of right wrist   • Spinal stenosis, lumbar region, with neurogenic claudication   • BMI 39.0-39.9,adult   • Malfunction of spinal cord stimulator (CMS/[facility])   • Abnormal stress test   • Other chest pain   • Hypertension   • Dehydration        Past Medical History:   Diagnosis Date   • Acute bronchitis    • Amnesia    • Anxiety    • Arthritis    • Asthma    • Back pain    • Chest pain    • CKD (chronic kidney disease)    • Concussion    • Contusion of chest wall    • COPD (chronic obstructive pulmonary disease) (CMS/[facility])    • Degeneration of intervertebral disc of lumbar region    • Fall    • Gait disturbance    • Gastro-esophageal reflux    • Headache    • Hearing impaired    • Hypersomnia    • Hypertension    • Hypotestosteronemia    • Insomnia    • Leg cramp    • Lumbar stenosis    • Obesity    • Obesity    • KATLYN (obstructive sleep apnea)    • PTSD (post-traumatic stress disorder)    • Radiculopathy of lumbosacral region    • Sinusitis    • Testicular hypofunction    • Tremor         Past Surgical History:   Procedure Laterality Date   • BACK SURGERY      L3-4 fusion   • BACK SURGERY      L3-S1  fusion   • CARDIAC CATHETERIZATION     • CARDIAC CATHETERIZATION N/A 12/14/2018    Procedure: Coronary angiography;  Surgeon: Lizandro Quiroz MD;  Location:  [facility];  Service: Cardiology   • CARDIAC CATHETERIZATION N/A 12/14/2018    Procedure: Percutaneous Coronary Intervention;  Surgeon: Lizandro Quiroz MD;  Location:  [facility];  Service: Cardiology   • CARPAL TUNNEL RELEASE Bilateral    • CHOLECYSTECTOMY     • FRACTURE SURGERY      LEFT HAND   • HERNIA REPAIR     • KNEE SURGERY     • SPINAL CORD STIMULATOR REMOVAL N/A 5/2/2018    Procedure: SPINAL CORD STIMULATOR REMOVAL Removal of generator and placment of new generator;  Surgeon: Miguel Hall MD;  Location:  [facility] OR;  Service: Neurosurgery   • SPINAL CORD STIMULATOR REMOVAL N/A 7/18/2018    Procedure: SPINAL CORD STIMULATOR BATTERY CHANGE;  Surgeon: Miguel Hall MD;  Location:  [facility] OR;  Service: Neurosurgery   • THORACIC LAMINECTOMY WITH PLACEMENT OF DORSAL COLUMN STIMULATOR  2010                       PT Assessment/Plan     Row Name 06/05/19 1100          PT Assessment    Assessment Comments  He continues to improve every visit. His pain is lessening and he is moving less guarded.   -TB        PT Plan    PT Plan Comments  cont emphasis on mobility of his right hip  -TB       User Key  (r) = Recorded By, (t) = Taken By, (c) = Cosigned By    Initials Name Provider Type    TB Juarez Hartmann, PT Physical Therapist            Exercises     Row Name 06/05/19 1100 06/05/19 1000          Subjective Comments    Subjective Comments  --  He says overall, he's doing better. He's moving better with less pain.   -TB        Subjective Pain    Pre-Treatment Pain Level  --  4  -TB        Total Minutes    77029 - PT Manual Therapy Minutes  60  -TB  --       User Key  (r) = Recorded By, (t) = Taken By, (c) = Cosigned By    Initials Name Provider Type    TB Juarez Hartmann, PT Physical Therapist                      Manual  Rx (last 36 hours)      Manual Treatments     Row Name 06/05/19 1100 06/05/19 1000          Total Minutes    57777 - PT Manual Therapy Minutes  60  -TB  --        Manual Rx 1    Manual Rx 1 Location  --  sidelying right piri, glute and lumbar, right IP  -TB     Manual Rx 1 Type  --  foam roll and manual STM  -TB     Manual Rx 1 Duration  --  35  -TB        Manual Rx 2    Manual Rx 2 Location  --  gentle right hip passive extension, sidelying with right lower leg on bolster  -TB     Manual Rx 2 Type  --  also hip ER stretch  -TB     Manual Rx 2 Grade  --  gave gentle anterior mob to right pelvis after stretch with gr 2 OP  -TB     Manual Rx 2 Duration  --  15  -TB        Manual Rx 3    Manual Rx 3 Location  --  hooklying right hip lateral and caudal distractionn  -TB     Manual Rx 3 Type  --  with gait belt and stretch for OI and piri  -TB     Manual Rx 3 Duration  --  10  -TB       User Key  (r) = Recorded By, (t) = Taken By, (c) = Cosigned By    Initials Name Provider Type    TB Juarez Hartmann, PT Physical Therapist          PT OP Goals     Row Name 06/05/19 1100          PT Short Term Goals    STG Date to Achieve  06/13/19  -TB     STG 1  Improve oliver hip ext to 0 deg  -TB     STG 1 Progress  Ongoing  -TB     STG 2  Able to walk with right foot position symmetrical to left  -TB     STG 2 Progress  Met  -TB     STG 3  Improve oliver hip IR to 30 deg  -TB     STG 3 Progress  Ongoing  -TB        Long Term Goals    LTG Date to Achieve  10/13/18  -TB     LTG 1  Able to walk community distances without an assistive device or only a cane without severe flare of pain  -TB     LTG 1 Progress  Ongoing  -TB     LTG 2  Improve hip passive extension to 10 degrees  -TB     LTG 2 Progress  Ongoing  -TB     LTG 2 Progress Comments  improving on this  -TB     LTG 3  Improved core contraction held during functional tasks.  -TB     LTG 3 Progress  Ongoing  -TB     LTG 4  Independent with HEP for flexibility and stability.   -TB      LTG 4 Progress  Ongoing  -TB     LTG 5  No difficulty voiding his bladder  -TB     LTG 5 Progress  Progressing  -TB        Time Calculation    PT Goal Re-Cert Due Date  06/22/19  -TB       User Key  (r) = Recorded By, (t) = Taken By, (c) = Cosigned By    Initials Name Provider Type    TB Juarez Hartmann, PT Physical Therapist          Therapy Education  Given: HEP, Symptoms/condition management  Program: Modified  How Provided: Verbal, Demonstration  Provided to: Patient  Level of Understanding: Verbalized, Demonstrated              Time Calculation:   Start Time: 1000  Stop Time: 1100  Time Calculation (min): 60 min  Therapy Charges for Today     Code Description Service Date Service Provider Modifiers Qty    50879697798 HC PT MANUAL THERAPY EA 15 MIN 6/5/2019 Juarez Hartmann, PT GP 4                    Juarez Hartmann, PT  6/5/2019

## 2019-06-07 ENCOUNTER — HOSPITAL ENCOUNTER (OUTPATIENT)
Dept: PHYSICAL THERAPY | Facility: HOSPITAL | Age: 64
Setting detail: THERAPIES SERIES
Discharge: HOME | End: 2019-06-07

## 2019-06-07 DIAGNOSIS — M54.2 NECK PAIN: ICD-10-CM

## 2019-06-07 DIAGNOSIS — M51.36 LUMBAR DEGENERATIVE DISC DISEASE: ICD-10-CM

## 2019-06-07 DIAGNOSIS — M54.41 CHRONIC BILATERAL LOW BACK PAIN WITH BILATERAL SCIATICA: Primary | ICD-10-CM

## 2019-06-07 DIAGNOSIS — M54.2 CERVICALGIA: ICD-10-CM

## 2019-06-07 DIAGNOSIS — M54.42 CHRONIC BILATERAL LOW BACK PAIN WITH BILATERAL SCIATICA: Primary | ICD-10-CM

## 2019-06-07 DIAGNOSIS — M54.12 CERVICAL RADICULOPATHY: ICD-10-CM

## 2019-06-07 DIAGNOSIS — M48.062 SPINAL STENOSIS, LUMBAR REGION, WITH NEUROGENIC CLAUDICATION: ICD-10-CM

## 2019-06-07 DIAGNOSIS — G89.29 CHRONIC BILATERAL LOW BACK PAIN WITH BILATERAL SCIATICA: Primary | ICD-10-CM

## 2019-06-07 DIAGNOSIS — G56.01 CARPAL TUNNEL SYNDROME OF RIGHT WRIST: ICD-10-CM

## 2019-06-07 PROCEDURE — 97140 MANUAL THERAPY 1/> REGIONS: CPT | Performed by: PHYSICAL THERAPIST

## 2019-06-07 NOTE — THERAPY TREATMENT NOTE
Outpatient Physical Therapy Ortho Treatment Note  [facility]     Patient Name: Carlos Hayes  : 1955  MRN: [id number]  Today's Date: 2019      Visit Date: 2019    Visit Dx:    ICD-10-CM ICD-9-CM   1. Chronic bilateral low back pain with bilateral sciatica M54.42 724.2    M54.41 724.3    G89.29 338.29   2. Lumbar degenerative disc disease M51.36 722.52   3. Spinal stenosis, lumbar region, with neurogenic claudication M48.062 724.03   4. Neck pain M54.2 723.1   5. Cervical radiculopathy M54.12 723.4   6. Cervicalgia M54.2 723.1   7. Carpal tunnel syndrome of right wrist G56.01 354.0       Patient Active Problem List   Diagnosis   • Battery end of life of spinal cord stimulator   • BMI 40.0-44.9, adult (CMS/[facility])   • Non-smoker   • Cervicalgia   • Cervical radiculopathy   • Carpal tunnel syndrome of right wrist   • Spinal stenosis, lumbar region, with neurogenic claudication   • BMI 39.0-39.9,adult   • Malfunction of spinal cord stimulator (CMS/[facility])   • Abnormal stress test   • Other chest pain   • Hypertension   • Dehydration        Past Medical History:   Diagnosis Date   • Acute bronchitis    • Amnesia    • Anxiety    • Arthritis    • Asthma    • Back pain    • Chest pain    • CKD (chronic kidney disease)    • Concussion    • Contusion of chest wall    • COPD (chronic obstructive pulmonary disease) (CMS/[facility])    • Degeneration of intervertebral disc of lumbar region    • Fall    • Gait disturbance    • Gastro-esophageal reflux    • Headache    • Hearing impaired    • Hypersomnia    • Hypertension    • Hypotestosteronemia    • Insomnia    • Leg cramp    • Lumbar stenosis    • Obesity    • Obesity    • KATLYN (obstructive sleep apnea)    • PTSD (post-traumatic stress disorder)    • Radiculopathy of lumbosacral region    • Sinusitis    • Testicular hypofunction    • Tremor         Past Surgical History:   Procedure Laterality Date   • BACK SURGERY      L3-4 fusion   • BACK SURGERY      L3-S1  fusion   • CARDIAC CATHETERIZATION     • CARDIAC CATHETERIZATION N/A 12/14/2018    Procedure: Coronary angiography;  Surgeon: Lizandro Quiroz MD;  Location:  [facility];  Service: Cardiology   • CARDIAC CATHETERIZATION N/A 12/14/2018    Procedure: Percutaneous Coronary Intervention;  Surgeon: Lizandro Quiroz MD;  Location:  [facility]ATH [facility];  Service: Cardiology   • CARPAL TUNNEL RELEASE Bilateral    • CHOLECYSTECTOMY     • FRACTURE SURGERY      LEFT HAND   • HERNIA REPAIR     • KNEE SURGERY     • SPINAL CORD STIMULATOR REMOVAL N/A 5/2/2018    Procedure: SPINAL CORD STIMULATOR REMOVAL Removal of generator and placment of new generator;  Surgeon: Miguel Hall MD;  Location:  [facility] OR;  Service: Neurosurgery   • SPINAL CORD STIMULATOR REMOVAL N/A 7/18/2018    Procedure: SPINAL CORD STIMULATOR BATTERY CHANGE;  Surgeon: Miguel Hall MD;  Location:  [facility] OR;  Service: Neurosurgery   • THORACIC LAMINECTOMY WITH PLACEMENT OF DORSAL COLUMN STIMULATOR  2010                       PT Assessment/Plan     Row Name 06/07/19 1500          PT Assessment    Assessment Comments  He was flared after the last time so I backed off quite a bit on the stretches  -TB        PT Plan    PT Plan Comments  Resume stretching of his hip  -TB       User Key  (r) = Recorded By, (t) = Taken By, (c) = Cosigned By    Initials Name Provider Type    TB Juarez Hartmann, PT Physical Therapist            Exercises     Row Name 06/07/19 1500 06/07/19 1410          Subjective Comments    Subjective Comments  --  He was really sore after the last visit. He awoke early this morning with pain.   -TB        Subjective Pain    Pre-Treatment Pain Level  --  7  -TB        Total Minutes    64437 - PT Manual Therapy Minutes  5  -TB  --       User Key  (r) = Recorded By, (t) = Taken By, (c) = Cosigned By    Initials Name Provider Type    TB Juarez Hartmann, PT Physical Therapist                      Manual Rx (last 36 hours)       Manual Treatments     Row Name 06/07/19 1500 06/07/19 1300          Total Minutes    20171 - PT Manual Therapy Minutes  5  -TB  --        Manual Rx 1    Manual Rx 1 Location  --  sidelying right piri, glute and lumbar, right IP  -TB     Manual Rx 1 Type  --  foam roll and manual STM  -TB     Manual Rx 1 Duration  --  40  -TB        Manual Rx 2    Manual Rx 2 Location  --  gentle right hip passive extension, sidelying with right lower leg on bolster  -TB     Manual Rx 2 Type  --  with STM to IP  -TB     Manual Rx 2 Grade  --  gave gentle anterior mob to right pelvis after stretch with gr 2 OP  -TB     Manual Rx 2 Duration  --  15  -TB        Manual Rx 3    Manual Rx 3 Location  --  --  -TB     Manual Rx 3 Type  --  --  -TB     Manual Rx 3 Duration  --  --  -TB       User Key  (r) = Recorded By, (t) = Taken By, (c) = Cosigned By    Initials Name Provider Type    TB Juarez Hartmann, PT Physical Therapist          PT OP Goals     Row Name 06/07/19 1500          PT Short Term Goals    STG Date to Achieve  06/13/19  -TB     STG 1  Improve oliver hip ext to 0 deg  -TB     STG 1 Progress  Ongoing  -TB     STG 2  Able to walk with right foot position symmetrical to left  -TB     STG 2 Progress  Met  -TB     STG 3  Improve oliver hip IR to 30 deg  -TB     STG 3 Progress  Ongoing  -TB        Long Term Goals    LTG Date to Achieve  10/13/18  -TB     LTG 1  Able to walk community distances without an assistive device or only a cane without severe flare of pain  -TB     LTG 1 Progress  Ongoing  -TB     LTG 1 Progress Comments  slowly improving  -TB     LTG 2  Improve hip passive extension to 10 degrees  -TB     LTG 2 Progress  Ongoing  -TB     LTG 3  Improved core contraction held during functional tasks.  -TB     LTG 3 Progress  Ongoing  -TB     LTG 4  Independent with HEP for flexibility and stability.   -TB     LTG 4 Progress  Ongoing  -TB     LTG 5  No difficulty voiding his bladder  -TB     LTG 5 Progress  Progressing  -TB         Time Calculation    PT Goal Re-Cert Due Date  06/22/19  -TB       User Key  (r) = Recorded By, (t) = Taken By, (c) = Cosigned By    Initials Name Provider Type    TB Juarez Hartmann, PT Physical Therapist          Therapy Education  Given: HEP, Symptoms/condition management  Program: Modified  How Provided: Verbal, Demonstration  Provided to: Patient  Level of Understanding: Verbalized, Demonstrated              Time Calculation:   Start Time: 1410  Stop Time: 1505  Time Calculation (min): 55 min  Therapy Charges for Today     Code Description Service Date Service Provider Modifiers Qty    17423437981 HC PT MANUAL THERAPY EA 15 MIN 6/7/2019 Juarez Hartmann, PT GP 4                    Juarez Hartmann, PT  6/7/2019

## 2019-06-11 ENCOUNTER — HOSPITAL ENCOUNTER (OUTPATIENT)
Dept: PHYSICAL THERAPY | Facility: HOSPITAL | Age: 64
Setting detail: THERAPIES SERIES
Discharge: HOME | End: 2019-06-11

## 2019-06-11 DIAGNOSIS — M54.42 CHRONIC BILATERAL LOW BACK PAIN WITH BILATERAL SCIATICA: Primary | ICD-10-CM

## 2019-06-11 DIAGNOSIS — M54.2 NECK PAIN: ICD-10-CM

## 2019-06-11 DIAGNOSIS — G89.29 CHRONIC BILATERAL LOW BACK PAIN WITH BILATERAL SCIATICA: Primary | ICD-10-CM

## 2019-06-11 DIAGNOSIS — M48.062 SPINAL STENOSIS, LUMBAR REGION, WITH NEUROGENIC CLAUDICATION: ICD-10-CM

## 2019-06-11 DIAGNOSIS — M54.41 CHRONIC BILATERAL LOW BACK PAIN WITH BILATERAL SCIATICA: Primary | ICD-10-CM

## 2019-06-11 DIAGNOSIS — M51.36 LUMBAR DEGENERATIVE DISC DISEASE: ICD-10-CM

## 2019-06-11 DIAGNOSIS — G56.01 CARPAL TUNNEL SYNDROME OF RIGHT WRIST: ICD-10-CM

## 2019-06-11 DIAGNOSIS — M54.12 CERVICAL RADICULOPATHY: ICD-10-CM

## 2019-06-11 DIAGNOSIS — M54.2 CERVICALGIA: ICD-10-CM

## 2019-06-11 PROCEDURE — 97140 MANUAL THERAPY 1/> REGIONS: CPT | Performed by: PHYSICAL THERAPIST

## 2019-06-11 NOTE — THERAPY PROGRESS REPORT/RE-CERT
Outpatient Physical Therapy Ortho Progress Note   [place]     Patient Name: Carlos Hayes  : 1955  MRN: [id number]  Today's Date: 2019      Visit Date: 2019    Visit Dx:    ICD-10-CM ICD-9-CM   1. Chronic bilateral low back pain with bilateral sciatica M54.42 724.2    M54.41 724.3    G89.29 338.29   2. Lumbar degenerative disc disease M51.36 722.52   3. Spinal stenosis, lumbar region, with neurogenic claudication M48.062 724.03   4. Neck pain M54.2 723.1   5. Cervical radiculopathy M54.12 723.4   6. Cervicalgia M54.2 723.1   7. Carpal tunnel syndrome of right wrist G56.01 354.0       Patient Active Problem List   Diagnosis   • Battery end of life of spinal cord stimulator   • BMI 40.0-44.9, adult (CMS/[facility])   • Non-smoker   • Cervicalgia   • Cervical radiculopathy   • Carpal tunnel syndrome of right wrist   • Spinal stenosis, lumbar region, with neurogenic claudication   • BMI 39.0-39.9,adult   • Malfunction of spinal cord stimulator (CMS/[facility])   • Abnormal stress test   • Other chest pain   • Hypertension   • Dehydration        Past Medical History:   Diagnosis Date   • Acute bronchitis    • Amnesia    • Anxiety    • Arthritis    • Asthma    • Back pain    • Chest pain    • CKD (chronic kidney disease)    • Concussion    • Contusion of chest wall    • COPD (chronic obstructive pulmonary disease) (CMS/[facility])    • Degeneration of intervertebral disc of lumbar region    • Fall    • Gait disturbance    • Gastro-esophageal reflux    • Headache    • Hearing impaired    • Hypersomnia    • Hypertension    • Hypotestosteronemia    • Insomnia    • Leg cramp    • Lumbar stenosis    • Obesity    • Obesity    • KATLYN (obstructive sleep apnea)    • PTSD (post-traumatic stress disorder)    • Radiculopathy of lumbosacral region    • Sinusitis    • Testicular hypofunction    • Tremor         Past Surgical History:   Procedure Laterality Date   • BACK SURGERY      L3-4 fusion   • BACK SURGERY      L3-S1  fusion   • CARDIAC CATHETERIZATION     • CARDIAC CATHETERIZATION N/A 12/14/2018    Procedure: Coronary angiography;  Surgeon: Lizandro Quiroz MD;  Location:  [facility] CATH INVASIVE LOC[facility];  Service: Cardiology   • CARDIAC CATHETERIZATION N/A 12/14/2018    Procedure: Percutaneous Coronary Intervention;  Surgeon: Lizandro Quiroz MD;  Location:  [facility] CATH INVASIVE LOCATION;  Service: Cardiology   • CARPAL TUNNEL RELEASE Bilateral    • CHOLECYSTECTOMY     • FRACTURE SURGERY      LEFT HAND   • HERNIA REPAIR     • KNEE SURGERY     • SPINAL CORD STIMULATOR REMOVAL N/A 5/2/2018    Procedure: SPINAL CORD STIMULATOR REMOVAL Removal of generator and placment of new generator;  Surgeon: Miguel Hall MD;  Location:  [facility] OR;  Service: Neurosurgery   • SPINAL CORD STIMULATOR REMOVAL N/A 7/18/2018    Procedure: SPINAL CORD STIMULATOR BATTERY CHANGE;  Surgeon: Miguel Hall MD;  Location:  [facility] OR;  Service: Neurosurgery   • THORACIC LAMINECTOMY WITH PLACEMENT OF DORSAL COLUMN STIMULATOR  2010                       PT Assessment/Plan     Row Name 06/11/19 1200          PT Assessment    Functional Limitations  Impaired gait;Impaired locomotion;Limitation in home management;Performance in leisure activities;Limitations in functional capacity and performance  -TB     Impairments  Balance;Gait;Pain;Muscle strength;Motor function;Joint mobility;Impaired muscle length;Range of motion;Posture;Impaired flexibility  -TB     Assessment Comments  Edin is slowly improving since his slip and flare of his right hip/back pain. We have focused on relaxing spasms around his right glute and hip and gently working on flexibility. He is able to walk more upright.   -TB     Rehab Potential  Good  -TB     Patient/caregiver participated in establishment of treatment plan and goals  Yes  -TB     Patient would benefit from skilled therapy intervention  Yes  -TB        PT Plan    PT Frequency  1x/week;2x/week  -TB     Predicted Duration of  Therapy Intervention (Therapy Eval)  4 weeks  -TB     Planned CPT's?  PT THER PROC EA 15 MIN: 41106;PT THER ACT EA 15 MIN: 77194;PT MANUAL THERAPY EA 15 MIN: 69552;PT GAIT TRAINING EA 15 MIN: 08454  -TB     PT Plan Comments  Hold on PT for a couple of weeks and potentially discharge if he is doing well .  -TB       User Key  (r) = Recorded By, (t) = Taken By, (c) = Cosigned By    Initials Name Provider Type    TB Juarez Hartmann, PT Physical Therapist            Exercises     Row Name 06/11/19 1200 06/11/19 1114          Subjective Comments    Subjective Comments  --  His c/c is pain on his right lateral hip. He doesn't recall any significant flares after the last treatment.   -TB        Subjective Pain    Pre-Treatment Pain Level  --  6  -TB        Total Minutes    32170 - PT Manual Therapy Minutes  55  -TB  --       User Key  (r) = Recorded By, (t) = Taken By, (c) = Cosigned By    Initials Name Provider Type    Juarez Franklin, PT Physical Therapist                      Manual Rx (last 36 hours)      Manual Treatments     Row Name 06/11/19 1200 06/11/19 1100          Total Minutes    89008 - PT Manual Therapy Minutes  55  -TB  --        Manual Rx 1    Manual Rx 1 Location  --  sidelying right piri, glute and lumbar, right IP  -TB     Manual Rx 1 Type  --  foam roll and manual STM  -TB     Manual Rx 1 Duration  --  40  -TB        Manual Rx 2    Manual Rx 2 Location  --  right ITB  -TB     Manual Rx 2 Type  --  foam roll IASTM  -TB     Manual Rx 2 Duration  --  15  -TB       User Key  (r) = Recorded By, (t) = Taken By, (c) = Cosigned By    Initials Name Provider Type    Juarez Franklin, PT Physical Therapist          PT OP Goals     Row Name 06/11/19 1200          PT Short Term Goals    STG Date to Achieve  06/13/19  -TB     STG 1  Improve oliver hip ext to 0 deg  -TB     STG 1 Progress  Ongoing  -TB     STG 1 Progress Comments  right hip limited due to pain but improving  -TB     STG 2  Able to walk with  right foot position symmetrical to left  -TB     STG 2 Progress  Met  -TB     STG 3  Improve oliver hip IR to 30 deg  -TB     STG 3 Progress  Ongoing  -TB        Long Term Goals    LTG Date to Achieve  10/13/18  -TB     LTG 1  Able to walk community distances without an assistive device or only a cane without severe flare of pain  -TB     LTG 1 Progress  Progressing  -TB     LTG 1 Progress Comments  this is better; he is now walking without a cane; his pain may still flare some  -TB     LTG 2  Improve hip passive extension to 10 degrees  -TB     LTG 2 Progress  Ongoing  -TB     LTG 3  Improved core contraction held during functional tasks.  -TB     LTG 3 Progress  Ongoing  -TB     LTG 4  Independent with HEP for flexibility and stability.   -TB     LTG 4 Progress  Ongoing  -TB     LTG 5  No difficulty voiding his bladder  -TB     LTG 5 Progress  Progressing  -TB     LTG 5 Progress Comments  this is much better since his back surgery  -TB        Time Calculation    PT Goal Re-Cert Due Date  06/22/19  -TB       User Key  (r) = Recorded By, (t) = Taken By, (c) = Cosigned By    Initials Name Provider Type    TB Juarez Hartmann, PT Physical Therapist          Therapy Education  Given: HEP, Symptoms/condition management  Program: Modified  How Provided: Verbal, Demonstration  Provided to: Patient  Level of Understanding: Verbalized, Demonstrated              Time Calculation:   Start Time: 1115  Stop Time: 1210  Time Calculation (min): 55 min  Therapy Charges for Today     Code Description Service Date Service Provider Modifiers Qty    52411347064 HC PT MANUAL THERAPY EA 15 MIN 6/11/2019 Juarez Hartmann, PT GP 4                    Juarez Hartmann, PT  6/11/2019

## 2019-06-17 ENCOUNTER — OFFICE VISIT (OUTPATIENT)
Dept: GASTROENTEROLOGY | Age: 64
End: 2019-06-17
Payer: MEDICARE

## 2019-06-17 VITALS
WEIGHT: 315 LBS | HEART RATE: 65 BPM | OXYGEN SATURATION: 95 % | DIASTOLIC BLOOD PRESSURE: 80 MMHG | SYSTOLIC BLOOD PRESSURE: 132 MMHG | BODY MASS INDEX: 40.29 KG/M2

## 2019-06-17 DIAGNOSIS — R10.30 LOWER ABDOMINAL PAIN: ICD-10-CM

## 2019-06-17 DIAGNOSIS — K92.1 TARRY STOOL: Primary | ICD-10-CM

## 2019-06-17 DIAGNOSIS — K21.9 CHRONIC GERD: ICD-10-CM

## 2019-06-17 DIAGNOSIS — K22.70 BARRETT'S ESOPHAGUS DETERMINED BY BIOPSY: ICD-10-CM

## 2019-06-17 DIAGNOSIS — K58.0 IRRITABLE BOWEL SYNDROME WITH DIARRHEA: ICD-10-CM

## 2019-06-17 PROCEDURE — G8427 DOCREV CUR MEDS BY ELIG CLIN: HCPCS | Performed by: NURSE PRACTITIONER

## 2019-06-17 PROCEDURE — 99204 OFFICE O/P NEW MOD 45 MIN: CPT | Performed by: NURSE PRACTITIONER

## 2019-06-17 PROCEDURE — 3017F COLORECTAL CA SCREEN DOC REV: CPT | Performed by: NURSE PRACTITIONER

## 2019-06-17 PROCEDURE — G8417 CALC BMI ABV UP PARAM F/U: HCPCS | Performed by: NURSE PRACTITIONER

## 2019-06-17 PROCEDURE — 1036F TOBACCO NON-USER: CPT | Performed by: NURSE PRACTITIONER

## 2019-06-17 RX ORDER — TADALAFIL 2.5 MG/1
TABLET ORAL
Status: ON HOLD | COMMUNITY
End: 2021-05-10

## 2019-06-17 RX ORDER — MECLIZINE HYDROCHLORIDE 25 MG/1
25 TABLET ORAL 4 TIMES DAILY PRN
COMMUNITY

## 2019-06-17 RX ORDER — ALBUTEROL SULFATE 90 UG/1
2 AEROSOL, METERED RESPIRATORY (INHALATION) EVERY 6 HOURS PRN
COMMUNITY
End: 2022-04-04

## 2019-06-17 RX ORDER — PROCHLORPERAZINE MALEATE 10 MG
10 TABLET ORAL EVERY 6 HOURS PRN
COMMUNITY
End: 2021-06-28

## 2019-06-17 RX ORDER — IBUPROFEN 800 MG/1
800 TABLET ORAL 3 TIMES DAILY
COMMUNITY
End: 2020-10-08

## 2019-06-17 ASSESSMENT — ENCOUNTER SYMPTOMS
BACK PAIN: 1
SHORTNESS OF BREATH: 0
VOMITING: 0
RECTAL PAIN: 0
SORE THROAT: 0
CHEST TIGHTNESS: 0
CONSTIPATION: 0
ABDOMINAL DISTENTION: 0
NAUSEA: 1
VOICE CHANGE: 0
BLOOD IN STOOL: 0
DIARRHEA: 1
ABDOMINAL PAIN: 1
COUGH: 0

## 2019-06-17 NOTE — PROGRESS NOTES
Subjective:      Patient ID: Lloyd Henry is a 60 y.o. male  MD Brittany Lund MD    HPI  Chief Complaint   Patient presents with    Abdominal Pain    Diarrhea     Patient with history of chronic GERD and Juan's esophagus due for EGD with surveillance biopsies. Today he c/o pain from mid abdomen to lower abdomen. Pain is sometimes improved with BM. Pain is moderate severity. He has IBS-D. No change in bowel habit. He denies rectal bleeding. He says reflux is controlled adequately with medication. He denies dysphagia. He has some nausea if pain is severe, no vomiting.          Family History   Problem Relation Age of Onset    Colon Polyps Mother     Heart Attack Mother     Arrhythmia Mother     Stroke Mother     Colon Polyps Maternal Grandmother     Arrhythmia Father     Obesity Sister     Stroke Paternal Grandmother     Colon Cancer Neg Hx     Esophageal Cancer Neg Hx     Liver Cancer Neg Hx     Liver Disease Neg Hx     Rectal Cancer Neg Hx     Stomach Cancer Neg Hx        Past Medical History:   Diagnosis Date    Abnormal gait     Amnesia     Anxiety     Asthma     BiPAP (biphasic positive airway pressure) dependence     NiPPV 23cm/16cm/12cm    Cervical facet syndrome     Chronic back pain     Complex sleep apnea syndrome     Initial PS; AHI:  31.2 per PSG, 10/2014    Concussion     COPD (chronic obstructive pulmonary disease) (HCC)     DDD (degenerative disc disease)     Facial ringworm     PERCY (generalized anxiety disorder)     GERD (gastroesophageal reflux disease)     Headache     Herniated disc     Herpes simplex     History of Juan's esophagus     Hyperlipidemia     Hypersomnia     Hypertension     Hypotestosteronism     Lumbar stenosis     Memory loss     Obesity     Obstructive sleep apnea     Initial PS; AHI:  31.2 per split-night PSG, 10/2014    Pneumonia     Potential difficult airway on Reactions    Lunesta [Eszopiclone] Other (See Comments)     Flu like symptoms     Levofloxacin         reports that he quit smoking about 3 years ago. His smoking use included cigarettes. He smoked 0.00 packs per day for 35.00 years. He has never used smokeless tobacco. He reports that he drinks alcohol. He reports that he does not use drugs. Review of Systems   Constitutional: Negative for appetite change, fever and unexpected weight change. HENT: Negative for sore throat and voice change. Respiratory: Negative for cough, chest tightness and shortness of breath. Cardiovascular: Negative for chest pain, palpitations and leg swelling. Gastrointestinal: Positive for abdominal pain, diarrhea and nausea. Negative for abdominal distention, blood in stool, constipation, rectal pain and vomiting. Dark tarry stools, IBS   Musculoskeletal: Positive for back pain. Negative for arthralgias and gait problem. Skin: Negative for pallor, rash and wound. Neurological: Positive for headaches. Negative for dizziness, weakness and light-headedness. Hematological: Negative for adenopathy. Does not bruise/bleed easily. All other systems reviewed and are negative. Objective:   Physical Exam   Constitutional: He is oriented to person, place, and time. He appears well-developed and well-nourished. No distress. /80   Pulse 65   Wt (!) 331 lb (150.1 kg)   SpO2 95%   BMI 40.29 kg/m²      Eyes: Conjunctivae are normal. No scleral icterus. Neck: No tracheal deviation present. Cardiovascular: Normal rate and regular rhythm. Exam reveals no gallop and no friction rub. No murmur heard. Pulmonary/Chest: Effort normal and breath sounds normal. No respiratory distress. He has no wheezes. He has no rhonchi. He has no rales. Abdominal: Soft. Normal appearance and bowel sounds are normal. He exhibits no distension and no mass. There is no hepatomegaly. There is no tenderness.  There is no rebound and no guarding. Musculoskeletal: He exhibits no edema. Neurological: He is alert and oriented to person, place, and time. He has normal strength. Skin: Skin is warm, dry and intact. No cyanosis. No pallor. Psychiatric: He has a normal mood and affect. His behavior is normal. Thought content normal. Cognition and memory are normal.       Assessment:      1. Tarry stool    2. Lower abdominal pain    3. Juan's esophagus determined by biopsy    4. Chronic GERD    5. Irritable bowel syndrome with diarrhea         KENNETH in history. Uses bipap. Polypharmacy including narcotic dependence. Plan:      Schedule colonoscopy and endoscopy     Instruct on bowel prep. Nothing to eat or drink after midnight the day of the exam.  Unable to drive for 24 hours after the procedure. No aspirin or nonsteroidal anti-inflammatories for 5 days before procedure. I have discussed the benefits, alternatives, and risks (including bleeding, perforation and death)  for pursuing Endoscopy (EGD/Colonscopy/EUS/ERCP) with the patient and they are willing to continue. We also discussed the need for anesthesia, IV access, proper dietary changes, medication changes if necessary, and need for bowel prep (if ordered) prior to their Endoscopic procedure. They are aware they must have someone accompany them to their scheduled procedure to drive them home - they agree to the above and are willing to continue.       Plan for anesthesia: MAC  KENNETH - uses bipap  Narcotic dependence  Morbid obesity

## 2019-06-17 NOTE — PATIENT INSTRUCTIONS
Schedule colonoscopy and endoscopy. Do not eat or drink after midnight the day of the procedure. Allowed medications can be taken with a small sip of water. Please review your prep instructions for allowed medications. You will not be able to drive for 24 hours after the procedure due to sedation. Bring a  with you the day of the procedure. If you are on blood thinners, clearance from the prescribing physician will be obtained before your procedure is scheduled. If it is determined it is not safe to hold these medications for a short time an alternative procedure for evaluation may be recommended. No aspirin, ibuprofen, naproxen, fish oil or vitamin E for 5 days before procedure. Risks of colonoscopy and endoscopy include, but are not limited to, perforation, bleeding, and infection, Risk of perforation and bleeding are increased if there is a polyp removed or dilation completed. Anesthesia risks will be reviewed with you before the procedure by a member of the anesthesia department. Your physician may also schedule a follow up appointment with the nurse practitioner to discuss pathology, symptoms or to check if you have had any problems related to your procedure. If you prefer not to return to the office after your procedure please discuss this with your physician on the day of your colonoscopy. The physician will talk with you and/or your family after the procedure is completed. Final recommendations are based on the pathologist report if biopsies or specimens are taken. For Colonoscopy: You will be given specific directions regarding restrictions to diet and bowel prep instructions including laxatives. Please read these instructions one week prior to your scheduled procedure to ensure that you are prepared.  If you have any questions regarding these instructions please call our office Mon through Fri from 8:00 am to 4:00 pm.     Follow prep instructions provided for bowel prep. Take all of the bowel prep as directed. If you are having problems with nausea, stop your prep for 30-45 min to allow the nausea to subside before resuming your prep. It is important to drink plenty of fluids throughout the day before taking your laxatives. This will help to protect your kidneys, prevent dehydration and maximize the effect of the bowel prep. If polyps are removed during the procedure they will be sent to a pathologist for analysis. Unless you have a follow up appointment scheduled, you will be notified by mail of the pathology results within 4 weeks. If you have not received results after 4 weeks you may call the office to obtain this information. Your diet before a colonoscopy bowel preparation is very important to ensure a successful colon exam. It is recommended to consider certain changes to your diet three to four days prior to the procedure. Remember that your bowels need to be empty for the exam.    What foods are good to eat? Cut down on heavy solid foods three to four days before the procedure and start introducing lighter meals to your diet. The following food suggestions are a good part of your diet before a colonoscopy bowel preparation. Light meat that is easily digestible such as chicken (without the skin)   Potatoes without skin   Cheese   Eggs   A light meal of steamed white fish   Light clear soups    Foods and drinks to avoid  Avoid foods that contain too much fiber. Stay clear of dark colored beverages. They can stick to the walls of the digestive tract and make it difficult to differentiate from blood. Some of these foods are:  Red meat, rice, nuts and vegetables   Milk, other milk based fluids and cream   Most fruit and puddings   Whole grain pasta   Cereals, bran and seeds   Colored beverages, especially those that are red or purple in color   Red colored Jell-O   On the day before the colonoscopy, continue to drink plenty of clear fluids.  It is important to keep yourself hydrated before the exam.     Please follow all instructions as provided for cleansing the bowel. Failure to have an adequately prepped colon may cause you to have incomplete exam with further testing required.      [email or web address]      Stop Prevacid

## 2019-06-19 ENCOUNTER — ANESTHESIA (OUTPATIENT)
Dept: ENDOSCOPY | Age: 64
End: 2019-06-19
Payer: MEDICARE

## 2019-06-19 ENCOUNTER — ANESTHESIA EVENT (OUTPATIENT)
Dept: ENDOSCOPY | Age: 64
End: 2019-06-19
Payer: MEDICARE

## 2019-06-19 ENCOUNTER — HOSPITAL ENCOUNTER (OUTPATIENT)
Age: 64
Setting detail: OUTPATIENT SURGERY
Discharge: HOME | End: 2019-06-19
Attending: INTERNAL MEDICINE | Admitting: INTERNAL MEDICINE
Payer: MEDICARE

## 2019-06-19 VITALS
DIASTOLIC BLOOD PRESSURE: 71 MMHG | SYSTOLIC BLOOD PRESSURE: 107 MMHG | OXYGEN SATURATION: 98 % | RESPIRATION RATE: 11 BRPM

## 2019-06-19 VITALS
OXYGEN SATURATION: 93 % | RESPIRATION RATE: 18 BRPM | HEIGHT: 76 IN | TEMPERATURE: 98.2 F | BODY MASS INDEX: 38.36 KG/M2 | SYSTOLIC BLOOD PRESSURE: 128 MMHG | WEIGHT: 315 LBS | DIASTOLIC BLOOD PRESSURE: 54 MMHG | HEART RATE: 57 BPM

## 2019-06-19 PROCEDURE — 6360000002 HC RX W HCPCS: Performed by: NURSE ANESTHETIST, CERTIFIED REGISTERED

## 2019-06-19 PROCEDURE — 2709999900 HC NON-CHARGEABLE SUPPLY: Performed by: INTERNAL MEDICINE

## 2019-06-19 PROCEDURE — 3700000001 HC ADD 15 MINUTES (ANESTHESIA): Performed by: INTERNAL MEDICINE

## 2019-06-19 PROCEDURE — 7100000010 HC PHASE II RECOVERY - FIRST 15 MIN: Performed by: INTERNAL MEDICINE

## 2019-06-19 PROCEDURE — 7100000011 HC PHASE II RECOVERY - ADDTL 15 MIN: Performed by: INTERNAL MEDICINE

## 2019-06-19 PROCEDURE — 45378 DIAGNOSTIC COLONOSCOPY: CPT | Performed by: INTERNAL MEDICINE

## 2019-06-19 PROCEDURE — 3700000000 HC ANESTHESIA ATTENDED CARE: Performed by: INTERNAL MEDICINE

## 2019-06-19 PROCEDURE — 3609012400 HC EGD TRANSORAL BIOPSY SINGLE/MULTIPLE: Performed by: INTERNAL MEDICINE

## 2019-06-19 PROCEDURE — 2500000003 HC RX 250 WO HCPCS: Performed by: NURSE ANESTHETIST, CERTIFIED REGISTERED

## 2019-06-19 PROCEDURE — 88305 TISSUE EXAM BY PATHOLOGIST: CPT

## 2019-06-19 PROCEDURE — 2500000003 HC RX 250 WO HCPCS: Performed by: INTERNAL MEDICINE

## 2019-06-19 PROCEDURE — 2580000003 HC RX 258: Performed by: INTERNAL MEDICINE

## 2019-06-19 PROCEDURE — 43239 EGD BIOPSY SINGLE/MULTIPLE: CPT | Performed by: INTERNAL MEDICINE

## 2019-06-19 PROCEDURE — 3609009500 HC COLONOSCOPY DIAGNOSTIC OR SCREENING: Performed by: INTERNAL MEDICINE

## 2019-06-19 RX ORDER — PROPOFOL 10 MG/ML
INJECTION, EMULSION INTRAVENOUS PRN
Status: DISCONTINUED | OUTPATIENT
Start: 2019-06-19 | End: 2019-06-19 | Stop reason: SDUPTHER

## 2019-06-19 RX ORDER — MIDAZOLAM HYDROCHLORIDE 1 MG/ML
INJECTION INTRAMUSCULAR; INTRAVENOUS PRN
Status: DISCONTINUED | OUTPATIENT
Start: 2019-06-19 | End: 2019-06-19 | Stop reason: SDUPTHER

## 2019-06-19 RX ORDER — FENTANYL CITRATE 50 UG/ML
INJECTION, SOLUTION INTRAMUSCULAR; INTRAVENOUS PRN
Status: DISCONTINUED | OUTPATIENT
Start: 2019-06-19 | End: 2019-06-19 | Stop reason: SDUPTHER

## 2019-06-19 RX ORDER — LIDOCAINE HYDROCHLORIDE 10 MG/ML
INJECTION, SOLUTION INFILTRATION; PERINEURAL PRN
Status: DISCONTINUED | OUTPATIENT
Start: 2019-06-19 | End: 2019-06-19 | Stop reason: SDUPTHER

## 2019-06-19 RX ORDER — LIDOCAINE HYDROCHLORIDE 10 MG/ML
1 INJECTION, SOLUTION EPIDURAL; INFILTRATION; INTRACAUDAL; PERINEURAL ONCE
Status: COMPLETED | OUTPATIENT
Start: 2019-06-19 | End: 2019-06-19

## 2019-06-19 RX ORDER — SODIUM CHLORIDE, SODIUM LACTATE, POTASSIUM CHLORIDE, CALCIUM CHLORIDE 600; 310; 30; 20 MG/100ML; MG/100ML; MG/100ML; MG/100ML
INJECTION, SOLUTION INTRAVENOUS CONTINUOUS
Status: DISCONTINUED | OUTPATIENT
Start: 2019-06-19 | End: 2019-06-19 | Stop reason: HOSPADM

## 2019-06-19 RX ADMIN — LIDOCAINE HYDROCHLORIDE 1 ML: 10 INJECTION, SOLUTION EPIDURAL; INFILTRATION; INTRACAUDAL; PERINEURAL at 11:10

## 2019-06-19 RX ADMIN — PROPOFOL 300 MG: 10 INJECTION, EMULSION INTRAVENOUS at 12:25

## 2019-06-19 RX ADMIN — FENTANYL CITRATE 100 MCG: 50 INJECTION INTRAMUSCULAR; INTRAVENOUS at 12:25

## 2019-06-19 RX ADMIN — MIDAZOLAM 2 MG: 1 INJECTION INTRAMUSCULAR; INTRAVENOUS at 12:23

## 2019-06-19 RX ADMIN — LIDOCAINE HYDROCHLORIDE 40 MG: 10 INJECTION, SOLUTION INFILTRATION; PERINEURAL at 12:25

## 2019-06-19 RX ADMIN — SODIUM CHLORIDE, POTASSIUM CHLORIDE, SODIUM LACTATE AND CALCIUM CHLORIDE: 600; 310; 30; 20 INJECTION, SOLUTION INTRAVENOUS at 11:10

## 2019-06-19 ASSESSMENT — COPD QUESTIONNAIRES: CAT_SEVERITY: MODERATE

## 2019-06-19 ASSESSMENT — PAIN DESCRIPTION - DESCRIPTORS: DESCRIPTORS: ACHING;DULL

## 2019-06-19 ASSESSMENT — PAIN - FUNCTIONAL ASSESSMENT
PAIN_FUNCTIONAL_ASSESSMENT: PREVENTS OR INTERFERES SOME ACTIVE ACTIVITIES AND ADLS
PAIN_FUNCTIONAL_ASSESSMENT: 0-10

## 2019-06-19 ASSESSMENT — PAIN SCALES - GENERAL: PAINLEVEL_OUTOF10: 4

## 2019-06-19 ASSESSMENT — PAIN DESCRIPTION - LOCATION: LOCATION: ABDOMEN

## 2019-06-19 ASSESSMENT — ENCOUNTER SYMPTOMS: SHORTNESS OF BREATH: 1

## 2019-06-19 NOTE — OP NOTE
Endoscopic Procedure Note    Patient: Joanne August: 1955  Med Rec#: [id number] Acc#: [id number]     Primary Care Provider Ailyn Conway MD      Endoscopist: Joe Greco MD    Date of Procedure:  6/19/2019    Procedure:   1. EGD  With biopsies    Indications:   1. H/o Juan's Esophagus  2. Abdominal pain  3. Black tarry stools recently  4. Chronic GERD    Anesthesia:  Sedation was administered by anesthesia who monitored the patient during the procedure. Estimated Blood Loss: minimal    Procedure:   After reviewing the patient's chart and obtaining informed consent, the patient was placed in the left lateral decubitus position. A forward-viewing Olympus peds colonoscope was lubricated and inserted through the mouth into the oropharynx. Under direct visualization, the upper esophagus was intubated. The scope was advanced to the level of the Proximal Jejunum. Scope was slowly withdrawn with careful inspection of the mucosal surfaces. The scope was retroflexed for inspection of the gastric fundus and incisura. Findings and maneuvers are listed in impression below. The patient tolerated the procedure well. The scope was removed. There were no immediate complications. Findings:   Esophagus: abnormal: Short segment Juan's Esophagus noted in the lower third from 44-46cm. NO nodules or ulcers or masses or strictures. Biopsies were taken to check for dysplasia. There is no  hiatal hernia present. Stomach:  normal  Except for small 3-5 mm sessile polyps in body likely hyperplastic/fundic gland type      Duodenum: normal      IMPRESSION:  1. Short segment Juan's esophagus  2. Otherwise, normal EGD     RECOMMENDATIONS:    1. Await path results, the patient will be contacted in 7-10 days with biopsy results. 2.  Continue PPI and anti-GERD measures  3. Will benefit from losing weight. The results were discussed with the patient and family.   A copy of the images obtained were given to the patient.      Anushka Rai MD  6/19/2019  12:34 PM

## 2019-06-19 NOTE — ANESTHESIA PRE PROCEDURE
Department of Anesthesiology  Preprocedure Note       Name:  Niko Cheng   Age:  60 y.o.  :  1955                                          MRN:  [id number]         Date:  2019      Surgeon: Nicole Hernandez):  Hussein Caruso MD    Procedure: Procedure(s):  EGD ESOPHAGOGASTRODUODENOSCOPY    Medications prior to admission:   Prior to Admission medications    Medication Sig Start Date End Date Taking? Authorizing Provider   ibuprofen (ADVIL;MOTRIN) 800 MG tablet Take 800 mg by mouth three times daily    Historical Provider, MD   meclizine (ANTIVERT) 25 MG tablet Take 25 mg by mouth 4 times daily as needed    Historical Provider, MD   albuterol sulfate HFA (PROAIR HFA) 108 (90 Base) MCG/ACT inhaler Inhale 2 puffs into the lungs every 6 hours as needed for Wheezing    Historical Provider, MD   prochlorperazine (COMPAZINE) 10 MG tablet Take 10 mg by mouth every 6 hours as needed    Historical Provider, MD   Tadalafil 2.5 MG TABS Take by mouth    Historical Provider, MD   mometasone-formoterol (DULERA) 200-5 MCG/ACT inhaler Inhale 2 puffs into the lungs every 12 hours    Historical Provider, MD   oxyCODONE-acetaminophen (PERCOCET)  MG per tablet Take 1 tablet by mouth every 6 hours as needed for Pain. Troy Bryant     Historical Provider, MD   celecoxib (CELEBREX) 200 MG capsule Take 200 mg by mouth daily    Historical Provider, MD   cyclobenzaprine (FLEXERIL) 5 MG tablet Take 5 mg by mouth 3 times daily    Historical Provider, MD   valACYclovir (VALTREX) 500 MG tablet Take 500 mg by mouth daily    Historical Provider, MD   tamsulosin (FLOMAX) 0.4 MG capsule Take 0.4 mg by mouth daily    Historical Provider, MD   losartan-hydrochlorothiazide (HYZAAR) 100-25 MG per tablet Take 1 tablet by mouth daily    Historical Provider, MD   fluticasone (FLONASE) 50 MCG/ACT nasal spray 1 spray by Nasal route daily    Historical Provider, MD   cloNIDine (CATAPRES) 0.1 MG tablet Take 0.1 mg by mouth every 6 hours as needed for High Blood Pressure     Historical Provider, MD   mometasone (ASMANEX 7 METERED DOSES) 110 MCG/INH AEPB Inhale 2 puffs into the lungs daily    Historical Provider, MD   diphenoxylate-atropine (LOMOTIL) 2.5-0.025 MG per tablet Take 1 tablet by mouth 4 times daily as needed for Diarrhea. .    Historical Provider, MD   Lorcaserin HCl (BELVIQ) 10 MG TABS Take by mouth 2 times daily. Harpreet Vasquez Historical Provider, MD   tiZANidine (ZANAFLEX) 4 MG tablet Take 4 mg by mouth every 8 hours as needed    Historical Provider, MD   oxyCODONE (OXYCONTIN) 10 MG extended release tablet Take 20 mg by mouth every 12 hours. .    Historical Provider, MD   omeprazole (PRILOSEC) 10 MG capsule Take 20 mg by mouth daily     Historical Provider, MD   ranitidine (ZANTAC) 150 MG tablet Take 2 tablets by mouth nightly as needed for Heartburn  Patient taking differently: Take 150 mg by mouth 2 times daily  4/12/16   Thomas Celestin,    Krill Oil [place]-3 300 MG CAPS Take by mouth 2 times daily     Historical Provider, MD   ketorolac (TORADOL) 10 MG tablet Take 10 mg by mouth every 6 hours as needed for Pain    Historical Provider, MD   azelastine (ASTELIN) 0.1 % nasal spray 1 spray by Nasal route 2 times daily Use in each nostril as directed    Historical Provider, MD   testosterone cypionate (DEPOTESTOTERONE CYPIONATE) 200 MG/ML injection Inject 50 mg into the muscle every 14 days. Harpreet Vasquez Historical Provider, MD   DULoxetine (CYMBALTA) 60 MG capsule Take 60 mg by mouth daily. Historical Provider, MD   ALPRAZolam Estellemaykel Foreman) 1 MG tablet Take 1 mg by mouth 2 times daily as needed. Historical Provider, MD   gabapentin (NEURONTIN) 800 MG tablet Take 600 mg by mouth 4 times daily 600 four times a day. Historical Provider, MD   esomeprazole Magnesium (NEXIUM) 40 MG PACK Take 40 mg by mouth daily.     Historical Provider, MD   traZODone (DESYREL) 50 MG tablet Take 150 mg by mouth nightly     Historical Provider, MD       Current medications:    No current 31.2 per split-night PSG, 10/2014    Pneumonia     Potential difficult airway on pre-intubation assessment     PTSD (post-traumatic stress disorder)     Sleep apnea     Bi-pap at night    Vertigo        Past Surgical History:        Procedure Laterality Date    APPENDECTOMY      BACK SURGERY      Three lumbar fusions L3-4, L3-4-5 & S1    CARDIAC CATHETERIZATION      COLONOSCOPY  2009    Murali Level    COLONOSCOPY  5/16/16    Dr Prema Robbins, normal, 5 yr recall    ENDOSCOPY, COLON, DIAGNOSTIC      FRACTURE SURGERY      wrist    HERNIA REPAIR      KNEE ARTHROSCOPY      OTHER SURGICAL HISTORY      nerve stimulator. dcs battery replacement 5/2/18    RHINOPLASTY      SKIN BIOPSY      SPINE SURGERY      x 3/Stimulator implant    UPPER GASTROINTESTINAL ENDOSCOPY  2009    Murali Level    UPPER GASTROINTESTINAL ENDOSCOPY  12/2013    BE surveillance. pos BE / neg dysplasia. retained gastric contents    UPPER GASTROINTESTINAL ENDOSCOPY N/A 4/12/2016    Dr Prema Robbins, Susy (-), Barretts (+), 3 yr recall       Social History:    Social History     Tobacco Use    Smoking status: Former Smoker     Packs/day: 0.00     Years: 35.00     Pack years: 0.00     Types: Cigarettes     Last attempt to quit: 3/16/2016     Years since quitting: 3.2    Smokeless tobacco: Never Used    Tobacco comment: pt states he has quit smokimg again for 3 weeks   Substance Use Topics    Alcohol use: Yes     Comment: 2-3 mixed drinks / month                                 Counseling given: Not Answered  Comment: pt states he has quit smokimg again for 3 weeks      Vital Signs (Current): There were no vitals filed for this visit.                                            BP Readings from Last 3 Encounters:   06/19/19 137/74   06/17/19 132/80   08/14/18 123/68       NPO Status:                                                                                 BMI:   Wt Readings from Last 3 Encounters:   06/19/19 (!) 323 lb (146.5 kg)   06/17/19 (!) 331 lb Numbness and tingling bilateral legs and feet     Neuro/Psych:   (+) headaches: cluster headaches, migraine headaches, tension headaches, psychiatric history: stable with treatment             ROS comment: Chronic back pain/on multiple narcs/anti-anxiety GI/Hepatic/Renal:   (+) GERD: poorly controlled, bowel prep, morbid obesity         ROS comment: etoh use. Endo/Other:    (+) : arthritis:., .                 Abdominal:   (+) obese,         Vascular:                                          Anesthesia Plan      general and TIVA     ASA 3       Induction: intravenous. Anesthetic plan and risks discussed with patient. Plan discussed with CRNA.                   BOUBACAR Soliman - CRNA   6/19/2019

## 2019-06-19 NOTE — H&P
Patient Name: Evangelista Scott  : 1955  MRN: [id number]  DATE: 19    Allergies: Allergies   Allergen Reactions    Lunesta [Eszopiclone] Other (See Comments)     Flu like symptoms     Levofloxacin         ENDOSCOPY  History and Physical    Procedure:    [x] Diagnostic Colonoscopy       [] Screening Colonoscopy  [x] EGD      [] ERCP      [] EUS       [] Other    [x] Previous office notes/History and Physical reviewed from the patients chart. Please see EMR for further details of HPI. I have examined the patient's status immediately prior to the procedure and:      Indications/HPI:    1. Tarry stool    2. Lower abdominal pain    3. Juan's esophagus determined by biopsy    4. Chronic GERD    5. Irritable bowel syndrome with diarrhea        []Abdominal Pain   []Cancer- GI/Lung     []Fhx of colon CA/polyps  []History of Polyps  []Barretts            []Melena  []Abnormal Imaging              []Dysphagia              []Persistent Pneumonia   []Anemia                            []Food Impaction        []History of Polyps  [] GI Bleed             []Pulmonary nodule/Mass   []Change in bowel habits []Heartburn/Reflux  []Rectal Bleed (BRBPR)  []Chest Pain - Non Cardiac []Heme (+) Stool []Ulcers  []Constipation  []Hemoptysis  []Varices  []Diarrhea  []Hypoxemia    []Nausea/Vomiting   []Screening   []Crohns/Colitis  []Other:     Anesthesia:   [x] MAC [] Moderate Sedation   [] General   [] None     ROS: 12 pt Review of Symptoms was negative unless mentioned above    Medications:   Prior to Admission medications    Medication Sig Start Date End Date Taking?  Authorizing Provider   ibuprofen (ADVIL;MOTRIN) 800 MG tablet Take 800 mg by mouth three times daily   Yes Historical Provider, MD   albuterol sulfate HFA (PROAIR HFA) 108 (90 Base) MCG/ACT inhaler Inhale 2 puffs into the lungs every 6 hours as needed for Wheezing   Yes Historical Provider, MD   mometasone-formoterol (DULERA) 200-5 MCG/ACT inhaler Inhale 2 puffs into the lungs every 12 hours   Yes Historical Provider, MD   oxyCODONE-acetaminophen (PERCOCET)  MG per tablet Take 1 tablet by mouth every 6 hours as needed for Pain. .   Yes Historical Provider, MD   celecoxib (CELEBREX) 200 MG capsule Take 200 mg by mouth daily   Yes Historical Provider, MD   cyclobenzaprine (FLEXERIL) 5 MG tablet Take 5 mg by mouth 3 times daily   Yes Historical Provider, MD   valACYclovir (VALTREX) 500 MG tablet Take 500 mg by mouth daily   Yes Historical Provider, MD   tamsulosin (FLOMAX) 0.4 MG capsule Take 0.4 mg by mouth daily   Yes Historical Provider, MD   losartan-hydrochlorothiazide (HYZAAR) 100-25 MG per tablet Take 1 tablet by mouth daily   Yes Historical Provider, MD   cloNIDine (CATAPRES) 0.1 MG tablet Take 0.1 mg by mouth every 6 hours as needed for High Blood Pressure    Yes Historical Provider, MD   mometasone (ASMANEX 7 METERED DOSES) 110 MCG/INH AEPB Inhale 2 puffs into the lungs daily   Yes Historical Provider, MD   Lorcaserin HCl (BELVIQ) 10 MG TABS Take by mouth 2 times daily. .   Yes Historical Provider, MD   tiZANidine (ZANAFLEX) 4 MG tablet Take 4 mg by mouth every 8 hours as needed   Yes Historical Provider, MD   oxyCODONE (OXYCONTIN) 10 MG extended release tablet Take 20 mg by mouth every 12 hours. .   Yes Historical Provider, MD   omeprazole (PRILOSEC) 10 MG capsule Take 20 mg by mouth daily    Yes Historical Provider, MD   ranitidine (ZANTAC) 150 MG tablet Take 2 tablets by mouth nightly as needed for Heartburn  Patient taking differently: Take 150 mg by mouth 2 times daily  4/12/16  Yes Thomas Celestin DO   Krill Oil [place]-3 300 MG CAPS Take by mouth 2 times daily    Yes Historical Provider, MD   DULoxetine (CYMBALTA) 60 MG capsule Take 60 mg by mouth daily. Yes Historical Provider, MD   ALPRAZolam Janel Fass) 1 MG tablet Take 1 mg by mouth 2 times daily as needed.    Yes Historical Provider, MD   gabapentin (NEURONTIN) 800 MG tablet Take 600 mg by mouth 4 times daily 600 four times a day. Yes Historical Provider, MD   esomeprazole Magnesium (NEXIUM) 40 MG PACK Take 40 mg by mouth daily. Yes Historical Provider, MD   traZODone (DESYREL) 50 MG tablet Take 150 mg by mouth nightly    Yes Historical Provider, MD   meclizine (ANTIVERT) 25 MG tablet Take 25 mg by mouth 4 times daily as needed    Historical Provider, MD   prochlorperazine (COMPAZINE) 10 MG tablet Take 10 mg by mouth every 6 hours as needed    Historical Provider, MD   Tadalafil 2.5 MG TABS Take by mouth    Historical Provider, MD   fluticasone (FLONASE) 50 MCG/ACT nasal spray 1 spray by Nasal route daily    Historical Provider, MD   diphenoxylate-atropine (LOMOTIL) 2.5-0.025 MG per tablet Take 1 tablet by mouth 4 times daily as needed for Diarrhea. .    Historical Provider, MD   ketorolac (TORADOL) 10 MG tablet Take 10 mg by mouth every 6 hours as needed for Pain    Historical Provider, MD   azelastine (ASTELIN) 0.1 % nasal spray 1 spray by Nasal route 2 times daily Use in each nostril as directed    Historical Provider, MD   testosterone cypionate (DEPOTESTOTERONE CYPIONATE) 200 MG/ML injection Inject 50 mg into the muscle every 14 days. Dasha Clarke     Historical Provider, MD       Past Medical History:  Past Medical History:   Diagnosis Date    Abnormal gait     Amnesia     Anxiety     Asthma     BiPAP (biphasic positive airway pressure) dependence     NiPPV 23cm/16cm/12cm    Cervical facet syndrome     Chronic back pain     Complex sleep apnea syndrome     Initial PS; AHI:  31.2 per PSG, 10/2014    Concussion     [facility] obstructive [facility] disease[facility]     DDD (degenerative disc disease)     Facial ringworm     PERCY (generalized anxiety disorder)     GERD (gastroesophageal reflux disease)     Headache     Herniated disc     Herpes simplex     History of Juan's esophagus     Hyperlipidemia     Hypersomnia     Hypertension     Hypotestosteronism     Lumbar stenosis     Memory loss     Obesity     Obstructive sleep apnea     Initial PS; AHI:  31.2 per split-night PSG, 10/2014    Pneumonia     Potential difficult airway on pre-intubation assessment     PTSD (post-traumatic stress disorder)     Sleep apnea     Bi-pap at night    Vertigo        Past Surgical History:  Past Surgical History:   Procedure Laterality Date    APPENDECTOMY      BACK SURGERY      Three lumbar fusions L3-4, L3-4-5 & S1    CARDIAC CATHETERIZATION      COLONOSCOPY      [place]    COLONOSCOPY  16    Dr Jany Estrella, normal, 5 yr recall    ENDOSCOPY, COLON, DIAGNOSTIC      FRACTURE SURGERY      wrist    HERNIA REPAIR      KNEE ARTHROSCOPY      OTHER SURGICAL HISTORY      nerve stimulator. dcs battery replacement 18    RHINOPLASTY      SKIN BIOPSY      SPINE SURGERY      x 3/Stimulator implant    UPPER GASTROINTESTINAL ENDOSCOPY      [place]    UPPER GASTROINTESTINAL ENDOSCOPY  2013    BE surveillance. pos BE / neg dysplasia.  retained gastric contents    UPPER GASTROINTESTINAL ENDOSCOPY N/A 2016    Dr Jany Estrella, Susy (-), Barretts (+), 3 yr recall       Social History:  Social History     Tobacco Use    Smoking status: Former Smoker     Packs/day: 0.00     Years: 35.00     Pack years: 0.00     Types: Cigarettes     Last attempt to quit: 3/16/2016     Years since quitting: 3.2    Smokeless tobacco: Never Used    Tobacco comment: pt states he has quit smokimg again for 3 weeks   Substance Use Topics    Alcohol use: Yes     Comment: 2-3 mixed drinks / month     Drug use: No       Vital Signs:   Vitals:    19 1035   BP: 137/74   Pulse: 65   Resp: 18   Temp: 98.2 °F (36.8 °C)   SpO2: 94%        Physical Exam:  Cardiac:  [x]WNL  []Comments:  Pulmonary:  [x]WNL   []Comments:  Neuro/Mental Status:  [x]WNL  []Comments:  Abdominal:  [x]WNL    []Comments:  Other:   []WNL  []Comments:    Informed Consent:  The risks and benefits of the procedure have been discussed with either the patient or if they cannot consent, their representative. Assessment:  Patient examined and appropriate for planned sedation and procedure. Plan:  Proceed with planned sedation and procedure as above.          Loren Gustafson MD

## 2019-07-01 ENCOUNTER — DOCUMENTATION (OUTPATIENT)
Dept: PHYSICAL THERAPY | Facility: HOSPITAL | Age: 64
End: 2019-07-01

## 2019-07-01 DIAGNOSIS — M51.36 LUMBAR DEGENERATIVE DISC DISEASE: ICD-10-CM

## 2019-07-01 DIAGNOSIS — M48.062 SPINAL STENOSIS, LUMBAR REGION, WITH NEUROGENIC CLAUDICATION: ICD-10-CM

## 2019-07-01 DIAGNOSIS — M54.2 CERVICALGIA: ICD-10-CM

## 2019-07-01 DIAGNOSIS — G89.29 CHRONIC BILATERAL LOW BACK PAIN WITH BILATERAL SCIATICA: Primary | ICD-10-CM

## 2019-07-01 DIAGNOSIS — M54.42 CHRONIC BILATERAL LOW BACK PAIN WITH BILATERAL SCIATICA: Primary | ICD-10-CM

## 2019-07-01 DIAGNOSIS — M54.41 CHRONIC BILATERAL LOW BACK PAIN WITH BILATERAL SCIATICA: Primary | ICD-10-CM

## 2019-07-01 DIAGNOSIS — G56.01 CARPAL TUNNEL SYNDROME OF RIGHT WRIST: ICD-10-CM

## 2019-07-01 DIAGNOSIS — M54.2 NECK PAIN: ICD-10-CM

## 2019-07-01 DIAGNOSIS — M54.12 CERVICAL RADICULOPATHY: ICD-10-CM

## 2019-07-01 NOTE — THERAPY DISCHARGE NOTE
Outpatient Physical Therapy Discharge Summary         Patient Name: Carlos Hayes  : 1955  MRN: [id number]    Today's Date: 2019    Visit Dx:    ICD-10-CM ICD-9-CM   1. Chronic bilateral low back pain with bilateral sciatica M54.42 724.2    M54.41 724.3    G89.29 338.29   2. Lumbar degenerative disc disease M51.36 722.52   3. Spinal stenosis, lumbar region, with neurogenic claudication M48.062 724.03   4. Neck pain M54.2 723.1   5. Cervical radiculopathy M54.12 723.4   6. Cervicalgia M54.2 723.1   7. Carpal tunnel syndrome of right wrist G56.01 354.0       PT OP Goals     Row Name 19 1700          PT Short Term Goals    STG Date to Achieve  19  -TB     STG 1  Improve oliver hip ext to 0 deg  -TB     STG 1 Progress  Partially Met  -TB     STG 1 Progress Comments  right hip limited due to pain but was improving  -TB     STG 2  Able to walk with right foot position symmetrical to left  -TB     STG 2 Progress  Met  -TB     STG 2 Progress Comments  was walking with antalgic gait on right due to hip pain but otherwise better  -TB     STG 3  Improve oliver hip IR to 30 deg  -TB     STG 3 Progress  Partially Met  -TB     STG 3 Progress Comments  limited to just past neutral due to hip strain  -TB        Long Term Goals    LTG Date to Achieve  10/13/18  -TB     LTG 1  Able to walk community distances without an assistive device or only a cane without severe flare of pain  -TB     LTG 1 Progress  Partially Met  -TB     LTG 1 Progress Comments  this was much better  before his hip strain  -TB     LTG 2  Improve hip passive extension to 10 degrees  -TB     LTG 2 Progress  Partially Met  -TB     LTG 2 Progress Comments  improving, see STG  -TB     LTG 3  Improved core contraction held during functional tasks.  -TB     LTG 3 Progress  Partially Met  -TB     LTG 3 Progress Comments  he was doing well with this but limited at the end after his hip strain  -TB     LTG 4  Independent with HEP for flexibility  and stability.   -TB     LTG 4 Progress  Met  -TB     LTG 4 Progress Comments  he was working on flexibility and core/hip control  -TB     LTG 5  No difficulty voiding his bladder  -TB     LTG 5 Progress  Partially Met  -TB     LTG 5 Progress Comments  this was better but his hip strain set him back; the surgery definitely improved this function  -TB       User Key  (r) = Recorded By, (t) = Taken By, (c) = Cosigned By    Initials Name Provider Type    Juarez Franklin, PT Physical Therapist          OP PT Discharge Summary  Date of Discharge: 07/01/19  Reason for Discharge: Patient/Caregiver request  Outcomes Achieved: Patient able to partially acheive established goals  Discharge Destination: Home with home program  Discharge Instructions/Additional Comments: He was doing remarkably better after his L2/3 fusion. He was gaining better control over his PF and able to empty his bladder better as well as improved sexual experience. His pain was much less and he wasnt' having the thigh radicular pain he was having before. He slipped at outside and almost did the splits causing a strain of his right hip. This became the main limitation and was improving although it would still have times of tightness and soreness. He has decided to go back to [facility] for health and weight loss and will be gone for 2 months.       Time Calculation:                    Juarez Hartmann, PT  7/1/2019

## 2019-07-02 ENCOUNTER — APPOINTMENT (OUTPATIENT)
Dept: PHYSICAL THERAPY | Facility: HOSPITAL | Age: 64
End: 2019-07-02

## 2019-08-02 ENCOUNTER — TELEPHONE (OUTPATIENT)
Dept: NEUROLOGY | Age: 64
End: 2019-08-02

## 2019-10-08 ENCOUNTER — OFFICE VISIT (OUTPATIENT)
Dept: NEUROLOGY | Age: 64
End: 2019-10-08
Payer: MEDICARE

## 2019-10-08 VITALS
HEART RATE: 58 BPM | DIASTOLIC BLOOD PRESSURE: 64 MMHG | RESPIRATION RATE: 18 BRPM | WEIGHT: 301.8 LBS | BODY MASS INDEX: 36.75 KG/M2 | SYSTOLIC BLOOD PRESSURE: 130 MMHG | HEIGHT: 76 IN

## 2019-10-08 DIAGNOSIS — R41.3 MEMORY LOSS: ICD-10-CM

## 2019-10-08 DIAGNOSIS — G47.33 OBSTRUCTIVE SLEEP APNEA: Primary | ICD-10-CM

## 2019-10-08 DIAGNOSIS — G89.29 CHRONIC BILATERAL LOW BACK PAIN WITH BILATERAL SCIATICA: ICD-10-CM

## 2019-10-08 DIAGNOSIS — M54.41 CHRONIC BILATERAL LOW BACK PAIN WITH BILATERAL SCIATICA: ICD-10-CM

## 2019-10-08 DIAGNOSIS — M54.42 CHRONIC BILATERAL LOW BACK PAIN WITH BILATERAL SCIATICA: ICD-10-CM

## 2019-10-08 PROCEDURE — G8484 FLU IMMUNIZE NO ADMIN: HCPCS | Performed by: PSYCHIATRY & NEUROLOGY

## 2019-10-08 PROCEDURE — 1036F TOBACCO NON-USER: CPT | Performed by: PSYCHIATRY & NEUROLOGY

## 2019-10-08 PROCEDURE — G8427 DOCREV CUR MEDS BY ELIG CLIN: HCPCS | Performed by: PSYCHIATRY & NEUROLOGY

## 2019-10-08 PROCEDURE — 3017F COLORECTAL CA SCREEN DOC REV: CPT | Performed by: PSYCHIATRY & NEUROLOGY

## 2019-10-08 PROCEDURE — 99213 OFFICE O/P EST LOW 20 MIN: CPT | Performed by: PSYCHIATRY & NEUROLOGY

## 2019-10-08 PROCEDURE — G8417 CALC BMI ABV UP PARAM F/U: HCPCS | Performed by: PSYCHIATRY & NEUROLOGY

## 2019-11-05 ENCOUNTER — HOSPITAL ENCOUNTER (OUTPATIENT)
Dept: GENERAL RADIOLOGY | Facility: HOSPITAL | Age: 64
Discharge: HOME | End: 2019-11-05
Admitting: FAMILY MEDICINE

## 2019-11-05 ENCOUNTER — TRANSCRIBE ORDERS (OUTPATIENT)
Dept: ADMINISTRATIVE | Facility: HOSPITAL | Age: 64
End: 2019-11-05

## 2019-11-05 DIAGNOSIS — M51.36 DEGENERATION OF LUMBAR INTERVERTEBRAL DISC: ICD-10-CM

## 2019-11-05 DIAGNOSIS — M51.36 DEGENERATION OF LUMBAR INTERVERTEBRAL DISC: Primary | ICD-10-CM

## 2019-11-05 PROCEDURE — 72110 X-RAY EXAM L-2 SPINE 4/>VWS: CPT

## 2019-12-12 ENCOUNTER — HOSPITAL ENCOUNTER (OUTPATIENT)
Dept: CT IMAGING | Facility: HOSPITAL | Age: 64
Discharge: HOME | End: 2019-12-12
Admitting: PHYSICIAN ASSISTANT

## 2019-12-12 DIAGNOSIS — G44.85 PRIMARY STABBING HEADACHE: ICD-10-CM

## 2019-12-12 DIAGNOSIS — G44.85 PRIMARY STABBING HEADACHE: Primary | ICD-10-CM

## 2019-12-12 PROCEDURE — 70450 CT HEAD/BRAIN W/O DYE: CPT

## 2020-01-23 DIAGNOSIS — N13.8 BPH WITH OBSTRUCTION/LOWER URINARY TRACT SYMPTOMS: ICD-10-CM

## 2020-01-23 DIAGNOSIS — N40.1 BPH WITH OBSTRUCTION/LOWER URINARY TRACT SYMPTOMS: ICD-10-CM

## 2020-01-24 RX ORDER — TAMSULOSIN HYDROCHLORIDE 0.4 MG/1
1 CAPSULE ORAL NIGHTLY
Qty: 90 CAPSULE | Refills: 3 | Status: SHIPPED | OUTPATIENT
Start: 2020-01-24 | End: 2021-11-09 | Stop reason: DRUGHIGH

## 2020-03-03 ENCOUNTER — TELEPHONE (OUTPATIENT)
Dept: NEUROSURGERY | Facility: CLINIC | Age: 65
End: 2020-03-03

## 2020-03-03 NOTE — TELEPHONE ENCOUNTER
----- Message from Nevin Dennis sent at 3/3/2020  2:48 PM CST -----  Contact: AG  CHARITY HAS CALLED AND IS HAVING PROBLEMS WITH HIS SPINAL CORD STIMULATOR. NOT SLEEPING AND HAVING PROBLEMS WITH INTENSE PAIN.   A LOT OF PAIN COMING FROM STIMULATOR IMPLANT SITE.     WOULD LIKE A CALL TO DISCUSS AS SOON AS POSSIBLE. HAD THOUGHT IT WAS RELATED TO HIS FUSION BUT MYELOGRAM DEMONSTRATED PROBLEM PROBABLY WITH STIMULATOR.    PT DOES HAVE AN APPT AT 4:30 PM, LEAVE MESSAGE IF CALLING AROUND THEN.     CALL BACK # [phone]

## 2020-03-03 NOTE — TELEPHONE ENCOUNTER
Contacted the patient to see what his concern is in regards to his DCS.  Patient having trouble standing up straight, thinks his DCS isn't working and this has been going on for quite some time.  He went and saw his neurosurgeon (who did the fusion) and neurologist but they ordered testing and state this is not coming from the fusion.  Patient goes on to say he did some commode work and had an acute onset of pain while working on the commode.    I have instructed him to contact Mirta and the patient services.  He says he called Mirta yesterday and again today but hasn't heard from him.  I have texted Mirta & Bambi while on the phone and asked that someone please call the patient.      Per Bambi: he must contact patient services: [phone] and/or Mirta [phone]

## 2020-06-08 ENCOUNTER — HOSPITAL ENCOUNTER (EMERGENCY)
Age: 65
Discharge: HOME | End: 2020-06-08
Payer: MEDICARE

## 2020-06-08 VITALS
BODY MASS INDEX: 38.24 KG/M2 | HEART RATE: 88 BPM | WEIGHT: 314 LBS | SYSTOLIC BLOOD PRESSURE: 137 MMHG | DIASTOLIC BLOOD PRESSURE: 81 MMHG | OXYGEN SATURATION: 98 % | TEMPERATURE: 98.2 F | RESPIRATION RATE: 20 BRPM | HEIGHT: 76 IN

## 2020-06-08 PROCEDURE — 90715 TDAP VACCINE 7 YRS/> IM: CPT | Performed by: PHYSICIAN ASSISTANT

## 2020-06-08 PROCEDURE — 99282 EMERGENCY DEPT VISIT SF MDM: CPT

## 2020-06-08 PROCEDURE — 2500000003 HC RX 250 WO HCPCS: Performed by: PHYSICIAN ASSISTANT

## 2020-06-08 PROCEDURE — 12002 RPR S/N/AX/GEN/TRNK2.6-7.5CM: CPT

## 2020-06-08 PROCEDURE — 6360000002 HC RX W HCPCS: Performed by: PHYSICIAN ASSISTANT

## 2020-06-08 PROCEDURE — 90471 IMMUNIZATION ADMIN: CPT | Performed by: PHYSICIAN ASSISTANT

## 2020-06-08 RX ORDER — BUPIVACAINE HYDROCHLORIDE 5 MG/ML
30 INJECTION, SOLUTION EPIDURAL; INTRACAUDAL ONCE
Status: COMPLETED | OUTPATIENT
Start: 2020-06-08 | End: 2020-06-08

## 2020-06-08 RX ORDER — CEPHALEXIN 500 MG/1
500 CAPSULE ORAL 2 TIMES DAILY
Qty: 14 CAPSULE | Refills: 0 | Status: SHIPPED | OUTPATIENT
Start: 2020-06-08 | End: 2020-06-15

## 2020-06-08 RX ADMIN — TETANUS TOXOID, REDUCED DIPHTHERIA TOXOID AND ACELLULAR PERTUSSIS VACCINE, ADSORBED 0.5 ML: 5; 2.5; 8; 8; 2.5 SUSPENSION INTRAMUSCULAR at 19:38

## 2020-06-08 RX ADMIN — BUPIVACAINE HYDROCHLORIDE 150 MG: 5 INJECTION, SOLUTION EPIDURAL; INTRACAUDAL; PERINEURAL at 19:39

## 2020-06-08 ASSESSMENT — PAIN SCALES - GENERAL
PAINLEVEL_OUTOF10: 8
PAINLEVEL_OUTOF10: 3
PAINLEVEL_OUTOF10: 8

## 2020-06-09 ASSESSMENT — ENCOUNTER SYMPTOMS
SHORTNESS OF BREATH: 0
EYE DISCHARGE: 0
COUGH: 0
BACK PAIN: 0
EYE ITCHING: 0
PHOTOPHOBIA: 0
COLOR CHANGE: 0
APNEA: 0

## 2020-06-09 NOTE — ED PROVIDER NOTES
 UPPER GASTROINTESTINAL ENDOSCOPY N/A 6/19/2019    EGD BIOPSY performed by Shannon Rushing MD at [address]       Discharge Medication List as of 6/8/2020  8:41 PM      CONTINUE these medications which have NOT CHANGED    Details   ibuprofen (ADVIL;MOTRIN) 800 MG tablet Take 800 mg by mouth three times dailyHistorical Med      meclizine (ANTIVERT) 25 MG tablet Take 25 mg by mouth 4 times daily as neededHistorical Med      albuterol sulfate HFA (PROAIR HFA) 108 (90 Base) MCG/ACT inhaler Inhale 2 puffs into the lungs every 6 hours as needed for WheezingHistorical Med      prochlorperazine (COMPAZINE) 10 MG tablet Take 10 mg by mouth every 6 hours as neededHistorical Med      Tadalafil 2.5 MG TABS Take by mouthHistorical Med      mometasone-formoterol (DULERA) 200-5 MCG/ACT inhaler Inhale 2 puffs into the lungs every 12 hoursHistorical Med      oxyCODONE-acetaminophen (PERCOCET)  MG per tablet Take 1 tablet by mouth every 6 hours as needed for Pain. Shaneka Bates Historical Med      celecoxib (CELEBREX) 200 MG capsule Take 200 mg by mouth dailyHistorical Med      cyclobenzaprine (FLEXERIL) 5 MG tablet Take 5 mg by mouth 3 times dailyHistorical Med      valACYclovir (VALTREX) 500 MG tablet Take 500 mg by mouth dailyHistorical Med      tamsulosin (FLOMAX) 0.4 MG capsule Take 0.4 mg by mouth dailyHistorical Med      losartan-hydrochlorothiazide (HYZAAR) 100-25 MG per tablet Take 1 tablet by mouth dailyHistorical Med      fluticasone (FLONASE) 50 MCG/ACT nasal spray 1 spray by Nasal route dailyHistorical Med      cloNIDine (CATAPRES) 0.1 MG tablet Take 0.1 mg by mouth every 6 hours as needed for High Blood Pressure Historical Med      mometasone (ASMANEX 7 METERED DOSES) 110 MCG/INH AEPB Inhale 2 puffs into the lungs daily, Inhalation, DAILY, Historical Med      diphenoxylate-atropine (LOMOTIL) 2.5-0.025 MG per tablet Take 1 tablet by mouth 4 times daily as needed for Diarrhea.  .Historical Med      Lorcaserin HCl (BELVIQ) 10 MG TABS Take by mouth 2 times daily. Chuck Virk Historical Med      tiZANidine (ZANAFLEX) 4 MG tablet Take 4 mg by mouth every 8 hours as neededHistorical Med      oxyCODONE (OXYCONTIN) 10 MG extended release tablet Take 20 mg by mouth every 12 hours. .Historical Med      omeprazole (PRILOSEC) 10 MG capsule Take 20 mg by mouth daily Historical Med      ranitidine (ZANTAC) 150 MG tablet Take 2 tablets by mouth nightly as needed for Heartburn, Disp-30 tablet, R-5      Krill Oil Omega-3 300 MG CAPS Take by mouth 2 times daily Historical Med      ketorolac (TORADOL) 10 MG tablet Take 10 mg by mouth every 6 hours as needed for Pain      azelastine (ASTELIN) 0.1 % nasal spray 1 spray by Nasal route 2 times daily Use in each nostril as directed      testosterone cypionate (DEPOTESTOTERONE CYPIONATE) 200 MG/ML injection Inject 50 mg into the muscle every 14 days. Chuck Virk Historical Med      DULoxetine (CYMBALTA) 60 MG capsule Take 60 mg by mouth daily. ALPRAZolam (XANAX) 1 MG tablet Take 1 mg by mouth 2 times daily as needed. gabapentin (NEURONTIN) 800 MG tablet Take 600 mg by mouth 4 times daily 600 four times a day. Historical Med      esomeprazole Magnesium (NEXIUM) 40 MG PACK Take 40 mg by mouth daily.       traZODone (DESYREL) 50 MG tablet Take 150 mg by mouth nightly Historical Med             ALLERGIES     Lunesta [eszopiclone] and Levofloxacin    FAMILY HISTORY       Family History   Problem Relation Age of Onset    Colon Polyps Mother     Heart Attack Mother     Arrhythmia Mother     Stroke Mother     Colon Polyps Maternal Grandmother     Arrhythmia Father     Obesity Sister     Stroke Paternal Grandmother     Colon Cancer Neg Hx     Esophageal Cancer Neg Hx     Liver Cancer Neg Hx     Liver Disease Neg Hx     Rectal Cancer Neg Hx     Stomach Cancer Neg Hx           SOCIAL HISTORY       Social History     Socioeconomic History    Marital status:      Spouse name: None    Number of children: None    Years of education: None    Highest education level: None   Occupational History    None   Social Needs    Financial resource strain: None    Food insecurity     Worry: None     Inability: None    Transportation needs     Medical: None     Non-medical: None   Tobacco Use    Smoking status: Former Smoker     Packs/day: 0.00     Years: 35.00     Pack years: 0.00     Types: Cigarettes     Last attempt to quit: 3/16/2016     Years since quittin.2    Smokeless tobacco: Never Used    Tobacco comment: pt states he has quit smokimg again for 3 weeks   Substance and Sexual Activity    Alcohol use: Yes     Comment: 2-3 mixed drinks / month     Drug use: No    Sexual activity: Yes     Partners: Female   Lifestyle    Physical activity     Days per week: None     Minutes per session: None    Stress: None   Relationships    Social connections     Talks on phone: None     Gets together: None     Attends Jainism service: None     Active member of club or organization: None     Attends meetings of clubs or organizations: None     Relationship status: None    Intimate partner violence     Fear of current or ex partner: None     Emotionally abused: None     Physically abused: None     Forced sexual activity: None   Other Topics Concern    None   Social History Narrative    None       SCREENINGS    Peyton Coma Scale  Eye Opening: Spontaneous  Best Verbal Response: Oriented  Best Motor Response: Obeys commands  [place]us Coma Scale Score: 15      PHYSICAL EXAM    (up to 7 forlevel 4, 8 or more for level 5)     ED Triage Vitals [20 1749]   BP Temp Temp src Pulse Resp SpO2 Height Weight   137/81 98.2 °F (36.8 °C) -- 88 20 98 % 6' 4\" (1.93 m) (!) 314 lb (142.4 kg)       Physical Exam  Vitals signs and nursing note reviewed. Constitutional:       General: He is not in acute distress. Appearance: He is well-developed. He is not diaphoretic.    HENT:      Head: this dictation. RE-ASSESSMENT        EMERGENCY DEPARTMENT COURSE and DIFFERENTIAL DIAGNOSIS/MDM:   Vitals:    Vitals:    06/08/20 1749 06/08/20 2000   BP: 137/81    Pulse: 88 88   Resp: 20    Temp: 98.2 °F (36.8 °C)    SpO2: 98%    Weight: (!) 314 lb (142.4 kg)    Height: 6' 4\" (1.93 m)          MDM  Patient tolerates procedure well plan for discharge and follow up in 7-10 days with plans for suture removal. Without depth and full ROM I felt less likely for tendon injury or indication for XR. Patient has abx prophylaxis to go home with. Typically 7 days suture removal but with area of movement 10 days more likely appropriate. Spouse is retired nurse I trust her to return to ED as needed for sxs of infection. PROCEDURES:    Lac Repair  Date/Time: 6/8/2020 8:41 PM  Performed by: EVONNE Lagos  Authorized by: EVONNE Lagos     Consent:     Consent obtained:  Verbal    Consent given by:  Patient    Risks discussed:  Pain  Anesthesia (see MAR for exact dosages): Anesthesia method:  Local infiltration    Local anesthetic:  Bupivacaine 0.5% w/o epi  Laceration details:     Location:  Hand    Hand location:  L palm    Wound length (cm): 3.  Repair type:     Repair type:  Simple  Exploration:     Hemostasis achieved with:  Direct pressure    Contaminated: no    Treatment:     Area cleansed with:  Betadine and saline    Amount of cleaning:  Standard    Visualized foreign bodies/material removed: no    Skin repair:     Repair method:  Sutures    Suture size:  5-0    Suture material:  Nylon    Suture technique:  Simple interrupted    Number of sutures:  7  Approximation:     Approximation:  Close  Post-procedure details:     Dressing:  Adhesive bandage, non-adherent dressing and antibiotic ointment    Patient tolerance of procedure: Tolerated well, no immediate complications          FINAL IMPRESSION      1.  Laceration of left hand without foreign body, initial encounter          DISPOSITION/PLAN   DISPOSITION

## 2020-08-18 ENCOUNTER — TRANSCRIBE ORDERS (OUTPATIENT)
Dept: ADMINISTRATIVE | Facility: HOSPITAL | Age: 65
End: 2020-08-18

## 2020-08-18 DIAGNOSIS — Z01.818 PREOP TESTING: Primary | ICD-10-CM

## 2020-08-27 ENCOUNTER — HOSPITAL ENCOUNTER (OUTPATIENT)
Dept: LAB | Age: 65
Discharge: HOME | End: 2020-08-27
Payer: MEDICARE

## 2020-08-27 LAB
HCT VFR BLD CALC: 51.9 % (ref 42–52)
HEMOGLOBIN: 17.5 G/DL (ref 14–18)

## 2020-08-27 PROCEDURE — 36415 COLL VENOUS BLD VENIPUNCTURE: CPT

## 2020-08-27 PROCEDURE — 85014 HEMATOCRIT: CPT

## 2020-08-27 PROCEDURE — 85018 HEMOGLOBIN: CPT

## 2020-10-08 ENCOUNTER — OFFICE VISIT (OUTPATIENT)
Dept: NEUROLOGY | Age: 65
End: 2020-10-08
Payer: MEDICARE

## 2020-10-08 VITALS
HEART RATE: 77 BPM | BODY MASS INDEX: 39.2 KG/M2 | RESPIRATION RATE: 76 BRPM | OXYGEN SATURATION: 94 % | SYSTOLIC BLOOD PRESSURE: 158 MMHG | WEIGHT: 315 LBS | DIASTOLIC BLOOD PRESSURE: 93 MMHG

## 2020-10-08 PROCEDURE — 3017F COLORECTAL CA SCREEN DOC REV: CPT | Performed by: PSYCHIATRY & NEUROLOGY

## 2020-10-08 PROCEDURE — 99214 OFFICE O/P EST MOD 30 MIN: CPT | Performed by: PSYCHIATRY & NEUROLOGY

## 2020-10-08 PROCEDURE — 1036F TOBACCO NON-USER: CPT | Performed by: PSYCHIATRY & NEUROLOGY

## 2020-10-08 PROCEDURE — G8427 DOCREV CUR MEDS BY ELIG CLIN: HCPCS | Performed by: PSYCHIATRY & NEUROLOGY

## 2020-10-08 PROCEDURE — G8417 CALC BMI ABV UP PARAM F/U: HCPCS | Performed by: PSYCHIATRY & NEUROLOGY

## 2020-10-08 PROCEDURE — G8484 FLU IMMUNIZE NO ADMIN: HCPCS | Performed by: PSYCHIATRY & NEUROLOGY

## 2020-10-08 RX ORDER — TRAZODONE HYDROCHLORIDE 100 MG/1
200 TABLET ORAL NIGHTLY PRN
Qty: 180 TABLET | Refills: 3 | Status: SHIPPED | OUTPATIENT
Start: 2020-10-08

## 2020-10-08 NOTE — PROGRESS NOTES
[facility]  [address] [place] [address] Drive, [address] [place] [address],[address]  Deja Lawson  Phone [phone]  Fax [phone]     [address] Follow Up Encounter  10/8/20 11:39 AM CDT    Information:   Patient Name: Verna Figueroa  :   1955  Age:   63 y.o. MRN:   [id number]  Account #:  [de-identified]  Today:  10/8/20    Provider: Marcelino Abdul M.D. Chief Complaint:   Chief Complaint   Patient presents with    1 Year Follow Up     trouble with sleeping for the past 3 months       Subjective:   Verna Figueroa is a 63 y.o. man with a history of central and obstructive sleep apnea and multiple back surgeries who is following up. He has chronic low back pain. He cannot stand or walk very far due to the back and leg pain. His legs get weak. He has numbness in the right thigh and down the leg. He has not bee sleeping well lately. He has difficulty getting to sleep and he wakens early and cannot get back to sleep. He is using his BiPAP ST nightly. It is largely the back pain that keeps him awake.         Objective:     Past Medical History:  Past Medical History:   Diagnosis Date    Abnormal gait     Amnesia     Anxiety     Asthma     BiPAP (biphasic positive airway pressure) dependence     NiPPV 23cm/16cm/12cm    Cervical facet syndrome     Chronic back pain     Complex sleep apnea syndrome     Initial PS; AHI:  31.2 per PSG, 10/2014    Concussion     COPD (chronic obstructive pulmonary disease) (HCC)     DDD (degenerative disc disease)     Facial ringworm     PERCY (generalized anxiety disorder)     GERD (gastroesophageal reflux disease)     Headache     Herniated disc     Herpes simplex     History of Juan's esophagus     Hyperlipidemia     Hypersomnia     Hypertension     Hypotestosteronism     Lumbar stenosis     Memory loss     Obesity     Obstructive sleep apnea     Initial PS; AHI:  31.2 per split-night PSG, 10/2014    Pneumonia     Potential difficult airway on pre-intubation assessment     PTSD (post-traumatic stress disorder)     Sleep apnea     Bi-pap at night    Vertigo        Past Surgical History:   Procedure Laterality Date    APPENDECTOMY      BACK SURGERY      Three lumbar fusions L3-4, L3-4-5 & S1    CARDIAC CATHETERIZATION      COLONOSCOPY  2009    [facility] Mater    COLONOSCOPY  5/16/16    Dr Bernice Dejesus, normal, 5 yr recall    COLONOSCOPY N/A 6/19/2019    COLONOSCOPY DIAGNOSTIC performed by Dilma Luong MD at [facility]    ENDOSCOPY, COLON, DIAGNOSTIC      FRACTURE SURGERY      wrist    HERNIA REPAIR      KNEE ARTHROSCOPY      LUMBAR FUSION      OTHER SURGICAL HISTORY      nerve stimulator. dcs battery replacement 5/2/18    RHINOPLASTY      SKIN BIOPSY      SPINE SURGERY      x 3/Stimulator implant    UPPER GASTROINTESTINAL ENDOSCOPY  2009    [facility] Mater    UPPER GASTROINTESTINAL ENDOSCOPY  12/2013    BE surveillance. pos BE / neg dysplasia. retained gastric contents    UPPER GASTROINTESTINAL ENDOSCOPY N/A 4/12/2016    Dr Bernice Dejesus, Susy (-), Barretts (+), 3 yr recall    UPPER GASTROINTESTINAL ENDOSCOPY  06/19/2019    Dr Sheri Silva segment Juan's esophagus    UPPER GASTROINTESTINAL ENDOSCOPY  06/19/2019    Dr Gulshan Spivey diverticulosis, suboptimal prep, Grade 2 internal hemorrhoids, 3 yr recall    UPPER GASTROINTESTINAL ENDOSCOPY N/A 6/19/2019    EGD BIOPSY performed by Dilma Luong MD at [facility]       Recent Hospitalizations  · None    Significant Injuries  · None    Habits  Marilee Adam reports that he quit smoking about 4 years ago. His smoking use included cigarettes. He smoked 0.00 packs per day for 35.00 years. He has never used smokeless tobacco. He reports current alcohol use. He reports that he does not use drugs.     Family History   Problem Relation Age of Onset    Colon Polyps Mother     Heart Attack Mother     Arrhythmia Mother     Stroke Mother     Colon Polyps Maternal Grandmother     Arrhythmia Father     Obesity Sister     Stroke Paternal Grandmother     Colon Cancer Neg Hx     Esophageal Cancer Neg Hx     Liver Cancer Neg Hx     Liver Disease Neg Hx     Rectal Cancer Neg Hx     Stomach Cancer Neg Hx        Social History  Danni Wolf , lives [place], [place], and is retired. Medications:  Current Outpatient Medications   Medication Sig Dispense Refill    meclizine (ANTIVERT) 25 MG tablet Take 25 mg by mouth 4 times daily as needed      albuterol sulfate HFA (PROAIR HFA) 108 (90 Base) MCG/ACT inhaler Inhale 2 puffs into the lungs every 6 hours as needed for Wheezing      prochlorperazine (COMPAZINE) 10 MG tablet Take 10 mg by mouth every 6 hours as needed      Tadalafil 2.5 MG TABS Take by mouth      mometasone-formoterol (DULERA) 200-5 MCG/ACT inhaler Inhale 2 puffs into the lungs every 12 hours      oxyCODONE-acetaminophen (PERCOCET)  MG per tablet Take 1 tablet by mouth every 6 hours as needed for Pain. Louanna Laney celecoxib (CELEBREX) 200 MG capsule Take 200 mg by mouth daily      cyclobenzaprine (FLEXERIL) 5 MG tablet Take 5 mg by mouth 3 times daily      valACYclovir (VALTREX) 500 MG tablet Take 500 mg by mouth daily      tamsulosin (FLOMAX) 0.4 MG capsule Take 0.4 mg by mouth daily      losartan-hydrochlorothiazide (HYZAAR) 100-25 MG per tablet Take 1 tablet by mouth daily      fluticasone (FLONASE) 50 MCG/ACT nasal spray 1 spray by Nasal route daily      cloNIDine (CATAPRES) 0.1 MG tablet Take 0.1 mg by mouth every 6 hours as needed for High Blood Pressure       mometasone (ASMANEX 7 METERED DOSES) 110 MCG/INH AEPB Inhale 2 puffs into the lungs daily      diphenoxylate-atropine (LOMOTIL) 2.5-0.025 MG per tablet Take 1 tablet by mouth 4 times daily as needed for Diarrhea. Louanna Laney Lorcaserin HCl (BELVIQ) 10 MG TABS Take by mouth 2 times daily. [place]       tiZANidine (ZANAFLEX) 4 MG tablet Take 4 mg by mouth every 8 hours as needed      oxyCODONE (OXYCONTIN) 10 MG extended release tablet Take 20 mg by mouth every 12 hours. Jorge Jefferson omeprazole (PRILOSEC) 10 MG capsule Take 20 mg by mouth daily       ranitidine (ZANTAC) 150 MG tablet Take 2 tablets by mouth nightly as needed for Heartburn (Patient taking differently: Take 150 mg by mouth 2 times daily ) 30 tablet 5    Krill Oil Omega-3 300 MG CAPS Take by mouth 2 times daily       azelastine (ASTELIN) 0.1 % nasal spray 1 spray by Nasal route 2 times daily Use in each nostril as directed      testosterone cypionate (DEPOTESTOTERONE CYPIONATE) 200 MG/ML injection Inject 50 mg into the muscle every 14 days. Jorge Jefferson DULoxetine (CYMBALTA) 60 MG capsule Take 60 mg by mouth daily.  ALPRAZolam (XANAX) 1 MG tablet Take 1 mg by mouth 2 times daily as needed.  gabapentin (NEURONTIN) 800 MG tablet Take 600 mg by mouth 4 times daily 600 four times a day.  esomeprazole Magnesium (NEXIUM) 40 MG PACK Take 40 mg by mouth daily.  traZODone (DESYREL) 50 MG tablet Take 150 mg by mouth nightly        No current facility-administered medications for this visit. Allergies:   Allergies as of 10/08/2020 - Review Complete 10/08/2020   Allergen Reaction Noted    Lunesta [eszopiclone] Other (See Comments) 02/13/2018    Levofloxacin  04/08/2017       Review of Systems:  Constitutional: negative for - chills and fever  Eyes:  negative for - visual disturbance and photophobia  HENMT: negative for - headaches and sinus pain  Respiratory: negative for - cough, hemoptysis, and shortness of breath  Cardiovascular: negative for - chest pain and palpitations  Gastrointestinal: negative for - blood in stools, constipation, diarrhea, nausea, and vomiting  Genito-Urinary: negative for - hematuria, urinary frequency, urinary urgency, and urinary retention  Musculoskeletal: positive for - joint pain, joint stiffness, and joint swelling  Hematological and Lymphatic: negative for - bleeding problems, abnormal bruising, and swollen lymph nodes  Endocrine:  negative for - polydipsia and polyphagia  Allergy/Immunology:  negative for - rhinorrhea, sinus congestion, hives  Integument:  negative for - negative for - rash, change in moles, new or changing lesions  Psychological: negative for - anxiety and depression  Neurological: negative for - memory loss  Positive for - numbness/tingling, and weakness     PHYSICAL EXAMINATION:  Vitals:  BP (!) 158/93   Pulse 77   Resp (!) 76   Wt (!) 322 lb (146.1 kg)   SpO2 94%   BMI 39.20 kg/m²   General appearance:  Alert, well developed, well nourished, in no distress  HEENT:  normocephalic, atraumatic, sclera appear normal, no nasal abnormalities, no rhinorrhea, Ears appear normal, oral mucous membranes are moist without erythema, trachea midline, thyroid is normal, no lymphadenopathy or neck mass. III (soft palate, base of uvula visible). Cardiovascular:  Regular rate and rhythm without murmer. No peripheral edema, No cyanosis or clubbing. No carotid bruits. Pulmonary:  Lungs are clear to auscultation. Breathing appears normal, good expansion, normal effort without use of accessory muscles  Musculoskeletal:  Joints are normal.  No splints, slings, or casts. Spine appears normal with normal posture and range of motion. Integument:  No rash, erythema, or pallor  Psychiatric:  Mood, affect, and behavior appear normal      NEUROLOGIC EXAMINATION:  Mental Status:  alert, oriented to person, place, and time. Speech:  Clear without dysarthria or dysphonia  Language:  Fluent without aphasia  Cranial Nerves:   II Visual fields are full to confrontation   III,IV, VI Extraocular movements are full   VII Facial movements are symmetrical without weakness   VIII Hearing is intact   XII No tongue atrophy or fasciculations. Normal tongue protrusion. No tongue weakness  Motor:  Normal strength in both upper and lower extremities. Normal muscle tone and bulk.   Deep tendon reflexes are intact and symmetrical in both upper and lower extremities. Smith's signs are absent bilaterally. There is no ankle clonus on either side. Coordination:  Rapid alternating movements are normal in both upper and lower extremities. Finger to nose testing is unimpaired bilaterally. Gait:  Normal station and gait. Pertinent Diagnostic Studies:  NiPPV download shows that he is 86% compliant with use of NiPPV at 21/14/10 with residual AHI of 7.3. Assessment:       ICD-10-CM    1. Obstructive sleep apnea  G47.33    2. Central sleep apnea  G47.31    3. Chronic bilateral low back pain with bilateral sciatica  M54.42     M54.41     G89.29    4. Memory loss  R41.3    5. Insomnia due to medical condition  G47.01      I discussed the diagnosis of obstructive sleep apnea with Jessica Avila including the pathophysiology (namely the mechanism of breathing and obstruction of upper airway, interruptions of sleep, hypoxemia, hypercapnia, and results of repetitive sympathetic activation), risks, evaluation, and treatment options. I discussed the risks of driving when drowsy and advised that Jessica Avila not drive when drowsy and avoid sedating medications and respiratory suppressants. Plan:   1. Increase the Trazodone to 200 mg at bedtime  2. Take tizanidine 4 mg, 1 to 2 at bedtime  3. Continue using BiPAP ST during sleep  4.  If the above does not help, will consider changing the Trazodone to doxepin Alemmireya Smith)     Electronically signed by Ministerio Hobbs MD on 10/8/20

## 2020-10-23 ENCOUNTER — TRANSCRIBE ORDERS (OUTPATIENT)
Dept: ADMINISTRATIVE | Facility: HOSPITAL | Age: 65
End: 2020-10-23

## 2020-10-23 DIAGNOSIS — M54.50 LOW BACK PAIN, UNSPECIFIED BACK PAIN LATERALITY, UNSPECIFIED CHRONICITY, UNSPECIFIED WHETHER SCIATICA PRESENT: Primary | ICD-10-CM

## 2020-10-27 ENCOUNTER — HOSPITAL ENCOUNTER (OUTPATIENT)
Dept: CT IMAGING | Facility: HOSPITAL | Age: 65
Discharge: HOME | End: 2020-10-27
Admitting: ORTHOPAEDIC SURGERY

## 2020-10-27 DIAGNOSIS — M54.50 LOW BACK PAIN, UNSPECIFIED BACK PAIN LATERALITY, UNSPECIFIED CHRONICITY, UNSPECIFIED WHETHER SCIATICA PRESENT: ICD-10-CM

## 2020-10-27 PROCEDURE — 72131 CT LUMBAR SPINE W/O DYE: CPT

## 2020-10-30 ENCOUNTER — APPOINTMENT (OUTPATIENT)
Dept: GENERAL RADIOLOGY | Facility: HOSPITAL | Age: 65
End: 2020-10-30

## 2020-10-30 ENCOUNTER — NURSE TRIAGE (OUTPATIENT)
Dept: CALL CENTER | Facility: HOSPITAL | Age: 65
End: 2020-10-30

## 2020-10-30 ENCOUNTER — HOSPITAL ENCOUNTER (OUTPATIENT)
Facility: HOSPITAL | Age: 65
Setting detail: OBSERVATION
LOS: 1 days | Discharge: HOME | End: 2020-10-31
Attending: FAMILY MEDICINE | Admitting: FAMILY MEDICINE

## 2020-10-30 DIAGNOSIS — T85.192D MALFUNCTION OF SPINAL CORD STIMULATOR, SUBSEQUENT ENCOUNTER: ICD-10-CM

## 2020-10-30 DIAGNOSIS — E86.0 DEHYDRATION: ICD-10-CM

## 2020-10-30 DIAGNOSIS — M48.062 SPINAL STENOSIS, LUMBAR REGION, WITH NEUROGENIC CLAUDICATION: ICD-10-CM

## 2020-10-30 DIAGNOSIS — I15.9 SECONDARY HYPERTENSION: ICD-10-CM

## 2020-10-30 DIAGNOSIS — R07.89 OTHER CHEST PAIN: ICD-10-CM

## 2020-10-30 DIAGNOSIS — R94.39 ABNORMAL STRESS TEST: ICD-10-CM

## 2020-10-30 DIAGNOSIS — M54.2 CERVICALGIA: ICD-10-CM

## 2020-10-30 DIAGNOSIS — U07.1 COVID-19 VIRUS INFECTION: Primary | ICD-10-CM

## 2020-10-30 DIAGNOSIS — M54.12 CERVICAL RADICULOPATHY: ICD-10-CM

## 2020-10-30 DIAGNOSIS — Z45.42 BATTERY END OF LIFE OF SPINAL CORD STIMULATOR: ICD-10-CM

## 2020-10-30 DIAGNOSIS — G56.01 CARPAL TUNNEL SYNDROME OF RIGHT WRIST: ICD-10-CM

## 2020-10-30 DIAGNOSIS — U07.1 COVID-19: ICD-10-CM

## 2020-10-30 DIAGNOSIS — Z78.9 NON-SMOKER: ICD-10-CM

## 2020-10-30 LAB — SARS-COV-2 RDRP RESP QL NAA+PROBE: DETECTED

## 2020-10-30 PROCEDURE — C9803 HOPD COVID-19 SPEC COLLECT: HCPCS

## 2020-10-30 PROCEDURE — 71045 X-RAY EXAM CHEST 1 VIEW: CPT

## 2020-10-30 PROCEDURE — 99284 EMERGENCY DEPT VISIT MOD MDM: CPT

## 2020-10-30 PROCEDURE — 87635 SARS-COV-2 COVID-19 AMP PRB: CPT

## 2020-10-30 RX ORDER — SODIUM CHLORIDE 0.9 % (FLUSH) 0.9 %
10 SYRINGE (ML) INJECTION AS NEEDED
Status: DISCONTINUED | OUTPATIENT
Start: 2020-10-30 | End: 2020-10-31 | Stop reason: HOSPADM

## 2020-10-31 ENCOUNTER — APPOINTMENT (OUTPATIENT)
Dept: GENERAL RADIOLOGY | Facility: HOSPITAL | Age: 65
End: 2020-10-31

## 2020-10-31 ENCOUNTER — READMISSION MANAGEMENT (OUTPATIENT)
Dept: CALL CENTER | Facility: HOSPITAL | Age: 65
End: 2020-10-31

## 2020-10-31 VITALS
OXYGEN SATURATION: 94 % | HEIGHT: 76 IN | BODY MASS INDEX: 38.36 KG/M2 | HEART RATE: 69 BPM | DIASTOLIC BLOOD PRESSURE: 91 MMHG | RESPIRATION RATE: 12 BRPM | TEMPERATURE: 97.3 F | SYSTOLIC BLOOD PRESSURE: 146 MMHG | WEIGHT: 315 LBS

## 2020-10-31 PROBLEM — U07.1 COVID-19: Status: ACTIVE | Noted: 2020-10-31

## 2020-10-31 PROBLEM — U07.1 COVID-19 VIRUS INFECTION: Status: ACTIVE | Noted: 2020-10-31

## 2020-10-31 LAB
ABO GROUP BLD: NORMAL
ALBUMIN SERPL-MCNC: 4.4 G/DL (ref 3.5–5.2)
ALBUMIN SERPL-MCNC: 4.5 G/DL (ref 3.5–5.2)
ALBUMIN/GLOB SERPL: 1.9 G/DL
ALBUMIN/GLOB SERPL: 2 G/DL
ALP SERPL-CCNC: 74 U/L (ref 39–117)
ALP SERPL-CCNC: 75 U/L (ref 39–117)
ALT SERPL W P-5'-P-CCNC: 31 U/L (ref 1–41)
ALT SERPL W P-5'-P-CCNC: 33 U/L (ref 1–41)
ANION GAP SERPL CALCULATED.3IONS-SCNC: 10 MMOL/L (ref 5–15)
ANION GAP SERPL CALCULATED.3IONS-SCNC: 9 MMOL/L (ref 5–15)
ARTERIAL PATENCY WRIST A: POSITIVE
AST SERPL-CCNC: 36 U/L (ref 1–40)
AST SERPL-CCNC: 38 U/L (ref 1–40)
ATMOSPHERIC PRESS: 759 MMHG
BASE EXCESS BLDA CALC-SCNC: 1.9 MMOL/L (ref 0–2)
BASOPHILS # BLD AUTO: 0.01 10*3/MM3 (ref 0–0.2)
BASOPHILS NFR BLD AUTO: 0.3 % (ref 0–1.5)
BDY SITE: ABNORMAL
BILIRUB SERPL-MCNC: 0.3 MG/DL (ref 0–1.2)
BILIRUB SERPL-MCNC: 0.4 MG/DL (ref 0–1.2)
BLD GP AB SCN SERPL QL: NEGATIVE
BODY TEMPERATURE: 37 C
BUN SERPL-MCNC: 13 MG/DL (ref 8–23)
BUN SERPL-MCNC: 15 MG/DL (ref 8–23)
BUN/CREAT SERPL: 12.5 (ref 7–25)
BUN/CREAT SERPL: 15.5 (ref 7–25)
CALCIUM SPEC-SCNC: 9.4 MG/DL (ref 8.6–10.5)
CALCIUM SPEC-SCNC: 9.4 MG/DL (ref 8.6–10.5)
CHLORIDE SERPL-SCNC: 101 MMOL/L (ref 98–107)
CHLORIDE SERPL-SCNC: 102 MMOL/L (ref 98–107)
CK SERPL-CCNC: 229 U/L (ref 20–200)
CO2 SERPL-SCNC: 27 MMOL/L (ref 22–29)
CO2 SERPL-SCNC: 28 MMOL/L (ref 22–29)
CREAT SERPL-MCNC: 0.97 MG/DL (ref 0.76–1.27)
CREAT SERPL-MCNC: 1.04 MG/DL (ref 0.76–1.27)
CRP SERPL-MCNC: 1.1 MG/DL (ref 0–0.5)
D DIMER PPP FEU-MCNC: 0.31 MG/L (FEU) (ref 0–0.5)
D-LACTATE SERPL-SCNC: 1 MMOL/L (ref 0.5–2)
DEPRECATED RDW RBC AUTO: 43.8 FL (ref 37–54)
DEPRECATED RDW RBC AUTO: 44.7 FL (ref 37–54)
EOSINOPHIL # BLD AUTO: 0.05 10*3/MM3 (ref 0–0.4)
EOSINOPHIL # BLD MANUAL: 0.05 10*3/MM3 (ref 0–0.4)
EOSINOPHIL NFR BLD AUTO: 1.5 % (ref 0.3–6.2)
EOSINOPHIL NFR BLD MANUAL: 2 % (ref 0.3–6.2)
ERYTHROCYTE [DISTWIDTH] IN BLOOD BY AUTOMATED COUNT: 13.7 % (ref 12.3–15.4)
ERYTHROCYTE [DISTWIDTH] IN BLOOD BY AUTOMATED COUNT: 13.8 % (ref 12.3–15.4)
FERRITIN SERPL-MCNC: 79.3 NG/ML (ref 30–400)
GFR SERPL CREATININE-BSD FRML MDRD: 72 ML/MIN/1.73
GFR SERPL CREATININE-BSD FRML MDRD: 78 ML/MIN/1.73
GLOBULIN UR ELPH-MCNC: 2.2 GM/DL
GLOBULIN UR ELPH-MCNC: 2.4 GM/DL
GLUCOSE SERPL-MCNC: 111 MG/DL (ref 65–99)
GLUCOSE SERPL-MCNC: 91 MG/DL (ref 65–99)
HCO3 BLDA-SCNC: 27 MMOL/L (ref 20–26)
HCT VFR BLD AUTO: 46.6 % (ref 37.5–51)
HCT VFR BLD AUTO: 49.4 % (ref 37.5–51)
HGB BLD-MCNC: 16.5 G/DL (ref 13–17.7)
HGB BLD-MCNC: 17 G/DL (ref 13–17.7)
HOLD SPECIMEN: NORMAL
HOLD SPECIMEN: NORMAL
IMM GRANULOCYTES # BLD AUTO: 0.02 10*3/MM3 (ref 0–0.05)
IMM GRANULOCYTES NFR BLD AUTO: 0.6 % (ref 0–0.5)
INR PPP: 1.05 (ref 0.91–1.09)
LDH SERPL-CCNC: 108 U/L (ref 135–225)
LYMPHOCYTES # BLD AUTO: 0.82 10*3/MM3 (ref 0.7–3.1)
LYMPHOCYTES # BLD MANUAL: 0.7 10*3/MM3 (ref 0.7–3.1)
LYMPHOCYTES NFR BLD AUTO: 24 % (ref 19.6–45.3)
LYMPHOCYTES NFR BLD MANUAL: 19 % (ref 5–12)
LYMPHOCYTES NFR BLD MANUAL: 27 % (ref 19.6–45.3)
Lab: ABNORMAL
MAGNESIUM SERPL-MCNC: 1.9 MG/DL (ref 1.6–2.4)
MCH RBC QN AUTO: 30.2 PG (ref 26.6–33)
MCH RBC QN AUTO: 31.2 PG (ref 26.6–33)
MCHC RBC AUTO-ENTMCNC: 34.4 G/DL (ref 31.5–35.7)
MCHC RBC AUTO-ENTMCNC: 35.4 G/DL (ref 31.5–35.7)
MCV RBC AUTO: 87.9 FL (ref 79–97)
MCV RBC AUTO: 88.1 FL (ref 79–97)
MODALITY: ABNORMAL
MONOCYTES # BLD AUTO: 0.49 10*3/MM3 (ref 0.1–0.9)
MONOCYTES # BLD AUTO: 0.56 10*3/MM3 (ref 0.1–0.9)
MONOCYTES NFR BLD AUTO: 16.4 % (ref 5–12)
NEUTROPHILS # BLD AUTO: 1.3 10*3/MM3 (ref 1.7–7)
NEUTROPHILS NFR BLD AUTO: 1.96 10*3/MM3 (ref 1.7–7)
NEUTROPHILS NFR BLD AUTO: 57.2 % (ref 42.7–76)
NEUTROPHILS NFR BLD MANUAL: 50 % (ref 42.7–76)
NRBC BLD AUTO-RTO: 0 /100 WBC (ref 0–0.2)
PCO2 BLDA: 42.6 MM HG (ref 35–45)
PCO2 TEMP ADJ BLD: 42.6 MM HG (ref 35–45)
PH BLDA: 7.41 PH UNITS (ref 7.35–7.45)
PH, TEMP CORRECTED: 7.41 PH UNITS (ref 7.35–7.45)
PHOSPHATE SERPL-MCNC: 2.8 MG/DL (ref 2.5–4.5)
PLATELET # BLD AUTO: 125 10*3/MM3 (ref 140–450)
PLATELET # BLD AUTO: 127 10*3/MM3 (ref 140–450)
PMV BLD AUTO: 9.3 FL (ref 6–12)
PMV BLD AUTO: 9.3 FL (ref 6–12)
PO2 BLDA: 79.5 MM HG (ref 83–108)
PO2 TEMP ADJ BLD: 79.5 MM HG (ref 83–108)
POTASSIUM SERPL-SCNC: 3.7 MMOL/L (ref 3.5–5.2)
POTASSIUM SERPL-SCNC: 3.8 MMOL/L (ref 3.5–5.2)
PROCALCITONIN SERPL-MCNC: 0.04 NG/ML (ref 0–0.25)
PROT SERPL-MCNC: 6.6 G/DL (ref 6–8.5)
PROT SERPL-MCNC: 6.9 G/DL (ref 6–8.5)
PROTHROMBIN TIME: 13.3 SECONDS (ref 11.9–14.6)
RBC # BLD AUTO: 5.29 10*6/MM3 (ref 4.14–5.8)
RBC # BLD AUTO: 5.62 10*6/MM3 (ref 4.14–5.8)
RBC MORPH BLD: NORMAL
RH BLD: NEGATIVE
S PNEUM AG SPEC QL LA: NEGATIVE
SAO2 % BLDCOA: 95.3 % (ref 94–99)
SMALL PLATELETS BLD QL SMEAR: ABNORMAL
SODIUM SERPL-SCNC: 138 MMOL/L (ref 136–145)
SODIUM SERPL-SCNC: 139 MMOL/L (ref 136–145)
T&S EXPIRATION DATE: NORMAL
TROPONIN T SERPL-MCNC: <0.01 NG/ML (ref 0–0.03)
VARIANT LYMPHS NFR BLD MANUAL: 2 % (ref 0–5)
VENTILATOR MODE: ABNORMAL
WBC # BLD AUTO: 2.59 10*3/MM3 (ref 3.4–10.8)
WBC # BLD AUTO: 3.42 10*3/MM3 (ref 3.4–10.8)
WBC MORPH BLD: NORMAL
WHOLE BLOOD HOLD SPECIMEN: NORMAL
WHOLE BLOOD HOLD SPECIMEN: NORMAL

## 2020-10-31 PROCEDURE — 86900 BLOOD TYPING SEROLOGIC ABO: CPT | Performed by: FAMILY MEDICINE

## 2020-10-31 PROCEDURE — 96372 THER/PROPH/DIAG INJ SC/IM: CPT

## 2020-10-31 PROCEDURE — 85007 BL SMEAR W/DIFF WBC COUNT: CPT | Performed by: FAMILY MEDICINE

## 2020-10-31 PROCEDURE — 85025 COMPLETE CBC W/AUTO DIFF WBC: CPT | Performed by: FAMILY MEDICINE

## 2020-10-31 PROCEDURE — 25010000002 DEXAMETHASONE PER 1 MG: Performed by: INTERNAL MEDICINE

## 2020-10-31 PROCEDURE — 25010000002 ENOXAPARIN PER 10 MG: Performed by: INTERNAL MEDICINE

## 2020-10-31 PROCEDURE — 82728 ASSAY OF FERRITIN: CPT | Performed by: FAMILY MEDICINE

## 2020-10-31 PROCEDURE — 85025 COMPLETE CBC W/AUTO DIFF WBC: CPT | Performed by: INTERNAL MEDICINE

## 2020-10-31 PROCEDURE — 84145 PROCALCITONIN (PCT): CPT | Performed by: FAMILY MEDICINE

## 2020-10-31 PROCEDURE — 80053 COMPREHEN METABOLIC PANEL: CPT | Performed by: INTERNAL MEDICINE

## 2020-10-31 PROCEDURE — 85610 PROTHROMBIN TIME: CPT | Performed by: INTERNAL MEDICINE

## 2020-10-31 PROCEDURE — 85379 FIBRIN DEGRADATION QUANT: CPT | Performed by: FAMILY MEDICINE

## 2020-10-31 PROCEDURE — 86901 BLOOD TYPING SEROLOGIC RH(D): CPT | Performed by: FAMILY MEDICINE

## 2020-10-31 PROCEDURE — 84484 ASSAY OF TROPONIN QUANT: CPT | Performed by: INTERNAL MEDICINE

## 2020-10-31 PROCEDURE — 83605 ASSAY OF LACTIC ACID: CPT | Performed by: FAMILY MEDICINE

## 2020-10-31 PROCEDURE — 87899 AGENT NOS ASSAY W/OPTIC: CPT | Performed by: FAMILY MEDICINE

## 2020-10-31 PROCEDURE — 36600 WITHDRAWAL OF ARTERIAL BLOOD: CPT

## 2020-10-31 PROCEDURE — G0378 HOSPITAL OBSERVATION PER HR: HCPCS

## 2020-10-31 PROCEDURE — 80053 COMPREHEN METABOLIC PANEL: CPT | Performed by: FAMILY MEDICINE

## 2020-10-31 PROCEDURE — 83615 LACTATE (LD) (LDH) ENZYME: CPT | Performed by: FAMILY MEDICINE

## 2020-10-31 PROCEDURE — 96374 THER/PROPH/DIAG INJ IV PUSH: CPT

## 2020-10-31 PROCEDURE — 86850 RBC ANTIBODY SCREEN: CPT | Performed by: FAMILY MEDICINE

## 2020-10-31 PROCEDURE — 86140 C-REACTIVE PROTEIN: CPT | Performed by: FAMILY MEDICINE

## 2020-10-31 PROCEDURE — 82803 BLOOD GASES ANY COMBINATION: CPT

## 2020-10-31 PROCEDURE — 83735 ASSAY OF MAGNESIUM: CPT | Performed by: INTERNAL MEDICINE

## 2020-10-31 PROCEDURE — 82550 ASSAY OF CK (CPK): CPT | Performed by: INTERNAL MEDICINE

## 2020-10-31 PROCEDURE — 84100 ASSAY OF PHOSPHORUS: CPT | Performed by: INTERNAL MEDICINE

## 2020-10-31 RX ORDER — SODIUM CHLORIDE 0.9 % (FLUSH) 0.9 %
10 SYRINGE (ML) INJECTION EVERY 12 HOURS SCHEDULED
Status: DISCONTINUED | OUTPATIENT
Start: 2020-10-31 | End: 2020-10-31 | Stop reason: HOSPADM

## 2020-10-31 RX ORDER — DULOXETIN HYDROCHLORIDE 30 MG/1
60 CAPSULE, DELAYED RELEASE ORAL DAILY
Status: DISCONTINUED | OUTPATIENT
Start: 2020-10-31 | End: 2020-10-31 | Stop reason: HOSPADM

## 2020-10-31 RX ORDER — SODIUM CHLORIDE 0.9 % (FLUSH) 0.9 %
10 SYRINGE (ML) INJECTION AS NEEDED
Status: DISCONTINUED | OUTPATIENT
Start: 2020-10-31 | End: 2020-10-31 | Stop reason: HOSPADM

## 2020-10-31 RX ORDER — IPRATROPIUM BROMIDE AND ALBUTEROL SULFATE 2.5; .5 MG/3ML; MG/3ML
3 SOLUTION RESPIRATORY (INHALATION) 4 TIMES DAILY PRN
Qty: 360 ML | Refills: 5 | Status: SHIPPED | OUTPATIENT
Start: 2020-10-31

## 2020-10-31 RX ORDER — TAMSULOSIN HYDROCHLORIDE 0.4 MG/1
0.4 CAPSULE ORAL NIGHTLY
Status: DISCONTINUED | OUTPATIENT
Start: 2020-10-31 | End: 2020-10-31 | Stop reason: HOSPADM

## 2020-10-31 RX ORDER — VALACYCLOVIR HYDROCHLORIDE 500 MG/1
500 TABLET, FILM COATED ORAL EVERY 12 HOURS SCHEDULED
Status: DISCONTINUED | OUTPATIENT
Start: 2020-10-31 | End: 2020-10-31 | Stop reason: HOSPADM

## 2020-10-31 RX ORDER — ATORVASTATIN CALCIUM 20 MG/1
20 TABLET, FILM COATED ORAL NIGHTLY
Qty: 30 TABLET | Refills: 0 | Status: SHIPPED | OUTPATIENT
Start: 2020-10-31 | End: 2020-10-31

## 2020-10-31 RX ORDER — ASCORBIC ACID 500 MG
500 TABLET ORAL DAILY
Qty: 30 TABLET | Refills: 0 | Status: SHIPPED | OUTPATIENT
Start: 2020-10-31 | End: 2021-11-29

## 2020-10-31 RX ORDER — MELATONIN
2000 DAILY
Qty: 30 TABLET | Refills: 0 | Status: SHIPPED | OUTPATIENT
Start: 2020-10-31

## 2020-10-31 RX ORDER — ZINC SULFATE 50(220)MG
220 CAPSULE ORAL DAILY
Status: DISCONTINUED | OUTPATIENT
Start: 2020-10-31 | End: 2020-10-31 | Stop reason: HOSPADM

## 2020-10-31 RX ORDER — ONDANSETRON 2 MG/ML
4 INJECTION INTRAMUSCULAR; INTRAVENOUS EVERY 6 HOURS PRN
Status: DISCONTINUED | OUTPATIENT
Start: 2020-10-31 | End: 2020-10-31 | Stop reason: HOSPADM

## 2020-10-31 RX ORDER — HYDROCHLOROTHIAZIDE 12.5 MG/1
25 CAPSULE, GELATIN COATED ORAL EVERY MORNING
COMMUNITY
End: 2020-10-31 | Stop reason: HOSPADM

## 2020-10-31 RX ORDER — TRAZODONE HYDROCHLORIDE 50 MG/1
50 TABLET ORAL NIGHTLY
Status: DISCONTINUED | OUTPATIENT
Start: 2020-10-31 | End: 2020-10-31 | Stop reason: HOSPADM

## 2020-10-31 RX ORDER — BIOTIN 10000 MCG
1 CAPSULE ORAL DAILY
COMMUNITY

## 2020-10-31 RX ORDER — ATORVASTATIN CALCIUM 20 MG/1
20 TABLET, FILM COATED ORAL NIGHTLY
Qty: 30 TABLET | Refills: 0 | Status: SHIPPED | OUTPATIENT
Start: 2020-10-31 | End: 2021-11-29

## 2020-10-31 RX ORDER — UBIDECARENONE 75 MG
50 CAPSULE ORAL DAILY
COMMUNITY

## 2020-10-31 RX ORDER — DILTIAZEM HYDROCHLORIDE 240 MG/1
240 CAPSULE, COATED, EXTENDED RELEASE ORAL DAILY
Status: DISCONTINUED | OUTPATIENT
Start: 2020-10-31 | End: 2020-10-31

## 2020-10-31 RX ORDER — CLONIDINE HYDROCHLORIDE 0.1 MG/1
0.1 TABLET ORAL EVERY 6 HOURS PRN
COMMUNITY

## 2020-10-31 RX ORDER — AZELASTINE 1 MG/ML
1 SPRAY, METERED NASAL DAILY
Status: DISCONTINUED | OUTPATIENT
Start: 2020-10-31 | End: 2020-10-31 | Stop reason: HOSPADM

## 2020-10-31 RX ORDER — CIMETIDINE 400 MG/1
400 TABLET, FILM COATED ORAL NIGHTLY
COMMUNITY
End: 2021-12-14

## 2020-10-31 RX ORDER — OXYCODONE HCL 20 MG/1
20 TABLET, FILM COATED, EXTENDED RELEASE ORAL EVERY 12 HOURS SCHEDULED
Status: DISCONTINUED | OUTPATIENT
Start: 2020-10-31 | End: 2020-10-31 | Stop reason: HOSPADM

## 2020-10-31 RX ORDER — MELATONIN
2000 DAILY
Qty: 30 TABLET | Refills: 0 | Status: SHIPPED | OUTPATIENT
Start: 2020-10-31 | End: 2020-10-31

## 2020-10-31 RX ORDER — PANTOPRAZOLE SODIUM 40 MG/1
40 TABLET, DELAYED RELEASE ORAL
Status: DISCONTINUED | OUTPATIENT
Start: 2020-10-31 | End: 2020-10-31 | Stop reason: HOSPADM

## 2020-10-31 RX ORDER — DOXEPIN HYDROCHLORIDE 25 MG/1
25 CAPSULE ORAL NIGHTLY
COMMUNITY
End: 2021-11-29

## 2020-10-31 RX ORDER — ACETAMINOPHEN 325 MG/1
650 TABLET ORAL EVERY 4 HOURS PRN
Status: DISCONTINUED | OUTPATIENT
Start: 2020-10-31 | End: 2020-10-31 | Stop reason: HOSPADM

## 2020-10-31 RX ORDER — MECLIZINE HYDROCHLORIDE 25 MG/1
25 TABLET ORAL 4 TIMES DAILY PRN
Status: DISCONTINUED | OUTPATIENT
Start: 2020-10-31 | End: 2020-10-31 | Stop reason: HOSPADM

## 2020-10-31 RX ORDER — OXYCODONE HYDROCHLORIDE AND ACETAMINOPHEN 5; 325 MG/1; MG/1
1 TABLET ORAL EVERY 6 HOURS PRN
Status: DISCONTINUED | OUTPATIENT
Start: 2020-10-31 | End: 2020-10-31 | Stop reason: HOSPADM

## 2020-10-31 RX ORDER — ASCORBIC ACID 500 MG
500 TABLET ORAL DAILY
Qty: 30 TABLET | Refills: 0 | Status: SHIPPED | OUTPATIENT
Start: 2020-10-31 | End: 2020-10-31

## 2020-10-31 RX ORDER — DOXEPIN HYDROCHLORIDE 25 MG/1
25 CAPSULE ORAL NIGHTLY
Status: DISCONTINUED | OUTPATIENT
Start: 2020-10-31 | End: 2020-10-31 | Stop reason: HOSPADM

## 2020-10-31 RX ORDER — DILTIAZEM HYDROCHLORIDE 240 MG/1
240 CAPSULE, COATED, EXTENDED RELEASE ORAL NIGHTLY
Status: DISCONTINUED | OUTPATIENT
Start: 2020-10-31 | End: 2020-10-31 | Stop reason: HOSPADM

## 2020-10-31 RX ORDER — GABAPENTIN 300 MG/1
600 CAPSULE ORAL 4 TIMES DAILY
Status: DISCONTINUED | OUTPATIENT
Start: 2020-10-31 | End: 2020-10-31 | Stop reason: HOSPADM

## 2020-10-31 RX ORDER — IPRATROPIUM BROMIDE AND ALBUTEROL SULFATE 2.5; .5 MG/3ML; MG/3ML
3 SOLUTION RESPIRATORY (INHALATION) 4 TIMES DAILY PRN
Qty: 360 ML | Refills: 5 | Status: SHIPPED | OUTPATIENT
Start: 2020-10-31 | End: 2020-10-31 | Stop reason: SDUPTHER

## 2020-10-31 RX ORDER — ZINC SULFATE 50(220)MG
220 CAPSULE ORAL DAILY
Qty: 30 CAPSULE | Refills: 0 | Status: SHIPPED | OUTPATIENT
Start: 2020-11-01 | End: 2021-12-14

## 2020-10-31 RX ORDER — AMLODIPINE BESYLATE 10 MG/1
10 TABLET ORAL DAILY
COMMUNITY
End: 2020-10-31 | Stop reason: HOSPADM

## 2020-10-31 RX ORDER — ALPRAZOLAM 0.5 MG/1
1 TABLET ORAL 2 TIMES DAILY PRN
Status: DISCONTINUED | OUTPATIENT
Start: 2020-10-31 | End: 2020-10-31 | Stop reason: HOSPADM

## 2020-10-31 RX ORDER — CELECOXIB 200 MG/1
200 CAPSULE ORAL DAILY
Status: DISCONTINUED | OUTPATIENT
Start: 2020-10-31 | End: 2020-10-31 | Stop reason: HOSPADM

## 2020-10-31 RX ORDER — ASPIRIN 81 MG/1
81 TABLET ORAL DAILY
Status: DISCONTINUED | OUTPATIENT
Start: 2020-10-31 | End: 2020-10-31

## 2020-10-31 RX ORDER — ALBUTEROL SULFATE 90 UG/1
2 AEROSOL, METERED RESPIRATORY (INHALATION) EVERY 4 HOURS PRN
Qty: 2 PUFF | Refills: 12 | Status: SHIPPED | OUTPATIENT
Start: 2020-10-31 | End: 2020-10-31 | Stop reason: SDUPTHER

## 2020-10-31 RX ORDER — TIZANIDINE 4 MG/1
4 TABLET ORAL EVERY 8 HOURS PRN
Status: DISCONTINUED | OUTPATIENT
Start: 2020-10-31 | End: 2020-10-31 | Stop reason: SDUPTHER

## 2020-10-31 RX ORDER — ATORVASTATIN CALCIUM 10 MG/1
20 TABLET, FILM COATED ORAL NIGHTLY
Status: DISCONTINUED | OUTPATIENT
Start: 2020-10-31 | End: 2020-10-31 | Stop reason: HOSPADM

## 2020-10-31 RX ORDER — PROCHLORPERAZINE MALEATE 10 MG
10 TABLET ORAL EVERY 6 HOURS
Status: DISCONTINUED | OUTPATIENT
Start: 2020-10-31 | End: 2020-10-31

## 2020-10-31 RX ORDER — DEXAMETHASONE SODIUM PHOSPHATE 4 MG/ML
6 INJECTION, SOLUTION INTRA-ARTICULAR; INTRALESIONAL; INTRAMUSCULAR; INTRAVENOUS; SOFT TISSUE DAILY
Status: DISCONTINUED | OUTPATIENT
Start: 2020-10-31 | End: 2020-10-31 | Stop reason: HOSPADM

## 2020-10-31 RX ORDER — CYCLOBENZAPRINE HCL 5 MG
5 TABLET ORAL 3 TIMES DAILY PRN
Status: DISCONTINUED | OUTPATIENT
Start: 2020-10-31 | End: 2020-10-31 | Stop reason: HOSPADM

## 2020-10-31 RX ORDER — OXYCODONE HYDROCHLORIDE AND ACETAMINOPHEN 5; 325 MG/1; MG/1
1 TABLET ORAL EVERY 6 HOURS PRN
COMMUNITY

## 2020-10-31 RX ORDER — ASCORBIC ACID 500 MG
500 TABLET ORAL 2 TIMES DAILY
Status: DISCONTINUED | OUTPATIENT
Start: 2020-10-31 | End: 2020-10-31 | Stop reason: HOSPADM

## 2020-10-31 RX ORDER — ZINC SULFATE 50(220)MG
220 CAPSULE ORAL DAILY
Qty: 30 CAPSULE | Refills: 0 | Status: SHIPPED | OUTPATIENT
Start: 2020-11-01 | End: 2020-10-31

## 2020-10-31 RX ORDER — ALBUTEROL SULFATE 90 UG/1
2 AEROSOL, METERED RESPIRATORY (INHALATION) EVERY 4 HOURS PRN
Qty: 2 PUFF | Refills: 12 | Status: SHIPPED | OUTPATIENT
Start: 2020-10-31 | End: 2021-12-14

## 2020-10-31 RX ORDER — MELATONIN
2000 DAILY
Status: DISCONTINUED | OUTPATIENT
Start: 2020-10-31 | End: 2020-10-31 | Stop reason: HOSPADM

## 2020-10-31 RX ADMIN — LOSARTAN POTASSIUM: 50 TABLET, FILM COATED ORAL at 09:37

## 2020-10-31 RX ADMIN — VALACYCLOVIR 500 MG: 500 TABLET, FILM COATED ORAL at 09:53

## 2020-10-31 RX ADMIN — ATORVASTATIN CALCIUM 20 MG: 10 TABLET, FILM COATED ORAL at 02:55

## 2020-10-31 RX ADMIN — GABAPENTIN 600 MG: 300 CAPSULE ORAL at 13:03

## 2020-10-31 RX ADMIN — SODIUM CHLORIDE, PRESERVATIVE FREE 10 ML: 5 INJECTION INTRAVENOUS at 09:53

## 2020-10-31 RX ADMIN — OXYCODONE HYDROCHLORIDE 20 MG: 20 TABLET, FILM COATED, EXTENDED RELEASE ORAL at 09:37

## 2020-10-31 RX ADMIN — GABAPENTIN 600 MG: 300 CAPSULE ORAL at 09:37

## 2020-10-31 RX ADMIN — AZELASTINE HYDROCHLORIDE 1 SPRAY: 137 SPRAY, METERED NASAL at 09:36

## 2020-10-31 RX ADMIN — DULOXETINE HYDROCHLORIDE 60 MG: 30 CAPSULE, DELAYED RELEASE ORAL at 09:37

## 2020-10-31 RX ADMIN — PANTOPRAZOLE SODIUM 40 MG: 40 TABLET, DELAYED RELEASE ORAL at 05:49

## 2020-10-31 RX ADMIN — DEXAMETHASONE SODIUM PHOSPHATE 6 MG: 4 INJECTION, SOLUTION INTRA-ARTICULAR; INTRALESIONAL; INTRAMUSCULAR; INTRAVENOUS; SOFT TISSUE at 09:36

## 2020-10-31 RX ADMIN — ZINC SULFATE 220 MG (50 MG) CAPSULE 220 MG: CAPSULE at 09:37

## 2020-10-31 RX ADMIN — ENOXAPARIN SODIUM 40 MG: 40 INJECTION SUBCUTANEOUS at 09:37

## 2020-10-31 NOTE — OUTREACH NOTE
Prep Survey      Responses   [facility] facility patient discharged from?  [facility]   Is LACE score < 7 ?  Yes   Eligibility  Readm Mgmt   Discharge diagnosis  Covid Virus Infection   Does the patient have one of the following disease processes/diagnoses(primary or secondary)?  COVID-19   Does the patient have Home health ordered?  No   Is there a DME ordered?  No   Prep survey completed?  Yes          Loida Greer RN

## 2020-11-01 ENCOUNTER — READMISSION MANAGEMENT (OUTPATIENT)
Dept: CALL CENTER | Facility: HOSPITAL | Age: 65
End: 2020-11-01

## 2020-11-01 NOTE — OUTREACH NOTE
COVID-19 Week 1 Survey      Responses   [facility] patient discharged from?  [place]   Does the patient have one of the following disease processes/diagnoses(primary or secondary)?  COVID-19   COVID-19 underlying condition?  COPD   Call Number  Call 1   Week 1 Call successful?  Yes   Call start time  [phone]   Call end time  [phone]   Is patient permission given to speak with other caregiver?  Yes   List who call center can speak with  wife-[place]in[place]   Meds reviewed with patient/caregiver?  Yes   Is the patient having any side effects they believe may be caused by any medication additions or changes?  No   Does the patient have all medications ordered at discharge?  Yes   Is the patient taking all medications as directed (includes completed medication regime)?  Yes   Does the patient have a primary care provider?   Yes   Comments regarding PCP  DR Elliott   Does the patient have an appointment with their PCP or specialist within 7 days of discharge?  No   What is preventing the patient from scheduling follow up appointments within 7 days of discharge?  Haven't had time   Nursing Interventions  Advised patient to make appointment, Educated patient on importance of making appointment   Has the patient kept scheduled appointments due by today?  N/A   Has home health visited the patient within 72 hours of discharge?  N/A   Psychosocial issues?  No   Psychosocial comments  States wife is showing covid s/s today.   Did the patient receive a copy of their discharge instructions?  Yes   Did the patient receive a copy of COVID-19 specific instructions?  Yes   Nursing interventions  Reviewed instructions with patient   What is the patient's perception of their health status since discharge?  Improving   Does the patient have any of the following symptoms?  Fever/chills, Cough, Shortness of breath, Loss of taste/smell   Nursing Interventions  Nurse provided patient education   Pulse Ox monitoring  Intermittent   Pulse Ox device  source  Patient   O2 Sat comments  Reports O2 sat 94% room air.   O2 Sat: education provided  Sat levels, Monitoring frequency, When to seek care   O2 Sat education comments  States was advised to seek care if O2 sats remain below 90%.   Is the patient/caregiver able to teach back steps to recovery at home?  Set small, achievable goals for return to baseline health, Eat a well-balance diet, Rest and rebuild strength, gradually increase activity   If the patient is a current smoker, are they able to teach back resources for cessation?  Not a smoker   Is the patient/caregiver able to teach back the hierarchy of who to call/visit for symptoms/problems? PCP, Specialist, Home health nurse, Urgent Care, ED, 911  Yes   Is the patient able to teach back COPD zones?  Yes   Nursing interventions  Education provided on various zones   Patient reports what zone on this call?  Yellow Zone   Yellow Zone  Using quick relief inhaler/nebulizer more often, Poor sleep and symptoms work patient up, Unable to complete daily activities   Yellow interventions  Continue to use daily medications, Use quick relief inhaler as ordered, Call provider immediatly if symptoms do not improve, Get plenty of rest, Use other meds such as steroids or antibiotics as ordered   COVID-19 call completed?  Yes   Graduated/Revoked comments  Reviewed quarantine, sanitize household surfaces, check temp bid, replace any items in contact with mucous membranes. States will call PCP if no improvement by tomorrow.   Wrap up additional comments  States is feeling better today, but continues with SOA on exertion, slight productive cough-reports chills last night. Denies any fever today or chest pain. States loss of smell with some taste intact. States using nebulizer, IS. States was very displeased with hospital stay-states lack of communication/needs were not met often. States will call Patient Relations to discuss. Denies any needs today. See Revoked Comments.           Mahsa Fernando, RN

## 2020-11-02 ENCOUNTER — READMISSION MANAGEMENT (OUTPATIENT)
Dept: CALL CENTER | Facility: HOSPITAL | Age: 65
End: 2020-11-02

## 2020-11-02 NOTE — OUTREACH NOTE
COVID-19 Week 1 Survey      Responses   [facility] patient discharged from?  [place]   Does the patient have one of the following disease processes/diagnoses(primary or secondary)?  COVID-19   COVID-19 underlying condition?  COPD   Call Number  Call 2   Week 1 Call successful?  No   Discharge diagnosis  Covid Virus Infection          Toma Morrow LPN

## 2020-11-03 ENCOUNTER — READMISSION MANAGEMENT (OUTPATIENT)
Dept: CALL CENTER | Facility: HOSPITAL | Age: 65
End: 2020-11-03

## 2020-11-03 NOTE — OUTREACH NOTE
COVID-19 Week 1 Survey      Responses   [facility] patient discharged from?  [place]   Does the patient have one of the following disease processes/diagnoses(primary or secondary)?  COVID-19   COVID-19 underlying condition?  COPD   Call Number  Call 3   Week 1 Call successful?  Yes   Call start time  1048   Call end time  1108   Discharge diagnosis  Covid Virus Infection   Is the patient taking all medications as directed (includes completed medication regime)?  Yes   Does the patient have an appointment with their PCP or specialist within 7 days of discharge?  Yes   Has the patient kept scheduled appointments due by today?  N/A   Comments  11/9   Psychosocial issues?  No   Psychosocial comments  wife is + for COVID now, he reports.    Comments  Pt has COPD so has some SOA at baseline. Using I.S. frequently. Still having HA. Pt reports he was told incubation period for Covid was 3-5 days, he was asking r/t his mother being exposed to it. I educated him that the incubation period according to CDC guidelines is 14 days. Pt questioning 10day isolation period.    What is the patient's perception of their health status since discharge?  Improving   Does the patient have any of the following symptoms?  Cough, Loss of taste/smell   Pulse Ox monitoring  Intermittent   Pulse Ox device source  Patient   O2 Sat comments  Reports O2 sat 88-95% room air.   O2 Sat: education provided  Sat levels   Is the patient/caregiver able to teach back steps to recovery at home?  Rest and rebuild strength, gradually increase activity   Is the patient/caregiver able to teach back the hierarchy of who to call/visit for symptoms/problems? PCP, Specialist, Home health nurse, Urgent Care, ED, 911  Yes   Patient reports what zone on this call?  Yellow Zone   Yellow Zone  Increased shortness of air, Unable to complete daily activities   Yellow interventions  Use quick relief inhaler as ordered, Continue to use daily medications   COVID-19  call completed?  Yes   Revoked  No further contact(revokes)-requires comment   Graduated/Revoked comments  wife RN, pt has no further needs.           Suyapa Beavers RN

## 2020-12-29 ENCOUNTER — TELEPHONE (OUTPATIENT)
Dept: NEUROLOGY | Age: 65
End: 2020-12-29

## 2020-12-29 NOTE — TELEPHONE ENCOUNTER
Patient left message stating that he would like to change from Doxepin to [facility] due to the Doxepin not keeping him asleep at night. He wakes up frequently throughout the night due to pain.

## 2021-01-07 RX ORDER — DOXEPIN HYDROCHLORIDE 10 MG/1
CAPSULE ORAL
Qty: 180 CAPSULE | Refills: 3 | Status: SHIPPED | OUTPATIENT
Start: 2021-01-07 | End: 2021-06-28

## 2021-01-07 RX ORDER — DOXEPIN HYDROCHLORIDE 10 MG/ML
SOLUTION ORAL
Status: CANCELLED | OUTPATIENT
Start: 2021-01-07

## 2021-01-07 NOTE — TELEPHONE ENCOUNTER
Spoke with patient and he voiced that he would like to either increase the Trazodone. He currently takes Trazodone 200mg nightly. OR he would like to try the Doxepin, he's heard that the name brand works better so he would like to try that if you feel switching would be in his best interest. Patient states he us not sleeping at all during the night. He is currently on vacation in [facility] and will not return until 1/29/21 so nothing could be picked up until then.      Uses [facility]

## 2021-01-07 NOTE — TELEPHONE ENCOUNTER
Corine Eden is the brand name of Doxepin. What dose is he taking? .  Is he still on Trazodone? .  I find nothing in his chart that he was changed just that it could be considered.

## 2021-01-08 NOTE — TELEPHONE ENCOUNTER
Sent Rx for Sinequan to [facility]. I don't think name brand is being made any longer aside from one called Silenor which is super low dose. Reduce the Trazodone to 100 mg q HS and take with Doxepin 10 mg at HS for a week, then just the Doxepin. Can go up to 20 mg on the Doxepin. The low dose Doxepin works better for insomnia at low dose.   At higher dose it loses specificity and does not work as well for insomnia

## 2021-01-20 ENCOUNTER — TRANSCRIBE ORDERS (OUTPATIENT)
Dept: ADMINISTRATIVE | Facility: HOSPITAL | Age: 66
End: 2021-01-20

## 2021-01-20 DIAGNOSIS — M25.511 PAIN, JOINT, SHOULDER, RIGHT: Primary | ICD-10-CM

## 2021-01-25 ENCOUNTER — HOSPITAL ENCOUNTER (OUTPATIENT)
Dept: CT IMAGING | Facility: HOSPITAL | Age: 66
Discharge: HOME | End: 2021-01-25
Admitting: FAMILY MEDICINE

## 2021-01-25 DIAGNOSIS — M25.511 PAIN, JOINT, SHOULDER, RIGHT: ICD-10-CM

## 2021-01-25 PROCEDURE — 73200 CT UPPER EXTREMITY W/O DYE: CPT

## 2021-01-29 ENCOUNTER — HOSPITAL ENCOUNTER (EMERGENCY)
Age: 66
Discharge: HOME | End: 2021-01-29
Payer: MEDICARE

## 2021-01-29 ENCOUNTER — APPOINTMENT (OUTPATIENT)
Dept: CT IMAGING | Age: 66
End: 2021-01-29
Payer: MEDICARE

## 2021-01-29 ENCOUNTER — APPOINTMENT (OUTPATIENT)
Dept: GENERAL RADIOLOGY | Age: 66
End: 2021-01-29
Payer: MEDICARE

## 2021-01-29 VITALS
HEART RATE: 78 BPM | SYSTOLIC BLOOD PRESSURE: 138 MMHG | RESPIRATION RATE: 19 BRPM | OXYGEN SATURATION: 98 % | BODY MASS INDEX: 38.36 KG/M2 | DIASTOLIC BLOOD PRESSURE: 80 MMHG | HEIGHT: 76 IN | TEMPERATURE: 98 F | WEIGHT: 315 LBS

## 2021-01-29 DIAGNOSIS — M25.511 RIGHT SHOULDER PAIN, UNSPECIFIED CHRONICITY: ICD-10-CM
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
## 2021-01-29 PROCEDURE — 6370000000 HC RX 637 (ALT 250 FOR IP)

## 2021-01-29 PROCEDURE — 6360000002 HC RX W HCPCS

## 2021-01-29 PROCEDURE — 72125 CT NECK SPINE W/O DYE: CPT

## 2021-01-29 PROCEDURE — 73060 X-RAY EXAM OF HUMERUS: CPT

## 2021-01-29 PROCEDURE — 73030 X-RAY EXAM OF SHOULDER: CPT

## 2021-01-29 PROCEDURE — 99284 EMERGENCY DEPT VISIT MOD MDM: CPT

## 2021-01-29 PROCEDURE — 71045 X-RAY EXAM CHEST 1 VIEW: CPT

## 2021-01-29 PROCEDURE — 96372 THER/PROPH/DIAG INJ SC/IM: CPT

## 2021-01-29 PROCEDURE — 73110 X-RAY EXAM OF WRIST: CPT

## 2021-01-29 PROCEDURE — 70450 CT HEAD/BRAIN W/O DYE: CPT

## 2021-01-29 RX ORDER — MELOXICAM 15 MG/1
15 TABLET ORAL DAILY
Qty: 30 TABLET | Refills: 0 | Status: SHIPPED | OUTPATIENT
Start: 2021-01-29 | End: 2021-06-28

## 2021-01-29 RX ORDER — PREDNISONE 10 MG/1
10 TABLET ORAL DAILY
Qty: 10 TABLET | Refills: 0 | Status: SHIPPED | OUTPATIENT
Start: 2021-01-29 | End: 2021-02-08

## 2021-01-29 RX ORDER — ORPHENADRINE CITRATE 30 MG/ML
60 INJECTION INTRAMUSCULAR; INTRAVENOUS ONCE
Status: COMPLETED | OUTPATIENT
Start: 2021-01-29 | End: 2021-01-29

## 2021-01-29 RX ORDER — ONDANSETRON 4 MG/1
TABLET, ORALLY DISINTEGRATING ORAL
Status: COMPLETED
Start: 2021-01-29 | End: 2021-01-29

## 2021-01-29 RX ORDER — OXYCODONE HYDROCHLORIDE AND ACETAMINOPHEN 5; 325 MG/1; MG/1
1 TABLET ORAL 4 TIMES DAILY
COMMUNITY

## 2021-01-29 RX ORDER — HYDROMORPHONE HYDROCHLORIDE 1 MG/ML
1 INJECTION, SOLUTION INTRAMUSCULAR; INTRAVENOUS; SUBCUTANEOUS ONCE
Status: COMPLETED | OUTPATIENT
Start: 2021-01-29 | End: 2021-01-29

## 2021-01-29 RX ORDER — ONDANSETRON 4 MG/1
4 TABLET, ORALLY DISINTEGRATING ORAL ONCE
Status: COMPLETED | OUTPATIENT
Start: 2021-01-29 | End: 2021-01-29

## 2021-01-29 RX ORDER — ORPHENADRINE CITRATE 30 MG/ML
INJECTION INTRAMUSCULAR; INTRAVENOUS
Status: COMPLETED
Start: 2021-01-29 | End: 2021-01-29

## 2021-01-29 RX ADMIN — ONDANSETRON 4 MG: 4 TABLET, ORALLY DISINTEGRATING ORAL at 16:04

## 2021-01-29 RX ADMIN — HYDROMORPHONE HYDROCHLORIDE 1 MG: 1 INJECTION, SOLUTION INTRAMUSCULAR; INTRAVENOUS; SUBCUTANEOUS at 16:05

## 2021-01-29 RX ADMIN — ORPHENADRINE CITRATE 60 MG: 30 INJECTION INTRAMUSCULAR; INTRAVENOUS at 16:04

## 2021-01-29 ASSESSMENT — PAIN SCALES - GENERAL: PAINLEVEL_OUTOF10: 10

## 2021-01-29 ASSESSMENT — ENCOUNTER SYMPTOMS
APNEA: 0
EYE DISCHARGE: 0
PHOTOPHOBIA: 0
COUGH: 0
SHORTNESS OF BREATH: 0
COLOR CHANGE: 0
EYE ITCHING: 0
BACK PAIN: 0

## 2021-01-29 NOTE — ED PROVIDER NOTES
[facility] EMERGENCY DEPT  eMERGENCYdEPARTMENT eNCOUnter      Pt Name: Dominique Dc  MRN: [id number]  [place] 1955  Date of evaluation: 1/29/2021  Provider:EVONNE Hanley    CHIEF COMPLAINT       Chief Complaint   Patient presents with    Fall     no loc    Shoulder Pain     right hx toren rotator cuff    Wrist Injury     right    Abrasion     hand         HISTORY OF PRESENT ILLNESS  (Location/Symptom, Timing/Onset, Context/Setting, Quality, Duration, Modifying Factors, Severity.)   Dominique Dc is a 61 y.o. 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 Damon Gram pending. Unsure if he hit head. Today mainly complaining of right shoulder pain referred to clavicle with right wrist pain. Spasm like in character rates as 10/10.     [facility] request # [id number]    Active morphine equivalent 0    HPI    Nursing Notes were reviewed and I agree. REVIEW OF SYSTEMS    (2-9 systems for level 4, 10 or more for level 5)     Review of Systems   Constitutional: Negative for activity change, appetite change, chills and fever. HENT: Negative for congestion and dental problem. Eyes: Negative for photophobia, discharge and itching. Respiratory: Negative for apnea, cough and shortness of breath. Cardiovascular: Negative for chest pain. Musculoskeletal: Positive for arthralgias. Negative for back pain, gait problem, myalgias and neck pain. Skin: Negative for color change, pallor and rash. Neurological: Negative for dizziness, seizures and syncope. Psychiatric/Behavioral: Negative for agitation. The patient is not nervous/anxious. Except as noted above the remainder of the review of systems was reviewed and negative.        PAST MEDICAL HISTORY     Past Medical History:   Diagnosis Date    Abnormal gait     Amnesia     Anxiety  Asthma     BiPAP (biphasic positive airway pressure) dependence     NiPPV 23cm/16cm/12cm    Cervical facet syndrome     Chronic back pain     Complex sleep apnea syndrome     Initial PS; AHI:  31.2 per PSG, 10/2014    Concussion     COPD (chronic obstructive pulmonary disease) (HCC)     DDD (degenerative disc disease)     Facial ringworm     PERCY (generalized anxiety disorder)     GERD (gastroesophageal reflux disease)     Headache     Herniated disc     Herpes simplex     History of Juan's esophagus     Hyperlipidemia     Hypersomnia     Hypertension     Hypotestosteronism     Lumbar stenosis     Memory loss     Obesity     Obstructive sleep apnea     Initial PS; AHI:  31.2 per split-night PSG, 10/2014    Pneumonia     Potential difficult airway on pre-intubation assessment     PTSD (post-traumatic stress disorder)     Sleep apnea     Bi-pap at night    Vertigo          SURGICAL HISTORY       Past Surgical History:   Procedure Laterality Date    APPENDECTOMY      BACK SURGERY      Three lumbar fusions L3-4, L3-4-5 & S1    CARDIAC CATHETERIZATION      COLONOSCOPY      Jessica Greer    COLONOSCOPY  16    Dr Blayne Hartmann, normal, 5 yr recall    COLONOSCOPY N/A 2019    COLONOSCOPY DIAGNOSTIC performed by [place] Oaks, MD at [facility]    ENDOSCOPY, COLON, DIAGNOSTIC      FRACTURE SURGERY      wrist    HERNIA REPAIR      KNEE ARTHROSCOPY      LUMBAR FUSION      OTHER SURGICAL HISTORY      nerve stimulator. dcs battery replacement 18    RHINOPLASTY      SKIN BIOPSY      SPINE SURGERY      x 3/Stimulator implant    UPPER GASTROINTESTINAL ENDOSCOPY      Jessica Greer    UPPER GASTROINTESTINAL ENDOSCOPY  2013    BE surveillance. pos BE / neg dysplasia.  retained gastric contents    UPPER GASTROINTESTINAL ENDOSCOPY N/A 2016    Dr Blayne Hartmann, Susy (-), Barretts (+), 3 yr recall    UPPER GASTROINTESTINAL ENDOSCOPY  2019 Dr Bowen Booty segment Juan's esophagus    UPPER GASTROINTESTINAL ENDOSCOPY  06/19/2019    Dr Cr Anton diverticulosis, suboptimal prep, Grade 2 internal hemorrhoids, 3 yr recall    UPPER GASTROINTESTINAL ENDOSCOPY N/A 6/19/2019    EGD BIOPSY performed by Teja Virk MD at [address]       Discharge Medication List as of 1/29/2021  5:04 PM      CONTINUE these medications which have NOT CHANGED    Details   oxyCODONE-acetaminophen (PERCOCET) 5-325 MG per tablet Take 1 tablet by mouth every 4 hours as needed for Pain. Historical Med      doxepin (SINEQUAN) 10 MG capsule 1 to 2 PO q HS, Disp-180 capsule, R-3Normal      traZODone (DESYREL) 100 MG tablet Take 2 tablets by mouth nightly as needed for Sleep, Disp-180 tablet,R-3Normal      meclizine (ANTIVERT) 25 MG tablet Take 25 mg by mouth 4 times daily as neededHistorical Med      albuterol sulfate HFA (PROAIR HFA) 108 (90 Base) MCG/ACT inhaler Inhale 2 puffs into the lungs every 6 hours as needed for WheezingHistorical Med      Tadalafil 2.5 MG TABS Take by mouthHistorical Med      mometasone-formoterol (DULERA) 200-5 MCG/ACT inhaler Inhale 2 puffs into the lungs every 12 hoursHistorical Med      celecoxib (CELEBREX) 200 MG capsule Take 200 mg by mouth dailyHistorical Med      cyclobenzaprine (FLEXERIL) 5 MG tablet Take 10 mg by mouth 3 times daily Historical Med      valACYclovir (VALTREX) 500 MG tablet Take 500 mg by mouth dailyHistorical Med      losartan-hydrochlorothiazide (HYZAAR) 100-25 MG per tablet Take 1 tablet by mouth dailyHistorical Med      mometasone (ASMANEX 7 METERED DOSES) 110 MCG/INH AEPB Inhale 2 puffs into the lungs daily, Inhalation, DAILY, Historical Med      Lorcaserin HCl (BELVIQ) 10 MG TABS Take by mouth 2 times daily. Missy Martinez Historical Med      oxyCODONE (OXYCONTIN) 10 MG extended release tablet Take 20 mg by mouth every 12 hours.  .Historical Med Krill Oil Omega-3 300 MG CAPS Take by mouth 2 times daily Historical Med      azelastine (ASTELIN) 0.1 % nasal spray 1 spray by Nasal route 2 times daily Use in each nostril as directed      testosterone cypionate (DEPOTESTOTERONE CYPIONATE) 200 MG/ML injection Inject 50 mg into the muscle every 14 days. Steven Nelson Historical Med      DULoxetine (CYMBALTA) 60 MG capsule Take 60 mg by mouth daily. ALPRAZolam (XANAX) 1 MG tablet Take 1 mg by mouth 2 times daily as needed. gabapentin (NEURONTIN) 800 MG tablet Take 600 mg by mouth 4 times daily 600 four times a day. Historical Med      esomeprazole Magnesium (NEXIUM) 40 MG PACK Take 40 mg by mouth daily. prochlorperazine (COMPAZINE) 10 MG tablet Take 10 mg by mouth every 6 hours as neededHistorical Med      tamsulosin (FLOMAX) 0.4 MG capsule Take 0.4 mg by mouth dailyHistorical Med      cloNIDine (CATAPRES) 0.1 MG tablet Take 0.1 mg by mouth every 6 hours as needed for High Blood Pressure Historical Med      diphenoxylate-atropine (LOMOTIL) 2.5-0.025 MG per tablet Take 1 tablet by mouth 4 times daily as needed for Diarrhea.  .Historical Med      omeprazole (PRILOSEC) 10 MG capsule Take 20 mg by mouth daily Historical Med             ALLERGIES     Lunesta [eszopiclone] and Levofloxacin    FAMILY HISTORY       Family History   Problem Relation Age of Onset    Colon Polyps Mother     Heart Attack Mother     Arrhythmia Mother     Stroke Mother     Colon Polyps Maternal Grandmother     Arrhythmia Father     Obesity Sister     Stroke Paternal Grandmother     Colon Cancer Neg Hx     Esophageal Cancer Neg Hx     Liver Cancer Neg Hx     Liver Disease Neg Hx     Rectal Cancer Neg Hx     Stomach Cancer Neg Hx           SOCIAL HISTORY       Social History     Socioeconomic History    Marital status:      Spouse name: None    Number of children: None    Years of education: None    Highest education level: None   Occupational History    None Social Needs    Financial resource strain: None    Food insecurity     Worry: None     Inability: None    Transportation needs     Medical: None     Non-medical: None   Tobacco Use    Smoking status: Former Smoker     Packs/day: 0.00     Years: 35.00     Pack years: 0.00     Types: Cigarettes     Quit date: 3/16/2016     Years since quittin.8    Smokeless tobacco: Never Used    Tobacco comment: pt states he has quit smokimg again for 3 weeks   Substance and Sexual Activity    Alcohol use: Yes     Comment: 2-3 mixed drinks / month     Drug use: No    Sexual activity: Yes     Partners: Female   Lifestyle    Physical activity     Days per week: None     Minutes per session: None    Stress: None   Relationships    Social connections     Talks on phone: None     Gets together: None     Attends Jain service: None     Active member of club or organization: None     Attends meetings of clubs or organizations: None     Relationship status: None    Intimate partner violence     Fear of current or ex partner: None     Emotionally abused: None     Physically abused: None     Forced sexual activity: None   Other Topics Concern    None   Social History Narrative    None       SCREENINGS           PHYSICAL EXAM    (up to 7 forlevel 4, 8 or more for level 5)     ED Triage Vitals [21 1457]   BP Temp Temp src Pulse Resp SpO2 Height Weight   138/88 98 °F (36.7 °C) -- 78 20 98 % 6' 4\" (1.93 m) (!) 327 lb (148.3 kg)       Physical Exam  Vitals signs and nursing note reviewed. Constitutional:       General: He is not in acute distress. Appearance: Normal appearance. He is well-developed. He is obese. He is not diaphoretic. HENT:      Head: Normocephalic and atraumatic. Right Ear: External ear normal.      Left Ear: External ear normal.   Eyes:      Pupils: Pupils are equal, round, and reactive to light. Neck:      Musculoskeletal: Normal range of motion and neck supple. Trachea: No tracheal deviation. Cardiovascular:      Rate and Rhythm: Normal rate and regular rhythm. Pulses: Normal pulses. Heart sounds: Normal heart sounds. No murmur. Pulmonary:      Effort: Pulmonary effort is normal.      Breath sounds: Normal breath sounds. No stridor. No wheezing. Chest:      Chest wall: No tenderness. Abdominal:      General: Bowel sounds are normal. There is no distension. Palpations: Abdomen is soft. Tenderness: There is no abdominal tenderness. Musculoskeletal:         General: Tenderness and signs of injury present. Comments: Significant range of motion deficit in right shoulder due to pain he has spasming radiating down to his wrist.   Skin:     General: Skin is warm and dry. Capillary Refill: Capillary refill takes less than 2 seconds. Neurological:      General: No focal deficit present. Mental Status: He is alert and oriented to person, place, and time. Mental status is at baseline. Psychiatric:         Mood and Affect: Mood normal.         Behavior: Behavior normal.         Thought Content: Thought content normal.         Judgment: Judgment normal.           DIAGNOSTIC RESULTS     RADIOLOGY:   Non-plain film images such as CT, Ultrasound and MRI are read by the radiologist. Jjuu Nuñez radiographic images are visualized and preliminarilyinterpreted by No att. providers found with the below findings:      Interpretation per the Radiologist below, if available at the time of this note:    CT Cervical Spine WO Contrast   Final Result   1. Moderate to advanced degenerative disc change and mild facet   arthropathy with no acute cervical vertebral fracture or malalignment. Degenerative changes contributing to mild to moderate central canal   stenosis at C5/6 with at least moderate right C6/7 neural foramen   narrowing.    Signed by Dr Maco Barrera on 1/29/2021 4:33 PM      CT Head WO Contrast   Final Result Mild cerebral and cerebellar volume loss with chronic microvascular   disease but no evidence of acute intracranial process. Signed by Dr Codie Romeo on 1/29/2021 4:09 PM      XR HUMERUS RIGHT (MIN 2 VIEWS)   Final Result   Negative right humerus   Signed by Dr Codie Romeo on 1/29/2021 4:06 PM      XR WRIST RIGHT (MIN 3 VIEWS)   Final Result   1. Negative radiographs of the right wrist.   Signed by Dr Codie Romeo on 1/29/2021 4:08 PM      XR SHOULDER RIGHT (MIN 2 VIEWS)   Final Result   1. Mild degenerative change right AC joint. Signed by Dr Codie Romeo on 1/29/2021 4:05 PM      XR CHEST PORTABLE   Final Result   1. Borderline cardiomegaly with no acute pulmonary vascular process. Signed by Dr Codie Romeo on 1/29/2021 4:07 PM          LABS:  Labs Reviewed - No data to display    All other labs were within normal range or notreturned as of this dictation. RE-ASSESSMENT        EMERGENCY DEPARTMENT COURSE and DIFFERENTIAL DIAGNOSIS/MDM:   Vitals:    Vitals:    01/29/21 1457 01/29/21 1727   BP: 138/88 138/80   Pulse: 78 78   Resp: 20 19   Temp: 98 °F (36.7 °C)    SpO2: 98% 98%   Weight: (!) 327 lb (148.3 kg)    Height: 6' 4\" (1.93 m)        MDM  Patient has no acute findings for any type of osseous injury I do advise that he will likely need an MRI follow-up for his right shoulder. He is already spoken with Dr. Ita Acosta with local orthopedics as he is a neighbor and friend on his cell phone has follow-up care for this coming week the plan will be for pain control sling until seeing Ortho. I have placed Dr. Hussain Glover name on the paperwork as he is on-call tonight. PROCEDURES:    Procedures      FINAL IMPRESSION      1. Fall, initial encounter    2.  Right shoulder pain, unspecified chronicity          DISPOSITION/PLAN   DISPOSITION Decision To Discharge 01/29/2021 04:50:58 PM      PATIENT REFERRED TO:  [facility] EMERGENCY DEPT  Alhaji Steinberg  [phone] If symptoms worsen    Chely Canales MD  [address]  [phone]    Schedule an appointment as soon as possible for a visit         DISCHARGE MEDICATIONS:  Discharge Medication List as of 1/29/2021  5:04 PM      START taking these medications    Details   predniSONE (DELTASONE) 10 MG tablet Take 1 tablet by mouth daily for 10 days, Disp-10 tablet, R-0Print      meloxicam (MOBIC) 15 MG tablet Take 1 tablet by mouth daily, Disp-30 tablet, R-0Print             (Please note that portions of this note were completed with a voice recognition program.  Efforts were made to edit the dictations but occasionallywords are mis-transcribed.)    Maritza Sy [facility], [place]  01/29/21 9840

## 2021-03-05 ENCOUNTER — TRANSCRIBE ORDERS (OUTPATIENT)
Dept: ADMINISTRATIVE | Facility: HOSPITAL | Age: 66
End: 2021-03-05

## 2021-03-05 DIAGNOSIS — Z87.891 PERSONAL HISTORY OF NICOTINE DEPENDENCE: Primary | ICD-10-CM

## 2021-03-11 ENCOUNTER — HOSPITAL ENCOUNTER (OUTPATIENT)
Dept: CT IMAGING | Facility: HOSPITAL | Age: 66
Discharge: HOME | End: 2021-03-11
Admitting: FAMILY MEDICINE

## 2021-03-11 DIAGNOSIS — Z87.891 PERSONAL HISTORY OF NICOTINE DEPENDENCE: ICD-10-CM

## 2021-03-11 PROCEDURE — 71271 CT THORAX LUNG CANCER SCR C-: CPT

## 2021-03-15 ENCOUNTER — IMMUNIZATION (OUTPATIENT)
Dept: VACCINE CLINIC | Facility: HOSPITAL | Age: 66
End: 2021-03-15

## 2021-03-15 PROCEDURE — 91301 HC SARSCO02 VAC 100MCG/0.5ML IM: CPT | Performed by: OBSTETRICS & GYNECOLOGY

## 2021-03-15 PROCEDURE — 0011A: CPT | Performed by: OBSTETRICS & GYNECOLOGY

## 2021-04-01 DIAGNOSIS — N13.8 BPH WITH OBSTRUCTION/LOWER URINARY TRACT SYMPTOMS: ICD-10-CM

## 2021-04-01 DIAGNOSIS — N40.1 BPH WITH OBSTRUCTION/LOWER URINARY TRACT SYMPTOMS: ICD-10-CM

## 2021-04-01 RX ORDER — TAMSULOSIN HYDROCHLORIDE 0.4 MG/1
1 CAPSULE ORAL NIGHTLY
Qty: 90 CAPSULE | Refills: 3 | OUTPATIENT
Start: 2021-04-01

## 2021-04-07 ENCOUNTER — TELEPHONE (OUTPATIENT)
Dept: NEUROLOGY | Age: 66
End: 2021-04-07

## 2021-04-07 NOTE — TELEPHONE ENCOUNTER
Called and spoke with the patient because he wanted to reschedule appointment via phytel. I got him rescheduled and he is aware of new appointment time and date.

## 2021-04-12 ENCOUNTER — APPOINTMENT (OUTPATIENT)
Dept: VACCINE CLINIC | Facility: HOSPITAL | Age: 66
End: 2021-04-12

## 2021-04-14 ENCOUNTER — HOSPITAL ENCOUNTER (EMERGENCY)
Age: 66
Discharge: HOME | End: 2021-04-14
Attending: PEDIATRICS
Payer: MEDICARE

## 2021-04-14 VITALS
WEIGHT: 315 LBS | RESPIRATION RATE: 17 BRPM | OXYGEN SATURATION: 91 % | TEMPERATURE: 98 F | HEART RATE: 89 BPM | HEIGHT: 76 IN | BODY MASS INDEX: 38.36 KG/M2 | DIASTOLIC BLOOD PRESSURE: 81 MMHG | SYSTOLIC BLOOD PRESSURE: 142 MMHG

## 2021-04-14 DIAGNOSIS — L03.116 CELLULITIS OF LEFT LOWER EXTREMITY: Primary | ICD-10-CM

## 2021-04-14 LAB
ALBUMIN SERPL-MCNC: 4.6 G/DL (ref 3.5–5.2)
ALP BLD-CCNC: 88 U/L (ref 40–130)
ALT SERPL-CCNC: 32 U/L (ref 5–41)
ANION GAP SERPL CALCULATED.3IONS-SCNC: 9 MMOL/L (ref 7–19)
AST SERPL-CCNC: 35 U/L (ref 5–40)
BASOPHILS ABSOLUTE: 0 K/UL (ref 0–0.2)
BASOPHILS RELATIVE PERCENT: 0.7 % (ref 0–1)
BILIRUB SERPL-MCNC: 0.5 MG/DL (ref 0.2–1.2)
BILIRUBIN URINE: NEGATIVE
BLOOD, URINE: NEGATIVE
BUN BLDV-MCNC: 17 MG/DL (ref 8–23)
CALCIUM SERPL-MCNC: 9.7 MG/DL (ref 8.8–10.2)
CHLORIDE BLD-SCNC: 97 MMOL/L (ref 98–111)
CLARITY: CLEAR
CO2: 29 MMOL/L (ref 22–29)
COLOR: YELLOW
CREAT SERPL-MCNC: 1 MG/DL (ref 0.5–1.2)
EOSINOPHILS ABSOLUTE: 0.1 K/UL (ref 0–0.6)
EOSINOPHILS RELATIVE PERCENT: 2.2 % (ref 0–5)
GFR AFRICAN AMERICAN: >59
GFR NON-AFRICAN AMERICAN: >60
GLUCOSE BLD-MCNC: 82 MG/DL (ref 74–109)
GLUCOSE URINE: NEGATIVE MG/DL
HCT VFR BLD CALC: 51.6 % (ref 42–52)
HEMOGLOBIN: 17.5 G/DL (ref 14–18)
IMMATURE GRANULOCYTES #: 0 K/UL
KETONES, URINE: NEGATIVE MG/DL
LEUKOCYTE ESTERASE, URINE: NEGATIVE
LYMPHOCYTES ABSOLUTE: 1.3 K/UL (ref 1.1–4.5)
LYMPHOCYTES RELATIVE PERCENT: 21.7 % (ref 20–40)
MCH RBC QN AUTO: 31.3 PG (ref 27–31)
MCHC RBC AUTO-ENTMCNC: 33.9 G/DL (ref 33–37)
MCV RBC AUTO: 92.1 FL (ref 80–94)
MONOCYTES ABSOLUTE: 0.8 K/UL (ref 0–0.9)
MONOCYTES RELATIVE PERCENT: 14.3 % (ref 0–10)
NEUTROPHILS ABSOLUTE: 3.6 K/UL (ref 1.5–7.5)
NEUTROPHILS RELATIVE PERCENT: 60.8 % (ref 50–65)
NITRITE, URINE: NEGATIVE
PDW BLD-RTO: 13 % (ref 11.5–14.5)
PH UA: 7.5 (ref 5–8)
PLATELET # BLD: 169 K/UL (ref 130–400)
PMV BLD AUTO: 9.7 FL (ref 9.4–12.4)
POTASSIUM SERPL-SCNC: 4 MMOL/L (ref 3.5–5)
PROTEIN UA: NEGATIVE MG/DL
RBC # BLD: 5.6 M/UL (ref 4.7–6.1)
SEDIMENTATION RATE, ERYTHROCYTE: 1 MM/HR (ref 0–15)
SODIUM BLD-SCNC: 135 MMOL/L (ref 136–145)
SPECIFIC GRAVITY UA: 1.01 (ref 1–1.03)
TOTAL PROTEIN: 7.1 G/DL (ref 6.6–8.7)
UROBILINOGEN, URINE: 1 E.U./DL
WBC # BLD: 5.9 K/UL (ref 4.8–10.8)

## 2021-04-14 PROCEDURE — 36415 COLL VENOUS BLD VENIPUNCTURE: CPT

## 2021-04-14 PROCEDURE — 80053 COMPREHEN METABOLIC PANEL: CPT

## 2021-04-14 PROCEDURE — 93971 EXTREMITY STUDY: CPT

## 2021-04-14 PROCEDURE — 85025 COMPLETE CBC W/AUTO DIFF WBC: CPT

## 2021-04-14 PROCEDURE — 99282 EMERGENCY DEPT VISIT SF MDM: CPT

## 2021-04-14 PROCEDURE — 85652 RBC SED RATE AUTOMATED: CPT

## 2021-04-14 PROCEDURE — 81003 URINALYSIS AUTO W/O SCOPE: CPT

## 2021-04-14 PROCEDURE — 6370000000 HC RX 637 (ALT 250 FOR IP): Performed by: PEDIATRICS

## 2021-04-14 RX ORDER — SULFAMETHOXAZOLE AND TRIMETHOPRIM 800; 160 MG/1; MG/1
1 TABLET ORAL 2 TIMES DAILY
Qty: 20 TABLET | Refills: 0 | Status: SHIPPED | OUTPATIENT
Start: 2021-04-14 | End: 2021-04-24

## 2021-04-14 RX ORDER — SULFAMETHOXAZOLE AND TRIMETHOPRIM 800; 160 MG/1; MG/1
1 TABLET ORAL ONCE
Status: COMPLETED | OUTPATIENT
Start: 2021-04-14 | End: 2021-04-14

## 2021-04-14 RX ADMIN — SULFAMETHOXAZOLE AND TRIMETHOPRIM 1 TABLET: 800; 160 TABLET ORAL at 23:42

## 2021-04-15 ENCOUNTER — NURSE TRIAGE (OUTPATIENT)
Dept: CALL CENTER | Facility: HOSPITAL | Age: 66
End: 2021-04-15

## 2021-04-15 ENCOUNTER — IMMUNIZATION (OUTPATIENT)
Dept: VACCINE CLINIC | Facility: HOSPITAL | Age: 66
End: 2021-04-15

## 2021-04-15 PROCEDURE — 0012A: CPT | Performed by: OBSTETRICS & GYNECOLOGY

## 2021-04-15 PROCEDURE — 91301 HC SARSCO02 VAC 100MCG/0.5ML IM: CPT | Performed by: OBSTETRICS & GYNECOLOGY

## 2021-04-15 NOTE — PROGRESS NOTES
Vascular Preliminary Report      Left Lower Extremity Venous Duplex Completed. No evidence of DVT, SVT, or Reflux noted at this time. Multiple enlarged lymph nodes noted in the left groin. Final Report Pending.

## 2021-04-15 NOTE — TELEPHONE ENCOUNTER
"    Reason for Disposition  • Health Information question, no triage required and triager able to answer question    Additional Information  • Negative: [1] Caller is not with the adult (patient) AND [2] reporting urgent symptoms  • Negative: Lab result questions  • Negative: Medication questions  • Negative: Caller can't be reached by phone  • Negative: Caller has already spoken to PCP or another triager  • Negative: RN needs further essential information from caller in order to complete triage  • Negative: Requesting regular office appointment  • Negative: [1] Caller requesting NON-URGENT health information AND [2] PCP's office is the best resource    Answer Assessment - Initial Assessment Questions  1. REASON FOR CALL or QUESTION: \"What is your reason for calling today?\" or \"How can I best help you?\" or \"What question do you have that I can help answer?\"      Lower leg is swollen. He thought he might have a blood clot and went to [facility] ER yesterday. He did not have a blood clot. Was started on Biaxin for cellulitis, has taken 1 dose, will be on antibiotic for 10 days. Has no fever, is not sick. Scheduled for second COVID vaccine this afternoon. Asking if vaccine will be administered. Advised to keep vaccine appt today.    Protocols used: INFORMATION ONLY CALL - NO TRIAGE-ADULT-      "

## 2021-04-18 ASSESSMENT — ENCOUNTER SYMPTOMS
RHINORRHEA: 0
VOMITING: 0
SHORTNESS OF BREATH: 0
COLOR CHANGE: 0
EYE DISCHARGE: 0
NAUSEA: 0
BACK PAIN: 0
ABDOMINAL PAIN: 0
COUGH: 0

## 2021-04-18 NOTE — ED PROVIDER NOTES
[address] [facility] EMERGENCY DEPT  eMERGENCY dEPARTMENT eNCOUnter      Pt Name: Dav Jimenez  MRN: [id number]  [place] 1955  Date of evaluation: 4/14/2021  Provider: Jerald James MD    [address] [place] [address]       Chief Complaint   Patient presents with    Leg Swelling     left lower leg swelling and redness         HISTORY OF PRESENT ILLNESS   (Location/Symptom, Timing/Onset,Context/Setting, Quality, Duration, Modifying Factors, Severity)  Note limiting factors. Dav Jimenez is a 61 y.o. male who presents to the emergency department with swelling and redness on left lower extremity. Patient is concerned that swelling may indicate a blood clot. Patient states that his anterior leg is \"sore. \"  Patient states there is intermittent pain. Symptoms began this morning. Patient denies fever or vomiting. HPI    NursingNotes were reviewed. REVIEW OF SYSTEMS    (2-9 systems for level 4, 10 or more for level 5)     Review of Systems   Constitutional: Negative for chills and fever. HENT: Negative for congestion and rhinorrhea. Eyes: Negative for discharge. Respiratory: Negative for cough and shortness of breath. Cardiovascular: Positive for leg swelling (Left lower extremity). Negative for chest pain and palpitations. Gastrointestinal: Negative for abdominal pain, nausea and vomiting. Genitourinary: Negative for difficulty urinating and dysuria. Musculoskeletal: Negative for back pain and neck pain. Left lower extremity pain   Skin: Positive for rash (Left lower extremity). Negative for color change and pallor. Neurological: Negative for syncope and light-headedness. Psychiatric/Behavioral: Negative for agitation and confusion. All other systems reviewed and are negative.            PAST MEDICALHISTORY     Past Medical History:   Diagnosis Date    Abnormal gait     Amnesia     Anxiety     Asthma     BiPAP (biphasic positive airway pressure) dependence     NiPPV 23cm/16cm/12cm    Cervical facet syndrome     Chronic back pain     Complex sleep apnea syndrome     Initial PS; AHI:  31.2 per PSG, 10/2014    Concussion     COPD (chronic obstructive pulmonary disease) (HCC)     DDD (degenerative disc disease)     Facial ringworm     PERCY (generalized anxiety disorder)     GERD (gastroesophageal reflux disease)     Headache     Herniated disc     Herpes simplex     History of Juan's esophagus     Hyperlipidemia     Hypersomnia     Hypertension     Hypotestosteronism     Lumbar stenosis     Memory loss     Obesity     Obstructive sleep apnea     Initial PS; AHI:  31.2 per split-night PSG, 10/2014    Pneumonia     Potential difficult airway on pre-intubation assessment     PTSD (post-traumatic stress disorder)     Sleep apnea     Bi-pap at night    Vertigo          SURGICAL HISTORY       Past Surgical History:   Procedure Laterality Date    APPENDECTOMY      BACK SURGERY      Three lumbar fusions L3-4, L3-4-5 & S1    CARDIAC CATHETERIZATION      COLONOSCOPY      Federica Massey    COLONOSCOPY  16    Dr Vinny Navas, normal, 5 yr recall    COLONOSCOPY N/A 2019    COLONOSCOPY DIAGNOSTIC performed by Keke Segal MD at [address] [place] [address] Endoscopy    ENDOSCOPY, COLON, DIAGNOSTIC      FRACTURE SURGERY      wrist    HERNIA REPAIR      KNEE ARTHROSCOPY      LUMBAR FUSION      OTHER SURGICAL HISTORY      nerve stimulator. dcs battery replacement 18    RHINOPLASTY      SKIN BIOPSY      SPINE SURGERY      x 3/Stimulator implant    UPPER GASTROINTESTINAL ENDOSCOPY      Federica Massey    UPPER GASTROINTESTINAL ENDOSCOPY  2013    BE surveillance. pos BE / neg dysplasia.  retained gastric contents    UPPER GASTROINTESTINAL ENDOSCOPY N/A 2016    Dr Vinny Navas, Susy (-), Barretts (+), 3 yr recall    UPPER GASTROINTESTINAL ENDOSCOPY  2019    Dr Ana Torres segment Juan's esophagus    UPPER GASTROINTESTINAL ENDOSCOPY  2019    Dr Gigi Beck diverticulosis, suboptimal prep, Grade 2 internal hemorrhoids, 3 yr recall    UPPER GASTROINTESTINAL ENDOSCOPY N/A 6/19/2019    EGD BIOPSY performed by Henry Feldman MD at [address]     Discharge Medication List as of 4/14/2021 11:34 PM      CONTINUE these medications which have NOT CHANGED    Details   oxyCODONE-acetaminophen (PERCOCET) 5-325 MG per tablet Take 1 tablet by mouth every 4 hours as needed for Pain. Historical Med      meloxicam (MOBIC) 15 MG tablet Take 1 tablet by mouth daily, Disp-30 tablet, R-0Print      doxepin (SINEQUAN) 10 MG capsule 1 to 2 PO q HS, Disp-180 capsule, R-3Normal      traZODone (DESYREL) 100 MG tablet Take 2 tablets by mouth nightly as needed for Sleep, Disp-180 tablet,R-3Normal      meclizine (ANTIVERT) 25 MG tablet Take 25 mg by mouth 4 times daily as neededHistorical Med      albuterol sulfate HFA (PROAIR HFA) 108 (90 Base) MCG/ACT inhaler Inhale 2 puffs into the lungs every 6 hours as needed for WheezingHistorical Med      prochlorperazine (COMPAZINE) 10 MG tablet Take 10 mg by mouth every 6 hours as neededHistorical Med      Tadalafil 2.5 MG TABS Take by mouthHistorical Med      mometasone-formoterol (DULERA) 200-5 MCG/ACT inhaler Inhale 2 puffs into the lungs every 12 hoursHistorical Med      celecoxib (CELEBREX) 200 MG capsule Take 200 mg by mouth dailyHistorical Med      cyclobenzaprine (FLEXERIL) 5 MG tablet Take 10 mg by mouth 3 times daily Historical Med      valACYclovir (VALTREX) 500 MG tablet Take 500 mg by mouth dailyHistorical Med      tamsulosin (FLOMAX) 0.4 MG capsule Take 0.4 mg by mouth dailyHistorical Med      losartan-hydrochlorothiazide (HYZAAR) 100-25 MG per tablet Take 1 tablet by mouth dailyHistorical Med      cloNIDine (CATAPRES) 0.1 MG tablet Take 0.1 mg by mouth every 6 hours as needed for High Blood Pressure Historical Med      mometasone (ASMANEX 7 METERED DOSES) 110 MCG/INH AEPB Inhale 2 puffs into the lungs daily, Inhalation, DAILY, Historical Med      diphenoxylate-atropine (LOMOTIL) 2.5-0.025 MG per tablet Take 1 tablet by mouth 4 times daily as needed for Diarrhea. .Historical Med      Lorcaserin HCl (BELVIQ) 10 MG TABS Take by mouth 2 times daily. Nadyne [place] Historical Med      oxyCODONE (OXYCONTIN) 10 MG extended release tablet Take 20 mg by mouth every 12 hours. .Historical Med      omeprazole (PRILOSEC) 10 MG capsule Take 20 mg by mouth daily Historical Med      Krill Oil Omega-3 300 MG CAPS Take by mouth 2 times daily Historical Med      azelastine (ASTELIN) 0.1 % nasal spray 1 spray by Nasal route 2 times daily Use in each nostril as directed      testosterone cypionate (DEPOTESTOTERONE CYPIONATE) 200 MG/ML injection Inject 50 mg into the muscle every 14 days. Nadyne Lisa Historical Med      DULoxetine (CYMBALTA) 60 MG capsule Take 60 mg by mouth daily. ALPRAZolam (XANAX) 1 MG tablet Take 1 mg by mouth 2 times daily as needed. gabapentin (NEURONTIN) 800 MG tablet Take 600 mg by mouth 4 times daily 600 four times a day. Historical Med      esomeprazole Magnesium (NEXIUM) 40 MG PACK Take 40 mg by mouth daily.              ALLERGIES     Lunesta [eszopiclone] and Levofloxacin    FAMILY HISTORY       Family History   Problem Relation Age of Onset    Colon Polyps Mother     Heart Attack Mother     Arrhythmia Mother     Stroke Mother     Colon Polyps Maternal Grandmother    Shivah Lufkin Arrhythmia Father     Obesity Sister     Stroke Paternal Grandmother     Colon Cancer Neg Hx     Esophageal Cancer Neg Hx     Liver Cancer Neg Hx     Liver Disease Neg Hx     Rectal Cancer Neg Hx     Stomach Cancer Neg Hx           SOCIAL HISTORY       Social History     Socioeconomic History    Marital status:      Spouse name: Not on file    Number of children: Not on file    Years of education: Not on file    Highest education level: Not on file   Occupational History    Not on file   Social Needs    Financial resource strain: Not on file    Food insecurity     Worry: Not on file     Inability: Not on file    Transportation needs     Medical: Not on file     Non-medical: Not on file   Tobacco Use    Smoking status: Former Smoker     Packs/day: 0.00     Years: 35.00     Pack years: 0.00     Types: Cigarettes     Quit date: 3/16/2016     Years since quittin.0    Smokeless tobacco: Never Used    Tobacco comment: pt states he has quit smokimg again for 3 weeks   Substance and Sexual Activity    Alcohol use: Yes     Comment: 2-3 mixed drinks / month     Drug use: No    Sexual activity: Yes     Partners: Female   Lifestyle    Physical activity     Days per week: Not on file     Minutes per session: Not on file    Stress: Not on file   Relationships    Social connections     Talks on phone: Not on file     Gets together: Not on file     Attends Synagogue service: Not on file     Active member of club or organization: Not on file     Attends meetings of clubs or organizations: Not on file     Relationship status: Not on file    Intimate partner violence     Fear of current or ex partner: Not on file     Emotionally abused: Not on file     Physically abused: Not on file     Forced sexual activity: Not on file   Other Topics Concern    Not on file   Social History Narrative    Not on file       SCREENINGS             PHYSICAL EXAM    (up to 7 for level 4, 8 or more for level 5)     ED Triage Vitals [21 1935]   BP Temp Temp src Pulse Resp SpO2 Height Weight   136/76 98 °F (36.7 °C) -- 93 21 90 % 6' 4\" (1.93 m) (!) 325 lb (147.4 kg)       Physical Exam  Vitals signs and nursing note reviewed. Constitutional:       General: He is not in acute distress. HENT:      Head: Normocephalic and atraumatic. Right Ear: External ear normal.      Left Ear: External ear normal.      Nose: Nose normal.      Mouth/Throat:      Mouth: Mucous membranes are moist.      Pharynx: Oropharynx is clear. No oropharyngeal exudate.    Eyes:

## 2021-05-03 ENCOUNTER — TRANSCRIBE ORDERS (OUTPATIENT)
Dept: ADMINISTRATIVE | Facility: HOSPITAL | Age: 66
End: 2021-05-03

## 2021-05-03 ENCOUNTER — HOSPITAL ENCOUNTER (OUTPATIENT)
Dept: GENERAL RADIOLOGY | Facility: HOSPITAL | Age: 66
Discharge: HOME | End: 2021-05-03

## 2021-05-03 DIAGNOSIS — G89.29 CHRONIC IDIOPATHIC ANAL PAIN: ICD-10-CM

## 2021-05-03 DIAGNOSIS — M54.50 LOW BACK PAIN, UNSPECIFIED BACK PAIN LATERALITY, UNSPECIFIED CHRONICITY, UNSPECIFIED WHETHER SCIATICA PRESENT: ICD-10-CM

## 2021-05-03 DIAGNOSIS — M54.16 LUMBAR RADICULOPATHY: Primary | ICD-10-CM

## 2021-05-03 DIAGNOSIS — M54.16 LUMBAR RADICULOPATHY: ICD-10-CM

## 2021-05-03 DIAGNOSIS — Z79.891 ENCOUNTER FOR LONG-TERM METHADONE USE: ICD-10-CM

## 2021-05-03 DIAGNOSIS — E66.01 MORBID OBESITY (HCC): ICD-10-CM

## 2021-05-03 DIAGNOSIS — K62.89 CHRONIC IDIOPATHIC ANAL PAIN: ICD-10-CM

## 2021-05-03 DIAGNOSIS — J18.9 PLEURAL EFFUSION ASSOCIATED WITH PULMONARY INFECTION: ICD-10-CM

## 2021-05-03 DIAGNOSIS — J91.8 PLEURAL EFFUSION ASSOCIATED WITH PULMONARY INFECTION: ICD-10-CM

## 2021-05-03 DIAGNOSIS — J18.9 PLEURAL EFFUSION ASSOCIATED WITH PULMONARY INFECTION: Primary | ICD-10-CM

## 2021-05-03 DIAGNOSIS — J91.8 PLEURAL EFFUSION ASSOCIATED WITH PULMONARY INFECTION: Primary | ICD-10-CM

## 2021-05-03 PROCEDURE — 71046 X-RAY EXAM CHEST 2 VIEWS: CPT

## 2021-05-03 PROCEDURE — 72114 X-RAY EXAM L-S SPINE BENDING: CPT

## 2021-05-10 ENCOUNTER — HOSPITAL ENCOUNTER (INPATIENT)
Age: 66
LOS: 3 days | Discharge: HOME | DRG: 190 | End: 2021-05-15
Attending: FAMILY MEDICINE | Admitting: FAMILY MEDICINE
Payer: MEDICARE

## 2021-05-10 ENCOUNTER — APPOINTMENT (OUTPATIENT)
Dept: GENERAL RADIOLOGY | Age: 66
DRG: 190 | End: 2021-05-10
Attending: FAMILY MEDICINE
Payer: MEDICARE

## 2021-05-10 PROBLEM — J44.1 COPD WITH ACUTE EXACERBATION (HCC): Status: ACTIVE | Noted: 2021-05-10

## 2021-05-10 LAB
BASE EXCESS ARTERIAL: 2.3 MMOL/L (ref -2–2)
CARBOXYHEMOGLOBIN ARTERIAL: 1.7 % (ref 0–5)
D DIMER: 1.41 UG/ML FEU (ref 0–0.48)
HCO3 ARTERIAL: 26.5 MMOL/L (ref 22–26)
HEMOGLOBIN, ART, EXTENDED: 18.1 G/DL (ref 14–18)
METHEMOGLOBIN ARTERIAL: 0.9 %
O2 CONTENT ARTERIAL: 23.5 ML/DL
O2 SAT, ARTERIAL: 92.6 %
O2 THERAPY: ABNORMAL
PCO2 ARTERIAL: 39 MMHG (ref 35–45)
PH ARTERIAL: 7.44 (ref 7.35–7.45)
PO2 ARTERIAL: 67 MMHG (ref 80–100)
POTASSIUM, WHOLE BLOOD: 4.2
PRO-BNP: 14 PG/ML (ref 0–900)
SARS-COV-2, NAAT: NOT DETECTED

## 2021-05-10 PROCEDURE — 96372 THER/PROPH/DIAG INJ SC/IM: CPT

## 2021-05-10 PROCEDURE — 36600 WITHDRAWAL OF ARTERIAL BLOOD: CPT

## 2021-05-10 PROCEDURE — G0378 HOSPITAL OBSERVATION PER HR: HCPCS

## 2021-05-10 PROCEDURE — 2580000003 HC RX 258: Performed by: FAMILY MEDICINE

## 2021-05-10 PROCEDURE — 84132 ASSAY OF SERUM POTASSIUM: CPT

## 2021-05-10 PROCEDURE — 2500000003 HC RX 250 WO HCPCS: Performed by: FAMILY MEDICINE

## 2021-05-10 PROCEDURE — G0379 DIRECT REFER HOSPITAL OBSERV: HCPCS

## 2021-05-10 PROCEDURE — 87635 SARS-COV-2 COVID-19 AMP PRB: CPT

## 2021-05-10 PROCEDURE — 82803 BLOOD GASES ANY COMBINATION: CPT

## 2021-05-10 PROCEDURE — 94640 AIRWAY INHALATION TREATMENT: CPT

## 2021-05-10 PROCEDURE — 85379 FIBRIN DEGRADATION QUANT: CPT

## 2021-05-10 PROCEDURE — 36415 COLL VENOUS BLD VENIPUNCTURE: CPT

## 2021-05-10 PROCEDURE — 6360000002 HC RX W HCPCS: Performed by: FAMILY MEDICINE

## 2021-05-10 PROCEDURE — 6370000000 HC RX 637 (ALT 250 FOR IP): Performed by: FAMILY MEDICINE

## 2021-05-10 PROCEDURE — 87040 BLOOD CULTURE FOR BACTERIA: CPT

## 2021-05-10 PROCEDURE — 96375 TX/PRO/DX INJ NEW DRUG ADDON: CPT

## 2021-05-10 PROCEDURE — 2700000000 HC OXYGEN THERAPY PER DAY

## 2021-05-10 PROCEDURE — 71046 X-RAY EXAM CHEST 2 VIEWS: CPT

## 2021-05-10 PROCEDURE — 93005 ELECTROCARDIOGRAM TRACING: CPT

## 2021-05-10 PROCEDURE — 96365 THER/PROPH/DIAG IV INF INIT: CPT

## 2021-05-10 PROCEDURE — 83880 ASSAY OF NATRIURETIC PEPTIDE: CPT

## 2021-05-10 PROCEDURE — 96376 TX/PRO/DX INJ SAME DRUG ADON: CPT

## 2021-05-10 RX ORDER — POTASSIUM CHLORIDE 7.45 MG/ML
10 INJECTION INTRAVENOUS PRN
Status: DISCONTINUED | OUTPATIENT
Start: 2021-05-10 | End: 2021-05-15 | Stop reason: HOSPADM

## 2021-05-10 RX ORDER — AMLODIPINE BESYLATE 10 MG/1
TABLET ORAL
COMMUNITY
End: 2022-06-16

## 2021-05-10 RX ORDER — MECLIZINE HCL 12.5 MG/1
25 TABLET ORAL 4 TIMES DAILY PRN
Status: DISCONTINUED | OUTPATIENT
Start: 2021-05-10 | End: 2021-05-15 | Stop reason: HOSPADM

## 2021-05-10 RX ORDER — AMOXICILLIN AND CLAVULANATE POTASSIUM 875; 125 MG/1; MG/1
TABLET, FILM COATED ORAL
Status: ON HOLD | COMMUNITY
Start: 2021-05-03 | End: 2021-05-10

## 2021-05-10 RX ORDER — METHYLPREDNISOLONE SODIUM SUCCINATE 125 MG/2ML
80 INJECTION, POWDER, LYOPHILIZED, FOR SOLUTION INTRAMUSCULAR; INTRAVENOUS EVERY 8 HOURS
Status: DISCONTINUED | OUTPATIENT
Start: 2021-05-10 | End: 2021-05-11

## 2021-05-10 RX ORDER — SODIUM CHLORIDE 9 MG/ML
25 INJECTION, SOLUTION INTRAVENOUS PRN
Status: DISCONTINUED | OUTPATIENT
Start: 2021-05-10 | End: 2021-05-15 | Stop reason: HOSPADM

## 2021-05-10 RX ORDER — CYCLOBENZAPRINE HCL 10 MG
10 TABLET ORAL 3 TIMES DAILY
Status: DISCONTINUED | OUTPATIENT
Start: 2021-05-10 | End: 2021-05-10

## 2021-05-10 RX ORDER — PANTOPRAZOLE SODIUM 40 MG/1
40 TABLET, DELAYED RELEASE ORAL
Status: DISCONTINUED | OUTPATIENT
Start: 2021-05-11 | End: 2021-05-10

## 2021-05-10 RX ORDER — DULOXETIN HYDROCHLORIDE 60 MG/1
CAPSULE, DELAYED RELEASE ORAL
Status: ON HOLD | COMMUNITY
End: 2021-05-10

## 2021-05-10 RX ORDER — BUDESONIDE 0.25 MG/2ML
0.25 INHALANT ORAL 2 TIMES DAILY
Status: DISCONTINUED | OUTPATIENT
Start: 2021-05-10 | End: 2021-05-15 | Stop reason: HOSPADM

## 2021-05-10 RX ORDER — PANTOPRAZOLE SODIUM 40 MG/1
40 TABLET, DELAYED RELEASE ORAL
Status: DISCONTINUED | OUTPATIENT
Start: 2021-05-11 | End: 2021-05-15 | Stop reason: HOSPADM

## 2021-05-10 RX ORDER — DIPHENOXYLATE HYDROCHLORIDE AND ATROPINE SULFATE 2.5; .025 MG/1; MG/1
1 TABLET ORAL 4 TIMES DAILY PRN
Status: DISCONTINUED | OUTPATIENT
Start: 2021-05-10 | End: 2021-05-15 | Stop reason: HOSPADM

## 2021-05-10 RX ORDER — OXYCODONE HYDROCHLORIDE AND ACETAMINOPHEN 325; 5 MG/5ML; MG/5ML
SOLUTION ORAL
Status: ON HOLD | COMMUNITY
Start: 2020-06-15 | End: 2021-05-10

## 2021-05-10 RX ORDER — HYDROCHLOROTHIAZIDE 25 MG/1
TABLET ORAL
COMMUNITY
Start: 2020-08-25 | End: 2022-04-01

## 2021-05-10 RX ORDER — SODIUM CHLORIDE 0.9 % (FLUSH) 0.9 %
10 SYRINGE (ML) INJECTION PRN
Status: DISCONTINUED | OUTPATIENT
Start: 2021-05-10 | End: 2021-05-15 | Stop reason: HOSPADM

## 2021-05-10 RX ORDER — FINASTERIDE 5 MG/1
5 TABLET, FILM COATED ORAL DAILY
Status: DISCONTINUED | OUTPATIENT
Start: 2021-05-11 | End: 2021-05-15 | Stop reason: HOSPADM

## 2021-05-10 RX ORDER — TAMSULOSIN HYDROCHLORIDE 0.4 MG/1
0.4 CAPSULE ORAL DAILY
Status: DISCONTINUED | OUTPATIENT
Start: 2021-05-10 | End: 2021-05-15 | Stop reason: HOSPADM

## 2021-05-10 RX ORDER — IPRATROPIUM BROMIDE AND ALBUTEROL SULFATE 2.5; .5 MG/3ML; MG/3ML
1 SOLUTION RESPIRATORY (INHALATION)
Status: DISCONTINUED | OUTPATIENT
Start: 2021-05-10 | End: 2021-05-15 | Stop reason: HOSPADM

## 2021-05-10 RX ORDER — TRAZODONE HYDROCHLORIDE 100 MG/1
300 TABLET ORAL NIGHTLY PRN
Status: DISCONTINUED | OUTPATIENT
Start: 2021-05-10 | End: 2021-05-15 | Stop reason: HOSPADM

## 2021-05-10 RX ORDER — SODIUM PHOSPHATE,MONO-DIBASIC 19G-7G/118
ENEMA (ML) RECTAL
COMMUNITY
End: 2021-06-28

## 2021-05-10 RX ORDER — NICOTINE 21 MG/24HR
1 PATCH, TRANSDERMAL 24 HOURS TRANSDERMAL DAILY PRN
Status: DISCONTINUED | OUTPATIENT
Start: 2021-05-10 | End: 2021-05-15 | Stop reason: HOSPADM

## 2021-05-10 RX ORDER — ALBUTEROL SULFATE 90 UG/1
2 AEROSOL, METERED RESPIRATORY (INHALATION) EVERY 6 HOURS PRN
Status: DISCONTINUED | OUTPATIENT
Start: 2021-05-10 | End: 2021-05-10 | Stop reason: CLARIF

## 2021-05-10 RX ORDER — BIOTIN 10000 MCG
10 CAPSULE ORAL DAILY
Status: DISCONTINUED | OUTPATIENT
Start: 2021-05-10 | End: 2021-05-10 | Stop reason: RX

## 2021-05-10 RX ORDER — OXYCODONE HYDROCHLORIDE AND ACETAMINOPHEN 5; 325 MG/1; MG/1
1 TABLET ORAL EVERY 4 HOURS PRN
Status: DISCONTINUED | OUTPATIENT
Start: 2021-05-10 | End: 2021-05-13

## 2021-05-10 RX ORDER — MAGNESIUM 30 MG
40 TABLET ORAL DAILY
COMMUNITY

## 2021-05-10 RX ORDER — ALBUTEROL SULFATE 90 UG/1
AEROSOL, METERED RESPIRATORY (INHALATION)
Status: ON HOLD | COMMUNITY
End: 2021-05-10

## 2021-05-10 RX ORDER — MAGNESIUM SULFATE 1 G/100ML
1000 INJECTION INTRAVENOUS PRN
Status: DISCONTINUED | OUTPATIENT
Start: 2021-05-10 | End: 2021-05-15 | Stop reason: HOSPADM

## 2021-05-10 RX ORDER — DILTIAZEM HYDROCHLORIDE 120 MG/1
240 CAPSULE, COATED, EXTENDED RELEASE ORAL DAILY
Status: DISCONTINUED | OUTPATIENT
Start: 2021-05-10 | End: 2021-05-13

## 2021-05-10 RX ORDER — POLYETHYLENE GLYCOL 3350 17 G/17G
17 POWDER, FOR SOLUTION ORAL DAILY PRN
Status: DISCONTINUED | OUTPATIENT
Start: 2021-05-10 | End: 2021-05-15 | Stop reason: HOSPADM

## 2021-05-10 RX ORDER — VALACYCLOVIR HYDROCHLORIDE 500 MG/1
500 TABLET, FILM COATED ORAL DAILY
Status: DISCONTINUED | OUTPATIENT
Start: 2021-05-11 | End: 2021-05-15 | Stop reason: HOSPADM

## 2021-05-10 RX ORDER — DILTIAZEM HYDROCHLORIDE 240 MG/1
CAPSULE, COATED, EXTENDED RELEASE ORAL
COMMUNITY

## 2021-05-10 RX ORDER — DULOXETIN HYDROCHLORIDE 60 MG/1
60 CAPSULE, DELAYED RELEASE ORAL DAILY
Status: DISCONTINUED | OUTPATIENT
Start: 2021-05-11 | End: 2021-05-15 | Stop reason: HOSPADM

## 2021-05-10 RX ORDER — ALBUTEROL SULFATE 2.5 MG/3ML
2.5 SOLUTION RESPIRATORY (INHALATION) EVERY 6 HOURS PRN
Status: DISCONTINUED | OUTPATIENT
Start: 2021-05-10 | End: 2021-05-15 | Stop reason: HOSPADM

## 2021-05-10 RX ORDER — GUAIFENESIN 600 MG/1
600 TABLET, EXTENDED RELEASE ORAL 2 TIMES DAILY
Status: DISCONTINUED | OUTPATIENT
Start: 2021-05-10 | End: 2021-05-15 | Stop reason: HOSPADM

## 2021-05-10 RX ORDER — VALACYCLOVIR HYDROCHLORIDE 500 MG/1
TABLET, FILM COATED ORAL
Status: ON HOLD | COMMUNITY
End: 2021-05-10

## 2021-05-10 RX ORDER — PROCHLORPERAZINE MALEATE 10 MG
10 TABLET ORAL EVERY 6 HOURS PRN
Status: DISCONTINUED | OUTPATIENT
Start: 2021-05-10 | End: 2021-05-15 | Stop reason: HOSPADM

## 2021-05-10 RX ORDER — CLONIDINE HYDROCHLORIDE 0.1 MG/1
0.1 TABLET ORAL EVERY 6 HOURS PRN
Status: DISCONTINUED | OUTPATIENT
Start: 2021-05-10 | End: 2021-05-15 | Stop reason: HOSPADM

## 2021-05-10 RX ORDER — TRAZODONE HYDROCHLORIDE 300 MG/1
TABLET ORAL
Status: ON HOLD | COMMUNITY
Start: 2021-04-20 | End: 2021-05-10

## 2021-05-10 RX ORDER — POTASSIUM CHLORIDE 20 MEQ/1
40 TABLET, EXTENDED RELEASE ORAL PRN
Status: DISCONTINUED | OUTPATIENT
Start: 2021-05-10 | End: 2021-05-15 | Stop reason: HOSPADM

## 2021-05-10 RX ORDER — ESOMEPRAZOLE MAGNESIUM 40 MG/1
CAPSULE, DELAYED RELEASE ORAL
COMMUNITY
End: 2022-06-16

## 2021-05-10 RX ORDER — SODIUM PHOSPHATE,MONO-DIBASIC 19G-7G/118
1 ENEMA (ML) RECTAL DAILY
Status: DISCONTINUED | OUTPATIENT
Start: 2021-05-10 | End: 2021-05-10 | Stop reason: RX

## 2021-05-10 RX ORDER — ARFORMOTEROL TARTRATE 15 UG/2ML
15 SOLUTION RESPIRATORY (INHALATION) 2 TIMES DAILY
Status: DISCONTINUED | OUTPATIENT
Start: 2021-05-10 | End: 2021-05-15 | Stop reason: HOSPADM

## 2021-05-10 RX ORDER — LOSARTAN POTASSIUM 100 MG/1
TABLET ORAL
Status: ON HOLD | COMMUNITY
Start: 2020-08-25 | End: 2021-05-10

## 2021-05-10 RX ORDER — GABAPENTIN 600 MG/1
TABLET ORAL
COMMUNITY

## 2021-05-10 RX ORDER — AZELASTINE 1 MG/ML
SPRAY, METERED NASAL
COMMUNITY
End: 2021-06-28

## 2021-05-10 RX ORDER — CELECOXIB 200 MG/1
CAPSULE ORAL
Status: ON HOLD | COMMUNITY
End: 2021-05-10

## 2021-05-10 RX ORDER — MELOXICAM 7.5 MG/1
15 TABLET ORAL DAILY
Status: DISCONTINUED | OUTPATIENT
Start: 2021-05-10 | End: 2021-05-10

## 2021-05-10 RX ORDER — HYDROCHLOROTHIAZIDE 25 MG/1
25 TABLET ORAL DAILY
Status: DISCONTINUED | OUTPATIENT
Start: 2021-05-10 | End: 2021-05-15 | Stop reason: HOSPADM

## 2021-05-10 RX ORDER — ACETAMINOPHEN 325 MG/1
650 TABLET ORAL EVERY 6 HOURS PRN
Status: DISCONTINUED | OUTPATIENT
Start: 2021-05-10 | End: 2021-05-15 | Stop reason: HOSPADM

## 2021-05-10 RX ORDER — TAMSULOSIN HYDROCHLORIDE 0.4 MG/1
CAPSULE ORAL
Status: ON HOLD | COMMUNITY
End: 2021-05-10

## 2021-05-10 RX ORDER — CYCLOBENZAPRINE HCL 10 MG
10 TABLET ORAL 3 TIMES DAILY PRN
Status: DISCONTINUED | OUTPATIENT
Start: 2021-05-10 | End: 2021-05-15 | Stop reason: HOSPADM

## 2021-05-10 RX ORDER — AZELASTINE 1 MG/ML
1 SPRAY, METERED NASAL 2 TIMES DAILY
Status: DISCONTINUED | OUTPATIENT
Start: 2021-05-10 | End: 2021-05-10 | Stop reason: RX

## 2021-05-10 RX ORDER — OXYCODONE HCL 20 MG/1
20 TABLET, FILM COATED, EXTENDED RELEASE ORAL EVERY 12 HOURS
Status: DISCONTINUED | OUTPATIENT
Start: 2021-05-10 | End: 2021-05-15 | Stop reason: HOSPADM

## 2021-05-10 RX ORDER — OMEPRAZOLE 20 MG/1
CAPSULE, DELAYED RELEASE ORAL
Status: ON HOLD | COMMUNITY
End: 2021-05-10

## 2021-05-10 RX ORDER — IPRATROPIUM BROMIDE AND ALBUTEROL SULFATE 2.5; .5 MG/3ML; MG/3ML
SOLUTION RESPIRATORY (INHALATION)
COMMUNITY

## 2021-05-10 RX ORDER — SODIUM CHLORIDE 0.9 % (FLUSH) 0.9 %
5-40 SYRINGE (ML) INJECTION EVERY 12 HOURS SCHEDULED
Status: DISCONTINUED | OUTPATIENT
Start: 2021-05-10 | End: 2021-05-15 | Stop reason: HOSPADM

## 2021-05-10 RX ORDER — GABAPENTIN 600 MG/1
600 TABLET ORAL 4 TIMES DAILY
Status: DISCONTINUED | OUTPATIENT
Start: 2021-05-10 | End: 2021-05-15 | Stop reason: HOSPADM

## 2021-05-10 RX ORDER — LOSARTAN POTASSIUM AND HYDROCHLOROTHIAZIDE 25; 100 MG/1; MG/1
1 TABLET ORAL DAILY
Status: DISCONTINUED | OUTPATIENT
Start: 2021-05-10 | End: 2021-05-10

## 2021-05-10 RX ORDER — OCTISALATE, AVOBENZONE, HOMOSALATE, AND OCTOCRYLENE 29.4; 29.4; 49; 25.48 MG/ML; MG/ML; MG/ML; MG/ML
LOTION TOPICAL
COMMUNITY
End: 2021-06-28

## 2021-05-10 RX ORDER — ONDANSETRON 4 MG/1
4 TABLET, ORALLY DISINTEGRATING ORAL EVERY 8 HOURS PRN
Status: DISCONTINUED | OUTPATIENT
Start: 2021-05-10 | End: 2021-05-15 | Stop reason: HOSPADM

## 2021-05-10 RX ORDER — CHLORAL HYDRATE 500 MG
1000 CAPSULE ORAL DAILY
Status: DISCONTINUED | OUTPATIENT
Start: 2021-05-10 | End: 2021-05-10 | Stop reason: RX

## 2021-05-10 RX ORDER — DUTASTERIDE 0.5 MG/1
CAPSULE, LIQUID FILLED ORAL
Status: ON HOLD | COMMUNITY
End: 2021-05-10

## 2021-05-10 RX ORDER — ALPRAZOLAM 0.5 MG/1
1 TABLET ORAL 2 TIMES DAILY PRN
Status: DISCONTINUED | OUTPATIENT
Start: 2021-05-10 | End: 2021-05-15 | Stop reason: HOSPADM

## 2021-05-10 RX ORDER — BIOTIN 10000 MCG
CAPSULE ORAL
COMMUNITY
End: 2021-06-28

## 2021-05-10 RX ORDER — CLONIDINE HYDROCHLORIDE 0.1 MG/1
TABLET ORAL
Status: ON HOLD | COMMUNITY
End: 2021-05-10

## 2021-05-10 RX ORDER — ONDANSETRON 2 MG/ML
4 INJECTION INTRAMUSCULAR; INTRAVENOUS EVERY 6 HOURS PRN
Status: DISCONTINUED | OUTPATIENT
Start: 2021-05-10 | End: 2021-05-15 | Stop reason: HOSPADM

## 2021-05-10 RX ORDER — DUTASTERIDE 0.5 MG/1
0.5 CAPSULE, LIQUID FILLED ORAL DAILY
COMMUNITY

## 2021-05-10 RX ORDER — OXYCODONE 18 MG/1
CAPSULE, EXTENDED RELEASE ORAL
Status: ON HOLD | COMMUNITY
Start: 2021-05-10 | End: 2021-05-10

## 2021-05-10 RX ORDER — LOSARTAN POTASSIUM 100 MG/1
100 TABLET ORAL DAILY
Status: DISCONTINUED | OUTPATIENT
Start: 2021-05-10 | End: 2021-05-15 | Stop reason: HOSPADM

## 2021-05-10 RX ORDER — CELECOXIB 200 MG/1
200 CAPSULE ORAL DAILY
Status: DISCONTINUED | OUTPATIENT
Start: 2021-05-11 | End: 2021-05-15 | Stop reason: HOSPADM

## 2021-05-10 RX ORDER — CHLORAL HYDRATE 500 MG
CAPSULE ORAL
COMMUNITY
End: 2022-04-01

## 2021-05-10 RX ORDER — TADALAFIL 2.5 MG/1
TABLET ORAL
COMMUNITY
End: 2021-06-28

## 2021-05-10 RX ORDER — DOXEPIN HYDROCHLORIDE 25 MG/1
25 CAPSULE ORAL NIGHTLY
Status: DISCONTINUED | OUTPATIENT
Start: 2021-05-10 | End: 2021-05-13

## 2021-05-10 RX ORDER — VARENICLINE TARTRATE 0.5 MG/1
1 TABLET, FILM COATED ORAL 2 TIMES DAILY
Status: DISCONTINUED | OUTPATIENT
Start: 2021-05-10 | End: 2021-05-15 | Stop reason: HOSPADM

## 2021-05-10 RX ORDER — VARENICLINE TARTRATE 1 MG/1
TABLET, FILM COATED ORAL
COMMUNITY
End: 2022-04-01

## 2021-05-10 RX ORDER — ACETAMINOPHEN 650 MG/1
650 SUPPOSITORY RECTAL EVERY 6 HOURS PRN
Status: DISCONTINUED | OUTPATIENT
Start: 2021-05-10 | End: 2021-05-15 | Stop reason: HOSPADM

## 2021-05-10 RX ORDER — AMLODIPINE BESYLATE 10 MG/1
10 TABLET ORAL DAILY
Status: DISCONTINUED | OUTPATIENT
Start: 2021-05-10 | End: 2021-05-15 | Stop reason: HOSPADM

## 2021-05-10 RX ORDER — BUDESONIDE AND FORMOTEROL FUMARATE DIHYDRATE 80; 4.5 UG/1; UG/1
2 AEROSOL RESPIRATORY (INHALATION) 2 TIMES DAILY
Status: DISCONTINUED | OUTPATIENT
Start: 2021-05-10 | End: 2021-05-10 | Stop reason: CLARIF

## 2021-05-10 RX ADMIN — GABAPENTIN 600 MG: 600 TABLET, FILM COATED ORAL at 20:13

## 2021-05-10 RX ADMIN — SODIUM CHLORIDE, PRESERVATIVE FREE 1000 MG: 5 INJECTION INTRAVENOUS at 17:50

## 2021-05-10 RX ADMIN — AZITHROMYCIN MONOHYDRATE 500 MG: 500 INJECTION, POWDER, LYOPHILIZED, FOR SOLUTION INTRAVENOUS at 17:49

## 2021-05-10 RX ADMIN — IPRATROPIUM BROMIDE AND ALBUTEROL SULFATE 1 AMPULE: 2.5; .5 SOLUTION RESPIRATORY (INHALATION) at 18:02

## 2021-05-10 RX ADMIN — METHYLPREDNISOLONE SODIUM SUCCINATE 80 MG: 125 INJECTION, POWDER, FOR SOLUTION INTRAMUSCULAR; INTRAVENOUS at 22:52

## 2021-05-10 RX ADMIN — GUAIFENESIN AND DEXTROMETHORPHAN HYDROBROMIDE 1 TABLET: 600; 30 TABLET, EXTENDED RELEASE ORAL at 20:53

## 2021-05-10 RX ADMIN — ALPRAZOLAM 1 MG: 0.5 TABLET ORAL at 20:12

## 2021-05-10 RX ADMIN — OXYCODONE HYDROCHLORIDE 20 MG: 20 TABLET, FILM COATED, EXTENDED RELEASE ORAL at 20:13

## 2021-05-10 RX ADMIN — FAMOTIDINE 20 MG: 10 INJECTION, SOLUTION INTRAVENOUS at 17:50

## 2021-05-10 RX ADMIN — IPRATROPIUM BROMIDE AND ALBUTEROL SULFATE 1 AMPULE: 2.5; .5 SOLUTION RESPIRATORY (INHALATION) at 15:28

## 2021-05-10 RX ADMIN — METHYLPREDNISOLONE SODIUM SUCCINATE 80 MG: 125 INJECTION, POWDER, FOR SOLUTION INTRAMUSCULAR; INTRAVENOUS at 17:52

## 2021-05-10 RX ADMIN — ENOXAPARIN SODIUM 40 MG: 40 INJECTION SUBCUTANEOUS at 17:50

## 2021-05-10 RX ADMIN — ALBUTEROL SULFATE 2.5 MG: 2.5 SOLUTION RESPIRATORY (INHALATION) at 22:50

## 2021-05-10 RX ADMIN — TRAZODONE HYDROCHLORIDE 300 MG: 100 TABLET ORAL at 20:12

## 2021-05-10 RX ADMIN — DILTIAZEM HYDROCHLORIDE 240 MG: 120 CAPSULE, COATED, EXTENDED RELEASE ORAL at 20:53

## 2021-05-10 RX ADMIN — OXYCODONE AND ACETAMINOPHEN 1 TABLET: 5; 325 TABLET ORAL at 20:53

## 2021-05-10 ASSESSMENT — PAIN SCALES - GENERAL
PAINLEVEL_OUTOF10: 6
PAINLEVEL_OUTOF10: 6

## 2021-05-10 NOTE — PROGRESS NOTES
Potassium, Whole Blood [[id number]] Collected: 05/10/21 1423      Updated: 05/10/21 1425      Potassium, Whole Blood 4.2     Blood gas, arterial [[id number]] (Abnormal) Collected: 05/10/21 1423     Specimen: Blood gases Updated: 05/10/21 1425      pH, Arterial 7.440      pCO2, Arterial 39.0 mmHg       pO2, Arterial 67.0 mmHg       HCO3, Arterial 26.5 mmol/L       Base Excess, Arterial 2.3 mmol/L       Hemoglobin, Art, Extended 18.1 g/dL       O2 Sat, Arterial 92.6 %       Carboxyhgb, Arterial 1.7 %       Methemoglobin, Arterial 0.9 %       O2 Content, Arterial 23.5 mL/dL       O2 Therapy Unknown         At+, L rad, O2 @2, RR = 20

## 2021-05-10 NOTE — H&P
[facility]  History and Physical    Patient:  Patsy Nichols  MRN: [id number]    CHIEF COMPLAINT:  Shortness of breath      PCP: Maryann Guy MD    HISTORY OF PRESENT ILLNESS:   The patient is a 61 y.o. male who presents with complaints of cough, congestion and progressively worsening sob. Known h/o COPD. Has failed maximum outpatinent management including IM steroids, nebs and abx.      REVIEW OF SYSTEMS:    Past Medical History:      Diagnosis Date    Abnormal gait     Amnesia     Anxiety     Asthma     BiPAP (biphasic positive airway pressure) dependence     NiPPV 23cm/16cm/12cm    Cervical facet syndrome     Chronic back pain     Complex sleep apnea syndrome     Initial PS; AHI:  31.2 per PSG, 10/2014    Concussion     COPD (chronic obstructive pulmonary disease) (HCC)     DDD (degenerative disc disease)     Facial ringworm     PERCY (generalized anxiety disorder)     GERD (gastroesophageal reflux disease)     Headache     Herniated disc     Herpes simplex     History of Juan's esophagus     Hyperlipidemia     Hypersomnia     Hypertension     Hypotestosteronism     Lumbar stenosis     Memory loss     Obesity     Obstructive sleep apnea     Initial PS; AHI:  31.2 per split-night PSG, 10/2014    Pneumonia     Potential difficult airway on pre-intubation assessment     PTSD (post-traumatic stress disorder)     Sleep apnea     Bi-pap at night    Vertigo        Past Surgical History:      Procedure Laterality Date    APPENDECTOMY      BACK SURGERY      Three lumbar fusions L3-4, L3-4-5 & S1    CARDIAC CATHETERIZATION      COLONOSCOPY      Juline Ganser    COLONOSCOPY  16    Dr Florian Leal, normal, 5 yr recall    COLONOSCOPY N/A 2019    COLONOSCOPY DIAGNOSTIC performed by Sukhwinder Painter MD at [address] [facility]    ENDOSCOPY, COLON, DIAGNOSTIC      FRACTURE SURGERY      wrist    HERNIA REPAIR      KNEE ARTHROSCOPY      LUMBAR FUSION      OTHER SURGICAL HISTORY      nerve stimulator. dcs battery replacement 5/2/18    RHINOPLASTY      SKIN BIOPSY      SPINE SURGERY      x 3/Stimulator implant    UPPER GASTROINTESTINAL ENDOSCOPY  2009    [address] [facility]    UPPER GASTROINTESTINAL ENDOSCOPY  12/2013    BE surveillance. pos BE / neg dysplasia. retained gastric contents    UPPER GASTROINTESTINAL ENDOSCOPY N/A 4/12/2016    Dr Vinny Navas, Susy (-), Barretts (+), 3 yr recall    UPPER GASTROINTESTINAL ENDOSCOPY  06/19/2019    Dr Ana Torres segment Juan's esophagus    UPPER GASTROINTESTINAL ENDOSCOPY  06/19/2019    Dr Giig Beck diverticulosis, suboptimal prep, Grade 2 internal hemorrhoids, 3 yr recall    UPPER GASTROINTESTINAL ENDOSCOPY N/A 6/19/2019    EGD BIOPSY performed by Keke Segal MD at [facility]       Medications Prior to Admission:    Prior to Admission medications    Medication Sig Start Date End Date Taking? Authorizing Provider   oxyCODONE-acetaminophen (PERCOCET) 5-325 MG per tablet Take 1 tablet by mouth every 4 hours as needed for Pain.     Historical Provider, MD   meloxicam (MOBIC) 15 MG tablet Take 1 tablet by mouth daily 1/29/21   EVONNE Barotn   doxepin (SINEQUAN) 10 MG capsule 1 to 2 PO q HS 1/7/21   Amalia Cisneros MD   traZODone (DESYREL) 100 MG tablet Take 2 tablets by mouth nightly as needed for Sleep 10/8/20   Amalia Cisneros MD   meclizine (ANTIVERT) 25 MG tablet Take 25 mg by mouth 4 times daily as needed    Historical Provider, MD   albuterol sulfate HFA (PROAIR HFA) 108 (90 Base) MCG/ACT inhaler Inhale 2 puffs into the lungs every 6 hours as needed for Wheezing    Historical Provider, MD   prochlorperazine (COMPAZINE) 10 MG tablet Take 10 mg by mouth every 6 hours as needed    Historical Provider, MD   Tadalafil 2.5 MG TABS Take by mouth    Historical Provider, MD   mometasone-formoterol (DULERA) 200-5 MCG/ACT inhaler Inhale 2 puffs into the lungs every 12 hours    Historical Provider, MD celecoxib (CELEBREX) 200 MG capsule Take 200 mg by mouth daily    Historical Provider, MD   cyclobenzaprine (FLEXERIL) 5 MG tablet Take 10 mg by mouth 3 times daily     Historical Provider, MD   valACYclovir (VALTREX) 500 MG tablet Take 500 mg by mouth daily    Historical Provider, MD   tamsulosin (FLOMAX) 0.4 MG capsule Take 0.4 mg by mouth daily    Historical Provider, MD   losartan-hydrochlorothiazide (HYZAAR) 100-25 MG per tablet Take 1 tablet by mouth daily    Historical Provider, MD   cloNIDine (CATAPRES) 0.1 MG tablet Take 0.1 mg by mouth every 6 hours as needed for High Blood Pressure     Historical Provider, MD   mometasone (ASMANEX 7 METERED DOSES) 110 MCG/INH AEPB Inhale 2 puffs into the lungs daily    Historical Provider, MD   diphenoxylate-atropine (LOMOTIL) 2.5-0.025 MG per tablet Take 1 tablet by mouth 4 times daily as needed for Diarrhea. .    Historical Provider, MD   Lorcaserin HCl (BELVIQ) 10 MG TABS Take by mouth 2 times daily. Konstantin Hernández Historical Provider, MD   oxyCODONE (OXYCONTIN) 10 MG extended release tablet Take 20 mg by mouth every 12 hours. .    Historical Provider, MD   omeprazole (PRILOSEC) 10 MG capsule Take 20 mg by mouth daily     Historical Provider, MD Adelfo Romero Omega-3 300 MG CAPS Take by mouth 2 times daily     Historical Provider, MD   azelastine (ASTELIN) 0.1 % nasal spray 1 spray by Nasal route 2 times daily Use in each nostril as directed    Historical Provider, MD   testosterone cypionate (DEPOTESTOTERONE CYPIONATE) 200 MG/ML injection Inject 50 mg into the muscle every 14 days. Konstantin Hernández Historical Provider, MD   DULoxetine (CYMBALTA) 60 MG capsule Take 60 mg by mouth daily. Historical Provider, MD   ALPRAZolam Jerry Overall) 1 MG tablet Take 1 mg by mouth 2 times daily as needed. Historical Provider, MD   gabapentin (NEURONTIN) 800 MG tablet Take 600 mg by mouth 4 times daily 600 four times a day.     Historical Provider, MD   esomeprazole Magnesium (NEXIUM) 40 MG PACK Take 40 mg Stroke Paternal Grandmother     Colon Cancer Neg Hx     Esophageal Cancer Neg Hx     Liver Cancer Neg Hx     Liver Disease Neg Hx     Rectal Cancer Neg Hx     Stomach Cancer Neg Hx       Review of systems:    Con: no fever/chills or significant weight changes   Heent: denies HA, change in vision or hearing. No significant sinus drainage. No             difficulties swallowing. No recent trauma noted. CV: denies CP, dyspnea on exertion, Palpitations. No change in exercise tolerance  Pulm: No cough, sputum production, denies hemoptysis   GI: denies changes in bowel habits, no diarrhea or significant constipation. No BRBPR       or melena. : no dysuria, hesitancy or dysuria. Denies hematuria. Derm: no rashes or new lesions of concern. Psych: no signs of depression, anxiety or significant mood changes. Neuro: denies focal weakness or change in mentation  A full 14 point review of systems is otherwise negative outside listed above and HPI      Physical Exam:    Vitals: There were no vitals taken for this visit. GENERAL: WD/WN not in acute distress, resting well in bed  HEENT: NC/AT PERRL, EOMI grossly, TM's clear, OP negative without sig erythema or exudate, neck is supple without masses or carotid bruit noted    CV: normal S1 and S2, no sig murmur, lift or gallop, normal PMI  CHEST: bilateral wheezes  ABD: soft NT/ND with normoactive BS noted. No guarding or rebounding noted  /rectal: deferred  EXT: no cyanosis or clubbing noted, normal ROM  DERM: skin is warm and dry without sig rashes on exposed areas  PSYCH: appears mentally stable with no obvious abnormalities  NEURO: CN 2-12 apper grossly intact without sig deficits in motor or sensory       CBC: No results for input(s): WBC, HGB, PLT in the last 72 hours. BMP:  No results for input(s): NA, K, CL, CO2, BUN, CREATININE, GLUCOSE in the last 72 hours. Hepatic: No results for input(s): AST, ALT, ALB, BILITOT, ALKPHOS in the last 72 hours.   Troponin T: No results for input(s): TROPONINI in the last 72 hours. Pro-BNP: No results for input(s): BNP in the last 72 hours. Lipids: No results for input(s): CHOL, HDL in the last 72 hours. Invalid input(s): LDLCALCU  ABGs: No results found for: PHART, PO2ART, [id number]  INR: No results for input(s): INR in the last 72 hours. -----------------------------------------------------------------    Radiology:     Vl Extremity Venous Left    Result Date: 4/15/2021  Vascular Lower Extremities DVT Study Procedure  Demographics   Patient Name    Demi Santiago Age                  61   Patient Number  [id number]         Gender               Male   Visit Number    [id number]      Interpreting         Chelo Oreilly MD                                 Physician   Date of Birth   1955     Referring Physician  Chitra Ontiveros   Accession       [id number]     [address], RVT,  Number                                              RDMS  Procedure Type of Study:   Veins:Lower Extremities DVT Study, VL EXTREMITY VENOUS DUPLEX LEFT. Indications for Study:Pain, left lower extremity and Edema, left lower extremity. Impression   There is no evidence of deep venous thrombosis (DVT) in the left lower  extremity(ies). There is no evidence of superficial thrombophlebitis of the left lower  extremity(ies). Signature   ----------------------------------------------------------------  Electronically signed by Chelo Oreilly MD(Interpreting  physician) on 04/15/2021 06:38 AM  ----------------------------------------------------------------  Velocities are measured in cm/s ; Diameters are measured in mm Right Lower Extremities DVT Study Measurements Right 2D Measurements +------------------------------------+----------+---------------+----------+ ! Location                            ! Visualized! Compressibility! Thrombosis! +------------------------------------+----------+---------------+----------+ ! Sapheno Femoral Junction            ! Yes       ! Yes            ! None      ! +------------------------------------+----------+---------------+----------+ ! Common Femoral                      !Yes       ! Yes            ! None      ! +------------------------------------+----------+---------------+----------+ Left Lower Extremities DVT Study Measurements Left 2D Measurements +------------------------------------+----------+---------------+----------+ ! Location                            ! Visualized! Compressibility! Thrombosis! +------------------------------------+----------+---------------+----------+ ! Sapheno Femoral Junction            ! Yes       ! Yes            ! None      ! +------------------------------------+----------+---------------+----------+ ! Common Femoral                      !Yes       ! Yes            ! None      ! +------------------------------------+----------+---------------+----------+ ! Prox Femoral                        !Yes       ! Yes            ! None      ! +------------------------------------+----------+---------------+----------+ ! Mid Femoral                         !Yes       ! Yes            ! None      ! +------------------------------------+----------+---------------+----------+ ! Dist Femoral                        !Yes       ! Yes            ! None      ! +------------------------------------+----------+---------------+----------+ ! Deep Femoral                        !Yes       ! Yes            ! None      ! +------------------------------------+----------+---------------+----------+ ! Popliteal                           !Yes       ! Yes            ! None      ! +------------------------------------+----------+---------------+----------+ ! SSV                                 ! Yes       ! Yes            ! None      ! +------------------------------------+----------+---------------+----------+ ! Gastroc                             ! Yes       ! Yes            ! None      ! +------------------------------------+----------+---------------+----------+ ! PTV                                 ! Yes       ! Yes            ! None      ! +------------------------------------+----------+---------------+----------+ ! GSV                                 ! Yes       ! Yes            ! None      ! +------------------------------------+----------+---------------+----------+ ! ATV                                 ! Yes       ! Yes            ! None      ! +------------------------------------+----------+---------------+----------+ ! Peroneal                            !Yes       ! Yes            ! None      ! +------------------------------------+----------+---------------+----------+      Assessment and Plan   Acute Exacerbation of COPD    Past Medical History:   Diagnosis Date    Abnormal gait     Amnesia     Anxiety     Asthma     BiPAP (biphasic positive airway pressure) dependence     NiPPV 23cm/16cm/12cm    Cervical facet syndrome     Chronic back pain     Complex sleep apnea syndrome     Initial PS; AHI:  31.2 per PSG, 10/2014    Concussion     [facility]     DDD (degenerative disc disease)     Facial ringworm     PERCY (generalized anxiety disorder)     GERD (gastroesophageal reflux disease)     Headache     Herniated disc     Herpes simplex     History of Juan's esophagus     Hyperlipidemia     Hypersomnia     Hypertension     Hypotestosteronism     Lumbar stenosis     Memory loss     Obesity     Obstructive sleep apnea     Initial PS; AHI:  31.2 per split-night PSG, 10/2014    Pneumonia     Potential difficult airway on pre-intubation assessment     PTSD (post-traumatic stress disorder)     Sleep apnea     Bi-pap at night    Vertigo          Plan:    COPD:  1. Start/continue IV steroids and wean as tolerated  2.  Supplemental 02 to maintain oxygen sats approximately 90%, avoiding suppression of respiratory drive in CO2 retainers  3. Aggressive pulmonary toilet with Duoneb   4. Early mobilization  5. Incentive spirometry  6. DVT prophylaxis  7. IV antibiotics for evidence of Acute on Chronic Bronchitis  8. Monitor for respiratory distress  9. Pulmonary consult as needed        Elizabeth Espinosa  .   5/10/2021

## 2021-05-11 LAB
ANION GAP SERPL CALCULATED.3IONS-SCNC: 10 MMOL/L (ref 7–19)
BUN BLDV-MCNC: 21 MG/DL (ref 8–23)
CALCIUM SERPL-MCNC: 9.6 MG/DL (ref 8.8–10.2)
CHLORIDE BLD-SCNC: 99 MMOL/L (ref 98–111)
CO2: 25 MMOL/L (ref 22–29)
CREAT SERPL-MCNC: 1.2 MG/DL (ref 0.5–1.2)
GFR AFRICAN AMERICAN: >59
GFR NON-AFRICAN AMERICAN: >60
GLUCOSE BLD-MCNC: 172 MG/DL (ref 74–109)
HCT VFR BLD CALC: 52.1 % (ref 42–52)
HEMOGLOBIN: 17.7 G/DL (ref 14–18)
INR BLD: 1.1 (ref 0.88–1.18)
MCH RBC QN AUTO: 31.3 PG (ref 27–31)
MCHC RBC AUTO-ENTMCNC: 34 G/DL (ref 33–37)
MCV RBC AUTO: 92 FL (ref 80–94)
PDW BLD-RTO: 13.2 % (ref 11.5–14.5)
PLATELET # BLD: 155 K/UL (ref 130–400)
PMV BLD AUTO: 9.9 FL (ref 9.4–12.4)
POTASSIUM REFLEX MAGNESIUM: 4.9 MMOL/L (ref 3.5–5)
PROTHROMBIN TIME: 14.2 SEC (ref 12–14.6)
RBC # BLD: 5.66 M/UL (ref 4.7–6.1)
SODIUM BLD-SCNC: 134 MMOL/L (ref 136–145)
WBC # BLD: 4.3 K/UL (ref 4.8–10.8)

## 2021-05-11 PROCEDURE — 6360000002 HC RX W HCPCS: Performed by: FAMILY MEDICINE

## 2021-05-11 PROCEDURE — 85027 COMPLETE CBC AUTOMATED: CPT

## 2021-05-11 PROCEDURE — 96376 TX/PRO/DX INJ SAME DRUG ADON: CPT

## 2021-05-11 PROCEDURE — 36415 COLL VENOUS BLD VENIPUNCTURE: CPT

## 2021-05-11 PROCEDURE — 6370000000 HC RX 637 (ALT 250 FOR IP): Performed by: FAMILY MEDICINE

## 2021-05-11 PROCEDURE — 94640 AIRWAY INHALATION TREATMENT: CPT

## 2021-05-11 PROCEDURE — 96366 THER/PROPH/DIAG IV INF ADDON: CPT

## 2021-05-11 PROCEDURE — 97116 GAIT TRAINING THERAPY: CPT

## 2021-05-11 PROCEDURE — G0378 HOSPITAL OBSERVATION PER HR: HCPCS

## 2021-05-11 PROCEDURE — 2580000003 HC RX 258: Performed by: FAMILY MEDICINE

## 2021-05-11 PROCEDURE — 85610 PROTHROMBIN TIME: CPT

## 2021-05-11 PROCEDURE — 97161 PT EVAL LOW COMPLEX 20 MIN: CPT

## 2021-05-11 PROCEDURE — 2500000003 HC RX 250 WO HCPCS: Performed by: FAMILY MEDICINE

## 2021-05-11 PROCEDURE — 2700000000 HC OXYGEN THERAPY PER DAY

## 2021-05-11 PROCEDURE — 96372 THER/PROPH/DIAG INJ SC/IM: CPT

## 2021-05-11 PROCEDURE — 97165 OT EVAL LOW COMPLEX 30 MIN: CPT

## 2021-05-11 PROCEDURE — 80048 BASIC METABOLIC PNL TOTAL CA: CPT

## 2021-05-11 RX ORDER — METHYLPREDNISOLONE SODIUM SUCCINATE 40 MG/ML
40 INJECTION, POWDER, LYOPHILIZED, FOR SOLUTION INTRAMUSCULAR; INTRAVENOUS EVERY 8 HOURS
Status: DISCONTINUED | OUTPATIENT
Start: 2021-05-11 | End: 2021-05-13

## 2021-05-11 RX ADMIN — GABAPENTIN 600 MG: 600 TABLET, FILM COATED ORAL at 13:16

## 2021-05-11 RX ADMIN — IPRATROPIUM BROMIDE AND ALBUTEROL SULFATE 1 AMPULE: 2.5; .5 SOLUTION RESPIRATORY (INHALATION) at 19:34

## 2021-05-11 RX ADMIN — FINASTERIDE 5 MG: 5 TABLET, FILM COATED ORAL at 07:49

## 2021-05-11 RX ADMIN — CELECOXIB 200 MG: 200 CAPSULE ORAL at 07:48

## 2021-05-11 RX ADMIN — PANTOPRAZOLE SODIUM 40 MG: 40 TABLET, DELAYED RELEASE ORAL at 06:14

## 2021-05-11 RX ADMIN — BUDESONIDE 250 MCG: 0.25 SUSPENSION RESPIRATORY (INHALATION) at 06:55

## 2021-05-11 RX ADMIN — OXYCODONE AND ACETAMINOPHEN 1 TABLET: 5; 325 TABLET ORAL at 17:26

## 2021-05-11 RX ADMIN — ACETAMINOPHEN 650 MG: 325 TABLET ORAL at 19:16

## 2021-05-11 RX ADMIN — TRAZODONE HYDROCHLORIDE 300 MG: 100 TABLET ORAL at 21:09

## 2021-05-11 RX ADMIN — DULOXETINE 60 MG: 60 CAPSULE, DELAYED RELEASE ORAL at 07:49

## 2021-05-11 RX ADMIN — LOSARTAN POTASSIUM 100 MG: 100 TABLET, FILM COATED ORAL at 06:15

## 2021-05-11 RX ADMIN — VARENICLINE TARTRATE 1 MG: 0.5 TABLET, FILM COATED ORAL at 06:15

## 2021-05-11 RX ADMIN — METHYLPREDNISOLONE SODIUM SUCCINATE 80 MG: 125 INJECTION, POWDER, FOR SOLUTION INTRAMUSCULAR; INTRAVENOUS at 13:16

## 2021-05-11 RX ADMIN — OXYCODONE HYDROCHLORIDE 20 MG: 20 TABLET, FILM COATED, EXTENDED RELEASE ORAL at 21:09

## 2021-05-11 RX ADMIN — GABAPENTIN 600 MG: 600 TABLET, FILM COATED ORAL at 21:09

## 2021-05-11 RX ADMIN — METHYLPREDNISOLONE SODIUM SUCCINATE 40 MG: 40 INJECTION, POWDER, FOR SOLUTION INTRAMUSCULAR; INTRAVENOUS at 21:10

## 2021-05-11 RX ADMIN — OXYCODONE AND ACETAMINOPHEN 1 TABLET: 5; 325 TABLET ORAL at 06:13

## 2021-05-11 RX ADMIN — TAMSULOSIN HYDROCHLORIDE 0.4 MG: 0.4 CAPSULE ORAL at 06:15

## 2021-05-11 RX ADMIN — AMLODIPINE BESYLATE 10 MG: 10 TABLET ORAL at 06:14

## 2021-05-11 RX ADMIN — BUDESONIDE 250 MCG: 0.25 SUSPENSION RESPIRATORY (INHALATION) at 19:43

## 2021-05-11 RX ADMIN — SODIUM CHLORIDE, PRESERVATIVE FREE 10 ML: 5 INJECTION INTRAVENOUS at 21:13

## 2021-05-11 RX ADMIN — DILTIAZEM HYDROCHLORIDE 240 MG: 120 CAPSULE, COATED, EXTENDED RELEASE ORAL at 06:15

## 2021-05-11 RX ADMIN — FAMOTIDINE 20 MG: 10 INJECTION, SOLUTION INTRAVENOUS at 07:48

## 2021-05-11 RX ADMIN — GUAIFENESIN 600 MG: 600 TABLET, EXTENDED RELEASE ORAL at 06:15

## 2021-05-11 RX ADMIN — VALACYCLOVIR HYDROCHLORIDE 500 MG: 500 TABLET, FILM COATED ORAL at 07:48

## 2021-05-11 RX ADMIN — ALPRAZOLAM 1 MG: 0.5 TABLET ORAL at 16:33

## 2021-05-11 RX ADMIN — DOXEPIN HYDROCHLORIDE 25 MG: 25 CAPSULE ORAL at 21:09

## 2021-05-11 RX ADMIN — IPRATROPIUM BROMIDE AND ALBUTEROL SULFATE 1 AMPULE: 2.5; .5 SOLUTION RESPIRATORY (INHALATION) at 14:52

## 2021-05-11 RX ADMIN — GABAPENTIN 600 MG: 600 TABLET, FILM COATED ORAL at 16:27

## 2021-05-11 RX ADMIN — ARFORMOTEROL TARTRATE 15 MCG: 15 SOLUTION RESPIRATORY (INHALATION) at 19:43

## 2021-05-11 RX ADMIN — IPRATROPIUM BROMIDE AND ALBUTEROL SULFATE 1 AMPULE: 2.5; .5 SOLUTION RESPIRATORY (INHALATION) at 06:45

## 2021-05-11 RX ADMIN — FAMOTIDINE 20 MG: 10 INJECTION, SOLUTION INTRAVENOUS at 21:10

## 2021-05-11 RX ADMIN — IPRATROPIUM BROMIDE AND ALBUTEROL SULFATE 1 AMPULE: 2.5; .5 SOLUTION RESPIRATORY (INHALATION) at 10:46

## 2021-05-11 RX ADMIN — VARENICLINE TARTRATE 1 MG: 0.5 TABLET, FILM COATED ORAL at 21:09

## 2021-05-11 RX ADMIN — ARFORMOTEROL TARTRATE 15 MCG: 15 SOLUTION RESPIRATORY (INHALATION) at 06:55

## 2021-05-11 RX ADMIN — METHYLPREDNISOLONE SODIUM SUCCINATE 80 MG: 125 INJECTION, POWDER, FOR SOLUTION INTRAMUSCULAR; INTRAVENOUS at 06:25

## 2021-05-11 RX ADMIN — CYCLOBENZAPRINE 10 MG: 10 TABLET, FILM COATED ORAL at 21:10

## 2021-05-11 RX ADMIN — OXYCODONE AND ACETAMINOPHEN 1 TABLET: 5; 325 TABLET ORAL at 13:16

## 2021-05-11 RX ADMIN — SODIUM CHLORIDE, PRESERVATIVE FREE 1000 MG: 5 INJECTION INTRAVENOUS at 13:16

## 2021-05-11 RX ADMIN — GUAIFENESIN 600 MG: 600 TABLET, EXTENDED RELEASE ORAL at 21:10

## 2021-05-11 RX ADMIN — PANTOPRAZOLE SODIUM 40 MG: 40 TABLET, DELAYED RELEASE ORAL at 16:27

## 2021-05-11 RX ADMIN — HYDROCHLOROTHIAZIDE 25 MG: 25 TABLET ORAL at 06:14

## 2021-05-11 RX ADMIN — AZITHROMYCIN MONOHYDRATE 500 MG: 500 INJECTION, POWDER, LYOPHILIZED, FOR SOLUTION INTRAVENOUS at 15:18

## 2021-05-11 RX ADMIN — OXYCODONE HYDROCHLORIDE 20 MG: 20 TABLET, FILM COATED, EXTENDED RELEASE ORAL at 06:13

## 2021-05-11 RX ADMIN — SODIUM CHLORIDE, PRESERVATIVE FREE 10 ML: 5 INJECTION INTRAVENOUS at 07:50

## 2021-05-11 RX ADMIN — ACETAMINOPHEN 650 MG: 325 TABLET ORAL at 11:28

## 2021-05-11 RX ADMIN — ENOXAPARIN SODIUM 40 MG: 40 INJECTION SUBCUTANEOUS at 07:48

## 2021-05-11 RX ADMIN — GABAPENTIN 600 MG: 600 TABLET, FILM COATED ORAL at 06:15

## 2021-05-11 ASSESSMENT — PAIN SCALES - GENERAL
PAINLEVEL_OUTOF10: 4
PAINLEVEL_OUTOF10: 3
PAINLEVEL_OUTOF10: 8
PAINLEVEL_OUTOF10: 7
PAINLEVEL_OUTOF10: 3
PAINLEVEL_OUTOF10: 4
PAINLEVEL_OUTOF10: 8

## 2021-05-11 ASSESSMENT — PAIN DESCRIPTION - LOCATION: LOCATION: HEAD

## 2021-05-11 NOTE — PROGRESS NOTES
Occupational Therapy   Occupational Therapy Initial Assessment  Date: 2021   Patient Name: Kayleen Habermann  MRN: [id number]     : 1955    Date of Service: 2021    Discharge Recommendations:          Assessment   Assessment: Did well with mobility and takes rest breaks as needed. No further OT needed. Treatment Diagnosis: COPD exacerbation, Pneumonia  Prognosis: Good  Decision Making: Low Complexity  REQUIRES OT FOLLOW UP: No  Activity Tolerance  Activity Tolerance: Patient limited by fatigue           Patient Diagnosis(es): There were no encounter diagnoses. has a past medical history of Abnormal gait, Amnesia, Anxiety, Asthma, BiPAP (biphasic positive airway pressure) dependence, Cervical facet syndrome, Chronic back pain, Complex sleep apnea syndrome, Concussion, COPD (chronic obstructive pulmonary disease) (HCC), DDD (degenerative disc disease), Facial ringworm, PERCY (generalized anxiety disorder), GERD (gastroesophageal reflux disease), Headache, Herniated disc, Herpes simplex, History of Juan's esophagus, Hyperlipidemia, Hypersomnia, Hypertension, Hypotestosteronism, Lumbar stenosis, Memory loss, Obesity, Obstructive sleep apnea, Pneumonia, Potential difficult airway on pre-intubation assessment, PTSD (post-traumatic stress disorder), Sleep apnea, and Vertigo. has a past surgical history that includes Knee arthroscopy; Appendectomy; hernia repair; rhinoplasty; fracture surgery; Cardiac catheterization; Colonoscopy (); Upper gastrointestinal endoscopy (); Upper gastrointestinal endoscopy (2013); Spine surgery; Upper gastrointestinal endoscopy (N/A, 2016); Colonoscopy (16); back surgery; other surgical history; Endoscopy, colon, diagnostic; skin biopsy; Upper gastrointestinal endoscopy (2019); Upper gastrointestinal endoscopy (2019); Colonoscopy (N/A, 2019); Upper gastrointestinal endoscopy (N/A, 2019); and lumbar fusion.     Treatment Diagnosis: COPD exacerbation, Pneumonia      Restrictions       Subjective   General  Chart Reviewed: Yes  Patient assessed for rehabilitation services?: Yes  Diagnosis: COPD exacerbation, Pneumonia  General Comment  Comments: Pt. willing to participate  Patient Currently in Pain: Denies  Pain Assessment  Pain Level: 4  Response to Pain Intervention: Patient Satisfied  Vital Signs  Patient Currently in Pain: Denies  Social/Functional History  Social/Functional History  Lives With: Spouse(Wife is currently out of the country, pt. living alone)  Type of Home: House  Home Equipment: (bipap)  ADL Assistance: Independent  Ambulation Assistance: Independent  Transfer Assistance: Independent       Objective   Vision: Within Functional Limits  Hearing: Within functional limits    Orientation  Overall Orientation Status: Within Normal Limits        ADL  Feeding: Independent  Grooming: Independent  UE Bathing: Supervision  LE Bathing: Supervision  UE Dressing: Supervision  LE Dressing: Supervision  Toileting: Supervision  Additional Comments: Takes rest breaks as needed due to breathing.   Is on room air           Transfers  Stand Step Transfers: Independent  Sit to stand: Independent  Transfer Comments: No AD                       LUE AROM (degrees)  LUE AROM : WFL  RUE AROM (degrees)  RUE AROM : WFL  LUE Strength  Gross LUE Strength: WFL  RUE Strength  Gross RUE Strength: WFL                   Plan   Plan  Times per week: OT eval only    G-Code     OutComes Score                                                  AM-PAC Score             Goals  Short term goals  Time Frame for Short term goals: OT eval only  Long term goals  Time Frame for Long term goals : OT eval only       Therapy Time   Individual Concurrent Group Co-treatment   Time In           Time Out           Minutes                   Betzy Das OT

## 2021-05-11 NOTE — PROGRESS NOTES
PHARMACY NOTE  Tamia Anna was ordered azelastine nasal spray, biotin caps, krill oil, omega-3 caps and glucosamine & chondroitin caps. Per the [address], this medication is non-formulary and not stocked by pharmacy. It has been discontinued. The medication can be reordered at discharge.      Electronically signed by Adrian Thomas, [address] [place] [address] on 5/10/2021 at 8:07 PM

## 2021-05-11 NOTE — CARE COORDINATION
CM Initial Assessment   Met with pt to assess dc plans/needs. The following information has been reviewed and confirmed: Address:  [address]    Phone number:   [phone] (home)     Pt reports that he lives at home with his spouse. They have a home in [place], and spouse is there for 1 month with her mother and 2 sisters. He has a cane and walker at home that he uses as needed. He still drives. He reports his medications as affordable. Pt is eligible for CHW program through [facility], pt denied referral to the program.   He stated that he has a bipap at home, is no ton home o2. He uses a nebulizer for breathing treatments, believes that it is supplied through [facility]. He is curious if he is eligible for new nebulizer equipment. SW will call. He stated that he had COVID in November, he has quit smoking, but has had pneumonia multiple times in the past.   He stated that he drove himself to the ED and plans to drive himself back home. If he does not feel like driving, he stated that he has a friend that can pick him up. [facility] SW following for nebulizer update and any other dc needs. Electronically signed by Salvador Gutierrez on 5/11/2021 at 1:55 PM    Spoke with [facility], they are supplier for pt's nebulizer and bipap. Pt was curious about buying concentrator for o2 at home although he does not qualify, they stated it would be $1200 and he would still need a DME order for this. Will speak with pt about buying concentrator.    Electronically signed by Salvador Gutierrez on 5/11/2021 at 3:30 PM

## 2021-05-11 NOTE — PROGRESS NOTES
[facility] [place] [facility] PHYSICAL THERAPY EVALUATION      Montserrat Salmeron    : 1955  MRN: [id number]   PHYSICIAN:  Judson Zapata MD  Primary Problem    Patient Active Problem List   Diagnosis    Chest pain    Acid reflux    Heartburn    Non-cardiac chest pain    Barretts esophagus    History of colon polyps    Family history of colonic polyps    Current smoker    Juan's esophagus determined by biopsy    Potential difficult airway on pre-intubation assessment    Obstructive sleep apnea    BiPAP (biphasic positive airway pressure) dependence    Complex sleep apnea syndrome    Cervical facet joint syndrome    Chronic bilateral low back pain with bilateral sciatica    Memory loss    COPD with acute exacerbation ([facility])       Rehabilitation Diagnosis:     COP[facility] ([facility] [J44.1]       SERVICE DATE: 2021        SUBJECTIVE: Patient agrees that they are safe with mobility and do not require physical therapy services in this setting. OBJECTIVE:    Orientation is Within normal limits           ACTIVE ROM:       All major lower extremity joints are within functional limits      STRENGTH     Both lower extremities are grossly within functional limits.     TRANSFERS   Sit to stand   Independent      Bed to chair   Independent     Bed to mobility   Supine to sit   Independent       Roll     Independent     Scoot Side to side / Up and down   Independent    AMBULATION   Distance: 50'     Device: NONE     Assistance: SUPERVISION       Comment: dec trunk rotation, no difficulty or LOB    BALANCE   Sitting    Good     Standing    Good    Tx Initiated: Amb 300' no AD SUP-IND    ASSESSMENT      Independent and safe with all functional mobility       PLAN: Discharge from skilled physical therapy      GOALS   Amb 300' no AD SUP-IND (met)            Electronically signed by Kathrine Do PT on 2021 at 3:57 PM

## 2021-05-12 PROBLEM — I10 HYPERTENSION: Status: ACTIVE | Noted: 2021-05-12

## 2021-05-12 PROBLEM — J18.9 CAP (COMMUNITY ACQUIRED PNEUMONIA): Status: ACTIVE | Noted: 2021-05-12

## 2021-05-12 PROBLEM — J44.1 COPD EXACERBATION (HCC): Status: ACTIVE | Noted: 2021-05-12

## 2021-05-12 LAB
ALBUMIN SERPL-MCNC: 5 G/DL (ref 3.5–5.2)
ALP BLD-CCNC: 85 U/L (ref 40–130)
ALT SERPL-CCNC: 32 U/L (ref 5–41)
ANION GAP SERPL CALCULATED.3IONS-SCNC: 9 MMOL/L (ref 7–19)
AST SERPL-CCNC: 25 U/L (ref 5–40)
BASOPHILS ABSOLUTE: 0 K/UL (ref 0–0.2)
BASOPHILS RELATIVE PERCENT: 0.1 % (ref 0–1)
BILIRUB SERPL-MCNC: 0.4 MG/DL (ref 0.2–1.2)
BUN BLDV-MCNC: 25 MG/DL (ref 8–23)
CALCIUM SERPL-MCNC: 10.2 MG/DL (ref 8.8–10.2)
CHLORIDE BLD-SCNC: 99 MMOL/L (ref 98–111)
CO2: 29 MMOL/L (ref 22–29)
CREAT SERPL-MCNC: 1.3 MG/DL (ref 0.5–1.2)
EOSINOPHILS ABSOLUTE: 0 K/UL (ref 0–0.6)
EOSINOPHILS RELATIVE PERCENT: 0 % (ref 0–5)
GFR AFRICAN AMERICAN: >59
GFR NON-AFRICAN AMERICAN: 56
GLUCOSE BLD-MCNC: 180 MG/DL (ref 74–109)
HCT VFR BLD CALC: 52.9 % (ref 42–52)
HEMOGLOBIN: 17.4 G/DL (ref 14–18)
IMMATURE GRANULOCYTES #: 0 K/UL
LYMPHOCYTES ABSOLUTE: 0.4 K/UL (ref 1.1–4.5)
LYMPHOCYTES RELATIVE PERCENT: 3.6 % (ref 20–40)
MCH RBC QN AUTO: 31.2 PG (ref 27–31)
MCHC RBC AUTO-ENTMCNC: 32.9 G/DL (ref 33–37)
MCV RBC AUTO: 94.8 FL (ref 80–94)
MONOCYTES ABSOLUTE: 0.4 K/UL (ref 0–0.9)
MONOCYTES RELATIVE PERCENT: 3.6 % (ref 0–10)
NEUTROPHILS ABSOLUTE: 9.9 K/UL (ref 1.5–7.5)
NEUTROPHILS RELATIVE PERCENT: 92.3 % (ref 50–65)
PDW BLD-RTO: 13.6 % (ref 11.5–14.5)
PLATELET # BLD: 168 K/UL (ref 130–400)
PMV BLD AUTO: 9.7 FL (ref 9.4–12.4)
POTASSIUM SERPL-SCNC: 4.5 MMOL/L (ref 3.5–5)
RBC # BLD: 5.58 M/UL (ref 4.7–6.1)
SODIUM BLD-SCNC: 137 MMOL/L (ref 136–145)
TOTAL PROTEIN: 7.2 G/DL (ref 6.6–8.7)
WBC # BLD: 10.7 K/UL (ref 4.8–10.8)

## 2021-05-12 PROCEDURE — 80053 COMPREHEN METABOLIC PANEL: CPT

## 2021-05-12 PROCEDURE — 6360000002 HC RX W HCPCS: Performed by: FAMILY MEDICINE

## 2021-05-12 PROCEDURE — 96376 TX/PRO/DX INJ SAME DRUG ADON: CPT

## 2021-05-12 PROCEDURE — 2500000003 HC RX 250 WO HCPCS: Performed by: FAMILY MEDICINE

## 2021-05-12 PROCEDURE — 36415 COLL VENOUS BLD VENIPUNCTURE: CPT

## 2021-05-12 PROCEDURE — 2700000000 HC OXYGEN THERAPY PER DAY

## 2021-05-12 PROCEDURE — 2580000003 HC RX 258: Performed by: FAMILY MEDICINE

## 2021-05-12 PROCEDURE — 1210000000 HC MED SURG R&B

## 2021-05-12 PROCEDURE — 6370000000 HC RX 637 (ALT 250 FOR IP): Performed by: FAMILY MEDICINE

## 2021-05-12 PROCEDURE — 94640 AIRWAY INHALATION TREATMENT: CPT

## 2021-05-12 PROCEDURE — 85025 COMPLETE CBC W/AUTO DIFF WBC: CPT

## 2021-05-12 RX ORDER — FAMOTIDINE 20 MG/1
20 TABLET, FILM COATED ORAL 2 TIMES DAILY
Status: DISCONTINUED | OUTPATIENT
Start: 2021-05-12 | End: 2021-05-15 | Stop reason: HOSPADM

## 2021-05-12 RX ADMIN — OXYCODONE HYDROCHLORIDE 20 MG: 20 TABLET, FILM COATED, EXTENDED RELEASE ORAL at 19:09

## 2021-05-12 RX ADMIN — METHYLPREDNISOLONE SODIUM SUCCINATE 40 MG: 40 INJECTION, POWDER, FOR SOLUTION INTRAMUSCULAR; INTRAVENOUS at 13:59

## 2021-05-12 RX ADMIN — FAMOTIDINE 20 MG: 10 INJECTION, SOLUTION INTRAVENOUS at 09:29

## 2021-05-12 RX ADMIN — ALPRAZOLAM 1 MG: 0.5 TABLET ORAL at 21:43

## 2021-05-12 RX ADMIN — GABAPENTIN 600 MG: 600 TABLET, FILM COATED ORAL at 21:43

## 2021-05-12 RX ADMIN — DILTIAZEM HYDROCHLORIDE 240 MG: 120 CAPSULE, COATED, EXTENDED RELEASE ORAL at 06:24

## 2021-05-12 RX ADMIN — FAMOTIDINE 20 MG: 20 TABLET ORAL at 21:43

## 2021-05-12 RX ADMIN — ARFORMOTEROL TARTRATE 15 MCG: 15 SOLUTION RESPIRATORY (INHALATION) at 19:17

## 2021-05-12 RX ADMIN — SODIUM CHLORIDE, PRESERVATIVE FREE 1000 MG: 5 INJECTION INTRAVENOUS at 13:59

## 2021-05-12 RX ADMIN — TRAZODONE HYDROCHLORIDE 300 MG: 100 TABLET ORAL at 21:43

## 2021-05-12 RX ADMIN — GUAIFENESIN 600 MG: 600 TABLET, EXTENDED RELEASE ORAL at 06:25

## 2021-05-12 RX ADMIN — IPRATROPIUM BROMIDE AND ALBUTEROL SULFATE 1 AMPULE: 2.5; .5 SOLUTION RESPIRATORY (INHALATION) at 19:09

## 2021-05-12 RX ADMIN — VARENICLINE TARTRATE 1 MG: 0.5 TABLET, FILM COATED ORAL at 21:43

## 2021-05-12 RX ADMIN — IPRATROPIUM BROMIDE AND ALBUTEROL SULFATE 1 AMPULE: 2.5; .5 SOLUTION RESPIRATORY (INHALATION) at 07:33

## 2021-05-12 RX ADMIN — GUAIFENESIN 600 MG: 600 TABLET, EXTENDED RELEASE ORAL at 21:43

## 2021-05-12 RX ADMIN — GABAPENTIN 600 MG: 600 TABLET, FILM COATED ORAL at 12:41

## 2021-05-12 RX ADMIN — TAMSULOSIN HYDROCHLORIDE 0.4 MG: 0.4 CAPSULE ORAL at 06:26

## 2021-05-12 RX ADMIN — GABAPENTIN 600 MG: 600 TABLET, FILM COATED ORAL at 16:18

## 2021-05-12 RX ADMIN — METHYLPREDNISOLONE SODIUM SUCCINATE 40 MG: 40 INJECTION, POWDER, FOR SOLUTION INTRAMUSCULAR; INTRAVENOUS at 06:26

## 2021-05-12 RX ADMIN — ARFORMOTEROL TARTRATE 15 MCG: 15 SOLUTION RESPIRATORY (INHALATION) at 07:37

## 2021-05-12 RX ADMIN — BUDESONIDE 250 MCG: 0.25 SUSPENSION RESPIRATORY (INHALATION) at 07:37

## 2021-05-12 RX ADMIN — SODIUM CHLORIDE, PRESERVATIVE FREE 10 ML: 5 INJECTION INTRAVENOUS at 09:29

## 2021-05-12 RX ADMIN — HYDROCHLOROTHIAZIDE 25 MG: 25 TABLET ORAL at 06:26

## 2021-05-12 RX ADMIN — OXYCODONE HYDROCHLORIDE 20 MG: 20 TABLET, FILM COATED, EXTENDED RELEASE ORAL at 06:23

## 2021-05-12 RX ADMIN — CYCLOBENZAPRINE 10 MG: 10 TABLET, FILM COATED ORAL at 21:43

## 2021-05-12 RX ADMIN — PANTOPRAZOLE SODIUM 40 MG: 40 TABLET, DELAYED RELEASE ORAL at 16:18

## 2021-05-12 RX ADMIN — VALACYCLOVIR HYDROCHLORIDE 500 MG: 500 TABLET, FILM COATED ORAL at 06:25

## 2021-05-12 RX ADMIN — OXYCODONE AND ACETAMINOPHEN 1 TABLET: 5; 325 TABLET ORAL at 18:01

## 2021-05-12 RX ADMIN — AMLODIPINE BESYLATE 10 MG: 10 TABLET ORAL at 06:26

## 2021-05-12 RX ADMIN — AZITHROMYCIN MONOHYDRATE 500 MG: 500 INJECTION, POWDER, LYOPHILIZED, FOR SOLUTION INTRAVENOUS at 16:18

## 2021-05-12 RX ADMIN — PANTOPRAZOLE SODIUM 40 MG: 40 TABLET, DELAYED RELEASE ORAL at 06:24

## 2021-05-12 RX ADMIN — FINASTERIDE 5 MG: 5 TABLET, FILM COATED ORAL at 06:26

## 2021-05-12 RX ADMIN — METHYLPREDNISOLONE SODIUM SUCCINATE 40 MG: 40 INJECTION, POWDER, FOR SOLUTION INTRAMUSCULAR; INTRAVENOUS at 21:44

## 2021-05-12 RX ADMIN — CELECOXIB 200 MG: 200 CAPSULE ORAL at 06:24

## 2021-05-12 RX ADMIN — GABAPENTIN 600 MG: 600 TABLET, FILM COATED ORAL at 06:25

## 2021-05-12 RX ADMIN — IPRATROPIUM BROMIDE AND ALBUTEROL SULFATE 1 AMPULE: 2.5; .5 SOLUTION RESPIRATORY (INHALATION) at 10:39

## 2021-05-12 RX ADMIN — DULOXETINE 60 MG: 60 CAPSULE, DELAYED RELEASE ORAL at 06:24

## 2021-05-12 RX ADMIN — BUDESONIDE 250 MCG: 0.25 SUSPENSION RESPIRATORY (INHALATION) at 19:17

## 2021-05-12 RX ADMIN — VARENICLINE TARTRATE 1 MG: 0.5 TABLET, FILM COATED ORAL at 06:24

## 2021-05-12 RX ADMIN — LOSARTAN POTASSIUM 100 MG: 100 TABLET, FILM COATED ORAL at 06:26

## 2021-05-12 RX ADMIN — ENOXAPARIN SODIUM 40 MG: 40 INJECTION SUBCUTANEOUS at 09:29

## 2021-05-12 RX ADMIN — OXYCODONE AND ACETAMINOPHEN 1 TABLET: 5; 325 TABLET ORAL at 21:43

## 2021-05-12 RX ADMIN — OXYCODONE AND ACETAMINOPHEN 1 TABLET: 5; 325 TABLET ORAL at 13:59

## 2021-05-12 RX ADMIN — CLONIDINE HYDROCHLORIDE 0.1 MG: 0.1 TABLET ORAL at 19:09

## 2021-05-12 RX ADMIN — SODIUM CHLORIDE, PRESERVATIVE FREE 10 ML: 5 INJECTION INTRAVENOUS at 21:44

## 2021-05-12 ASSESSMENT — PAIN DESCRIPTION - LOCATION
LOCATION: BACK;LEG
LOCATION: BACK;LEG

## 2021-05-12 ASSESSMENT — PAIN SCALES - GENERAL
PAINLEVEL_OUTOF10: 8
PAINLEVEL_OUTOF10: 8

## 2021-05-12 ASSESSMENT — ENCOUNTER SYMPTOMS
SHORTNESS OF BREATH: 1
COUGH: 1

## 2021-05-12 NOTE — PROGRESS NOTES
Pharmacy Intravenous to Oral Protocol    Medication changed per [address] IV to PO protocol: Famotidine    Patient meets the following inclusion criteria and none of the exclusion criteria:    Inclusion criteria:  - IV therapy > 24 hours (antibiotics only)  - Tolerating diet more advanced than clear liquids  - Tolerating PO medications  - No vasopressor blood pressure support (ie no signs of shock)  - Patient hasn't had a seizure for 72 hrs (antiepileptic medications only)    Exclusion criteria:  - Infections requiring IV therapy (ie meningitis, endocarditis, osteomyelitis, pancreatitis)   - Nausea and/or vomiting or severe diarrhea within past 24 hours   - Has gastrectomy, ileus, gastric outlet or bowel obstruction, or malabsorption syndromes   - Has significant painful oral ulceration   - TPN with an NPO order   - Active GI bleed   - Unable to swallow   - NPO   - Febrile in the last 24 hours (antibiotics only)   - Clinical deteriorating or unstable (antibiotics only)   - Pediatric patients and patients who are not euthyroid (not on oral levothyroxine/not stabilized on oral levothyroxine)- Levothyroxine only     Electronically signed by Simran Peck RPH on 5/12/2021 at 1:46 PM

## 2021-05-12 NOTE — PLAN OF CARE
Problem: Falls - Risk of:  Goal: Will remain free from falls  Description: Will remain free from falls  Outcome: Ongoing  Goal: Absence of physical injury  Description: Absence of physical injury  Outcome: Ongoing     Problem: Pain:  Goal: Pain level will decrease  Description: Pain level will decrease  Outcome: Ongoing  Goal: Control of acute pain  Description: Control of acute pain  Outcome: Ongoing  Goal: Control of chronic pain  Description: Control of chronic pain  Outcome: Ongoing     Problem: Discharge Planning:  Goal: Discharged to appropriate level of care  Description: Discharged to appropriate level of care  Outcome: Ongoing     Problem:  Activity Intolerance:  Goal: Ability to tolerate increased activity will improve  Description: Ability to tolerate increased activity will improve  Outcome: Ongoing     Problem: Airway Clearance - Ineffective:  Goal: Ability to maintain a clear airway will improve  Description: Ability to maintain a clear airway will improve  Outcome: Ongoing     Problem: Gas Exchange - Impaired:  Goal: Levels of oxygenation will improve  Description: Levels of oxygenation will improve  Outcome: Ongoing

## 2021-05-12 NOTE — PROGRESS NOTES
Problem:  <principal problem not specified>    Hospital Problem list:  Active Problems:    Obstructive sleep apnea    BiPAP (biphasic positive airway pressure) dependence    COPD with acute exacerbation (HCC)    CAP (community acquired pneumonia)    Hypertension  Resolved Problems:    * No resolved hospital problems. *      PMH:  Past Medical History:   Diagnosis Date    Abnormal gait     Amnesia     Anxiety     Asthma     BiPAP (biphasic positive airway pressure) dependence     NiPPV 23cm/16cm/12cm    Cervical facet syndrome     Chronic back pain     Complex sleep apnea syndrome     Initial PS; AHI:  31.2 per PSG, 10/2014    Concussion     COPD (chronic obstructive pulmonary disease) (HCC)     DDD (degenerative disc disease)     Facial ringworm     PERCY (generalized anxiety disorder)     GERD (gastroesophageal reflux disease)     Headache     Herniated disc     Herpes simplex     History of Juan's esophagus     Hyperlipidemia     Hypersomnia     Hypertension     Hypotestosteronism     Lumbar stenosis     Memory loss     Obesity     Obstructive sleep apnea     Initial PS; AHI:  31.2 per split-night PSG, 10/2014    Pneumonia     Potential difficult airway on pre-intubation assessment     PTSD (post-traumatic stress disorder)     Sleep apnea     Bi-pap at night    Vertigo        Treatment Plan:  Community-acquired pneumonia  Continue IV antibiotics, gradually improving, repeat labs this a.m. COPD exacerbation  Continue IV antibiotics and steroids, secondary to pneumonia. Gradually improving and weaning steroids. Hypertension  Blood pressure within control, continue home medication    Degenerative disc disease  Continue home pain medication regimen, followed by pain management    GERD  Continue home and acid therapy    Disposition: Change to inpatient, anticipate discharge in the next day or 2  Reviewed treatment plans with the patient and/or family.    20 minutes spent in face to face interaction and coordination of care.      Electronically signed by Shira Juárez MD on 5/12/2021 at 8:28 AM

## 2021-05-13 PROCEDURE — 1210000000 HC MED SURG R&B

## 2021-05-13 PROCEDURE — 6370000000 HC RX 637 (ALT 250 FOR IP): Performed by: FAMILY MEDICINE

## 2021-05-13 PROCEDURE — 2580000003 HC RX 258: Performed by: FAMILY MEDICINE

## 2021-05-13 PROCEDURE — 6360000002 HC RX W HCPCS: Performed by: FAMILY MEDICINE

## 2021-05-13 PROCEDURE — 2700000000 HC OXYGEN THERAPY PER DAY

## 2021-05-13 PROCEDURE — 94640 AIRWAY INHALATION TREATMENT: CPT

## 2021-05-13 RX ORDER — OXYCODONE HYDROCHLORIDE AND ACETAMINOPHEN 5; 325 MG/1; MG/1
1 TABLET ORAL
Status: DISCONTINUED | OUTPATIENT
Start: 2021-05-13 | End: 2021-05-15 | Stop reason: HOSPADM

## 2021-05-13 RX ORDER — DILTIAZEM HYDROCHLORIDE 120 MG/1
240 CAPSULE, COATED, EXTENDED RELEASE ORAL DAILY
Status: DISCONTINUED | OUTPATIENT
Start: 2021-05-14 | End: 2021-05-15 | Stop reason: HOSPADM

## 2021-05-13 RX ORDER — METHYLPREDNISOLONE SODIUM SUCCINATE 40 MG/ML
40 INJECTION, POWDER, LYOPHILIZED, FOR SOLUTION INTRAMUSCULAR; INTRAVENOUS EVERY 12 HOURS
Status: DISCONTINUED | OUTPATIENT
Start: 2021-05-13 | End: 2021-05-14

## 2021-05-13 RX ADMIN — HYDROCHLOROTHIAZIDE 25 MG: 25 TABLET ORAL at 06:38

## 2021-05-13 RX ADMIN — AZITHROMYCIN MONOHYDRATE 500 MG: 500 INJECTION, POWDER, LYOPHILIZED, FOR SOLUTION INTRAVENOUS at 14:48

## 2021-05-13 RX ADMIN — OXYCODONE AND ACETAMINOPHEN 1 TABLET: 5; 325 TABLET ORAL at 02:42

## 2021-05-13 RX ADMIN — ALBUTEROL SULFATE 2.5 MG: 2.5 SOLUTION RESPIRATORY (INHALATION) at 03:29

## 2021-05-13 RX ADMIN — METHYLPREDNISOLONE SODIUM SUCCINATE 40 MG: 40 INJECTION, POWDER, FOR SOLUTION INTRAMUSCULAR; INTRAVENOUS at 18:49

## 2021-05-13 RX ADMIN — ACETAMINOPHEN 650 MG: 325 TABLET ORAL at 21:09

## 2021-05-13 RX ADMIN — IPRATROPIUM BROMIDE AND ALBUTEROL SULFATE 1 AMPULE: 2.5; .5 SOLUTION RESPIRATORY (INHALATION) at 18:14

## 2021-05-13 RX ADMIN — IPRATROPIUM BROMIDE AND ALBUTEROL SULFATE 1 AMPULE: 2.5; .5 SOLUTION RESPIRATORY (INHALATION) at 07:09

## 2021-05-13 RX ADMIN — CELECOXIB 200 MG: 200 CAPSULE ORAL at 06:39

## 2021-05-13 RX ADMIN — DILTIAZEM HYDROCHLORIDE 240 MG: 120 CAPSULE, COATED, EXTENDED RELEASE ORAL at 06:48

## 2021-05-13 RX ADMIN — IPRATROPIUM BROMIDE AND ALBUTEROL SULFATE 1 AMPULE: 2.5; .5 SOLUTION RESPIRATORY (INHALATION) at 14:15

## 2021-05-13 RX ADMIN — GABAPENTIN 600 MG: 600 TABLET, FILM COATED ORAL at 13:05

## 2021-05-13 RX ADMIN — CYCLOBENZAPRINE 10 MG: 10 TABLET, FILM COATED ORAL at 21:04

## 2021-05-13 RX ADMIN — PANTOPRAZOLE SODIUM 40 MG: 40 TABLET, DELAYED RELEASE ORAL at 17:53

## 2021-05-13 RX ADMIN — GUAIFENESIN 600 MG: 600 TABLET, EXTENDED RELEASE ORAL at 06:40

## 2021-05-13 RX ADMIN — VALACYCLOVIR HYDROCHLORIDE 500 MG: 500 TABLET, FILM COATED ORAL at 06:39

## 2021-05-13 RX ADMIN — OXYCODONE AND ACETAMINOPHEN 1 TABLET: 5; 325 TABLET ORAL at 06:39

## 2021-05-13 RX ADMIN — OXYCODONE AND ACETAMINOPHEN 1 TABLET: 5; 325 TABLET ORAL at 21:03

## 2021-05-13 RX ADMIN — METHYLPREDNISOLONE SODIUM SUCCINATE 40 MG: 40 INJECTION, POWDER, FOR SOLUTION INTRAMUSCULAR; INTRAVENOUS at 06:40

## 2021-05-13 RX ADMIN — ENOXAPARIN SODIUM 40 MG: 40 INJECTION SUBCUTANEOUS at 09:21

## 2021-05-13 RX ADMIN — ALPRAZOLAM 1 MG: 0.5 TABLET ORAL at 21:04

## 2021-05-13 RX ADMIN — GABAPENTIN 600 MG: 600 TABLET, FILM COATED ORAL at 21:04

## 2021-05-13 RX ADMIN — BUDESONIDE 250 MCG: 0.25 SUSPENSION RESPIRATORY (INHALATION) at 18:26

## 2021-05-13 RX ADMIN — SODIUM CHLORIDE, PRESERVATIVE FREE 1000 MG: 5 INJECTION INTRAVENOUS at 14:47

## 2021-05-13 RX ADMIN — ARFORMOTEROL TARTRATE 15 MCG: 15 SOLUTION RESPIRATORY (INHALATION) at 07:20

## 2021-05-13 RX ADMIN — SODIUM CHLORIDE, PRESERVATIVE FREE 10 ML: 5 INJECTION INTRAVENOUS at 09:21

## 2021-05-13 RX ADMIN — ALBUTEROL SULFATE 2.5 MG: 2.5 SOLUTION RESPIRATORY (INHALATION) at 22:24

## 2021-05-13 RX ADMIN — GABAPENTIN 600 MG: 600 TABLET, FILM COATED ORAL at 17:43

## 2021-05-13 RX ADMIN — LOSARTAN POTASSIUM 100 MG: 100 TABLET, FILM COATED ORAL at 06:38

## 2021-05-13 RX ADMIN — BUDESONIDE 250 MCG: 0.25 SUSPENSION RESPIRATORY (INHALATION) at 07:20

## 2021-05-13 RX ADMIN — FAMOTIDINE 20 MG: 20 TABLET ORAL at 21:04

## 2021-05-13 RX ADMIN — OXYCODONE AND ACETAMINOPHEN 1 TABLET: 5; 325 TABLET ORAL at 17:43

## 2021-05-13 RX ADMIN — GABAPENTIN 600 MG: 600 TABLET, FILM COATED ORAL at 06:38

## 2021-05-13 RX ADMIN — DULOXETINE 60 MG: 60 CAPSULE, DELAYED RELEASE ORAL at 06:37

## 2021-05-13 RX ADMIN — OXYCODONE AND ACETAMINOPHEN 1 TABLET: 5; 325 TABLET ORAL at 11:12

## 2021-05-13 RX ADMIN — VARENICLINE TARTRATE 1 MG: 0.5 TABLET, FILM COATED ORAL at 06:49

## 2021-05-13 RX ADMIN — AMLODIPINE BESYLATE 10 MG: 10 TABLET ORAL at 06:38

## 2021-05-13 RX ADMIN — FAMOTIDINE 20 MG: 20 TABLET ORAL at 06:50

## 2021-05-13 RX ADMIN — GUAIFENESIN AND DEXTROMETHORPHAN HYDROBROMIDE 1 TABLET: 600; 30 TABLET, EXTENDED RELEASE ORAL at 06:40

## 2021-05-13 RX ADMIN — ARFORMOTEROL TARTRATE 15 MCG: 15 SOLUTION RESPIRATORY (INHALATION) at 18:26

## 2021-05-13 RX ADMIN — PANTOPRAZOLE SODIUM 40 MG: 40 TABLET, DELAYED RELEASE ORAL at 06:39

## 2021-05-13 RX ADMIN — OXYCODONE HYDROCHLORIDE 20 MG: 20 TABLET, FILM COATED, EXTENDED RELEASE ORAL at 06:39

## 2021-05-13 RX ADMIN — TRAZODONE HYDROCHLORIDE 300 MG: 100 TABLET ORAL at 21:03

## 2021-05-13 RX ADMIN — OXYCODONE HYDROCHLORIDE 20 MG: 20 TABLET, FILM COATED, EXTENDED RELEASE ORAL at 21:03

## 2021-05-13 RX ADMIN — VARENICLINE TARTRATE 1 MG: 0.5 TABLET, FILM COATED ORAL at 21:05

## 2021-05-13 RX ADMIN — FINASTERIDE 5 MG: 5 TABLET, FILM COATED ORAL at 06:36

## 2021-05-13 RX ADMIN — GUAIFENESIN 600 MG: 600 TABLET, EXTENDED RELEASE ORAL at 21:04

## 2021-05-13 RX ADMIN — TAMSULOSIN HYDROCHLORIDE 0.4 MG: 0.4 CAPSULE ORAL at 06:36

## 2021-05-13 ASSESSMENT — PAIN SCALES - GENERAL
PAINLEVEL_OUTOF10: 7
PAINLEVEL_OUTOF10: 6

## 2021-05-13 ASSESSMENT — ENCOUNTER SYMPTOMS
COUGH: 1
SHORTNESS OF BREATH: 1

## 2021-05-13 ASSESSMENT — PAIN DESCRIPTION - DESCRIPTORS: DESCRIPTORS: SPASM;ACHING

## 2021-05-13 ASSESSMENT — PAIN DESCRIPTION - FREQUENCY: FREQUENCY: CONTINUOUS

## 2021-05-13 ASSESSMENT — PAIN DESCRIPTION - PROGRESSION: CLINICAL_PROGRESSION: NOT CHANGED

## 2021-05-13 NOTE — PROGRESS NOTES
Pt went downstairs to coffee shop. Denies SOB. Now sitting in chair on RA. Oxygen saturation is 92%. Pt believes the reading is inaccurate since hs PO2 in ABG upon admission was 67. Discussed with pt that he has improved since then with steroids and neb treatments.

## 2021-05-13 NOTE — PROGRESS NOTES
Hx     Stomach Cancer Neg Hx      Social History     Socioeconomic History    Marital status:      Spouse name: Not on file    Number of children: Not on file    Years of education: Not on file    Highest education level: Not on file   Occupational History    Not on file   Social Needs    Financial resource strain: Not on file    Food insecurity     Worry: Not on file     Inability: Not on file    Transportation needs     Medical: Not on file     Non-medical: Not on file   Tobacco Use    Smoking status: Former Smoker     Packs/day: 0.00     Years: 35.00     Pack years: 0.00     Types: Cigarettes     Quit date: 3/16/2016     Years since quittin.1    Smokeless tobacco: Never Used    Tobacco comment: pt states he has quit smokimg again for 3 weeks   Substance and Sexual Activity    Alcohol use: Yes     Comment: 2-3 mixed drinks / month     Drug use: No    Sexual activity: Yes     Partners: Female   Lifestyle    Physical activity     Days per week: Not on file     Minutes per session: Not on file    Stress: Not on file   Relationships    Social connections     Talks on phone: Not on file     Gets together: Not on file     Attends Mandaeism service: Not on file     Active member of club or organization: Not on file     Attends meetings of clubs or organizations: Not on file     Relationship status: Not on file    Intimate partner violence     Fear of current or ex partner: Not on file     Emotionally abused: Not on file     Physically abused: Not on file     Forced sexual activity: Not on file   Other Topics Concern    Not on file   Social History Narrative    Not on file       Labs:  CBC:   Recent Labs     21  0519 21  0858   WBC 4.3* 10.7   HGB 17.7 17.4    168     BMP:    Recent Labs     05/10/21  1423 21  0519 21  0858   NA  --  134* 137   K 4.2 4.9 4.5   CL  --  99 99   CO2  --  25 29   BUN  --  21 25*   CREATININE  --  1.2 1.3*   GLUCOSE  --  172* 180* Hepatic:   Recent Labs     21  0858   AST 25   ALT 32   BILITOT 0.4   ALKPHOS 85     Lipids: No results for input(s): CHOL, HDL in the last 72 hours. Invalid input(s): LDLCALCU  INR:   Recent Labs     21  0519   INR 1.10       Objective:     Vitals: /69   Pulse 73   Temp 98.1 °F (36.7 °C) (Temporal)   Resp 20   Ht 6' 4\" (1.93 m)   Wt (!) 341 lb 8 oz (154.9 kg)   SpO2 92%   BMI 41.57 kg/m²      Intake/Output Summary (Last 24 hours) at 2021 0809  Last data filed at 2021 1223  Gross per 24 hour   Intake 480 ml   Output --   Net 480 ml    Temp (24hrs), Av.4 °F (36.9 °C), Min:97.9 °F (36.6 °C), Max:99.3 °F (37.4 °C)    Glucose:  No results for input(s): POCGLU in the last 72 hours. Physical Exam  Vitals signs reviewed. Constitutional:       Appearance: He is obese. HENT:      Head: Normocephalic and atraumatic. Nose: Nose normal.      Mouth/Throat:      Mouth: Mucous membranes are moist.   Eyes:      Extraocular Movements: Extraocular movements intact. Pupils: Pupils are equal, round, and reactive to light. Neck:      Musculoskeletal: Normal range of motion and neck supple. Cardiovascular:      Rate and Rhythm: Normal rate and regular rhythm. Heart sounds: No murmur. Pulmonary:      Effort: Pulmonary effort is normal.      Comments: Expiratory wheezes, right lower lobe rhonchi  Abdominal:      General: Abdomen is flat. Bowel sounds are normal. There is no distension. Tenderness: There is no abdominal tenderness. Musculoskeletal: Normal range of motion. General: No swelling. Skin:     General: Skin is warm and dry. Capillary Refill: Capillary refill takes less than 2 seconds. Neurological:      General: No focal deficit present. Mental Status: He is alert and oriented to person, place, and time.    Psychiatric:         Mood and Affect: Mood normal.         Behavior: Behavior normal.           Assessment and Plan: Primary Problem:  COPD with acute exacerbation ([facility] [address]  RLL PNEUMONIA - POA/CAP  Hospital Problem list:  Principal Problem:    COPD with acute exacerbation ([facility] [address]  Active Problems:    Obstructive sleep apnea    BiPAP (biphasic positive airway pressure) dependence    CAP (community acquired pneumonia)    Hypertension    COPD exacerbation ([facility] [address]  Resolved Problems:    * No resolved hospital problems. *      PMH:  Past Medical History:   Diagnosis Date    Abnormal gait     Amnesia     Anxiety     Asthma     BiPAP (biphasic positive airway pressure) dependence     NiPPV 23cm/16cm/12cm    Cervical facet syndrome     Chronic back pain     Complex sleep apnea syndrome     Initial PS; AHI:  31.2 per PSG, 10/2014    Concussion     COPD (chronic obstructive pulmonary disease) (HCC)     DDD (degenerative disc disease)     Facial ringworm     PERCY (generalized anxiety disorder)     GERD (gastroesophageal reflux disease)     Headache     Herniated disc     Herpes simplex     History of Juan's esophagus     Hyperlipidemia     Hypersomnia     Hypertension     Hypotestosteronism     Lumbar stenosis     Memory loss     Obesity     Obstructive sleep apnea     Initial PS; AHI:  31.2 per split-night PSG, 10/2014    Pneumonia     Potential difficult airway on pre-intubation assessment     PTSD (post-traumatic stress disorder)     Sleep apnea     Bi-pap at night    Vertigo        Treatment Plan:  Community-acquired pneumonia  Continue IV antibiotics, gradually improving, repeat labs this a.m. COPD exacerbation  Continue IV antibiotics and steroids, secondary to pneumonia. Gradually improving and weaning steroids.     Hypertension  Blood pressure within control, continue home medication    Degenerative disc disease  Continue home pain medication regimen, followed by pain management    GERD  Continue home and acid therapy    Disposition: Change to inpatient, anticipate discharge in the

## 2021-05-14 ENCOUNTER — APPOINTMENT (OUTPATIENT)
Dept: GENERAL RADIOLOGY | Age: 66
DRG: 190 | End: 2021-05-14
Attending: FAMILY MEDICINE
Payer: MEDICARE

## 2021-05-14 ENCOUNTER — TELEPHONE (OUTPATIENT)
Dept: NEUROLOGY | Age: 66
End: 2021-05-14

## 2021-05-14 LAB
BASE EXCESS ARTERIAL: 6.2 MMOL/L (ref -2–2)
CARBOXYHEMOGLOBIN ARTERIAL: 1.7 % (ref 0–5)
HCO3 ARTERIAL: 31.3 MMOL/L (ref 22–26)
HEMOGLOBIN, ART, EXTENDED: 17.3 G/DL (ref 14–18)
METHEMOGLOBIN ARTERIAL: 0.8 %
O2 CONTENT ARTERIAL: 21.9 ML/DL
O2 SAT, ARTERIAL: 90.2 %
O2 THERAPY: ABNORMAL
PCO2 ARTERIAL: 45 MMHG (ref 35–45)
PH ARTERIAL: 7.45 (ref 7.35–7.45)
PO2 ARTERIAL: 56 MMHG (ref 80–100)
POTASSIUM, WHOLE BLOOD: 4.1

## 2021-05-14 PROCEDURE — 94640 AIRWAY INHALATION TREATMENT: CPT

## 2021-05-14 PROCEDURE — 82803 BLOOD GASES ANY COMBINATION: CPT

## 2021-05-14 PROCEDURE — 6370000000 HC RX 637 (ALT 250 FOR IP): Performed by: FAMILY MEDICINE

## 2021-05-14 PROCEDURE — 1210000000 HC MED SURG R&B

## 2021-05-14 PROCEDURE — 84132 ASSAY OF SERUM POTASSIUM: CPT

## 2021-05-14 PROCEDURE — 94761 N-INVAS EAR/PLS OXIMETRY MLT: CPT

## 2021-05-14 PROCEDURE — 6360000002 HC RX W HCPCS: Performed by: FAMILY MEDICINE

## 2021-05-14 PROCEDURE — 36600 WITHDRAWAL OF ARTERIAL BLOOD: CPT

## 2021-05-14 PROCEDURE — 2580000003 HC RX 258: Performed by: FAMILY MEDICINE

## 2021-05-14 PROCEDURE — 2700000000 HC OXYGEN THERAPY PER DAY

## 2021-05-14 PROCEDURE — 71046 X-RAY EXAM CHEST 2 VIEWS: CPT

## 2021-05-14 RX ORDER — AZITHROMYCIN 250 MG/1
500 TABLET, FILM COATED ORAL DAILY
Status: DISCONTINUED | OUTPATIENT
Start: 2021-05-15 | End: 2021-05-15 | Stop reason: HOSPADM

## 2021-05-14 RX ORDER — PREDNISONE 10 MG/1
40 TABLET ORAL DAILY
Status: DISCONTINUED | OUTPATIENT
Start: 2021-05-14 | End: 2021-05-15 | Stop reason: HOSPADM

## 2021-05-14 RX ORDER — AMOXICILLIN AND CLAVULANATE POTASSIUM 875; 125 MG/1; MG/1
1 TABLET, FILM COATED ORAL EVERY 12 HOURS SCHEDULED
Status: DISCONTINUED | OUTPATIENT
Start: 2021-05-14 | End: 2021-05-15 | Stop reason: HOSPADM

## 2021-05-14 RX ADMIN — LOSARTAN POTASSIUM 100 MG: 100 TABLET, FILM COATED ORAL at 06:56

## 2021-05-14 RX ADMIN — GABAPENTIN 600 MG: 600 TABLET, FILM COATED ORAL at 22:09

## 2021-05-14 RX ADMIN — PANTOPRAZOLE SODIUM 40 MG: 40 TABLET, DELAYED RELEASE ORAL at 14:11

## 2021-05-14 RX ADMIN — ALPRAZOLAM 1 MG: 0.5 TABLET ORAL at 22:09

## 2021-05-14 RX ADMIN — OXYCODONE AND ACETAMINOPHEN 1 TABLET: 5; 325 TABLET ORAL at 06:55

## 2021-05-14 RX ADMIN — OXYCODONE HYDROCHLORIDE 20 MG: 20 TABLET, FILM COATED, EXTENDED RELEASE ORAL at 22:07

## 2021-05-14 RX ADMIN — METHYLPREDNISOLONE SODIUM SUCCINATE 40 MG: 40 INJECTION, POWDER, FOR SOLUTION INTRAMUSCULAR; INTRAVENOUS at 06:57

## 2021-05-14 RX ADMIN — ARFORMOTEROL TARTRATE 15 MCG: 15 SOLUTION RESPIRATORY (INHALATION) at 19:12

## 2021-05-14 RX ADMIN — AMLODIPINE BESYLATE 10 MG: 10 TABLET ORAL at 08:36

## 2021-05-14 RX ADMIN — IPRATROPIUM BROMIDE AND ALBUTEROL SULFATE 1 AMPULE: 2.5; .5 SOLUTION RESPIRATORY (INHALATION) at 12:30

## 2021-05-14 RX ADMIN — CELECOXIB 200 MG: 200 CAPSULE ORAL at 18:05

## 2021-05-14 RX ADMIN — AZITHROMYCIN MONOHYDRATE 500 MG: 500 INJECTION, POWDER, LYOPHILIZED, FOR SOLUTION INTRAVENOUS at 14:11

## 2021-05-14 RX ADMIN — OXYCODONE AND ACETAMINOPHEN 1 TABLET: 5; 325 TABLET ORAL at 18:06

## 2021-05-14 RX ADMIN — AMOXICILLIN AND CLAVULANATE POTASSIUM 1 TABLET: 875; 125 TABLET, FILM COATED ORAL at 22:09

## 2021-05-14 RX ADMIN — OXYCODONE AND ACETAMINOPHEN 1 TABLET: 5; 325 TABLET ORAL at 22:25

## 2021-05-14 RX ADMIN — GUAIFENESIN 600 MG: 600 TABLET, EXTENDED RELEASE ORAL at 08:35

## 2021-05-14 RX ADMIN — BUDESONIDE 250 MCG: 0.25 SUSPENSION RESPIRATORY (INHALATION) at 19:12

## 2021-05-14 RX ADMIN — IPRATROPIUM BROMIDE AND ALBUTEROL SULFATE 1 AMPULE: 2.5; .5 SOLUTION RESPIRATORY (INHALATION) at 16:23

## 2021-05-14 RX ADMIN — SODIUM CHLORIDE, PRESERVATIVE FREE 1000 MG: 5 INJECTION INTRAVENOUS at 14:13

## 2021-05-14 RX ADMIN — SODIUM CHLORIDE, PRESERVATIVE FREE 10 ML: 5 INJECTION INTRAVENOUS at 08:45

## 2021-05-14 RX ADMIN — CYCLOBENZAPRINE 10 MG: 10 TABLET, FILM COATED ORAL at 22:09

## 2021-05-14 RX ADMIN — DULOXETINE 60 MG: 60 CAPSULE, DELAYED RELEASE ORAL at 08:35

## 2021-05-14 RX ADMIN — GUAIFENESIN AND DEXTROMETHORPHAN HYDROBROMIDE 1 TABLET: 600; 30 TABLET, EXTENDED RELEASE ORAL at 22:09

## 2021-05-14 RX ADMIN — HYDROCHLOROTHIAZIDE 25 MG: 25 TABLET ORAL at 08:36

## 2021-05-14 RX ADMIN — DULOXETINE 60 MG: 60 CAPSULE, DELAYED RELEASE ORAL at 06:56

## 2021-05-14 RX ADMIN — OXYCODONE HYDROCHLORIDE 20 MG: 20 TABLET, FILM COATED, EXTENDED RELEASE ORAL at 06:55

## 2021-05-14 RX ADMIN — GABAPENTIN 600 MG: 600 TABLET, FILM COATED ORAL at 14:11

## 2021-05-14 RX ADMIN — VARENICLINE TARTRATE 1 MG: 0.5 TABLET, FILM COATED ORAL at 08:35

## 2021-05-14 RX ADMIN — GUAIFENESIN 600 MG: 600 TABLET, EXTENDED RELEASE ORAL at 22:08

## 2021-05-14 RX ADMIN — TAMSULOSIN HYDROCHLORIDE 0.4 MG: 0.4 CAPSULE ORAL at 08:36

## 2021-05-14 RX ADMIN — IPRATROPIUM BROMIDE AND ALBUTEROL SULFATE 1 AMPULE: 2.5; .5 SOLUTION RESPIRATORY (INHALATION) at 06:35

## 2021-05-14 RX ADMIN — ARFORMOTEROL TARTRATE 15 MCG: 15 SOLUTION RESPIRATORY (INHALATION) at 06:46

## 2021-05-14 RX ADMIN — VALACYCLOVIR HYDROCHLORIDE 500 MG: 500 TABLET, FILM COATED ORAL at 08:35

## 2021-05-14 RX ADMIN — FINASTERIDE 5 MG: 5 TABLET, FILM COATED ORAL at 06:56

## 2021-05-14 RX ADMIN — GABAPENTIN 600 MG: 600 TABLET, FILM COATED ORAL at 08:35

## 2021-05-14 RX ADMIN — IPRATROPIUM BROMIDE AND ALBUTEROL SULFATE 1 AMPULE: 2.5; .5 SOLUTION RESPIRATORY (INHALATION) at 18:55

## 2021-05-14 RX ADMIN — GABAPENTIN 600 MG: 600 TABLET, FILM COATED ORAL at 06:56

## 2021-05-14 RX ADMIN — AMLODIPINE BESYLATE 10 MG: 10 TABLET ORAL at 06:56

## 2021-05-14 RX ADMIN — FAMOTIDINE 20 MG: 20 TABLET ORAL at 08:34

## 2021-05-14 RX ADMIN — PREDNISONE 40 MG: 10 TABLET ORAL at 18:05

## 2021-05-14 RX ADMIN — BUDESONIDE 250 MCG: 0.25 SUSPENSION RESPIRATORY (INHALATION) at 06:46

## 2021-05-14 RX ADMIN — TRAZODONE HYDROCHLORIDE 300 MG: 100 TABLET ORAL at 22:08

## 2021-05-14 RX ADMIN — VARENICLINE TARTRATE 1 MG: 0.5 TABLET, FILM COATED ORAL at 22:09

## 2021-05-14 RX ADMIN — GABAPENTIN 600 MG: 600 TABLET, FILM COATED ORAL at 18:06

## 2021-05-14 RX ADMIN — OXYCODONE AND ACETAMINOPHEN 1 TABLET: 5; 325 TABLET ORAL at 14:13

## 2021-05-14 RX ADMIN — ENOXAPARIN SODIUM 40 MG: 40 INJECTION SUBCUTANEOUS at 08:33

## 2021-05-14 RX ADMIN — ACETAMINOPHEN 650 MG: 325 TABLET ORAL at 14:42

## 2021-05-14 RX ADMIN — PANTOPRAZOLE SODIUM 40 MG: 40 TABLET, DELAYED RELEASE ORAL at 06:55

## 2021-05-14 RX ADMIN — DILTIAZEM HYDROCHLORIDE 240 MG: 120 CAPSULE, COATED, EXTENDED RELEASE ORAL at 08:34

## 2021-05-14 RX ADMIN — FAMOTIDINE 20 MG: 20 TABLET ORAL at 22:07

## 2021-05-14 ASSESSMENT — PAIN SCALES - GENERAL
PAINLEVEL_OUTOF10: 8
PAINLEVEL_OUTOF10: 6

## 2021-05-14 ASSESSMENT — ENCOUNTER SYMPTOMS
COUGH: 1
SHORTNESS OF BREATH: 1

## 2021-05-14 NOTE — PROGRESS NOTES
Daily Progress Note  Svitlana Mcdonnell  MRN: [id number] LOS: 2    Admit Date: 5/10/2021   5/14/2021 8:03 AM    Subjective:          Chief Complaint:  Shortness of breath, cough    Interval History:    Reviewed overnight events and nursing notes. Status:  improved  Pain:  some relief    Patient states that he feels worse this morning. Increased cough. States that he remains hypoxic but was off of oxygen for most the day yesterday was able to walk to the gift shop without descending. Review of Systems   Constitutional: Negative for chills and fever. Respiratory: Positive for cough and shortness of breath. Cardiovascular: Negative for chest pain.        DIET:  DIET GENERAL;    Medications:      sodium chloride        methylPREDNISolone  40 mg Intravenous Q12H    dilTIAZem  240 mg Oral Daily    oxyCODONE-acetaminophen  1 tablet Oral 4x Daily AC & HS    famotidine  20 mg Oral BID    sodium chloride flush  5-40 mL Intravenous 2 times per day    enoxaparin  40 mg Subcutaneous Daily    cefTRIAXone (ROCEPHIN) IV  1,000 mg Intravenous Q24H    azithromycin  500 mg Intravenous Q24H    ipratropium-albuterol  1 ampule Inhalation Q4H WA    amLODIPine  10 mg Oral Daily    celecoxib  200 mg Oral Daily    DULoxetine  60 mg Oral Daily    finasteride  5 mg Oral Daily    gabapentin  600 mg Oral 4x Daily    guaiFENesin  600 mg Oral BID    hydroCHLOROthiazide  25 mg Oral Daily    pantoprazole  40 mg Oral BID AC    oxyCODONE  20 mg Oral Q12H    tamsulosin  0.4 mg Oral Daily    valACYclovir  500 mg Oral Daily    varenicline  1 mg Oral BID    Arformoterol Tartrate  15 mcg Nebulization BID    And    budesonide  0.25 mg Nebulization BID    losartan  100 mg Oral Daily       Data:     Code Status: Full Code    Family History   Problem Relation Age of Onset    Colon Polyps Mother     Heart Attack Mother     Arrhythmia Mother     Stroke Mother     Colon Polyps Maternal Grandmother     Arrhythmia Father    Giovanny Barr Obesity Sister     Stroke Paternal Grandmother     Colon Cancer Neg Hx     Esophageal Cancer Neg Hx     Liver Cancer Neg Hx     Liver Disease Neg Hx     Rectal Cancer Neg Hx     Stomach Cancer Neg Hx      Social History     Socioeconomic History    Marital status:      Spouse name: Not on file    Number of children: Not on file    Years of education: Not on file    Highest education level: Not on file   Occupational History    Not on file   Social Needs    Financial resource strain: Not on file    Food insecurity     Worry: Not on file     Inability: Not on file    Transportation needs     Medical: Not on file     Non-medical: Not on file   Tobacco Use    Smoking status: Former Smoker     Packs/day: 0.00     Years: 35.00     Pack years: 0.00     Types: Cigarettes     Quit date: 3/16/2016     Years since quittin.1    Smokeless tobacco: Never Used    Tobacco comment: pt states he has quit smokimg again for 3 weeks   Substance and Sexual Activity    Alcohol use: Yes     Comment: 2-3 mixed drinks / month     Drug use: No    Sexual activity: Yes     Partners: Female   Lifestyle    Physical activity     Days per week: Not on file     Minutes per session: Not on file    Stress: Not on file   Relationships    Social connections     Talks on phone: Not on file     Gets together: Not on file     Attends Jew service: Not on file     Active member of club or organization: Not on file     Attends meetings of clubs or organizations: Not on file     Relationship status: Not on file    Intimate partner violence     Fear of current or ex partner: Not on file     Emotionally abused: Not on file     Physically abused: Not on file     Forced sexual activity: Not on file   Other Topics Concern    Not on file   Social History Narrative    Not on file       Labs:  CBC:   Recent Labs     21  0858   WBC 10.7   HGB 17.4        BMP:    Recent Labs     21  0858      K 4.5 CL 99   CO2 29   BUN 25*   CREATININE 1.3*   GLUCOSE 180*     Hepatic:   Recent Labs     21  0858   AST 25   ALT 32   BILITOT 0.4   ALKPHOS 85     Lipids: No results for input(s): CHOL, HDL in the last 72 hours. Invalid input(s): LDLCALCU  INR:   No results for input(s): INR in the last 72 hours. Objective:     Vitals: BP (!) 144/77   Pulse 76   Temp 97.8 °F (36.6 °C) (Temporal)   Resp 20   Ht 6' 4\" (1.93 m)   Wt (!) 341 lb 8 oz (154.9 kg)   SpO2 91%   BMI 41.57 kg/m²      Intake/Output Summary (Last 24 hours) at 2021 0803  Last data filed at 2021 0023  Gross per 24 hour   Intake 1320 ml   Output --   Net 1320 ml    Temp (24hrs), Av.2 °F (36.8 °C), Min:97.6 °F (36.4 °C), Max:99.3 °F (37.4 °C)    Glucose:  No results for input(s): POCGLU in the last 72 hours. Physical Exam  Vitals signs reviewed. Constitutional:       Appearance: He is obese. HENT:      Head: Normocephalic and atraumatic. Nose: Nose normal.      Mouth/Throat:      Mouth: Mucous membranes are moist.   Eyes:      Extraocular Movements: Extraocular movements intact. Pupils: Pupils are equal, round, and reactive to light. Neck:      Musculoskeletal: Normal range of motion and neck supple. Cardiovascular:      Rate and Rhythm: Normal rate and regular rhythm. Heart sounds: No murmur. Pulmonary:      Effort: Pulmonary effort is normal.      Comments: Expiratory wheezes, right lower lobe rhonchi  Abdominal:      General: Abdomen is flat. Bowel sounds are normal. There is no distension. Tenderness: There is no abdominal tenderness. Musculoskeletal: Normal range of motion. General: No swelling. Skin:     General: Skin is warm and dry. Capillary Refill: Capillary refill takes less than 2 seconds. Neurological:      General: No focal deficit present. Mental Status: He is alert and oriented to person, place, and time.    Psychiatric:         Mood and Affect: Mood normal.

## 2021-05-14 NOTE — TELEPHONE ENCOUNTER
Juanpablo Carrasco called this morning to advise that he is currently in the hospital, room # [phone], he is being treated for pneumonia, PTSD and is having a lot of problems breathing and sleeping. He asked if Dr. Ramakrishna Joshua could call to see him. Thank you.

## 2021-05-14 NOTE — PROGRESS NOTES
PTS SAT ON RA WAS 93% AT REST. PT THEN WALKED IN HALLWAY WITHOUT OXYGEN SAT DROPPED TO 86% . OXYGEN PLACED BACK ON PT AT 2 LPM SAT INCREASED TO 94% WHILE WALKING.

## 2021-05-14 NOTE — PROGRESS NOTES
5/14/2021 12:24 PM - Mele, Kyin Incoming Lab Results From [facility] Value Ref Range & Units Status Collected Lab   pH, Arterial 7.450  7.350 - 7.450 Final 05/14/2021 12:23 PM [address] [place] [address] Lab   pCO2, Arterial 45.0  35.0 - 45.0 mmHg Final 05/14/2021 12:23 PM [address] [place] [address] Lab   pO2, Arterial 56. 0Low   80.0 - 100.0 mmHg Final 05/14/2021 12:23 PM [address] [place] [address] Lab   HCO3, Arterial 31.3High   22.0 - 26.0 mmol/L Final 05/14/2021 12:23 PM [facility] Excess, Arterial 6.2High   -2.0 - 2.0 mmol/L Final 05/14/2021 12:23 PM [address] [place] [address] Lab   Hemoglobin, Art, Extended 17.3  14.0 - 18.0 g/dL Final 05/14/2021 12:23 PM [address] [place] [address] Lab   O2 Sat, Arterial 90.2  >92 % Final 05/14/2021 12:23 PM [facility] Lab   Carboxyhgb, Arterial 1.7  0.0 - 5.0 % Final 05/14/2021 12:23 PM [facility] Lab        0.0-1.5   (Smokers 1.5-5.0)    Methemoglobin, Arterial 0.8  <1.5 % Final 05/14/2021 12:23 PM [address] [place] [address] Lab   O2 Content, Arterial 21.9  Not Established mL/dL Final 05/14/2021 12:23 PM [address] [place] [address] Lab   O2 Therapy Unknown   Final 05/14/2021 12:      ROOM AIR  RIGHT RADIAL  ALLENS TEST POSITIVE

## 2021-05-15 VITALS
HEART RATE: 76 BPM | RESPIRATION RATE: 16 BRPM | HEIGHT: 76 IN | SYSTOLIC BLOOD PRESSURE: 155 MMHG | WEIGHT: 315 LBS | TEMPERATURE: 97.9 F | DIASTOLIC BLOOD PRESSURE: 82 MMHG | BODY MASS INDEX: 38.36 KG/M2 | OXYGEN SATURATION: 91 %

## 2021-05-15 PROBLEM — R09.02 HYPOXIA: Status: ACTIVE | Noted: 2021-05-15

## 2021-05-15 LAB
ALBUMIN SERPL-MCNC: 4.3 G/DL (ref 3.5–5.2)
ALP BLD-CCNC: 91 U/L (ref 40–130)
ALT SERPL-CCNC: 31 U/L (ref 5–41)
ANION GAP SERPL CALCULATED.3IONS-SCNC: 10 MMOL/L (ref 7–19)
AST SERPL-CCNC: 18 U/L (ref 5–40)
BASOPHILS ABSOLUTE: 0 K/UL (ref 0–0.2)
BASOPHILS RELATIVE PERCENT: 0.2 % (ref 0–1)
BILIRUB SERPL-MCNC: 0.4 MG/DL (ref 0.2–1.2)
BLOOD CULTURE, ROUTINE: NORMAL
BUN BLDV-MCNC: 21 MG/DL (ref 8–23)
CALCIUM SERPL-MCNC: 9.6 MG/DL (ref 8.8–10.2)
CHLORIDE BLD-SCNC: 97 MMOL/L (ref 98–111)
CO2: 30 MMOL/L (ref 22–29)
CREAT SERPL-MCNC: 0.9 MG/DL (ref 0.5–1.2)
CULTURE, BLOOD 2: NORMAL
EOSINOPHILS ABSOLUTE: 0 K/UL (ref 0–0.6)
EOSINOPHILS RELATIVE PERCENT: 0 % (ref 0–5)
GFR AFRICAN AMERICAN: >59
GFR NON-AFRICAN AMERICAN: >60
GLUCOSE BLD-MCNC: 154 MG/DL (ref 74–109)
HCT VFR BLD CALC: 50.9 % (ref 42–52)
HEMOGLOBIN: 17.2 G/DL (ref 14–18)
IMMATURE GRANULOCYTES #: 0.1 K/UL
LYMPHOCYTES ABSOLUTE: 0.5 K/UL (ref 1.1–4.5)
LYMPHOCYTES RELATIVE PERCENT: 5.6 % (ref 20–40)
MCH RBC QN AUTO: 31.3 PG (ref 27–31)
MCHC RBC AUTO-ENTMCNC: 33.8 G/DL (ref 33–37)
MCV RBC AUTO: 92.5 FL (ref 80–94)
MONOCYTES ABSOLUTE: 0.9 K/UL (ref 0–0.9)
MONOCYTES RELATIVE PERCENT: 9.9 % (ref 0–10)
NEUTROPHILS ABSOLUTE: 7.7 K/UL (ref 1.5–7.5)
NEUTROPHILS RELATIVE PERCENT: 82.8 % (ref 50–65)
PDW BLD-RTO: 13.2 % (ref 11.5–14.5)
PLATELET # BLD: 170 K/UL (ref 130–400)
PMV BLD AUTO: 9.5 FL (ref 9.4–12.4)
POTASSIUM SERPL-SCNC: 4.3 MMOL/L (ref 3.5–5)
RBC # BLD: 5.5 M/UL (ref 4.7–6.1)
SODIUM BLD-SCNC: 137 MMOL/L (ref 136–145)
TOTAL PROTEIN: 6.3 G/DL (ref 6.6–8.7)
WBC # BLD: 9.3 K/UL (ref 4.8–10.8)

## 2021-05-15 PROCEDURE — 6360000002 HC RX W HCPCS: Performed by: FAMILY MEDICINE

## 2021-05-15 PROCEDURE — 94640 AIRWAY INHALATION TREATMENT: CPT

## 2021-05-15 PROCEDURE — 80053 COMPREHEN METABOLIC PANEL: CPT

## 2021-05-15 PROCEDURE — 6370000000 HC RX 637 (ALT 250 FOR IP): Performed by: FAMILY MEDICINE

## 2021-05-15 PROCEDURE — 94762 N-INVAS EAR/PLS OXIMTRY CONT: CPT

## 2021-05-15 PROCEDURE — 85025 COMPLETE CBC W/AUTO DIFF WBC: CPT

## 2021-05-15 PROCEDURE — 2700000000 HC OXYGEN THERAPY PER DAY

## 2021-05-15 PROCEDURE — 36415 COLL VENOUS BLD VENIPUNCTURE: CPT

## 2021-05-15 RX ORDER — PREDNISONE 10 MG/1
TABLET ORAL
Qty: 24 TABLET | Refills: 0 | Status: SHIPPED | OUTPATIENT
Start: 2021-05-16 | End: 2021-05-27

## 2021-05-15 RX ORDER — AMOXICILLIN AND CLAVULANATE POTASSIUM 875; 125 MG/1; MG/1
1 TABLET, FILM COATED ORAL EVERY 12 HOURS SCHEDULED
Qty: 12 TABLET | Refills: 0 | Status: SHIPPED | OUTPATIENT
Start: 2021-05-15 | End: 2021-05-21

## 2021-05-15 RX ADMIN — PANTOPRAZOLE SODIUM 40 MG: 40 TABLET, DELAYED RELEASE ORAL at 08:06

## 2021-05-15 RX ADMIN — DULOXETINE 60 MG: 60 CAPSULE, DELAYED RELEASE ORAL at 08:04

## 2021-05-15 RX ADMIN — IPRATROPIUM BROMIDE AND ALBUTEROL SULFATE 1 AMPULE: 2.5; .5 SOLUTION RESPIRATORY (INHALATION) at 11:02

## 2021-05-15 RX ADMIN — VALACYCLOVIR HYDROCHLORIDE 500 MG: 500 TABLET, FILM COATED ORAL at 08:04

## 2021-05-15 RX ADMIN — IPRATROPIUM BROMIDE AND ALBUTEROL SULFATE 1 AMPULE: 2.5; .5 SOLUTION RESPIRATORY (INHALATION) at 15:10

## 2021-05-15 RX ADMIN — BUDESONIDE 250 MCG: 0.25 SUSPENSION RESPIRATORY (INHALATION) at 07:11

## 2021-05-15 RX ADMIN — LOSARTAN POTASSIUM 100 MG: 100 TABLET, FILM COATED ORAL at 08:06

## 2021-05-15 RX ADMIN — PREDNISONE 40 MG: 10 TABLET ORAL at 08:06

## 2021-05-15 RX ADMIN — GABAPENTIN 600 MG: 600 TABLET, FILM COATED ORAL at 11:30

## 2021-05-15 RX ADMIN — GABAPENTIN 600 MG: 600 TABLET, FILM COATED ORAL at 08:04

## 2021-05-15 RX ADMIN — AMLODIPINE BESYLATE 10 MG: 10 TABLET ORAL at 08:05

## 2021-05-15 RX ADMIN — HYDROCHLOROTHIAZIDE 25 MG: 25 TABLET ORAL at 08:05

## 2021-05-15 RX ADMIN — FAMOTIDINE 20 MG: 20 TABLET ORAL at 08:06

## 2021-05-15 RX ADMIN — AMOXICILLIN AND CLAVULANATE POTASSIUM 1 TABLET: 875; 125 TABLET, FILM COATED ORAL at 08:05

## 2021-05-15 RX ADMIN — IPRATROPIUM BROMIDE AND ALBUTEROL SULFATE 1 AMPULE: 2.5; .5 SOLUTION RESPIRATORY (INHALATION) at 07:00

## 2021-05-15 RX ADMIN — OXYCODONE HYDROCHLORIDE 20 MG: 20 TABLET, FILM COATED, EXTENDED RELEASE ORAL at 08:07

## 2021-05-15 RX ADMIN — OXYCODONE AND ACETAMINOPHEN 1 TABLET: 5; 325 TABLET ORAL at 11:31

## 2021-05-15 RX ADMIN — CELECOXIB 200 MG: 200 CAPSULE ORAL at 08:05

## 2021-05-15 RX ADMIN — GUAIFENESIN 600 MG: 600 TABLET, EXTENDED RELEASE ORAL at 08:06

## 2021-05-15 RX ADMIN — DILTIAZEM HYDROCHLORIDE 240 MG: 120 CAPSULE, COATED, EXTENDED RELEASE ORAL at 08:06

## 2021-05-15 RX ADMIN — FINASTERIDE 5 MG: 5 TABLET, FILM COATED ORAL at 08:07

## 2021-05-15 RX ADMIN — TAMSULOSIN HYDROCHLORIDE 0.4 MG: 0.4 CAPSULE ORAL at 08:05

## 2021-05-15 RX ADMIN — OXYCODONE AND ACETAMINOPHEN 1 TABLET: 5; 325 TABLET ORAL at 08:03

## 2021-05-15 RX ADMIN — ARFORMOTEROL TARTRATE 15 MCG: 15 SOLUTION RESPIRATORY (INHALATION) at 07:11

## 2021-05-15 RX ADMIN — VARENICLINE TARTRATE 1 MG: 0.5 TABLET, FILM COATED ORAL at 08:04

## 2021-05-15 ASSESSMENT — PAIN SCALES - GENERAL
PAINLEVEL_OUTOF10: 8
PAINLEVEL_OUTOF10: 6

## 2021-05-15 NOTE — CARE COORDINATION
CM requested by Nohemi Fraser RN, to facilitate home 02 for patient at discharge. Per note, pt would like to use [employer] as vendor. Ryan Morgan #[id number] (BLO:[id number]) (SPENCER:[birth year] 61 y.o. M) (Adm: 05/10/21)  [facility] [id number]  PCP    Oneil Blizzard  Demographics  Comment     Last edited by  on  at    Address: Home Phone:  Work Phone: Mobile Phone:   [address] [place] [address] [address] [place] [address] [phone] -- [phone]   SSN: Insurance: Marital Status: Christian:    [id number] Critical access hospital Status   No [002]   Insurance Payors as of 5/15/2021    GENERIC AUTO INSURANCE    Plan: GENERIC AUTO INSURANCE Group: [id number] Member: [id number]   Effective from: 12/5/2017 Subscriber: Davon Zimmerman Subscriber ID: [id number]   Guarantor: Davon Zimmerman   MEDICARE    Plan: MEDICARE PART A AND B Member: [id number] Effective from: 1/1/2015   Subscriber: Davon Zimmerman Subscriber ID: [id number] Guarantor: [address] [place] [address], [address]: ANTH MEDICARE SUPP Group: [id number] Member: [id number]   Effective from: 12/1/2016 Subscriber: Davon Zimmerman Subscriber ID: [id number]   Guarantor: Davon Zimmerman   Documents Filed to Patient    Power of  Living Will Clinical Unknown Study Attachment Consent Form ABN Waiver After Visit Summary Lab Result Scan Code Status MyChart Status Advance Care Planning   Not on File Not on File Not on File Not on File Not on File Not on File Jay Elmore FULL [Updated on 05/10/21 9858] Active Jump to the Activity    Auth/Cert Information    Create Auth/Cert for Hospital Account [de-identified]   Emergency Contact(s)    Name Relation Home Work Debi Hill Spouse [phone]     Admission Information    Current Information    Attending Provider Admitting Provider Admission Type Admission Status   MD Meredith Parrish MD Elective Confirmed Admission          Admission Date/Time Discharge Date Hospital Service Auth/Cert Status   78/31/27  01:46 PM  Med/Surg Incomplete          Hospital Area Unit Room/Bed    [facility] 4 ONCOLOGY UNIT [id number] [facility] Account    Name Acct ID Class Status Primary Coverage   Simona Seaman [id number] Inpatient Open MEDICARE - MEDICARE PART A AND B          Guarantor Account (for Hospital Account [de-identified])    Name Relation to Pt Service Area Active? Acct Type   Simona Seaman Self [employer] [facility] Yes Personal/Family   Address Phone     [address]   [address] [phone])            Coverage Information (for Hospital Account [de-identified])    1. [facility] PART A AND B    F/O Payor/Plan Precert #   MEDICARE/MEDICARE PART A AND B    Subscriber Subscriber #   Simona Urszula [id number]   Address Phone   [address] [address] [address] [facility] [phone]   2.  BCBS/ANTHEM MEDICARE SUPP    F/O Payor/Plan Precert #   BCBS/ANTHEM MEDICARE SUPP    Subscriber Subscriber #   Simona Urszula [id number]   Address Phone   [address]   [address] [phone]     Electronically signed by Lizbeth Capone RN on 5/15/2021 at 2:34 PM

## 2021-05-15 NOTE — DISCHARGE SUMMARY
Discharge Summary    Patient ID: Aguila Henry  MRN: [id number]     Acct:  [de-identified]       Patient's PCP: Hesham Moran MD    Admit Date: 5/10/2021     Discharge Date:   5/15/2021    Admitting Physician: Hesham Moran MD    Discharge Physician: Delfino Wheeler MD     Active Discharge Diagnoses:    Primary Problem  COPD with acute exacerbation [facility])  [place] Problems    Diagnosis Date Noted    Hypoxia [R09.02] 05/15/2021    CAP (community acquired pneumonia) [J18.9] 05/12/2021    Hypertension [I10] 05/12/2021    COPD exacerbation ([facility] [address] [J44.1] 05/12/2021    COPD with acute exacerbation ([facility] [address] [J44.1] 05/10/2021    BiPAP (biphasic positive airway pressure) dependence [Z99.89]     Obstructive sleep apnea [G47.33]        The patient was seen and examined on day of discharge and this discharge summary is in conjunction with the daily progress note from day of discharge. Code Status:  Full Code    Hospital Course: Patient admitted for worsening dyspnea and hypoxia. He is found to be hypoxic on his initial ABG and chest x-ray showed a right-sided pneumonia. He was placed initially on IV antibiotics and IV steroids after failure of outpatient treatment. He had gradual improvement throughout the course of his stay but continued to desat with exertion as well as overnight without oxygen. Based on this, home oxygen therapy was arranged and he will be sent home with nocturnal oxygen as well as oxygen to use when he is up moving around at 2 L/min. He will be discharged to complete 6 more days of antibiotics and a taper of prednisone over the next 11 days. On day of discharge, patient I had extensive discussion about pneumonia and COPD and expected course. All of his questions were answered and he was agreeable to discharge home.   I did offer that if he was uncomfortable with discharge home he could stay in the hospital another day but he was comfortable and agreeable to discharge. I would recommend that we get pulmonology referral at his outpatient follow-up and consider increasing the intensity of his inhaler therapy to a triple inhaler such as Breztri he or Trelegy    The patient was admitted for the above noted medical/surgical issues. Please see daily progress note for further details concerning their stay. The patient improved throughout their stay and reached maximum medical improvement on the day of discharge. The patient/family agree with the treatment plan as outlined above. All questions concerning their stay were answered prior to discharge. They understand the importance of follow up concerning any abnormal test results. Physical Exam  Vitals reviewed. Constitutional:       Appearance: He is obese. HENT:      Head: Normocephalic and atraumatic. Nose: Nose normal.      Mouth/Throat:      Mouth: Mucous membranes are moist.   Eyes:      Extraocular Movements: Extraocular movements intact. Pupils: Pupils are equal, round, and reactive to light. Cardiovascular:      Rate and Rhythm: Normal rate and regular rhythm. Heart sounds: No murmur heard. Pulmonary:      Effort: Pulmonary effort is normal.      Comments: Expiratory wheezes, right lower lobe rhonchi  Abdominal:      General: Abdomen is flat. Bowel sounds are normal. There is no distension. Tenderness: There is no abdominal tenderness. Musculoskeletal:         General: No swelling. Normal range of motion. Cervical back: Normal range of motion and neck supple. Skin:     General: Skin is warm and dry. Capillary Refill: Capillary refill takes less than 2 seconds. Neurological:      General: No focal deficit present. Mental Status: He is alert and oriented to person, place, and time.    Psychiatric:         Mood and Affect: Mood normal.         Behavior: Behavior normal.        Consults:  IP CONSULT TO CASE MANAGEMENT    Disposition: Discharged home in stable name was removed. Continue taking this medication, and follow the directions you see here. traZODone 100 MG tablet  Commonly known as: DESYREL  Take 2 tablets by mouth nightly as needed for Sleep  What changed:   · how much to take  · Another medication with the same name was removed. Continue taking this medication, and follow the directions you see here. valACYclovir 500 MG tablet  Commonly known as: VALTREX  What changed: Another medication with the same name was removed. Continue taking this medication, and follow the directions you see here. * This list has 2 medication(s) that are the same as other medications prescribed for you. Read the directions carefully, and ask your doctor or other care provider to review them with you.             CONTINUE taking these medications    Align 4 MG Caps     Belviq 10 MG Tabs  Generic drug: Lorcaserin HCl     Biotin 10 MG Caps     Cardizem  MG extended release capsule  Generic drug: dilTIAZem     Chantix Continuing Month Armen 1 MG tablet  Generic drug: varenicline     cyclobenzaprine 5 MG tablet  Commonly known as: FLEXERIL     dextromethorphan-guaiFENesin  MG per extended release tablet  Commonly known as: MUCINEX DM     doxepin 10 MG capsule  Commonly known as: SINEQUAN  1 to 2 PO q HS     glucosamine-chondroitin 500-400 MG Caps     hydroCHLOROthiazide 25 MG tablet  Commonly known as: HYDRODIURIL     ipratropium-albuterol 0.5-2.5 (3) MG/3ML Soln nebulizer solution  Commonly known as: DUONEB     Krill Oil Omega-3 300 MG Caps     Lomotil 2.5-0.025 MG per tablet  Generic drug: diphenoxylate-atropine     losartan-hydroCHLOROthiazide 100-25 MG per tablet  Commonly known as: HYZAAR     Magnesium 100 MG Caps     meclizine 25 MG tablet  Commonly known as: ANTIVERT     meloxicam 15 MG tablet  Commonly known as: Mobic  Take 1 tablet by mouth daily     MENS MULTIVITAMIN PLUS PO     Mucinex 600 MG extended release tablet  Generic drug: guaiFENesin Norvasc 10 MG tablet  Generic drug: amLODIPine     Omega-3 1000 MG Caps     oxyCODONE 10 MG extended release tablet  Commonly known as: OXYCONTIN     prochlorperazine 10 MG tablet  Commonly known as: COMPAZINE     testosterone cypionate 200 MG/ML injection  Commonly known as: DEPOTESTOTERONE CYPIONATE        STOP taking these medications    Asmanex (7 Metered Doses) 110 MCG/INH Aepb  Generic drug: mometasone     losartan 100 MG tablet  Commonly known as: COZAAR     Xtampza ER 18 MG C12a  Generic drug: oxyCODONE ER           Where to Get Your Medications      These medications were sent to [facility], [address] [phone]  [address] [place] [address] RD., [address] [place] [id number]    Hours: 24-hours Phone: [phone]   · amoxicillin-clavulanate 875-125 MG per tablet  · predniSONE 10 MG tablet         Time Spent on discharge is 54 minutes in patient examination, evaluation, counseling as well as medication reconciliation, prescriptions for required medications, discharge planning and follow up.     Electronically signed by Estefani Hopson MD on 5/15/2021 at 11:37 AM

## 2021-05-15 NOTE — CARE COORDINATION
CM faxed documentation for request for 02. ANDERS advised 02 tank will need to be brought to room prior to discharge.    Electronically signed by Timbo Oates RN on 5/15/2021 at 3:00 PM

## 2021-05-17 ENCOUNTER — CARE COORDINATION (OUTPATIENT)
Dept: CASE MANAGEMENT | Age: 66
End: 2021-05-17

## 2021-05-17 NOTE — CARE COORDINATION
[facility] 45 Transitions Initial Follow Up Call    Call within 2 business days of discharge: Yes    Patient: Jessi Keita Patient : 1955   MRN: [id number]  Reason for Admission: COPD. KENNETH, CAD, et al  Discharge Date: 5/15/21 RARS: Readmission Risk Score: 19      Last Discharge [facility]       Complaint Diagnosis Description Type Department Provider    5/10/21   Admission (Discharged) [facility] Hao Chung MD           Spoke with: [address]: [facility]    Non-face-to-face services provided:  Reviewed encounter information for continuity of care prior to follow up Care Transitions phone call - chart notes, consults, progress notes, test results, med list, appointments, AVS, other information. Care Transitions 24 Hour Call    Schedule Follow Up Appointment with PCP: Completed  Do you have a copy of your discharge instructions?: Yes  Do you have all of your prescriptions and are they filled?: Yes  Have you been contacted by a [address]?: No  Have you scheduled your follow up appointment?: Yes  How are you going to get to your appointment?: Car - drive self  Were you discharged with any Home Care or Post Acute Services: No  Do you feel like you have everything you need to keep you well at home?: Yes  Care Transitions Interventions         Follow Up  Future Appointments   Date Time Provider Caleb Menendez   2021 11:30 AM Miley Wall MD [facility] a call to the number listed for patient and spoke with him for an initial follow up call post discharge 5/15/2021. As he answered and I started to speak, he had a terrible round of coughing. It sounded very loose and rattly. 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 He said that he believes the increased activity and also the outdoor humidity is what is breaking it up.   He said he is getting very little up and is swallowing it. He is not aware of there is color to it or not. Instructed to watch the color of it if possible and to report anything that is not clear in color or changes. He said that he is doing neb treatments as ordered. He is also taking all of his meds, except he does not have Albuterol inhaler that is ordered. I was not able to do a medication review as he is not at home. He needs the inhaler and he also reported that in the hospital, he got an extended release mucinex and a regular release and he said he thought that helped more than anything. He is taking prednisone as ordered. He is on antibiotic as ordered. He said he is eating and drinking plenty of fluids. He is getting stronger, just concerned about the cough. He said he has an appt with PCP for follow up. He is not aware of any other issues at this time. His wife is still out of the country. His granddaughter is staying with him but will be leaving in one week. He said he is making it fine, meals, personal care, etc.  He said the oxygen is helping tremendously as well. He uses it off and on. Has pulse ox available and checks levels often. He did say that he did not see a pulmonologist while in and wonders if he needs to. No other needs noted. Discussed COVID 19 information, risks, signs, quarantine, infection control, vaccines, etc.  Patient aware and voices understanding. Informed of CTC process, purpose, future calls, etc and is agreeable with appropriate follow up. Ensured to have CTN contact information to be used as needed. Encouraged to call as needed for new issues, questions, etc.  Given the opportunity to ask questions and answered appropriately. CTN to follow up as indicated. Transitions of Care Initial Call    Was this an external facility discharge?  No    Challenges to be reviewed by the provider   Additional needs identified to be addressed with provider Needs script for Albuterol, Mucinex Method of communication with provider : phone      Advance Care Planning:   Does patient have an Advance Directive:  not on file. Advance Care Planning   Healthcare Decision Maker:  Patient does not have a current health care decision maker listed and is not interested in naming one at this time. Was this a readmission? No  Patient stated reason for admission: \"I couldn't breathe very well. \"  Patients top risk factors for readmission: functional physical ability, medical condition-COPD, et al, medication management and caregiver is out of  the country    Care Transition Nurse (CTN) contacted the patient by telephone to perform post hospital discharge assessment. Verified name and  with patient as identifiers. Provided introduction to self, and explanation of the CTN role. CTN reviewed discharge instructions, medical action plan and red flags with patient who verbalized understanding. Patient given an opportunity to ask questions and does not have any further questions or concerns at this time. Were discharge instructions available to patient? Yes. Reviewed appropriate site of care based on symptoms and resources available to patient including: PCP, Specialist, Urgent care clinics, Home health and When to call 911. The patient agrees to contact the PCP office for questions related to their healthcare. Medication reconciliation was not performed with patient, unable, not at home. He verbalizes understanding of administration of home medications. Advised obtaining a 90-day supply of all daily and as-needed medications. Covid Risk Education     Educated patient about risk for severe COVID-19 due to risk factors according to CDC guidelines. CTN reviewed discharge instructions, medical action plan and red flag symptoms patient who verbalized understanding. Discussed COVID vaccination status No. Education provided on COVID-19 vaccination as appropriate. Discussed exposure protocols and quarantine

## 2021-05-17 NOTE — CARE COORDINATION
Placed a call to Dr. Harjit Angela office and spoke with phone nurse, Tim Obrien. Reported that patient is needing Albuterol inhaler and also needs clarification about his Mucinex dosage and asked about seeing pulmonology. She will speak with Dr. Belinda Grimaldo and get back with patient. She said he is scheduled for follow up today.

## 2021-05-18 LAB
EKG P AXIS: 50 DEGREES
EKG P-R INTERVAL: 200 MS
EKG Q-T INTERVAL: 340 MS
EKG QRS DURATION: 96 MS
EKG QTC CALCULATION (BAZETT): 396 MS
EKG T AXIS: 26 DEGREES

## 2021-05-19 ENCOUNTER — TRANSCRIBE ORDERS (OUTPATIENT)
Dept: ADMINISTRATIVE | Facility: HOSPITAL | Age: 66
End: 2021-05-19

## 2021-05-19 DIAGNOSIS — J44.1 COPD EXACERBATION (HCC): Primary | ICD-10-CM

## 2021-05-20 ENCOUNTER — CARE COORDINATION (OUTPATIENT)
Dept: CASE MANAGEMENT | Age: 66
End: 2021-05-20

## 2021-05-20 NOTE — CARE COORDINATION
[facility] Transitions Follow Up Call    2021    Patient: Andre Arellano  Patient : 1955   MRN: [id number]    Discharge Date: 5/15/21 RARS: Readmission Risk Score: 23         Spoke with: N/A    Care Transitions Subsequent and Final Call    Subsequent and Final Calls  Care Transitions Interventions  Other Interventions: Follow Up  Future Appointments   Date Time Provider Caleb Menendez   2021 11:30 AM MD JEAN CLAUDE Alfredo [facility]     Attempted to make contact with patient/caregiver for a routine follow up call without success. Unable to leave a message regarding intent of call and call back information. Call went to an unidentifiable voice messaging sytem (mobile voice mail or telephone answering machine). Will try again at a later time.          Shayy Soto RN

## 2021-05-27 ENCOUNTER — CARE COORDINATION (OUTPATIENT)
Dept: CASE MANAGEMENT | Age: 66
End: 2021-05-27

## 2021-05-27 NOTE — CARE COORDINATION
[facility] Transitions Follow Up Call    2021    Patient: Rhonda Olsen  Patient : 1955   MRN: [id number]    Discharge Date: 5/15/21 RARS: Readmission Risk Score: 23         Spoke with: [facility] Transitions Subsequent and Final Call    Subsequent and Final Calls  Have your medications changed?: Yes  Patient Reports: see cc note  Do you have any questions related to your medications?: No  Do you currently have any active services?: No  Do you have any needs or concerns that I can assist you with?: No  Identified Barriers: None  Care Transitions Interventions  Other Interventions: Follow Up  Future Appointments   Date Time Provider Department Center   2021 11:30 AM Gabriela Neumann MD [facility]     Placed a call to the patient for follow up. He said he still has pneumonia and is still having issues. He said that he has been back to see his PCP twice weekly since getting out of the hospital.  He has been on more rounds of steroids and antibiotics, is doing neb treatments, etc.  He said that he has been told that he still has some fluid in one of his lungs. He said that they think COVID 19 has scarred his lungs. He is eating well he said, getting around a little better, but is still quite weak. He then had to hang up because he said his doctor was calling him. Told him I would follow up next week.       Lois Loving RN

## 2021-05-28 ENCOUNTER — OFFICE VISIT (OUTPATIENT)
Dept: PULMONOLOGY | Facility: CLINIC | Age: 66
End: 2021-05-28

## 2021-05-28 VITALS
BODY MASS INDEX: 38.36 KG/M2 | WEIGHT: 315 LBS | DIASTOLIC BLOOD PRESSURE: 94 MMHG | HEIGHT: 76 IN | HEART RATE: 86 BPM | OXYGEN SATURATION: 98 % | SYSTOLIC BLOOD PRESSURE: 142 MMHG

## 2021-05-28 DIAGNOSIS — J98.4 RESTRICTIVE LUNG DISEASE: ICD-10-CM

## 2021-05-28 DIAGNOSIS — R09.02 HYPOXEMIA REQUIRING SUPPLEMENTAL OXYGEN: ICD-10-CM

## 2021-05-28 DIAGNOSIS — U07.1 COVID-19 VIRUS INFECTION: Primary | ICD-10-CM

## 2021-05-28 DIAGNOSIS — Z99.81 HYPOXEMIA REQUIRING SUPPLEMENTAL OXYGEN: ICD-10-CM

## 2021-05-28 DIAGNOSIS — F43.10 PTSD (POST-TRAUMATIC STRESS DISORDER): ICD-10-CM

## 2021-05-28 DIAGNOSIS — E66.01 MORBID OBESITY WITH BMI OF 40.0-44.9, ADULT (HCC): ICD-10-CM

## 2021-05-28 DIAGNOSIS — R91.8 MULTIPLE LUNG NODULES: ICD-10-CM

## 2021-05-28 DIAGNOSIS — R06.02 SHORTNESS OF BREATH: ICD-10-CM

## 2021-05-28 DIAGNOSIS — R05.9 COUGH: ICD-10-CM

## 2021-05-28 DIAGNOSIS — J44.9 CHRONIC OBSTRUCTIVE PULMONARY DISEASE, UNSPECIFIED COPD TYPE (HCC): ICD-10-CM

## 2021-05-28 DIAGNOSIS — G47.33 OBSTRUCTIVE SLEEP APNEA TREATED WITH BIPAP: ICD-10-CM

## 2021-05-28 DIAGNOSIS — J40 BRONCHITIS: ICD-10-CM

## 2021-05-28 DIAGNOSIS — F41.8 OTHER SPECIFIED ANXIETY DISORDERS: ICD-10-CM

## 2021-05-28 DIAGNOSIS — J84.10 GRANULOMATOUS LUNG DISEASE (HCC): ICD-10-CM

## 2021-05-28 DIAGNOSIS — Z87.891 FORMER CIGARETTE SMOKER: ICD-10-CM

## 2021-05-28 DIAGNOSIS — J44.1 COPD WITH ACUTE EXACERBATION (HCC): ICD-10-CM

## 2021-05-28 DIAGNOSIS — Z87.01 HISTORY OF PNEUMONIA: ICD-10-CM

## 2021-05-28 PROBLEM — Z23 COVID-19 VACCINE ADMINISTERED: Status: ACTIVE | Noted: 2021-05-28

## 2021-05-28 PROBLEM — Z78.9 NON-SMOKER: Status: RESOLVED | Noted: 2018-04-02 | Resolved: 2021-05-28

## 2021-05-28 PROBLEM — J30.89 NON-SEASONAL ALLERGIC RHINITIS: Status: ACTIVE | Noted: 2021-05-28

## 2021-05-28 PROCEDURE — 99204 OFFICE O/P NEW MOD 45 MIN: CPT | Performed by: INTERNAL MEDICINE

## 2021-05-28 RX ORDER — ALBUTEROL SULFATE 90 UG/1
2 AEROSOL, METERED RESPIRATORY (INHALATION) EVERY 4 HOURS PRN
Qty: 20.1 G | Refills: 2 | Status: SHIPPED | OUTPATIENT
Start: 2021-05-28 | End: 2021-12-23

## 2021-05-28 RX ORDER — ALBUTEROL SULFATE 2.5 MG/3ML
2.5 SOLUTION RESPIRATORY (INHALATION) EVERY 4 HOURS PRN
Qty: 360 ML | Refills: 3 | Status: SHIPPED | OUTPATIENT
Start: 2021-05-28

## 2021-05-28 RX ORDER — LORATADINE 10 MG/1
10 TABLET ORAL DAILY
Qty: 90 TABLET | Refills: 3 | Status: SHIPPED | OUTPATIENT
Start: 2021-05-28 | End: 2021-12-14

## 2021-05-28 RX ORDER — AZELASTINE HYDROCHLORIDE 137 UG/1
2 SPRAY, METERED NASAL 2 TIMES DAILY
Qty: 30 ML | Refills: 5 | Status: SHIPPED | OUTPATIENT
Start: 2021-05-28 | End: 2022-07-15

## 2021-05-28 RX ORDER — FLUTICASONE PROPIONATE 50 MCG
2 SPRAY, SUSPENSION (ML) NASAL DAILY
Qty: 16 G | Refills: 5 | Status: SHIPPED | OUTPATIENT
Start: 2021-05-28 | End: 2022-02-07

## 2021-05-28 RX ORDER — GABAPENTIN 600 MG/1
600 TABLET ORAL 4 TIMES DAILY
COMMUNITY
End: 2021-11-29
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
## 2021-06-03 ENCOUNTER — CARE COORDINATION (OUTPATIENT)
Dept: CASE MANAGEMENT | Age: 66
End: 2021-06-03

## 2021-06-03 NOTE — CARE COORDINATION
[facility] Transitions Follow Up Call    6/3/2021    Patient: Sincere Murphy  Patient : 1955   MRN: [id number]    Discharge Date: 5/15/21 RARS: Readmission Risk Score: 23         Spoke with: Zehra 86 Transitions Subsequent and Final Call    Schedule Follow Up Appointment with PCP: Completed  Subsequent and Final Calls  Have your medications changed?: No  Do you have any questions related to your medications?: No  Do you currently have any active services?: No  Do you have any needs or concerns that I can assist you with?: No  Identified Barriers: None  Care Transitions Interventions  Other Interventions:       Placed a call to the patient for follow up. He reported that he is still having some issues. Cy Andrade he is trying to go without his oxygen when he is out and about, but right now, for example, he is having to rush home so he can get it put on. He does not carry portable tanks with him. Advised that he needs to do this in case he really gets into bad distress and needs it. Informed him that it is probably not safe for him to be driving if he is near hypoxic. Voiced understanding and agreed. He said that he is still having a loose rattly cough. Not really getting anything up. He said at times his lungs hurt. He does a breathing treatment and it clears. He has been seeing his PCP regularly still. He is not aware of any other issues. His wife is still in [facility] and is coming home soon. He  Is aware of calling prn any needs. CTN to follow up again as indicated.          Follow Up  Future Appointments   Date Time Provider Caleb Menendez   2021 11:30 AM Vic Heredia MD [facility]       Bryant Mann RN

## 2021-06-10 ENCOUNTER — CARE COORDINATION (OUTPATIENT)
Dept: CASE MANAGEMENT | Age: 66
End: 2021-06-10

## 2021-06-10 NOTE — CARE COORDINATION
[facility] Transitions Follow Up Call    6/10/2021    Patient: Montserrat Salmeron  Patient : 1955   MRN: [id number]    Discharge Date: 5/15/21 RARS: Readmission Risk Score: 23         Spoke with: N/A    Care Transitions Subsequent and Final Call    Subsequent and Final Calls  Care Transitions Interventions  Other Interventions:         Attempted to make contact with patient/caregiver for a routine follow up call without success. Unable to leave a HIPAA compliant message regarding intent of call and call back information. Call went to an unidentifiable voice messaging sytem (mobile voice mail or telephone answering machine). Will try again at a later time.      Follow Up  Future Appointments   Date Time Provider Caleb Menendez   2021 11:30 AM Miko Phillips MD [facility]       Digna Farias RN

## 2021-06-16 ENCOUNTER — TRANSCRIBE ORDERS (OUTPATIENT)
Dept: LAB | Facility: HOSPITAL | Age: 66
End: 2021-06-16

## 2021-06-16 DIAGNOSIS — Z01.818 PRE-OP TESTING: Primary | ICD-10-CM

## 2021-06-22 ENCOUNTER — LAB (OUTPATIENT)
Dept: LAB | Facility: HOSPITAL | Age: 66
End: 2021-06-22

## 2021-06-22 LAB — SARS-COV-2 ORF1AB RESP QL NAA+PROBE: NOT DETECTED

## 2021-06-22 PROCEDURE — U0005 INFEC AGEN DETEC AMPLI PROBE: HCPCS | Performed by: FAMILY MEDICINE

## 2021-06-22 PROCEDURE — C9803 HOPD COVID-19 SPEC COLLECT: HCPCS | Performed by: FAMILY MEDICINE

## 2021-06-22 PROCEDURE — U0004 COV-19 TEST NON-CDC HGH THRU: HCPCS | Performed by: FAMILY MEDICINE

## 2021-06-25 ENCOUNTER — HOSPITAL ENCOUNTER (OUTPATIENT)
Dept: PULMONOLOGY | Facility: HOSPITAL | Age: 66
Discharge: HOME | End: 2021-06-25
Admitting: FAMILY MEDICINE

## 2021-06-25 DIAGNOSIS — J44.1 COPD EXACERBATION (HCC): ICD-10-CM

## 2021-06-25 PROCEDURE — 94010 BREATHING CAPACITY TEST: CPT

## 2021-06-25 PROCEDURE — 94726 PLETHYSMOGRAPHY LUNG VOLUMES: CPT | Performed by: INTERNAL MEDICINE

## 2021-06-25 PROCEDURE — 94729 DIFFUSING CAPACITY: CPT

## 2021-06-25 PROCEDURE — 94726 PLETHYSMOGRAPHY LUNG VOLUMES: CPT

## 2021-06-25 PROCEDURE — 94729 DIFFUSING CAPACITY: CPT | Performed by: INTERNAL MEDICINE

## 2021-06-25 PROCEDURE — 94010 BREATHING CAPACITY TEST: CPT | Performed by: INTERNAL MEDICINE

## 2021-06-28 ENCOUNTER — OFFICE VISIT (OUTPATIENT)
Dept: NEUROLOGY | Age: 66
End: 2021-06-28
Payer: MEDICARE

## 2021-06-28 VITALS
WEIGHT: 315 LBS | RESPIRATION RATE: 20 BRPM | HEART RATE: 86 BPM | SYSTOLIC BLOOD PRESSURE: 139 MMHG | DIASTOLIC BLOOD PRESSURE: 76 MMHG | BODY MASS INDEX: 38.36 KG/M2 | HEIGHT: 76 IN

## 2021-06-28 DIAGNOSIS — G47.31 CENTRAL SLEEP APNEA: ICD-10-CM

## 2021-06-28 DIAGNOSIS — G89.29 CHRONIC BILATERAL LOW BACK PAIN WITH BILATERAL SCIATICA: ICD-10-CM

## 2021-06-28 DIAGNOSIS — M54.41 CHRONIC BILATERAL LOW BACK PAIN WITH BILATERAL SCIATICA: ICD-10-CM

## 2021-06-28 DIAGNOSIS — M54.42 CHRONIC BILATERAL LOW BACK PAIN WITH BILATERAL SCIATICA: ICD-10-CM

## 2021-06-28 DIAGNOSIS — G47.33 OBSTRUCTIVE SLEEP APNEA: Primary | ICD-10-CM

## 2021-06-28 PROCEDURE — 3017F COLORECTAL CA SCREEN DOC REV: CPT | Performed by: PSYCHIATRY & NEUROLOGY

## 2021-06-28 PROCEDURE — 1036F TOBACCO NON-USER: CPT | Performed by: PSYCHIATRY & NEUROLOGY

## 2021-06-28 PROCEDURE — G8427 DOCREV CUR MEDS BY ELIG CLIN: HCPCS | Performed by: PSYCHIATRY & NEUROLOGY

## 2021-06-28 PROCEDURE — 99214 OFFICE O/P EST MOD 30 MIN: CPT | Performed by: PSYCHIATRY & NEUROLOGY

## 2021-06-28 PROCEDURE — G8417 CALC BMI ABV UP PARAM F/U: HCPCS | Performed by: PSYCHIATRY & NEUROLOGY

## 2021-06-28 NOTE — PATIENT INSTRUCTIONS
INSTRUCTIONS:  1. Check with your DME company to determine if the PAP machines have been recalled. You can also go to [email or web address] and enter the serial number to determine if it has been recalled and what to do about it. 2. Continue use of BiPAP ST with supplemental oxygen during sleep.

## 2021-06-28 NOTE — PROGRESS NOTES
[facility]  [address] [place] [address] Drive, [address]  [place], Deja Perez  Phone [phone]  Fax [phone]     [facility] Follow Up Encounter  21 12:09 PM CDT    Information:   Patient Name: Aguila Henry  :   1955  Age:   61 y.o. MRN:   [id number]  Account #:  [de-identified]  Today:  21    Provider: Zachary Slade M.D. Chief Complaint:   Chief Complaint   Patient presents with    6 Month Follow-Up    Sleep Apnea       Subjective:   Aguila Henry is a 61 y.o. man with a history of multiple lumbar spine surgeries with chronic back pain, obstructive and central sleep apnea who is following up. He had COVID - 19 in the fall. He recovered well from that. He had pneumonia last month and was hospitalized for 5 days. He has had persistent dyspnea. He is on oxygen at night through his BiPAP ST and PRN in the daytime. He is on nebulizers. He does use his BiPAP every night. He cannot sleep without it. He complains of continued dyspnea, and fatigue, as well as back pain.         Objective:     Past Medical History:  Past Medical History:   Diagnosis Date    Abnormal gait     Amnesia     Anxiety     Asthma     BiPAP (biphasic positive airway pressure) dependence     NiPPV 23cm/16cm/12cm    Cervical facet syndrome     Chronic back pain     Complex sleep apnea syndrome     Initial PS; AHI:  31.2 per PSG, 10/2014    Concussion     COPD (chronic obstructive pulmonary disease) (HCC)     DDD (degenerative disc disease)     Facial ringworm     PERCY (generalized anxiety disorder)     GERD (gastroesophageal reflux disease)     Headache     Herniated disc     Herpes simplex     History of Juan's esophagus     Hyperlipidemia     Hypersomnia     Hypertension     Hypotestosteronism     Lumbar stenosis     Memory loss     Obesity     Obstructive sleep apnea     Initial PS; AHI:  31.2 per split-night PSG, 10/2014    Pneumonia     Potential difficult airway on pre-intubation assessment     PTSD (post-traumatic stress disorder)     Sleep apnea     Bi-pap at night    Vertigo        Past Surgical History:   Procedure Laterality Date    APPENDECTOMY      BACK SURGERY      Three lumbar fusions L3-4, L3-4-5 & S1    CARDIAC CATHETERIZATION      [facility]  [facility]    COLONOSCOPY  5/16/16    Dr Slava Husain, normal, 5 yr recall    COLONOSCOPY N/A 6/19/2019    COLONOSCOPY DIAGNOSTIC performed by Jenni Fong MD at [facility]    ENDOSCOPY, COLON, DIAGNOSTIC      FRACTURE SURGERY      wrist    HERNIA REPAIR      KNEE ARTHROSCOPY      LUMBAR FUSION      OTHER SURGICAL HISTORY      nerve stimulator. dcs battery replacement 5/2/18    RHINOPLASTY      SKIN BIOPSY      SPINE SURGERY      x 3/Stimulator implant    UPPER GASTROINTESTINAL ENDOSCOPY  2009    [facility]    UPPER GASTROINTESTINAL ENDOSCOPY  12/2013    BE surveillance. pos BE / neg dysplasia. retained gastric contents    UPPER GASTROINTESTINAL ENDOSCOPY N/A 4/12/2016    Dr Slava Husain, Susy (-), Barretts (+), 3 yr recall    UPPER GASTROINTESTINAL ENDOSCOPY  06/19/2019    Dr Justin Paulino segment Juan's esophagus    UPPER GASTROINTESTINAL ENDOSCOPY  06/19/2019    Dr Lolly Terrazas diverticulosis, suboptimal prep, Grade 2 internal hemorrhoids, 3 yr recall    UPPER GASTROINTESTINAL ENDOSCOPY N/A 6/19/2019    EGD BIOPSY performed by Jenni Fong MD at [facility]       Recent Hospitalizations  · None    Significant Injuries  · None    Habits  Barbara Vuong reports that he quit smoking about 5 years ago. His smoking use included cigarettes. He smoked 0.00 packs per day for 35.00 years. He has never used smokeless tobacco. He reports current alcohol use. He reports that he does not use drugs.     Family History   Problem Relation Age of Onset    Colon Polyps Mother     Heart Attack Mother     Arrhythmia Mother     Stroke Mother     Colon Polyps Maternal Grandmother     Arrhythmia Father     Obesity Sister     Stroke Paternal Grandmother     Colon Cancer Neg Hx     Esophageal Cancer Neg Hx     Liver Cancer Neg Hx     Liver Disease Neg Hx     Rectal Cancer Neg Hx     Stomach Cancer Neg Hx        Social History  Richard Garcia , lives [place], and is retired. Medications:  Current Outpatient Medications   Medication Sig Dispense Refill    dutasteride (AVODART) 0.5 MG capsule Take 0.5 mg by mouth daily      dilTIAZem (CARDIZEM CD) 240 MG extended release capsule 1 capsule      varenicline (CHANTIX CONTINUING MONTH KENNY) 1 MG tablet 1 tablet      esomeprazole (NEXIUM) 40 MG delayed release capsule TAKE ONE CAPSULE BY MOUTH EVERY DAY      gabapentin (NEURONTIN) 600 MG tablet TAKE ONE TABLET BY MOUTH FOUR TIMES A DAY **MAY MAKE DROWSY**      hydroCHLOROthiazide (HYDRODIURIL) 25 MG tablet 1 tablet in the morning      ipratropium-albuterol (DUONEB) 0.5-2.5 (3) MG/3ML SOLN nebulizer solution 3 ml as needed      Magnesium 100 MG CAPS 4 tablets with a meal      Multiple Vitamins-Minerals (MENS MULTIVITAMIN PLUS PO) as directed      amLODIPine (NORVASC) 10 MG tablet 1 tablet      Omega-3 1000 MG CAPS 1 capsule      tadalafil (CIALIS) 10 MG tablet 1 tablet as needed      oxyCODONE-acetaminophen (PERCOCET) 5-325 MG per tablet Take 1 tablet by mouth every 4 hours as needed for Pain.       traZODone (DESYREL) 100 MG tablet Take 2 tablets by mouth nightly as needed for Sleep (Patient taking differently: Take 300 mg by mouth nightly as needed for Sleep ) 180 tablet 3    meclizine (ANTIVERT) 25 MG tablet Take 25 mg by mouth 4 times daily as needed      mometasone-formoterol (DULERA) 200-5 MCG/ACT inhaler Inhale 2 puffs into the lungs every 12 hours      celecoxib (CELEBREX) 200 MG capsule Take 200 mg by mouth daily      valACYclovir (VALTREX) 500 MG tablet Take 500 mg by mouth daily      tamsulosin (FLOMAX) 0.4 MG capsule Take 0.4 mg by mouth daily      losartan-hydrochlorothiazide (HYZAAR) 100-25 MG per tablet Take 1 tablet by mouth daily      cloNIDine (CATAPRES) 0.1 MG tablet Take 0.1 mg by mouth every 6 hours as needed for High Blood Pressure       diphenoxylate-atropine (LOMOTIL) 2.5-0.025 MG per tablet Take 1 tablet by mouth 4 times daily as needed for Diarrhea. [place] oxyCODONE (OXYCONTIN) 10 MG extended release tablet Take 20 mg by mouth every 12 hours. [place] omeprazole (PRILOSEC) 10 MG capsule Take 20 mg by mouth daily       Krill Oil Omega-3 300 MG CAPS Take 1,000 mg by mouth 2 times daily       testosterone cypionate (DEPOTESTOTERONE CYPIONATE) 200 MG/ML injection Inject 50 mg into the muscle every 14 days. [place] DULoxetine (CYMBALTA) 60 MG capsule Take 60 mg by mouth daily.  ALPRAZolam (XANAX) 1 MG tablet Take 1 mg by mouth 2 times daily as needed.  albuterol sulfate HFA (PROAIR HFA) 108 (90 Base) MCG/ACT inhaler Inhale 2 puffs into the lungs every 6 hours as needed for Wheezing       No current facility-administered medications for this visit. Allergies:   Allergies as of 06/28/2021 - Fully Reviewed 06/28/2021   Allergen Reaction Noted    Lunesta [eszopiclone] Other (See Comments) 02/13/2018    Levofloxacin  04/08/2017       Review of Systems:  Constitutional: negative for - chills and fever  Eyes:  negative for - visual disturbance and photophobia  HENMT: negative for - headaches and sinus pain  Respiratory: negative for - cough, hemoptysis, and shortness of breath  Cardiovascular: negative for - chest pain and palpitations  Gastrointestinal: negative for - blood in stools, constipation, diarrhea, nausea, and vomiting  Genito-Urinary: negative for - hematuria, urinary frequency, urinary urgency, and urinary retention  Musculoskeletal: positive for - joint pain, joint stiffness, and joint swelling  Hematological and Lymphatic: negative for - bleeding problems, abnormal bruising, and swollen lymph nodes  Endocrine:  negative for - polydipsia and polyphagia  Allergy/Immunology:  negative for - rhinorrhea, sinus congestion, hives  Integument:  negative for - negative for - rash, change in moles, new or changing lesions  Psychological: negative for - anxiety and depression  Neurological: negative for - memory loss  Positive for - numbness/tingling, and weakness     PHYSICAL EXAMINATION:  Vitals:  /76   Pulse 86   Resp 20   Ht 6' 4\" (1.93 m)   Wt (!) 328 lb (148.8 kg)   BMI 39.93 kg/m²   General appearance:  Alert, well developed, well nourished, in no distress  HEENT:  normocephalic, atraumatic, sclera appear normal, no nasal abnormalities, no rhinorrhea, Ears appear normal, oral mucous membranes are moist without erythema, trachea midline, thyroid is normal, no lymphadenopathy or neck mass. Cardiovascular:  Regular rate and rhythm without murmer. No peripheral edema, No cyanosis or clubbing. No carotid bruits. Pulmonary:  Lungs are clear to auscultation. Breathing appears normal, good expansion, normal effort without use of accessory muscles  Musculoskeletal:  Joints are arthritic. Integument:  No rash, erythema, or pallor  Psychiatric:  Mood, affect, and behavior appear normal      NEUROLOGIC EXAMINATION:  Mental Status:  alert, oriented to person, place, and time. Speech:  Clear without dysarthria or dysphonia  Language:  Fluent without aphasia  Cranial Nerves:   II Visual fields are full to confrontation   III,IV, VI Extraocular movements are full   VII Facial movements are symmetrical without weakness   VIII Hearing is intact   IX,X Shoulder shrug and head rotation strength are intact   XII No tongue atrophy or fasciculations. Normal tongue protrusion. No tongue weakness  Motor:  Normal strength in both upper and lower extremities. Normal muscle tone and bulk. Deep tendon reflexes are intact and symmetrical in both upper and lower extremities. Smith's signs are absent bilaterally.   There is no ankle clonus on either side. Sensation:  Sensation is intact to light touch, temperature, and vibration in all extremities. Coordination:  Rapid alternating movements are normal in both upper and lower extremities. Finger to nose testing is unimpaired bilaterally. Gait:  Normal station and gait. Pertinent Diagnostic Studies:  NiPPV download shows that he is 100% compliant with use of NiPPV at 21/14/10 with residual AHI of 2.1. Assessment:       ICD-10-CM    1. Obstructive sleep apnea  G47.33    2. Central sleep apnea  G47.31    3. Chronic bilateral low back pain with bilateral sciatica  M54.42     M54.41     G89.29    I discussed the diagnosis of obstructive sleep apnea with Aguila Henry including the pathophysiology (namely the mechanism of breathing and obstruction of upper airway, interruptions of sleep, hypoxemia, hypercapnia, and results of repetitive sympathetic activation), risks, evaluation, and treatment options. Plan:   1. Check with your [employer] company to determine if the PAP machines have been recalled. You can also go to [email or web address] and enter the serial number to determine if it has been recalled and what to do about it. 2. Continue use of BiPAP ST with supplemental oxygen during sleep.   3.  FU in a year    Electronically signed by Zachary Slade MD on 6/28/21

## 2021-07-09 ENCOUNTER — TRANSCRIBE ORDERS (OUTPATIENT)
Dept: ADMINISTRATIVE | Facility: HOSPITAL | Age: 66
End: 2021-07-09

## 2021-07-09 DIAGNOSIS — M19.011 PRIMARY OSTEOARTHRITIS OF RIGHT SHOULDER: Primary | ICD-10-CM

## 2021-07-13 ENCOUNTER — TRANSCRIBE ORDERS (OUTPATIENT)
Dept: ADMINISTRATIVE | Facility: HOSPITAL | Age: 66
End: 2021-07-13

## 2021-07-13 DIAGNOSIS — M17.11 PRIMARY OSTEOARTHRITIS OF RIGHT KNEE: Primary | ICD-10-CM

## 2021-07-13 DIAGNOSIS — M19.011 PRIMARY OSTEOARTHRITIS OF RIGHT SHOULDER: ICD-10-CM

## 2021-07-14 ENCOUNTER — APPOINTMENT (OUTPATIENT)
Dept: CT IMAGING | Facility: HOSPITAL | Age: 66
End: 2021-07-14

## 2021-07-14 ENCOUNTER — HOSPITAL ENCOUNTER (OUTPATIENT)
Dept: CT IMAGING | Facility: HOSPITAL | Age: 66
Discharge: HOME | End: 2021-07-14
Admitting: FAMILY MEDICINE

## 2021-07-14 DIAGNOSIS — M19.011 PRIMARY OSTEOARTHRITIS OF RIGHT SHOULDER: ICD-10-CM

## 2021-07-14 DIAGNOSIS — M17.11 PRIMARY OSTEOARTHRITIS OF RIGHT KNEE: ICD-10-CM

## 2021-07-14 PROCEDURE — 73700 CT LOWER EXTREMITY W/O DYE: CPT

## 2021-07-14 PROCEDURE — 73200 CT UPPER EXTREMITY W/O DYE: CPT

## 2021-09-30 ENCOUNTER — TRANSCRIBE ORDERS (OUTPATIENT)
Dept: OTHER | Facility: OTHER | Age: 66
End: 2021-09-30

## 2021-09-30 ENCOUNTER — TRANSCRIBE ORDERS (OUTPATIENT)
Dept: ADMINISTRATIVE | Facility: HOSPITAL | Age: 66
End: 2021-09-30

## 2021-09-30 DIAGNOSIS — R55 SYNCOPE AND COLLAPSE: Primary | ICD-10-CM

## 2021-10-08 ENCOUNTER — HOSPITAL ENCOUNTER (OUTPATIENT)
Dept: CARDIOLOGY | Facility: HOSPITAL | Age: 66
Discharge: HOME | End: 2021-10-08

## 2021-10-08 VITALS
SYSTOLIC BLOOD PRESSURE: 162 MMHG | BODY MASS INDEX: 38.36 KG/M2 | WEIGHT: 315 LBS | HEIGHT: 76 IN | DIASTOLIC BLOOD PRESSURE: 84 MMHG

## 2021-10-08 DIAGNOSIS — R55 SYNCOPE AND COLLAPSE: ICD-10-CM

## 2021-10-08 LAB
MAXIMAL PREDICTED HEART RATE: 156 BPM
STRESS TARGET HR: 133 BPM

## 2021-10-08 PROCEDURE — 93306 TTE W/DOPPLER COMPLETE: CPT | Performed by: EMERGENCY MEDICINE

## 2021-10-08 PROCEDURE — 93306 TTE W/DOPPLER COMPLETE: CPT

## 2021-10-08 PROCEDURE — 93660 TILT TABLE EVALUATION: CPT

## 2021-10-08 PROCEDURE — 93246 EXT ECG>7D<15D RECORDING: CPT

## 2021-10-08 PROCEDURE — 93660 TILT TABLE EVALUATION: CPT | Performed by: EMERGENCY MEDICINE

## 2021-10-08 RX ORDER — NITROGLYCERIN 0.4 MG/1
0.4 TABLET SUBLINGUAL ONCE
Status: COMPLETED | OUTPATIENT
Start: 2021-10-08 | End: 2021-10-08

## 2021-10-08 RX ADMIN — NITROGLYCERIN 0.4 MG: 0.4 TABLET SUBLINGUAL at 13:44

## 2021-10-10 LAB
BH CV ECHO MEAS - AO MAX PG (FULL): 2.1 MMHG
BH CV ECHO MEAS - AO MAX PG: 8 MMHG
BH CV ECHO MEAS - AO MEAN PG (FULL): 2 MMHG
BH CV ECHO MEAS - AO MEAN PG: 5 MMHG
BH CV ECHO MEAS - AO ROOT AREA (BSA CORRECTED): 1.6
BH CV ECHO MEAS - AO ROOT AREA: 15.2 CM^2
BH CV ECHO MEAS - AO ROOT DIAM: 4.4 CM
BH CV ECHO MEAS - AO V2 MAX: 141 CM/SEC
BH CV ECHO MEAS - AO V2 MEAN: 95.8 CM/SEC
BH CV ECHO MEAS - AO V2 VTI: 33.1 CM
BH CV ECHO MEAS - AVA(I,A): 5.1 CM^2
BH CV ECHO MEAS - AVA(I,D): 5.1 CM^2
BH CV ECHO MEAS - AVA(V,A): 5.3 CM^2
BH CV ECHO MEAS - AVA(V,D): 5.3 CM^2
BH CV ECHO MEAS - BSA(HAYCOCK): 2.9 M^2
BH CV ECHO MEAS - BSA: 2.8 M^2
BH CV ECHO MEAS - BZI_BMI: 40.5 KILOGRAMS/M^2
BH CV ECHO MEAS - BZI_METRIC_HEIGHT: 193 CM
BH CV ECHO MEAS - BZI_METRIC_WEIGHT: 151 KG
BH CV ECHO MEAS - EDV(CUBED): 200.2 ML
BH CV ECHO MEAS - EDV(MOD-SP4): 182 ML
BH CV ECHO MEAS - EDV(TEICH): 169.9 ML
BH CV ECHO MEAS - EF(MOD-SP4): 67.5 %
BH CV ECHO MEAS - ESV(MOD-SP4): 59.1 ML
BH CV ECHO MEAS - IVS/LVPW: 1.1
BH CV ECHO MEAS - IVSD: 1.6 CM
BH CV ECHO MEAS - LAT PEAK E' VEL: 7 CM/SEC
BH CV ECHO MEAS - LV DIASTOLIC VOL/BSA (35-75): 66.1 ML/M^2
BH CV ECHO MEAS - LV MASS(C)D: 420.6 GRAMS
BH CV ECHO MEAS - LV MASS(C)DI: 152.8 GRAMS/M^2
BH CV ECHO MEAS - LV MAX PG: 5.9 MMHG
BH CV ECHO MEAS - LV MEAN PG: 3 MMHG
BH CV ECHO MEAS - LV SYSTOLIC VOL/BSA (12-30): 21.5 ML/M^2
BH CV ECHO MEAS - LV V1 MAX: 121 CM/SEC
BH CV ECHO MEAS - LV V1 MEAN: 77.1 CM/SEC
BH CV ECHO MEAS - LV V1 VTI: 27.4 CM
BH CV ECHO MEAS - LVIDD: 5.9 CM
BH CV ECHO MEAS - LVLD AP4: 10 CM
BH CV ECHO MEAS - LVLS AP4: 8 CM
BH CV ECHO MEAS - LVOT AREA (M): 6.2 CM^2
BH CV ECHO MEAS - LVOT AREA: 6.2 CM^2
BH CV ECHO MEAS - LVOT DIAM: 2.8 CM
BH CV ECHO MEAS - LVPWD: 1.5 CM
BH CV ECHO MEAS - MED PEAK E' VEL: 6.85 CM/SEC
BH CV ECHO MEAS - MV A MAX VEL: 79.5 CM/SEC
BH CV ECHO MEAS - MV DEC SLOPE: 251 CM/SEC^2
BH CV ECHO MEAS - MV DEC TIME: 0.26 SEC
BH CV ECHO MEAS - MV E MAX VEL: 65.5 CM/SEC
BH CV ECHO MEAS - MV E/A: 0.82
BH CV ECHO MEAS - RAP SYSTOLE: 5 MMHG
BH CV ECHO MEAS - RVSP: 9.2 MMHG
BH CV ECHO MEAS - SI(AO): 182.9 ML/M^2
BH CV ECHO MEAS - SI(LVOT): 61.3 ML/M^2
BH CV ECHO MEAS - SI(MOD-SP4): 44.7 ML/M^2
BH CV ECHO MEAS - SV(AO): 503.3 ML
BH CV ECHO MEAS - SV(LVOT): 168.7 ML
BH CV ECHO MEAS - SV(MOD-SP4): 122.9 ML
BH CV ECHO MEAS - TR MAX VEL: 103 CM/SEC
BH CV ECHO MEASUREMENTS AVERAGE E/E' RATIO: 9.46
BH CV TILT AGENT USED: NORMAL
LEFT ATRIUM VOLUME INDEX: 38.2 ML/M2
LEFT ATRIUM VOLUME: 105 CM3
MAXIMAL PREDICTED HEART RATE: 156 BPM
MAXIMAL PREDICTED HEART RATE: 156 BPM
STRESS TARGET HR: 133 BPM
STRESS TARGET HR: 133 BPM

## 2021-10-25 ENCOUNTER — APPOINTMENT (OUTPATIENT)
Dept: GENERAL RADIOLOGY | Facility: HOSPITAL | Age: 66
End: 2021-10-25

## 2021-10-25 ENCOUNTER — HOSPITAL ENCOUNTER (OUTPATIENT)
Facility: HOSPITAL | Age: 66
Setting detail: OBSERVATION
Discharge: HOME | End: 2021-10-27
Attending: EMERGENCY MEDICINE | Admitting: FAMILY MEDICINE

## 2021-10-25 ENCOUNTER — HOSPITAL ENCOUNTER (OUTPATIENT)
Facility: HOSPITAL | Age: 66
Setting detail: OBSERVATION
End: 2021-10-25
Attending: FAMILY MEDICINE | Admitting: FAMILY MEDICINE

## 2021-10-25 PROBLEM — R06.00 DYSPNEA: Status: ACTIVE | Noted: 2021-10-25

## 2021-10-25 LAB
ALBUMIN SERPL-MCNC: 4.5 G/DL (ref 3.5–5.2)
ALBUMIN/GLOB SERPL: 1.9 G/DL
ALP SERPL-CCNC: 108 U/L (ref 39–117)
ALT SERPL W P-5'-P-CCNC: 50 U/L (ref 1–41)
ANION GAP SERPL CALCULATED.3IONS-SCNC: 11 MMOL/L (ref 5–15)
ANION GAP SERPL CALCULATED.3IONS-SCNC: 12 MMOL/L (ref 5–15)
AST SERPL-CCNC: 60 U/L (ref 1–40)
BASOPHILS # BLD AUTO: 0.02 10*3/MM3 (ref 0–0.2)
BASOPHILS NFR BLD AUTO: 0.4 % (ref 0–1.5)
BILIRUB SERPL-MCNC: 0.5 MG/DL (ref 0–1.2)
BUN SERPL-MCNC: 15 MG/DL (ref 8–23)
BUN SERPL-MCNC: 15 MG/DL (ref 8–23)
BUN/CREAT SERPL: 11.6 (ref 7–25)
BUN/CREAT SERPL: 12.3 (ref 7–25)
CALCIUM SPEC-SCNC: 9.5 MG/DL (ref 8.6–10.5)
CALCIUM SPEC-SCNC: 9.5 MG/DL (ref 8.6–10.5)
CHLORIDE SERPL-SCNC: 98 MMOL/L (ref 98–107)
CHLORIDE SERPL-SCNC: 99 MMOL/L (ref 98–107)
CO2 SERPL-SCNC: 27 MMOL/L (ref 22–29)
CO2 SERPL-SCNC: 28 MMOL/L (ref 22–29)
CREAT SERPL-MCNC: 1.22 MG/DL (ref 0.76–1.27)
CREAT SERPL-MCNC: 1.29 MG/DL (ref 0.76–1.27)
D DIMER PPP FEU-MCNC: 0.68 MG/L (FEU) (ref 0–0.5)
D-LACTATE SERPL-SCNC: 0.8 MMOL/L (ref 0.5–2)
DEPRECATED RDW RBC AUTO: 47.3 FL (ref 37–54)
EOSINOPHIL # BLD AUTO: 0.07 10*3/MM3 (ref 0–0.4)
EOSINOPHIL NFR BLD AUTO: 1.5 % (ref 0.3–6.2)
ERYTHROCYTE [DISTWIDTH] IN BLOOD BY AUTOMATED COUNT: 14.1 % (ref 12.3–15.4)
GFR SERPL CREATININE-BSD FRML MDRD: 56 ML/MIN/1.73
GFR SERPL CREATININE-BSD FRML MDRD: 60 ML/MIN/1.73
GLOBULIN UR ELPH-MCNC: 2.4 GM/DL
GLUCOSE SERPL-MCNC: 119 MG/DL (ref 65–99)
GLUCOSE SERPL-MCNC: 97 MG/DL (ref 65–99)
HCT VFR BLD AUTO: 45.9 % (ref 37.5–51)
HGB BLD-MCNC: 15.5 G/DL (ref 13–17.7)
HOLD SPECIMEN: NORMAL
HOLD SPECIMEN: NORMAL
IMM GRANULOCYTES # BLD AUTO: 0.01 10*3/MM3 (ref 0–0.05)
IMM GRANULOCYTES NFR BLD AUTO: 0.2 % (ref 0–0.5)
LYMPHOCYTES # BLD AUTO: 0.99 10*3/MM3 (ref 0.7–3.1)
LYMPHOCYTES NFR BLD AUTO: 21.7 % (ref 19.6–45.3)
MCH RBC QN AUTO: 31.6 PG (ref 26.6–33)
MCHC RBC AUTO-ENTMCNC: 33.8 G/DL (ref 31.5–35.7)
MCV RBC AUTO: 93.5 FL (ref 79–97)
MONOCYTES # BLD AUTO: 0.53 10*3/MM3 (ref 0.1–0.9)
MONOCYTES NFR BLD AUTO: 11.6 % (ref 5–12)
NEUTROPHILS NFR BLD AUTO: 2.95 10*3/MM3 (ref 1.7–7)
NEUTROPHILS NFR BLD AUTO: 64.6 % (ref 42.7–76)
NRBC BLD AUTO-RTO: 0 /100 WBC (ref 0–0.2)
NT-PROBNP SERPL-MCNC: 20.7 PG/ML (ref 0–900)
PLATELET # BLD AUTO: 157 10*3/MM3 (ref 140–450)
PMV BLD AUTO: 9.6 FL (ref 6–12)
POTASSIUM SERPL-SCNC: 4 MMOL/L (ref 3.5–5.2)
POTASSIUM SERPL-SCNC: 4.1 MMOL/L (ref 3.5–5.2)
PROCALCITONIN SERPL-MCNC: 0.07 NG/ML (ref 0–0.25)
PROT SERPL-MCNC: 6.9 G/DL (ref 6–8.5)
RBC # BLD AUTO: 4.91 10*6/MM3 (ref 4.14–5.8)
SODIUM SERPL-SCNC: 137 MMOL/L (ref 136–145)
SODIUM SERPL-SCNC: 138 MMOL/L (ref 136–145)
TROPONIN T SERPL-MCNC: <0.01 NG/ML (ref 0–0.03)
TROPONIN T SERPL-MCNC: <0.01 NG/ML (ref 0–0.03)
TSH SERPL DL<=0.05 MIU/L-ACNC: 1.21 UIU/ML (ref 0.27–4.2)
WBC # BLD AUTO: 4.57 10*3/MM3 (ref 3.4–10.8)
WHOLE BLOOD HOLD SPECIMEN: NORMAL
WHOLE BLOOD HOLD SPECIMEN: NORMAL

## 2021-10-25 PROCEDURE — 85379 FIBRIN DEGRADATION QUANT: CPT | Performed by: FAMILY MEDICINE

## 2021-10-25 PROCEDURE — 71046 X-RAY EXAM CHEST 2 VIEWS: CPT

## 2021-10-25 PROCEDURE — 83605 ASSAY OF LACTIC ACID: CPT | Performed by: FAMILY MEDICINE

## 2021-10-25 PROCEDURE — 85025 COMPLETE CBC W/AUTO DIFF WBC: CPT | Performed by: EMERGENCY MEDICINE

## 2021-10-25 PROCEDURE — 80053 COMPREHEN METABOLIC PANEL: CPT | Performed by: EMERGENCY MEDICINE

## 2021-10-25 PROCEDURE — 93010 ELECTROCARDIOGRAM REPORT: CPT | Performed by: INTERNAL MEDICINE

## 2021-10-25 PROCEDURE — 84443 ASSAY THYROID STIM HORMONE: CPT | Performed by: FAMILY MEDICINE

## 2021-10-25 PROCEDURE — 87635 SARS-COV-2 COVID-19 AMP PRB: CPT | Performed by: FAMILY MEDICINE

## 2021-10-25 PROCEDURE — 83880 ASSAY OF NATRIURETIC PEPTIDE: CPT | Performed by: FAMILY MEDICINE

## 2021-10-25 PROCEDURE — 84484 ASSAY OF TROPONIN QUANT: CPT | Performed by: EMERGENCY MEDICINE

## 2021-10-25 PROCEDURE — 93005 ELECTROCARDIOGRAM TRACING: CPT | Performed by: EMERGENCY MEDICINE

## 2021-10-25 PROCEDURE — G0378 HOSPITAL OBSERVATION PER HR: HCPCS

## 2021-10-25 PROCEDURE — 71045 X-RAY EXAM CHEST 1 VIEW: CPT

## 2021-10-25 PROCEDURE — 99285 EMERGENCY DEPT VISIT HI MDM: CPT

## 2021-10-25 PROCEDURE — 84145 PROCALCITONIN (PCT): CPT | Performed by: FAMILY MEDICINE

## 2021-10-25 PROCEDURE — C9803 HOPD COVID-19 SPEC COLLECT: HCPCS

## 2021-10-25 RX ORDER — AMLODIPINE BESYLATE 10 MG/1
10 TABLET ORAL
Status: DISCONTINUED | OUTPATIENT
Start: 2021-10-26 | End: 2021-10-27 | Stop reason: HOSPADM

## 2021-10-25 RX ORDER — ACETAMINOPHEN 650 MG/1
650 SUPPOSITORY RECTAL EVERY 4 HOURS PRN
Status: DISCONTINUED | OUTPATIENT
Start: 2021-10-25 | End: 2021-10-27 | Stop reason: HOSPADM

## 2021-10-25 RX ORDER — CLONIDINE HYDROCHLORIDE 0.1 MG/1
0.1 TABLET ORAL EVERY 6 HOURS PRN
Status: DISCONTINUED | OUTPATIENT
Start: 2021-10-25 | End: 2021-10-27 | Stop reason: HOSPADM

## 2021-10-25 RX ORDER — CELECOXIB 200 MG/1
200 CAPSULE ORAL NIGHTLY
Status: DISCONTINUED | OUTPATIENT
Start: 2021-10-26 | End: 2021-10-27 | Stop reason: HOSPADM

## 2021-10-25 RX ORDER — GABAPENTIN 300 MG/1
600 CAPSULE ORAL EVERY 6 HOURS
Status: DISCONTINUED | OUTPATIENT
Start: 2021-10-25 | End: 2021-10-27 | Stop reason: HOSPADM

## 2021-10-25 RX ORDER — FAMOTIDINE 20 MG/1
20 TABLET, FILM COATED ORAL
Status: DISCONTINUED | OUTPATIENT
Start: 2021-10-26 | End: 2021-10-27 | Stop reason: HOSPADM

## 2021-10-25 RX ORDER — ATORVASTATIN CALCIUM 10 MG/1
20 TABLET, FILM COATED ORAL NIGHTLY
Status: DISCONTINUED | OUTPATIENT
Start: 2021-10-25 | End: 2021-10-27 | Stop reason: HOSPADM

## 2021-10-25 RX ORDER — ASPIRIN 81 MG/1
324 TABLET, CHEWABLE ORAL ONCE
Status: COMPLETED | OUTPATIENT
Start: 2021-10-25 | End: 2021-10-25

## 2021-10-25 RX ORDER — DILTIAZEM HYDROCHLORIDE 240 MG/1
240 CAPSULE, COATED, EXTENDED RELEASE ORAL DAILY
Status: DISCONTINUED | OUTPATIENT
Start: 2021-10-26 | End: 2021-10-27 | Stop reason: HOSPADM

## 2021-10-25 RX ORDER — TAMSULOSIN HYDROCHLORIDE 0.4 MG/1
0.4 CAPSULE ORAL NIGHTLY
Status: DISCONTINUED | OUTPATIENT
Start: 2021-10-25 | End: 2021-10-27 | Stop reason: HOSPADM

## 2021-10-25 RX ORDER — OXYCODONE AND ACETAMINOPHEN 7.5; 325 MG/1; MG/1
1 TABLET ORAL EVERY 4 HOURS PRN
Status: DISCONTINUED | OUTPATIENT
Start: 2021-10-25 | End: 2021-10-27 | Stop reason: HOSPADM

## 2021-10-25 RX ORDER — NITROGLYCERIN 0.4 MG/1
0.4 TABLET SUBLINGUAL
Status: DISCONTINUED | OUTPATIENT
Start: 2021-10-25 | End: 2021-10-27 | Stop reason: HOSPADM

## 2021-10-25 RX ORDER — ONDANSETRON 4 MG/1
4 TABLET, FILM COATED ORAL EVERY 6 HOURS PRN
Status: DISCONTINUED | OUTPATIENT
Start: 2021-10-25 | End: 2021-10-27 | Stop reason: HOSPADM

## 2021-10-25 RX ORDER — PANTOPRAZOLE SODIUM 40 MG/1
40 TABLET, DELAYED RELEASE ORAL
Status: DISCONTINUED | OUTPATIENT
Start: 2021-10-26 | End: 2021-10-27 | Stop reason: HOSPADM

## 2021-10-25 RX ORDER — OXYCODONE HYDROCHLORIDE 15 MG/1
15 TABLET, FILM COATED, EXTENDED RELEASE ORAL EVERY 8 HOURS SCHEDULED
Status: DISCONTINUED | OUTPATIENT
Start: 2021-10-26 | End: 2021-10-27 | Stop reason: HOSPADM

## 2021-10-25 RX ORDER — DULOXETIN HYDROCHLORIDE 30 MG/1
60 CAPSULE, DELAYED RELEASE ORAL DAILY
Status: DISCONTINUED | OUTPATIENT
Start: 2021-10-26 | End: 2021-10-27 | Stop reason: HOSPADM

## 2021-10-25 RX ORDER — DIPHENHYDRAMINE HCL 25 MG
25 CAPSULE ORAL NIGHTLY PRN
Status: DISCONTINUED | OUTPATIENT
Start: 2021-10-25 | End: 2021-10-27 | Stop reason: HOSPADM

## 2021-10-25 RX ORDER — SODIUM CHLORIDE 0.9 % (FLUSH) 0.9 %
10 SYRINGE (ML) INJECTION EVERY 12 HOURS SCHEDULED
Status: DISCONTINUED | OUTPATIENT
Start: 2021-10-25 | End: 2021-10-27 | Stop reason: HOSPADM

## 2021-10-25 RX ORDER — FUROSEMIDE 10 MG/ML
40 INJECTION INTRAMUSCULAR; INTRAVENOUS EVERY 12 HOURS SCHEDULED
Status: DISCONTINUED | OUTPATIENT
Start: 2021-10-25 | End: 2021-10-26

## 2021-10-25 RX ORDER — ONDANSETRON 2 MG/ML
4 INJECTION INTRAMUSCULAR; INTRAVENOUS EVERY 6 HOURS PRN
Status: DISCONTINUED | OUTPATIENT
Start: 2021-10-25 | End: 2021-10-27 | Stop reason: HOSPADM

## 2021-10-25 RX ORDER — SODIUM CHLORIDE 0.9 % (FLUSH) 0.9 %
10 SYRINGE (ML) INJECTION AS NEEDED
Status: DISCONTINUED | OUTPATIENT
Start: 2021-10-25 | End: 2021-10-27 | Stop reason: HOSPADM

## 2021-10-25 RX ORDER — ALBUTEROL SULFATE 2.5 MG/3ML
2.5 SOLUTION RESPIRATORY (INHALATION) EVERY 4 HOURS PRN
Status: DISCONTINUED | OUTPATIENT
Start: 2021-10-25 | End: 2021-10-27 | Stop reason: HOSPADM

## 2021-10-25 RX ORDER — CALCIUM CARBONATE 200(500)MG
2 TABLET,CHEWABLE ORAL 2 TIMES DAILY PRN
Status: DISCONTINUED | OUTPATIENT
Start: 2021-10-25 | End: 2021-10-27 | Stop reason: HOSPADM

## 2021-10-25 RX ORDER — OXYCODONE HYDROCHLORIDE AND ACETAMINOPHEN 5; 325 MG/1; MG/1
1 TABLET ORAL EVERY 6 HOURS PRN
Status: DISCONTINUED | OUTPATIENT
Start: 2021-10-25 | End: 2021-10-27 | Stop reason: HOSPADM

## 2021-10-25 RX ORDER — BUDESONIDE AND FORMOTEROL FUMARATE DIHYDRATE 160; 4.5 UG/1; UG/1
2 AEROSOL RESPIRATORY (INHALATION)
Status: DISCONTINUED | OUTPATIENT
Start: 2021-10-25 | End: 2021-10-27 | Stop reason: HOSPADM

## 2021-10-25 RX ORDER — ACETAMINOPHEN 160 MG/5ML
650 SOLUTION ORAL EVERY 4 HOURS PRN
Status: DISCONTINUED | OUTPATIENT
Start: 2021-10-25 | End: 2021-10-27 | Stop reason: HOSPADM

## 2021-10-25 RX ORDER — ACETAMINOPHEN 325 MG/1
650 TABLET ORAL EVERY 4 HOURS PRN
Status: DISCONTINUED | OUTPATIENT
Start: 2021-10-25 | End: 2021-10-27 | Stop reason: HOSPADM

## 2021-10-25 RX ADMIN — GABAPENTIN 600 MG: 300 CAPSULE ORAL at 23:22

## 2021-10-25 RX ADMIN — ASPIRIN 324 MG: 81 TABLET, CHEWABLE ORAL at 23:20

## 2021-10-26 ENCOUNTER — APPOINTMENT (OUTPATIENT)
Dept: CT IMAGING | Facility: HOSPITAL | Age: 66
End: 2021-10-26

## 2021-10-26 PROBLEM — R60.0 LOWER EXTREMITY EDEMA: Status: ACTIVE | Noted: 2021-10-26

## 2021-10-26 PROBLEM — G89.29 CHRONIC PAIN: Status: ACTIVE | Noted: 2021-10-26

## 2021-10-26 LAB
ANION GAP SERPL CALCULATED.3IONS-SCNC: 12 MMOL/L (ref 5–15)
ARTERIAL PATENCY WRIST A: POSITIVE
ATMOSPHERIC PRESS: 752 MMHG
BASE EXCESS BLDA CALC-SCNC: 4.6 MMOL/L (ref 0–2)
BASOPHILS # BLD AUTO: 0.02 10*3/MM3 (ref 0–0.2)
BASOPHILS NFR BLD AUTO: 0.5 % (ref 0–1.5)
BDY SITE: ABNORMAL
BODY TEMPERATURE: 37 C
BUN SERPL-MCNC: 15 MG/DL (ref 8–23)
BUN/CREAT SERPL: 13.5 (ref 7–25)
CALCIUM SPEC-SCNC: 9.8 MG/DL (ref 8.6–10.5)
CHLORIDE SERPL-SCNC: 97 MMOL/L (ref 98–107)
CO2 SERPL-SCNC: 28 MMOL/L (ref 22–29)
CREAT SERPL-MCNC: 1.11 MG/DL (ref 0.76–1.27)
DEPRECATED RDW RBC AUTO: 46.2 FL (ref 37–54)
EOSINOPHIL # BLD AUTO: 0.15 10*3/MM3 (ref 0–0.4)
EOSINOPHIL NFR BLD AUTO: 3.5 % (ref 0.3–6.2)
ERYTHROCYTE [DISTWIDTH] IN BLOOD BY AUTOMATED COUNT: 13.9 % (ref 12.3–15.4)
GAS FLOW AIRWAY: 2 LPM
GFR SERPL CREATININE-BSD FRML MDRD: 67 ML/MIN/1.73
GLUCOSE SERPL-MCNC: 98 MG/DL (ref 65–99)
HCO3 BLDA-SCNC: 29.8 MMOL/L (ref 20–26)
HCT VFR BLD AUTO: 47.5 % (ref 37.5–51)
HGB BLD-MCNC: 16.3 G/DL (ref 13–17.7)
IMM GRANULOCYTES # BLD AUTO: 0.02 10*3/MM3 (ref 0–0.05)
IMM GRANULOCYTES NFR BLD AUTO: 0.5 % (ref 0–0.5)
LYMPHOCYTES # BLD AUTO: 1.21 10*3/MM3 (ref 0.7–3.1)
LYMPHOCYTES NFR BLD AUTO: 28 % (ref 19.6–45.3)
Lab: ABNORMAL
MCH RBC QN AUTO: 31.9 PG (ref 26.6–33)
MCHC RBC AUTO-ENTMCNC: 34.3 G/DL (ref 31.5–35.7)
MCV RBC AUTO: 93 FL (ref 79–97)
MODALITY: ABNORMAL
MONOCYTES # BLD AUTO: 0.55 10*3/MM3 (ref 0.1–0.9)
MONOCYTES NFR BLD AUTO: 12.7 % (ref 5–12)
NEUTROPHILS NFR BLD AUTO: 2.37 10*3/MM3 (ref 1.7–7)
NEUTROPHILS NFR BLD AUTO: 54.8 % (ref 42.7–76)
NRBC BLD AUTO-RTO: 0 /100 WBC (ref 0–0.2)
PCO2 BLDA: 45 MM HG (ref 35–45)
PCO2 TEMP ADJ BLD: 45 MM HG (ref 35–45)
PH BLDA: 7.43 PH UNITS (ref 7.35–7.45)
PH, TEMP CORRECTED: 7.43 PH UNITS (ref 7.35–7.45)
PLATELET # BLD AUTO: 151 10*3/MM3 (ref 140–450)
PMV BLD AUTO: 9.5 FL (ref 6–12)
PO2 BLDA: 82.6 MM HG (ref 83–108)
PO2 TEMP ADJ BLD: 82.6 MM HG (ref 83–108)
POTASSIUM SERPL-SCNC: 3.9 MMOL/L (ref 3.5–5.2)
QT INTERVAL: 366 MS
QT INTERVAL: 384 MS
QTC INTERVAL: 419 MS
QTC INTERVAL: 423 MS
RBC # BLD AUTO: 5.11 10*6/MM3 (ref 4.14–5.8)
SAO2 % BLDCOA: 96.8 % (ref 94–99)
SARS-COV-2 RNA PNL SPEC NAA+PROBE: NOT DETECTED
SODIUM SERPL-SCNC: 137 MMOL/L (ref 136–145)
VENTILATOR MODE: ABNORMAL
WBC # BLD AUTO: 4.32 10*3/MM3 (ref 3.4–10.8)

## 2021-10-26 PROCEDURE — G0378 HOSPITAL OBSERVATION PER HR: HCPCS

## 2021-10-26 PROCEDURE — 96374 THER/PROPH/DIAG INJ IV PUSH: CPT

## 2021-10-26 PROCEDURE — 94799 UNLISTED PULMONARY SVC/PX: CPT

## 2021-10-26 PROCEDURE — 96376 TX/PRO/DX INJ SAME DRUG ADON: CPT

## 2021-10-26 PROCEDURE — 25010000002 FUROSEMIDE PER 20 MG: Performed by: FAMILY MEDICINE

## 2021-10-26 PROCEDURE — 85025 COMPLETE CBC W/AUTO DIFF WBC: CPT | Performed by: FAMILY MEDICINE

## 2021-10-26 PROCEDURE — 36600 WITHDRAWAL OF ARTERIAL BLOOD: CPT

## 2021-10-26 PROCEDURE — 96372 THER/PROPH/DIAG INJ SC/IM: CPT

## 2021-10-26 PROCEDURE — 71275 CT ANGIOGRAPHY CHEST: CPT

## 2021-10-26 PROCEDURE — 82803 BLOOD GASES ANY COMBINATION: CPT

## 2021-10-26 PROCEDURE — 80048 BASIC METABOLIC PNL TOTAL CA: CPT | Performed by: FAMILY MEDICINE

## 2021-10-26 PROCEDURE — 99214 OFFICE O/P EST MOD 30 MIN: CPT | Performed by: NURSE PRACTITIONER

## 2021-10-26 PROCEDURE — 0 IOPAMIDOL PER 1 ML: Performed by: FAMILY MEDICINE

## 2021-10-26 PROCEDURE — 25010000002 ENOXAPARIN PER 10 MG: Performed by: FAMILY MEDICINE

## 2021-10-26 RX ORDER — FUROSEMIDE 10 MG/ML
40 INJECTION INTRAMUSCULAR; INTRAVENOUS EVERY 12 HOURS
Status: DISCONTINUED | OUTPATIENT
Start: 2021-10-26 | End: 2021-10-27 | Stop reason: HOSPADM

## 2021-10-26 RX ADMIN — FUROSEMIDE 40 MG: 10 INJECTION, SOLUTION INTRAVENOUS at 23:04

## 2021-10-26 RX ADMIN — IOPAMIDOL 77 ML: 755 INJECTION, SOLUTION INTRAVENOUS at 17:20

## 2021-10-26 RX ADMIN — OXYCODONE HYDROCHLORIDE 15 MG: 15 TABLET, FILM COATED, EXTENDED RELEASE ORAL at 01:04

## 2021-10-26 RX ADMIN — FUROSEMIDE 40 MG: 10 INJECTION, SOLUTION INTRAVENOUS at 11:22

## 2021-10-26 RX ADMIN — ENOXAPARIN SODIUM 40 MG: 40 INJECTION SUBCUTANEOUS at 13:56

## 2021-10-26 RX ADMIN — OXYCODONE HYDROCHLORIDE 15 MG: 15 TABLET, FILM COATED, EXTENDED RELEASE ORAL at 18:10

## 2021-10-26 RX ADMIN — AMLODIPINE BESYLATE 10 MG: 10 TABLET ORAL at 08:56

## 2021-10-26 RX ADMIN — ENOXAPARIN SODIUM 40 MG: 40 INJECTION SUBCUTANEOUS at 01:05

## 2021-10-26 RX ADMIN — GABAPENTIN 600 MG: 300 CAPSULE ORAL at 15:25

## 2021-10-26 RX ADMIN — CELECOXIB 200 MG: 200 CAPSULE ORAL at 01:05

## 2021-10-26 RX ADMIN — OXYCODONE HYDROCHLORIDE AND ACETAMINOPHEN 1 TABLET: 7.5; 325 TABLET ORAL at 12:41

## 2021-10-26 RX ADMIN — OXYCODONE HYDROCHLORIDE AND ACETAMINOPHEN 1 TABLET: 5; 325 TABLET ORAL at 23:04

## 2021-10-26 RX ADMIN — BUDESONIDE AND FORMOTEROL FUMARATE DIHYDRATE 2 PUFF: 160; 4.5 AEROSOL RESPIRATORY (INHALATION) at 20:31

## 2021-10-26 RX ADMIN — GABAPENTIN 600 MG: 300 CAPSULE ORAL at 09:20

## 2021-10-26 RX ADMIN — OXYCODONE HYDROCHLORIDE AND ACETAMINOPHEN 1 TABLET: 5; 325 TABLET ORAL at 16:53

## 2021-10-26 RX ADMIN — TAMSULOSIN HYDROCHLORIDE 0.4 MG: 0.4 CAPSULE ORAL at 20:14

## 2021-10-26 RX ADMIN — LOSARTAN POTASSIUM: 50 TABLET, FILM COATED ORAL at 08:57

## 2021-10-26 RX ADMIN — GABAPENTIN 600 MG: 300 CAPSULE ORAL at 20:14

## 2021-10-26 RX ADMIN — DILTIAZEM HYDROCHLORIDE 240 MG: 240 CAPSULE, EXTENDED RELEASE ORAL at 09:20

## 2021-10-26 RX ADMIN — ATORVASTATIN CALCIUM 20 MG: 10 TABLET, FILM COATED ORAL at 20:14

## 2021-10-26 RX ADMIN — FAMOTIDINE 20 MG: 20 TABLET, FILM COATED ORAL at 18:10

## 2021-10-26 RX ADMIN — PANTOPRAZOLE SODIUM 40 MG: 40 TABLET, DELAYED RELEASE ORAL at 05:00

## 2021-10-26 RX ADMIN — OXYCODONE HYDROCHLORIDE 15 MG: 15 TABLET, FILM COATED, EXTENDED RELEASE ORAL at 08:54

## 2021-10-26 RX ADMIN — FAMOTIDINE 20 MG: 20 TABLET, FILM COATED ORAL at 08:54

## 2021-10-26 RX ADMIN — FUROSEMIDE 40 MG: 10 INJECTION, SOLUTION INTRAVENOUS at 00:04

## 2021-10-26 RX ADMIN — Medication 10 ML: at 20:15

## 2021-10-26 RX ADMIN — CELECOXIB 200 MG: 200 CAPSULE ORAL at 20:14

## 2021-10-26 RX ADMIN — DULOXETINE HYDROCHLORIDE 60 MG: 30 CAPSULE, DELAYED RELEASE ORAL at 08:55

## 2021-10-26 RX ADMIN — Medication 10 ML: at 11:22

## 2021-10-26 RX ADMIN — ATORVASTATIN CALCIUM 20 MG: 10 TABLET, FILM COATED ORAL at 01:05

## 2021-10-26 RX ADMIN — Medication 10 ML: at 08:59

## 2021-10-26 RX ADMIN — TAMSULOSIN HYDROCHLORIDE 0.4 MG: 0.4 CAPSULE ORAL at 01:05

## 2021-10-27 VITALS
HEART RATE: 54 BPM | WEIGHT: 315 LBS | BODY MASS INDEX: 38.36 KG/M2 | RESPIRATION RATE: 16 BRPM | HEIGHT: 76 IN | OXYGEN SATURATION: 94 % | DIASTOLIC BLOOD PRESSURE: 78 MMHG | TEMPERATURE: 97.4 F | SYSTOLIC BLOOD PRESSURE: 147 MMHG

## 2021-10-27 LAB
ANION GAP SERPL CALCULATED.3IONS-SCNC: 8 MMOL/L (ref 5–15)
BASOPHILS # BLD AUTO: 0.03 10*3/MM3 (ref 0–0.2)
BASOPHILS NFR BLD AUTO: 0.7 % (ref 0–1.5)
BUN SERPL-MCNC: 17 MG/DL (ref 8–23)
BUN/CREAT SERPL: 12.7 (ref 7–25)
CALCIUM SPEC-SCNC: 9.6 MG/DL (ref 8.6–10.5)
CHLORIDE SERPL-SCNC: 95 MMOL/L (ref 98–107)
CO2 SERPL-SCNC: 35 MMOL/L (ref 22–29)
CREAT SERPL-MCNC: 1.34 MG/DL (ref 0.76–1.27)
DEPRECATED RDW RBC AUTO: 47.3 FL (ref 37–54)
EOSINOPHIL # BLD AUTO: 0.07 10*3/MM3 (ref 0–0.4)
EOSINOPHIL NFR BLD AUTO: 1.6 % (ref 0.3–6.2)
ERYTHROCYTE [DISTWIDTH] IN BLOOD BY AUTOMATED COUNT: 14.1 % (ref 12.3–15.4)
GFR SERPL CREATININE-BSD FRML MDRD: 54 ML/MIN/1.73
GLUCOSE SERPL-MCNC: 117 MG/DL (ref 65–99)
HCT VFR BLD AUTO: 47 % (ref 37.5–51)
HGB BLD-MCNC: 15.7 G/DL (ref 13–17.7)
IMM GRANULOCYTES # BLD AUTO: 0.02 10*3/MM3 (ref 0–0.05)
IMM GRANULOCYTES NFR BLD AUTO: 0.5 % (ref 0–0.5)
LYMPHOCYTES # BLD AUTO: 0.95 10*3/MM3 (ref 0.7–3.1)
LYMPHOCYTES NFR BLD AUTO: 21.6 % (ref 19.6–45.3)
MCH RBC QN AUTO: 31.4 PG (ref 26.6–33)
MCHC RBC AUTO-ENTMCNC: 33.4 G/DL (ref 31.5–35.7)
MCV RBC AUTO: 94 FL (ref 79–97)
MONOCYTES # BLD AUTO: 0.58 10*3/MM3 (ref 0.1–0.9)
MONOCYTES NFR BLD AUTO: 13.2 % (ref 5–12)
NEUTROPHILS NFR BLD AUTO: 2.75 10*3/MM3 (ref 1.7–7)
NEUTROPHILS NFR BLD AUTO: 62.4 % (ref 42.7–76)
NRBC BLD AUTO-RTO: 0 /100 WBC (ref 0–0.2)
PLATELET # BLD AUTO: 142 10*3/MM3 (ref 140–450)
PMV BLD AUTO: 9.5 FL (ref 6–12)
POTASSIUM SERPL-SCNC: 3.7 MMOL/L (ref 3.5–5.2)
RBC # BLD AUTO: 5 10*6/MM3 (ref 4.14–5.8)
SODIUM SERPL-SCNC: 138 MMOL/L (ref 136–145)
WBC # BLD AUTO: 4.4 10*3/MM3 (ref 3.4–10.8)

## 2021-10-27 PROCEDURE — 96376 TX/PRO/DX INJ SAME DRUG ADON: CPT

## 2021-10-27 PROCEDURE — 94799 UNLISTED PULMONARY SVC/PX: CPT

## 2021-10-27 PROCEDURE — 96372 THER/PROPH/DIAG INJ SC/IM: CPT

## 2021-10-27 PROCEDURE — 36415 COLL VENOUS BLD VENIPUNCTURE: CPT | Performed by: FAMILY MEDICINE

## 2021-10-27 PROCEDURE — 80048 BASIC METABOLIC PNL TOTAL CA: CPT | Performed by: FAMILY MEDICINE

## 2021-10-27 PROCEDURE — 25010000002 ENOXAPARIN PER 10 MG: Performed by: FAMILY MEDICINE

## 2021-10-27 PROCEDURE — 85025 COMPLETE CBC W/AUTO DIFF WBC: CPT | Performed by: FAMILY MEDICINE

## 2021-10-27 PROCEDURE — G0378 HOSPITAL OBSERVATION PER HR: HCPCS

## 2021-10-27 RX ORDER — FUROSEMIDE 20 MG/1
20 TABLET ORAL 2 TIMES DAILY
Qty: 90 TABLET | Refills: 0 | Status: SHIPPED | OUTPATIENT
Start: 2021-10-27

## 2021-10-27 RX ADMIN — OXYCODONE HYDROCHLORIDE 15 MG: 15 TABLET, FILM COATED, EXTENDED RELEASE ORAL at 08:52

## 2021-10-27 RX ADMIN — GABAPENTIN 600 MG: 300 CAPSULE ORAL at 00:47

## 2021-10-27 RX ADMIN — ENOXAPARIN SODIUM 40 MG: 40 INJECTION SUBCUTANEOUS at 00:47

## 2021-10-27 RX ADMIN — Medication 10 ML: at 08:52

## 2021-10-27 RX ADMIN — OXYCODONE HYDROCHLORIDE 15 MG: 15 TABLET, FILM COATED, EXTENDED RELEASE ORAL at 00:47

## 2021-10-27 RX ADMIN — DILTIAZEM HYDROCHLORIDE 240 MG: 240 CAPSULE, EXTENDED RELEASE ORAL at 08:52

## 2021-10-27 RX ADMIN — FAMOTIDINE 20 MG: 20 TABLET, FILM COATED ORAL at 07:41

## 2021-10-27 RX ADMIN — OXYCODONE HYDROCHLORIDE AND ACETAMINOPHEN 1 TABLET: 7.5; 325 TABLET ORAL at 05:17

## 2021-10-27 RX ADMIN — LOSARTAN POTASSIUM: 50 TABLET, FILM COATED ORAL at 08:52

## 2021-10-27 RX ADMIN — DULOXETINE HYDROCHLORIDE 60 MG: 30 CAPSULE, DELAYED RELEASE ORAL at 08:52

## 2021-10-27 RX ADMIN — PANTOPRAZOLE SODIUM 40 MG: 40 TABLET, DELAYED RELEASE ORAL at 05:17

## 2021-10-27 RX ADMIN — GABAPENTIN 600 MG: 300 CAPSULE ORAL at 07:41

## 2021-10-27 RX ADMIN — AMLODIPINE BESYLATE 10 MG: 10 TABLET ORAL at 08:52

## 2021-11-09 ENCOUNTER — OFFICE VISIT (OUTPATIENT)
Dept: UROLOGY | Facility: CLINIC | Age: 66
End: 2021-11-09

## 2021-11-09 VITALS — HEIGHT: 76 IN | BODY MASS INDEX: 38.36 KG/M2 | TEMPERATURE: 97.5 F | WEIGHT: 315 LBS

## 2021-11-09 DIAGNOSIS — N40.1 BPH WITH OBSTRUCTION/LOWER URINARY TRACT SYMPTOMS: Primary | ICD-10-CM

## 2021-11-09 DIAGNOSIS — N13.8 BPH WITH OBSTRUCTION/LOWER URINARY TRACT SYMPTOMS: Primary | ICD-10-CM

## 2021-11-09 PROCEDURE — 99203 OFFICE O/P NEW LOW 30 MIN: CPT | Performed by: UROLOGY

## 2021-11-09 PROCEDURE — 51798 US URINE CAPACITY MEASURE: CPT | Performed by: UROLOGY

## 2021-11-09 RX ORDER — TAMSULOSIN HYDROCHLORIDE 0.4 MG/1
2 CAPSULE ORAL NIGHTLY
Qty: 180 CAPSULE | Refills: 3 | Status: SHIPPED | OUTPATIENT
Start: 2021-11-09 | End: 2022-11-04

## 2021-11-09 NOTE — PROGRESS NOTES
Chief Complaint  This fluid on my legs is because I have a grapefruit sized prostate    Subjective          Carlos Hayes presents to [facility] for a plethora of complaints.  His major complaint is that his legs hurt because he is getting edema.  This initially was associated with shortness of breath.  He has pictures of this which show marked lower extremity edema.  He thinks this is because his prostate is enlarged he is not emptying his bladder.  His major complaints are that he has a poor urinary stream and urinary urgency.  He is currently on tamsulosin 0.4 mg daily.  He also takes tadalafil at 20 mg on a as needed basis.  He has an extensive history of spinal disease having had multiple procedures.      Current Outpatient Medications:   •  albuterol sulfate  (90 Base) MCG/ACT inhaler, Inhale 2 puffs Every 4 (Four) Hours As Needed for Wheezing., Disp: 2 puff, Rfl: 12  •  ALPRAZolam (XANAX) 1 MG tablet, Take 1 mg by mouth 2 (Two) Times a Day., Disp: , Rfl:   •  ascorbic acid (VITAMIN C) 500 MG tablet, Take 1 tablet by mouth Daily., Disp: 30 tablet, Rfl: 0  •  atorvastatin (LIPITOR) 20 MG tablet, Take 1 tablet by mouth Every Night., Disp: 30 tablet, Rfl: 0  •  azelastine (ASTELIN) 0.1 % nasal spray, 1 spray into the nostril(s) as directed by provider 2 (Two) Times a Day., Disp: , Rfl:   •  Azelastine HCl 137 MCG/SPRAY solution, 2 sprays into the nostril(s) as directed by provider 2 (two) times a day., Disp: 30 mL, Rfl: 5  •  Budeson-Glycopyrrol-Formoterol (Breztri Aerosphere) 160-9-4.8 MCG/ACT aerosol inhaler, Inhale 2 puffs 2 (Two) Times a Day., Disp: 10.7 g, Rfl: 5  •  celecoxib (CeleBREX) 200 MG capsule, Take 200 mg by mouth Daily., Disp: , Rfl:   •  cholecalciferol (VITAMIN D3) 25 MCG (1000 UT) tablet, Take 2 tablets by mouth Daily., Disp: 30 tablet, Rfl: 0  •  cimetidine (TAGAMET) 400 MG tablet, Take 400 mg by mouth Every Night., Disp: , Rfl:   •  cloNIDine (CATAPRES) 0.1  MG tablet, Take 0.1 mg by mouth Every 6 (Six) Hours As Needed for High Blood Pressure (for bp 150/90 or greater)., Disp: , Rfl:   •  diltiaZEM CD (CARDIZEM CD) 240 MG 24 hr capsule, Take 240 mg by mouth Daily., Disp: , Rfl:   •  DULoxetine (CYMBALTA) 60 MG capsule, Take 60 mg by mouth Daily., Disp: , Rfl:   •  esomeprazole (NEXIUM) 40 MG packet, Take 40 mg by mouth Daily., Disp: , Rfl:   •  fluticasone (Flonase Allergy Relief) 50 MCG/ACT nasal spray, 2 sprays into the nostril(s) as directed by provider Daily., Disp: 16 g, Rfl: 5  •  furosemide (Lasix) 20 MG tablet, Take 1 tablet by mouth 2 (Two) Times a Day., Disp: 90 tablet, Rfl: 0  •  gabapentin (NEURONTIN) 600 MG tablet, Take 600 mg by mouth 4 (Four) Times a Day., Disp: , Rfl:   •  gabapentin (NEURONTIN) 800 MG tablet, Take 600 mg by mouth 4 (Four) Times a Day., Disp: , Rfl:   •  glucosamine sulfate 500 MG capsule capsule, Take  by mouth 3 (Three) Times a Day With Meals., Disp: , Rfl:   •  ipratropium-albuterol (DUO-NEB) 0.5-2.5 mg/3 ml nebulizer, Take 3 mL by nebulization 4 (Four) Times a Day As Needed for Wheezing or Shortness of Air. 2 boxes., Disp: 360 mL, Rfl: 5  •  loratadine (CLARITIN) 10 MG tablet, Take 1 tablet by mouth Daily., Disp: 90 tablet, Rfl: 3  •  losartan-hydrochlorothiazide (HYZAAR) 100-25 MG per tablet, Take 1 tablet by mouth Daily., Disp: , Rfl:   •  magnesium oxide (MAGOX) 400 (241.3 Mg) MG tablet tablet, Take 400 mg by mouth Daily., Disp: , Rfl:   •  mometasone-formoterol (DULERA 200) 200-5 MCG/ACT inhaler, Inhale 2 puffs 2 (Two) Times a Day., Disp: , Rfl:   •  Multiple Vitamins-Minerals (MULTI COMPLETE PO), Take  by mouth Daily., Disp: , Rfl:   •  Omega 3 1000 MG capsule, Take 1 g by mouth Daily., Disp: , Rfl:   •  oxyCODONE-acetaminophen (PERCOCET) 5-325 MG per tablet, Take 1 tablet by mouth Every 6 (Six) Hours As Needed for Moderate Pain  or Severe Pain . 4 x a day, Disp: , Rfl:   •  OXYCONTIN 15 MG tablet extended-release 12 hour, Take  20 mg by mouth Every 12 (Twelve) Hours. 2 x a day, Disp: , Rfl: 0  •  Probiotic Product (PROBIOTIC ADVANCED PO), Take 1 tablet/day by mouth Daily., Disp: , Rfl:   •  tadalafil (ADCIRCA) 20 MG tablet tablet, Take 10 mg by mouth Daily As Needed., Disp: , Rfl:   •  tiZANidine (ZANAFLEX) 4 MG tablet, Take 4 mg by mouth Every 8 (Eight) Hours As Needed for Muscle Spasms., Disp: , Rfl:   •  traZODone (DESYREL) 50 MG tablet, 300 mg., Disp: , Rfl:   •  varenicline (CHANTIX) 1 MG tablet, Take 1 mg by mouth 2 (Two) Times a Day., Disp: , Rfl:   •  vitamin B-12 (CYANOCOBALAMIN) 100 MCG tablet, Take 50 mcg by mouth Daily., Disp: , Rfl:   •  zinc sulfate (ZINCATE) 220 (50 Zn) MG capsule, Take 1 capsule by mouth Daily., Disp: 30 capsule, Rfl: 0  •  albuterol (PROVENTIL) (2.5 MG/3ML) 0.083% nebulizer solution, Take 2.5 mg by nebulization Every 4 (Four) Hours As Needed for Wheezing., Disp: 360 mL, Rfl: 3  •  Biotin 10 MG capsule, Take 1 tablet by mouth Daily., Disp: , Rfl:   •  doxepin (SINEquan) 25 MG capsule, Take 25 mg by mouth Every Night., Disp: , Rfl:   •  tamsulosin (FLOMAX) 0.4 MG capsule 24 hr capsule, Take 2 capsules by mouth Every Night for 360 days., Disp: 180 capsule, Rfl: 3  Past Medical History:   Diagnosis Date   • Acute bronchitis    • Amnesia    • Anxiety    • Arthritis    • Asthma    • Back pain    • Chest pain    • CKD (chronic kidney disease)    • Concussion    • Contusion of chest wall    • COPD (chronic obstructive pulmonary disease) ([facility])    • COVID-19    • Degeneration of intervertebral disc of lumbar region    • Fall    • Gait disturbance    • Gastro-esophageal reflux    • Headache    • Hearing impaired    • Hypersomnia    • Hypertension    • Hypotestosteronemia    • Insomnia    • Leg cramp    • Lumbar stenosis    • Obesity    • Obesity    • KATLYN (obstructive sleep apnea)    • PTSD (post-traumatic stress disorder)    • Radiculopathy of lumbosacral region    • Sinusitis    • Testicular hypofunction    • Tremor  "     Past Surgical History:   Procedure Laterality Date   • BACK SURGERY  1994    L3-4 fusion   • BACK SURGERY  2009    L3-S1 fusion   • CARDIAC CATHETERIZATION     • CARDIAC CATHETERIZATION N/A 12/14/2018    Procedure: Coronary angiography;  Surgeon: Lizandro Quiroz MD;  Location:  [facility] CATH INVASIVE LOCATION;  Service: Cardiology   • CARDIAC CATHETERIZATION N/A 12/14/2018    Procedure: Percutaneous Coronary Intervention;  Surgeon: Lizandro Quiroz MD;  Location:  [facility] CATH INVASIVE LOCATION;  Service: Cardiology   • CARPAL TUNNEL RELEASE Bilateral    • CHOLECYSTECTOMY     • FRACTURE SURGERY      LEFT HAND   • HERNIA REPAIR     • KNEE SURGERY     • SPINAL CORD STIMULATOR REMOVAL N/A 5/2/2018    Procedure: SPINAL CORD STIMULATOR REMOVAL Removal of generator and placment of new generator;  Surgeon: Miguel Hall MD;  Location:  [facility] OR;  Service: Neurosurgery   • SPINAL CORD STIMULATOR REMOVAL N/A 7/18/2018    Procedure: SPINAL CORD STIMULATOR BATTERY CHANGE;  Surgeon: Miguel Hall MD;  Location:  [facility] OR;  Service: Neurosurgery   • THORACIC LAMINECTOMY WITH PLACEMENT OF DORSAL COLUMN STIMULATOR  2010           Review  of systems  Constitutional: Negative for chills and fever.   Gastrointestinal: Negative for abdominal pain, anal bleeding and blood in stool.   Genitourinary: No hematuria or flank pain        Objective   PHYSICAL EXAM  Vital Signs:   Temp 97.5 °F (36.4 °C) (Temporal)   Ht 193 cm (76\")   Wt (!) 159 kg (350 lb)   BMI 42.60 kg/m²     Constitutional: Patient is without distress or deformity.  Vital signs are reviewed as above.    Neuro: No confusion; No disorientation; Alert and oriented  Pulmonary: No respiratory distress.   Skin: No pallor or diaphoresis  GI: Abdomen is soft and nontender.  No significant distention.  No hernias noted.  : Penis and testicles are normal. Benign feeling prostate without nodule approximately 30 mL in size.             DATA  Result Review :         Results " for orders placed or performed during the hospital encounter of 10/25/21   COVID-19,[facility] IN-HOUSE,Nasal Swab No Transport Media 3-4 HR TAT - Swab, Nasal Cavity    Specimen: Nasal Cavity; Swab   Result Value Ref Range    COVID19 Not Detected Not Detected - Ref. Range   Comprehensive Metabolic Panel    Specimen: Blood   Result Value Ref Range    Glucose 119 (H) 65 - 99 mg/dL    BUN 15 8 - 23 mg/dL    Creatinine 1.29 (H) 0.76 - 1.27 mg/dL    Sodium 137 136 - 145 mmol/L    Potassium 4.0 3.5 - 5.2 mmol/L    Chloride 98 98 - 107 mmol/L    CO2 27.0 22.0 - 29.0 mmol/L    Calcium 9.5 8.6 - 10.5 mg/dL    Total Protein 6.9 6.0 - 8.5 g/dL    Albumin 4.50 3.50 - 5.20 g/dL    ALT (SGPT) 50 (H) 1 - 41 U/L    AST (SGOT) 60 (H) 1 - 40 U/L    Alkaline Phosphatase 108 39 - 117 U/L    Total Bilirubin 0.5 0.0 - 1.2 mg/dL    eGFR Non African Amer 56 (L) >60 mL/min/1.73    Globulin 2.4 gm/dL    A/G Ratio 1.9 g/dL    BUN/Creatinine Ratio 11.6 7.0 - 25.0    Anion Gap 12.0 5.0 - 15.0 mmol/L   Troponin    Specimen: Blood   Result Value Ref Range    Troponin T <0.010 0.000 - 0.030 ng/mL   Troponin    Specimen: Blood   Result Value Ref Range    Troponin T <0.010 0.000 - 0.030 ng/mL   CBC Auto Differential    Specimen: Blood   Result Value Ref Range    WBC 4.57 3.40 - 10.80 10*3/mm3    RBC 4.91 4.14 - 5.80 10*6/mm3    Hemoglobin 15.5 13.0 - 17.7 g/dL    Hematocrit 45.9 37.5 - 51.0 %    MCV 93.5 79.0 - 97.0 fL    MCH 31.6 26.6 - 33.0 pg    MCHC 33.8 31.5 - 35.7 g/dL    RDW 14.1 12.3 - 15.4 %    RDW-SD 47.3 37.0 - 54.0 fl    MPV 9.6 6.0 - 12.0 fL    Platelets 157 140 - 450 10*3/mm3    Neutrophil % 64.6 42.7 - 76.0 %    Lymphocyte % 21.7 19.6 - 45.3 %    Monocyte % 11.6 5.0 - 12.0 %    Eosinophil % 1.5 0.3 - 6.2 %    Basophil % 0.4 0.0 - 1.5 %    Immature Grans % 0.2 0.0 - 0.5 %    Neutrophils, Absolute 2.95 1.70 - 7.00 10*3/mm3    Lymphocytes, Absolute 0.99 0.70 - 3.10 10*3/mm3    Monocytes, Absolute 0.53 0.10 - 0.90 10*3/mm3    Eosinophils,  Absolute 0.07 0.00 - 0.40 10*3/mm3    Basophils, Absolute 0.02 0.00 - 0.20 10*3/mm3    Immature Grans, Absolute 0.01 0.00 - 0.05 10*3/mm3    nRBC 0.0 0.0 - 0.2 /100 WBC   BNP    Specimen: Blood   Result Value Ref Range    proBNP 20.7 0.0 - 900.0 pg/mL   Lactic Acid, Plasma    Specimen: Blood   Result Value Ref Range    Lactate 0.8 0.5 - 2.0 mmol/L   Procalcitonin    Specimen: Blood   Result Value Ref Range    Procalcitonin 0.07 0.00 - 0.25 ng/mL   TSH    Specimen: Blood   Result Value Ref Range    TSH 1.210 0.270 - 4.200 uIU/mL   Basic Metabolic Panel    Specimen: Blood   Result Value Ref Range    Glucose 97 65 - 99 mg/dL    BUN 15 8 - 23 mg/dL    Creatinine 1.22 0.76 - 1.27 mg/dL    Sodium 138 136 - 145 mmol/L    Potassium 4.1 3.5 - 5.2 mmol/L    Chloride 99 98 - 107 mmol/L    CO2 28.0 22.0 - 29.0 mmol/L    Calcium 9.5 8.6 - 10.5 mg/dL    eGFR Non African Amer 60 (L) >60 mL/min/1.73    BUN/Creatinine Ratio 12.3 7.0 - 25.0    Anion Gap 11.0 5.0 - 15.0 mmol/L   Basic Metabolic Panel    Specimen: Blood   Result Value Ref Range    Glucose 98 65 - 99 mg/dL    BUN 15 8 - 23 mg/dL    Creatinine 1.11 0.76 - 1.27 mg/dL    Sodium 137 136 - 145 mmol/L    Potassium 3.9 3.5 - 5.2 mmol/L    Chloride 97 (L) 98 - 107 mmol/L    CO2 28.0 22.0 - 29.0 mmol/L    Calcium 9.8 8.6 - 10.5 mg/dL    eGFR Non African Amer 67 >60 mL/min/1.73    BUN/Creatinine Ratio 13.5 7.0 - 25.0    Anion Gap 12.0 5.0 - 15.0 mmol/L   D-dimer, Quantitative    Specimen: Blood   Result Value Ref Range    D-Dimer, Quantitative 0.68 (H) 0.00 - 0.50 mg/L (FEU)   CBC Auto Differential    Specimen: Blood   Result Value Ref Range    WBC 4.32 3.40 - 10.80 10*3/mm3    RBC 5.11 4.14 - 5.80 10*6/mm3    Hemoglobin 16.3 13.0 - 17.7 g/dL    Hematocrit 47.5 37.5 - 51.0 %    MCV 93.0 79.0 - 97.0 fL    MCH 31.9 26.6 - 33.0 pg    MCHC 34.3 31.5 - 35.7 g/dL    RDW 13.9 12.3 - 15.4 %    RDW-SD 46.2 37.0 - 54.0 fl    MPV 9.5 6.0 - 12.0 fL    Platelets 151 140 - 450 10*3/mm3     Neutrophil % 54.8 42.7 - 76.0 %    Lymphocyte % 28.0 19.6 - 45.3 %    Monocyte % 12.7 (H) 5.0 - 12.0 %    Eosinophil % 3.5 0.3 - 6.2 %    Basophil % 0.5 0.0 - 1.5 %    Immature Grans % 0.5 0.0 - 0.5 %    Neutrophils, Absolute 2.37 1.70 - 7.00 10*3/mm3    Lymphocytes, Absolute 1.21 0.70 - 3.10 10*3/mm3    Monocytes, Absolute 0.55 0.10 - 0.90 10*3/mm3    Eosinophils, Absolute 0.15 0.00 - 0.40 10*3/mm3    Basophils, Absolute 0.02 0.00 - 0.20 10*3/mm3    Immature Grans, Absolute 0.02 0.00 - 0.05 10*3/mm3    nRBC 0.0 0.0 - 0.2 /100 WBC   Blood Gas, Arterial -    Specimen: Arterial Blood   Result Value Ref Range    Site Right Radial     Josafat's Test Positive     pH, Arterial 7.430 7.350 - 7.450 pH units    pCO2, Arterial 45.0 35.0 - 45.0 mm Hg    pO2, Arterial 82.6 (L) 83.0 - 108.0 mm Hg    HCO3, Arterial 29.8 (H) 20.0 - 26.0 mmol/L    Base Excess, Arterial 4.6 (H) 0.0 - 2.0 mmol/L    O2 Saturation, Arterial 96.8 94.0 - 99.0 %    Temperature 37.0 C    Barometric Pressure for Blood Gas 752 mmHg    Modality Nasal Cannula     Flow Rate 2.0 lpm    Ventilator Mode NA     Collected by [id number]     pCO2, Temperature Corrected 45.0 35 - 45 mm Hg    pH, Temp Corrected 7.430 7.350 - 7.450 pH Units    pO2, Temperature Corrected 82.6 (L) 83 - 108 mm Hg   Basic Metabolic Panel    Specimen: Blood   Result Value Ref Range    Glucose 117 (H) 65 - 99 mg/dL    BUN 17 8 - 23 mg/dL    Creatinine 1.34 (H) 0.76 - 1.27 mg/dL    Sodium 138 136 - 145 mmol/L    Potassium 3.7 3.5 - 5.2 mmol/L    Chloride 95 (L) 98 - 107 mmol/L    CO2 35.0 (H) 22.0 - 29.0 mmol/L    Calcium 9.6 8.6 - 10.5 mg/dL    eGFR Non African Amer 54 (L) >60 mL/min/1.73    BUN/Creatinine Ratio 12.7 7.0 - 25.0    Anion Gap 8.0 5.0 - 15.0 mmol/L   CBC Auto Differential    Specimen: Blood   Result Value Ref Range    WBC 4.40 3.40 - 10.80 10*3/mm3    RBC 5.00 4.14 - 5.80 10*6/mm3    Hemoglobin 15.7 13.0 - 17.7 g/dL    Hematocrit 47.0 37.5 - 51.0 %    MCV 94.0 79.0 - 97.0 fL    MCH  31.4 26.6 - 33.0 pg    MCHC 33.4 31.5 - 35.7 g/dL    RDW 14.1 12.3 - 15.4 %    RDW-SD 47.3 37.0 - 54.0 fl    MPV 9.5 6.0 - 12.0 fL    Platelets 142 140 - 450 10*3/mm3    Neutrophil % 62.4 42.7 - 76.0 %    Lymphocyte % 21.6 19.6 - 45.3 %    Monocyte % 13.2 (H) 5.0 - 12.0 %    Eosinophil % 1.6 0.3 - 6.2 %    Basophil % 0.7 0.0 - 1.5 %    Immature Grans % 0.5 0.0 - 0.5 %    Neutrophils, Absolute 2.75 1.70 - 7.00 10*3/mm3    Lymphocytes, Absolute 0.95 0.70 - 3.10 10*3/mm3    Monocytes, Absolute 0.58 0.10 - 0.90 10*3/mm3    Eosinophils, Absolute 0.07 0.00 - 0.40 10*3/mm3    Basophils, Absolute 0.03 0.00 - 0.20 10*3/mm3    Immature Grans, Absolute 0.02 0.00 - 0.05 10*3/mm3    nRBC 0.0 0.0 - 0.2 /100 WBC   ECG 12 Lead   Result Value Ref Range    QT Interval 366 ms    QTC Interval 419 ms   ECG 12 Lead   Result Value Ref Range    QT Interval 384 ms    QTC Interval 423 ms   Green Top (Gel)   Result Value Ref Range    Extra Tube Hold for add-ons.    Lavender Top   Result Value Ref Range    Extra Tube hold for add-on    Red Top   Result Value Ref Range    Extra Tube Hold for add-ons.    Light Blue Top   Result Value Ref Range    Extra Tube hold for add-on                     ASSESSMENT AND PLAN       Bladder Scan interpretation  Estimation of residual urine via abdominal ultrasound  Residual Urine: 112 ml  Indication: enlarged prostate  Position: Supine  Examination: Incremental scanning of the suprapubic area using 3 MHz transducer using copious amounts of acoustic gel.   Findings: An anechoic area was demonstrated which represented the bladder, with measurement of residual urine as noted. I inspected this myself. In that the residual urine was stable or insignificant, no treatment will be necessary at this time.     {CC Problem List  Visit Diagnosis  ROS  Review (Popup)  Health Maintenance  Quality  BestPractice  Medications  SmartSets  SnapShot Encounters  Media :23}     Problem List Items Addressed This Visit      None      Visit Diagnoses     BPH with obstruction/lower urinary tract symptoms    -  Primary    Relevant Medications    tamsulosin (FLOMAX) 0.4 MG capsule 24 hr capsule      Will increase his tamsulosin to 0.8 mg/day.   Explained diuretic use with increase urine output.   If this doesn't work consider tadalafil at 5 mg qd.   Needs PVR at next visit.   If medical management fails, he needs a urodynamic study done which would require referral to [facility].           FOLLOW UP     Return in about 6 weeks (around 12/21/2021).        (Please note that portions of this note were completed with a voice recognition program.)  Delfino Bassett MD  11/10/21  07:18 CST

## 2021-11-26 ENCOUNTER — TRANSCRIBE ORDERS (OUTPATIENT)
Dept: LAB | Facility: HOSPITAL | Age: 66
End: 2021-11-26

## 2021-11-26 DIAGNOSIS — Z11.59 SCREENING FOR VIRAL DISEASE: Primary | ICD-10-CM

## 2021-11-29 ENCOUNTER — LAB (OUTPATIENT)
Dept: LAB | Facility: HOSPITAL | Age: 66
End: 2021-11-29

## 2021-11-29 ENCOUNTER — PRE-ADMISSION TESTING (OUTPATIENT)
Dept: PREADMISSION TESTING | Facility: HOSPITAL | Age: 66
End: 2021-11-29

## 2021-11-29 VITALS
DIASTOLIC BLOOD PRESSURE: 80 MMHG | SYSTOLIC BLOOD PRESSURE: 135 MMHG | HEART RATE: 89 BPM | WEIGHT: 315 LBS | BODY MASS INDEX: 41.75 KG/M2 | OXYGEN SATURATION: 95 % | RESPIRATION RATE: 20 BRPM | HEIGHT: 73 IN

## 2021-11-29 LAB
ANION GAP SERPL CALCULATED.3IONS-SCNC: 12 MMOL/L (ref 5–15)
BUN SERPL-MCNC: 23 MG/DL (ref 8–23)
BUN/CREAT SERPL: 15.6 (ref 7–25)
CALCIUM SPEC-SCNC: 10 MG/DL (ref 8.6–10.5)
CHLORIDE SERPL-SCNC: 96 MMOL/L (ref 98–107)
CO2 SERPL-SCNC: 27 MMOL/L (ref 22–29)
CREAT SERPL-MCNC: 1.47 MG/DL (ref 0.76–1.27)
DEPRECATED RDW RBC AUTO: 41 FL (ref 37–54)
ERYTHROCYTE [DISTWIDTH] IN BLOOD BY AUTOMATED COUNT: 12 % (ref 12.3–15.4)
GFR SERPL CREATININE-BSD FRML MDRD: 48 ML/MIN/1.73
GLUCOSE SERPL-MCNC: 101 MG/DL (ref 65–99)
HCT VFR BLD AUTO: 48.7 % (ref 37.5–51)
HGB BLD-MCNC: 17 G/DL (ref 13–17.7)
MCH RBC QN AUTO: 32.1 PG (ref 26.6–33)
MCHC RBC AUTO-ENTMCNC: 34.9 G/DL (ref 31.5–35.7)
MCV RBC AUTO: 91.9 FL (ref 79–97)
PLATELET # BLD AUTO: 200 10*3/MM3 (ref 140–450)
PMV BLD AUTO: 9.9 FL (ref 6–12)
POTASSIUM SERPL-SCNC: 4.3 MMOL/L (ref 3.5–5.2)
RBC # BLD AUTO: 5.3 10*6/MM3 (ref 4.14–5.8)
SARS-COV-2 ORF1AB RESP QL NAA+PROBE: NOT DETECTED
SODIUM SERPL-SCNC: 135 MMOL/L (ref 136–145)
WBC NRBC COR # BLD: 5.35 10*3/MM3 (ref 3.4–10.8)

## 2021-11-29 PROCEDURE — U0005 INFEC AGEN DETEC AMPLI PROBE: HCPCS | Performed by: PLASTIC SURGERY

## 2021-11-29 PROCEDURE — U0004 COV-19 TEST NON-CDC HGH THRU: HCPCS | Performed by: PLASTIC SURGERY

## 2021-11-29 PROCEDURE — 85027 COMPLETE CBC AUTOMATED: CPT

## 2021-11-29 PROCEDURE — 36415 COLL VENOUS BLD VENIPUNCTURE: CPT

## 2021-11-29 PROCEDURE — 80048 BASIC METABOLIC PNL TOTAL CA: CPT

## 2021-11-29 PROCEDURE — 93005 ELECTROCARDIOGRAM TRACING: CPT

## 2021-11-29 PROCEDURE — 93010 ELECTROCARDIOGRAM REPORT: CPT | Performed by: INTERNAL MEDICINE

## 2021-11-29 PROCEDURE — C9803 HOPD COVID-19 SPEC COLLECT: HCPCS | Performed by: PLASTIC SURGERY

## 2021-11-30 LAB
QT INTERVAL: 378 MS
QTC INTERVAL: 413 MS

## 2021-12-01 ENCOUNTER — ANESTHESIA EVENT (OUTPATIENT)
Dept: PERIOP | Facility: HOSPITAL | Age: 66
End: 2021-12-01

## 2021-12-01 ENCOUNTER — ANESTHESIA (OUTPATIENT)
Dept: PERIOP | Facility: HOSPITAL | Age: 66
End: 2021-12-01

## 2021-12-01 ENCOUNTER — HOSPITAL ENCOUNTER (OUTPATIENT)
Facility: HOSPITAL | Age: 66
Setting detail: HOSPITAL OUTPATIENT SURGERY
Discharge: HOME | End: 2021-12-01
Attending: PLASTIC SURGERY | Admitting: PLASTIC SURGERY

## 2021-12-01 VITALS
RESPIRATION RATE: 16 BRPM | TEMPERATURE: 97.5 F | DIASTOLIC BLOOD PRESSURE: 68 MMHG | SYSTOLIC BLOOD PRESSURE: 125 MMHG | HEART RATE: 88 BPM | OXYGEN SATURATION: 93 %

## 2021-12-01 PROCEDURE — 25010000002 FENTANYL CITRATE (PF) 50 MCG/ML SOLUTION: Performed by: ANESTHESIOLOGY

## 2021-12-01 PROCEDURE — 25010000002 DEXAMETHASONE PER 1 MG: Performed by: NURSE ANESTHETIST, CERTIFIED REGISTERED

## 2021-12-01 PROCEDURE — 25010000002 ONDANSETRON PER 1 MG: Performed by: NURSE ANESTHETIST, CERTIFIED REGISTERED

## 2021-12-01 PROCEDURE — 25010000002 PROPOFOL 10 MG/ML EMULSION: Performed by: NURSE ANESTHETIST, CERTIFIED REGISTERED

## 2021-12-01 PROCEDURE — 25010000002 DEXAMETHASONE PER 1 MG: Performed by: ANESTHESIOLOGY

## 2021-12-01 RX ORDER — LABETALOL HYDROCHLORIDE 5 MG/ML
5 INJECTION, SOLUTION INTRAVENOUS
Status: DISCONTINUED | OUTPATIENT
Start: 2021-12-01 | End: 2021-12-01 | Stop reason: HOSPADM

## 2021-12-01 RX ORDER — SODIUM CHLORIDE 0.9 % (FLUSH) 0.9 %
3-10 SYRINGE (ML) INJECTION AS NEEDED
Status: DISCONTINUED | OUTPATIENT
Start: 2021-12-01 | End: 2021-12-01 | Stop reason: HOSPADM

## 2021-12-01 RX ORDER — MIDAZOLAM HYDROCHLORIDE 1 MG/ML
1 INJECTION INTRAMUSCULAR; INTRAVENOUS
Status: DISCONTINUED | OUTPATIENT
Start: 2021-12-01 | End: 2021-12-01 | Stop reason: HOSPADM

## 2021-12-01 RX ORDER — ACETAMINOPHEN 500 MG
1000 TABLET ORAL ONCE
Status: COMPLETED | OUTPATIENT
Start: 2021-12-01 | End: 2021-12-01

## 2021-12-01 RX ORDER — FENTANYL CITRATE 50 UG/ML
25 INJECTION, SOLUTION INTRAMUSCULAR; INTRAVENOUS
Status: DISCONTINUED | OUTPATIENT
Start: 2021-12-01 | End: 2021-12-01 | Stop reason: HOSPADM

## 2021-12-01 RX ORDER — NALOXONE HCL 0.4 MG/ML
0.4 VIAL (ML) INJECTION AS NEEDED
Status: DISCONTINUED | OUTPATIENT
Start: 2021-12-01 | End: 2021-12-01 | Stop reason: HOSPADM

## 2021-12-01 RX ORDER — ONDANSETRON 2 MG/ML
4 INJECTION INTRAMUSCULAR; INTRAVENOUS ONCE AS NEEDED
Status: DISCONTINUED | OUTPATIENT
Start: 2021-12-01 | End: 2021-12-01 | Stop reason: HOSPADM

## 2021-12-01 RX ORDER — PROPOFOL 10 MG/ML
VIAL (ML) INTRAVENOUS AS NEEDED
Status: DISCONTINUED | OUTPATIENT
Start: 2021-12-01 | End: 2021-12-01 | Stop reason: SURG

## 2021-12-01 RX ORDER — FLUMAZENIL 0.1 MG/ML
0.2 INJECTION INTRAVENOUS AS NEEDED
Status: DISCONTINUED | OUTPATIENT
Start: 2021-12-01 | End: 2021-12-01 | Stop reason: HOSPADM

## 2021-12-01 RX ORDER — ONDANSETRON 2 MG/ML
INJECTION INTRAMUSCULAR; INTRAVENOUS AS NEEDED
Status: DISCONTINUED | OUTPATIENT
Start: 2021-12-01 | End: 2021-12-01 | Stop reason: SURG

## 2021-12-01 RX ORDER — SODIUM CHLORIDE, SODIUM LACTATE, POTASSIUM CHLORIDE, CALCIUM CHLORIDE 600; 310; 30; 20 MG/100ML; MG/100ML; MG/100ML; MG/100ML
1000 INJECTION, SOLUTION INTRAVENOUS CONTINUOUS
Status: DISCONTINUED | OUTPATIENT
Start: 2021-12-01 | End: 2021-12-01 | Stop reason: HOSPADM

## 2021-12-01 RX ORDER — DEXAMETHASONE SODIUM PHOSPHATE 4 MG/ML
INJECTION, SOLUTION INTRA-ARTICULAR; INTRALESIONAL; INTRAMUSCULAR; INTRAVENOUS; SOFT TISSUE AS NEEDED
Status: DISCONTINUED | OUTPATIENT
Start: 2021-12-01 | End: 2021-12-01 | Stop reason: SURG

## 2021-12-01 RX ORDER — LIDOCAINE HYDROCHLORIDE 20 MG/ML
INJECTION, SOLUTION EPIDURAL; INFILTRATION; INTRACAUDAL; PERINEURAL AS NEEDED
Status: DISCONTINUED | OUTPATIENT
Start: 2021-12-01 | End: 2021-12-01 | Stop reason: SURG

## 2021-12-01 RX ORDER — SUFENTANIL CITRATE 50 UG/ML
INJECTION EPIDURAL; INTRAVENOUS AS NEEDED
Status: DISCONTINUED | OUTPATIENT
Start: 2021-12-01 | End: 2021-12-01 | Stop reason: SURG

## 2021-12-01 RX ORDER — DEXAMETHASONE SODIUM PHOSPHATE 4 MG/ML
4 INJECTION, SOLUTION INTRA-ARTICULAR; INTRALESIONAL; INTRAMUSCULAR; INTRAVENOUS; SOFT TISSUE ONCE AS NEEDED
Status: COMPLETED | OUTPATIENT
Start: 2021-12-01 | End: 2021-12-01

## 2021-12-01 RX ORDER — LIDOCAINE HYDROCHLORIDE 10 MG/ML
0.5 INJECTION, SOLUTION EPIDURAL; INFILTRATION; INTRACAUDAL; PERINEURAL ONCE AS NEEDED
Status: DISCONTINUED | OUTPATIENT
Start: 2021-12-01 | End: 2021-12-01 | Stop reason: HOSPADM

## 2021-12-01 RX ORDER — SODIUM CHLORIDE 0.9 % (FLUSH) 0.9 %
3 SYRINGE (ML) INJECTION AS NEEDED
Status: DISCONTINUED | OUTPATIENT
Start: 2021-12-01 | End: 2021-12-01 | Stop reason: HOSPADM

## 2021-12-01 RX ORDER — OXYCODONE AND ACETAMINOPHEN 10; 325 MG/1; MG/1
1 TABLET ORAL ONCE AS NEEDED
Status: COMPLETED | OUTPATIENT
Start: 2021-12-01 | End: 2021-12-01

## 2021-12-01 RX ORDER — LIDOCAINE HYDROCHLORIDE 40 MG/ML
SOLUTION TOPICAL AS NEEDED
Status: DISCONTINUED | OUTPATIENT
Start: 2021-12-01 | End: 2021-12-01 | Stop reason: SURG

## 2021-12-01 RX ORDER — ROCURONIUM BROMIDE 10 MG/ML
INJECTION, SOLUTION INTRAVENOUS AS NEEDED
Status: DISCONTINUED | OUTPATIENT
Start: 2021-12-01 | End: 2021-12-01 | Stop reason: SURG

## 2021-12-01 RX ORDER — LIDOCAINE HYDROCHLORIDE AND EPINEPHRINE 10; 10 MG/ML; UG/ML
INJECTION, SOLUTION INFILTRATION; PERINEURAL AS NEEDED
Status: DISCONTINUED | OUTPATIENT
Start: 2021-12-01 | End: 2021-12-01 | Stop reason: HOSPADM

## 2021-12-01 RX ORDER — EPHEDRINE SULFATE 50 MG/ML
INJECTION, SOLUTION INTRAVENOUS AS NEEDED
Status: DISCONTINUED | OUTPATIENT
Start: 2021-12-01 | End: 2021-12-01 | Stop reason: SURG

## 2021-12-01 RX ORDER — OXYCODONE AND ACETAMINOPHEN 7.5; 325 MG/1; MG/1
2 TABLET ORAL EVERY 4 HOURS PRN
Status: DISCONTINUED | OUTPATIENT
Start: 2021-12-01 | End: 2021-12-01 | Stop reason: HOSPADM

## 2021-12-01 RX ORDER — SODIUM CHLORIDE 0.9 % (FLUSH) 0.9 %
3 SYRINGE (ML) INJECTION EVERY 12 HOURS SCHEDULED
Status: DISCONTINUED | OUTPATIENT
Start: 2021-12-01 | End: 2021-12-01 | Stop reason: HOSPADM

## 2021-12-01 RX ORDER — MAGNESIUM HYDROXIDE 1200 MG/15ML
LIQUID ORAL AS NEEDED
Status: DISCONTINUED | OUTPATIENT
Start: 2021-12-01 | End: 2021-12-01 | Stop reason: HOSPADM

## 2021-12-01 RX ORDER — DEXMEDETOMIDINE HYDROCHLORIDE 100 UG/ML
INJECTION, SOLUTION INTRAVENOUS AS NEEDED
Status: DISCONTINUED | OUTPATIENT
Start: 2021-12-01 | End: 2021-12-01 | Stop reason: SURG

## 2021-12-01 RX ORDER — HYDROCODONE BITARTRATE AND ACETAMINOPHEN 5; 325 MG/1; MG/1
1-2 TABLET ORAL EVERY 4 HOURS PRN
Qty: 30 TABLET | Refills: 0 | Status: SHIPPED | OUTPATIENT
Start: 2021-12-01 | End: 2021-12-14

## 2021-12-01 RX ORDER — NEOMYCIN SULFATE, POLYMYXIN B SULFATE AND BACITRACIN ZINC 3.5; 10000; 4 MG/G; [USP'U]/G; [USP'U]/G
OINTMENT OPHTHALMIC AS NEEDED
Status: DISCONTINUED | OUTPATIENT
Start: 2021-12-01 | End: 2021-12-01 | Stop reason: HOSPADM

## 2021-12-01 RX ORDER — SODIUM CHLORIDE, SODIUM LACTATE, POTASSIUM CHLORIDE, CALCIUM CHLORIDE 600; 310; 30; 20 MG/100ML; MG/100ML; MG/100ML; MG/100ML
100 INJECTION, SOLUTION INTRAVENOUS CONTINUOUS
Status: DISCONTINUED | OUTPATIENT
Start: 2021-12-01 | End: 2021-12-01 | Stop reason: HOSPADM

## 2021-12-01 RX ADMIN — ROCURONIUM BROMIDE 25 MG: 10 INJECTION INTRAVENOUS at 07:05

## 2021-12-01 RX ADMIN — LIDOCAINE HYDROCHLORIDE 1 EACH: 40 SOLUTION TOPICAL at 07:05

## 2021-12-01 RX ADMIN — SUFENTANIL CITRATE 25 MCG: 50 INJECTION EPIDURAL; INTRAVENOUS at 07:05

## 2021-12-01 RX ADMIN — FENTANYL CITRATE 25 MCG: 50 INJECTION INTRAMUSCULAR; INTRAVENOUS at 06:56

## 2021-12-01 RX ADMIN — ACETAMINOPHEN 1000 MG: 500 TABLET, FILM COATED ORAL at 06:56

## 2021-12-01 RX ADMIN — LIDOCAINE HYDROCHLORIDE 100 MG: 20 INJECTION, SOLUTION EPIDURAL; INFILTRATION; INTRACAUDAL; PERINEURAL at 07:05

## 2021-12-01 RX ADMIN — VASOPRESSIN 2 ML: 20 INJECTION INTRAVENOUS at 07:10

## 2021-12-01 RX ADMIN — DEXAMETHASONE SODIUM PHOSPHATE 4 MG: 4 INJECTION, SOLUTION INTRA-ARTICULAR; INTRALESIONAL; INTRAMUSCULAR; INTRAVENOUS; SOFT TISSUE at 09:04

## 2021-12-01 RX ADMIN — DEXAMETHASONE SODIUM PHOSPHATE 4 MG: 4 INJECTION, SOLUTION INTRA-ARTICULAR; INTRALESIONAL; INTRAMUSCULAR; INTRAVENOUS; SOFT TISSUE at 06:56

## 2021-12-01 RX ADMIN — SODIUM CHLORIDE, POTASSIUM CHLORIDE, SODIUM LACTATE AND CALCIUM CHLORIDE: 600; 310; 30; 20 INJECTION, SOLUTION INTRAVENOUS at 07:59

## 2021-12-01 RX ADMIN — PROPOFOL 200 MG: 10 INJECTION, EMULSION INTRAVENOUS at 07:05

## 2021-12-01 RX ADMIN — ONDANSETRON 4 MG: 2 INJECTION INTRAMUSCULAR; INTRAVENOUS at 09:04

## 2021-12-01 RX ADMIN — SODIUM CHLORIDE, POTASSIUM CHLORIDE, SODIUM LACTATE AND CALCIUM CHLORIDE 1000 ML: 600; 310; 30; 20 INJECTION, SOLUTION INTRAVENOUS at 06:34

## 2021-12-01 RX ADMIN — EPHEDRINE SULFATE 20 MG: 50 INJECTION INTRAVENOUS at 07:15

## 2021-12-01 RX ADMIN — EPHEDRINE SULFATE 20 MG: 50 INJECTION INTRAVENOUS at 07:58

## 2021-12-01 RX ADMIN — OXYCODONE AND ACETAMINOPHEN 1 TABLET: 325; 10 TABLET ORAL at 11:30

## 2021-12-01 RX ADMIN — DEXMEDETOMIDINE 50 MCG: 100 INJECTION, SOLUTION, CONCENTRATE INTRAVENOUS at 07:05

## 2021-12-01 NOTE — ANESTHESIA PROCEDURE NOTES
Airway  Urgency: elective    Date/Time: 12/1/2021 6:59 AM  Airway not difficult    General Information and Staff    Patient location during procedure: OR  CRNA: Al Vaughan CRNA    Indications and Patient Condition  Indications for airway management: airway protection    Preoxygenated: yes  MILS maintained throughout  Mask difficulty assessment: 1 - vent by mask    Final Airway Details  Final airway type: endotracheal airway      Successful airway: ETT  Cuffed: yes   Successful intubation technique: direct laryngoscopy  Endotracheal tube insertion site: oral  Blade: Fontana  Blade size: 2  ETT size (mm): 7.0  Cormack-Lehane Classification: grade IIa - partial view of glottis  Placement verified by: chest auscultation   Cuff volume (mL): 10  Measured from: lips  ETT/EBT  to lips (cm): 23  Number of attempts at approach: 1  Assessment: lips, teeth, and gum same as pre-op and atraumatic intubation

## 2021-12-01 NOTE — OP NOTE
Carlos Hayes  12/1/2021     PREOPERATIVE DIAGNOSIS: H02.31, H02.34, H53.453     POSTOPERATIVE DIAGNOSIS: same     PROCEDURE PERFORMED: 1) upper blepharoplasties 2) brow lift     SURGEON: Jagdish Gregg MD      ANESTHESIA: General    Estimated Blood Loss: Minimal     PREPARATION: Routine.    STAFF: Circulator: Mary Abreu RN; Tee Banks RN  Scrub Person: Willow Stack; Cyndee Pham          INDICATIONS: Carlos Hayes is a 66 y.o. white male with progressive visual field obstructions as the soft tissue of the upper eyelids and brows descended creating folds of skin that obstructed his upper and lateral gaze.  In an effort to improve his visual fields, it was advised patient undergo brow lift and upper lid blepharoplasty.  The indications, risks, and possible complications of the procedure were explained to the patient, who voiced understanding and wished to proceed with surgery.     Complications: None    PROCEDURE IN DETAIL: The patient was taken to the operating room and placed on the operating table in a supine position. After general anesthesia was obtained, the face was prepped and draped in a sterile manner.  1% lidocaine with epinephrine was used for local infiltration of the subcutaneous tissues of the brow and forehead.  A hairline incision was created along the upper reaches of the forehead and this incision was carried through the deep dermis and subcutaneous tissue using sharp dissection.  The skin and subcutaneous tissue were then elevated off of the frontalis muscle extending to a point inferior to the brows on both sides and extending laterally and inferiorly to the lateral orbital rim area.  Hemostasis was achieved with electrocautery.  The forehead flap was then advanced superiorly and the excess skin and subcutaneous tissue was excised following the upper incision skin edge.  The skin edges were then reapproximated using interrupted 4-0 Vicryl suture in the deep  dermis and running 4-0 nylon subcuticular suture at the skin line.    Attention was then turned to the upper eyelids where transverse elliptical patches of skin were marked with the inferior border at the superior aspect of the tarsal plate on both sides.  The amount of excess skin to be removed was measured by tension.  The transverse elliptical patches of skin thus marked were then injected with 1% lidocaine with epinephrine and sharply excised through full-thickness of dermis and the orbicularis ocular muscles on each side.  Hemostasis was achieved with electrocautery.  The skin edges on both sides were then reapproximated using running 6-0 nylon suture.  Steri-Strips were applied to the forehead incisions a forehead compression wrap was then applied.  The patient tolerated the procedure well. Sponge and needle counts were correct. The patient was then awakened and extubated in the operating room and taken to the recovery room in good condition.    Jagdish Gregg MD  Date: 12/1/2021 Time: 09:33 CST

## 2021-12-01 NOTE — DISCHARGE INSTRUCTIONS

## 2021-12-01 NOTE — ANESTHESIA POSTPROCEDURE EVALUATION
Patient: Carlos Hayes    Procedure Summary     Date: 12/01/21 Room / Location:  [facility] OR  /  [facility] OR    Anesthesia Start: 0659 Anesthesia Stop: 0938    Procedures:       UPPER LID BLEPHAROPLASTY (95692), WITH BROWLIFT (59203) (Bilateral Eye)      BROWLIFT (99696) (Bilateral Face) Diagnosis: (H02.31, H02.34, H53.453)    Surgeons: Jagdish Gregg MD Provider: Al Vaughan CRNA    Anesthesia Type: general ASA Status: 3          Anesthesia Type: general    Vitals  Vitals Value Taken Time   /76 12/01/21 1200   Temp 97.5 °F (36.4 °C) 12/01/21 1200   Pulse 91 12/01/21 1202   Resp 14 12/01/21 1200   SpO2 96 % 12/01/21 1201   Vitals shown include unvalidated device data.        Post Anesthesia Care and Evaluation    Patient location during evaluation: PACU  Patient participation: complete - patient participated  Level of consciousness: awake and alert  Pain management: adequate  Airway patency: patent  Anesthetic complications: No anesthetic complications  PONV Status: none  Cardiovascular status: acceptable and hemodynamically stable  Respiratory status: acceptable  Hydration status: acceptable    Comments: Blood pressure 129/72, pulse 92, temperature 97.5 °F (36.4 °C), temperature source Temporal, resp. rate 16, SpO2 94 %.    Patient discharged from PACU based upon Lola score. Please see RN notes for further details

## 2021-12-01 NOTE — ANESTHESIA PREPROCEDURE EVALUATION
Anesthesia Evaluation     Patient summary reviewed and Nursing notes reviewed   no history of anesthetic complications:  NPO Solid Status: > 8 hours  NPO Liquid Status: > 8 hours           Airway   Mallampati: II  TM distance: >3 FB  Neck ROM: limited  Dental      Pulmonary    (+) a smoker (quit 2016) Former, COPD, asthma,home oxygen, sleep apnea (uses bipap) on CPAP,   Cardiovascular   Exercise tolerance: poor (<4 METS) (Limited by back)    ECG reviewed    (+) hypertension,   (-) past MI, CAD, dysrhythmias, angina, cardiac stents, hyperlipidemia    ROS comment: Recent negative stress test    Neuro/Psych  (+) numbness (neuropathy bilateral lower extremities),     (-) seizures, TIA, CVA  GI/Hepatic/Renal/Endo    (+) morbid obesity, GERD,    (-) liver disease, no renal disease, diabetes    Musculoskeletal     (+) back pain (s/p spinal cord stimulator), neck pain,   Abdominal    Substance History      OB/GYN          Other   arthritis,                        Anesthesia Plan    ASA 3     general     intravenous induction     Anesthetic plan, all risks, benefits, and alternatives have been provided, discussed and informed consent has been obtained with: patient.

## 2021-12-13 ENCOUNTER — TELEPHONE (OUTPATIENT)
Dept: VASCULAR SURGERY | Facility: CLINIC | Age: 66
End: 2021-12-13

## 2021-12-13 NOTE — TELEPHONE ENCOUNTER
Spoke with Mr Hayes reminding him of his appointment for Tuesday, December 14, 2021 at 230 pm with Dr Ivey. Mr Hayes confirmed he would be here.

## 2021-12-14 ENCOUNTER — OFFICE VISIT (OUTPATIENT)
Dept: VASCULAR SURGERY | Facility: CLINIC | Age: 66
End: 2021-12-14

## 2021-12-14 VITALS
DIASTOLIC BLOOD PRESSURE: 90 MMHG | OXYGEN SATURATION: 97 % | WEIGHT: 315 LBS | SYSTOLIC BLOOD PRESSURE: 130 MMHG | RESPIRATION RATE: 18 BRPM | HEART RATE: 85 BPM | BODY MASS INDEX: 41.75 KG/M2 | HEIGHT: 73 IN

## 2021-12-14 DIAGNOSIS — I83.12 VARICOSE VEINS OF BOTH LOWER EXTREMITIES WITH INFLAMMATION: ICD-10-CM

## 2021-12-14 DIAGNOSIS — I83.11 VARICOSE VEINS OF BOTH LOWER EXTREMITIES WITH INFLAMMATION: ICD-10-CM

## 2021-12-14 DIAGNOSIS — I87.323 CHRONIC VENOUS HYPERTENSION WITH INFLAMMATION INVOLVING BOTH SIDES: Primary | ICD-10-CM

## 2021-12-14 PROBLEM — M51.379 DEGENERATION OF LUMBOSACRAL INTERVERTEBRAL DISC: Status: ACTIVE | Noted: 2021-12-14

## 2021-12-14 PROBLEM — M54.41 CHRONIC BILATERAL LOW BACK PAIN WITH BILATERAL SCIATICA: Status: ACTIVE | Noted: 2019-10-08

## 2021-12-14 PROBLEM — Z91.89 POTENTIAL DIFFICULT AIRWAY ON PRE-INTUBATION ASSESSMENT: Status: ACTIVE | Noted: 2021-12-14

## 2021-12-14 PROBLEM — M46.92 UNSPECIFIED INFLAMMATORY SPONDYLOPATHY, CERVICAL REGION (HCC): Status: ACTIVE | Noted: 2021-12-14

## 2021-12-14 PROBLEM — S40.019A CONTUSION OF SHOULDER REGION: Status: ACTIVE | Noted: 2021-12-14

## 2021-12-14 PROBLEM — I50.31 ACUTE DIASTOLIC HEART FAILURE (HCC): Status: ACTIVE | Noted: 2021-12-14

## 2021-12-14 PROBLEM — Z01.818 POTENTIAL DIFFICULT AIRWAY ON PRE-INTUBATION ASSESSMENT: Status: ACTIVE | Noted: 2021-12-14

## 2021-12-14 PROBLEM — R51.9 DAILY HEADACHE: Status: ACTIVE | Noted: 2021-12-14

## 2021-12-14 PROBLEM — Z99.89 BIPAP (BIPHASIC POSITIVE AIRWAY PRESSURE) DEPENDENCE: Status: ACTIVE | Noted: 2021-12-14

## 2021-12-14 PROBLEM — J18.9 CAP (COMMUNITY ACQUIRED PNEUMONIA): Status: ACTIVE | Noted: 2021-05-12

## 2021-12-14 PROBLEM — M12.811 OTHER SPECIFIC ARTHROPATHIES, NOT ELSEWHERE CLASSIFIED, RIGHT SHOULDER: Status: ACTIVE | Noted: 2021-12-14

## 2021-12-14 PROBLEM — M54.30 SCIATICA: Status: ACTIVE | Noted: 2021-12-14

## 2021-12-14 PROBLEM — R41.3 AMNESIA: Status: ACTIVE | Noted: 2019-10-08

## 2021-12-14 PROBLEM — N18.2 STAGE 2 CHRONIC KIDNEY DISEASE: Status: ACTIVE | Noted: 2021-12-14

## 2021-12-14 PROBLEM — R79.89 ELEVATED SERUM CREATININE: Status: ACTIVE | Noted: 2019-08-16

## 2021-12-14 PROBLEM — R35.1 NOCTURIA: Status: ACTIVE | Noted: 2021-12-14

## 2021-12-14 PROBLEM — J41.0 SIMPLE CHRONIC BRONCHITIS: Status: ACTIVE | Noted: 2021-12-14

## 2021-12-14 PROBLEM — M19.019 OSTEOARTHRITIS OF GLENOHUMERAL JOINT: Status: ACTIVE | Noted: 2021-12-14

## 2021-12-14 PROBLEM — F33.9 RECURRENT MAJOR DEPRESSION: Status: ACTIVE | Noted: 2021-12-14

## 2021-12-14 PROBLEM — R26.9 ABNORMAL GAIT: Status: ACTIVE | Noted: 2021-12-14

## 2021-12-14 PROBLEM — M76.899 ADDUCTOR TENDINITIS: Status: ACTIVE | Noted: 2019-07-23

## 2021-12-14 PROBLEM — E29.1 TESTICULAR HYPOFUNCTION: Status: ACTIVE | Noted: 2021-12-14

## 2021-12-14 PROBLEM — R33.9 URINARY RETENTION: Status: ACTIVE | Noted: 2021-12-14

## 2021-12-14 PROBLEM — I83.10 VARICOSE VEINS OF LOWER EXTREMITY WITH INFLAMMATION: Status: ACTIVE | Noted: 2021-12-14

## 2021-12-14 PROBLEM — R39.11 HESITANCY OF MICTURITION: Status: ACTIVE | Noted: 2021-12-14

## 2021-12-14 PROBLEM — M51.37 DEGENERATION OF LUMBOSACRAL INTERVERTEBRAL DISC: Status: ACTIVE | Noted: 2021-12-14

## 2021-12-14 PROBLEM — D50.8 IRON DEFICIENCY ANEMIA SECONDARY TO INADEQUATE DIETARY IRON INTAKE: Status: ACTIVE | Noted: 2021-12-14

## 2021-12-14 PROBLEM — Z91.81 HISTORY OF FALL: Status: ACTIVE | Noted: 2021-12-14

## 2021-12-14 PROBLEM — G44.009 CLUSTER HEADACHE SYNDROME: Status: ACTIVE | Noted: 2021-12-14

## 2021-12-14 PROBLEM — S06.0XAA BRAIN CONCUSSION: Status: ACTIVE | Noted: 2021-12-14

## 2021-12-14 PROBLEM — N52.2 DRUG-INDUCED ERECTILE DYSFUNCTION: Status: ACTIVE | Noted: 2021-12-14

## 2021-12-14 PROBLEM — N40.0 ENLARGED PROSTATE: Status: ACTIVE | Noted: 2021-12-14

## 2021-12-14 PROBLEM — S20.212A CONTUSION OF LEFT CHEST WALL: Status: ACTIVE | Noted: 2021-12-14

## 2021-12-14 PROBLEM — R11.15 CYCLICAL VOMITING SYNDROME: Status: ACTIVE | Noted: 2021-12-14

## 2021-12-14 PROBLEM — M16.11 PRIMARY OSTEOARTHRITIS OF RIGHT HIP: Status: ACTIVE | Noted: 2019-07-23

## 2021-12-14 PROBLEM — Z98.1 HISTORY OF LUMBAR FUSION: Status: ACTIVE | Noted: 2021-12-14

## 2021-12-14 PROBLEM — G47.00 INSOMNIA: Status: ACTIVE | Noted: 2021-12-14

## 2021-12-14 PROBLEM — M25.861 KNEE JOINT CYST, RIGHT: Status: ACTIVE | Noted: 2021-12-14

## 2021-12-14 PROBLEM — R42 VERTIGO: Status: ACTIVE | Noted: 2021-12-14

## 2021-12-14 PROBLEM — F32.9 REACTIVE DEPRESSION (SITUATIONAL): Status: ACTIVE | Noted: 2021-12-14

## 2021-12-14 PROBLEM — G89.29 CHRONIC BILATERAL LOW BACK PAIN WITH BILATERAL SCIATICA: Status: ACTIVE | Noted: 2019-10-08

## 2021-12-14 PROBLEM — G43.909 MIGRAINE HEADACHE: Status: ACTIVE | Noted: 2021-12-14

## 2021-12-14 PROBLEM — R79.89 ABNORMAL C-REACTIVE PROTEIN: Status: ACTIVE | Noted: 2021-12-14

## 2021-12-14 PROBLEM — M17.0 PRIMARY OSTEOARTHRITIS OF BOTH KNEES: Status: ACTIVE | Noted: 2021-12-14

## 2021-12-14 PROBLEM — M54.42 CHRONIC BILATERAL LOW BACK PAIN WITH BILATERAL SCIATICA: Status: ACTIVE | Noted: 2019-10-08

## 2021-12-14 PROBLEM — E34.9 HYPOTESTOSTERONEMIA: Status: ACTIVE | Noted: 2021-12-14

## 2021-12-14 PROBLEM — J32.9 CHRONIC SINUSITIS: Status: ACTIVE | Noted: 2021-12-14

## 2021-12-14 PROBLEM — M75.101 RUPTURE OF RIGHT ROTATOR CUFF: Status: ACTIVE | Noted: 2021-12-14
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
## 2021-12-14 PROCEDURE — 99204 OFFICE O/P NEW MOD 45 MIN: CPT | Performed by: SURGERY

## 2021-12-14 RX ORDER — SPIRONOLACTONE 50 MG/1
1 TABLET, FILM COATED ORAL DAILY
COMMUNITY
End: 2022-02-07

## 2021-12-16 DIAGNOSIS — N13.8 BPH WITH OBSTRUCTION/LOWER URINARY TRACT SYMPTOMS: Primary | ICD-10-CM

## 2021-12-16 DIAGNOSIS — N40.1 BPH WITH OBSTRUCTION/LOWER URINARY TRACT SYMPTOMS: Primary | ICD-10-CM

## 2021-12-17 DIAGNOSIS — N13.8 BPH WITH OBSTRUCTION/LOWER URINARY TRACT SYMPTOMS: ICD-10-CM

## 2021-12-17 DIAGNOSIS — N40.1 BPH WITH OBSTRUCTION/LOWER URINARY TRACT SYMPTOMS: ICD-10-CM

## 2021-12-18 LAB — PSA SERPL-MCNC: 0.8 NG/ML (ref 0–4)

## 2021-12-23 RX ORDER — ALBUTEROL SULFATE 90 UG/1
2 AEROSOL, METERED RESPIRATORY (INHALATION) EVERY 4 HOURS PRN
Qty: 18 G | Refills: 0 | Status: SHIPPED | OUTPATIENT
Start: 2021-12-23

## 2021-12-23 NOTE — TELEPHONE ENCOUNTER
Rx Refill Note  Requested Prescriptions     Pending Prescriptions Disp Refills   • albuterol sulfate  (90 Base) MCG/ACT inhaler [Pharmacy Med Name: ALBUTEROL SULFATE  ( 108 (90 BAS Aerosol] 18 g 11     Sig: Inhale 2 puffs Every 4 (Four) Hours As Needed for Wheezing or Shortness of Air.      Last office visit with prescribing clinician: 5/28/2021      Next office visit with prescribing clinician: Visit date not found            Roseann Marion CMA  12/23/21, 12:58 CST       Patient doesn't have any appts upcoming.  Do you want to refill this?

## 2022-01-03 DIAGNOSIS — U07.1 COVID-19: ICD-10-CM

## 2022-01-03 RX ORDER — DIPHENHYDRAMINE HYDROCHLORIDE 50 MG/ML
50 INJECTION INTRAMUSCULAR; INTRAVENOUS
Status: CANCELLED | OUTPATIENT
Start: 2022-01-04

## 2022-01-03 RX ORDER — SODIUM CHLORIDE 9 MG/ML
INJECTION, SOLUTION INTRAVENOUS CONTINUOUS
Status: CANCELLED | OUTPATIENT
Start: 2022-01-04

## 2022-01-03 RX ORDER — ACETAMINOPHEN 325 MG/1
650 TABLET ORAL ONCE
Status: CANCELLED
Start: 2022-01-04 | End: 2022-01-04

## 2022-01-03 RX ORDER — ALBUTEROL SULFATE 90 UG/1
4 AEROSOL, METERED RESPIRATORY (INHALATION) PRN
Status: CANCELLED | OUTPATIENT
Start: 2022-01-04

## 2022-01-03 RX ORDER — SODIUM CHLORIDE 0.9 % (FLUSH) 0.9 %
5-40 SYRINGE (ML) INJECTION PRN
Status: CANCELLED | OUTPATIENT
Start: 2022-01-04

## 2022-01-03 RX ORDER — EPINEPHRINE 1 MG/ML
0.3 INJECTION, SOLUTION, CONCENTRATE INTRAVENOUS PRN
Status: CANCELLED | OUTPATIENT
Start: 2022-01-04

## 2022-01-03 RX ORDER — ONDANSETRON 2 MG/ML
8 INJECTION INTRAMUSCULAR; INTRAVENOUS
Status: CANCELLED | OUTPATIENT
Start: 2022-01-04

## 2022-01-03 RX ORDER — DIPHENHYDRAMINE HYDROCHLORIDE 50 MG/ML
25 INJECTION INTRAMUSCULAR; INTRAVENOUS ONCE
Status: CANCELLED
Start: 2022-01-04 | End: 2022-01-04

## 2022-01-03 RX ORDER — ACETAMINOPHEN 325 MG/1
650 TABLET ORAL
Status: CANCELLED | OUTPATIENT
Start: 2022-01-04

## 2022-01-04 ENCOUNTER — HOSPITAL ENCOUNTER (OUTPATIENT)
Dept: INFUSION THERAPY | Age: 67
Setting detail: INFUSION SERIES
Discharge: HOME | End: 2022-01-04
Payer: MEDICARE

## 2022-01-04 VITALS
RESPIRATION RATE: 16 BRPM | TEMPERATURE: 97.8 F | OXYGEN SATURATION: 91 % | DIASTOLIC BLOOD PRESSURE: 76 MMHG | HEART RATE: 68 BPM | SYSTOLIC BLOOD PRESSURE: 115 MMHG

## 2022-01-04 DIAGNOSIS — U07.1 COVID-19: Primary | ICD-10-CM

## 2022-01-04 PROCEDURE — 6360000002 HC RX W HCPCS: Performed by: FAMILY MEDICINE

## 2022-01-04 PROCEDURE — 6370000000 HC RX 637 (ALT 250 FOR IP): Performed by: FAMILY MEDICINE

## 2022-01-04 PROCEDURE — 2580000003 HC RX 258: Performed by: FAMILY MEDICINE

## 2022-01-04 PROCEDURE — 96374 THER/PROPH/DIAG INJ IV PUSH: CPT

## 2022-01-04 PROCEDURE — 2500000003 HC RX 250 WO HCPCS: Performed by: FAMILY MEDICINE

## 2022-01-04 PROCEDURE — M0245 HC IV INFUSION BAMLANIVIMAB & ETESEVIMAB W/MONITORING: HCPCS

## 2022-01-04 RX ORDER — EPINEPHRINE 1 MG/ML
0.3 INJECTION, SOLUTION, CONCENTRATE INTRAVENOUS PRN
Status: CANCELLED | OUTPATIENT
Start: 2022-01-04

## 2022-01-04 RX ORDER — DIPHENHYDRAMINE HYDROCHLORIDE 50 MG/ML
50 INJECTION INTRAMUSCULAR; INTRAVENOUS
Status: CANCELLED | OUTPATIENT
Start: 2022-01-04

## 2022-01-04 RX ORDER — DIPHENHYDRAMINE HYDROCHLORIDE 50 MG/ML
25 INJECTION INTRAMUSCULAR; INTRAVENOUS ONCE
Status: CANCELLED
Start: 2022-01-04 | End: 2022-01-04

## 2022-01-04 RX ORDER — SODIUM CHLORIDE 9 MG/ML
INJECTION, SOLUTION INTRAVENOUS CONTINUOUS
Status: ACTIVE | OUTPATIENT
Start: 2022-01-04 | End: 2022-01-04

## 2022-01-04 RX ORDER — SODIUM CHLORIDE 0.9 % (FLUSH) 0.9 %
5-40 SYRINGE (ML) INJECTION PRN
Status: CANCELLED | OUTPATIENT
Start: 2022-01-04

## 2022-01-04 RX ORDER — ACETAMINOPHEN 325 MG/1
650 TABLET ORAL
Status: CANCELLED | OUTPATIENT
Start: 2022-01-04

## 2022-01-04 RX ORDER — SODIUM CHLORIDE 9 MG/ML
INJECTION, SOLUTION INTRAVENOUS CONTINUOUS
Status: CANCELLED | OUTPATIENT
Start: 2022-01-04

## 2022-01-04 RX ORDER — SODIUM CHLORIDE 0.9 % (FLUSH) 0.9 %
5-40 SYRINGE (ML) INJECTION PRN
Status: DISCONTINUED | OUTPATIENT
Start: 2022-01-04 | End: 2022-01-05 | Stop reason: HOSPADM

## 2022-01-04 RX ORDER — DIPHENHYDRAMINE HYDROCHLORIDE 50 MG/ML
25 INJECTION INTRAMUSCULAR; INTRAVENOUS ONCE
Status: COMPLETED | OUTPATIENT
Start: 2022-01-04 | End: 2022-01-04

## 2022-01-04 RX ORDER — ACETAMINOPHEN 325 MG/1
650 TABLET ORAL ONCE
Status: CANCELLED
Start: 2022-01-04 | End: 2022-01-04

## 2022-01-04 RX ORDER — ONDANSETRON 2 MG/ML
8 INJECTION INTRAMUSCULAR; INTRAVENOUS
Status: CANCELLED | OUTPATIENT
Start: 2022-01-04

## 2022-01-04 RX ORDER — ALBUTEROL SULFATE 90 UG/1
4 AEROSOL, METERED RESPIRATORY (INHALATION) PRN
Status: CANCELLED | OUTPATIENT
Start: 2022-01-04

## 2022-01-04 RX ORDER — ACETAMINOPHEN 325 MG/1
650 TABLET ORAL ONCE
Status: COMPLETED | OUTPATIENT
Start: 2022-01-04 | End: 2022-01-04

## 2022-01-04 RX ADMIN — SODIUM CHLORIDE: 9 INJECTION, SOLUTION INTRAVENOUS at 11:17

## 2022-01-04 RX ADMIN — DIPHENHYDRAMINE HYDROCHLORIDE 25 MG: 50 INJECTION, SOLUTION INTRAMUSCULAR; INTRAVENOUS at 11:17

## 2022-01-04 RX ADMIN — ACETAMINOPHEN 650 MG: 325 TABLET ORAL at 11:17

## 2022-01-04 RX ADMIN — SODIUM CHLORIDE: 9 INJECTION, SOLUTION INTRAVENOUS at 11:20

## 2022-01-04 ASSESSMENT — PAIN SCALES - GENERAL: PAINLEVEL_OUTOF10: 0

## 2022-01-10 NOTE — PROGRESS NOTES
12/14/2021      Chris Russ MD  [address] [place] [address]    Carlos Hayes  1955    Chief Complaint   Patient presents with   • Establish Care     Referral by Dr Chris Russ for Varicose Veins.  Pt states he had swelling in the right leg and pain. Pt denies any stroke like symptoms.   • Former smoker     Pt verified former smoker.       Dear Chris Russ MD    HPI  I had the pleasure of seeing your patient Carlos Hayes in the office today.  Thank you kindly for this consultation.  As you recall, Carlos Hayes is a 66 y.o.  male who you are currently following for routine health maintenance.  Patient has a longstanding history of pain and swelling in his right lower extremity.  He denies any history of DVT or phlebitis in the lower extremities.  He denies any use of compression stockings.  He does have heaviness, tiredness, and cramps at night when he sleeps.      Past Medical History:   Diagnosis Date   • Acute bronchitis    • Amnesia    • Anxiety    • Arthritis    • Asthma    • Back pain    • Chest pain    • CKD (chronic kidney disease)    • Concussion    • Contusion of chest wall    • COPD (chronic obstructive pulmonary disease) ([facility])    • COVID-19    • Degeneration of intervertebral disc of lumbar region    • Fall    • Gait disturbance    • Gastro-esophageal reflux    • Headache    • Hearing impaired    • Hypersomnia    • Hypertension    • Hypotestosteronemia    • Insomnia    • Leg cramp    • Lumbar stenosis    • Obesity    • Obesity    • KATLYN (obstructive sleep apnea)    • KATLYN treated with BiPAP     2 liters of oxygen at night   • Potential difficult airway on pre-intubation assessment 12/14/2021   • PTSD (post-traumatic stress disorder)    • Radiculopathy of lumbosacral region    • Sinusitis    • Testicular hypofunction    • Tremor        Past Surgical History:   Procedure Laterality Date   • APPENDECTOMY     • BACK SURGERY  1994    L3-4 fusion   • BACK SURGERY  2009    L3-S1  fusion   • BLEPHAROPLASTY Bilateral 2021    Procedure: UPPER LID BLEPHAROPLASTY (94783), WITH BROWLIFT (03411);  Surgeon: Jagdish Gregg MD;  Location: [facility] OR;  Service: Plastics;  Laterality: Bilateral;   • BROW LIFT Bilateral 2021    Procedure: BROWLIFT (69828);  Surgeon: Jagdish Gregg MD;  Location:  [facility] OR;  Service: Plastics;  Laterality: Bilateral;   • CARDIAC CATHETERIZATION     • CARDIAC CATHETERIZATION N/A 2018    Procedure: Coronary angiography;  Surgeon: Lizandro Quiroz MD;  Location:  [facility] CATH INVASIVE LOCATION;  Service: Cardiology   • CARDIAC CATHETERIZATION N/A 2018    Procedure: Percutaneous Coronary Intervention;  Surgeon: Lizandro Quiroz MD;  Location:  [facility] CATH INVASIVE LOCATION;  Service: Cardiology   • CARPAL TUNNEL RELEASE Bilateral    • COLONOSCOPY     • ENDOSCOPY     • FRACTURE SURGERY      LEFT HAND   • HERNIA REPAIR     • KNEE SURGERY Right    • SPINAL CORD STIMULATOR REMOVAL N/A 2018    Procedure: SPINAL CORD STIMULATOR REMOVAL Removal of generator and placment of new generator;  Surgeon: Miguel Hall MD;  Location:  [facility] OR;  Service: Neurosurgery   • SPINAL CORD STIMULATOR REMOVAL N/A 2018    Procedure: SPINAL CORD STIMULATOR BATTERY CHANGE;  Surgeon: Miguel Hall MD;  Location:  [facility] OR;  Service: Neurosurgery   • THORACIC LAMINECTOMY WITH PLACEMENT OF DORSAL COLUMN STIMULATOR         Family History   Problem Relation Age of Onset   • Stroke Mother    • Arrhythmia Mother    • Arrhythmia Father        Social History     Socioeconomic History   • Marital status:    Tobacco Use   • Smoking status: Former Smoker     Packs/day: 0.75     Years: 41.00     Pack years: 30.75     Types: Cigarettes     Start date: 1975     Quit date: 3/23/2016     Years since quittin.8   • Smokeless tobacco: Never Used   Vaping Use   • Vaping Use: Never used   Substance and Sexual Activity   • Alcohol use: Yes     Comment: occ   • Drug  use: No   • Sexual activity: Defer       Allergies   Allergen Reactions   • Eszopiclone Other (See Comments)     Flu like symptoms    • Levofloxacin Rash                Current Outpatient Medications:   •  albuterol (PROVENTIL) (2.5 MG/3ML) 0.083% nebulizer solution, Take 2.5 mg by nebulization Every 4 (Four) Hours As Needed for Wheezing., Disp: 360 mL, Rfl: 3  •  ALPRAZolam (XANAX) 1 MG tablet, Take 1 mg by mouth Daily., Disp: , Rfl:   •  Azelastine HCl 137 MCG/SPRAY solution, 2 sprays into the nostril(s) as directed by provider 2 (two) times a day., Disp: 30 mL, Rfl: 5  •  Biotin 10 MG capsule, Take 1 tablet by mouth Daily., Disp: , Rfl:   •  Budeson-Glycopyrrol-Formoterol (Breztri Aerosphere) 160-9-4.8 MCG/ACT aerosol inhaler, Inhale 2 puffs 2 (Two) Times a Day., Disp: 10.7 g, Rfl: 5  •  celecoxib (CeleBREX) 200 MG capsule, Take 200 mg by mouth Daily., Disp: , Rfl:   •  cholecalciferol (VITAMIN D3) 25 MCG (1000 UT) tablet, Take 2 tablets by mouth Daily., Disp: 30 tablet, Rfl: 0  •  cloNIDine (CATAPRES) 0.1 MG tablet, Take 0.1 mg by mouth Every 6 (Six) Hours As Needed for High Blood Pressure (for bp 150/90 or greater)., Disp: , Rfl:   •  diltiaZEM CD (CARDIZEM CD) 240 MG 24 hr capsule, Take 240 mg by mouth Daily., Disp: , Rfl:   •  DULoxetine (CYMBALTA) 60 MG capsule, Take 60 mg by mouth Daily., Disp: , Rfl:   •  esomeprazole (NEXIUM) 40 MG packet, Take 40 mg by mouth 3 (Three) Times a Day., Disp: , Rfl:   •  fluticasone (Flonase Allergy Relief) 50 MCG/ACT nasal spray, 2 sprays into the nostril(s) as directed by provider Daily., Disp: 16 g, Rfl: 5  •  furosemide (Lasix) 20 MG tablet, Take 1 tablet by mouth 2 (Two) Times a Day. (Patient taking differently: Take 40 mg by mouth 2 (Two) Times a Day.), Disp: 90 tablet, Rfl: 0  •  gabapentin (NEURONTIN) 800 MG tablet, Take 600 mg by mouth 4 (Four) Times a Day., Disp: , Rfl:   •  ipratropium-albuterol (DUO-NEB) 0.5-2.5 mg/3 ml nebulizer, Take 3 mL by nebulization 4  (Four) Times a Day As Needed for Wheezing or Shortness of Air. 2 boxes., Disp: 360 mL, Rfl: 5  •  losartan-hydrochlorothiazide (HYZAAR) 100-25 MG per tablet, Take 1 tablet by mouth Daily., Disp: , Rfl:   •  magnesium oxide (MAGOX) 400 (241.3 Mg) MG tablet tablet, Take 400 mg by mouth Daily., Disp: , Rfl:   •  Multiple Vitamins-Minerals (MULTI COMPLETE PO), Take  by mouth Daily., Disp: , Rfl:   •  O2 (OXYGEN), Inhale 2 L/min Every Night. With Bipap, Disp: , Rfl:   •  oxyCODONE-acetaminophen (PERCOCET) 5-325 MG per tablet, Take 1 tablet by mouth Every 6 (Six) Hours As Needed for Moderate Pain  or Severe Pain . 4 x a day, Disp: , Rfl:   •  OXYCONTIN 15 MG tablet extended-release 12 hour, Take 15 mg by mouth 3 (Three) Times a Day. 2 x a day, Disp: , Rfl: 0  •  Probiotic Product (PROBIOTIC ADVANCED PO), Take 1 tablet/day by mouth Daily., Disp: , Rfl:   •  Semaglutide-Weight Management (WEGOVY SC), Inject  under the skin into the appropriate area as directed 1 (One) Time Per Week. Every friday, Disp: , Rfl:   •  tadalafil (ADCIRCA) 20 MG tablet tablet, Take 10 mg by mouth Daily As Needed., Disp: , Rfl:   •  tamsulosin (FLOMAX) 0.4 MG capsule 24 hr capsule, Take 2 capsules by mouth Every Night for 360 days., Disp: 180 capsule, Rfl: 3  •  tiZANidine (ZANAFLEX) 4 MG tablet, Take 4 mg by mouth Every 8 (Eight) Hours As Needed for Muscle Spasms., Disp: , Rfl:   •  traZODone (DESYREL) 50 MG tablet, Take 300 mg by mouth Every Night., Disp: , Rfl:   •  vitamin B-12 (CYANOCOBALAMIN) 100 MCG tablet, Take 50 mcg by mouth Daily., Disp: , Rfl:   •  albuterol sulfate  (90 Base) MCG/ACT inhaler, Inhale 2 puffs Every 4 (Four) Hours As Needed for Wheezing or Shortness of Air., Disp: 18 g, Rfl: 0  •  spironolactone (ALDACTONE) 50 MG tablet, Take 1 tablet by mouth Daily., Disp: , Rfl:     Review of Systems   Constitutional: Negative.    HENT: Negative.    Eyes: Negative.    Respiratory: Negative.    Cardiovascular: Positive for leg  "swelling.        Leg cramping   Gastrointestinal: Negative.    Endocrine: Negative.    Genitourinary: Negative.    Musculoskeletal: Negative.    Skin: Negative.    Allergic/Immunologic: Negative.    Neurological: Negative.    Hematological: Negative.    Psychiatric/Behavioral: Negative.      /90 (BP Location: Left arm, Patient Position: Sitting, Cuff Size: Adult)   Pulse 85   Resp 18   Ht 186.5 cm (73.43\")   Wt (!) 154 kg (339 lb)   SpO2 97%   BMI 44.20 kg/m²     Physical Exam  Vitals and nursing note reviewed.   Constitutional:       Appearance: He is well-developed.   HENT:      Head: Normocephalic and atraumatic.   Eyes:      General: No scleral icterus.     Pupils: Pupils are equal, round, and reactive to light.   Neck:      Thyroid: No thyromegaly.      Vascular: No carotid bruit or JVD.   Cardiovascular:      Rate and Rhythm: Normal rate and regular rhythm.      Pulses:           Carotid pulses are 2+ on the right side and 2+ on the left side.       Femoral pulses are 2+ on the right side and 2+ on the left side.       Popliteal pulses are 2+ on the right side and 2+ on the left side.        Dorsalis pedis pulses are 2+ on the right side and 2+ on the left side.        Posterior tibial pulses are 2+ on the right side and 2+ on the left side.      Heart sounds: Normal heart sounds.   Pulmonary:      Effort: Pulmonary effort is normal.      Breath sounds: Normal breath sounds.   Abdominal:      General: Bowel sounds are normal. There is no distension or abdominal bruit.      Palpations: Abdomen is soft. There is no mass.      Tenderness: There is no abdominal tenderness.   Musculoskeletal:         General: Normal range of motion.      Cervical back: Neck supple.   Lymphadenopathy:      Cervical: No cervical adenopathy.   Skin:     General: Skin is warm and dry.   Neurological:      Mental Status: He is alert and oriented to person, place, and time.      Cranial Nerves: No cranial nerve deficit.      " Sensory: No sensory deficit.         Patient Active Problem List   Diagnosis   • Battery end of life of spinal cord stimulator   • Morbid obesity with BMI of 40.0-44.9, adult ([facility])   • Cervicalgia   • Cervical radiculopathy   • Carpal tunnel syndrome of right wrist   • Spinal stenosis, lumbar region, with neurogenic claudication   • BMI 39.0-39.9,adult   • Malfunction of spinal cord stimulator ([facility])   • Abnormal stress test   • Other chest pain   • Hypertension   • Dehydration   • COVID-19   • COVID-19 virus infection   • COPD (chronic obstructive pulmonary disease) ([facility])   • Former cigarette smoker   • Obstructive sleep apnea treated with BiPAP   • Hypoxemia requiring supplemental oxygen   • COPD with acute exacerbation ([facility])   • History of pneumonia   • Bronchitis   • COVID-19 vaccine administered   • Non-seasonal allergic rhinitis   • Shortness of breath   • Cough   • PTSD (post-traumatic stress disorder)   • Other specified anxiety disorders   • Restrictive lung disease   • Dyspnea   • Chronic pain   • Lower extremity edema   • Abnormal C-reactive protein   • Abnormal gait   • Acute diastolic heart failure ([facility])   • Adductor tendinitis   • Amnesia   • BiPAP (biphasic positive airway pressure) dependence   • Brain concussion   • CAP (community acquired pneumonia)   • Chronic bilateral low back pain with bilateral sciatica   • Cluster headache syndrome   • Chronic sinusitis   • Chronic use of opiate drug for therapeutic purpose   • Contusion of left chest wall   • Contusion of shoulder region   • Cyclical vomiting syndrome   • Daily headache   • Degeneration of lumbosacral intervertebral disc   • Drug-induced erectile dysfunction   • Elevated serum creatinine   • Enlarged prostate   • Fall   • Family history of colonic polyps   • Leo's esophagus   • Hearing deficit   • Heartburn   • Hesitancy of micturition   • History of colon polyps   • History of fall   • History of lumbar fusion   • Insomnia   •  Hypotestosteronemia   • Iron deficiency anemia secondary to inadequate dietary iron intake   • Knee joint cyst, right   • Osteoarthritis of glenohumeral joint   • Low testosterone   • Lumbar spine instability   • Migraine headache   • Nocturia   • Osteoarthritis of knee   • Other specific arthropathies, not elsewhere classified, right shoulder   • Potential difficult airway on pre-intubation assessment   • Primary osteoarthritis of both knees   • Primary osteoarthritis of right hip   • Reactive depression (situational)   • Recurrent major depression ([facility])   • Rupture of right rotator cuff   • Sciatica   • Seasonal allergies   • Shoulder pain   • Simple chronic bronchitis ([facility])   • Stage 2 chronic kidney disease   • Testicular hypofunction   • Tobacco dependence   • Tremor   • Unspecified inflammatory spondylopathy, cervical region ([facility])   • Urinary retention   • Varicose veins of lower extremity with inflammation   • Vertigo        Diagnosis Plan   1. Chronic venous hypertension with inflammation involving both sides     2. Varicose veins of both lower extremities with inflammation         Plan: After thoroughly evaluating Carlos Hayes, I believe the best course of action is to initially remain conservative from a vascular surgery standpoint.  I would like him to start wearing compression stockings in the 20 to 30 mm pressure gradient range.  He can also keep his legs elevated when he is not on them.  I will see him back in 3 months time with a 2 leg VVI to see if he would be a great candidate for endovenous closure.  I instructed him on how to wear the compression stockings on a daily basis and to keep his legs moisturized in the wintertime. The patient is to continue taking their medications as previously discussed.   This was all discussed in full with complete understanding.  Thank you for allowing me to participate in the care of your patient.  Please do not hesitate to call with any questions or concerns.   We will keep you aware of any further encounters with Carlos Hayes.        Sincerely yours,         DO Earl Serna James Kyle, MD

## 2022-01-25 RX ORDER — ALBUTEROL SULFATE 90 UG/1
2 AEROSOL, METERED RESPIRATORY (INHALATION) EVERY 4 HOURS PRN
Qty: 18 G | Refills: 0 | OUTPATIENT
Start: 2022-01-25

## 2022-01-25 NOTE — TELEPHONE ENCOUNTER
Rx Refill Note  Requested Prescriptions     Pending Prescriptions Disp Refills   • albuterol sulfate  (90 Base) MCG/ACT inhaler [Pharmacy Med Name: ALBUTEROL SULFATE  ( 108 (90 BAS Aerosol] 18 g 0     Sig: INHALE 2 PUFFS EVERY 4 (FOUR) HOURS AS NEEDED FOR WHEEZING OR SHORTNESS OF AIR.      Last office visit with prescribing clinician: 5/28/2021      Next office visit with prescribing clinician: Visit date not found            Roseann Marion CMA  01/25/22, 10:28 CST       Script refused due to not having any appts coming up.

## 2022-01-26 ENCOUNTER — TRANSCRIBE ORDERS (OUTPATIENT)
Dept: ADMINISTRATIVE | Facility: HOSPITAL | Age: 67
End: 2022-01-26

## 2022-01-26 DIAGNOSIS — R06.09 DYSPNEA ON EXERTION: Primary | ICD-10-CM

## 2022-01-27 ENCOUNTER — HOSPITAL ENCOUNTER (OUTPATIENT)
Dept: CT IMAGING | Facility: HOSPITAL | Age: 67
Discharge: HOME | End: 2022-01-27
Admitting: FAMILY MEDICINE

## 2022-01-27 DIAGNOSIS — R06.09 DYSPNEA ON EXERTION: ICD-10-CM

## 2022-01-27 LAB — CREAT BLDA-MCNC: 1.5 MG/DL (ref 0.6–1.3)

## 2022-01-27 PROCEDURE — 71275 CT ANGIOGRAPHY CHEST: CPT

## 2022-01-27 PROCEDURE — 82565 ASSAY OF CREATININE: CPT

## 2022-01-27 PROCEDURE — 0 IOPAMIDOL PER 1 ML: Performed by: FAMILY MEDICINE

## 2022-01-27 RX ADMIN — IOPAMIDOL 100 ML: 755 INJECTION, SOLUTION INTRAVENOUS at 14:09

## 2022-01-31 ENCOUNTER — APPOINTMENT (OUTPATIENT)
Dept: CARDIOLOGY | Facility: HOSPITAL | Age: 67
End: 2022-01-31

## 2022-02-02 ENCOUNTER — APPOINTMENT (OUTPATIENT)
Dept: CARDIOLOGY | Facility: HOSPITAL | Age: 67
End: 2022-02-02

## 2022-02-02 ENCOUNTER — HOSPITAL ENCOUNTER (OUTPATIENT)
Dept: CARDIOLOGY | Facility: HOSPITAL | Age: 67
Discharge: HOME | End: 2022-02-02
Admitting: FAMILY MEDICINE

## 2022-02-02 VITALS
HEIGHT: 73 IN | DIASTOLIC BLOOD PRESSURE: 90 MMHG | WEIGHT: 315 LBS | BODY MASS INDEX: 41.75 KG/M2 | SYSTOLIC BLOOD PRESSURE: 130 MMHG

## 2022-02-02 DIAGNOSIS — R06.09 DYSPNEA ON EXERTION: ICD-10-CM

## 2022-02-02 PROCEDURE — 25010000002 PERFLUTREN 6.52 MG/ML SUSPENSION: Performed by: INTERNAL MEDICINE

## 2022-02-02 PROCEDURE — 93306 TTE W/DOPPLER COMPLETE: CPT | Performed by: INTERNAL MEDICINE

## 2022-02-02 PROCEDURE — 93306 TTE W/DOPPLER COMPLETE: CPT

## 2022-02-02 RX ADMIN — PERFLUTREN 8.48 MG: 6.52 INJECTION, SUSPENSION INTRAVENOUS at 13:04

## 2022-02-03 LAB
BH CV ECHO MEAS - AO MAX PG (FULL): 1.8 MMHG
BH CV ECHO MEAS - AO MAX PG: 8 MMHG
BH CV ECHO MEAS - AO MEAN PG (FULL): 2 MMHG
BH CV ECHO MEAS - AO MEAN PG: 5 MMHG
BH CV ECHO MEAS - AO ROOT AREA (BSA CORRECTED): 1.6
BH CV ECHO MEAS - AO ROOT AREA: 15.2 CM^2
BH CV ECHO MEAS - AO ROOT DIAM: 4.4 CM
BH CV ECHO MEAS - AO V2 MAX: 141 CM/SEC
BH CV ECHO MEAS - AO V2 MEAN: 98.8 CM/SEC
BH CV ECHO MEAS - AO V2 VTI: 28.2 CM
BH CV ECHO MEAS - AVA(I,A): 3.6 CM^2
BH CV ECHO MEAS - AVA(I,D): 3.6 CM^2
BH CV ECHO MEAS - AVA(V,A): 3.2 CM^2
BH CV ECHO MEAS - AVA(V,D): 3.2 CM^2
BH CV ECHO MEAS - BSA(HAYCOCK): 2.9 M^2
BH CV ECHO MEAS - BSA: 2.7 M^2
BH CV ECHO MEAS - BZI_BMI: 44.7 KILOGRAMS/M^2
BH CV ECHO MEAS - BZI_METRIC_HEIGHT: 185.4 CM
BH CV ECHO MEAS - BZI_METRIC_WEIGHT: 153.8 KG
BH CV ECHO MEAS - EDV(CUBED): 119.8 ML
BH CV ECHO MEAS - EDV(MOD-SP4): 145 ML
BH CV ECHO MEAS - EDV(TEICH): 114.4 ML
BH CV ECHO MEAS - EF(CUBED): 62 %
BH CV ECHO MEAS - EF(MOD-SP4): 60.1 %
BH CV ECHO MEAS - EF(TEICH): 53.4 %
BH CV ECHO MEAS - ESV(CUBED): 45.5 ML
BH CV ECHO MEAS - ESV(MOD-SP4): 57.8 ML
BH CV ECHO MEAS - ESV(TEICH): 53.3 ML
BH CV ECHO MEAS - FS: 27.6 %
BH CV ECHO MEAS - IVS/LVPW: 0.98
BH CV ECHO MEAS - IVSD: 1.3 CM
BH CV ECHO MEAS - LA DIMENSION: 4.7 CM
BH CV ECHO MEAS - LA/AO: 1.1
BH CV ECHO MEAS - LAT PEAK E' VEL: 12.7 CM/SEC
BH CV ECHO MEAS - LV DIASTOLIC VOL/BSA (35-75): 53.8 ML/M^2
BH CV ECHO MEAS - LV MASS(C)D: 250.5 GRAMS
BH CV ECHO MEAS - LV MASS(C)DI: 93 GRAMS/M^2
BH CV ECHO MEAS - LV MAX PG: 6.2 MMHG
BH CV ECHO MEAS - LV MEAN PG: 3 MMHG
BH CV ECHO MEAS - LV SYSTOLIC VOL/BSA (12-30): 21.5 ML/M^2
BH CV ECHO MEAS - LV V1 MAX: 124 CM/SEC
BH CV ECHO MEAS - LV V1 MEAN: 87.2 CM/SEC
BH CV ECHO MEAS - LV V1 VTI: 27.9 CM
BH CV ECHO MEAS - LVIDD: 4.9 CM
BH CV ECHO MEAS - LVIDS: 3.6 CM
BH CV ECHO MEAS - LVLD AP4: 9 CM
BH CV ECHO MEAS - LVLS AP4: 8.1 CM
BH CV ECHO MEAS - LVOT AREA (M): 3.8 CM^2
BH CV ECHO MEAS - LVOT AREA: 3.6 CM^2
BH CV ECHO MEAS - LVOT DIAM: 2.2 CM
BH CV ECHO MEAS - LVPWD: 1.3 CM
BH CV ECHO MEAS - MED PEAK E' VEL: 10.3 CM/SEC
BH CV ECHO MEAS - MV A MAX VEL: 65 CM/SEC
BH CV ECHO MEAS - MV DEC SLOPE: 141 CM/SEC^2
BH CV ECHO MEAS - MV DEC TIME: 0.45 SEC
BH CV ECHO MEAS - MV E MAX VEL: 41.2 CM/SEC
BH CV ECHO MEAS - MV E/A: 0.63
BH CV ECHO MEAS - MV P1/2T MAX VEL: 62.8 CM/SEC
BH CV ECHO MEAS - MV P1/2T: 130.5 MSEC
BH CV ECHO MEAS - MVA P1/2T LCG: 3.5 CM^2
BH CV ECHO MEAS - MVA(P1/2T): 1.7 CM^2
BH CV ECHO MEAS - PA MAX PG: 7.8 MMHG
BH CV ECHO MEAS - PA V2 MAX: 140 CM/SEC
BH CV ECHO MEAS - RAP SYSTOLE: 5 MMHG
BH CV ECHO MEAS - RVSP: 12.5 MMHG
BH CV ECHO MEAS - SI(AO): 159.2 ML/M^2
BH CV ECHO MEAS - SI(CUBED): 27.6 ML/M^2
BH CV ECHO MEAS - SI(LVOT): 37.6 ML/M^2
BH CV ECHO MEAS - SI(MOD-SP4): 32.4 ML/M^2
BH CV ECHO MEAS - SI(TEICH): 22.7 ML/M^2
BH CV ECHO MEAS - SV(AO): 428.8 ML
BH CV ECHO MEAS - SV(CUBED): 74.3 ML
BH CV ECHO MEAS - SV(LVOT): 101.3 ML
BH CV ECHO MEAS - SV(MOD-SP4): 87.2 ML
BH CV ECHO MEAS - SV(TEICH): 61.1 ML
BH CV ECHO MEAS - TR MAX VEL: 137 CM/SEC
BH CV ECHO MEASUREMENTS AVERAGE E/E' RATIO: 3.58
LEFT ATRIUM VOLUME INDEX: 37 ML/M2
MAXIMAL PREDICTED HEART RATE: 155 BPM
STRESS TARGET HR: 132 BPM

## 2022-02-07 ENCOUNTER — OFFICE VISIT (OUTPATIENT)
Dept: PULMONOLOGY | Facility: CLINIC | Age: 67
End: 2022-02-07

## 2022-02-07 VITALS
HEIGHT: 73 IN | WEIGHT: 315 LBS | OXYGEN SATURATION: 94 % | BODY MASS INDEX: 41.75 KG/M2 | HEART RATE: 70 BPM | DIASTOLIC BLOOD PRESSURE: 80 MMHG | SYSTOLIC BLOOD PRESSURE: 136 MMHG

## 2022-02-07 DIAGNOSIS — F17.200 TOBACCO DEPENDENCE: ICD-10-CM

## 2022-02-07 DIAGNOSIS — G47.33 OBSTRUCTIVE SLEEP APNEA TREATED WITH BIPAP: ICD-10-CM

## 2022-02-07 DIAGNOSIS — Z86.16 HISTORY OF COVID-19: ICD-10-CM

## 2022-02-07 DIAGNOSIS — J44.9 CHRONIC OBSTRUCTIVE PULMONARY DISEASE, UNSPECIFIED COPD TYPE: Primary | ICD-10-CM

## 2022-02-07 DIAGNOSIS — G25.81 RESTLESS LEGS SYNDROME (RLS): ICD-10-CM

## 2022-02-07 DIAGNOSIS — Z99.89 BIPAP (BIPHASIC POSITIVE AIRWAY PRESSURE) DEPENDENCE: ICD-10-CM

## 2022-02-07 DIAGNOSIS — J98.4 RESTRICTIVE LUNG DISEASE: ICD-10-CM

## 2022-02-07 DIAGNOSIS — R09.02 HYPOXEMIA REQUIRING SUPPLEMENTAL OXYGEN: ICD-10-CM

## 2022-02-07 DIAGNOSIS — Z99.81 HYPOXEMIA REQUIRING SUPPLEMENTAL OXYGEN: ICD-10-CM

## 2022-02-07 DIAGNOSIS — Z87.891 FORMER CIGARETTE SMOKER: ICD-10-CM

## 2022-02-07 DIAGNOSIS — E66.01 MORBID OBESITY WITH BMI OF 40.0-44.9, ADULT: ICD-10-CM

## 2022-02-07 PROBLEM — U07.1 COVID-19: Status: RESOLVED | Noted: 2020-10-31 | Resolved: 2022-02-07

## 2022-02-07 PROBLEM — U07.1 COVID-19 VIRUS INFECTION: Status: RESOLVED | Noted: 2020-10-31 | Resolved: 2022-02-07

## 2022-02-07 PROCEDURE — 99214 OFFICE O/P EST MOD 30 MIN: CPT | Performed by: INTERNAL MEDICINE

## 2022-02-07 RX ORDER — CIMETIDINE 400 MG/1
400 TABLET, FILM COATED ORAL AS NEEDED
COMMUNITY

## 2022-02-07 RX ORDER — FLUTICASONE PROPIONATE 50 MCG
2 SPRAY, SUSPENSION (ML) NASAL DAILY
Qty: 16 G | Refills: 5 | Status: SHIPPED | OUTPATIENT
Start: 2022-02-07 | End: 2023-03-06

## 2022-02-07 RX ORDER — HYDROCHLOROTHIAZIDE 25 MG/1
TABLET ORAL
COMMUNITY
Start: 2021-12-24

## 2022-02-07 RX ORDER — TESTOSTERONE CYPIONATE 200 MG/ML
INJECTION, SOLUTION INTRAMUSCULAR
COMMUNITY
Start: 2022-02-01

## 2022-02-07 RX ORDER — DUTASTERIDE 0.5 MG/1
CAPSULE, LIQUID FILLED ORAL
COMMUNITY
Start: 2022-01-20

## 2022-02-07 RX ORDER — ROPINIROLE 0.5 MG/1
TABLET, FILM COATED ORAL
COMMUNITY
Start: 2021-12-20

## 2022-02-07 RX ORDER — GABAPENTIN 600 MG/1
600 TABLET ORAL EVERY 6 HOURS
COMMUNITY
Start: 2022-01-22

## 2022-02-07 RX ORDER — LOSARTAN POTASSIUM 100 MG/1
TABLET ORAL
COMMUNITY
Start: 2021-12-13

## 2022-02-07 RX ORDER — OMEPRAZOLE 20 MG/1
CAPSULE, DELAYED RELEASE ORAL
COMMUNITY
Start: 2021-11-27 | End: 2022-02-07

## 2022-02-07 RX ORDER — AMLODIPINE BESYLATE 10 MG/1
10 TABLET ORAL DAILY
COMMUNITY
Start: 2022-02-04

## 2022-02-07 RX ORDER — LORATADINE 10 MG/1
10 TABLET ORAL DAILY
Qty: 90 TABLET | Refills: 3 | Status: SHIPPED | OUTPATIENT
Start: 2022-02-07

## 2022-02-07 NOTE — PROGRESS NOTES
[facility] OUTPATIENT PROGRESS NOTE    Patient: Carlos Hayes  : 1955  Age: 67 y.o.  Date of Service: 2022    REASON FOR CLINIC VISIT:  Chief Complaint   Patient presents with   • COVID-19 virus infection     10/30/2020 and 2022   • Pneumonia     last summer; uses O2 @ HS, has a Bipap    • Shortness of Breath     couple of weeks; started getting bronchiatis and sinusitis; continued weakness ; has SOA c elevation, had while flying    • Abnormal Imaging     ct ; pft 2021; echo         Subjective:    History of Present Illness:  Carlos Hayes is a 67 y.o. male who presents to the office today to be seen for    Diagnosis Plan   1. Chronic obstructive pulmonary disease, unspecified COPD type (HCC)     2. Hypoxemia requiring supplemental oxygen     3. Restrictive lung disease     4. Obstructive sleep apnea treated with BiPAP     5. History of COVID-19     6. Tobacco dependence     7. Former cigarette smoker     8. BiPAP (biphasic positive airway pressure) dependence     9. Restless legs syndrome (RLS)     10. Morbid obesity with BMI of 40.0-44.9, adult (HCC)     .  Other problems per record.  Patient is a middle aged  gentleman who was seen in the pulmonary clinic for follow-up visit.    Patient is morbidly obese gentleman with history of tobacco abuse in the past and underlying COPD and obstructive sleep apnea for which he uses BiPAP at home.  He also has supplemental oxygen at home which he uses from time to time but not all the time.  He had a recent echocardiogram done which showed no definite pulmonary hypertension but left atrial dilatation noted.  Otherwise his echocardiogram was unremarkable.  The patient had Covid infection in the past but recently had another Covid infection a few weeks ago from which he recovered he is already vaccinated for COVID with the 2 doses of COVID vaccine but is due for the third dose of vaccine.  He is a former smoker and  has history of some underlying cardiac problems.    He has nasal allergy and he uses only as tolerated no spray but not using Flonase or loratadine but constantly have nasal drainage.  He has anxiety issues and currently on alprazolam.  He told me he had several acute exacerbation of COPD in the last few months and received several courses of steroid and he gained some weight since then and he currently weighs 165 kg or 264 pounds.  Patient had volume overload and is currently on Lasix Aldactone and also takes hydrochlorothiazide.    Recent CT angiogram done which shows some bibasilar scarring and chronic changes in the lungs but otherwise clear without any nodules or masses.  His echocardiogram done recently showed no evidence of pulmonary hypertension and cardiomegaly noted in the imaging studies.  He has no other recent hospitalizations and ER visit or urgent care visit.  He also had chronic back pain and had back surgery in the past.  For his COPD he was started on Breztri inhaler and also uses albuterol rescue inhaler as needed and he is also using DuoNeb 3-4 times a day and he is asking me if he could get DuoNeb down to 2 or 3 times a day instead of 4 times a day.  He has anxiety issues but had no other acute complaints.      PFT done today:  Not done today      Results for orders placed during the hospital encounter of 06/25/21    Full Pulmonary Function Test With Bronchodilator    Narrative  [facility] - Pulmonary Function Test    [address] [place] [address]  [address]  [phone]    Patient : Carlos Hayes  MRN : [id number]  CSN : [id number]  Pulmonologist : Earl Shearer MD  Date : 6/28/2021    ______________________________________________________________________    Interpretation :  1.  Spirometry is consistent with a mild restrictive ventilatory defect with coexisting small airways disease.  2.  Lung volumes confirm a mild restrictive ventilatory defect.  There is also a decrease in  inspiratory capacity.  3.  Diffusion capacity is within normal limits and when corrected for alveolar volume is supranormal.  4.  When current studies are compared to studies from , there has been a drop in the patient's forced vital capacity and FEV1 compared to previous.  There has also been a decrease in total lung capacity compared to previous.  When corrected for alveolar volume there has been an improvement in diffusion capacity compared to previous.      Earl Shearer MD      Results for orders placed during the hospital encounter of 18    Full Pulmonary Function Test With Bronchodilator    Narrative  [facility] - Pulmonary Function Test    [address] [place] [address]  [place]  [address]  [phone]    Patient : Carlos Hayes  MRN : [id number]  CSN : [id number]  Pulmonologist : Earl Shearer MD  Date : 12/10/2018    ______________________________________________________________________    Interpretation :  1.  Spirometry is within normal limits with the exception of a mild decrease in the patient's forced inspiratory vital capacity.  2.  Lung volumes reveal a mild decrease in expiratory reserve volume and otherwise are within normal limits.  3.  Diffusion capacity is within normal limits.      Earl Shearer MD         Bronchodilator therapy: Breztri Inhaler,  Duoneb nebulizer, Albuterol rescue inhaler    Smoking Status:   Social History     Tobacco Use   Smoking Status Former Smoker   • Packs/day: 0.75   • Years: 41.00   • Pack years: 30.75   • Types: Cigarettes   • Start date: 1975   • Quit date: 3/23/2016   • Years since quittin.8   Smokeless Tobacco Never Used     Pulm Rehab: no  Sleep: yes    Support System: lives with their spouse    Code Status:   There are no questions and answers to display.        Review of Systems:  A complete review of systems is performed and all other systems were reviewed and negative as note above in the HPI.  Review of  Systems   Constitutional: Positive for fatigue and unexpected weight gain.   HENT: Positive for congestion and sinus pressure.    Eyes: Negative.    Respiratory: Positive for cough, chest tightness and shortness of breath.    Cardiovascular: Positive for leg swelling. Negative for chest pain.   Gastrointestinal: Negative.    Endocrine: Negative.    Genitourinary: Negative.    Musculoskeletal: Positive for arthralgias and back pain.   Allergic/Immunologic: Positive for environmental allergies.   Neurological: Positive for weakness and light-headedness. Negative for seizures.   Hematological: Negative.    Psychiatric/Behavioral: The patient is nervous/anxious.        CAT/ACT Score:  Not done today    Medications:  Outpatient Encounter Medications as of 2/7/2022   Medication Sig Dispense Refill   • albuterol (PROVENTIL) (2.5 MG/3ML) 0.083% nebulizer solution Take 2.5 mg by nebulization Every 4 (Four) Hours As Needed for Wheezing. 360 mL 3   • albuterol sulfate  (90 Base) MCG/ACT inhaler Inhale 2 puffs Every 4 (Four) Hours As Needed for Wheezing or Shortness of Air. 18 g 0   • ALPRAZolam (XANAX) 1 MG tablet Take 1 mg by mouth Daily.     • amLODIPine (NORVASC) 10 MG tablet      • Azelastine HCl 137 MCG/SPRAY solution 2 sprays into the nostril(s) as directed by provider 2 (two) times a day. 30 mL 5   • Biotin 10 MG capsule Take 1 tablet by mouth Daily.     • Budeson-Glycopyrrol-Formoterol (Breztri Aerosphere) 160-9-4.8 MCG/ACT aerosol inhaler Inhale 2 puffs 2 (Two) Times a Day. 10.7 g 5   • celecoxib (CeleBREX) 200 MG capsule Take 200 mg by mouth Daily.     • cholecalciferol (VITAMIN D3) 25 MCG (1000 UT) tablet Take 2 tablets by mouth Daily. 30 tablet 0   • cimetidine (TAGAMET) 400 MG tablet Take 400 mg by mouth As Needed.     • cloNIDine (CATAPRES) 0.1 MG tablet Take 0.1 mg by mouth Every 6 (Six) Hours As Needed for High Blood Pressure (for bp 150/90 or greater).     • diltiaZEM CD (CARDIZEM CD) 240 MG 24 hr  capsule Take 240 mg by mouth Daily.     • DULoxetine (CYMBALTA) 60 MG capsule Take 60 mg by mouth Daily.     • dutasteride (AVODART) 0.5 MG capsule      • esomeprazole (NEXIUM) 40 MG packet Take 40 mg by mouth 3 (Three) Times a Day.     • furosemide (Lasix) 20 MG tablet Take 1 tablet by mouth 2 (Two) Times a Day. (Patient taking differently: Take 40 mg by mouth 2 (Two) Times a Day.) 90 tablet 0   • gabapentin (NEURONTIN) 600 MG tablet Take 600 mg by mouth Every 6 (Six) Hours.     • hydroCHLOROthiazide (HYDRODIURIL) 25 MG tablet      • ipratropium-albuterol (DUO-NEB) 0.5-2.5 mg/3 ml nebulizer Take 3 mL by nebulization 4 (Four) Times a Day As Needed for Wheezing or Shortness of Air. 2 boxes. 360 mL 5   • losartan (COZAAR) 100 MG tablet      • magnesium oxide (MAGOX) 400 (241.3 Mg) MG tablet tablet Take 400 mg by mouth Daily.     • Multiple Vitamins-Minerals (MULTI COMPLETE PO) Take  by mouth Daily.     • O2 (OXYGEN) Inhale 2 L/min Every Night. With Bipap     • oxyCODONE-acetaminophen (PERCOCET) 5-325 MG per tablet Take 1 tablet by mouth Every 6 (Six) Hours As Needed for Moderate Pain  or Severe Pain . 4 x a day     • OXYCONTIN 15 MG tablet extended-release 12 hour Take 15 mg by mouth 3 (Three) Times a Day. 2 x a day  0   • Probiotic Product (PROBIOTIC ADVANCED PO) Take 1 tablet/day by mouth Daily.     • rOPINIRole (REQUIP) 0.5 MG tablet TAKE (1) TABLET BY MOUTH DAILY AS NEEDED.     • Semaglutide-Weight Management (WEGOVY SC) Inject  under the skin into the appropriate area as directed 1 (One) Time Per Week. Every friday     • tadalafil (ADCIRCA) 20 MG tablet tablet Take 10 mg by mouth Daily As Needed.     • tamsulosin (FLOMAX) 0.4 MG capsule 24 hr capsule Take 2 capsules by mouth Every Night for 360 days. 180 capsule 3   • Testosterone Cypionate (DEPOTESTOTERONE CYPIONATE) 200 MG/ML injection      • traZODone (DESYREL) 50 MG tablet Take 300 mg by mouth Every Night.     • vitamin B-12 (CYANOCOBALAMIN) 100 MCG tablet  "Take 50 mcg by mouth Daily.     • loratadine (CLARITIN) 10 MG tablet Take 1 tablet by mouth Daily. 90 tablet 3   • [DISCONTINUED] fluticasone (Flonase Allergy Relief) 50 MCG/ACT nasal spray 2 sprays into the nostril(s) as directed by provider Daily. 16 g 5   • [DISCONTINUED] gabapentin (NEURONTIN) 800 MG tablet Take 600 mg by mouth 4 (Four) Times a Day.     • [DISCONTINUED] losartan-hydrochlorothiazide (HYZAAR) 100-25 MG per tablet Take 1 tablet by mouth Daily.     • [DISCONTINUED] omeprazole (priLOSEC) 20 MG capsule      • [DISCONTINUED] spironolactone (ALDACTONE) 50 MG tablet Take 1 tablet by mouth Daily.     • [DISCONTINUED] tiZANidine (ZANAFLEX) 4 MG tablet Take 4 mg by mouth Every 8 (Eight) Hours As Needed for Muscle Spasms.       No facility-administered encounter medications on file as of 2/7/2022.       Allergies:  Allergies   Allergen Reactions   • Eszopiclone Other (See Comments)     Flu like symptoms    • Levofloxacin Rash and Unknown - High Severity              Immunizations:  Immunization History   Administered Date(s) Administered   • COVID-19 (MODERNA) 1st, 2nd, 3rd Dose Only 03/15/2021, 04/15/2021   • Flu Vaccine Split Quad 09/19/2012, 10/28/2013, 10/13/2014, 10/15/2015, 11/06/2017, 09/20/2018, 09/13/2019, 08/25/2020   • FluLaval/Fluarix/Fluzone >6 09/13/2019, 08/25/2020   • Fluad Quad 65+ 09/23/2021   • INFLUENZA SPLIT TRI 11/26/2013   • Influenza Whole 10/14/2015   • Influenza, Unspecified 11/06/2017, 09/18/2018   • Pneumococcal Conjugate 13-Valent (PCV13) 11/06/2017   • Pneumococcal Polysaccharide (PPSV23) 11/26/2013, 10/13/2014, 10/14/2015, 09/18/2018, 09/23/2021   • Tdap 10/13/2014, 06/08/2020   • Zostavax 10/14/2015       Objective:    Vitals:  /80   Pulse 70   Ht 185.4 cm (73\")   Wt (!) 165 kg (364 lb)   SpO2 94%   BMI 48.02 kg/m²     Physical Exam:  General: Patient is a 67 y.o. middle aged  male. Looks stated age. Appears to be in no acute distress.  Eyes: EOMI. PERRLA. " Vision intact. No scleral icterus.  Ear, Nose, Mouth and Throat: Hearing is grossly intact. No Leukoplakia, pharyngitis, stomatitis or thrush. Swollen nasal mucosa with post nasal drop.  Neck: Range of motion of neck normal. No thyromegaly or masses. Mallampati Class 3, No JVD  Respiratory: Clear to auscultation bilaterally. No use of accessory muscles. Decreased breath sounds.  Cardiovascular: Normal heart sounds. Regularly regular rhythm without murmur.  Gastrointestinal: Non tender, non distended, soft. Bowel sounds positive in all four quadrants. No organomegaly.  Skin: No obvious rashes, lesions, ulcers or large amount of bruising.  Bilateral ankle edema noted.  Neurological: No new motor deficits. Cranial nerves appear intact.  Psychiatric: Patient is alert and oriented to person, place and time.    Chest Imaging:    Recent CT chest showed     IMPRESSION:.  1. No pulmonary emboli identified.  2. No thoracic aortic aneurysm or dissection.  3. Stable mild cardiomegaly. Low lung volumes with vascular crowding  versus mild venous congestion and basilar atelectasis.    This report was finalized on 01/27/2022 14:29 by Dr. Kat Tolentino MD.    Assessment:  1. Chronic obstructive pulmonary disease, unspecified COPD type ([facility])    2. Hypoxemia requiring supplemental oxygen    3. Restrictive lung disease    4. Obstructive sleep apnea treated with BiPAP    5. History of COVID-19    6. Tobacco dependence    7. Former cigarette smoker    8. BiPAP (biphasic positive airway pressure) dependence    9. Restless legs syndrome (RLS)    10. Morbid obesity with BMI of 40.0-44.9, adult ([facility])        Plan/Recommendations:    1.  For his COPD he should continue using Breztri Hailer and albuterol rescue inhaler as before.  Also using DuoNeb 3-4 times a day which could be decreased to 2-3 times a day depending on his symptom control but he also has albuterol nebulizer at home which is not using.  He is not needing medication  refill.  2.  For his nasal allergy he is using only Astelin nasal spray but has ongoing allergy problems I added the patient on fluticasone nasal spray and loratadine and prescription was sent to the pharmacy.  3.  Patient gained a lot of weight and has deconditioning advised him to lose weight and healthy lifestyle is recommended.  4.  He already recovered from Covid and had Covid infection twice in the past.  He had COVID vaccination 2 doses and he will need a booster dose and is going to talk to Dr. Henderson to get a booster dose.  5.  For his obstructive sleep apnea should continues in the BiPAP in the current settings and also will use oxygen for hypoxemia as needed.  He is currently using 2 to 3 L when his oxygen saturation drops.  He may need to use oxygen more frequently.  6.  I explained the CT results to the patient and also explained the echocardiogram done recently had a lot of questions about his echocardiogram results.  He does not have pulmonary hypertension from the cardiogram results.  He should continue follow-up with Dr. Henderson is already vaccinated for COVID.  He will return to pulmonary clinic in 6 months time for a follow-up visit or earlier if needed.    Follow up:  6 Months    Time Spent:  30 minutes    I appreciate the opportunity of participating in this patient's care. I would like to thank the PCP for the referral.  Please feel free to contact me with any other questions.    Humphrey Scherer MD   Pulmonologist/Intensivist     Electronically signed by: Humphrey Scherer MD, 2/7/2022 16:49 CST

## 2022-03-10 ENCOUNTER — OFFICE VISIT (OUTPATIENT)
Dept: CARDIOLOGY | Facility: CLINIC | Age: 67
End: 2022-03-10

## 2022-03-10 VITALS
DIASTOLIC BLOOD PRESSURE: 66 MMHG | HEIGHT: 73 IN | WEIGHT: 315 LBS | HEART RATE: 77 BPM | OXYGEN SATURATION: 97 % | BODY MASS INDEX: 41.75 KG/M2 | SYSTOLIC BLOOD PRESSURE: 110 MMHG

## 2022-03-10 DIAGNOSIS — R53.81 PHYSICAL DECONDITIONING: ICD-10-CM

## 2022-03-10 DIAGNOSIS — I10 PRIMARY HYPERTENSION: ICD-10-CM

## 2022-03-10 DIAGNOSIS — I51.89 DIASTOLIC DYSFUNCTION: ICD-10-CM

## 2022-03-10 DIAGNOSIS — R06.02 SOB (SHORTNESS OF BREATH): Primary | ICD-10-CM

## 2022-03-10 PROCEDURE — 99214 OFFICE O/P EST MOD 30 MIN: CPT | Performed by: INTERNAL MEDICINE

## 2022-03-10 PROCEDURE — 93000 ELECTROCARDIOGRAM COMPLETE: CPT | Performed by: INTERNAL MEDICINE

## 2022-03-10 NOTE — PROGRESS NOTES
Subjective:     Encounter Date:03/10/2022      Patient ID: Carlos Hayes is a 67 y.o. male who presents today for further evaluation of shortness of breath.    Chief Complaint: Shortness of breath, improving at this time    This patient presents today for further evaluation of shortness of breath.  The patient is a 67-year-old male.  He underwent cardiac evaluation a few years ago after an abnormal stress test.  He was found to have normal coronary arteries at that time.  He is known to have normal systolic function but is thought to have diastolic dysfunction.  He had COVID with prolonged symptoms and also subsequent pneumonia.  He says that he was essentially sedentary for quite some time.  He regained some weight that he had previously lost.  He started to notice more shortness of breath and dyspnea on exertion with activities.  He also notes a persistent cough and congestion since his Covid and pneumonia.  The symptoms seem to be improving at this time.  He recently underwent pulmonary function testing, CT scan of the chest, echocardiogram.  He has been more active lately and trying to limit his sodium intake and watching his fluid intake.  He has also been more active and lost a few pounds lately.  He says that his shortness of breath is already improving to a significant degree.  He denies having any chest pain at this time.  No lightheadedness, dizziness, syncope or palpitations.  No orthopnea, PND.  He denies having nausea, vomiting, abdominal pain.  He does have hypertension.  His blood pressure is generally well controlled.  He is on diuretic therapy at this time which he says has helped with his lower extremity edema.  He has no other complaints today and says that generally he is somewhat weak and fatigued from his prolonged illnesses however this seems to be improving the more active that he has been lately.    Shortness of Breath  This is a chronic problem. The problem occurs daily. The problem  has been gradually improving. Associated symptoms include leg swelling and wheezing. Pertinent negatives include no abdominal pain, chest pain, fever, headaches, orthopnea, PND, syncope or vomiting. The symptoms are aggravated by exercise. There is no history of CAD.       Current Outpatient Medications:   •  albuterol (PROVENTIL) (2.5 MG/3ML) 0.083% nebulizer solution, Take 2.5 mg by nebulization Every 4 (Four) Hours As Needed for Wheezing., Disp: 360 mL, Rfl: 3  •  albuterol sulfate  (90 Base) MCG/ACT inhaler, Inhale 2 puffs Every 4 (Four) Hours As Needed for Wheezing or Shortness of Air., Disp: 18 g, Rfl: 0  •  ALPRAZolam (XANAX) 1 MG tablet, Take 1 mg by mouth Daily., Disp: , Rfl:   •  amLODIPine (NORVASC) 10 MG tablet, Take 10 mg by mouth Daily., Disp: , Rfl:   •  Azelastine HCl 137 MCG/SPRAY solution, 2 sprays into the nostril(s) as directed by provider 2 (two) times a day., Disp: 30 mL, Rfl: 5  •  Biotin 10 MG capsule, Take 1 tablet by mouth Daily., Disp: , Rfl:   •  Budeson-Glycopyrrol-Formoterol (Breztri Aerosphere) 160-9-4.8 MCG/ACT aerosol inhaler, Inhale 2 puffs 2 (Two) Times a Day., Disp: 10.7 g, Rfl: 5  •  celecoxib (CeleBREX) 200 MG capsule, Take 200 mg by mouth Daily., Disp: , Rfl:   •  cholecalciferol (VITAMIN D3) 25 MCG (1000 UT) tablet, Take 2 tablets by mouth Daily., Disp: 30 tablet, Rfl: 0  •  cimetidine (TAGAMET) 400 MG tablet, Take 400 mg by mouth As Needed., Disp: , Rfl:   •  cloNIDine (CATAPRES) 0.1 MG tablet, Take 0.1 mg by mouth Every 6 (Six) Hours As Needed for High Blood Pressure (for bp 150/90 or greater)., Disp: , Rfl:   •  diltiaZEM CD (CARDIZEM CD) 240 MG 24 hr capsule, Take 240 mg by mouth Daily., Disp: , Rfl:   •  DULoxetine (CYMBALTA) 60 MG capsule, Take 60 mg by mouth Daily., Disp: , Rfl:   •  dutasteride (AVODART) 0.5 MG capsule, , Disp: , Rfl:   •  esomeprazole (NexIUM) 40 MG packet, Take 40 mg by mouth 3 (Three) Times a Day., Disp: , Rfl:   •  fluticasone (Flonase  Allergy Relief) 50 MCG/ACT nasal spray, 2 sprays into the nostril(s) as directed by provider Daily., Disp: 16 g, Rfl: 5  •  furosemide (Lasix) 20 MG tablet, Take 1 tablet by mouth 2 (Two) Times a Day. (Patient taking differently: Take 40 mg by mouth 2 (Two) Times a Day.), Disp: 90 tablet, Rfl: 0  •  gabapentin (NEURONTIN) 600 MG tablet, Take 600 mg by mouth Every 6 (Six) Hours., Disp: , Rfl:   •  hydroCHLOROthiazide (HYDRODIURIL) 25 MG tablet, , Disp: , Rfl:   •  ipratropium-albuterol (DUO-NEB) 0.5-2.5 mg/3 ml nebulizer, Take 3 mL by nebulization 4 (Four) Times a Day As Needed for Wheezing or Shortness of Air. 2 boxes., Disp: 360 mL, Rfl: 5  •  loratadine (CLARITIN) 10 MG tablet, Take 1 tablet by mouth Daily., Disp: 90 tablet, Rfl: 3  •  losartan (COZAAR) 100 MG tablet, , Disp: , Rfl:   •  magnesium oxide (MAGOX) 400 (241.3 Mg) MG tablet tablet, Take 400 mg by mouth Daily., Disp: , Rfl:   •  Multiple Vitamins-Minerals (MULTI COMPLETE PO), Take  by mouth Daily., Disp: , Rfl:   •  O2 (OXYGEN), Inhale 2 L/min Every Night. With Bipap, Disp: , Rfl:   •  oxyCODONE-acetaminophen (PERCOCET) 5-325 MG per tablet, Take 1 tablet by mouth Every 6 (Six) Hours As Needed for Moderate Pain  or Severe Pain . 4 x a day, Disp: , Rfl:   •  OXYCONTIN 15 MG tablet extended-release 12 hour, Take 15 mg by mouth 3 (Three) Times a Day. 2 x a day, Disp: , Rfl: 0  •  Probiotic Product (PROBIOTIC ADVANCED PO), Take 1 tablet/day by mouth Daily., Disp: , Rfl:   •  rOPINIRole (REQUIP) 0.5 MG tablet, TAKE (1) TABLET BY MOUTH DAILY AS NEEDED., Disp: , Rfl:   •  Semaglutide-Weight Management (WEGOVY SC), Inject  under the skin into the appropriate area as directed 1 (One) Time Per Week. Every friday, Disp: , Rfl:   •  tadalafil (ADCIRCA) 20 MG tablet tablet, Take 10 mg by mouth Daily As Needed., Disp: , Rfl:   •  tamsulosin (FLOMAX) 0.4 MG capsule 24 hr capsule, Take 2 capsules by mouth Every Night for 360 days., Disp: 180 capsule, Rfl: 3  •   Testosterone Cypionate (DEPOTESTOTERONE CYPIONATE) 200 MG/ML injection, , Disp: , Rfl:   •  traZODone (DESYREL) 50 MG tablet, Take 300 mg by mouth Every Night., Disp: , Rfl:   •  vitamin B-12 (CYANOCOBALAMIN) 100 MCG tablet, Take 50 mcg by mouth Daily., Disp: , Rfl:     Allergies   Allergen Reactions   • Eszopiclone Other (See Comments)     Flu like symptoms    • Levofloxacin Rash and Unknown - High Severity            Social History     Tobacco Use   • Smoking status: Former Smoker     Packs/day: 0.75     Years: 41.00     Pack years: 30.75     Types: Cigarettes     Start date: 1975     Quit date: 3/23/2016     Years since quittin.9   • Smokeless tobacco: Never Used   Substance Use Topics   • Alcohol use: Yes     Comment: occ     Review of Systems   Constitutional: Negative for fever and weight loss.   Cardiovascular: Positive for dyspnea on exertion and leg swelling. Negative for chest pain, orthopnea, palpitations, paroxysmal nocturnal dyspnea and syncope.   Respiratory: Positive for cough, shortness of breath and wheezing.    Hematologic/Lymphatic: Negative for adenopathy and bleeding problem.   Musculoskeletal: Positive for back pain and joint pain.   Gastrointestinal: Negative for abdominal pain, nausea and vomiting.   Neurological: Positive for numbness. Negative for dizziness, headaches and loss of balance.       ECG 12 Lead    Date/Time: 3/10/2022 5:14 PM  Performed by: Jarod Mitchell MD  Authorized by: Jarod Mitchell MD   Comparison: compared with previous ECG from 2021  Similar to previous ECG  Rhythm: sinus rhythm  Rate: normal  BPM: 71  Conduction: conduction normal  ST Segments: ST segments normal  T Waves: T waves normal  QRS axis: normal  Other: no other findings    Clinical impression: normal ECG             Objective:     Vitals reviewed.   Constitutional:       General: Not in acute distress.     Appearance: Not in distress. Obese.   Eyes:      Extraocular Movements:  "Extraocular movements intact.      Pupils: Pupils are equal, round, and reactive to light.   HENT:      Head: Normocephalic and atraumatic.   Neck:      Thyroid: No thyroid mass.      Vascular: No JVD.   Pulmonary:      Effort: Pulmonary effort is normal.      Breath sounds: Normal breath sounds. No wheezing. No rhonchi. No rales.   Cardiovascular:      Normal rate. Regular rhythm.      Murmurs: There is no murmur.      No gallop.   Pulses:     Intact distal pulses.   Edema:     Peripheral edema absent.   Abdominal:      General: Abdomen is protuberant. Bowel sounds are normal. There is no distension.      Palpations: Abdomen is soft.      Tenderness: There is no abdominal tenderness.   Musculoskeletal: Normal range of motion.      Extremities: No clubbing present.Skin:     General: Skin is warm and dry. There is no cyanosis.      Findings: No erythema or rash.   Neurological:      Mental Status: Alert and oriented to person, place, and time.      Cranial Nerves: No cranial nerve deficit.       /66   Pulse 77   Ht 185.4 cm (73\")   Wt (!) 161 kg (356 lb)   SpO2 97%   BMI 46.97 kg/m²     Data/Lab Review:     Results for orders placed during the hospital encounter of 02/02/22    Adult Transthoracic Echo Complete W/ Cont if Necessary Per Protocol    Interpretation Summary  · Left ventricular ejection fraction appears to be 56 - 60%. Left ventricular systolic function is normal.  · Left ventricular diastolic function was indeterminate.  · Left atrial volume is mildly increased.  · Mild dilation of the aortic root is present.  · Estimated right ventricular systolic pressure from tricuspid regurgitation is normal (<35 mmHg).    ===================================================    CTA Chest 1/2022:  1. No pulmonary emboli identified.  2. No thoracic aortic aneurysm or dissection.  3. Stable mild cardiomegaly. Low lung volumes with vascular crowding  versus mild venous congestion and basilar " atelectasis.    ===================================================    PFT 6/28/21:  1.  Spirometry is consistent with a mild restrictive ventilatory defect with coexisting small airways disease.  2.  Lung volumes confirm a mild restrictive ventilatory defect.  There is also a decrease in inspiratory capacity.  3.  Diffusion capacity is within normal limits and when corrected for alveolar volume is supranormal.  4.  When current studies are compared to studies from December 7 of 2018, there has been a drop in the patient's forced vital capacity and FEV1 compared to previous.  There has also been a decrease in total lung capacity compared to previous.  When corrected for alveolar volume there has been an improvement in diffusion capacity compared to previous.    ==================================================    Cardiac Cath 12/2018:    Hemodynamics:  Ac=676/65, m87, HY=048, LVEDP=13, AV grad=none     Left ventriculography:  1. The contractility of the left ventricle is normal.  Estimated ejection fraction >60%.  2. The left ventricle is normal in wall thickness and chamber size.  3. There is no significant mitral regurgitation or aortic insufficiency.     Selective coronary angiography:   Dominance: right  Left Main coronary artery: Large vessel arising normally from the left cusp and bifurcates.  Angiographically normal.   Left anterior descending artery: Large vessel arising normally from the distal left main the supplies two large diagonal branches.  Angiographic normal.   Left circumflex:  Large vessel arising normally from the distal left main that is not dominant for posterior circulation.  Supplies a bifurcating OM1 and then a posterior lateral branch.  Very minor luminal irregularities.  Right coronary artery:  Large vessel arising normally from the right cusp that is dominant for posterior circulation.  It is extremely tortuous in its midsegment and continues on in the posterior AV groove until supplying  the PDA.  Minor luminal irregularities throughout the system.         Assessment:          Diagnosis Plan   1. SOB (shortness of breath)  ECG 12 Lead   2. Diastolic dysfunction     3. Physical deconditioning     4. Primary hypertension          Plan:       1.  Shortness of breath: The patient presents for further evaluation of shortness of breath.  He previously had coronary angiography showing angiographically normal arteries.  He recently had an echocardiogram showing normal systolic function, no hemodynamically significant valvular abnormalities, no suggestive of right ventricular systolic dysfunction or pulmonary artery hypertension.  Diastolic function was indeterminate at that time but my suspicion is that he truly has diastolic dysfunction.  We did discuss today that this could lead to shortness of breath and lower extremity edema.  The patient is already essentially doing what he needs to be doing in regards to trying to monitor his sodium and fluid intake along with taking diuretics as needed.  His shortness of breath is likely multifactorial.  He does have some pulmonary pathology and has been evaluated by pulmonology as well.  Overall, he himself states that he is improved at this time.  I would not feel that he would need any further cardiac testing at this time but rather education about his diastolic dysfunction was provided today.  We discussed the need for careful monitoring of sodium intake, fluid intake, output.  Daily weights would also be indicated.  The patient takes diuretics and should use flexible dosing of diuretics if he gains weight or has increasing symptoms.  However, he should also be more active after his prolonged immobility to try to improve his exercise tolerance.  Likely, a lot of his shortness of breath is related to physical deconditioning as he has been sick for quite some time over the past year or so and also has chronic back pain which does limit his activities.  He was  receptive to this information.    2.  Diastolic dysfunction: As noted, it is thought that the patient does have diastolic dysfunction therefore he will need to continue to monitor sodium and fluid intake, output, daily weights, diuretics with flexible dosing if indicated.    3.  Physical deconditioning: The patient is now trying to be more physically active and has already lost some weight and feels better with an improvement in shortness of breath and also improvement in lower extremity edema.  He was commended on his progress and he will continue to try to achieve weight loss with more exercise and dietary restrictions.    4.  Essential hypertension: Blood pressure is well controlled.  He should continue current medications at this time.    At this time, we will plan to see the patient back as needed but would be happy to see him again at any point in future if he develops further cardiac issues.

## 2022-03-14 ENCOUNTER — TELEPHONE (OUTPATIENT)
Dept: VASCULAR SURGERY | Facility: CLINIC | Age: 67
End: 2022-03-14

## 2022-03-14 DIAGNOSIS — I87.323 CHRONIC VENOUS HYPERTENSION WITH INFLAMMATION INVOLVING BOTH SIDES: Primary | ICD-10-CM

## 2022-03-14 NOTE — TELEPHONE ENCOUNTER
Pt called and stated that he was just informed of an appt from our office.  He stated that when he checked his calendar that he already had an appt scheduled that day.  I asked if he wanted to reschedule.  He said yes.  I told him that I was going to transfer him to scheduling to get the testing rescheduled and then they could transfer him to the office so that we could coordinate the office appt with the testing.

## 2022-03-15 ENCOUNTER — APPOINTMENT (OUTPATIENT)
Dept: ULTRASOUND IMAGING | Facility: HOSPITAL | Age: 67
End: 2022-03-15

## 2022-03-21 ENCOUNTER — TELEPHONE (OUTPATIENT)
Dept: VASCULAR SURGERY | Facility: CLINIC | Age: 67
End: 2022-03-21

## 2022-03-21 NOTE — TELEPHONE ENCOUNTER
Per notes from scheduling patient does not want to be seen for the testing.  Pt will not have appt without testing.  If pt changes mind at a later date they will call in.

## 2022-04-01 ENCOUNTER — OFFICE VISIT (OUTPATIENT)
Dept: GASTROENTEROLOGY | Age: 67
End: 2022-04-01
Payer: MEDICARE

## 2022-04-01 VITALS
HEART RATE: 69 BPM | HEIGHT: 75 IN | DIASTOLIC BLOOD PRESSURE: 61 MMHG | BODY MASS INDEX: 39.17 KG/M2 | OXYGEN SATURATION: 95 % | WEIGHT: 315 LBS | SYSTOLIC BLOOD PRESSURE: 120 MMHG

## 2022-04-01 DIAGNOSIS — R14.0 ABDOMINAL BLOATING: ICD-10-CM

## 2022-04-01 DIAGNOSIS — R10.84 GENERALIZED ABDOMINAL PAIN: ICD-10-CM

## 2022-04-01 DIAGNOSIS — K22.70 BARRETT'S ESOPHAGUS DETERMINED BY BIOPSY: ICD-10-CM

## 2022-04-01 DIAGNOSIS — R11.0 NAUSEA: ICD-10-CM

## 2022-04-01 DIAGNOSIS — R19.4 CHANGE IN BOWEL HABITS: ICD-10-CM

## 2022-04-01 DIAGNOSIS — R10.10 UPPER ABDOMINAL PAIN: Primary | ICD-10-CM

## 2022-04-01 DIAGNOSIS — R12 CHRONIC HEARTBURN: ICD-10-CM

## 2022-04-01 DIAGNOSIS — K59.00 CONSTIPATION, UNSPECIFIED CONSTIPATION TYPE: ICD-10-CM

## 2022-04-01 PROCEDURE — G8417 CALC BMI ABV UP PARAM F/U: HCPCS | Performed by: NURSE PRACTITIONER

## 2022-04-01 PROCEDURE — G8428 CUR MEDS NOT DOCUMENT: HCPCS | Performed by: NURSE PRACTITIONER

## 2022-04-01 PROCEDURE — 3017F COLORECTAL CA SCREEN DOC REV: CPT | Performed by: NURSE PRACTITIONER

## 2022-04-01 PROCEDURE — 1123F ACP DISCUSS/DSCN MKR DOCD: CPT | Performed by: NURSE PRACTITIONER

## 2022-04-01 PROCEDURE — 4040F PNEUMOC VAC/ADMIN/RCVD: CPT | Performed by: NURSE PRACTITIONER

## 2022-04-01 PROCEDURE — 99203 OFFICE O/P NEW LOW 30 MIN: CPT | Performed by: NURSE PRACTITIONER

## 2022-04-01 PROCEDURE — 1036F TOBACCO NON-USER: CPT | Performed by: NURSE PRACTITIONER

## 2022-04-01 RX ORDER — SUCRALFATE 1 G/1
1 TABLET ORAL 2 TIMES DAILY
COMMUNITY
Start: 2022-03-15

## 2022-04-01 RX ORDER — PANTOPRAZOLE SODIUM 40 MG/1
40 TABLET, DELAYED RELEASE ORAL 2 TIMES DAILY
COMMUNITY
Start: 2022-03-15

## 2022-04-01 ASSESSMENT — ENCOUNTER SYMPTOMS
NAUSEA: 1
TROUBLE SWALLOWING: 0
VOICE CHANGE: 0
COUGH: 0
ABDOMINAL DISTENTION: 0
ANAL BLEEDING: 0
BACK PAIN: 1
CONSTIPATION: 1
DIARRHEA: 0
RECTAL PAIN: 0
VOMITING: 0
ABDOMINAL PAIN: 1
SHORTNESS OF BREATH: 1
BLOOD IN STOOL: 0
SORE THROAT: 0

## 2022-04-01 NOTE — PATIENT INSTRUCTIONS

## 2022-04-01 NOTE — PROGRESS NOTES
Subjective:      Vanesa Caballero is a70 y.o. male  Chief Complaint   Patient presents with    Follow-up       HPI  PCP: Mohini Johns MD  Referring Provider: Gus Trejo MD  Previous pt of BOUBACAR Rivera  New pt to me    Reports abd pain  Upper abdomen  Started 6 months ago  Waxes and wanes  Has comorbid bloating  Describes as \"pressure\"  Drinking ETOH having a BM brings it on. Denies melena, hematemesis. On celebrex long term. Reports constipation  With generalized abd cramping. Abrupt onset 2 months ago  Having a BM every 2-3 days  Stools are hard avelino or soft serve ice cream consistency and he has to strain  [employer] he isnt emptying  Reports sometimes he cant empty his bladder til he has a BM  Has not taken anything for the constipation    Reports chronic heartburn  Currently on protonix BID and this works well  Has malathi. Reports he is on wydovi for weight loss   And has had nausea since starting (about 3 months ago)      Family HX:    Pt denies family hx of colon polyps, colon CA, inflammatory bowel dx, gastric CA and esophageal CA.     Past Medical History:   Diagnosis Date    Abnormal gait     Amnesia     Anxiety     Asthma     BiPAP (biphasic positive airway pressure) dependence     NiPPV 23cm/16cm/12cm    Cervical facet syndrome     Chronic back pain     Complex sleep apnea syndrome     Initial PS; AHI:  31.2 per PSG, 10/2014    Concussion     COPD (chronic obstructive pulmonary disease) (HCC)     DDD (degenerative disc disease)     Facial ringworm     PERCY (generalized anxiety disorder)     GERD (gastroesophageal reflux disease)     Headache     Herniated disc     Herpes simplex     History of Juan's esophagus     Hyperlipidemia     Hypersomnia     Hypertension     Hypotestosteronism     Lumbar stenosis     Memory loss     Obesity     Obstructive sleep apnea     Initial PS; AHI:  31.2 per split-night PSG, 10/2014    Pneumonia     Potential difficult airway on pre-intubation assessment     PTSD (post-traumatic stress disorder)     Sleep apnea     Bi-pap at night    Vertigo           Past Surgical History:   Procedure Laterality Date    APPENDECTOMY      BACK SURGERY      Three lumbar fusions L3-4, L3-4-5 & S1    CARDIAC CATHETERIZATION      COLONOSCOPY      Radha Krystina COLONOSCOPY  2016    Dr Ashley Moe, normal, 5 yr recall    COLONOSCOPY N/A 2019    Leonid---Pan diverticulosis, suboptimal prep, Grade 2 internal hemorrhoids, 3 yr recall    FRACTURE SURGERY      wrist    HERNIA REPAIR      KNEE ARTHROSCOPY      LUMBAR FUSION      OTHER SURGICAL HISTORY      nerve stimulator. dcs battery replacement 18    RHINOPLASTY      SKIN BIOPSY      SPINE SURGERY      x 3/Stimulator implant    [facility]PPER [facility]OSCOPY      [facility]    UPPER GASTROINTESTINAL ENDOSCOPY  2013    BE surveillance. pos BE / neg dysplasia.  retained gastric contents    UPPER GASTROINTESTINAL ENDOSCOPY N/A 2016    Dr Ashley Moe, Susy (-), Barretts (+), 3 yr recall    UPPER GASTROINTESTINAL ENDOSCOPY N/A 2019    Dr Harsh Back segment Juan's esophagus-Pan diverticulosis, suboptimal prep, Grade 2 internal hemorrhoids, 3 yr recall       Social History     Socioeconomic History    Marital status:      Spouse name: None    Number of children: None    Years of education: None    Highest education level: None   Occupational History    None   Tobacco Use    Smoking status: Former Smoker     Packs/day: 0.00     Years: 35.00     Pack years: 0.00     Types: Cigarettes     Quit date: 3/16/2016     Years since quittin.0    Smokeless tobacco: Never Used    Tobacco comment: pt states he has quit smokimg again for 3 weeks   Vaping Use    Vaping Use: Never used   Substance and Sexual Activity    Alcohol use: Yes     Comment: 2-3 mixed drinks / month     Drug use: No    Sexual activity: Yes     Partners: Female Other Topics Concern    None   Social History Narrative    None     Social Determinants of Health     Financial Resource Strain:     Difficulty of Paying Living Expenses: Not on file   Food Insecurity:     Worried About Running Out of Food in the Last Year: Not on file    Nilsa of Food in the Last Year: Not on file   Transportation Needs:     Lack of Transportation (Medical): Not on file    Lack of Transportation (Non-Medical):  Not on file   Physical Activity:     Days of Exercise per Week: Not on file    Minutes of Exercise per Session: Not on file   Stress:     Feeling of Stress : Not on file   Social Connections:     Frequency of Communication with Friends and Family: Not on file    Frequency of Social Gatherings with Friends and Family: Not on file    Attends Restoration Services: Not on file    Active Member of [address] [place] [address] [employer] or Organizations: Not on file    Attends Club or Organization Meetings: Not on file    Marital Status: Not on file   Intimate Partner Violence:     Fear of Current or Ex-Partner: Not on file    Emotionally Abused: Not on file    Physically Abused: Not on file    Sexually Abused: Not on file   Housing Stability:     Unable to Pay for Housing in the Last Year: Not on file    Number of [place] in the Last Year: Not on file    Unstable Housing in the Last Year: Not on file       Allergies   Allergen Reactions    Lunesta [Eszopiclone] Other (See Comments)     Flu like symptoms     Levofloxacin Other (See Comments)     Pt can not remember the reaction       Current Outpatient Medications   Medication Sig Dispense Refill    pantoprazole (PROTONIX) 40 MG tablet Take 40 mg by mouth in the morning and at bedtime       sucralfate (CARAFATE) 1 GM tablet Take 1 g by mouth in the morning and at bedtime       TADALAFIL PO Take 25 mg by mouth as needed      UNABLE TO FIND 1 mg by Subdermal route once a week abisai- weekly shot      dutasteride (AVODART) 0.5 MG capsule Take 0.5 mg by mouth daily      dilTIAZem (CARDIZEM CD) 240 MG extended release capsule 1 capsule      gabapentin (NEURONTIN) 600 MG tablet TAKE ONE TABLET BY MOUTH FOUR TIMES A DAY **MAY MAKE DROWSY**      ipratropium-albuterol (DUONEB) 0.5-2.5 (3) MG/3ML SOLN nebulizer solution 3 ml as needed      magnesium 30 MG tablet Take 40 mg by mouth daily       Multiple Vitamins-Minerals (MENS MULTIVITAMIN PLUS PO) as directed      amLODIPine (NORVASC) 10 MG tablet 1 tablet      TADALAFIL PO Take 12 mg by mouth daily       oxyCODONE-acetaminophen (PERCOCET) 5-325 MG per tablet Take 1 tablet by mouth 4 times daily.  traZODone (DESYREL) 100 MG tablet Take 2 tablets by mouth nightly as needed for Sleep (Patient taking differently: Take 300 mg by mouth nightly as needed for Sleep ) 180 tablet 3    meclizine (ANTIVERT) 25 MG tablet Take 25 mg by mouth 4 times daily as needed      albuterol sulfate HFA (PROAIR HFA) 108 (90 Base) MCG/ACT inhaler Inhale 2 puffs into the lungs every 6 hours as needed for Wheezing      mometasone-formoterol (DULERA) 200-5 MCG/ACT inhaler Inhale 2 puffs into the lungs every 12 hours      celecoxib (CELEBREX) 200 MG capsule Take 200 mg by mouth daily      valACYclovir (VALTREX) 500 MG tablet Take 500 mg by mouth daily      tamsulosin (FLOMAX) 0.4 MG capsule Take 0.4 mg by mouth daily      losartan-hydrochlorothiazide (HYZAAR) 100-25 MG per tablet Take 1 tablet by mouth daily      cloNIDine (CATAPRES) 0.1 MG tablet Take 0.1 mg by mouth every 6 hours as needed for High Blood Pressure       diphenoxylate-atropine (LOMOTIL) 2.5-0.025 MG per tablet Take 1 tablet by mouth 4 times daily as needed for Diarrhea. Luz Michaud oxyCODONE (OXYCONTIN) 10 MG extended release tablet Take 15 mg by mouth every 8 hours.  testosterone cypionate (DEPOTESTOTERONE CYPIONATE) 200 MG/ML injection Inject 50 mg into the muscle every 14 days. Luz Michaud DULoxetine (CYMBALTA) 60 MG capsule Take 60 mg by mouth daily.  ALPRAZolam (XANAX) 1 MG tablet Take 1 mg by mouth 2 times daily as needed.  esomeprazole (NEXIUM) 40 MG delayed release capsule TAKE ONE CAPSULE BY MOUTH EVERY DAY (Patient not taking: Reported on 4/1/2022)       No current facility-administered medications for this visit. Review of Systems   Constitutional: Positive for fatigue. Negative for appetite change, fever and unexpected weight change. HENT: Negative for sore throat, trouble swallowing and voice change. Respiratory: Positive for shortness of breath. Negative for cough. Cardiovascular: Negative for chest pain, palpitations and leg swelling. Gastrointestinal: Positive for abdominal pain, constipation and nausea. Negative for abdominal distention, anal bleeding, blood in stool, diarrhea, rectal pain and vomiting. Genitourinary: Negative for hematuria. Musculoskeletal: Positive for arthralgias, back pain and neck pain. Neurological: Negative for dizziness, weakness, light-headedness and headaches. Psychiatric/Behavioral: Positive for dysphoric mood. Negative for sleep disturbance. The patient is not nervous/anxious. All other systems reviewed and are negative. Objective:     Physical Exam  Vitals and nursing note reviewed. Constitutional:       Appearance: He is well-developed. Comments: /61 (Site: Left Upper Arm)   Pulse 69   Ht 6' 3\" (1.905 m)   Wt (!) 350 lb (158.8 kg)   SpO2 95%   BMI 43.75 kg/m²    Eyes:      General: No scleral icterus. Conjunctiva/sclera: Conjunctivae normal.      Pupils: Pupils are equal, round, and reactive to light. Neck:      Thyroid: No thyromegaly. Cardiovascular:      Rate and Rhythm: Normal rate and regular rhythm. Heart sounds: Normal heart sounds. No murmur heard. No friction rub. No gallop. Pulmonary:      Effort: Pulmonary effort is normal. No respiratory distress. Breath sounds: Normal breath sounds.    Abdominal:      General: Bowel sounds are normal. There is no distension. Palpations: Abdomen is soft. Tenderness: There is abdominal tenderness (entire abd with palpation, particurlay upper abd). There is no rebound. Musculoskeletal:         General: No deformity. Normal range of motion. Cervical back: Normal range of motion and neck supple. Skin:     Coloration: Skin is not pale. Neurological:      Mental Status: He is alert and oriented to person, place, and time. Cranial Nerves: No cranial nerve deficit. Psychiatric:         Judgment: Judgment normal.           Assessment:       Diagnosis Orders   1. Upper abdominal pain  ESOPHAGOSCOPY / EGD   2. Generalized abdominal pain  COLONOSCOPY W/ OR W/O BIOPSY    ESOPHAGOSCOPY / EGD   3. Abdominal bloating  COLONOSCOPY W/ OR W/O BIOPSY    ESOPHAGOSCOPY / EGD   4. Nausea  ESOPHAGOSCOPY / EGD   5. Change in bowel habits  COLONOSCOPY W/ OR W/O BIOPSY   6. Constipation, unspecified constipation type  COLONOSCOPY W/ OR W/O BIOPSY   7. Juan's esophagus determined by biopsy  ESOPHAGOSCOPY / EGD   8. Chronic heartburn  ESOPHAGOSCOPY / EGD         Plan:      1. Stop celebrex  2. Start miralax, take 1-2x daily  3. Schedule outpatient endoscopy r/o h pylori. Patient advised no Aspirin, Fish Oil, Vit E or NSAIDs 5 (five) days before procedure. Follow-up Visit: per Dr. Reji Ugarte  Pt Education:   Risks, benefits, and alternatives to endoscopy were discussed. Patient voices understanding of risks of, but not limited to, perforation, bleeding, and infection. The risk of perforation is increased with esophageal dilatation. All questions answered to patient's satisfaction. Patient is agreable to proceed. 4. Schedule outpatient colonoscopy with 2 day lizeth prep. Patient advised no Aspirin, Fish Oil, Vit E or NSAIDs 5 (five) days before procedure. Follow-up Visit: per Dr Reji Ugarte  Pt education:  Risks, benefits, and alternatives to colonoscopy were discussed.  Risks of colonoscopy include, but are not limited to, perforation, bleeding, and infection. We discussed that the risk for perforation is 1-3 in 5,000  at the time of colonoscopy;   and 1-2% risk of bleeding post-polypectomy. All questions answered to the satisfaction of the patient. Pt is agreeable to proceed.

## 2022-04-04 ENCOUNTER — OFFICE VISIT (OUTPATIENT)
Dept: NEUROLOGY | Age: 67
End: 2022-04-04
Payer: MEDICARE

## 2022-04-04 VITALS
HEIGHT: 75 IN | BODY MASS INDEX: 39.17 KG/M2 | WEIGHT: 315 LBS | DIASTOLIC BLOOD PRESSURE: 68 MMHG | HEART RATE: 57 BPM | OXYGEN SATURATION: 96 % | SYSTOLIC BLOOD PRESSURE: 110 MMHG

## 2022-04-04 DIAGNOSIS — G47.33 OBSTRUCTIVE SLEEP APNEA: Primary | ICD-10-CM

## 2022-04-04 DIAGNOSIS — G47.31 CENTRAL SLEEP APNEA: ICD-10-CM

## 2022-04-04 PROCEDURE — 1036F TOBACCO NON-USER: CPT | Performed by: PSYCHIATRY & NEUROLOGY

## 2022-04-04 PROCEDURE — 99213 OFFICE O/P EST LOW 20 MIN: CPT | Performed by: PSYCHIATRY & NEUROLOGY

## 2022-04-04 PROCEDURE — G8417 CALC BMI ABV UP PARAM F/U: HCPCS | Performed by: PSYCHIATRY & NEUROLOGY

## 2022-04-04 PROCEDURE — 1123F ACP DISCUSS/DSCN MKR DOCD: CPT | Performed by: PSYCHIATRY & NEUROLOGY

## 2022-04-04 PROCEDURE — 4040F PNEUMOC VAC/ADMIN/RCVD: CPT | Performed by: PSYCHIATRY & NEUROLOGY

## 2022-04-04 PROCEDURE — G8427 DOCREV CUR MEDS BY ELIG CLIN: HCPCS | Performed by: PSYCHIATRY & NEUROLOGY

## 2022-04-04 PROCEDURE — 3017F COLORECTAL CA SCREEN DOC REV: CPT | Performed by: PSYCHIATRY & NEUROLOGY

## 2022-04-04 RX ORDER — OXYCODONE HYDROCHLORIDE 15 MG/1
1 TABLET, FILM COATED, EXTENDED RELEASE ORAL EVERY 8 HOURS PRN
COMMUNITY

## 2022-04-04 RX ORDER — FUROSEMIDE 20 MG/1
20 TABLET ORAL DAILY
COMMUNITY

## 2022-04-04 RX ORDER — BUDESONIDE, GLYCOPYRROLATE, AND FORMOTEROL FUMARATE 160; 9; 4.8 UG/1; UG/1; UG/1
AEROSOL, METERED RESPIRATORY (INHALATION)
COMMUNITY
Start: 2021-05-28

## 2022-04-04 NOTE — PATIENT INSTRUCTIONS
INSTRUCTIONS:  1. Presently the inspiratory pressure is set at 21cm, the expiratory pressure is 14cm, and the respiratory backup rate is 10 breaths per minute. Will reset it to an inspiratory pressure of 23cm, an expiratory pressure of 16cm, and a backup rate of 8cm.

## 2022-04-04 NOTE — PROGRESS NOTES
[facility]  [address] [place] [address] [address]  [place], [place]  Phone [phone]  Fax [phone]     [facility] Follow Up Encounter  22 9:57 AM CDT    Information:   Patient Name: Valerie Damon  :   1955  Age:   74 y.o. MRN:   [id number]  Account #:  [de-identified]  Today:  22    Provider: Carin Gill M.D. Chief Complaint:   Chief Complaint   Patient presents with    Sleep Apnea     6 month follow up        Subjective:   Valerie Damon is a 74 y.o. man with a history of central and obstructive sleep apnea who is following up. He received a new NiPPV device a few months ago. He does not feel he is getting enough air. It makes it difficult to use. He is not sleeping very well as a result. He is getting only 4 hours of sleep a night. He has chronic back and leg pain.         Objective:     Past Medical History:  Past Medical History:   Diagnosis Date    Abnormal gait     Amnesia     Anxiety     Asthma     BiPAP (biphasic positive airway pressure) dependence     NiPPV 23cm/16cm/12cm    Cervical facet syndrome     Chronic back pain     Complex sleep apnea syndrome     Initial PS; AHI:  31.2 per PSG, 10/2014    Concussion     COPD (chronic obstructive pulmonary disease) (HCC)     DDD (degenerative disc disease)     Facial ringworm     PERCY (generalized anxiety disorder)     GERD (gastroesophageal reflux disease)     Headache     Herniated disc     Herpes simplex     History of Juan's esophagus     Hyperlipidemia     Hypersomnia     Hypertension     Hypotestosteronism     Lumbar stenosis     Memory loss     Obesity     Obstructive sleep apnea     Initial PS; AHI:  31.2 per split-night PSG, 10/2014    Pneumonia     Potential difficult airway on pre-intubation assessment     PTSD (post-traumatic stress disorder)     Sleep apnea     Bi-pap at night    Vertigo        Past Surgical History:   Procedure Laterality Date    APPENDECTOMY  BACK SURGERY      Three lumbar fusions L3-4, L3-4-5 & S1    CARDIAC CATHETERIZATION      COLONOSCOPY  2009    Eleuterio Redman COLONOSCOPY  05/16/2016    Dr Yamel Mohamud, normal, 5 yr recall    COLONOSCOPY N/A 06/19/2019    Leonid---Pan diverticulosis, suboptimal prep, Grade 2 internal hemorrhoids, 3 yr recall    FRACTURE SURGERY      wrist    HERNIA REPAIR      KNEE ARTHROSCOPY      LUMBAR FUSION      OTHER SURGICAL HISTORY      nerve stimulator. dcs battery replacement 5/2/18    RHINOPLASTY      SKIN BIOPSY      SPINE SURGERY      x 3/Stimulator implant    UPPER GASTROINTESTINAL ENDOSCOPY  2009    [address] [facility]    UPPER GASTROINTESTINAL ENDOSCOPY  12/2013    BE surveillance. pos BE / neg dysplasia. retained gastric contents    UPPER GASTROINTESTINAL ENDOSCOPY N/A 04/12/2016    Dr Yamel Mohamud, Susy (-), Barretts (+), 3 yr recall    UPPER GASTROINTESTINAL ENDOSCOPY N/A 06/19/2019    Dr Rebeca Baer segment Juan's esophagus-Pan diverticulosis, suboptimal prep, Grade 2 internal hemorrhoids, 3 yr recall       Recent Hospitalizations  · None    Significant Injuries  · None    Habits  Vanesa Caballero reports that he quit smoking about 6 years ago. His smoking use included cigarettes. He smoked 0.00 packs per day for 35.00 years. He has never used smokeless tobacco. He reports current alcohol use. He reports that he does not use drugs. Family History   Problem Relation Age of Onset    Colon Polyps Mother     Heart Attack Mother     Arrhythmia Mother     Stroke Mother     Colon Polyps Maternal Grandmother     Arrhythmia Father     Obesity Sister     Stroke Paternal Grandmother     Colon Cancer Neg Hx     Esophageal Cancer Neg Hx     Liver Cancer Neg Hx     Liver Disease Neg Hx     Rectal Cancer Neg Hx     Stomach Cancer Neg Hx        Social History  Sameer Lisseth , lives [place], [place], and is retired.     Medications:  Current Outpatient Medications   Medication Sig Dispense Refill    oxyCODONE (OXYCONTIN) 15 MG T12A extended release tablet Take 1 tablet by mouth every 8 hours as needed for Pain.  Budeson-Glycopyrrol-Formoterol (BREZTRI AEROSPHERE) 160-9-4.8 MCG/ACT AERO 2 puffs      furosemide (LASIX) 20 MG tablet Take 20 mg by mouth daily      pantoprazole (PROTONIX) 40 MG tablet Take 40 mg by mouth in the morning and at bedtime       sucralfate (CARAFATE) 1 GM tablet Take 1 g by mouth in the morning and at bedtime       TADALAFIL PO Take 25 mg by mouth as needed      UNABLE TO FIND 1 mg by Subdermal route once a week abisai- weekly shot      dutasteride (AVODART) 0.5 MG capsule Take 0.5 mg by mouth daily      dilTIAZem (CARDIZEM CD) 240 MG extended release capsule 1 capsule      gabapentin (NEURONTIN) 600 MG tablet TAKE ONE TABLET BY MOUTH FOUR TIMES A DAY **MAY MAKE DROWSY**      ipratropium-albuterol (DUONEB) 0.5-2.5 (3) MG/3ML SOLN nebulizer solution 3 ml as needed      magnesium 30 MG tablet Take 40 mg by mouth daily       Multiple Vitamins-Minerals (MENS MULTIVITAMIN PLUS PO) as directed      amLODIPine (NORVASC) 10 MG tablet 1 tablet      TADALAFIL PO Take 12 mg by mouth daily       oxyCODONE-acetaminophen (PERCOCET) 5-325 MG per tablet Take 1 tablet by mouth 4 times daily.        traZODone (DESYREL) 100 MG tablet Take 2 tablets by mouth nightly as needed for Sleep (Patient taking differently: Take 300 mg by mouth nightly as needed for Sleep ) 180 tablet 3    meclizine (ANTIVERT) 25 MG tablet Take 25 mg by mouth 4 times daily as needed      mometasone-formoterol (DULERA) 200-5 MCG/ACT inhaler Inhale 2 puffs into the lungs every 12 hours      valACYclovir (VALTREX) 500 MG tablet Take 500 mg by mouth daily      tamsulosin (FLOMAX) 0.4 MG capsule Take 0.4 mg by mouth daily      losartan-hydrochlorothiazide (HYZAAR) 100-25 MG per tablet Take 1 tablet by mouth daily      cloNIDine (CATAPRES) 0.1 MG tablet Take 0.1 mg by mouth every 6 hours as needed for High Blood Pressure       diphenoxylate-atropine (LOMOTIL) 2.5-0.025 MG per tablet Take 1 tablet by mouth 4 times daily as needed for Diarrhea. Elisabeth Shingles testosterone cypionate (DEPOTESTOTERONE CYPIONATE) 200 MG/ML injection Inject 50 mg into the muscle every 14 days. Elisabeth Shingles DULoxetine (CYMBALTA) 60 MG capsule Take 60 mg by mouth daily.  ALPRAZolam (XANAX) 1 MG tablet Take 1 mg by mouth 2 times daily as needed.  esomeprazole (NEXIUM) 40 MG delayed release capsule TAKE ONE CAPSULE BY MOUTH EVERY DAY (Patient not taking: Reported on 4/1/2022)       No current facility-administered medications for this visit. Allergies:   Allergies as of 04/04/2022 - Fully Reviewed 04/04/2022   Allergen Reaction Noted    Lunesta [eszopiclone] Other (See Comments) 02/13/2018    Levofloxacin Other (See Comments) 04/08/2017       Review of Systems:  Constitutional: negative for - chills and fever  Eyes:  negative for - visual disturbance and photophobia  HENMT: negative for - headaches and sinus pain  Respiratory: negative for - cough, hemoptysis, and shortness of breath  Cardiovascular: negative for - chest pain and palpitations  Gastrointestinal: negative for - blood in stools, constipation, diarrhea, nausea, and vomiting  Genito-Urinary: negative for - hematuria, urinary frequency, urinary urgency, and urinary retention  Musculoskeletal: positive for - joint pain, joint stiffness, and joint swelling  Hematological and Lymphatic: negative for - bleeding problems, abnormal bruising, and swollen lymph nodes  Endocrine:  negative for - polydipsia and polyphagia  Allergy/Immunology:  negative for - rhinorrhea, sinus congestion, hives  Integument:  negative for - negative for - rash, change in moles, new or changing lesions  Psychological: negative for - anxiety and depression  Neurological: negative for - memory loss  Positive for - numbness/tingling, and weakness     PHYSICAL EXAMINATION:  Vitals:  /68   Pulse 57   Ht 6' 3\" (1.905 m)   Wt (!) 350 lb (158.8 kg)   SpO2 96%   BMI 43.75 kg/m²   General appearance:  Alert, well developed, well nourished, in no distress  HEENT:  normocephalic, atraumatic, sclera appear normal, no nasal abnormalities, no rhinorrhea, Ears appear normal, oral mucous membranes are moist without erythema, trachea midline, thyroid is normal, no lymphadenopathy or neck mass. Cardiovascular:  Regular rate and rhythm without murmer. No peripheral edema, No cyanosis or clubbing. No carotid bruits. Pulmonary:  Lungs are clear to auscultation. Breathing appears normal, good expansion, normal effort without use of accessory muscles  Musculoskeletal:  Joints are arthritic. Integument:  No rash, erythema, or pallor  Psychiatric:  Mood, affect, and behavior appear normal      NEUROLOGIC EXAMINATION:  Mental Status:  alert, oriented to person, place, and time. Speech:  Clear without dysarthria or dysphonia  Language:  Fluent without aphasia  Cranial Nerves:   II Visual fields are full to confrontation   III,IV, VI Extraocular movements are full   VII Facial movements are symmetrical without weakness   VIII Hearing is intact   IX,X Shoulder shrug and head rotation strength are intact   XII No tongue atrophy or fasciculations. Normal tongue protrusion. No tongue weakness  Motor:  Normal strength in both upper and lower extremities. Normal muscle tone and bulk. Deep tendon reflexes are intact and symmetrical in both upper and lower extremities. Smith's signs are absent bilaterally. There is no ankle clonus on either side. Sensation:  Sensation is intact to light touch, temperature, and vibration in all extremities. Coordination:  Rapid alternating movements are normal in both upper and lower extremities. Finger to nose testing is unimpaired bilaterally. Gait:  Normal station and gait. Pertinent Diagnostic Studies:  No NiPPV download is available today.   It should be set as previously at

## 2022-05-09 ENCOUNTER — ANESTHESIA (OUTPATIENT)
Dept: ENDOSCOPY | Age: 67
End: 2022-05-09
Payer: MEDICARE

## 2022-05-09 ENCOUNTER — ANESTHESIA EVENT (OUTPATIENT)
Dept: ENDOSCOPY | Age: 67
End: 2022-05-09
Payer: MEDICARE

## 2022-05-09 ENCOUNTER — HOSPITAL ENCOUNTER (OUTPATIENT)
Age: 67
Setting detail: OUTPATIENT SURGERY
Discharge: HOME | End: 2022-05-09
Attending: INTERNAL MEDICINE | Admitting: INTERNAL MEDICINE
Payer: MEDICARE

## 2022-05-09 VITALS
TEMPERATURE: 98.6 F | WEIGHT: 315 LBS | OXYGEN SATURATION: 96 % | DIASTOLIC BLOOD PRESSURE: 77 MMHG | HEIGHT: 75 IN | RESPIRATION RATE: 18 BRPM | BODY MASS INDEX: 39.17 KG/M2 | SYSTOLIC BLOOD PRESSURE: 132 MMHG | HEART RATE: 54 BPM

## 2022-05-09 VITALS
SYSTOLIC BLOOD PRESSURE: 118 MMHG | OXYGEN SATURATION: 90 % | RESPIRATION RATE: 17 BRPM | DIASTOLIC BLOOD PRESSURE: 77 MMHG

## 2022-05-09 PROCEDURE — 2709999900 HC NON-CHARGEABLE SUPPLY: Performed by: INTERNAL MEDICINE

## 2022-05-09 PROCEDURE — 3700000000 HC ANESTHESIA ATTENDED CARE: Performed by: INTERNAL MEDICINE

## 2022-05-09 PROCEDURE — 45378 DIAGNOSTIC COLONOSCOPY: CPT | Performed by: INTERNAL MEDICINE

## 2022-05-09 PROCEDURE — 7100000011 HC PHASE II RECOVERY - ADDTL 15 MIN: Performed by: INTERNAL MEDICINE

## 2022-05-09 PROCEDURE — 88305 TISSUE EXAM BY PATHOLOGIST: CPT

## 2022-05-09 PROCEDURE — 3700000001 HC ADD 15 MINUTES (ANESTHESIA): Performed by: INTERNAL MEDICINE

## 2022-05-09 PROCEDURE — 2500000003 HC RX 250 WO HCPCS: Performed by: NURSE ANESTHETIST, CERTIFIED REGISTERED

## 2022-05-09 PROCEDURE — 6360000002 HC RX W HCPCS: Performed by: NURSE ANESTHETIST, CERTIFIED REGISTERED

## 2022-05-09 PROCEDURE — 3609027000 HC COLONOSCOPY: Performed by: INTERNAL MEDICINE

## 2022-05-09 PROCEDURE — 88342 IMHCHEM/IMCYTCHM 1ST ANTB: CPT

## 2022-05-09 PROCEDURE — 3609012400 HC EGD TRANSORAL BIOPSY SINGLE/MULTIPLE: Performed by: INTERNAL MEDICINE

## 2022-05-09 PROCEDURE — 7100000010 HC PHASE II RECOVERY - FIRST 15 MIN: Performed by: INTERNAL MEDICINE

## 2022-05-09 PROCEDURE — 2580000003 HC RX 258: Performed by: INTERNAL MEDICINE

## 2022-05-09 PROCEDURE — 43239 EGD BIOPSY SINGLE/MULTIPLE: CPT | Performed by: INTERNAL MEDICINE

## 2022-05-09 RX ORDER — LOSARTAN POTASSIUM 100 MG/1
100 TABLET ORAL DAILY
COMMUNITY
End: 2022-06-16

## 2022-05-09 RX ORDER — SODIUM CHLORIDE, SODIUM LACTATE, POTASSIUM CHLORIDE, CALCIUM CHLORIDE 600; 310; 30; 20 MG/100ML; MG/100ML; MG/100ML; MG/100ML
INJECTION, SOLUTION INTRAVENOUS CONTINUOUS
Status: DISCONTINUED | OUTPATIENT
Start: 2022-05-09 | End: 2022-05-09 | Stop reason: HOSPADM

## 2022-05-09 RX ORDER — LIDOCAINE HYDROCHLORIDE 10 MG/ML
INJECTION, SOLUTION INFILTRATION; PERINEURAL PRN
Status: DISCONTINUED | OUTPATIENT
Start: 2022-05-09 | End: 2022-05-09 | Stop reason: SDUPTHER

## 2022-05-09 RX ORDER — PROPOFOL 10 MG/ML
INJECTION, EMULSION INTRAVENOUS CONTINUOUS PRN
Status: DISCONTINUED | OUTPATIENT
Start: 2022-05-09 | End: 2022-05-09 | Stop reason: SDUPTHER

## 2022-05-09 RX ADMIN — SODIUM CHLORIDE, POTASSIUM CHLORIDE, SODIUM LACTATE AND CALCIUM CHLORIDE: 600; 310; 30; 20 INJECTION, SOLUTION INTRAVENOUS at 09:27

## 2022-05-09 RX ADMIN — LIDOCAINE HYDROCHLORIDE 100 MG: 10 INJECTION, SOLUTION INFILTRATION; PERINEURAL at 09:56

## 2022-05-09 RX ADMIN — PROPOFOL 180 MCG/KG/MIN: 10 INJECTION, EMULSION INTRAVENOUS at 09:57

## 2022-05-09 ASSESSMENT — PAIN - FUNCTIONAL ASSESSMENT: PAIN_FUNCTIONAL_ASSESSMENT: 0-10

## 2022-05-09 NOTE — ANESTHESIA POSTPROCEDURE EVALUATION
Department of Anesthesiology  Postprocedure Note    Patient: Bryant Gibbons  MRN: [id number]  YOB: 1955  Date of evaluation: 5/9/2022  Time:  10:24 AM     Procedure Summary     Date: 05/09/22 Room / Location: [address]    Anesthesia Start: 0309 Anesthesia Stop: 1024    Procedures:       EGD BIOPSY (N/A Abdomen)      COLONOSCOPY DIAGNOSTIC (N/A ) Diagnosis: (HEARTBURN, CONSTIPATION, NAUSEA, UPPER ABD PAIN)    Surgeons: Kem Cornell MD Responsible Provider: BOUBACAR Garcia CRNA    Anesthesia Type: general, TIVA ASA Status: 3          Anesthesia Type: No value filed. Bruno Phase I: Bruno Score: 10    Bruno Phase II:      Last vitals: Reviewed and per EMR flowsheets.        Anesthesia Post Evaluation    Patient location during evaluation: bedside  Patient participation: complete - patient participated  Level of consciousness: sleepy but conscious  Pain score: 0  Airway patency: patent  Nausea & Vomiting: no nausea and no vomiting  Complications: no  Cardiovascular status: hemodynamically stable  Respiratory status: acceptable, spontaneous ventilation and room air  Hydration status: euvolemic

## 2022-05-09 NOTE — PROGRESS NOTES
DR. Low Gum IN TO SEE PT/FAMILY TO DISCUSS RESULTS OF PROCEDURE. DISCHARGE INSTRUCTIONS GIVEN TO PT/FAMILY. BOTH VERBALIZED UNDERSTANDING  OF INSTRUCTIONS.

## 2022-05-09 NOTE — ANESTHESIA PRE PROCEDURE
Department of Anesthesiology  Preprocedure Note       Name:  Pinky Marcos   Age:  74 y.o.  :  1955                                          MRN:  [id number]         Date:  2022      Surgeon: Sima Chowdhury):  Sukhi Burrell MD    Procedure: Procedure(s):  EGD BIOPSY  COLONOSCOPY DIAGNOSTIC    Medications prior to admission:   Prior to Admission medications    Medication Sig Start Date End Date Taking? Authorizing Provider   losartan (COZAAR) 100 MG tablet Take 100 mg by mouth daily   Yes Historical Provider, MD   oxyCODONE (OXYCONTIN) 15 MG T12A extended release tablet Take 1 tablet by mouth every 8 hours as needed for Pain.     Historical Provider, MD   Budeson-Glycopyrrol-Formoterol (BREZTRI AEROSPHERE) 160-9-4.8 MCG/ACT AERO 2 puffs 21   Historical Provider, MD   furosemide (LASIX) 20 MG tablet Take 20 mg by mouth daily    Historical Provider, MD   pantoprazole (PROTONIX) 40 MG tablet Take 40 mg by mouth in the morning and at bedtime  3/15/22   Historical Provider, MD   sucralfate (CARAFATE) 1 GM tablet Take 1 g by mouth in the morning and at bedtime  3/15/22   Historical Provider, MD   TADALAFIL PO Take 25 mg by mouth as needed    Historical Provider, MD   UNABLE TO FIND 1 mg by Subdermal route once a week abisai- weekly shot    Historical Provider, MD   dutasteride (AVODART) 0.5 MG capsule Take 0.5 mg by mouth daily    Historical Provider, MD   dilTIAZem (CARDIZEM CD) 240 MG extended release capsule 1 capsule    Historical Provider, MD   esomeprazole (NEXIUM) 40 MG delayed release capsule TAKE ONE CAPSULE BY MOUTH EVERY DAY  Patient not taking: Reported on 2022    Historical Provider, MD   gabapentin (NEURONTIN) 600 MG tablet TAKE ONE TABLET BY MOUTH FOUR TIMES A DAY **MAY MAKE DROWSY**    Historical Provider, MD   ipratropium-albuterol (DUONEB) 0.5-2.5 (3) MG/3ML SOLN nebulizer solution 3 ml as needed    Historical Provider, MD   magnesium 30 MG tablet Take 40 mg by mouth daily Historical Provider, MD   Multiple Vitamins-Minerals (MENS MULTIVITAMIN PLUS PO) as directed    Historical Provider, MD   amLODIPine (NORVASC) 10 MG tablet 1 tablet    Historical Provider, MD   TADALAFIL PO Take 12 mg by mouth daily     Historical Provider, MD   oxyCODONE-acetaminophen (PERCOCET) 5-325 MG per tablet Take 1 tablet by mouth 4 times daily. Historical Provider, MD   traZODone (DESYREL) 100 MG tablet Take 2 tablets by mouth nightly as needed for Sleep  Patient taking differently: Take 300 mg by mouth nightly as needed for Sleep  10/8/20   Yandy Esparza MD   meclizine (ANTIVERT) 25 MG tablet Take 25 mg by mouth 4 times daily as needed    Historical Provider, MD   mometasone-formoterol (DULERA) 200-5 MCG/ACT inhaler Inhale 2 puffs into the lungs every 12 hours    Historical Provider, MD   valACYclovir (VALTREX) 500 MG tablet Take 500 mg by mouth daily    Historical Provider, MD   tamsulosin (FLOMAX) 0.4 MG capsule Take 0.4 mg by mouth daily    Historical Provider, MD   cloNIDine (CATAPRES) 0.1 MG tablet Take 0.1 mg by mouth every 6 hours as needed for High Blood Pressure     Historical Provider, MD   diphenoxylate-atropine (LOMOTIL) 2.5-0.025 MG per tablet Take 1 tablet by mouth 4 times daily as needed for Diarrhea. .    Historical Provider, MD   testosterone cypionate (DEPOTESTOTERONE CYPIONATE) 200 MG/ML injection Inject 50 mg into the muscle every 14 days. Marciano Hernandez Historical Provider, MD   DULoxetine (CYMBALTA) 60 MG capsule Take 60 mg by mouth daily. Historical Provider, MD   ALPRAZolam Alfreida Bottoms) 1 MG tablet Take 1 mg by mouth 2 times daily as needed. Historical Provider, MD       Current medications:    Current Facility-Administered Medications   Medication Dose Route Frequency Provider Last Rate Last Admin    lactated ringers infusion   IntraVENous Continuous Rhea Santiago MD           Allergies:     Allergies   Allergen Reactions    Lunesta [Eszopiclone] Other (See Comments) Flu like symptoms     Levofloxacin Other (See Comments)     Pt can not remember the reaction       Problem List:    Patient Active Problem List   Diagnosis Code    Chest pain R07.9    Acid reflux K21.9    Heartburn R12    Non-cardiac chest pain R07.89    Barretts esophagus K22.70    History of colon polyps Z86.010    Family history of colonic polyps Z83.71    Current smoker F17.200    Juan's esophagus determined by biopsy K22.70    Potential difficult airway on pre-intubation assessment Z01.818, Z91.89    Obstructive sleep apnea G47.33    BiPAP (biphasic positive airway pressure) dependence Z99.89    Complex sleep apnea syndrome G47.31    Cervical facet joint syndrome M47.812    Chronic bilateral low back pain with bilateral sciatica M54.42, M54.41, G89.29    Memory loss R41.3    COPD with acute exacerbation ([facility]) J44.1    CAP (community acquired pneumonia) J18.9    Hypertension I10    COPD exacerbation ([facility]) J44.1    Hypoxia R09.02    COVID-19 U07.1    Constipation K59.00    Change in bowel habits R19.4    Nausea R11.0    Abdominal bloating R14.0    Generalized abdominal pain R10.84    Upper abdominal pain R10.10    Chronic heartburn R12       Past Medical History:        Diagnosis Date    Abnormal gait     Amnesia     Anxiety     Asthma     BiPAP (biphasic positive airway pressure) dependence     NiPPV 23cm/16cm/12cm    Cervical facet syndrome     Chronic back pain     Complex sleep apnea syndrome     Initial PS; AHI:  31.2 per PSG, 10/2014    Concussion     COPD (chronic obstructive pulmonary disease) ([facility])     COVID     DDD (degenerative disc disease)     Facial ringworm     PERCY (generalized anxiety disorder)     GERD (gastroesophageal reflux disease)     Headache     Herniated disc     Herpes simplex     History of Juan's esophagus     Hypersomnia     Hypertension     Hypotestosteronism     Lumbar stenosis     Memory loss     Obesity     Obstructive sleep apnea     Initial PS; AHI:  31.2 per split-night PSG, 10/2014    Pneumonia     Potential difficult airway on pre-intubation assessment     PTSD (post-traumatic stress disorder)     Sleep apnea     Bi-pap at night    Vertigo        Past Surgical History:        Procedure Laterality Date    APPENDECTOMY      BACK SURGERY      Three lumbar fusions L3-4, L3-4-5 & S1    CARDIAC CATHETERIZATION      COLONOSCOPY      Talat Naylor COLONOSCOPY  2016    Dr Tamara Iglesias, normal, 5 yr recall    COLONOSCOPY N/A 2019    Leonid---Pan diverticulosis, suboptimal prep, Grade 2 internal hemorrhoids, 3 yr recall    FRACTURE SURGERY      wrist    HERNIA REPAIR      KNEE ARTHROSCOPY      LUMBAR FUSION      OTHER SURGICAL HISTORY      nerve stimulator. dcs battery replacement 18    RHINOPLASTY      SKIN BIOPSY      SPINE SURGERY      x 3/Stimulator implant    UPPER GASTROINTESTINAL ENDOSCOPY      [id number] [facility]    UPPER GASTROINTESTINAL ENDOSCOPY  2013    BE surveillance. pos BE / neg dysplasia.  retained gastric contents    UPPER GASTROINTESTINAL ENDOSCOPY N/A 2016    Dr Tamara Iglesias, Susy (-), Barretts (+), 3 yr recall    UPPER GASTROINTESTINAL ENDOSCOPY N/A 2019    Dr Anoop Ku segment Juan's esophagus-Pan diverticulosis, suboptimal prep, Grade 2 internal hemorrhoids, 3 yr recall       Social History:    Social History     Tobacco Use    Smoking status: Former Smoker     Packs/day: 0.00     Years: 35.00     Pack years: 0.00     Types: Cigarettes     Quit date: 3/16/2016     Years since quittin.1    Smokeless tobacco: Never Used    Tobacco comment: pt states he has quit smokimg again for 3 weeks   Substance Use Topics    Alcohol use: Yes     Comment: 2-3 mixed drinks / month                                 Counseling given: Not Answered  Comment: pt states he has quit smokimg again for 3 weeks      Vital Signs (Current):   Vitals:    22 0913   BP: Fadia Verdin ) 142/72   Pulse: 62   Resp: 17   Temp: 98.1 °F (36.7 °C)   TempSrc: Temporal   SpO2: 94%   Weight: (!) 344 lb (156 kg)   Height: 6' 3\" (1.905 m)                                              BP Readings from Last 3 Encounters:   05/09/22 (!) 142/72   04/04/22 110/68   04/01/22 120/61       NPO Status: Time of last liquid consumption: 0330                        Time of last solid consumption: 1800                        Date of last liquid consumption: 05/09/22                        Date of last solid food consumption: 05/08/22    BMI:   Wt Readings from Last 3 Encounters:   05/09/22 (!) 344 lb (156 kg)   04/04/22 (!) 350 lb (158.8 kg)   04/01/22 (!) 350 lb (158.8 kg)     Body mass index is 43 kg/m². CBC:   Lab Results   Component Value Date    WBC 9.3 05/15/2021    RBC 5.50 05/15/2021    HGB 17.2 05/15/2021    HCT 50.9 05/15/2021    HCT 47.5 09/09/2011    MCV 92.5 05/15/2021    RDW 13.2 05/15/2021     05/15/2021     09/09/2011       CMP:   Lab Results   Component Value Date     05/15/2021     09/09/2011    K 4.3 05/15/2021    K 4.9 05/11/2021    K 4.2 09/09/2011    CL 97 05/15/2021     09/09/2011    CO2 30 05/15/2021    BUN 21 05/15/2021    CREATININE 0.9 05/15/2021    CREATININE 1.0 09/09/2011    GFRAA >59 05/15/2021    LABGLOM >60 05/15/2021    GLUCOSE 154 05/15/2021    PROT 6.3 05/15/2021    PROT 7.6 09/09/2011    CALCIUM 9.6 05/15/2021    BILITOT 0.4 05/15/2021    ALKPHOS 91 05/15/2021    ALKPHOS 111 09/09/2011    AST 18 05/15/2021    ALT 31 05/15/2021       POC Tests: No results for input(s): POCGLU, POCNA, POCK, POCCL, POCBUN, POCHEMO, POCHCT in the last 72 hours.     Coags:   Lab Results   Component Value Date    PROTIME 14.2 05/11/2021    PROTIME 11.16 09/09/2011    INR 1.10 05/11/2021    APTT 29.8 12/28/2015       HCG (If Applicable): No results found for: PREGTESTUR, PREGSERUM, HCG, HCGQUANT     ABGs:   Lab Results   Component Value Date    PHART 7.450 05/14/2021 PO2ART 56.0 05/14/2021    FUH9RSX 45.0 05/14/2021    NGK2LZJ 31.3 05/14/2021    BEART 6.2 05/14/2021    Q9BKBRZC 90.2 05/14/2021        Type & Screen (If Applicable):  No results found for: LABABO, LABRH    Drug/Infectious Status (If Applicable):  No results found for: HIV, HEPCAB    COVID-19 Screening (If Applicable):   Lab Results   Component Value Date    COVID19 Not Detected 05/10/2021           Anesthesia Evaluation  Patient summary reviewed and Nursing notes reviewed  Airway: Mallampati: III  TM distance: >3 FB   Neck ROM: full  Mouth opening: > = 3 FB Dental: normal exam         Pulmonary:   (+) COPD:  sleep apnea: on CPAP,  decreased breath sounds,  asthma:                            Cardiovascular:    (+) hypertension: moderate,                   Neuro/Psych:   (+) neuromuscular disease:, headaches:, psychiatric history:            GI/Hepatic/Renal:   (+) GERD:, bowel prep,           Endo/Other:                     Abdominal:   (+) obese,           Vascular: Other Findings:             Anesthesia Plan      general and TIVA     ASA 3       Induction: intravenous. Anesthetic plan and risks discussed with patient. Plan discussed with CRNA.                   BOUBACAR Crowe - CRNA   5/9/2022

## 2022-05-09 NOTE — OP NOTE
Patient: Lizz Sheehan : 1955  Med Rec#: [id number] Acc#: [id number]   Primary Care Provider France Hsieh MD    Date of Procedure:  2022    Endoscopist: Denis Pierson MD, MD    Referring Provider: France Hsieh MD,     Operation Performed: Colonoscopy to the cecum    Indications: For both EGD and colonoscopy exams today:    1. Upper abdominal pain     2. Generalized abdominal pain          3. Abdominal bloating          4. Nausea     5. Change in bowel habits     6. Constipation, unspecified constipation type     7. Juan's esophagus determined by biopsy     8. Chronic heartburn        Anesthesia:  Sedation was administered by anesthesia who monitored the patient during the procedure. I met with Lizz Sheehan prior to procedure. We discussed the procedure itself, and I have discussed the risks of endoscopy (including-- but not limited to-- pain, discomfort, bleeding potentially requiring second endoscopic procedure and/or blood transfusion, organ perforation requiring operative repair, damage to organs near the colon, infection, aspiration, cardiopulmonary/allergic reaction), benefits, indications to endoscopy. Additionally, we discussed options other than colonoscopy. The patient expressed understanding. All questions answered. The patient decided to proceed with the procedure. Signed informed consent was placed on the chart. Blood Loss: minimal    Withdrawal time: More than 6 minutes  Bowel Prep: Fair  with small amounts of thick solid and semisolid stool and moderate amount of thick, opaque liquid scattered in patchy segments especially in the vicinity of diverticula throughout the colon obscuring the underlying mucosa. Lesions including polyps may have been missed. Complications: no immediate complications    DESCRIPTION OF PROCEDURE:     A time out was performed. After written informed consent was obtained, the patient was placed in the left lateral position. The perianal area was inspected, and a digital rectal exam was performed. A rectal exam was performed: normal tone, no palpable lesions. At this point, a forward viewing Olympus colonoscope was inserted into the anus and carefully advanced to the cecum. The cecum was identified by the ileocecal valve and the appendiceal orifice. The colonoscope was then slowly withdrawn with careful inspection of the mucosa in a linear and circumferential fashion. The scope was retroflexed in the rectum. Suction was utilized during the procedure to remove as much air as possible from the bowel. The colonoscope was removed from the patient, and the procedure was terminated. Findings are listed below. Findings:     NO large polyps or masses or strictures or colitis. Suboptimal exam due to prep quality as described above. Moderate pandiverticulosis in the colon  Internal hemorrhoids-Grade 1 without any bleeding stigmata  Where it was clearly visible, the mucosa appeared normal throughout the entire examined colon  Retroflexion in the rectum was otherwise normal and revealed no further abnormalities     Recommendations:  1. Repeat colonoscopy: In 3 years for colorectal cancer screening due to his prep quality today, he will need a strict 2-day clear liquid diet and a 2-day prep PEG solution like Plenvu assisted with Reglan or Zofran for prep associated nausea or vomiting  2.-- Resume previous meds and diet  - GI clinic f/u 4-6 weeks with Ms. Giles  - Keep scheduled f/u appts with other MDs     - NO ASA/NSAIDs x 2 weeks    Findings and recommendations were discussed w/ the patient. A copy of the images was provided.     Lucinda Alfaro MD, MD  5/9/2022  9:13 AM

## 2022-05-09 NOTE — H&P
Patient Name: Jenny De Jesus  : 1955  MRN: [id number]  DATE: 22    Allergies: Allergies   Allergen Reactions    Lunesta [Eszopiclone] Other (See Comments)     Flu like symptoms     Levofloxacin Other (See Comments)     Pt can not remember the reaction        ENDOSCOPY  History and Physical    Procedure:    [x] Diagnostic Colonoscopy       [] Screening Colonoscopy  [x] EGD      [] ERCP      [] EUS       [] Other    [x] Previous office notes/History and Physical reviewed from the patients chart. Please see EMR for further details of HPI. I have examined the patient's status immediately prior to the procedure and:      Indications/HPI: For both EGD and colonoscopy exams today:    1. Upper abdominal pain     2. Generalized abdominal pain          3. Abdominal bloating          4. Nausea     5. Change in bowel habits     6. Constipation, unspecified constipation type     7. Juan's esophagus determined by biopsy     8. Chronic heartburn        []Abdominal Pain   []Cancer- GI/Lung     []Fhx of colon CA/polyps  []History of Polyps  []Barretts            []Melena  []Abnormal Imaging              []Dysphagia              []Persistent Pneumonia   []Anemia                            []Food Impaction        []History of Polyps  [] GI Bleed             []Pulmonary nodule/Mass   []Change in bowel habits []Heartburn/Reflux  []Rectal Bleed (BRBPR)  []Chest Pain - Non Cardiac []Heme (+) Stool []Ulcers  []Constipation  []Hemoptysis  []Varices  []Diarrhea  []Hypoxemia    []Nausea/Vomiting   []Screening   []Crohns/Colitis  []Other:     Anesthesia:   [x] MAC [] Moderate Sedation   [] General   [] None     ROS: 12 pt Review of Symptoms was negative unless mentioned above    Medications:   Prior to Admission medications    Medication Sig Start Date End Date Taking? Authorizing Provider   oxyCODONE (OXYCONTIN) 15 MG T12A extended release tablet Take 1 tablet by mouth every 8 hours as needed for Pain.     Historical Provider, MD   Budruba-Glycopyrrol-Formoterol (BREZTRI AEROSPHERE) 160-9-4.8 MCG/ACT AERO 2 puffs 5/28/21   Historical Provider, MD   furosemide (LASIX) 20 MG tablet Take 20 mg by mouth daily    Historical Provider, MD   pantoprazole (PROTONIX) 40 MG tablet Take 40 mg by mouth in the morning and at bedtime  3/15/22   Historical Provider, MD   sucralfate (CARAFATE) 1 GM tablet Take 1 g by mouth in the morning and at bedtime  3/15/22   Historical Provider, MD   TADALAFIL PO Take 25 mg by mouth as needed    Historical Provider, MD   UNABLE TO FIND 1 mg by Subdermal route once a week abisai- weekly shot    Historical Provider, MD   dutasteride (AVODART) 0.5 MG capsule Take 0.5 mg by mouth daily    Historical Provider, MD   dilTIAZem (CARDIZEM CD) 240 MG extended release capsule 1 capsule    Historical Provider, MD   esomeprazole (NEXIUM) 40 MG delayed release capsule TAKE ONE CAPSULE BY MOUTH EVERY DAY  Patient not taking: Reported on 4/1/2022    Historical Provider, MD   gabapentin (NEURONTIN) 600 MG tablet TAKE ONE TABLET BY MOUTH FOUR TIMES A DAY **MAY MAKE DROWSY**    Historical Provider, MD   ipratropium-albuterol (DUONEB) 0.5-2.5 (3) MG/3ML SOLN nebulizer solution 3 ml as needed    Historical Provider, MD   magnesium 30 MG tablet Take 40 mg by mouth daily     Historical Provider, MD   Multiple Vitamins-Minerals (MENS MULTIVITAMIN PLUS PO) as directed    Historical Provider, MD   amLODIPine (NORVASC) 10 MG tablet 1 tablet    Historical Provider, MD   TADALAFIL PO Take 12 mg by mouth daily     Historical Provider, MD   oxyCODONE-acetaminophen (PERCOCET) 5-325 MG per tablet Take 1 tablet by mouth 4 times daily.      Historical Provider, MD   traZODone (DESYREL) 100 MG tablet Take 2 tablets by mouth nightly as needed for Sleep  Patient taking differently: Take 300 mg by mouth nightly as needed for Sleep  10/8/20   Jessica James MD   meclizine (ANTIVERT) 25 MG tablet Take 25 mg by mouth 4 times daily as needed Historical Provider, MD   mometasone-formoterol (DULERA) 200-5 MCG/ACT inhaler Inhale 2 puffs into the lungs every 12 hours    Historical Provider, MD   valACYclovir (VALTREX) 500 MG tablet Take 500 mg by mouth daily    Historical Provider, MD   tamsulosin (FLOMAX) 0.4 MG capsule Take 0.4 mg by mouth daily    Historical Provider, MD   losartan-hydrochlorothiazide (HYZAAR) 100-25 MG per tablet Take 1 tablet by mouth daily    Historical Provider, MD   cloNIDine (CATAPRES) 0.1 MG tablet Take 0.1 mg by mouth every 6 hours as needed for High Blood Pressure     Historical Provider, MD   diphenoxylate-atropine (LOMOTIL) 2.5-0.025 MG per tablet Take 1 tablet by mouth 4 times daily as needed for Diarrhea. .    Historical Provider, MD   testosterone cypionate (DEPOTESTOTERONE CYPIONATE) 200 MG/ML injection Inject 50 mg into the muscle every 14 days. Dilip Matthews Historical Provider, MD   DULoxetine (CYMBALTA) 60 MG capsule Take 60 mg by mouth daily. Historical Provider, MD   ALPRAZolam [place]) 1 MG tablet Take 1 mg by mouth 2 times daily as needed.     Historical Provider, MD       Past Medical History:  Past Medical History:   Diagnosis Date    Abnormal gait     Amnesia     Anxiety     Asthma     BiPAP (biphasic positive airway pressure) dependence     NiPPV 23cm/16cm/12cm    Cervical facet syndrome     Chronic back pain     Complex sleep apnea syndrome     Initial PS; AHI:  31.2 per PSG, 10/2014    Concussion     [facility]     DDD (degenerative disc disease)     Facial ringworm     PERCY (generalized anxiety disorder)     GERD (gastroesophageal reflux disease)     Headache     Herniated disc     Herpes simplex     History of Juan's esophagus     Hyperlipidemia     Hypersomnia     Hypertension     Hypotestosteronism     Lumbar stenosis     Memory loss     Obesity     Obstructive sleep apnea     Initial PS; AHI:  31.2 per split-night PSG, 10/2014  Pneumonia     Potential difficult airway on pre-intubation assessment     PTSD (post-traumatic stress disorder)     Sleep apnea     Bi-pap at night    Vertigo        Past Surgical History:  Past Surgical History:   Procedure Laterality Date    APPENDECTOMY      BACK SURGERY      Three lumbar fusions L3-4, L3-4-5 & S1    CARDIAC CATHETERIZATION      COLONOSCOPY      Martín Capone COLONOSCOPY  2016    Dr Alejandra Bazan, normal, 5 yr recall    COLONOSCOPY N/A 2019    Leonid---Pan diverticulosis, suboptimal prep, Grade 2 internal hemorrhoids, 3 yr recall    FRACTURE SURGERY      wrist    HERNIA REPAIR      KNEE ARTHROSCOPY      LUMBAR FUSION      OTHER SURGICAL HISTORY      nerve stimulator. dcs battery replacement 18    RHINOPLASTY      SKIN BIOPSY      SPINE SURGERY      x 3/Stimulator implant    [facility]PPER GA[facility]INTESTINAL ENDOSCOPY      [facility]    UPPER GASTROINTESTINAL ENDOSCOPY  2013    BE surveillance. pos BE / neg dysplasia. retained gastric contents    UPPER GASTROINTESTINAL ENDOSCOPY N/A 2016    Dr Alejandra Bazan, Susy (-), Barretts (+), 3 yr recall    UPPER GASTROINTESTINAL ENDOSCOPY N/A 2019    Dr Rosey Jang segment Juan's esophagus-Pan diverticulosis, suboptimal prep, Grade 2 internal hemorrhoids, 3 yr recall       Social History:  Social History     Tobacco Use    Smoking status: Former Smoker     Packs/day: 0.00     Years: 35.00     Pack years: 0.00     Types: Cigarettes     Quit date: 3/16/2016     Years since quittin.1    Smokeless tobacco: Never Used    Tobacco comment: pt states he has quit smokimg again for 3 weeks   Vaping Use    Vaping Use: Never used   Substance Use Topics    Alcohol use: Yes     Comment: 2-3 mixed drinks / month     Drug use: No       Vital Signs: There were no vitals filed for this visit.      Physical Exam:  Cardiac:  [x]WNL  []Comments:  Pulmonary:  [x]WNL   []Comments:  Neuro/Mental Status:  [x]WNL []Comments:  Abdominal:  [x]WNL    []Comments:  Other:   []WNL  []Comments:    Informed Consent:  The risks and benefits of the procedure have been discussed with either the patient or if they cannot consent, their representative. Assessment:  Patient examined and appropriate for planned sedation and procedure. Plan:  Proceed with planned sedation and procedure as above.          Dilma Luong MD

## 2022-05-09 NOTE — OP NOTE
Endoscopic Procedure Note    Patient: Jana Mas: 1955  [place] Rec#: [id number] Acc#: [id number]     Primary Care Provider Rosalba Madrigal MD    Endoscopist: Rhea Santiago MD, MD    Date of Procedure:  5/9/2022    Procedure:   1. EGD with biopsies    Indications: For both EGD and colonoscopy exams today:    1. Upper abdominal pain     2. Generalized abdominal pain          3. Abdominal bloating          4. Nausea     5. Change in bowel habits     6. Constipation, unspecified constipation type     7. Juan's esophagus determined by biopsy     8. Chronic heartburn        Anesthesia:  Sedation was administered by anesthesia who monitored the patient during the procedure. Estimated Blood Loss: minimal    Procedure:   After reviewing the patient's chart and obtaining informed consent, the patient was placed in the left lateral decubitus position. A forward-viewing Olympus endoscope was lubricated and inserted through the mouth into the oropharynx. Under direct visualization, the upper esophagus was intubated. The scope was advanced to the level of the third portion of duodenum. Scope was slowly withdrawn with careful inspection of the mucosal surfaces. The scope was retroflexed for inspection of the gastric fundus and incisura. Findings and maneuvers are listed in impression below. The patient tolerated the procedure well. The scope was removed. There were no immediate complications. Findings/IMPRESSION:  Esophagus: abnormal: Normal upper two thirds; short segment Juan's Esophagus like changes in the lower third with 1-1.5 cm segments of tongue like projections of gastric appearing mucosa into the lower third of the esophagus away from the EG junction at 45 cm. Cold biopsies were taken to check for intestinal metaplasia. There is no obvious hiatal hernia present.       Stomach:  abnormal: Few small 3 to 4 mm in diameter sessile hyperplastic appearing polyps in the gastric body-biopsy sampling was done to confirm the hyperplastic nature of these polyps. Otherwise examined stomach appeared normal    NO ulcers or masses or gastric outlet obstruction or retained food or fluid. Rugae were normal and lumen distended well with insufflation. Retroflexed views otherwise revealed a normal GE junction, fundus and cardia as well. Duodenum: Normal. Random biopsies were taken to check for Celiac disease and other causes of villous atrophy. No obvious etiology to explain the patient's upper GI symptoms were discovered on this exam.  For dyspepsia unknown GI etiology should be in the differential diagnosis     RECOMMENDATIONS:    1. Await path results, the patient will be contacted in 7-10 days with biopsy results. 2.  Repeat EGD in 3 years for surveillance of short segment Juan's esophagus  3. PPI and anti-GERD measures  4. Continue attempts to lose weight    The results were discussed with the patient and family. A copy of the images obtained were given to the patient.      Freddy Cintron MD, MD  5/9/2022  9:14 AM

## 2022-05-12 ENCOUNTER — TELEPHONE (OUTPATIENT)
Dept: GASTROENTEROLOGY | Age: 67
End: 2022-05-12

## 2022-05-12 NOTE — TELEPHONE ENCOUNTER
Xena Neff, I spoke to this patient and he said he thinks he will go on  to the ED as you recommended to be evaluated because he is still hurting at present. Patient said thank you for your quick response back.  Jonny kaba

## 2022-05-12 NOTE — TELEPHONE ENCOUNTER
We did the scopes because of his complaint of abd pain. Nothing was identified endoscopically that would explain his pain. Not sure what could have been done with the scope that would cause increased pain unless there is a perforation and this is a risk with the tests, he would be having pretty significant pain with this. So if he is having significant pain I recommend going to the ED for evaluation so stat imaging can be performed if needed.

## 2022-05-12 NOTE — TELEPHONE ENCOUNTER
Last visit [facility] aprn 4-1-22. Egd/Cln  5-9-22. FU scheduled 6-23-22. Patient called today from [phone] said he had both Egd/Cln with Cordelia Low 5-9-22 and since the scopes he has not felt the same, has increased abdominal pain about 4-5 inches above his navel, sometimes it will hit 8-10 on pain scale but at present it is about 6/10. Abdomen is bloated, no fever, BM's ok but does have some nausea, currently takes Pantoprazole 40 mg Bid and Carafate 1 gm Qid and nothing seems to help at present per the patient. Patient said not able to eat much of anything and has lost another 10 lbs, current weight is 339 lbs and is trying to lose weight but not this way. Patient drinks a lot of water as well, said something cold like sherbet feels good to his stomach just not sure what is going on, said he has had several scopes in his past and has never felt this way after procedures, very concerned. Patient wants [facility] aprn to review and recommend.   cma

## 2022-06-08 NOTE — DISCHARGE SUMMARY
Hospital Discharge Summary    Adriana Esparza  :  1955  MRN:  [id number]    Admit date:  2018  Discharge date:   2018      Admitting Physician:    Jessee Barone MD    Discharge Diagnoses: Active Problems:    Dehydration  Resolved Problems:    * No resolved hospital problems. *  VIRAJ  Viral Gastroenteritis    Hospital Course: The patient was admitted for the above noted medical/surgical issues. Please see daily progress note for further details concerning their stay. The patient improved throughout their stay and reached maximum medical improvement on the day of discharge. The patient/family agree with the treatment plan as outlined above. All questions concerning their stay were answered prior to discharge. They understand the importance of follow up concerning any abnormal test results. Discharge Medications:       Annie Wilson   Home Medication Instructions ALW:[id number]    Printed on:18 1096   Medication Information                      ALPRAZolam (XANAX) 1 MG tablet  Take 1 mg by mouth 2 times daily as needed. azelastine (ASTELIN) 0.1 % nasal spray  1 spray by Nasal route 2 times daily Use in each nostril as directed             celecoxib (CELEBREX) 200 MG capsule  Take 200 mg by mouth daily             cloNIDine (CATAPRES) 0.1 MG tablet  Take 0.1 mg by mouth every 6 hours as needed for High Blood Pressure              cyclobenzaprine (FLEXERIL) 5 MG tablet  Take 5 mg by mouth 3 times daily             diphenoxylate-atropine (LOMOTIL) 2.5-0.025 MG per tablet  Take 1 tablet by mouth 4 times daily as needed for Diarrhea. .             DULoxetine (CYMBALTA) 60 MG capsule  Take 60 mg by mouth daily. esomeprazole Magnesium (NEXIUM) 40 MG PACK  Take 40 mg by mouth daily. eszopiclone (LUNESTA) 3 MG TABS  Take 3 mg by mouth nightly. .             fluticasone (FLONASE) 50 MCG/ACT nasal spray  1 spray by Nasal route daily             gabapentin (NEURONTIN) 800 MG tablet  Take 600 mg by mouth 4 times daily 600 four times a day.             ketorolac (TORADOL) 10 MG tablet  Take 10 mg by mouth every 6 hours as needed for Pain             Krill Oil Omega-3 300 MG CAPS  Take by mouth 2 times daily              Lorcaserin HCl (BELVIQ) 10 MG TABS  Take by mouth 2 times daily. Clerance Riis losartan-hydrochlorothiazide (HYZAAR) 100-25 MG per tablet  Take 1 tablet by mouth daily             mometasone (ASMANEX 7 METERED DOSES) 110 MCG/INH AEPB  Inhale 2 puffs into the lungs daily             montelukast (SINGULAIR) 10 MG tablet  Take 10 mg by mouth nightly             omeprazole (PRILOSEC) 10 MG capsule  Take 20 mg by mouth daily              oxyCODONE (OXYCONTIN) 10 MG extended release tablet  Take 20 mg by mouth every 12 hours. Clerance Riis oxyCODONE-acetaminophen (PERCOCET)  MG per tablet  Take 1 tablet by mouth every 6 hours as needed for Pain. .             ranitidine (ZANTAC) 150 MG tablet  Take 2 tablets by mouth nightly as needed for Heartburn             tamsulosin (FLOMAX) 0.4 MG capsule  Take 0.4 mg by mouth daily             testosterone cypionate (DEPOTESTOTERONE CYPIONATE) 200 MG/ML injection  Inject 50 mg into the muscle every 14 days. Clerance Riis tiZANidine (ZANAFLEX) 4 MG tablet  Take 4 mg by mouth every 8 hours as needed             traZODone (DESYREL) 50 MG tablet  Take 150 mg by mouth nightly              valACYclovir (VALTREX) 500 MG tablet  Take 500 mg by mouth daily             varenicline (CHANTIX) 1 MG tablet  Take 1 mg by mouth 2 times daily             verapamil (CALAN SR) 240 MG extended release tablet  Take 1 tablet by mouth daily                 Consults:   none  Significant Diagnostic Studies:    See summary of labs/x-rays/path report for further details      Treatments:   IVF    Disposition:   HOME  Follow up with Mayda Ram MD in 1 weeks.     Signed:  Mayda Ram MD, MPH  8/1/2018, 5:27 PM Where Do You Want The Question To Include Opioid Counseling Located?: Case Summary Tab

## 2022-06-16 ENCOUNTER — OFFICE VISIT (OUTPATIENT)
Dept: GASTROENTEROLOGY | Age: 67
End: 2022-06-16
Payer: MEDICARE

## 2022-06-16 VITALS
SYSTOLIC BLOOD PRESSURE: 120 MMHG | OXYGEN SATURATION: 98 % | HEIGHT: 75 IN | DIASTOLIC BLOOD PRESSURE: 80 MMHG | WEIGHT: 315 LBS | BODY MASS INDEX: 39.17 KG/M2 | HEART RATE: 81 BPM

## 2022-06-16 DIAGNOSIS — R10.84 GENERALIZED ABDOMINAL PAIN: Primary | ICD-10-CM

## 2022-06-16 DIAGNOSIS — K64.8 INTERNAL HEMORRHOIDS: ICD-10-CM

## 2022-06-16 DIAGNOSIS — K57.30 DIVERTICULOSIS OF COLON: ICD-10-CM

## 2022-06-16 DIAGNOSIS — K59.00 CONSTIPATION, UNSPECIFIED CONSTIPATION TYPE: ICD-10-CM

## 2022-06-16 DIAGNOSIS — K22.70 BARRETT'S ESOPHAGUS DETERMINED BY BIOPSY: ICD-10-CM

## 2022-06-16 PROCEDURE — 3017F COLORECTAL CA SCREEN DOC REV: CPT | Performed by: NURSE PRACTITIONER

## 2022-06-16 PROCEDURE — G8427 DOCREV CUR MEDS BY ELIG CLIN: HCPCS | Performed by: NURSE PRACTITIONER

## 2022-06-16 PROCEDURE — 1123F ACP DISCUSS/DSCN MKR DOCD: CPT | Performed by: NURSE PRACTITIONER

## 2022-06-16 PROCEDURE — G8417 CALC BMI ABV UP PARAM F/U: HCPCS | Performed by: NURSE PRACTITIONER

## 2022-06-16 PROCEDURE — 99214 OFFICE O/P EST MOD 30 MIN: CPT | Performed by: NURSE PRACTITIONER

## 2022-06-16 PROCEDURE — 1036F TOBACCO NON-USER: CPT | Performed by: NURSE PRACTITIONER

## 2022-06-16 RX ORDER — POLYETHYLENE GLYCOL 3350 17 G/17G
17 POWDER, FOR SOLUTION ORAL 2 TIMES DAILY
Qty: 1000 G | Refills: 11 | Status: SHIPPED | OUTPATIENT
Start: 2022-06-16

## 2022-06-16 ASSESSMENT — ENCOUNTER SYMPTOMS
NAUSEA: 0
VOICE CHANGE: 0
VOMITING: 0
BACK PAIN: 1
CONSTIPATION: 1
BLOOD IN STOOL: 0
SHORTNESS OF BREATH: 1
SORE THROAT: 0
ABDOMINAL PAIN: 1
ANAL BLEEDING: 0
RECTAL PAIN: 0
DIARRHEA: 0
COUGH: 0
ABDOMINAL DISTENTION: 0
TROUBLE SWALLOWING: 0

## 2022-06-16 NOTE — PROGRESS NOTES
Subjective:      Doloris Essex is a70 y.o. male  Chief Complaint   Patient presents with    Follow-up       HPI  PCP: Shashank Roper MD  Referring Provider: Dr Carlos Hubbard MD  Pt made an appt s/p EGD/colonoscopy as advised by Dr Berta Garcia, to discuss results  Denies post-procedural complications  Results of scopes in epic  We reviewed everything  He is still having mid abdominal pain/pressure  And constipation  Didn't start the miralax 1-2x daily as I advised him at his previous appt  EGD and colonoscopy unrevealing for etiology of the pain. Reports he went to his PCP again for this pain a few weeks ago and he was advised that [facility] commonly causes abd pain. But pt is on this for wt loss and plans to continue taking it    Family HX:    Pt denies family hx of colon polyps, colon CA, inflammatory bowel dx, gastric CA and esophageal CA.     Past Medical History:   Diagnosis Date    Abnormal gait     Amnesia     Anxiety     Asthma     BiPAP (biphasic positive airway pressure) dependence     NiPPV 23cm/16cm/12cm    Cervical facet syndrome     Chronic back pain     Complex sleep apnea syndrome     Initial PS; AHI:  31.2 per PSG, 10/2014    Concussion     COPD (chronic obstructive pulmonary disease) (HCC)     COVID     DDD (degenerative disc disease)     Facial ringworm     PERCY (generalized anxiety disorder)     GERD (gastroesophageal reflux disease)     Headache     Herniated disc     Herpes simplex     History of Juan's esophagus     Hypersomnia     Hypertension     Hypotestosteronism     Lumbar stenosis     Memory loss     Obesity     Obstructive sleep apnea     Initial PS; AHI:  31.2 per split-night PSG, 10/2014    Pneumonia     Potential difficult airway on pre-intubation assessment     PTSD (post-traumatic stress disorder)     Sleep apnea     Bi-pap at night    SSBE (short-segment Juan's esophagus) 05/10/2022    Dr Carlos Hubbard Per EGD pathology    Vertigo Past Surgical History:   Procedure Laterality Date    APPENDECTOMY      BACK SURGERY      Three lumbar fusions L3-4, L3-4-5 & S1    CARDIAC CATHETERIZATION      COLONOSCOPY      [facility] COLONOSCOPY  2016    Dr Chely Zavala, normal, 5 yr recall    COLONOSCOPY N/A 2019    Leonid---Pan diverticulosis, suboptimal prep, Grade 2 internal hemorrhoids, 3 yr recall    COLONOSCOPY N/A 2022    Dr Lalo Prieto, Mod pan diverticulosis, int hem Gr 1 wo bleeding, 3 year recall    FRACTURE SURGERY      wrist    HERNIA REPAIR      KNEE ARTHROSCOPY      LUMBAR FUSION      OTHER SURGICAL HISTORY      nerve stimulator. dcs battery replacement 18    RHINOPLASTY      SKIN BIOPSY      SPINE SURGERY      x 3/Stimulator implant    UPPER GASTROINTESTINAL ENDOSCOPY      [id number]    UPPER GASTROINTESTINAL ENDOSCOPY  2013    BE surveillance. pos BE / neg dysplasia.  retained gastric contents    UPPER GASTROINTESTINAL ENDOSCOPY N/A 2016    Dr Chely Zavala, Susy (-), Barretts (+), 3 yr recall    UPPER GASTROINTESTINAL ENDOSCOPY N/A 2019    Dr Sabrina Sutherland segment Juan's esophagus-Pan diverticulosis, suboptimal prep, Grade 2 internal hemorrhoids, 3 yr recall    UPPER GASTROINTESTINAL ENDOSCOPY N/A 2022    Dr Lalo Prieto, (+) SSBE (-)dysplasia, (-)Hpylori, Benign HP gastric polyp, no obv etiology to explain symptoms, 3 year recall       Social History     Socioeconomic History    Marital status:      Spouse name: None    Number of children: None    Years of education: None    Highest education level: None   Occupational History    None   Tobacco Use    Smoking status: Former Smoker     Packs/day: 0.00     Years: 35.00     Pack years: 0.00     Types: Cigarettes     Quit date: 3/16/2016     Years since quittin.2    Smokeless tobacco: Never Used   Vaping Use    Vaping Use: Never used   Substance and Sexual Activity    Alcohol use: Yes     Comment: 2-3 mixed drinks / month     Drug use: No    Sexual activity: Yes     Partners: Female   Other Topics Concern    None   Social History Narrative    None     Social Determinants of Health     Financial Resource Strain:     Difficulty of Paying Living Expenses: Not on file   Food Insecurity:     Worried About Running Out of Food in the Last Year: Not on file    Nilsa of Food in the Last Year: Not on file   Transportation Needs:     Lack of Transportation (Medical): Not on file    Lack of Transportation (Non-Medical): Not on file   Physical Activity:     Days of Exercise per Week: Not on file    Minutes of Exercise per Session: Not on file   Stress:     Feeling of Stress : Not on file   Social Connections:     Frequency of Communication with Friends and Family: Not on file    Frequency of Social Gatherings with Friends and Family: Not on file    Attends Shinto Services: Not on file    Active Member of [address] [place] [address] [employer] or Organizations: Not on file    Attends Club or Organization Meetings: Not on file    Marital Status: Not on file   Intimate Partner Violence:     Fear of Current or Ex-Partner: Not on file    Emotionally Abused: Not on file    Physically Abused: Not on file    Sexually Abused: Not on file   Housing Stability:     Unable to Pay for Housing in the Last Year: Not on file    Number of [place] in the Last Year: Not on file    Unstable Housing in the Last Year: Not on file       Allergies   Allergen Reactions    Lunesta [Eszopiclone] Other (See Comments)     Flu like symptoms     Levofloxacin Other (See Comments)     Pt can not remember the reaction       Current Outpatient Medications   Medication Sig Dispense Refill    polyethylene glycol (GLYCOLAX) 17 GM/SCOOP powder Take 17 g by mouth 2 times daily 1000 g 11    oxyCODONE (OXYCONTIN) 15 MG T12A extended release tablet Take 1 tablet by mouth every 8 hours as needed for Pain.       Budeson-Glycopyrrol-Formoterol (BREZTRI AEROSPHERE) 160-9-4.8 MCG/ACT AERO 2 puffs      furosemide (LASIX) 20 MG tablet Take 20 mg by mouth daily      pantoprazole (PROTONIX) 40 MG tablet Take 40 mg by mouth in the morning and at bedtime       sucralfate (CARAFATE) 1 GM tablet Take 1 g by mouth in the morning and at bedtime       TADALAFIL PO Take 25 mg by mouth as needed      UNABLE TO FIND 1 mg by Subdermal route once a week abisai- weekly shot      dutasteride (AVODART) 0.5 MG capsule Take 0.5 mg by mouth daily      dilTIAZem (CARDIZEM CD) 240 MG extended release capsule 1 capsule      gabapentin (NEURONTIN) 600 MG tablet TAKE ONE TABLET BY MOUTH FOUR TIMES A DAY **MAY MAKE DROWSY**      ipratropium-albuterol (DUONEB) 0.5-2.5 (3) MG/3ML SOLN nebulizer solution 3 ml as needed      magnesium 30 MG tablet Take 40 mg by mouth daily       Multiple Vitamins-Minerals (MENS MULTIVITAMIN PLUS PO) as directed      TADALAFIL PO Take 12 mg by mouth daily       oxyCODONE-acetaminophen (PERCOCET) 5-325 MG per tablet Take 1 tablet by mouth 4 times daily.  traZODone (DESYREL) 100 MG tablet Take 2 tablets by mouth nightly as needed for Sleep (Patient taking differently: Take 300 mg by mouth nightly as needed for Sleep ) 180 tablet 3    meclizine (ANTIVERT) 25 MG tablet Take 25 mg by mouth 4 times daily as needed      mometasone-formoterol (DULERA) 200-5 MCG/ACT inhaler Inhale 2 puffs into the lungs every 12 hours      valACYclovir (VALTREX) 500 MG tablet Take 500 mg by mouth daily      tamsulosin (FLOMAX) 0.4 MG capsule Take 0.4 mg by mouth daily      cloNIDine (CATAPRES) 0.1 MG tablet Take 0.1 mg by mouth every 6 hours as needed for High Blood Pressure       diphenoxylate-atropine (LOMOTIL) 2.5-0.025 MG per tablet Take 1 tablet by mouth 4 times daily as needed for Diarrhea. Metta  testosterone cypionate (DEPOTESTOTERONE CYPIONATE) 200 MG/ML injection Inject 50 mg into the muscle every 14 days.  Metta  DULoxetine (CYMBALTA) 60 MG capsule Take 60 mg by mouth daily.  ALPRAZolam (XANAX) 1 MG tablet Take 1 mg by mouth 2 times daily as needed. No current facility-administered medications for this visit. Review of Systems   Constitutional: Negative for appetite change, fatigue, fever and unexpected weight change. HENT: Negative for sore throat, trouble swallowing and voice change. Respiratory: Positive for shortness of breath (at times). Negative for cough. Cardiovascular: Negative for chest pain, palpitations and leg swelling. Gastrointestinal: Positive for abdominal pain and constipation. Negative for abdominal distention, anal bleeding, blood in stool, diarrhea, nausea, rectal pain and vomiting. Genitourinary: Negative for hematuria. Musculoskeletal: Positive for arthralgias, back pain, myalgias and neck pain. Neurological: Negative for dizziness, weakness, light-headedness and headaches. Psychiatric/Behavioral: Negative for dysphoric mood and sleep disturbance. The patient is not nervous/anxious. All other systems reviewed and are negative. Objective:     Physical Exam  Vitals and nursing note reviewed. Constitutional:       Appearance: He is well-developed. Comments: /80 (Site: Left Upper Arm)   Pulse 81   Ht 6' 3\" (1.905 m)   Wt (!) 335 lb (152 kg)   SpO2 98%   BMI 41.87 kg/m²    Eyes:      General: No scleral icterus. Conjunctiva/sclera: Conjunctivae normal.      Pupils: Pupils are equal, round, and reactive to light. Neck:      Thyroid: No thyromegaly. Cardiovascular:      Rate and Rhythm: Normal rate and regular rhythm. Heart sounds: Normal heart sounds. No murmur heard. No friction rub. No gallop. Pulmonary:      Effort: Pulmonary effort is normal. No respiratory distress. Breath sounds: Normal breath sounds. Abdominal:      General: Bowel sounds are normal. There is no distension. Palpations: Abdomen is soft. Tenderness: There is abdominal tenderness.  There is no rebound. Comments: Pain here with palpation   Musculoskeletal:         General: No deformity. Normal range of motion. Cervical back: Normal range of motion and neck supple. Skin:     Coloration: Skin is not pale. Neurological:      Mental Status: He is alert and oriented to person, place, and time. Cranial Nerves: No cranial nerve deficit. Psychiatric:         Judgment: Judgment normal.           Assessment:       Diagnosis Orders   1. Generalized abdominal pain  XR ABDOMEN (KUB) (SINGLE AP VIEW)    CT ABDOMEN PELVIS W IV CONTRAST Additional Contrast? Oral   2. Constipation, unspecified constipation type  XR ABDOMEN (KUB) (SINGLE AP VIEW)    polyethylene glycol (GLYCOLAX) 17 GM/SCOOP powder    CT ABDOMEN PELVIS W IV CONTRAST Additional Contrast? Oral   3. Juan's esophagus determined by biopsy     4. Internal hemorrhoids     5. Diverticulosis of colon           Plan:      1. KUB to check for retained stool. Im getting a CT of his abdomen/pelvis as well to r/o extraluminal causes of pain. I advised him again to start on miralax, use 1-2x daily. F/u in 4-6 weeks if not effective.  He is on percocet long term, can try movantik or other meds for constipation if the miralax isnt helpful

## 2022-06-17 ENCOUNTER — TRANSCRIBE ORDERS (OUTPATIENT)
Dept: ADMINISTRATIVE | Facility: HOSPITAL | Age: 67
End: 2022-06-17

## 2022-06-17 DIAGNOSIS — K59.00 CONSTIPATION, UNSPECIFIED CONSTIPATION TYPE: ICD-10-CM

## 2022-06-17 DIAGNOSIS — R10.84 ABDOMINAL PAIN, GENERALIZED: Primary | ICD-10-CM

## 2022-06-20 ENCOUNTER — HOSPITAL ENCOUNTER (OUTPATIENT)
Dept: GENERAL RADIOLOGY | Facility: HOSPITAL | Age: 67
Discharge: HOME | End: 2022-06-20

## 2022-06-20 ENCOUNTER — HOSPITAL ENCOUNTER (OUTPATIENT)
Dept: CT IMAGING | Facility: HOSPITAL | Age: 67
Discharge: HOME | End: 2022-06-20

## 2022-06-20 DIAGNOSIS — R10.84 ABDOMINAL PAIN, GENERALIZED: ICD-10-CM

## 2022-06-20 DIAGNOSIS — K59.00 CONSTIPATION, UNSPECIFIED CONSTIPATION TYPE: ICD-10-CM

## 2022-06-20 LAB — CREAT BLDA-MCNC: 1.4 MG/DL (ref 0.6–1.3)

## 2022-06-20 PROCEDURE — 74177 CT ABD & PELVIS W/CONTRAST: CPT

## 2022-06-20 PROCEDURE — 0 IOPAMIDOL PER 1 ML: Performed by: NURSE PRACTITIONER

## 2022-06-20 PROCEDURE — 82565 ASSAY OF CREATININE: CPT

## 2022-06-20 PROCEDURE — 74018 RADEX ABDOMEN 1 VIEW: CPT

## 2022-06-20 RX ADMIN — IOPAMIDOL 100 ML: 755 INJECTION, SOLUTION INTRAVENOUS at 10:32

## 2022-06-21 ENCOUNTER — TELEPHONE (OUTPATIENT)
Dept: GASTROENTEROLOGY | Age: 67
End: 2022-06-21

## 2022-06-21 NOTE — TELEPHONE ENCOUNTER
6-21-22   PT HAS BEEN NOTIFIED OF RESULTS AND RECOMMENDATIONS. THESE ARE NOT NEW FINDINGS. RESULTS FAXED TO PCP, AND DR ARANA.

## 2022-06-21 NOTE — TELEPHONE ENCOUNTER
Please let Tom Stoner know I have reviewed results of his KUB abdominal xray as well as CT of his abdomen    The xray reveals a moderate amt of stool and gas in his colon. The CT reveals the following:  Fatty liver. Treatment for this is weight loss to get to goal weight and a low saturated fat diet. I know he is on a medication right now to help him to lose weight. He can have a referral to dietician if he wants to do this. It also reveals a moderate amt of stool in his colon like the xray revealed. 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 And he should f/u with them if this is new. In the meantime, for treatment of the constipation, I started him on miralax BID and he has a f/u if this isnt effective and we can try something else if the miralax isnt effective.

## 2022-07-15 RX ORDER — AZELASTINE 1 MG/ML
SPRAY, METERED NASAL
Qty: 30 ML | Refills: 5 | Status: SHIPPED | OUTPATIENT
Start: 2022-07-15

## 2022-07-28 ENCOUNTER — OFFICE VISIT (OUTPATIENT)
Dept: GASTROENTEROLOGY | Age: 67
End: 2022-07-28
Payer: MEDICARE

## 2022-07-28 VITALS
HEIGHT: 76 IN | SYSTOLIC BLOOD PRESSURE: 120 MMHG | BODY MASS INDEX: 38.36 KG/M2 | WEIGHT: 315 LBS | DIASTOLIC BLOOD PRESSURE: 70 MMHG | OXYGEN SATURATION: 94 % | HEART RATE: 79 BPM

## 2022-07-28 DIAGNOSIS — K59.00 CONSTIPATION, UNSPECIFIED CONSTIPATION TYPE: Primary | ICD-10-CM

## 2022-07-28 PROCEDURE — G8427 DOCREV CUR MEDS BY ELIG CLIN: HCPCS | Performed by: NURSE PRACTITIONER

## 2022-07-28 PROCEDURE — 3017F COLORECTAL CA SCREEN DOC REV: CPT | Performed by: NURSE PRACTITIONER

## 2022-07-28 PROCEDURE — G8417 CALC BMI ABV UP PARAM F/U: HCPCS | Performed by: NURSE PRACTITIONER

## 2022-07-28 PROCEDURE — 1123F ACP DISCUSS/DSCN MKR DOCD: CPT | Performed by: NURSE PRACTITIONER

## 2022-07-28 PROCEDURE — 1036F TOBACCO NON-USER: CPT | Performed by: NURSE PRACTITIONER

## 2022-07-28 PROCEDURE — 99213 OFFICE O/P EST LOW 20 MIN: CPT | Performed by: NURSE PRACTITIONER

## 2022-07-28 ASSESSMENT — ENCOUNTER SYMPTOMS
BLOOD IN STOOL: 0
SHORTNESS OF BREATH: 0
COUGH: 0
RECTAL PAIN: 0
ANAL BLEEDING: 0
DIARRHEA: 0
BACK PAIN: 1
ABDOMINAL DISTENTION: 0
VOICE CHANGE: 0
TROUBLE SWALLOWING: 0
CONSTIPATION: 1
NAUSEA: 0
ABDOMINAL PAIN: 0
VOMITING: 0

## 2022-07-28 NOTE — PROGRESS NOTES
Subjective:      Sole Gomez is a70 y.o. male  Chief Complaint   Patient presents with    Follow-up       HPI  PCP: Marisol Hernandez MD  Pt is here for 6 week f/u appt  To note the med efficacy of miralax up to 2x daily for his chronic constipation  He is on miralax 2x daily  He has a BM 1-2 times a day on average   No straining  Sometimes he feels he isn't emptying completely but other times he feels like he does  We discussed RX meds to treat the constipation  Feels this regimen is working well for him overall at this time and does not want to try anything different at this time but will let us know if he changes his mind    Family HX:    Pt denies family hx of colon polyps, colon CA, inflammatory bowel dx, gastric CA and esophageal CA.     Past Medical History:   Diagnosis Date    Abnormal gait     Amnesia     Anxiety     Asthma     BiPAP (biphasic positive airway pressure) dependence     NiPPV 23cm/16cm/12cm    Cervical facet syndrome     Chronic back pain     Complex sleep apnea syndrome     Initial PS; AHI:  31.2 per PSG, 10/2014    Concussion     COPD (chronic obstructive pulmonary disease) ([facility] [address]     COVID     DDD (degenerative disc disease)     Facial ringworm     PERCY (generalized anxiety disorder)     GERD (gastroesophageal reflux disease)     Headache     Herniated disc     Herpes simplex     History of Juan's esophagus     Hypersomnia     Hypertension     Hypotestosteronism     Lumbar stenosis     Memory loss     Obesity     Obstructive sleep apnea     Initial PS; AHI:  31.2 per split-night PSG, 10/2014    Pneumonia     Potential difficult airway on pre-intubation assessment     PTSD (post-traumatic stress disorder)     Sleep apnea     Bi-pap at night    SSBE (short-segment Juan's esophagus) 05/10/2022    Dr Leah Licea Per EGD pathology    Vertigo           Past Surgical History:   Procedure Laterality Date    APPENDECTOMY      BACK SURGERY      Three lumbar fusions L3-4, L3-4-5 & S1    CARDIAC CATHETERIZATION      COLONOSCOPY      Vashti Roberson    COLONOSCOPY  2016    Dr Ange Segal, normal, 5 yr recall    COLONOSCOPY N/A 2019    Leonid---Pan diverticulosis, suboptimal prep, Grade 2 internal hemorrhoids, 3 yr recall    COLONOSCOPY N/A 2022    Dr Belia Nair, Mod pan diverticulosis, int hem Gr 1 wo bleeding, 3 year recall    FRACTURE SURGERY      wrist    HERNIA REPAIR      KNEE ARTHROSCOPY      LUMBAR FUSION      OTHER SURGICAL HISTORY      nerve stimulator. dcs battery replacement 18    RHINOPLASTY      SKIN BIOPSY      SPINE SURGERY      x 3/Stimulator implant    UPPER GASTROINTESTINAL ENDOSCOPY      Vashti Roberson    UPPER GASTROINTESTINAL ENDOSCOPY  2013    BE surveillance. pos BE / neg dysplasia.  retained gastric contents    UPPER GASTROINTESTINAL ENDOSCOPY N/A 2016    Dr Ange Segal, Susy (-), Barretts (+), 3 yr recall    UPPER GASTROINTESTINAL ENDOSCOPY N/A 2019    Dr Raya Foots segment Juan's esophagus-Pan diverticulosis, suboptimal prep, Grade 2 internal hemorrhoids, 3 yr recall    UPPER GASTROINTESTINAL ENDOSCOPY N/A 2022    Dr Belia Nair, (+) SSBE (-)dysplasia, (-)Hpylori, Benign HP gastric polyp, no obv etiology to explain symptoms, 3 year recall       Social History     Socioeconomic History    Marital status:    Tobacco Use    Smoking status: Former     Packs/day: 0.00     Years: 35.00     Pack years: 0.00     Types: Cigarettes     Quit date: 3/16/2016     Years since quittin.3    Smokeless tobacco: Never   Vaping Use    Vaping Use: Never used   Substance and Sexual Activity    Alcohol use: Yes     Comment: 2-3 mixed drinks / month     Drug use: No    Sexual activity: Yes     Partners: Female       Allergies   Allergen Reactions    Lunesta [Eszopiclone] Other (See Comments)     Flu like symptoms     Levofloxacin Other (See Comments)     Pt can not remember the reaction       Current Outpatient Medications diphenoxylate-atropine (LOMOTIL) 2.5-0.025 MG per tablet Take 1 tablet by mouth 4 times daily as needed for Diarrhea. .      testosterone cypionate (DEPOTESTOTERONE CYPIONATE) 200 MG/ML injection Inject 50 mg into the muscle every 14 days. .      DULoxetine (CYMBALTA) 60 MG capsule Take 60 mg by mouth daily. ALPRAZolam (XANAX) 1 MG tablet Take 1 mg by mouth 2 times daily as needed. No current facility-administered medications for this visit. Review of Systems   Constitutional:  Positive for fatigue. Negative for unexpected weight change. HENT:  Negative for trouble swallowing and voice change. Respiratory:  Negative for cough and shortness of breath. Cardiovascular:  Negative for chest pain and palpitations. Gastrointestinal:  Positive for constipation (chr/controlled). Negative for abdominal distention, abdominal pain, anal bleeding, blood in stool, diarrhea, nausea, rectal pain and vomiting. Genitourinary:  Negative for hematuria. Musculoskeletal:  Positive for arthralgias, back pain and neck pain. Neurological:  Negative for weakness and headaches. Psychiatric/Behavioral:  Positive for dysphoric mood. The patient is nervous/anxious. Objective:     Physical Exam  Vitals and nursing note reviewed. Constitutional:       Appearance: He is well-developed. Comments: /70 (Site: Left Upper Arm)   Pulse 79   Ht 6' 4\" (1.93 m)   Wt (!) 342 lb (155.1 kg)   SpO2 94%   BMI 41.63 kg/m²    Eyes:      General: No scleral icterus. Conjunctiva/sclera: Conjunctivae normal.      Pupils: Pupils are equal, round, and reactive to light. Cardiovascular:      Rate and Rhythm: Normal rate and regular rhythm. Heart sounds: No murmur heard. No friction rub. No gallop. Pulmonary:      Effort: Pulmonary effort is normal. No respiratory distress. Breath sounds: Normal breath sounds. Abdominal:      General: Bowel sounds are normal. There is no distension.

## 2022-08-05 NOTE — PROGRESS NOTES
[facility]  Daily Progress Note  Delores Cazares  MRN: [id number] LOS: 0    Admit Date: 5/10/2021   5/11/2021 7:42 AM    Subjective:          Chief Complaint:  SOB, COUGH    Interval History:    Reviewed overnight events and nursing notes. Status:  improved  Pain:  some relief    Remains with cough and congestion.   HOBBS noted      ROS:  10 point ROS obtained:   Gen: No fevers  HEENT: No migraine or visual change  Lung: No cough, hemopotysis or wheezing  Cv: No chest pain or pressure  Abd: No nausea or vomit, no change in bowel fct, no gi bleeding  Ext: No inc pain or swelling  Neuro: No seizure or syncope  Skin: No new rashes  Endo: No polyuria, polyphagia or poilydypsia  Psyc: No inc anxiety or depression  MS: No increase in joint pain or swelling reported    DIET:  DIET GENERAL;    Medications:      sodium chloride        sodium chloride flush  5-40 mL Intravenous 2 times per day    enoxaparin  40 mg Subcutaneous Daily    famotidine (PEPCID) injection  20 mg Intravenous BID    methylPREDNISolone  80 mg Intravenous Q8H    cefTRIAXone (ROCEPHIN) IV  1,000 mg Intravenous Q24H    azithromycin  500 mg Intravenous Q24H    ipratropium-albuterol  1 ampule Inhalation Q4H WA    amLODIPine  10 mg Oral Daily    celecoxib  200 mg Oral Daily    dilTIAZem  240 mg Oral Daily    doxepin  25 mg Oral Nightly    DULoxetine  60 mg Oral Daily    finasteride  5 mg Oral Daily    gabapentin  600 mg Oral 4x Daily    guaiFENesin  600 mg Oral BID    hydroCHLOROthiazide  25 mg Oral Daily    pantoprazole  40 mg Oral BID AC    oxyCODONE  20 mg Oral Q12H    tamsulosin  0.4 mg Oral Daily    valACYclovir  500 mg Oral Daily    varenicline  1 mg Oral BID    Arformoterol Tartrate  15 mcg Nebulization BID    And    budesonide  0.25 mg Nebulization BID    losartan  100 mg Oral Daily       Data:     Code Status: Full Code    Family History   Problem Relation Age of Onset    Colon Polyps Mother     Heart Attack Mother  Arrhythmia Mother     Stroke Mother     Colon Polyps Maternal Grandmother     Arrhythmia Father     Obesity Sister     Stroke Paternal Grandmother     Colon Cancer Neg Hx     Esophageal Cancer Neg Hx     Liver Cancer Neg Hx     Liver Disease Neg Hx     Rectal Cancer Neg Hx     Stomach Cancer Neg Hx      Social History     Socioeconomic History    Marital status:      Spouse name: Not on file    Number of children: Not on file    Years of education: Not on file    Highest education level: Not on file   Occupational History    Not on file   Social Needs    Financial resource strain: Not on file    Food insecurity     Worry: Not on file     Inability: Not on file    Transportation needs     Medical: Not on file     Non-medical: Not on file   Tobacco Use    Smoking status: Former Smoker     Packs/day: 0.00     Years: 35.00     Pack years: 0.00     Types: Cigarettes     Quit date: 3/16/2016     Years since quittin.1    Smokeless tobacco: Never Used    Tobacco comment: pt states he has quit smokimg again for 3 weeks   Substance and Sexual Activity    Alcohol use: Yes     Comment: 2-3 mixed drinks / month     Drug use: No    Sexual activity: Yes     Partners: Female   Lifestyle    Physical activity     Days per week: Not on file     Minutes per session: Not on file    Stress: Not on file   Relationships    Social connections     Talks on phone: Not on file     Gets together: Not on file     Attends [employer] service: Not on file     Active member of club or organization: Not on file     Attends meetings of clubs or organizations: Not on file     Relationship status: Not on file    Intimate partner violence     Fear of current or ex partner: Not on file     Emotionally abused: Not on file     Physically abused: Not on file     Forced sexual activity: Not on file   Other Topics Concern    Not on file   Social History Narrative    Not on file       Labs:  Hematology:  Recent Labs 21  0519   WBC 4.3*   HGB 17.7   HCT 52.1*      INR 1.10     Chemistry:  Recent Labs     05/10/21  1423 21  0519   NA  --  134*   K 4.2 4.9   CL  --  99   CO2  --  25   GLUCOSE  --  172*   BUN  --  21   CREATININE  --  1.2   ANIONGAP  --  10   LABGLOM  --  >60   GFRAA  --  >59   CALCIUM  --  9.6     No results for input(s): PROT, LABALBU, LABA1C, P9FCBTU, S2ZFUIE, FT4, TSH, AST, ALT, LDH, GGT, ALKPHOS, BILITOT, BILIDIR, AMMONIA, AMYLASE, LIPASE, LACTATE, CHOL, HDL, LDLCHOLESTEROL, CHOLHDLRATIO, TRIG, VLDL, PHENYTOIN, PHENYF in the last 72 hours. Objective:     Vitals: /73   Pulse 73   Temp 97.9 °F (36.6 °C) (Temporal)   Resp 20   Ht 6' 4\" (1.93 m)   Wt (!) 333 lb 1 oz (151.1 kg)   SpO2 92%   BMI 40.54 kg/m²  No intake or output data in the 24 hours ending 21 0742 Temp (24hrs), Av.8 °F (36.6 °C), Min:97.2 °F (36.2 °C), Max:98.5 °F (36.9 °C)    Glucose:  No results for input(s): POCGLU in the last 72 hours.   Physical Examination:   General appearance - alert, well appearing, and in no distress and oriented to person, place, and time  Mental status - alert, oriented to person, place, and time, normal mood, behavior, speech, dress, motor activity, and thought processes  Eyes - pupils equal and reactive, extraocular eye movements intact  Ears - bilateral TM's and external ear canals normal, hearing grossly normal bilaterally  Mouth - mucous membranes moist, pharynx normal without lesions  Neck - supple, no significant adenopathy  Lymphatics - no palpable lymphadenopathy, no hepatosplenomegaly  Chest - Bilateral wheezes, diminished in bases  Heart - normal rate, regular rhythm, normal S1, S2, no murmurs, rubs, clicks or gallops  Abdomen - soft, nontender, nondistended, no masses or organomegaly bowel sounds normal  Neurological - alert, oriented, normal speech, no focal findings or movement disorder noted  Musculoskeletal - no joint tenderness, deformity or swelling  Extremities - peripheral pulses normal, no pedal edema, no clubbing or cyanosis  Skin - normal coloration and turgor, no rashes, no suspicious skin lesions noted      Assessment and Plan:     Primary Problem:  RLL PNEUMONIA  COPD EXACERBATION  HYPOXIA    Hospital Problem list:  Active Problems:    COPD with acute exacerbation ([address]  Resolved Problems:    * No resolved hospital problems. *      PMH:  Past Medical History:   Diagnosis Date    Abnormal gait     Amnesia     Anxiety     Asthma     BiPAP (biphasic positive airway pressure) dependence     NiPPV 23cm/16cm/12cm    Cervical facet syndrome     Chronic back pain     Complex sleep apnea syndrome     Initial PS; AHI:  31.2 per PSG, 10/2014    Concussion     COPD (chronic obstructive pulmonary disease) (HCC)     DDD (degenerative disc disease)     Facial ringworm     PERCY (generalized anxiety disorder)     GERD (gastroesophageal reflux disease)     Headache     Herniated disc     Herpes simplex     History of Juan's esophagus     Hyperlipidemia     Hypersomnia     Hypertension     Hypotestosteronism     Lumbar stenosis     Memory loss     Obesity     Obstructive sleep apnea     Initial PS; AHI:  31.2 per split-night PSG, 10/2014    Pneumonia     Potential difficult airway on pre-intubation assessment     PTSD (post-traumatic stress disorder)     Sleep apnea     Bi-pap at night    Vertigo        Treatment Plan:  PNEUMONIA:  1. continue IV antibiotics  2. Pulmonary toilet (nebulized treatments, Incentive Spirometry, P&D)  3. Early Mobilization  4. Collect sputum culture and blood cultures x2  5. Supplement 02 as needed to maintain oxygen saturation >92%  6. DVT prophylaxis with Lovenox and/or SCD/Teds  7. Tobacco cessation education provided    COPD:  1. continue IV steroids and wean as tolerated  2.  Supplemental 02 to maintain oxygen sats approximately 90%, avoiding suppression of respiratory drive in CO2 retainers  3. Aggressive pulmonary toilet with Duoneb   4. Early mobilization  5. Incentive spirometry  6. DVT prophylaxis  7. IV antibiotics for evidence of Acute on Chronic Bronchitis  8. Monitor for respiratory distress      Discharge planning:   Home    Reviewed treatment plans with the patient and/or family. 20 minutes spent in face to face interaction and coordination of care.      Electronically signed by Dayanara Jmienez MD on 5/11/2021 at 7:42 AM Yes

## 2022-09-20 ENCOUNTER — TRANSCRIBE ORDERS (OUTPATIENT)
Dept: ADMINISTRATIVE | Facility: HOSPITAL | Age: 67
End: 2022-09-20

## 2022-09-20 ENCOUNTER — LAB (OUTPATIENT)
Dept: LAB | Facility: HOSPITAL | Age: 67
End: 2022-09-20

## 2022-09-20 DIAGNOSIS — E66.9 OBESITY, UNSPECIFIED CLASSIFICATION, UNSPECIFIED OBESITY TYPE, UNSPECIFIED WHETHER SERIOUS COMORBIDITY PRESENT: ICD-10-CM

## 2022-09-20 DIAGNOSIS — I50.30 DIASTOLIC CHF WITH PRESERVED LEFT VENTRICULAR FUNCTION, NYHA CLASS 2: ICD-10-CM

## 2022-09-20 DIAGNOSIS — N13.8 BPH WITH OBSTRUCTION/LOWER URINARY TRACT SYMPTOMS: ICD-10-CM

## 2022-09-20 DIAGNOSIS — N18.31 STAGE 3A CHRONIC KIDNEY DISEASE: ICD-10-CM

## 2022-09-20 DIAGNOSIS — F51.01 PRIMARY INSOMNIA: ICD-10-CM

## 2022-09-20 DIAGNOSIS — G47.33 OSA (OBSTRUCTIVE SLEEP APNEA): ICD-10-CM

## 2022-09-20 DIAGNOSIS — N40.1 BPH WITH OBSTRUCTION/LOWER URINARY TRACT SYMPTOMS: ICD-10-CM

## 2022-09-20 DIAGNOSIS — I10 ESSENTIAL HYPERTENSION: Primary | ICD-10-CM

## 2022-09-20 DIAGNOSIS — I10 ESSENTIAL HYPERTENSION: ICD-10-CM

## 2022-09-20 DIAGNOSIS — J44.9 CHRONIC OBSTRUCTIVE PULMONARY DISEASE, UNSPECIFIED COPD TYPE: ICD-10-CM

## 2022-09-20 LAB
ALBUMIN SERPL-MCNC: 4.9 G/DL (ref 3.5–5.2)
ALBUMIN/GLOB SERPL: 2.1 G/DL
ALP SERPL-CCNC: 91 U/L (ref 39–117)
ALT SERPL W P-5'-P-CCNC: 30 U/L (ref 1–41)
ANION GAP SERPL CALCULATED.3IONS-SCNC: 9 MMOL/L (ref 5–15)
AST SERPL-CCNC: 36 U/L (ref 1–40)
AUTO MIXED CELLS #: 1 10*3/MM3 (ref 0.1–2.6)
AUTO MIXED CELLS %: 20.5 % (ref 0.1–24)
BILIRUB SERPL-MCNC: 0.6 MG/DL (ref 0–1.2)
BUN SERPL-MCNC: 21 MG/DL (ref 8–23)
BUN/CREAT SERPL: 16 (ref 7–25)
CALCIUM SPEC-SCNC: 10.1 MG/DL (ref 8.6–10.5)
CHLORIDE SERPL-SCNC: 98 MMOL/L (ref 98–107)
CHOLEST SERPL-MCNC: 159 MG/DL (ref 0–200)
CO2 SERPL-SCNC: 30 MMOL/L (ref 22–29)
CREAT SERPL-MCNC: 1.31 MG/DL (ref 0.76–1.27)
EGFRCR SERPLBLD CKD-EPI 2021: 60.4 ML/MIN/1.73
ERYTHROCYTE [DISTWIDTH] IN BLOOD BY AUTOMATED COUNT: 12.6 % (ref 12.3–15.4)
GLOBULIN UR ELPH-MCNC: 2.3 GM/DL
GLUCOSE SERPL-MCNC: 102 MG/DL (ref 65–99)
HCT VFR BLD AUTO: 48.2 % (ref 37.5–51)
HDLC SERPL-MCNC: 42 MG/DL (ref 40–60)
HGB BLD-MCNC: 16.5 G/DL (ref 13–17.7)
LDLC SERPL CALC-MCNC: 92 MG/DL (ref 0–100)
LDLC/HDLC SERPL: 2.11 {RATIO}
LYMPHOCYTES # BLD AUTO: 1.5 10*3/MM3 (ref 0.7–3.1)
LYMPHOCYTES NFR BLD AUTO: 30.1 % (ref 19.6–45.3)
MCH RBC QN AUTO: 31.8 PG (ref 26.6–33)
MCHC RBC AUTO-ENTMCNC: 34.2 G/DL (ref 31.5–35.7)
MCV RBC AUTO: 92.9 FL (ref 79–97)
NEUTROPHILS NFR BLD AUTO: 2.5 10*3/MM3 (ref 1.7–7)
NEUTROPHILS NFR BLD AUTO: 49.4 % (ref 42.7–76)
PLATELET # BLD AUTO: 157 10*3/MM3 (ref 140–450)
PMV BLD AUTO: 8.4 FL (ref 6–12)
POTASSIUM SERPL-SCNC: 4.3 MMOL/L (ref 3.5–5.2)
PROT SERPL-MCNC: 7.2 G/DL (ref 6–8.5)
RBC # BLD AUTO: 5.19 10*6/MM3 (ref 4.14–5.8)
SODIUM SERPL-SCNC: 137 MMOL/L (ref 136–145)
TRIGL SERPL-MCNC: 142 MG/DL (ref 0–150)
VLDLC SERPL-MCNC: 25 MG/DL (ref 5–40)
WBC NRBC COR # BLD: 5 10*3/MM3 (ref 3.4–10.8)

## 2022-09-20 PROCEDURE — 36415 COLL VENOUS BLD VENIPUNCTURE: CPT | Performed by: FAMILY MEDICINE

## 2022-09-20 PROCEDURE — 80061 LIPID PANEL: CPT

## 2022-09-20 PROCEDURE — 85025 COMPLETE CBC W/AUTO DIFF WBC: CPT

## 2022-09-20 PROCEDURE — 80053 COMPREHEN METABOLIC PANEL: CPT | Performed by: FAMILY MEDICINE

## 2022-10-04 ENCOUNTER — OFFICE VISIT (OUTPATIENT)
Dept: NEUROLOGY | Age: 67
End: 2022-10-04
Payer: MEDICARE

## 2022-10-04 VITALS
SYSTOLIC BLOOD PRESSURE: 156 MMHG | OXYGEN SATURATION: 97 % | HEIGHT: 76 IN | HEART RATE: 81 BPM | DIASTOLIC BLOOD PRESSURE: 88 MMHG | WEIGHT: 315 LBS | BODY MASS INDEX: 38.36 KG/M2

## 2022-10-04 DIAGNOSIS — M54.42 CHRONIC BILATERAL LOW BACK PAIN WITH BILATERAL SCIATICA: ICD-10-CM

## 2022-10-04 DIAGNOSIS — M54.41 CHRONIC BILATERAL LOW BACK PAIN WITH BILATERAL SCIATICA: ICD-10-CM

## 2022-10-04 DIAGNOSIS — G89.29 CHRONIC BILATERAL LOW BACK PAIN WITH BILATERAL SCIATICA: ICD-10-CM

## 2022-10-04 DIAGNOSIS — G47.33 OBSTRUCTIVE SLEEP APNEA: Primary | ICD-10-CM

## 2022-10-04 DIAGNOSIS — G47.31 CENTRAL SLEEP APNEA: ICD-10-CM

## 2022-10-04 PROCEDURE — 99213 OFFICE O/P EST LOW 20 MIN: CPT | Performed by: PSYCHIATRY & NEUROLOGY

## 2022-10-04 PROCEDURE — 1036F TOBACCO NON-USER: CPT | Performed by: PSYCHIATRY & NEUROLOGY

## 2022-10-04 PROCEDURE — 1123F ACP DISCUSS/DSCN MKR DOCD: CPT | Performed by: PSYCHIATRY & NEUROLOGY

## 2022-10-04 PROCEDURE — G8427 DOCREV CUR MEDS BY ELIG CLIN: HCPCS | Performed by: PSYCHIATRY & NEUROLOGY

## 2022-10-04 PROCEDURE — G8417 CALC BMI ABV UP PARAM F/U: HCPCS | Performed by: PSYCHIATRY & NEUROLOGY

## 2022-10-04 PROCEDURE — G8484 FLU IMMUNIZE NO ADMIN: HCPCS | Performed by: PSYCHIATRY & NEUROLOGY

## 2022-10-04 PROCEDURE — 3017F COLORECTAL CA SCREEN DOC REV: CPT | Performed by: PSYCHIATRY & NEUROLOGY

## 2022-10-04 RX ORDER — DULOXETIN HYDROCHLORIDE 60 MG/1
60 CAPSULE, DELAYED RELEASE ORAL 2 TIMES DAILY
Qty: 180 CAPSULE | Refills: 3 | Status: SHIPPED | OUTPATIENT
Start: 2022-10-04

## 2022-10-04 NOTE — PROGRESS NOTES
[id number] [facility] Neurology  [address], [address]  [place], [place]  Phone [phone]  Fax [phone] [address] [facility] Neurology Follow Up Encounter  10/4/22 12:15 PM CDT    Information:   Patient Name: Viktoriya Garcia  :   1955  Age:   74 y.o. MRN:   [id number]  Account #:  [de-identified]  Today:  10/4/22    Provider: Yolanda Ugarte M.D. Chief Complaint:   Chief Complaint   Patient presents with    Sleep Apnea       Subjective:   Viktoriya Garcia is a 74 y.o. man with a history of multiple lumbar spine surgeries, chronic back pain, central and obstructive sleep apnea who is following up. Last visit, he complained that his NiPPV was not putting out enough air. The pressures were adjusted to an IPAP of 23cm, and EPAP of 16cm, and a BU rate to 8. He is doing better with it. He uses it every night and all night. Despite the high pressures, it does not leak much. He is building a house. His back has been especially painful. He has right sciatica lately. He has had numbness and burning in his feet.         Objective:     Past Medical History:  Past Medical History:   Diagnosis Date    Abnormal gait     Amnesia     Anxiety     Asthma     BiPAP (biphasic positive airway pressure) dependence     NiPPV 23cm/16cm/12cm    Cervical facet syndrome     Chronic back pain     Complex sleep apnea syndrome     Initial PS; AHI:  31.2 per PSG, 10/2014    Concussion     COPD (chronic obstructive pulmonary disease) ([facility] [address]     COVID     DDD (degenerative disc disease)     Facial ringworm     PERCY (generalized anxiety disorder)     GERD (gastroesophageal reflux disease)     Headache     Herniated disc     Herpes simplex     History of Juan's esophagus     Hypersomnia     Hypertension     Hypotestosteronism     Lumbar stenosis     Memory loss     Obesity     Obstructive sleep apnea     Initial PS; AHI:  31.2 per split-night PSG, 10/2014    Pneumonia     Potential difficult airway on pre-intubation assessment PTSD (post-traumatic stress disorder)     Sleep apnea     Bi-pap at night    SSBE (short-segment Juan's esophagus) 05/10/2022    Dr Yuri Lopez Per EGD pathology    Vertigo        Past Surgical History:   Procedure Laterality Date    APPENDECTOMY      BACK SURGERY      Three lumbar fusions L3-4, L3-4-5 & S1    CARDIAC CATHETERIZATION      COLONOSCOPY  2009    [facility]    COLONOSCOPY  05/16/2016    Dr Coco Harrison, normal, 5 yr recall    COLONOSCOPY N/A 06/19/2019    Leonid---Pan diverticulosis, suboptimal prep, Grade 2 internal hemorrhoids, 3 yr recall    COLONOSCOPY N/A 05/09/2022    Dr Yuri Lopez, Mod pan diverticulosis, int hem Gr 1 wo bleeding, 3 year recall    FRACTURE SURGERY      wrist    HERNIA REPAIR      KNEE ARTHROSCOPY      LUMBAR FUSION      OTHER SURGICAL HISTORY      nerve stimulator. dcs battery replacement 5/2/18    RHINOPLASTY      SKIN BIOPSY      SPINE SURGERY      x 3/Stimulator implant    UPPER [facility]    UPPER GASTROINTESTINAL ENDOSCOPY  12/2013    BE surveillance. pos BE / neg dysplasia. retained gastric contents    UPPER GASTROINTESTINAL ENDOSCOPY N/A 04/12/2016    Dr Coco Harrison, Susy (-), Barretts (+), 3 yr recall    UPPER GASTROINTESTINAL ENDOSCOPY N/A 06/19/2019    Dr Holley Flattery segment Juan's esophagus-Pan diverticulosis, suboptimal prep, Grade 2 internal hemorrhoids, 3 yr recall    UPPER GASTROINTESTINAL ENDOSCOPY N/A 05/09/2022    Dr Yuri Lopez, (+) SSBE (-)dysplasia, (-)Hpylori, Benign HP gastric polyp, no obv etiology to explain symptoms, 3 year recall       Recent Hospitalizations  None    Significant Injuries  None    Habits  Valerie Morales reports that he quit smoking about 6 years ago. His smoking use included cigarettes. He has never used smokeless tobacco. He reports current alcohol use. He reports that he does not use drugs.     Family History   Problem Relation Age of Onset    Colon Polyps Mother     Heart Attack Mother Arrhythmia Mother     Stroke Mother     Colon Polyps Maternal Grandmother     Arrhythmia Father     Obesity Sister     Stroke Paternal Grandmother     Colon Cancer Neg Hx     Esophageal Cancer Neg Hx     Liver Cancer Neg Hx     Liver Disease Neg Hx     Rectal Cancer Neg Hx     Stomach Cancer Neg Hx        Social History  Harris Parents is , lives in [facility], and is retired. Medications:  Current Outpatient Medications   Medication Sig Dispense Refill    polyethylene glycol (GLYCOLAX) 17 GM/SCOOP powder Take 17 g by mouth 2 times daily 1000 g 11    oxyCODONE (OXYCONTIN) 15 MG T12A extended release tablet Take 1 tablet by mouth every 8 hours as needed for Pain. Budeson-Glycopyrrol-Formoterol (BREZTRI AEROSPHERE) 160-9-4.8 MCG/ACT AERO 2 puffs      furosemide (LASIX) 20 MG tablet Take 20 mg by mouth daily      pantoprazole (PROTONIX) 40 MG tablet Take 40 mg by mouth in the morning and at bedtime       sucralfate (CARAFATE) 1 GM tablet Take 1 g by mouth in the morning and at bedtime       TADALAFIL PO Take 25 mg by mouth as needed      UNABLE TO FIND 1 mg by Subdermal route once a week abisai- weekly shot      dutasteride (AVODART) 0.5 MG capsule Take 0.5 mg by mouth daily      dilTIAZem (CARDIZEM CD) 240 MG extended release capsule 1 capsule      gabapentin (NEURONTIN) 600 MG tablet TAKE ONE TABLET BY MOUTH FOUR TIMES A DAY **MAY MAKE DROWSY**      ipratropium-albuterol (DUONEB) 0.5-2.5 (3) MG/3ML SOLN nebulizer solution 3 ml as needed      magnesium 30 MG tablet Take 40 mg by mouth daily       Multiple Vitamins-Minerals (MENS MULTIVITAMIN PLUS PO) as directed      TADALAFIL PO Take 12 mg by mouth daily       oxyCODONE-acetaminophen (PERCOCET) 5-325 MG per tablet Take 1 tablet by mouth 4 times daily.       traZODone (DESYREL) 100 MG tablet Take 2 tablets by mouth nightly as needed for Sleep (Patient taking differently: Take 300 mg by mouth nightly as needed for Sleep) 180 tablet 3    meclizine (ANTIVERT) 25 MG tablet Take 25 mg by mouth 4 times daily as needed      mometasone-formoterol (DULERA) 200-5 MCG/ACT inhaler Inhale 2 puffs into the lungs every 12 hours      valACYclovir (VALTREX) 500 MG tablet Take 500 mg by mouth daily      tamsulosin (FLOMAX) 0.4 MG capsule Take 0.4 mg by mouth daily      cloNIDine (CATAPRES) 0.1 MG tablet Take 0.1 mg by mouth every 6 hours as needed for High Blood Pressure       diphenoxylate-atropine (LOMOTIL) 2.5-0.025 MG per tablet Take 1 tablet by mouth 4 times daily as needed for Diarrhea. .      testosterone cypionate (DEPOTESTOTERONE CYPIONATE) 200 MG/ML injection Inject 50 mg into the muscle every 14 days. .      DULoxetine (CYMBALTA) 60 MG capsule Take 60 mg by mouth daily. ALPRAZolam (XANAX) 1 MG tablet Take 1 mg by mouth 2 times daily as needed. No current facility-administered medications for this visit. Allergies:   Allergies as of 10/04/2022 - Fully Reviewed 10/04/2022   Allergen Reaction Noted    Lunesta [eszopiclone] Other (See Comments) 02/13/2018    Levofloxacin Other (See Comments) 04/08/2017       Review of Systems:  Constitutional: negative for - chills and fever  Eyes:  negative for - visual disturbance and photophobia  HENMT: negative for - headaches and sinus pain  Respiratory: negative for - cough, hemoptysis, and shortness of breath  Cardiovascular: negative for - chest pain and palpitations  Gastrointestinal: negative for - blood in stools, constipation, diarrhea, nausea, and vomiting  Genito-Urinary: negative for - hematuria, urinary frequency, urinary urgency, and urinary retention  Musculoskeletal: positive for - joint pain, joint stiffness, and joint swelling  Hematological and Lymphatic: negative for - bleeding problems, abnormal bruising, and swollen lymph nodes  Endocrine:  negative for - polydipsia and polyphagia  Allergy/Immunology:  negative for - rhinorrhea, sinus congestion, hives  Integument:  negative for - negative for - rash, change in moles, new or changing lesions  Psychological: negative for - anxiety and depression  Neurological: negative for - memory loss  Positive for - numbness/tingling, and weakness     PHYSICAL EXAMINATION:  Vitals:  BP (!) 156/88   Pulse 81   Ht 6' 4\" (1.93 m)   Wt (!) 342 lb (155.1 kg)   SpO2 97%   BMI 41.63 kg/m²   General appearance:  Alert, well developed, well nourished, in no distress  HEENT:  normocephalic, atraumatic, sclera appear normal, no nasal abnormalities, no rhinorrhea, Ears appear normal, oral mucous membranes are moist without erythema, trachea midline, thyroid is normal, no lymphadenopathy or neck mass. Cardiovascular:  Regular rate and rhythm without murmer. Mild peripheral edema, No cyanosis or clubbing. No carotid bruits. Pulmonary:  Lungs are clear to auscultation. Breathing appears normal, good expansion, normal effort without use of accessory muscles  Musculoskeletal:  Joints are osteoarthritic  Integument:  No rash, erythema, or pallor  Psychiatric:  Mood, affect, and behavior appear normal      NEUROLOGIC EXAMINATION:  Mental Status:  alert, oriented to person, place, and time. Speech:  Clear without dysarthria or dysphonia  Language:  Fluent without aphasia  Cranial Nerves:   II Visual fields are full to confrontation   III,IV, VI Extraocular movements are full   VII Facial movements are symmetrical without weakness   VIII Hearing is intact   IX,X Shoulder shrug and head rotation strength are intact   XII No tongue atrophy or fasciculations. Normal tongue protrusion. No tongue weakness  Motor:  Normal strength in both upper and lower extremities. Normal muscle tone and bulk. Deep tendon reflexes are absent. Smith's signs are absent bilaterally. There is no ankle clonus on either side. Sensation:  Sensation is intact to light touch, temperature, and vibration in all extremities.   Coordination:  Rapid alternating movements are normal in both upper and lower extremities. Finger to nose testing is unimpaired bilaterally. Gait:  Normal station and gait. Pertinent Diagnostic Studies:  NiPPV download shows that he is 100% compliant with an IPAP of 21cm, an EPAP of 14cm, and a rate of 10 with residual AHI of 5.1. Assessment:       ICD-10-CM    1. Obstructive sleep apnea  G47.33       2. Central sleep apnea  G47.31       3. Chronic bilateral low back pain with bilateral sciatica  M54.42     M54.41     G89.29       He is objectively compliant with NiPPV and clinically benefiting. He has chronic back pain. He has been evaluated recently by Dr. Darryn Colón. Plan:   1. Continue using NiPPV during sleep  2. Increase Cymbalta to 60 mg BID  3.  Follow up in     Electronically signed by Dava Mcburney, MD on 10/4/22 No

## 2022-11-07 ENCOUNTER — TRANSCRIBE ORDERS (OUTPATIENT)
Dept: ADMINISTRATIVE | Facility: HOSPITAL | Age: 67
End: 2022-11-07

## 2022-11-07 ENCOUNTER — HOSPITAL ENCOUNTER (OUTPATIENT)
Dept: GENERAL RADIOLOGY | Facility: HOSPITAL | Age: 67
Discharge: HOME | End: 2022-11-07
Admitting: PHYSICIAN ASSISTANT

## 2022-11-07 DIAGNOSIS — J20.9 ACUTE BRONCHITIS, UNSPECIFIED ORGANISM: Primary | ICD-10-CM

## 2022-11-07 PROCEDURE — 71046 X-RAY EXAM CHEST 2 VIEWS: CPT

## 2022-12-15 NOTE — PROGRESS NOTES
Subjective    Mr. Hayes is 67 y.o. male    Chief Complaint: New Patient/BPH/frequency/right groin pain    History of Present Illness  Patient presents for follow-up he has a history of BPH in the past.  He is currently taking 1 tamsulosin daily.  He states this past year he is having worsening stream and flow as well as a splayed stream at times.  Also has some hesitancy urgency frequency.  Denies any hematuria or urinary tract infections.  His bladder scan is 0 mL postvoid residual.    Patient also having some discomfort in the right groin area he suspects he may have a hernia.  He is having no difficulties with bowel movements.  He has not had any imaging demonstrating a hernia.    The following portions of the patient's history were reviewed and updated as appropriate: allergies, current medications, past family history, past medical history, past social history, past surgical history and problem list.    Review of Systems   Gastrointestinal: Positive for abdominal pain.   Genitourinary: Positive for difficulty urinating, frequency and urgency.         Current Outpatient Medications:   •  albuterol (PROVENTIL) (2.5 MG/3ML) 0.083% nebulizer solution, Take 2.5 mg by nebulization Every 4 (Four) Hours As Needed for Wheezing., Disp: 360 mL, Rfl: 3  •  albuterol sulfate  (90 Base) MCG/ACT inhaler, Inhale 2 puffs Every 4 (Four) Hours As Needed for Wheezing or Shortness of Air., Disp: 18 g, Rfl: 0  •  ALPRAZolam (XANAX) 1 MG tablet, Take 1 mg by mouth Daily., Disp: , Rfl:   •  amLODIPine (NORVASC) 10 MG tablet, Take 10 mg by mouth Daily., Disp: , Rfl:   •  azelastine (ASTELIN) 0.1 % nasal spray, USE 2 SPRAYS IN EACH NOSTRIL TWICE A DAY, Disp: 30 mL, Rfl: 5  •  Biotin 10 MG capsule, Take 1 tablet by mouth Daily., Disp: , Rfl:   •  Budeson-Glycopyrrol-Formoterol (Breztri Aerosphere) 160-9-4.8 MCG/ACT aerosol inhaler, Inhale 2 puffs 2 (Two) Times a Day., Disp: 10.7 g, Rfl: 5  •  celecoxib (CeleBREX) 200 MG capsule,  Take 200 mg by mouth Daily., Disp: , Rfl:   •  cholecalciferol (VITAMIN D3) 25 MCG (1000 UT) tablet, Take 2 tablets by mouth Daily., Disp: 30 tablet, Rfl: 0  •  cimetidine (TAGAMET) 400 MG tablet, Take 400 mg by mouth As Needed., Disp: , Rfl:   •  cloNIDine (CATAPRES) 0.1 MG tablet, Take 0.1 mg by mouth Every 6 (Six) Hours As Needed for High Blood Pressure (for bp 150/90 or greater)., Disp: , Rfl:   •  diltiaZEM CD (CARDIZEM CD) 240 MG 24 hr capsule, Take 240 mg by mouth Daily., Disp: , Rfl:   •  DULoxetine (CYMBALTA) 60 MG capsule, Take 60 mg by mouth Daily., Disp: , Rfl:   •  dutasteride (AVODART) 0.5 MG capsule, , Disp: , Rfl:   •  esomeprazole (NexIUM) 40 MG packet, Take 40 mg by mouth 3 (Three) Times a Day., Disp: , Rfl:   •  fluticasone (Flonase Allergy Relief) 50 MCG/ACT nasal spray, 2 sprays into the nostril(s) as directed by provider Daily., Disp: 16 g, Rfl: 5  •  furosemide (Lasix) 20 MG tablet, Take 1 tablet by mouth 2 (Two) Times a Day. (Patient taking differently: Take 40 mg by mouth 2 (Two) Times a Day.), Disp: 90 tablet, Rfl: 0  •  gabapentin (NEURONTIN) 600 MG tablet, Take 600 mg by mouth Every 6 (Six) Hours., Disp: , Rfl:   •  hydroCHLOROthiazide (HYDRODIURIL) 25 MG tablet, , Disp: , Rfl:   •  ipratropium-albuterol (DUO-NEB) 0.5-2.5 mg/3 ml nebulizer, Take 3 mL by nebulization 4 (Four) Times a Day As Needed for Wheezing or Shortness of Air. 2 boxes., Disp: 360 mL, Rfl: 5  •  loratadine (CLARITIN) 10 MG tablet, Take 1 tablet by mouth Daily., Disp: 90 tablet, Rfl: 3  •  losartan (COZAAR) 100 MG tablet, , Disp: , Rfl:   •  magnesium oxide (MAGOX) 400 (241.3 Mg) MG tablet tablet, Take 400 mg by mouth Daily., Disp: , Rfl:   •  Multiple Vitamins-Minerals (MULTI COMPLETE PO), Take  by mouth Daily., Disp: , Rfl:   •  O2 (OXYGEN), Inhale 2 L/min Every Night. With Bipap, Disp: , Rfl:   •  oxyCODONE-acetaminophen (PERCOCET) 5-325 MG per tablet, Take 1 tablet by mouth Every 6 (Six) Hours As Needed for Moderate  Pain  or Severe Pain . 4 x a day, Disp: , Rfl:   •  OXYCONTIN 15 MG tablet extended-release 12 hour, Take 15 mg by mouth 3 (Three) Times a Day. 2 x a day, Disp: , Rfl: 0  •  Probiotic Product (PROBIOTIC ADVANCED PO), Take 1 tablet/day by mouth Daily., Disp: , Rfl:   •  rOPINIRole (REQUIP) 0.5 MG tablet, TAKE (1) TABLET BY MOUTH DAILY AS NEEDED., Disp: , Rfl:   •  Semaglutide-Weight Management (WEGOVY SC), Inject  under the skin into the appropriate area as directed 1 (One) Time Per Week. Every friday, Disp: , Rfl:   •  tadalafil (ADCIRCA) 20 MG tablet tablet, Take 10 mg by mouth Daily As Needed., Disp: , Rfl:   •  Testosterone Cypionate (DEPOTESTOTERONE CYPIONATE) 200 MG/ML injection, , Disp: , Rfl:   •  traZODone (DESYREL) 50 MG tablet, Take 300 mg by mouth Every Night., Disp: , Rfl:   •  vitamin B-12 (CYANOCOBALAMIN) 100 MCG tablet, Take 50 mcg by mouth Daily., Disp: , Rfl:   •  tamsulosin (FLOMAX) 0.4 MG capsule 24 hr capsule, Take 2 capsules by mouth Every Night for 90 days., Disp: 60 capsule, Rfl: 2    Past Medical History:   Diagnosis Date   • Acute bronchitis    • Amnesia    • Anxiety    • Arthritis    • Asthma    • Back pain    • Chest pain    • CKD (chronic kidney disease)    • Concussion    • Contusion of chest wall    • COPD (chronic obstructive pulmonary disease) ([facility])    • COVID-19    • Degeneration of intervertebral disc of lumbar region    • Fall    • Gait disturbance    • Gastro-esophageal reflux    • Headache    • Hearing impaired    • Hypersomnia    • Hypertension    • Hypotestosteronemia    • Insomnia    • Leg cramp    • Lumbar stenosis    • Obesity    • Obesity    • KATLYN (obstructive sleep apnea)    • KATLYN treated with BiPAP     2 liters of oxygen at night   • Potential difficult airway on pre-intubation assessment 12/14/2021   • PTSD (post-traumatic stress disorder)    • Radiculopathy of lumbosacral region    • Sinusitis    • Testicular hypofunction    • Tremor        Past Surgical History:    Procedure Laterality Date   • APPENDECTOMY     • BACK SURGERY      L3-4 fusion   • BACK SURGERY      L3-S1 fusion   • BLEPHAROPLASTY Bilateral 2021    Procedure: UPPER LID BLEPHAROPLASTY (63147), WITH BROWLIFT (09750);  Surgeon: Jagdish Gregg MD;  Location:  [facility] OR;  Service: Plastics;  Laterality: Bilateral;   • BROW LIFT Bilateral 2021    Procedure: BROWLIFT (08240);  Surgeon: Jagdish Gregg MD;  Location:  [facility] OR;  Service: Plastics;  Laterality: Bilateral;   • CARDIAC CATHETERIZATION     • CARDIAC CATHETERIZATION N/A 2018    Procedure: Coronary angiography;  Surgeon: Lizandro Quiroz MD;  Location:  [facility] CATH INVASIVE LOCATION;  Service: Cardiology   • CARDIAC CATHETERIZATION N/A 2018    Procedure: Percutaneous Coronary Intervention;  Surgeon: Lizandro Quiroz MD;  Location:  [facility] CATH INVASIVE LOCATION;  Service: Cardiology   • CARPAL TUNNEL RELEASE Bilateral    • COLONOSCOPY     • ENDOSCOPY     • FRACTURE SURGERY      LEFT HAND   • HERNIA REPAIR     • KNEE SURGERY Right    • SPINAL CORD STIMULATOR REMOVAL N/A 2018    Procedure: SPINAL CORD STIMULATOR REMOVAL Removal of generator and placment of new generator;  Surgeon: Miguel Hall MD;  Location:  [facility] OR;  Service: Neurosurgery   • SPINAL CORD STIMULATOR REMOVAL N/A 2018    Procedure: SPINAL CORD STIMULATOR BATTERY CHANGE;  Surgeon: Miguel Hall MD;  Location:  [facility] OR;  Service: Neurosurgery   • THORACIC LAMINECTOMY WITH PLACEMENT OF DORSAL COLUMN STIMULATOR         Social History     Socioeconomic History   • Marital status:    Tobacco Use   • Smoking status: Former     Packs/day: 0.75     Years: 41.00     Pack years: 30.75     Types: Cigarettes     Start date: 1975     Quit date: 3/23/2016     Years since quittin.7   • Smokeless tobacco: Never   Vaping Use   • Vaping Use: Never used   Substance and Sexual Activity   • Alcohol use: Yes     Comment: occ   • Drug use: No   •  "Sexual activity: Defer       Family History   Problem Relation Age of Onset   • Stroke Mother    • Arrhythmia Mother    • Arrhythmia Father        Objective    Temp 96 °F (35.6 °C)   Ht 193 cm (76\")   Wt (!) 161 kg (354 lb 9.6 oz)   BMI 43.16 kg/m²     Physical Exam  Vitals reviewed.   Constitutional:       General: He is not in acute distress.     Appearance: He is obese.   HENT:      Head: Normocephalic and atraumatic.   Pulmonary:      Effort: Pulmonary effort is normal.   Abdominal:      Tenderness: There is abdominal tenderness.      Comments: Right lower quadrant right inguinal pain   Genitourinary:     Comments: External genitalia normal.  Digital rectal exam revealed a mildly enlarged prostate no specific lesions nodules asymmetry or firmness were palpated.  Skin:     General: Skin is warm and dry.   Neurological:      Mental Status: He is alert and oriented to person, place, and time.   Psychiatric:         Mood and Affect: Mood normal.         Behavior: Behavior normal.             Results for orders placed or performed in visit on 12/19/22   POC Urinalysis Dipstick, Multipro    Specimen: Urine   Result Value Ref Range    Color Yellow Yellow, Straw, Dark Yellow, Diane    Clarity, UA Clear Clear    Glucose, UA Negative Negative mg/dL    Bilirubin Negative Negative    Ketones, UA Negative Negative    Specific Gravity  1.020 1.005 - 1.030    Blood, UA Negative Negative    pH, Urine 5.5 5.0 - 8.0    Protein, POC Negative Negative mg/dL    Urobilinogen, UA 0.2 E.U./dL Normal, 0.2 E.U./dL    Nitrite, UA Negative Negative    Leukocytes Negative Negative   IPSS Questionnaire (AUA-7):  Incomplete emptying  Over the past month, how often have you had a sensation of not emptying your bladder completely after you finish?: Almost always (12/19/22 0943)  Frequency  Over the past month, how often have you had to urinate again less than two hours after you finishing urinating ?: Almost always (12/19/22 " 0935)  Intermittency  Over the past month, how often have you found you stopped and started again several time when you urinated ?: More than half the time (12/19/22 0935)  Urgency  Over the last month, how difficult  have you found it to postpone urination ?: About half the time (12/19/22 0935)  Weak Stream  Over the past month, how often have you had a weak urinary stream ?: More than half the time (12/19/22 0935)  Straining  Over the past month, how often have you had to push or strain to begin urination ?: About half the time (12/19/22 0935)  Nocturia  Over the past month, how many times did you most typically get up to urinate from the time you went to bed until the time you got up in the morning ?: Almost always (12/19/22 0935)  Quality of life due to urinary symptoms  If you were to spend the rest of your life with your urinary condition the way it is now, how would feel about that?: Mostly dissatified (12/19/22 0935)    Scores  Total IPSS Score: 30 (12/19/22 0935)  Total Score = Symtomatic Level: severely symptomatic: 20-35 (12/19/22 0935)   Bladder Scan interpretation  Estimation of residual urine via abdominal ultrasound  Residual Urine: 0ml  Indication: BPH  Position: Supine  Examination: Incremental scanning of the suprapubic area using 3 MHz transducer using copious amounts of acoustic gel.   Findings: An anechoic area was demonstrated which represented the bladder, with measurement of residual urine as noted. I inspected this myself. In that the residual urine was stable or insignificant, no treatment will be necessary at this time.       Assessment and Plan    Diagnoses and all orders for this visit:    1. BPH with obstruction/lower urinary tract symptoms (Primary)  -     POC Urinalysis Dipstick, Multipro  -     tamsulosin (FLOMAX) 0.4 MG capsule 24 hr capsule; Take 2 capsules by mouth Every Night for 90 days.  Dispense: 60 capsule; Refill: 2    2. Right groin pain  -     CT Abdomen Pelvis With  Contrast; Future    Patient gradually worsening voiding symptoms or complaints he has a weaker stream and flow some hesitancy at times, also describes a splayed stream intermittently.  Also has urgency and frequency.  In the meantime I will start him on 2 tamsulosin daily and schedule him for cystoscopy to better evaluate lower urinary tracts.     Regarding right groin plan I will schedule him for CT abdomen pelvis with contrast if there is a hernia present I can refer him to general surgery if indicated.

## 2022-12-19 ENCOUNTER — OFFICE VISIT (OUTPATIENT)
Dept: UROLOGY | Facility: CLINIC | Age: 67
End: 2022-12-19

## 2022-12-19 VITALS — BODY MASS INDEX: 38.36 KG/M2 | HEIGHT: 76 IN | WEIGHT: 315 LBS | TEMPERATURE: 96 F

## 2022-12-19 DIAGNOSIS — N40.1 BPH WITH OBSTRUCTION/LOWER URINARY TRACT SYMPTOMS: Primary | ICD-10-CM

## 2022-12-19 DIAGNOSIS — R10.31 RIGHT GROIN PAIN: ICD-10-CM

## 2022-12-19 DIAGNOSIS — N13.8 BPH WITH OBSTRUCTION/LOWER URINARY TRACT SYMPTOMS: Primary | ICD-10-CM

## 2022-12-19 LAB
BILIRUB BLD-MCNC: NEGATIVE MG/DL
CLARITY, POC: CLEAR
COLOR UR: YELLOW
GLUCOSE UR STRIP-MCNC: NEGATIVE MG/DL
KETONES UR QL: NEGATIVE
LEUKOCYTE EST, POC: NEGATIVE
NITRITE UR-MCNC: NEGATIVE MG/ML
PH UR: 5.5 [PH] (ref 5–8)
PROT UR STRIP-MCNC: NEGATIVE MG/DL
RBC # UR STRIP: NEGATIVE /UL
SP GR UR: 1.02 (ref 1–1.03)
UROBILINOGEN UR QL: NORMAL

## 2022-12-19 PROCEDURE — 99214 OFFICE O/P EST MOD 30 MIN: CPT | Performed by: PHYSICIAN ASSISTANT

## 2022-12-19 PROCEDURE — 51798 US URINE CAPACITY MEASURE: CPT | Performed by: PHYSICIAN ASSISTANT

## 2022-12-19 PROCEDURE — 81001 URINALYSIS AUTO W/SCOPE: CPT | Performed by: PHYSICIAN ASSISTANT

## 2022-12-19 RX ORDER — TAMSULOSIN HYDROCHLORIDE 0.4 MG/1
2 CAPSULE ORAL NIGHTLY
Qty: 60 CAPSULE | Refills: 2 | Status: SHIPPED | OUTPATIENT
Start: 2022-12-19 | End: 2023-03-19

## 2022-12-30 ENCOUNTER — HOSPITAL ENCOUNTER (OUTPATIENT)
Dept: CT IMAGING | Facility: HOSPITAL | Age: 67
Discharge: HOME | End: 2022-12-30
Admitting: PHYSICIAN ASSISTANT

## 2022-12-30 DIAGNOSIS — R10.31 RIGHT GROIN PAIN: ICD-10-CM

## 2022-12-30 LAB — CREAT BLDA-MCNC: 1.7 MG/DL (ref 0.6–1.3)

## 2022-12-30 PROCEDURE — 25010000002 IOPAMIDOL 61 % SOLUTION: Performed by: PHYSICIAN ASSISTANT

## 2022-12-30 PROCEDURE — 82565 ASSAY OF CREATININE: CPT

## 2022-12-30 PROCEDURE — 74177 CT ABD & PELVIS W/CONTRAST: CPT

## 2022-12-30 RX ADMIN — IOPAMIDOL 100 ML: 612 INJECTION, SOLUTION INTRAVENOUS at 08:33

## 2023-01-16 ENCOUNTER — PROCEDURE VISIT (OUTPATIENT)
Dept: UROLOGY | Facility: CLINIC | Age: 68
End: 2023-01-16
Payer: MEDICARE

## 2023-01-16 DIAGNOSIS — N40.1 BPH WITH OBSTRUCTION/LOWER URINARY TRACT SYMPTOMS: Primary | ICD-10-CM

## 2023-01-16 DIAGNOSIS — N13.8 BPH WITH OBSTRUCTION/LOWER URINARY TRACT SYMPTOMS: Primary | ICD-10-CM

## 2023-01-16 LAB
BILIRUB BLD-MCNC: NEGATIVE MG/DL
CLARITY, POC: CLEAR
COLOR UR: YELLOW
GLUCOSE UR STRIP-MCNC: NEGATIVE MG/DL
KETONES UR QL: ABNORMAL
LEUKOCYTE EST, POC: NEGATIVE
NITRITE UR-MCNC: NEGATIVE MG/ML
PH UR: 5.5 [PH] (ref 5–8)
PROT UR STRIP-MCNC: ABNORMAL MG/DL
RBC # UR STRIP: NEGATIVE /UL
SP GR UR: 1.02 (ref 1–1.03)
UROBILINOGEN UR QL: ABNORMAL

## 2023-01-16 PROCEDURE — 52000 CYSTOURETHROSCOPY: CPT | Performed by: UROLOGY

## 2023-01-16 PROCEDURE — 81001 URINALYSIS AUTO W/SCOPE: CPT | Performed by: UROLOGY

## 2023-01-16 NOTE — PROGRESS NOTES
CC: I am here for the doctor to look at my bladder    Cystoscopy procedure note  Pre- operative diagnosis:  Lower urinary tract symptoms    Post operative diagnosis:  Mild BPH with no definitive    Procedure:  The patient was prepped and draped in a normal sterile fashion.  The urethra was anesthetized with 2% lidocaine jelly.  A well lubricated flexible cystoscope was introduced per urethra.  The anterior urethra is normal in its caliber without evidence of a mass.  There appears to be no contracture at the bladder neck.  The prostatic urethra reveals mild bilobar enlargement with slightly elevated bladder neck.  This does not appear significantly obstructed by flexible cystoscopy.  The bladder shows a normal urothelium without evidence of a mass, erythema, nor diverticulum.  There is no evidence of a bladder stone nor foreign body.  The detrusor is minimally trabeculated.  The ureteral orifces are essentially normal in locationn and there is clear efflux of urine.    Patient tolerated the procedure well    Complications: none    Blood loss: minimal       ASSESSMENT AND PLAN          Problem List Items Addressed This Visit    None  Visit Diagnoses     BPH with obstruction/lower urinary tract symptoms    -  Primary    Relevant Orders    POC Urinalysis Dipstick, Multipro (Completed)        -I explained to him that I really do not think this is an obstructive etiology.  There may even be somewhat of a neurogenic bladder component secondary to all of the disc disease that this gentleman has experienced.  He is presently on dutasteride and tamsulosin which have not been very helpful.  I think the best option for this gentleman would be a urodynamic study but I do not have this availability nor do we have it locally.  Both patient and his wife are interested in pursuing the etiology of his voiding dysfunction further.  I recommended a referral to [facility].    Delfino Bassett MD  1/17/2023  16:44 CST

## 2023-02-09 RX ORDER — LORATADINE 10 MG/1
10 TABLET ORAL DAILY
Qty: 30 TABLET | Refills: 3 | OUTPATIENT
Start: 2023-02-09

## 2023-02-09 NOTE — TELEPHONE ENCOUNTER
Pharmacy sent a request for refills on Loratadine.   Rx Refill Note  Requested Prescriptions     Pending Prescriptions Disp Refills   • loratadine (CLARITIN) 10 MG tablet [Pharmacy Med Name: LORATADINE 10 MG TAB 10 Tablet] 30 tablet 3     Sig: TAKE 1 TABLET BY MOUTH DAILY.      Last office visit with prescribing clinician: 2/7/2022   Last telemedicine visit with prescribing clinician: Visit date not found   Next office visit with prescribing clinician: Visit date not found                         Would you like a call back once the refill request has been completed: [] Yes [] No    If the office needs to give you a call back, can they leave a voicemail: [] Yes [] No    [facility]  02/09/23, 16:47 CST

## 2023-03-06 DIAGNOSIS — J44.9 CHRONIC OBSTRUCTIVE PULMONARY DISEASE, UNSPECIFIED COPD TYPE: Primary | ICD-10-CM

## 2023-03-06 RX ORDER — FLUTICASONE PROPIONATE 50 MCG
SPRAY, SUSPENSION (ML) NASAL
Qty: 16 G | Refills: 5 | Status: SHIPPED | OUTPATIENT
Start: 2023-03-06

## 2023-03-06 NOTE — TELEPHONE ENCOUNTER
Rx Refill Note  Requested Prescriptions     Pending Prescriptions Disp Refills   • fluticasone (FLONASE) 50 MCG/ACT nasal spray [Pharmacy Med Name: FLUTICASONE PROP 50 MCG SPR 50 DARON] 16 g 5     Sig: USE 2 SPRAYS IN EACH NOSTRIL AS DIRECTED BY PROVIDER DAILY      Last office visit with prescribing clinician: 2/7/2022   Last telemedicine visit with prescribing clinician: Visit date not found   Next office visit with prescribing clinician: Visit date not found                         Would you like a call back once the refill request has been completed: [] Yes [] No    If the office needs to give you a call back, can they leave a voicemail: [] Yes [] No    Viv Leon MA  03/06/23, 10:57 CST

## 2023-04-06 RX ORDER — LORATADINE 10 MG/1
10 TABLET ORAL DAILY
Qty: 30 TABLET | Refills: 3 | OUTPATIENT
Start: 2023-04-06

## 2023-04-06 NOTE — TELEPHONE ENCOUNTER
This was denied in February because the patient needs an appointment first.    Rx Refill Note  Requested Prescriptions     Refused Prescriptions Disp Refills   • loratadine (CLARITIN) 10 MG tablet [Pharmacy Med Name: LORATADINE 10 MG TAB 10 Tablet] 30 tablet 3     Sig: Take 1 tablet by mouth Daily.      Last office visit with prescribing clinician: 2/7/2022   Last telemedicine visit with prescribing clinician: Visit date not found   Next office visit with prescribing clinician: Visit date not found                         Would you like a call back once the refill request has been completed: [] Yes [] No    If the office needs to give you a call back, can they leave a voicemail: [] Yes [] No    Viv Leon MA  04/06/23, 12:11 CDT

## 2023-07-18 ENCOUNTER — OFFICE VISIT (OUTPATIENT)
Dept: NEUROLOGY | Age: 68
End: 2023-07-18
Payer: MEDICARE

## 2023-07-18 VITALS
HEIGHT: 76 IN | WEIGHT: 315 LBS | BODY MASS INDEX: 38.36 KG/M2 | HEART RATE: 62 BPM | SYSTOLIC BLOOD PRESSURE: 120 MMHG | DIASTOLIC BLOOD PRESSURE: 64 MMHG | RESPIRATION RATE: 26 BRPM

## 2023-07-18 DIAGNOSIS — M54.42 CHRONIC BILATERAL LOW BACK PAIN WITH BILATERAL SCIATICA: ICD-10-CM

## 2023-07-18 DIAGNOSIS — M54.41 CHRONIC BILATERAL LOW BACK PAIN WITH BILATERAL SCIATICA: ICD-10-CM

## 2023-07-18 DIAGNOSIS — G89.29 CHRONIC BILATERAL LOW BACK PAIN WITH BILATERAL SCIATICA: ICD-10-CM

## 2023-07-18 DIAGNOSIS — G47.10 HYPERSOMNOLENCE: ICD-10-CM

## 2023-07-18 DIAGNOSIS — G47.31 CENTRAL SLEEP APNEA: ICD-10-CM

## 2023-07-18 DIAGNOSIS — G47.33 OBSTRUCTIVE SLEEP APNEA: Primary | ICD-10-CM

## 2023-07-18 PROCEDURE — G8417 CALC BMI ABV UP PARAM F/U: HCPCS | Performed by: PSYCHIATRY & NEUROLOGY

## 2023-07-18 PROCEDURE — 3074F SYST BP LT 130 MM HG: CPT | Performed by: PSYCHIATRY & NEUROLOGY

## 2023-07-18 PROCEDURE — G8427 DOCREV CUR MEDS BY ELIG CLIN: HCPCS | Performed by: PSYCHIATRY & NEUROLOGY

## 2023-07-18 PROCEDURE — 99214 OFFICE O/P EST MOD 30 MIN: CPT | Performed by: PSYCHIATRY & NEUROLOGY

## 2023-07-18 PROCEDURE — 1036F TOBACCO NON-USER: CPT | Performed by: PSYCHIATRY & NEUROLOGY

## 2023-07-18 PROCEDURE — 1123F ACP DISCUSS/DSCN MKR DOCD: CPT | Performed by: PSYCHIATRY & NEUROLOGY

## 2023-07-18 PROCEDURE — 3017F COLORECTAL CA SCREEN DOC REV: CPT | Performed by: PSYCHIATRY & NEUROLOGY

## 2023-07-18 PROCEDURE — 3078F DIAST BP <80 MM HG: CPT | Performed by: PSYCHIATRY & NEUROLOGY

## 2023-07-18 RX ORDER — LOSARTAN POTASSIUM 100 MG/1
TABLET ORAL
COMMUNITY
Start: 2021-12-13

## 2023-07-18 RX ORDER — ALBUTEROL SULFATE 90 UG/1
AEROSOL, METERED RESPIRATORY (INHALATION)
COMMUNITY
Start: 2023-05-26

## 2023-07-18 RX ORDER — SEMAGLUTIDE 0.68 MG/ML
INJECTION, SOLUTION SUBCUTANEOUS
COMMUNITY
Start: 2023-06-19

## 2023-07-18 RX ORDER — GUAIFENESIN, PSEUDOEPHEDRINE HYDROCHLORIDE 600; 60 MG/1; MG/1
1 TABLET, EXTENDED RELEASE ORAL EVERY 12 HOURS
COMMUNITY

## 2023-07-18 RX ORDER — FLUTICASONE PROPIONATE 50 MCG
SPRAY, SUSPENSION (ML) NASAL
COMMUNITY
Start: 2023-03-06

## 2023-07-18 RX ORDER — AZELASTINE 1 MG/ML
SPRAY, METERED NASAL
COMMUNITY
Start: 2023-05-02

## 2023-07-18 NOTE — PROGRESS NOTES
[facility]  [address] [facility], [address] [place] [address]  Phone [phone]  Fax [phone]     [facility] Follow Up Encounter  23 11:01 AM CDT    Information:   Patient Name: Javad Jesus  :   1955  Age:   79 y.o. MRN:   [id number]  Account #:  [de-identified]  Today:  23    Provider: Benedict Rios M.D. Chief Complaint:   Chief Complaint   Patient presents with    Follow-up    Sleep Apnea       Subjective:   Javad Jesus is a 79 y.o. man with a history of chronic back pain, multiple lumbar spine surgeries, central and obstructive sleep apnea who is following up. He has a NiPPV device and uses it nightly. He has difficulty maintaining sleep. Pain does interfere with his sleep. He does nap in the daytime.         Objective:     Past Medical History:  Past Medical History:   Diagnosis Date    Abnormal gait     Amnesia     Anxiety     Asthma     BiPAP (biphasic positive airway pressure) dependence     NiPPV 23cm/16cm/12cm    Cervical facet syndrome     Chronic back pain     Complex sleep apnea syndrome     Initial PS; AHI:  31.2 per PSG, 10/2014    Concussion     COPD (chronic obstructive pulmonary disease) (HCC)     COVID     DDD (degenerative disc disease)     Facial ringworm     PERCY (generalized anxiety disorder)     GERD (gastroesophageal reflux disease)     Headache     Herniated disc     Herpes simplex     History of Juan's esophagus     Hypersomnia     Hypertension     Hypotestosteronism     Lumbar stenosis     Memory loss     Obesity     Obstructive sleep apnea     Initial PS; AHI:  31.2 per split-night PSG, 10/2014    Pneumonia     Potential difficult airway on pre-intubation assessment     PTSD (post-traumatic stress disorder)     Sleep apnea     Bi-pap at night    SSBE (short-segment Juan's esophagus) 05/10/2022    Dr Sandy Sloan Per EGD pathology    Vertigo        Past Surgical History:   Procedure Laterality Date    APPENDECTOMY

## 2023-07-18 NOTE — PATIENT INSTRUCTIONS
INSTRUCTIONS:  Continue use of BiPAP St during sleep  Trial of modafinil 200 mg every am.  If it does not work well enough on keeping you up, try it once in the am and one after lunchtime.

## 2023-07-25 DIAGNOSIS — J30.89 NON-SEASONAL ALLERGIC RHINITIS, UNSPECIFIED TRIGGER: Primary | ICD-10-CM

## 2023-07-25 RX ORDER — AZELASTINE 1 MG/ML
SPRAY, METERED NASAL
Qty: 30 ML | Refills: 5 | Status: SHIPPED | OUTPATIENT
Start: 2023-07-25

## 2023-07-25 NOTE — TELEPHONE ENCOUNTER
Pharmacy sent a request for refills on Azelastine NS. Refill request routed to Ann LOOMIS.   Rx Refill Note  Requested Prescriptions     Pending Prescriptions Disp Refills    azelastine (ASTELIN) 0.1 % nasal spray [Pharmacy Med Name: AZELASTINE HCL 0.1 % SOLN 0.1 Solution] 30 mL 5     Sig: USE 2 SPRAYS IN EACH NOSTRIL TWICE A DAY      Last office visit with prescribing clinician:07/06/2023   Last telemedicine visit with prescribing clinician: Visit date not found   Next office visit with prescribing clinician: 08/17/2023                         Would you like a call back once the refill request has been completed: [] Yes [] No    If the office needs to give you a call back, can they leave a voicemail: [] Yes [] No    Seymour Garcia CMA  07/25/23, 15:23 CDT

## 2023-07-28 RX ORDER — MODAFINIL 200 MG/1
200 TABLET ORAL DAILY
Qty: 30 TABLET | Refills: 5 | Status: SHIPPED | OUTPATIENT
Start: 2023-07-28 | End: 2024-01-24

## 2023-08-09 ENCOUNTER — HOSPITAL ENCOUNTER (OUTPATIENT)
Dept: PULMONOLOGY | Facility: HOSPITAL | Age: 68
Discharge: HOME | End: 2023-08-09
Admitting: NURSE PRACTITIONER
Payer: MEDICARE

## 2023-08-09 DIAGNOSIS — J98.4 RESTRICTIVE LUNG DISEASE: ICD-10-CM

## 2023-08-09 LAB
ARTERIAL PATENCY WRIST A: POSITIVE
ATMOSPHERIC PRESS: 747 MMHG
BASE EXCESS BLDA CALC-SCNC: 4.1 MMOL/L (ref 0–2)
BDY SITE: ABNORMAL
BODY TEMPERATURE: 37 C
HCO3 BLDA-SCNC: 29.3 MMOL/L (ref 20–26)
Lab: ABNORMAL
MODALITY: ABNORMAL
PCO2 BLDA: 44.5 MM HG (ref 35–45)
PCO2 TEMP ADJ BLD: 44.5 MM HG (ref 35–45)
PH BLDA: 7.43 PH UNITS (ref 7.35–7.45)
PH, TEMP CORRECTED: 7.43 PH UNITS (ref 7.35–7.45)
PO2 BLDA: 72.5 MM HG (ref 83–108)
PO2 TEMP ADJ BLD: 72.5 MM HG (ref 83–108)
SAO2 % BLDCOA: 95.1 % (ref 94–99)
VENTILATOR MODE: ABNORMAL

## 2023-08-09 PROCEDURE — 82803 BLOOD GASES ANY COMBINATION: CPT

## 2023-08-09 PROCEDURE — 94729 DIFFUSING CAPACITY: CPT | Performed by: INTERNAL MEDICINE

## 2023-08-09 PROCEDURE — 94726 PLETHYSMOGRAPHY LUNG VOLUMES: CPT

## 2023-08-09 PROCEDURE — 36600 WITHDRAWAL OF ARTERIAL BLOOD: CPT

## 2023-08-09 PROCEDURE — 94060 EVALUATION OF WHEEZING: CPT

## 2023-08-09 PROCEDURE — 94726 PLETHYSMOGRAPHY LUNG VOLUMES: CPT | Performed by: INTERNAL MEDICINE

## 2023-08-09 PROCEDURE — 94060 EVALUATION OF WHEEZING: CPT | Performed by: INTERNAL MEDICINE

## 2023-08-09 PROCEDURE — 94729 DIFFUSING CAPACITY: CPT

## 2023-08-09 RX ORDER — ALBUTEROL SULFATE 2.5 MG/3ML
2.5 SOLUTION RESPIRATORY (INHALATION) ONCE
Status: COMPLETED | OUTPATIENT
Start: 2023-08-09 | End: 2023-08-09

## 2023-08-09 RX ADMIN — ALBUTEROL SULFATE 2.5 MG: 2.5 SOLUTION RESPIRATORY (INHALATION) at 11:57

## 2023-08-14 NOTE — PROGRESS NOTES
VLADISLAV Seo  [facility]   Pulmonary and Critical Care  [address]  [place] [address]  Phone: [phone]  Fax: [phone]           Chief Complaint  Non-seasonal allergic rhinitis, unspecified trigger    Subjective    History of Present Illness     Carlos Hayes presents to [facility] PULMONARY & CRITICAL CARE MEDICINE   History of Present Illness  Mr. Hayes is a 68-year-old male patient of Dr. Scherer's last seen in February 2022 with known chronic respiratory failure with hypoxemia, restrictive lung disease, Elevated diaphragm,obstructive sleep apnea with BiPAP, history of COVID-19 (2020, 2021), restless leg syndrome, morbid obesity, CKD, arthritis, anxiety, PTSD, tremor, sinusitis, hypertension, chronic back pain with degeneration. He has had bronchitis/ pneumonia on/off most of his life. Short term memory loss. He is a former smoker. PFT showed mild bordering moderate restriction with no change post bronchodilator except for coexisting decrease in midlfows and some improvement post bronchodilator. Lung volumes with borderline restriction. Supra normal diffusion capacity when corrected for alveolar volume. He is compliant with his BiPAP.  He is on 2-3 liters per minute of supplemental oxygen for which he uses nightly bleed into PAP. He has postnasal drip/allergies for which he uses Astelin nasal spray, Flonase nasal spray, loratadine and finds some benefit. He stopped the mucinex but had been taking it twice a day. His balance got off, he quit taking the mucinex and it went away. He fell in the shower and hurt his right shoulder and bruised his left calf. He was kept on Breztri inhaler and asked to take as prescribed. Today he reports benefit. He uses albuterol rescue inhaler once a day. He has a DuoNeb nebulizer he uses-2-3 X a day. Both help.  When he lays face down he gets clogged up in the sinuses. He is on Testosterone. He was seen in the sleep  "lab last month and reports he was put on a new medication for hypersomnolence.  His compliance report showed 100% compliant with AHI of 1.3.        Objective   Vital Signs:   /76   Pulse 76   Resp 16   Ht 193 cm (76\")   Wt (!) 168 kg (370 lb)   SpO2 95% Comment: RA  BMI 45.04 kg/mý     Physical Exam  Vitals reviewed.   Constitutional:       Appearance: Normal appearance. He is morbidly obese.   Cardiovascular:      Rate and Rhythm: Normal rate and regular rhythm.   Pulmonary:      Effort: Pulmonary effort is normal.      Breath sounds: Normal breath sounds.   Neurological:      General: No focal deficit present.      Mental Status: He is alert and oriented to person, place, and time.   Psychiatric:         Mood and Affect: Mood normal.         Behavior: Behavior normal.        Result Review :  The following data was reviewed by: VLADISLAV Seo on 08/17/2023:    My interpretation of imaging:  none  My interpretation of labs: none      My interpretation of the PFT : as below and in HPI    Results for orders placed during the hospital encounter of 08/09/23    Pulmonary Function Test    [facility] - Pulmonary Function Test    [address] [place] [address]  [address]  [phone]    Patient : Carlos Hayes  MRN : [id number]  CSN : [id number]  Pulmonologist : Earl Shearer MD  Date : 8/9/2023    ______________________________________________________________________    Interpretation :  1.  Spirometry is consistent with a mild bordering on moderate restrictive ventilatory defect with a coexisting decrease in midflows and peak expiratory flow.  2.  There is some improvement in midflows and peak expiratory flow postbronchodilator.  However postbronchodilator spirometry is still consistent with a mild bordering on moderate restrictive ventilatory defect with a decrease in midflows.  Peak expiratory flow is now within normal limits.  3.  Lung volumes reveal a low normal total " lung capacity and vital capacity suggestive of a borderline restrictive ventilatory defect.  There is also a decreased expiratory reserve volume.  4.  Diffusion capacity is within normal limits and when corrected for alveolar volume is supranormal.  5.  Arterial blood gases on room air reveal mild hypoxemia.  A mild metabolic alkalosis is also present.  6.  When current studies are compared to studies performed on June 25, 2021, the patient's current pre and postbronchodilator spirometry reveals a slight decline in both the FVC and FEV1 compared to previous baseline values.  There has been slight improvement in the patient's total lung capacity compared to previous.  When corrected for alveolar volume there is no significant change in diffusion capacity compared to previous.      Earl Shearer MD      Results for orders placed during the hospital encounter of 06/25/21    Full Pulmonary Function Test With Bronchodilator    Narrative  [facility] - Pulmonary Function Test    [address] [place] [address]  [address]  [phone]    Patient : Carlos Hayes  MRN : [id number]  CSN : [id number]  Pulmonologist : Earl Shearer MD  Date : 6/28/2021    ______________________________________________________________________    Interpretation :  1.  Spirometry is consistent with a mild restrictive ventilatory defect with coexisting small airways disease.  2.  Lung volumes confirm a mild restrictive ventilatory defect.  There is also a decrease in inspiratory capacity.  3.  Diffusion capacity is within normal limits and when corrected for alveolar volume is supranormal.  4.  When current studies are compared to studies from December 7 of 2018, there has been a drop in the patient's forced vital capacity and FEV1 compared to previous.  There has also been a decrease in total lung capacity compared to previous.  When corrected for alveolar volume there has been an improvement in diffusion capacity compared to  previous.      Earl Shearer MD      Results for orders placed during the hospital encounter of 12/07/18    Full Pulmonary Function Test With Bronchodilator    Narrative  [facility] - Pulmonary Function Test    [address]  [place]  [place]  [address]  [phone]    Patient : Carlos Hayes  MRN : [id number]  CSN : [id number]  Pulmonologist : Earl Shearer MD  Date : 12/10/2018    ______________________________________________________________________    Interpretation :  1.  Spirometry is within normal limits with the exception of a mild decrease in the patient's forced inspiratory vital capacity.  2.  Lung volumes reveal a mild decrease in expiratory reserve volume and otherwise are within normal limits.  3.  Diffusion capacity is within normal limits.      Earl Shearer MD          Assessment and Plan   Diagnoses and all orders for this visit:    1. Restrictive lung disease (Primary)    2. Small airways disease    3. Obstructive sleep apnea treated with BiPAP    4. Personal history of nicotine dependence    5. Nocturnal hypoxia    6. Non-seasonal allergic rhinitis, unspecified trigger    7. Morbid obesity with BMI of 45.0-49.9, adult          1. Continue Flonase, Astelin and Loratidine   2. Continue Breztri as he finds benefit.    3. Use Albuterol HFA rescue inhaler as needed. Use 2 puffs every 4 hours as needed. Do not use with Albuterol nebulizer   4. May use Albuterol Nebulizer as needed every 4 hours. Do not use with the Albuterol HFA.   5. We reviewed his PFT. Restriction is likely related to his weight.   6. We discussed weight loss and he is agreeable to be referred to the Bariatric office for weight loss management.   7.  His LDCT as ordered from his PCP in May was not scheduled as he had told the schedulers wrong on when he quit smoking. He quit in 2016 which makes him eligible for LDCT. I will order today.       Follow Up   Return in about 3 months (around  11/17/2023).  Patient was given instructions and counseling regarding his condition or for health maintenance advice. Please see specific information pulled into the AVS if appropriate.     Mary Christian, VLADISLAV  8/17/2023  18:21 CDT

## 2023-08-17 ENCOUNTER — OFFICE VISIT (OUTPATIENT)
Dept: PULMONOLOGY | Facility: CLINIC | Age: 68
End: 2023-08-17
Payer: MEDICARE

## 2023-08-17 VITALS
WEIGHT: 315 LBS | HEIGHT: 76 IN | OXYGEN SATURATION: 95 % | DIASTOLIC BLOOD PRESSURE: 76 MMHG | HEART RATE: 76 BPM | BODY MASS INDEX: 38.36 KG/M2 | SYSTOLIC BLOOD PRESSURE: 140 MMHG | RESPIRATION RATE: 16 BRPM

## 2023-08-17 DIAGNOSIS — G47.34 NOCTURNAL HYPOXIA: Chronic | ICD-10-CM

## 2023-08-17 DIAGNOSIS — E66.01 MORBID OBESITY WITH BMI OF 45.0-49.9, ADULT: ICD-10-CM

## 2023-08-17 DIAGNOSIS — J98.4 SMALL AIRWAYS DISEASE: Chronic | ICD-10-CM

## 2023-08-17 DIAGNOSIS — J30.89 NON-SEASONAL ALLERGIC RHINITIS, UNSPECIFIED TRIGGER: ICD-10-CM

## 2023-08-17 DIAGNOSIS — J98.4 RESTRICTIVE LUNG DISEASE: Primary | ICD-10-CM

## 2023-08-17 DIAGNOSIS — G47.33 OBSTRUCTIVE SLEEP APNEA TREATED WITH BIPAP: ICD-10-CM

## 2023-08-17 DIAGNOSIS — Z87.891 PERSONAL HISTORY OF NICOTINE DEPENDENCE: ICD-10-CM

## 2023-08-17 PROBLEM — J18.9 CAP (COMMUNITY ACQUIRED PNEUMONIA): Status: RESOLVED | Noted: 2021-05-12 | Resolved: 2023-08-17

## 2023-08-17 PROBLEM — J44.1 COPD WITH ACUTE EXACERBATION: Status: RESOLVED | Noted: 2021-05-28 | Resolved: 2023-08-17

## 2023-08-17 PROCEDURE — 1159F MED LIST DOCD IN RCRD: CPT | Performed by: NURSE PRACTITIONER

## 2023-08-17 PROCEDURE — 1160F RVW MEDS BY RX/DR IN RCRD: CPT | Performed by: NURSE PRACTITIONER

## 2023-08-17 PROCEDURE — 3078F DIAST BP <80 MM HG: CPT | Performed by: NURSE PRACTITIONER

## 2023-08-17 PROCEDURE — 3077F SYST BP >= 140 MM HG: CPT | Performed by: NURSE PRACTITIONER

## 2023-08-17 PROCEDURE — 99214 OFFICE O/P EST MOD 30 MIN: CPT | Performed by: NURSE PRACTITIONER

## 2023-08-17 RX ORDER — MODAFINIL 200 MG/1
200 TABLET ORAL DAILY
COMMUNITY

## 2023-08-17 RX ORDER — PANTOPRAZOLE SODIUM 40 MG/1
40 TABLET, DELAYED RELEASE ORAL DAILY
COMMUNITY

## 2023-08-17 RX ORDER — VALACYCLOVIR HYDROCHLORIDE 500 MG/1
500 TABLET, FILM COATED ORAL 2 TIMES DAILY
COMMUNITY

## 2023-08-22 ENCOUNTER — TRANSCRIBE ORDERS (OUTPATIENT)
Dept: ADMINISTRATIVE | Facility: HOSPITAL | Age: 68
End: 2023-08-22
Payer: MEDICARE

## 2023-08-22 DIAGNOSIS — M25.511 RIGHT SHOULDER PAIN, UNSPECIFIED CHRONICITY: Primary | ICD-10-CM

## 2023-08-31 ENCOUNTER — HOSPITAL ENCOUNTER (OUTPATIENT)
Dept: CT IMAGING | Facility: HOSPITAL | Age: 68
Discharge: HOME | End: 2023-08-31
Payer: MEDICARE

## 2023-08-31 ENCOUNTER — HOSPITAL ENCOUNTER (OUTPATIENT)
Dept: GENERAL RADIOLOGY | Facility: HOSPITAL | Age: 68
Discharge: HOME | End: 2023-08-31
Payer: MEDICARE

## 2023-08-31 ENCOUNTER — TRANSCRIBE ORDERS (OUTPATIENT)
Dept: ADMINISTRATIVE | Facility: HOSPITAL | Age: 68
End: 2023-08-31
Payer: MEDICARE

## 2023-08-31 DIAGNOSIS — M25.511 RIGHT SHOULDER PAIN, UNSPECIFIED CHRONICITY: ICD-10-CM

## 2023-08-31 DIAGNOSIS — M25.511 RIGHT SHOULDER PAIN, UNSPECIFIED CHRONICITY: Primary | ICD-10-CM

## 2023-08-31 PROCEDURE — 73030 X-RAY EXAM OF SHOULDER: CPT

## 2023-08-31 PROCEDURE — 73200 CT UPPER EXTREMITY W/O DYE: CPT

## 2023-09-18 ENCOUNTER — APPOINTMENT (OUTPATIENT)
Dept: CT IMAGING | Facility: HOSPITAL | Age: 68
End: 2023-09-18
Payer: MEDICARE

## 2023-09-18 ENCOUNTER — HOSPITAL ENCOUNTER (OUTPATIENT)
Dept: CT IMAGING | Facility: HOSPITAL | Age: 68
Discharge: HOME | End: 2023-09-18
Admitting: NURSE PRACTITIONER
Payer: MEDICARE

## 2023-09-18 DIAGNOSIS — Z87.891 PERSONAL HISTORY OF NICOTINE DEPENDENCE: ICD-10-CM

## 2023-09-18 PROCEDURE — 71271 CT THORAX LUNG CANCER SCR C-: CPT

## 2023-09-20 ENCOUNTER — TELEPHONE (OUTPATIENT)
Dept: BARIATRICS/WEIGHT MGMT | Facility: CLINIC | Age: 68
End: 2023-09-20
Payer: MEDICARE

## 2023-09-20 NOTE — PROGRESS NOTES
Spoke with patient and relayed test results. Patient voiced understanding. Faxed report to Dr. Elliott at [phone].

## 2023-09-20 NOTE — TELEPHONE ENCOUNTER
LVM about their new pt appt, to bring ppw, sign up for B360. Arrive 60 minutes early if these aren't done. We will do a glucose finger stick at appt. Call w ith any questions [phone]. Also this 1st appt may last up 2hrs

## 2023-09-21 ENCOUNTER — NUTRITION (OUTPATIENT)
Dept: BARIATRICS/WEIGHT MGMT | Facility: CLINIC | Age: 68
End: 2023-09-21
Payer: MEDICARE

## 2023-09-21 ENCOUNTER — OFFICE VISIT (OUTPATIENT)
Dept: BARIATRICS/WEIGHT MGMT | Facility: CLINIC | Age: 68
End: 2023-09-21
Payer: MEDICARE

## 2023-09-21 VITALS
SYSTOLIC BLOOD PRESSURE: 128 MMHG | OXYGEN SATURATION: 93 % | BODY MASS INDEX: 39.17 KG/M2 | HEIGHT: 75 IN | HEART RATE: 70 BPM | TEMPERATURE: 98.6 F | DIASTOLIC BLOOD PRESSURE: 76 MMHG | WEIGHT: 315 LBS

## 2023-09-21 DIAGNOSIS — E66.01 CLASS 3 SEVERE OBESITY DUE TO EXCESS CALORIES WITH SERIOUS COMORBIDITY AND BODY MASS INDEX (BMI) OF 45.0 TO 49.9 IN ADULT: Primary | ICD-10-CM

## 2023-09-21 DIAGNOSIS — G47.33 OBSTRUCTIVE SLEEP APNEA TREATED WITH BIPAP: ICD-10-CM

## 2023-09-21 DIAGNOSIS — R73.9 ELEVATED SERUM GLUCOSE: ICD-10-CM

## 2023-09-21 DIAGNOSIS — I10 PRIMARY HYPERTENSION: ICD-10-CM

## 2023-09-21 PROBLEM — E66.813 CLASS 3 SEVERE OBESITY DUE TO EXCESS CALORIES WITH SERIOUS COMORBIDITY AND BODY MASS INDEX (BMI) OF 45.0 TO 49.9 IN ADULT: Status: ACTIVE | Noted: 2018-04-02

## 2023-09-21 LAB — GLUCOSE BLDC GLUCOMTR-MCNC: 121 MG/DL (ref 70–130)

## 2023-09-21 NOTE — ASSESSMENT & PLAN NOTE
Patient's (Body mass index is 45.5 kg/m².) indicates that they are morbidly/severely obese (BMI > 40 or > 35 with obesity - related health condition) with health conditions that include obstructive sleep apnea and hypertension . Weight is worsening. BMI  is above average; BMI management plan is completed. We discussed portion control and increasing exercise.

## 2023-09-21 NOTE — PROGRESS NOTES
Patient Care Team:  Carlos Elliott MD as PCP - Good Pride MD as Consulting Physician (Pain Medicine)  Lizandro Quiroz MD as Cardiologist (Cardiology)  Carlos Elliott MD as Referring Physician (Family Medicine)  Mary Christian APRN as Nurse Practitioner (Nurse Practitioner)      Subjective     Patient is a 68 y.o. male presents with morbid obesity and his Body mass index is 45.5 kg/m².     Patient was referred to our practice by PCP.  He is here for discussion of surgical weight loss options.  He stated he has been with the disease of obesity for more than 20 years.  He stated he suffers from Hypertension and KATLYN and morbid obesity due to his weight gain.  He stated that weight loss helps alleviate these symptoms.   he has tried KETO and calorie counting . he stated that he has attempted these conservative methods for weight loss without maintaining long term success.  Today he would like to discuss surgical weight loss options such as the Laparoscopic Sleeve Gastrectomy or the Laparoscopic R - Y Gastric Bypass.     he states the weight worsened around age 52 and the highest weight was 380 lbs.    Review of Systems   Constitutional:  Positive for fatigue.   Respiratory:  Positive for shortness of breath and wheezing.    Cardiovascular: Negative.    Gastrointestinal: Negative.    Endocrine: Negative.    Musculoskeletal:  Positive for arthralgias, back pain and joint swelling.   Neurological:  Positive for numbness.   Psychiatric/Behavioral:  The patient is nervous/anxious.       History  Past Medical History:   Diagnosis Date    Acute bronchitis     Amnesia     Anxiety     Arthritis     Asthma     Back pain     Chest pain     CKD (chronic kidney disease)     Concussion     Contusion of chest wall     COPD (chronic obstructive pulmonary disease)     COVID-19     Degeneration of intervertebral disc of lumbar region     Fall     Gait disturbance     Gastro-esophageal reflux     Headache      Hearing impaired     Hypersomnia     Hypertension     Hypotestosteronemia     Insomnia     Leg cramp     Lumbar stenosis     Obesity     Obesity     KATLYN (obstructive sleep apnea)     KATLYN treated with BiPAP     2 liters of oxygen at night    Potential difficult airway on pre-intubation assessment 12/14/2021    PTSD (post-traumatic stress disorder)     Radiculopathy of lumbosacral region     Sinusitis     Testicular hypofunction     Tremor       Past Surgical History:   Procedure Laterality Date    APPENDECTOMY      BACK SURGERY  1994    L3-4 fusion    BACK SURGERY  2009    L3-S1 fusion    BLEPHAROPLASTY Bilateral 12/1/2021    Procedure: UPPER LID BLEPHAROPLASTY (71721), WITH BROWLIFT (69847);  Surgeon: Jagdish Gregg MD;  Location:  [facility];  Service: Plastics;  Laterality: Bilateral;    BROW LIFT Bilateral 12/1/2021    Procedure: BROWLIFT (80941);  Surgeon: Jagdish Gregg MD;  Location:  [facility];  Service: Plastics;  Laterality: Bilateral;    CARDIAC CATHETERIZATION      CARDIAC CATHETERIZATION N/A 12/14/2018    Procedure: Coronary angiography;  Surgeon: Lizandro Quiroz MD;  Location:  [facility];  Service: Cardiology    CARDIAC CATHETERIZATION N/A 12/14/2018    Procedure: Percutaneous Coronary Intervention;  Surgeon: Lizandro Quiroz MD;  Location:  [facility];  Service: Cardiology    CARPAL TUNNEL RELEASE Bilateral     COLONOSCOPY      ENDOSCOPY      FRACTURE SURGERY      LEFT HAND    HERNIA REPAIR      KNEE SURGERY Right     SPINAL CORD STIMULATOR REMOVAL N/A 5/2/2018    Procedure: SPINAL CORD STIMULATOR REMOVAL Removal of generator and placment of new generator;  Surgeon: Miguel Hall MD;  Location:  [facility];  Service: Neurosurgery    SPINAL CORD STIMULATOR REMOVAL N/A 7/18/2018    Procedure: SPINAL CORD STIMULATOR BATTERY CHANGE;  Surgeon: Miguel Hall MD;  Location:  [facility];  Service: Neurosurgery    THORACIC LAMINECTOMY WITH PLACEMENT OF DORSAL COLUMN  STIMULATOR        Social History     Socioeconomic History    Marital status:    Tobacco Use    Smoking status: Former     Packs/day: 0.75     Years: 41.00     Pack years: 30.75     Types: Cigarettes     Start date: 1975     Quit date: 3/23/2016     Years since quittin.5     Passive exposure: Current    Smokeless tobacco: Never   Vaping Use    Vaping Use: Never used   Substance and Sexual Activity    Alcohol use: Yes     Comment: occ    Drug use: No    Sexual activity: Defer      Family History   Problem Relation Age of Onset    Stroke Mother     Arrhythmia Mother     Arrhythmia Father       Allergies   Allergen Reactions    Eszopiclone Other (See Comments)     Flu like symptoms     Levofloxacin Rash and Unknown - High Severity                 Current Outpatient Medications:     albuterol (PROVENTIL) (2.5 MG/3ML) 0.083% nebulizer solution, Take 2.5 mg by nebulization Every 4 (Four) Hours As Needed for Wheezing., Disp: 360 mL, Rfl: 3    albuterol sulfate  (90 Base) MCG/ACT inhaler, Inhale 2 puffs Every 4 (Four) Hours As Needed for Wheezing or Shortness of Air., Disp: 18 g, Rfl: 0    ALPRAZolam (XANAX) 1 MG tablet, Take 1 tablet by mouth Daily., Disp: , Rfl:     amLODIPine (NORVASC) 10 MG tablet, Take 1 tablet by mouth Daily., Disp: , Rfl:     atorvastatin (LIPITOR) 10 MG tablet, Take 1 tablet by mouth Daily., Disp: , Rfl:     azelastine (ASTELIN) 0.1 % nasal spray, USE 2 SPRAYS IN EACH NOSTRIL TWICE A DAY, Disp: 30 mL, Rfl: 5    Budeson-Glycopyrrol-Formoterol (Breztri Aerosphere) 160-9-4.8 MCG/ACT aerosol inhaler, Inhale 2 puffs 2 (Two) Times a Day., Disp: 10.7 g, Rfl: 5    cholecalciferol (VITAMIN D3) 25 MCG (1000 UT) tablet, Take 2 tablets by mouth Daily., Disp: 30 tablet, Rfl: 0    cloNIDine (CATAPRES) 0.1 MG tablet, Take 1 tablet by mouth Every 6 (Six) Hours As Needed for High Blood Pressure (for bp 150/90 or greater)., Disp: , Rfl:     diltiaZEM CD (CARDIZEM CD) 240 MG 24 hr capsule,  Take 1 capsule by mouth Daily., Disp: , Rfl:     DULoxetine (CYMBALTA) 60 MG capsule, Take 1 capsule by mouth Daily., Disp: , Rfl:     dutasteride (AVODART) 0.5 MG capsule, , Disp: , Rfl:     fluticasone (FLONASE) 50 MCG/ACT nasal spray, USE 2 SPRAYS IN EACH NOSTRIL AS DIRECTED BY PROVIDER DAILY, Disp: 16 g, Rfl: 5    furosemide (Lasix) 20 MG tablet, Take 1 tablet by mouth 2 (Two) Times a Day. (Patient taking differently: Take 2 tablets by mouth 2 (Two) Times a Day.), Disp: 90 tablet, Rfl: 0    gabapentin (NEURONTIN) 600 MG tablet, Take 1 tablet by mouth Every 6 (Six) Hours., Disp: , Rfl:     loratadine (CLARITIN) 10 MG tablet, Take 1 tablet by mouth Daily., Disp: 90 tablet, Rfl: 3    losartan (COZAAR) 100 MG tablet, , Disp: , Rfl:     magnesium oxide (MAGOX) 400 (241.3 Mg) MG tablet tablet, Take 1 tablet by mouth Daily., Disp: , Rfl:     Mirabegron ER (MYRBETRIQ) 25 MG tablet sustained-release 24 hour 24 hr tablet, Take 1 tablet by mouth Daily., Disp: , Rfl:     modafinil (PROVIGIL) 200 MG tablet, Take 1 tablet by mouth Daily., Disp: , Rfl:     Multiple Vitamins-Minerals (MULTI COMPLETE PO), Take  by mouth Daily., Disp: , Rfl:     O2 (OXYGEN), Inhale 2 L/min Every Night. With Bipap, Disp: , Rfl:     oxyCODONE-acetaminophen (PERCOCET) 5-325 MG per tablet, Take 1 tablet by mouth Every 6 (Six) Hours As Needed for Moderate Pain or Severe Pain. 4 x a day, Disp: , Rfl:     OXYCONTIN 15 MG tablet extended-release 12 hour, Take 1 tablet by mouth 3 (Three) Times a Day. 2 x a day, Disp: , Rfl: 0    pantoprazole (PROTONIX) 40 MG EC tablet, Take 1 tablet by mouth Daily., Disp: , Rfl:     Probiotic Product (PROBIOTIC ADVANCED PO), Take 1 tablet/day by mouth Daily., Disp: , Rfl:     pseudoephedrine-guaifenesin (MUCINEX D)  MG per 12 hr tablet, Take 1 tablet by mouth Every 12 (Twelve) Hours., Disp: , Rfl:     Semaglutide,0.25 or 0.5MG/DOS, (Ozempic, 0.25 or 0.5 MG/DOSE,) 2 MG/1.5ML solution pen-injector, Inject 0.5 mg under  the skin into the appropriate area as directed 1 (One) Time Per Week., Disp: , Rfl:     tadalafil (ADCIRCA) 20 MG tablet tablet, Take 10 mg by mouth Daily As Needed., Disp: , Rfl:     tamsulosin (FLOMAX) 0.4 MG capsule 24 hr capsule, TAKE TWO CAPSULES BY MOUTH EVERY NIGHT, Disp: 60 capsule, Rfl: 2    Testosterone Cypionate (DEPOTESTOTERONE CYPIONATE) 200 MG/ML injection, , Disp: , Rfl:     traZODone (DESYREL) 50 MG tablet, Take 6 tablets by mouth Every Night., Disp: , Rfl:     valACYclovir (VALTREX) 500 MG tablet, Take 1 tablet by mouth 2 (Two) Times a Day., Disp: , Rfl:     vitamin B-12 (CYANOCOBALAMIN) 100 MCG tablet, Take 0.5 tablets by mouth Daily. (Patient not taking: Reported on 9/21/2023), Disp: , Rfl:     Objective     Vital Signs  Temp:  [98.6 °F (37 °C)] 98.6 °F (37 °C)  Heart Rate:  [70] 70  BP: (128)/(76) 128/76  Body mass index is 45.5 kg/m².      09/21/23  1503   Weight: (!) 164 kg (361 lb 9.6 oz)            Physical Exam  Vitals reviewed.   Constitutional:       Appearance: He is obese.   Cardiovascular:      Rate and Rhythm: Normal rate and regular rhythm.   Pulmonary:      Effort: Pulmonary effort is normal.   Abdominal:      General: Bowel sounds are normal.      Palpations: Abdomen is soft.   Musculoskeletal:         General: Normal range of motion.   Skin:     General: Skin is warm and dry.   Neurological:      Mental Status: He is alert and oriented to person, place, and time.   Psychiatric:         Mood and Affect: Mood normal.         Behavior: Behavior normal.          Results Review:   I reviewed the patient's new clinical results.      Assessment & Plan   Diagnoses and all orders for this visit:    1. Class 3 severe obesity due to excess calories with serious comorbidity and body mass index (BMI) of 45.0 to 49.9 in adult (Primary)  Assessment & Plan:  Patient's (Body mass index is 45.5 kg/m².) indicates that they are morbidly/severely obese (BMI > 40 or > 35 with obesity - related health  condition) with health conditions that include obstructive sleep apnea and hypertension . Weight is worsening. BMI  is above average; BMI management plan is completed. We discussed portion control and increasing exercise.       2. Obstructive sleep apnea treated with BiPAP  Comments:  Continue BIPAP    3. Primary hypertension  Comments:  Nutritional counseling provided      Today the patient  received the 4 meals/day diet prescription, which was explained to patient.  Patient will see the dietitian today to further discuss goals for diet, exercise, and lifestyle. Patient has received intensive behavioral therapy for obesity today. I explained the pathophysiology of the disease and its storage component. We also discussed Dr. Dejesus' pearls of the program. Nutrition counseling ordered today.     2. Current comorbid condition of Hypertension and KATLYN associated with his morbid obesity is reported to be stable on his current treatment regimen and medications. We anticipate the comorbid condition to improve as we address his morbid obesity.     I discussed the patient's findings and my recommendations with patient.       Patient will begin GREEN meal plan.  History of incisional hernia repair near RLQ.  He states he wants to remain in our [facility] program.       A total of 30 minutes was spent face to face with this patient and over half of the time was spent on counseling and coordination of care for the disease of obesity. We specifically reviewed the dietary prescription and I made recommendations toward increasing exercise as tolerated as well as focusing on training their behavior toward storing less.  Yanna Thurman, [email or web address]     09/21/23  15:13 CDT

## 2023-09-27 NOTE — PROGRESS NOTES
"Metabolic and Bariatric Surgery Adult Nutrition Assessment    Patient Name: Carlos Hayes   YOB: 1955   MRN: [id number]     Assessment Date:  09/21/2023     Reason for Visit: Initial Nutrition Assessment     Treatment Pathway: Medical Weight Loss    Assessment    Anthropometrics   Wt Readings from Last 1 Encounters:   09/21/23 (!) 164 kg (361 lb 9.6 oz)     Ht Readings from Last 1 Encounters:   09/21/23 189.9 cm (74.75\")     BMI Readings from Last 1 Encounters:   09/21/23 45.50 kg/m²        Initial Weight/Date: 361.6 lbs (9/21/23)    Past Medical History:   Diagnosis Date    Acute bronchitis     Amnesia     Anxiety     Arthritis     Asthma     Back pain     Chest pain     CKD (chronic kidney disease)     Concussion     Contusion of chest wall     COPD (chronic obstructive pulmonary disease)     COVID-19     Degeneration of intervertebral disc of lumbar region     Fall     Gait disturbance     Gastro-esophageal reflux     Headache     Hearing impaired     Hypersomnia     Hypertension     Hypotestosteronemia     Insomnia     Leg cramp     Lumbar stenosis     Obesity     Obesity     KATLYN (obstructive sleep apnea)     KATLYN treated with BiPAP     2 liters of oxygen at night    Potential difficult airway on pre-intubation assessment 12/14/2021    PTSD (post-traumatic stress disorder)     Radiculopathy of lumbosacral region     Sinusitis     Testicular hypofunction     Tremor       Past Surgical History:   Procedure Laterality Date    APPENDECTOMY      BACK SURGERY  1994    L3-4 fusion    BACK SURGERY  2009    L3-S1 fusion    BLEPHAROPLASTY Bilateral 12/1/2021    Procedure: UPPER LID BLEPHAROPLASTY (17048), WITH BROWLIFT (01239);  Surgeon: Jagdish Gregg MD;  Location:  [facility];  Service: Plastics;  Laterality: Bilateral;    BROW LIFT Bilateral 12/1/2021    Procedure: BROWLIFT (95274);  Surgeon: Jagdish Gregg MD;  Location:  [facility];  Service: Plastics;  Laterality: Bilateral;    CARDIAC " CATHETERIZATION      CARDIAC CATHETERIZATION N/A 12/14/2018    Procedure: Coronary angiography;  Surgeon: Lizandro Quiroz MD;  Location:  [facility];  Service: Cardiology    CARDIAC CATHETERIZATION N/A 12/14/2018    Procedure: Percutaneous Coronary Intervention;  Surgeon: Lizandro Quiroz MD;  Location:  [facility];  Service: Cardiology    CARPAL TUNNEL RELEASE Bilateral     COLONOSCOPY      ENDOSCOPY      FRACTURE SURGERY      LEFT HAND    HERNIA REPAIR      KNEE SURGERY Right     SPINAL CORD STIMULATOR REMOVAL N/A 5/2/2018    Procedure: SPINAL CORD STIMULATOR REMOVAL Removal of generator and placment of new generator;  Surgeon: Miguel Hall MD;  Location:  [facility];  Service: Neurosurgery    SPINAL CORD STIMULATOR REMOVAL N/A 7/18/2018    Procedure: SPINAL CORD STIMULATOR BATTERY CHANGE;  Surgeon: Miguel Hall MD;  Location:  [facility];  Service: Neurosurgery    THORACIC LAMINECTOMY WITH PLACEMENT OF DORSAL COLUMN STIMULATOR  2010      Current Outpatient Medications   Medication Sig Dispense Refill    albuterol (PROVENTIL) (2.5 MG/3ML) 0.083% nebulizer solution Take 2.5 mg by nebulization Every 4 (Four) Hours As Needed for Wheezing. 360 mL 3    albuterol sulfate  (90 Base) MCG/ACT inhaler Inhale 2 puffs Every 4 (Four) Hours As Needed for Wheezing or Shortness of Air. 18 g 0    ALPRAZolam (XANAX) 1 MG tablet Take 1 tablet by mouth Daily.      amLODIPine (NORVASC) 10 MG tablet Take 1 tablet by mouth Daily.      atorvastatin (LIPITOR) 10 MG tablet Take 1 tablet by mouth Daily.      azelastine (ASTELIN) 0.1 % nasal spray USE 2 SPRAYS IN EACH NOSTRIL TWICE A DAY 30 mL 5    Budeson-Glycopyrrol-Formoterol (Breztri Aerosphere) 160-9-4.8 MCG/ACT aerosol inhaler Inhale 2 puffs 2 (Two) Times a Day. 10.7 g 5    cholecalciferol (VITAMIN D3) 25 MCG (1000 UT) tablet Take 2 tablets by mouth Daily. 30 tablet 0    cloNIDine (CATAPRES) 0.1 MG tablet Take 1 tablet by mouth Every 6  (Six) Hours As Needed for High Blood Pressure (for bp 150/90 or greater).      diltiaZEM CD (CARDIZEM CD) 240 MG 24 hr capsule Take 1 capsule by mouth Daily.      DULoxetine (CYMBALTA) 60 MG capsule Take 1 capsule by mouth Daily.      dutasteride (AVODART) 0.5 MG capsule       fluticasone (FLONASE) 50 MCG/ACT nasal spray USE 2 SPRAYS IN EACH NOSTRIL AS DIRECTED BY PROVIDER DAILY 16 g 5    furosemide (Lasix) 20 MG tablet Take 1 tablet by mouth 2 (Two) Times a Day. (Patient taking differently: Take 2 tablets by mouth 2 (Two) Times a Day.) 90 tablet 0    gabapentin (NEURONTIN) 600 MG tablet Take 1 tablet by mouth Every 6 (Six) Hours.      loratadine (CLARITIN) 10 MG tablet Take 1 tablet by mouth Daily. 90 tablet 3    losartan (COZAAR) 100 MG tablet       magnesium oxide (MAGOX) 400 (241.3 Mg) MG tablet tablet Take 1 tablet by mouth Daily.      Mirabegron ER (MYRBETRIQ) 25 MG tablet sustained-release 24 hour 24 hr tablet Take 1 tablet by mouth Daily.      modafinil (PROVIGIL) 200 MG tablet Take 1 tablet by mouth Daily.      Multiple Vitamins-Minerals (MULTI COMPLETE PO) Take  by mouth Daily.      O2 (OXYGEN) Inhale 2 L/min Every Night. With Bipap      oxyCODONE-acetaminophen (PERCOCET) 5-325 MG per tablet Take 1 tablet by mouth Every 6 (Six) Hours As Needed for Moderate Pain or Severe Pain. 4 x a day      OXYCONTIN 15 MG tablet extended-release 12 hour Take 1 tablet by mouth 3 (Three) Times a Day. 2 x a day  0    pantoprazole (PROTONIX) 40 MG EC tablet Take 1 tablet by mouth Daily.      Probiotic Product (PROBIOTIC ADVANCED PO) Take 1 tablet/day by mouth Daily.      pseudoephedrine-guaifenesin (MUCINEX D)  MG per 12 hr tablet Take 1 tablet by mouth Every 12 (Twelve) Hours.      Semaglutide,0.25 or 0.5MG/DOS, (Ozempic, 0.25 or 0.5 MG/DOSE,) 2 MG/1.5ML solution pen-injector Inject 0.5 mg under the skin into the appropriate area as directed 1 (One) Time Per Week.      tadalafil (ADCIRCA) 20 MG tablet tablet Take 10  mg by mouth Daily As Needed.      tamsulosin (FLOMAX) 0.4 MG capsule 24 hr capsule TAKE TWO CAPSULES BY MOUTH EVERY NIGHT 60 capsule 2    Testosterone Cypionate (DEPOTESTOTERONE CYPIONATE) 200 MG/ML injection       traZODone (DESYREL) 50 MG tablet Take 6 tablets by mouth Every Night.      valACYclovir (VALTREX) 500 MG tablet Take 1 tablet by mouth 2 (Two) Times a Day.      vitamin B-12 (CYANOCOBALAMIN) 100 MCG tablet Take 0.5 tablets by mouth Daily. (Patient not taking: Reported on 9/21/2023)       No current facility-administered medications for this visit.      Allergies   Allergen Reactions    Eszopiclone Other (See Comments)     Flu like symptoms     Levofloxacin Rash and Unknown - High Severity                 Nutrition Intervention  Nutrition education and nutrition coaching for behavior change provided.  Strategies used included Comprehensive education & skill development for measuring portions, reading food labels, calculating protein, meal planning, understanding appropriate drink choices, and evaluating condiments, seasonings, and cooking methods.   Review of medical weight loss prescription plan and reviewed nutritional needs for Medical Weight Loss.  Self-monitoring strategies such as keeping a food journal (on paper or electronically) were discussed.  Recommended increasing physical activity, beyond normal daily habits, gradually working to reach ~30 minutes daily.     Recommended Diet Changes- Green Prescription Meal Plan. Provided Sample Menus  Eat 4 meals per day with protein and vegetables at each meal, no carbs after meal 2., Protein goal: 80 gms., Eat vegetables first at each meal., Discussed protein guidelines for shakes and bars., Reduce snacking -use foods from free foods list only., Reduce fat, sugar, and/or salt in food choices., Choose more nutrient dense foods., Choose foods with increased fiber., Monitor portion sizes using a food scale and/or measuring cup., Eliminate soda and  sugar-sweetened beverages, and Increase fluid intake to 64 ounces per day       Monitoring/Evaluation Plan  Anticipate follow up in one month. Continue collaboration of care with physician and treatment team.     Electronically signed by  Jenny Thomas RDN, LD  09/21/2023 13:08 CDT.

## 2023-10-05 ENCOUNTER — TELEPHONE (OUTPATIENT)
Dept: BARIATRICS/WEIGHT MGMT | Facility: CLINIC | Age: 68
End: 2023-10-05
Payer: MEDICARE

## 2023-10-05 NOTE — TELEPHONE ENCOUNTER
Attempted call for 2 week check in after initial appointment.   No answer. Left message with my name and office number.

## 2023-10-24 ENCOUNTER — TRANSCRIBE ORDERS (OUTPATIENT)
Dept: PHYSICAL THERAPY | Facility: CLINIC | Age: 68
End: 2023-10-24
Payer: MEDICARE

## 2023-10-24 DIAGNOSIS — M62.838 NECK MUSCLE SPASM: ICD-10-CM

## 2023-10-24 DIAGNOSIS — M25.552 LEFT HIP PAIN: Primary | ICD-10-CM

## 2023-10-26 ENCOUNTER — TREATMENT (OUTPATIENT)
Dept: PHYSICAL THERAPY | Facility: CLINIC | Age: 68
End: 2023-10-26
Payer: MEDICARE

## 2023-10-26 DIAGNOSIS — S16.1XXA STRAIN OF NECK MUSCLE, INITIAL ENCOUNTER: Primary | ICD-10-CM

## 2023-10-26 DIAGNOSIS — M25.552 LEFT HIP PAIN: ICD-10-CM

## 2023-10-26 NOTE — PROGRESS NOTES
Physical Therapy Initial Evaluation and Plan of Care  [address] [place] [address]    Patient: Carlos Hayes               : 1955  Visit Date: 10/26/2023  Referring practitioner: Chris Russ MD  Date of Initial Visit: 10/26/2023  Patient seen for 1 sessions    Visit Diagnoses:    ICD-10-CM ICD-9-CM   1. Strain of neck muscle, initial encounter  S16.1XXA 847.0   2. Left hip pain  M25.552 719.45     Past Medical History:   Diagnosis Date    Acute bronchitis     Amnesia     Anxiety     Arthritis     Asthma     Back pain     Chest pain     CKD (chronic kidney disease)     Concussion     Contusion of chest wall     COPD (chronic obstructive pulmonary disease)     COVID-19     Degeneration of intervertebral disc of lumbar region     Fall     Gait disturbance     Gastro-esophageal reflux     Headache     Hearing impaired     Hypersomnia     Hypertension     Hypotestosteronemia     Insomnia     Leg cramp     Lumbar stenosis     Obesity     Obesity     KATLYN (obstructive sleep apnea)     KATLYN treated with BiPAP     2 liters of oxygen at night    Potential difficult airway on pre-intubation assessment 2021    PTSD (post-traumatic stress disorder)     Radiculopathy of lumbosacral region     Sinusitis     Testicular hypofunction     Tremor      Past Surgical History:   Procedure Laterality Date    APPENDECTOMY      BACK SURGERY      L3-4 fusion    BACK SURGERY      L3-S1 fusion    BLEPHAROPLASTY Bilateral 2021    Procedure: UPPER LID BLEPHAROPLASTY (93678), WITH BROWLIFT (18842);  Surgeon: Jagdish Gregg MD;  Location: [facility] OR;  Service: Plastics;  Laterality: Bilateral;    BROW LIFT Bilateral 2021    Procedure: BROWLIFT (69842);  Surgeon: Jagdish Gregg MD;  Location: [facility] OR;  Service: Plastics;  Laterality: Bilateral;    CARDIAC CATHETERIZATION      CARDIAC CATHETERIZATION N/A 2018    Procedure: Coronary  angiography;  Surgeon: Lizandro Quiroz MD;  Location:  [facility] CATH INVASIVE LOCATION;  Service: Cardiology    CARDIAC CATHETERIZATION N/A 2018    Procedure: Percutaneous Coronary Intervention;  Surgeon: Lizandro Quiroz MD;  Location:  [facility] CATH INVASIVE LOCATION;  Service: Cardiology    CARPAL TUNNEL RELEASE Bilateral     COLONOSCOPY      ENDOSCOPY      FRACTURE SURGERY      LEFT HAND    HERNIA REPAIR      KNEE SURGERY Right     SPINAL CORD STIMULATOR REMOVAL N/A 2018    Procedure: SPINAL CORD STIMULATOR REMOVAL Removal of generator and placment of new generator;  Surgeon: Miguel Hall MD;  Location:  [facility] OR;  Service: Neurosurgery    SPINAL CORD STIMULATOR REMOVAL N/A 2018    Procedure: SPINAL CORD STIMULATOR BATTERY CHANGE;  Surgeon: Miguel Hall MD;  Location:  [facility] OR;  Service: Neurosurgery    THORACIC LAMINECTOMY WITH PLACEMENT OF DORSAL COLUMN STIMULATOR           SUBJECTIVE     Subjective Evaluation    History of Present Illness  Date of onset: 10/5/2023  Mechanism of injury: He has a long h/o in PT here. He has a h/o lumbar fusions, one that failed and had to be redone. This helped his oliver sciatica and leg function but he still struggles with mobility. He fell recently getting of the toilet as he legs were numb and weak causing a neck strain. He has right severe shoulder pain with a torn biceps and triceps. He has had more swelling in his legs. He was also c/o left groin pain. He's tried massage therapy for his neck. He has not had neck surgery.     Pain  Current pain ratin  Location: neck/oliver hips    Social Support  Lives in: multiple-level home  Lives with: spouse    Patient Goals  Patient goals for therapy: decreased pain, increased motion, increased strength, independence with ADLs/IADLs and return to sport/leisure activities              OBJECTIVE     Objective          Static Posture     Head  Forward.    Shoulders  Rounded.    Thoracic  "Spine  Hyperkyphosis.    Lumbar Spine   Flattened.     Hip   Hip (Left): Increased flexion.   Hip (Right): Increased flexion.     Knee   Knee (Left): Flexed.   Knee (Right): Flexed.     Palpation   Left   Hypertonic in the deltoid, suboccipitals and upper trapezius.   Tenderness of the deltoid, suboccipitals and upper trapezius.     Right   Hypertonic in the deltoid, suboccipitals and upper trapezius. Tenderness of the deltoid, suboccipitals and upper trapezius.     Additional Palpation Details  Wilder hip flexor, right PF guarded.     Active Range of Motion   Cervical/Thoracic Spine   Cervical    Left rotation: 50 degrees with pain  Right rotation: 43 (pain right lower neck) degrees with pain    Additional Active Range of Motion Details  Right shoulder elevation 100, left 120 deg    Passive Range of Motion     Additional Passive Range of Motion Details  10 deg flexion contracture wilder.      Dry Needle Location [20560 (1-2 muscles)/20561 (3 or more muscles)]   Location Depth (\") Comment   Wilder accessory 2 Wilder LTR   Wilder C2-6 post cut 2    Wilder gr occip 2    Wilder subocc  1    Wilder saphenous 2    Wilder IP 3    Wilder obturator 3         Time 20       Therapy Education/Self Care 79394   Education offered today POC  HEP for [employer]   [employer] Code Access Code: [email or web address]  URL: [email or web address]   Ongoing HEP     Date: 10/26/2023  Prepared by: Juarez Hartmann    Exercises  - Forward and Backward Stepping at Pool Wall  - 1 x daily - 7 x weekly - 2 sets - 10 reps  - Standing Hip Abduction Adduction at Pool Wall  - 1 x daily - 7 x weekly - 2 sets - 10 reps  - Standing Hip Abduction Adduction at Pool Wall (Mirrored)  - 1 x daily - 7 x weekly - 2 sets - 10 reps  - Standing Hip Flexion Extension at Pool Wall  - 1 x daily - 7 x weekly - 2 sets - 10 reps  - Standing Hip Flexion Extension at Pool Wall (Mirrored)  - 1 x daily - 7 x weekly - 2 sets - 10 reps  - Bilateral Shoulder Horizontal Abduction Adduction AROM at Pool Wall  - 1 x " daily - 7 x weekly - 2 sets - 10 reps  - Bilateral Shoulder Flexion Extension AROM at Pool Wall  - 1 x daily - 7 x weekly - 2 sets - 10 reps  - Shoulder Flexion Extension Side to Side with Pool Noodle  - 1 x daily - 7 x weekly - 2 sets - 10 reps  - Plank with Hip Extension at Pool Wall  - 1 x daily - 7 x weekly - 2 sets - 10 reps   Timed Minutes        Total Timed Treatment:     0   mins  Total Time of Visit:            75   mins    ASSESSMENT/PLAN     GOALS:  Goals                                          Progress Note due by 11/25/23                                                      Recert due by 1/23/24   LTG by: 12 weeks Comments Date Status   Improve oliver cervical rotation to 50 deg      Able to stand more upright with more neutral cervical      Improve passive hip ext to 10 deg      Able to walk x 20 min without rollator before sitting      Improve oliver shoulder elevation to 130 deg      Ind with HEP for flexibility and stability         Assessment & Plan       Assessment  Impairments: abnormal muscle firing, abnormal muscle tone, abnormal or restricted ROM, activity intolerance, impaired physical strength, lacks appropriate home exercise program and pain with function   Functional limitations: walking, uncomfortable because of pain, moving in bed, sitting, standing, reaching overhead and unable to perform repetitive tasks   Assessment details: His neck strain is exacerbated by his hyperkyphotic thoracic spine with a severe forward head posture causing him to have to hyperextend his neck to look up with facet impingement and muscle guarding around his neck. His hip pain is magnified by the flexion contractures and make him unable to stand full erect and walking more difficult and easy to fatigue. I did a trial of dry needling today to see if this might help.   This patient would benefit from skilled PT.   Prognosis: good    Plan  Therapy options: will be seen for skilled therapy services  Planned modality  interventions: dry needling, low level laser therapy, TENS and traction  Planned therapy interventions: manual therapy, neuromuscular re-education, spinal/joint mobilization, home exercise program, body mechanics training, stretching, therapeutic activities, strengthening, soft tissue mobilization and postural training  Frequency: 3x week  Duration in weeks: 12  Treatment plan discussed with: patient  Plan details: Focus early on pain relief with soft tissue work and other modalities, including potentially dry needling. Manual therapy used for mobilizations of the spine and upper thoracic and flexibility through the upper girdle. Progress with stability for posture and the shoulder girdle and progress HEP for the same.         SIGNATURE: Juarez Hartmann, PT, [place] License #: [id number]  Electronically Signed on 10/26/2023      Initial Certification  Certification Period: 10/26/2023 through 1/23/2024  I certify that the therapy services are furnished while this patient is under my care.  The services outlined above are required by this patient, and will be reviewed every 90 days.     PHYSICIAN: Chris Russ MD (NPI: [id number])    Signature____________________________________________DATE: _________     Please sign and return via fax to [phone].   Thank you so much for letting us work with Carlos. I appreciate your letting us work with your patients. If you have any questions or concerns, please don't hesitate to contact me.          [address] [address]  [phone]

## 2023-10-31 ENCOUNTER — TREATMENT (OUTPATIENT)
Dept: PHYSICAL THERAPY | Facility: CLINIC | Age: 68
End: 2023-10-31
Payer: MEDICARE

## 2023-10-31 DIAGNOSIS — M25.552 LEFT HIP PAIN: ICD-10-CM

## 2023-10-31 DIAGNOSIS — S16.1XXA STRAIN OF NECK MUSCLE, INITIAL ENCOUNTER: Primary | ICD-10-CM

## 2023-10-31 NOTE — PROGRESS NOTES
Physical Therapy Treatment Note  [address] [place] [address]    Patient: Carlos Hayes                                                 Visit Date: 10/31/2023  :     1955    Referring practitioner:    Chris Russ MD  Date of Initial Visit:          Type: THERAPY  Noted: 10/26/2023    Patient seen for 2 sessions    Visit Diagnoses:    ICD-10-CM ICD-9-CM   1. Strain of neck muscle, initial encounter  S16.1XXA 847.0   2. Left hip pain  M25.552 719.45     SUBJECTIVE     Subjective:  He says he felt better after the eval. His neck felt better. He got a new mattress.     PAIN: 8/10 neck         OBJECTIVE     Objective     Manual Therapy     36452  Comments   HL subocc release and man cerv traction    HL mid to upper cervical facet man SNAG    Supine oliver CT ext/rotation mobs    Supine oliver UT STM    HL left hip adductor/IP STM    Left LE LAD        Timed Minutes 60       Therapy Education/Self Care 60140   Education offered today POC  HEP for [employer]   [employer] Code Access Code: [email or web address]  URL: [email or web address]   Ongoing HEP     Date: 10/26/2023  Prepared by: Juarez Hartmann    Exercises  - Forward and Backward Stepping at Pool Wall  - 1 x daily - 7 x weekly - 2 sets - 10 reps  - Standing Hip Abduction Adduction at Pool Wall  - 1 x daily - 7 x weekly - 2 sets - 10 reps  - Standing Hip Abduction Adduction at Pool Wall (Mirrored)  - 1 x daily - 7 x weekly - 2 sets - 10 reps  - Standing Hip Flexion Extension at Pool Wall  - 1 x daily - 7 x weekly - 2 sets - 10 reps  - Standing Hip Flexion Extension at Pool Wall (Mirrored)  - 1 x daily - 7 x weekly - 2 sets - 10 reps  - Bilateral Shoulder Horizontal Abduction Adduction AROM at Pool Wall  - 1 x daily - 7 x weekly - 2 sets - 10 reps  - Bilateral Shoulder Flexion Extension AROM at Pool Wall  - 1 x daily - 7 x weekly - 2 sets - 10 reps  - Shoulder Flexion Extension Side to Side with Pool  Noodle  - 1 x daily - 7 x weekly - 2 sets - 10 reps  - Plank with Hip Extension at Pool Wall  - 1 x daily - 7 x weekly - 2 sets - 10 reps   Timed Minutes        Total Timed Treatment:     60   mins  Total Time of Visit:            60   mins    ASSESSMENT/PLAN     GOALS:  Goals                                          Progress Note due by 11/25/23                                                      Recert due by 1/23/24   LTG by: 12 weeks Comments Date Status   Improve oliver cervical rotation to 50 deg      Able to stand more upright with more neutral cervical      Improve passive hip ext to 10 deg      Able to walk x 20 min without rollator before sitting      Improve oliver shoulder elevation to 130 deg      Ind with HEP for flexibility and stability         Assessment/Plan     ASSESSMENT:   Today was his first visit after his eval so no goals met today. He does feel like the DN helped his neck pain. If did manual therapy today to compare.     PLAN:   Do DN again if he feels like it was more beneficial.     SIGNATURE: Juarez Hartmann, PT, [place] License #: [id number]  Electronically Signed on 10/31/2023        [facility] [place] [address]  [phone]

## 2023-11-03 ENCOUNTER — TREATMENT (OUTPATIENT)
Dept: PHYSICAL THERAPY | Facility: CLINIC | Age: 68
End: 2023-11-03
Payer: MEDICARE

## 2023-11-03 DIAGNOSIS — S16.1XXA STRAIN OF NECK MUSCLE, INITIAL ENCOUNTER: Primary | ICD-10-CM

## 2023-11-03 DIAGNOSIS — M25.552 LEFT HIP PAIN: ICD-10-CM

## 2023-11-03 NOTE — PROGRESS NOTES
"                                                                Physical Therapy Treatment Note  [address] [place] [address]    Patient: Carlos Hayes                                                 Visit Date: 11/3/2023  :     1955    Referring practitioner:    Chris Russ MD  Date of Initial Visit:          Type: THERAPY  Noted: 10/26/2023    Patient seen for 3 sessions    Visit Diagnoses:    ICD-10-CM ICD-9-CM   1. Strain of neck muscle, initial encounter  S16.1XXA 847.0   2. Left hip pain  M25.552 719.45     SUBJECTIVE     Subjective:  He says his neck is really stiff today. He tried a combination of a couple of pillows and a heating blanket and was able to sleep better.     PAIN: 8/10 neck         OBJECTIVE     Objective     Dry Needle Location [ (1-2 muscles)/ (3 or more muscles)]   Location Depth (\") Comment   Oliver accessory 2 Oliver LTR   Oliver C2-6 post cut 2    Oliver gr occip 2    Oliver subocc  1                        Time 20      Manual Therapy     35179  Comments   HL subocc release and man cerv traction    HL mid to upper cervical facet man SNAG    Supine oliver CT ext/rotation mobs    Supine oliver UT STM    HL left hip adductor/IP STM            Timed Minutes 60       Therapy Education/Self Care 28598   Education offered today POC  HEP for [employer] Code Access Code: [email or web address]  URL: [email or web address]   Ongoing HEP     Date: 10/26/2023  Prepared by: Juarez Hartmann    Exercises  - Forward and Backward Stepping at Pool Wall  - 1 x daily - 7 x weekly - 2 sets - 10 reps  - Standing Hip Abduction Adduction at Pool Wall  - 1 x daily - 7 x weekly - 2 sets - 10 reps  - Standing Hip Abduction Adduction at Pool Wall (Mirrored)  - 1 x daily - 7 x weekly - 2 sets - 10 reps  - Standing Hip Flexion Extension at Pool Wall  - 1 x daily - 7 x weekly - 2 sets - 10 reps  - Standing Hip Flexion Extension at Pool Wall (Mirrored)  - 1 x daily - 7 x weekly - 2 sets - 10 reps  - Bilateral " Shoulder Horizontal Abduction Adduction AROM at Pool Wall  - 1 x daily - 7 x weekly - 2 sets - 10 reps  - Bilateral Shoulder Flexion Extension AROM at Pool Wall  - 1 x daily - 7 x weekly - 2 sets - 10 reps  - Shoulder Flexion Extension Side to Side with Pool Noodle  - 1 x daily - 7 x weekly - 2 sets - 10 reps  - Plank with Hip Extension at Pool Wall  - 1 x daily - 7 x weekly - 2 sets - 10 reps   Timed Minutes        Total Timed Treatment:     60   mins  Total Time of Visit:            80   mins    ASSESSMENT/PLAN     GOALS:  Goals                                          Progress Note due by 11/25/23                                                      Recert due by 1/23/24   LTG by: 12 weeks Comments Date Status   Improve oliver cervical rotation to 50 deg      Able to stand more upright with more neutral cervical Still quite stooped 11/3 ongoing   Improve passive hip ext to 10 deg      Able to walk x 20 min without rollator before sitting      Improve oliver shoulder elevation to 130 deg      Ind with HEP for flexibility and stability         Assessment/Plan     ASSESSMENT:   Today I did dry needling as this made a difference on the evaluation. His upper neck is where he is most guarded likely because the rest of his neck looks like it's close to auto-fusing causing increased stress to his upper neck.     PLAN:   Do DN again if he feels like it was more beneficial.     SIGNATURE: Juarez Hartmann, PT, [place] License #: [id number]  Electronically Signed on 11/3/2023        [address] [place] [address] [place] [address]  [phone]

## 2023-11-06 ENCOUNTER — TREATMENT (OUTPATIENT)
Dept: PHYSICAL THERAPY | Facility: CLINIC | Age: 68
End: 2023-11-06
Payer: MEDICARE

## 2023-11-06 DIAGNOSIS — N40.1 BPH WITH OBSTRUCTION/LOWER URINARY TRACT SYMPTOMS: ICD-10-CM

## 2023-11-06 DIAGNOSIS — M25.552 LEFT HIP PAIN: ICD-10-CM

## 2023-11-06 DIAGNOSIS — S16.1XXA STRAIN OF NECK MUSCLE, INITIAL ENCOUNTER: Primary | ICD-10-CM

## 2023-11-06 DIAGNOSIS — N13.8 BPH WITH OBSTRUCTION/LOWER URINARY TRACT SYMPTOMS: ICD-10-CM

## 2023-11-06 PROCEDURE — 97140 MANUAL THERAPY 1/> REGIONS: CPT | Performed by: PHYSICAL THERAPIST

## 2023-11-06 RX ORDER — DULOXETIN HYDROCHLORIDE 60 MG/1
60 CAPSULE, DELAYED RELEASE ORAL 2 TIMES DAILY
Qty: 180 CAPSULE | Refills: 3 | Status: SHIPPED | OUTPATIENT
Start: 2023-11-06

## 2023-11-06 RX ORDER — TAMSULOSIN HYDROCHLORIDE 0.4 MG/1
CAPSULE ORAL
Qty: 60 CAPSULE | Refills: 2 | Status: SHIPPED | OUTPATIENT
Start: 2023-11-06

## 2023-11-06 NOTE — PROGRESS NOTES
Physical Therapy Treatment Note  [address] [place] [address]    Patient: Carlos Hayes                                                 Visit Date: 2023  :     1955    Referring practitioner:    Chris Russ MD  Date of Initial Visit:          Type: THERAPY  Noted: 10/26/2023    Patient seen for 4 sessions    Visit Diagnoses:    ICD-10-CM ICD-9-CM   1. Strain of neck muscle, initial encounter  S16.1XXA 847.0   2. Left hip pain  M25.552 719.45     SUBJECTIVE     Subjective:  He says a friend massaged his neck and it helped and it's stiffened up more. He says that he has difficulty emptying his bladder occasionally unless he has a BM.     PAIN: 8/10 neck       OBJECTIVE     Objective       Manual Therapy     50367  Comments   Percussive IASTM using Powerboost to oliver UT/CT junction at L2 using spade attachment    Prone CT ext mobs    HL subocc release and man cerv traction    HL mid to upper cervical facet man SNAG    Percussive IASTM using Powerboost to left short hip adductor at L2 using ball attachment      HL oliver PF deep pressure release (external)                Timed Minutes 75       Therapy Education/Self Care 38342   Education offered today POC  HEP for [employer] Code Access Code: [email or web address]  URL: [email or web address]   Ongoing HEP     Date: 10/26/2023  Prepared by: Juarez Hartmann    Exercises  - Forward and Backward Stepping at Pool Wall  - 1 x daily - 7 x weekly - 2 sets - 10 reps  - Standing Hip Abduction Adduction at Pool Wall  - 1 x daily - 7 x weekly - 2 sets - 10 reps  - Standing Hip Abduction Adduction at Pool Wall (Mirrored)  - 1 x daily - 7 x weekly - 2 sets - 10 reps  - Standing Hip Flexion Extension at Pool Wall  - 1 x daily - 7 x weekly - 2 sets - 10 reps  - Standing Hip Flexion Extension at Pool Wall (Mirrored)  - 1 x daily - 7 x weekly - 2 sets - 10 reps  - Bilateral Shoulder Horizontal  Abduction Adduction AROM at Pool Wall  - 1 x daily - 7 x weekly - 2 sets - 10 reps  - Bilateral Shoulder Flexion Extension AROM at Pool Wall  - 1 x daily - 7 x weekly - 2 sets - 10 reps  - Shoulder Flexion Extension Side to Side with Pool Noodle  - 1 x daily - 7 x weekly - 2 sets - 10 reps  - Plank with Hip Extension at Pool Wall  - 1 x daily - 7 x weekly - 2 sets - 10 reps   Timed Minutes        Total Timed Treatment:     75   mins  Total Time of Visit:            75   mins    ASSESSMENT/PLAN     GOALS:  Goals                                          Progress Note due by 11/25/23                                                      Recert due by 1/23/24   LTG by: 12 weeks Comments Date Status   Improve oliver cervical rotation to 50 deg      Able to stand more upright with more neutral cervical Still quite stooped 11/6 ongoing   Improve passive hip ext to 10 deg      Able to walk x 20 min without rollator before sitting      Improve oliver shoulder elevation to 130 deg      Ind with HEP for flexibility and stability         Assessment/Plan     ASSESSMENT:   He said the powerboost percussion tool worked well for him. I did the PF release given his guarding through his hips and this might help to release his hips some and empty his bladder.    PLAN:   Do DN again if he feels like it was more beneficial. Assess PF release.    SIGNATURE: Juarez Hartmann, PT, [place] License #: [id number]  Electronically Signed on 11/6/2023        [address] [place] [address] [place] [address]  [phone]

## 2023-11-06 NOTE — TELEPHONE ENCOUNTER
Requested Prescriptions     Pending Prescriptions Disp Refills    DULoxetine (CYMBALTA) 60 MG extended release capsule [Pharmacy Med Name: DULOXETINE HCL 60 MG CAP 60 Capsule] 180 capsule 3     Sig: TAKE 1 CAPSULE BY MOUTH 2 TIMES DAILY       Last Office Visit: 7/18/2023  Next Office Visit: 1/22/2024  Last Medication Refill:10/4/2022 with 3 rf

## 2023-11-14 ENCOUNTER — TREATMENT (OUTPATIENT)
Dept: PHYSICAL THERAPY | Facility: CLINIC | Age: 68
End: 2023-11-14
Payer: MEDICARE

## 2023-11-14 DIAGNOSIS — S16.1XXA STRAIN OF NECK MUSCLE, INITIAL ENCOUNTER: Primary | ICD-10-CM

## 2023-11-14 DIAGNOSIS — M25.552 LEFT HIP PAIN: ICD-10-CM

## 2023-11-15 NOTE — PROGRESS NOTES
Physical Therapy Treatment Note  [address] [place] [address]    Patient: Carlos Hayes                                                 Visit Date: 2023  :     1955    Referring practitioner:    Chris Russ MD  Date of Initial Visit:          Type: THERAPY  Noted: 10/26/2023    Patient seen for 5 sessions    Visit Diagnoses:    ICD-10-CM ICD-9-CM   1. Strain of neck muscle, initial encounter  S16.1XXA 847.0   2. Left hip pain  M25.552 719.45     SUBJECTIVE     Subjective:  He says he stood while on a hunting trip and his back locked up and he almost fell. He asked if stress could increase neck pain. He reported that he hasn't been able to orgasm since January.     PAIN: 8/10 neck       OBJECTIVE     Objective       Manual Therapy     31656  Comments       Prone CT ext mobs    HL subocc release and man cerv traction    HL mid to upper cervical facet man SNAG                        Timed Minutes 60       Therapy Education/Self Care 12205   Education offered today Discussed pelvic floor and orgasm and the possibility of a thoracic/autonomic issue contributing to this.    [employer] Code Access Code: [email or web address]  URL: [email or web address]   Ongoing HEP     Date: 10/26/2023  Prepared by: Juarez Hartmann    Exercises  - Forward and Backward Stepping at Pool Wall  - 1 x daily - 7 x weekly - 2 sets - 10 reps  - Standing Hip Abduction Adduction at Pool Wall  - 1 x daily - 7 x weekly - 2 sets - 10 reps  - Standing Hip Abduction Adduction at Pool Wall (Mirrored)  - 1 x daily - 7 x weekly - 2 sets - 10 reps  - Standing Hip Flexion Extension at Pool Wall  - 1 x daily - 7 x weekly - 2 sets - 10 reps  - Standing Hip Flexion Extension at Pool Wall (Mirrored)  - 1 x daily - 7 x weekly - 2 sets - 10 reps  - Bilateral Shoulder Horizontal Abduction Adduction AROM at Pool Wall  - 1 x daily - 7 x weekly - 2 sets - 10 reps  - Bilateral Shoulder  Flexion Extension AROM at Pool Wall  - 1 x daily - 7 x weekly - 2 sets - 10 reps  - Shoulder Flexion Extension Side to Side with Pool Noodle  - 1 x daily - 7 x weekly - 2 sets - 10 reps  - Plank with Hip Extension at Pool Wall  - 1 x daily - 7 x weekly - 2 sets - 10 reps   Timed Minutes 10       Total Timed Treatment:     75   mins  Total Time of Visit:            75   mins    ASSESSMENT/PLAN     GOALS:  Goals                                          Progress Note due by 11/25/23                                                      Recert due by 1/23/24   LTG by: 12 weeks Comments Date Status   Improve oliver cervical rotation to 50 deg      Able to stand more upright with more neutral cervical Still quite stooped 11/14 ongoing   Improve passive hip ext to 10 deg      Able to walk x 20 min without rollator before sitting      Improve oliver shoulder elevation to 130 deg      Ind with HEP for flexibility and stability         Assessment/Plan     ASSESSMENT:   I focused on his neck today as this was the main pain. CT extension gave immediate relief.     PLAN:   Do DN again if he feels like it was more beneficial. Assess PF and possible OI spasm with pudendal impingement.     SIGNATURE: Juarez Hartmann, PT, [place] License #: [id number]  Electronically Signed on 11/14/2023        [address] [place] [address]. [address]  [phone]

## 2023-11-16 ENCOUNTER — OFFICE VISIT (OUTPATIENT)
Dept: PULMONOLOGY | Facility: CLINIC | Age: 68
End: 2023-11-16
Payer: MEDICARE

## 2023-11-16 VITALS
BODY MASS INDEX: 39.17 KG/M2 | DIASTOLIC BLOOD PRESSURE: 80 MMHG | OXYGEN SATURATION: 95 % | HEART RATE: 87 BPM | SYSTOLIC BLOOD PRESSURE: 122 MMHG | HEIGHT: 75 IN | WEIGHT: 315 LBS

## 2023-11-16 DIAGNOSIS — G47.34 NOCTURNAL HYPOXIA: Chronic | ICD-10-CM

## 2023-11-16 DIAGNOSIS — E66.01 CLASS 3 SEVERE OBESITY DUE TO EXCESS CALORIES WITH SERIOUS COMORBIDITY AND BODY MASS INDEX (BMI) OF 45.0 TO 49.9 IN ADULT: ICD-10-CM

## 2023-11-16 DIAGNOSIS — J98.4 RESTRICTIVE LUNG DISEASE: Primary | ICD-10-CM

## 2023-11-16 DIAGNOSIS — Z87.891 PERSONAL HISTORY OF NICOTINE DEPENDENCE: ICD-10-CM

## 2023-11-16 DIAGNOSIS — J30.89 NON-SEASONAL ALLERGIC RHINITIS, UNSPECIFIED TRIGGER: ICD-10-CM

## 2023-11-16 DIAGNOSIS — J98.4 SMALL AIRWAYS DISEASE: Chronic | ICD-10-CM

## 2023-11-16 DIAGNOSIS — G47.33 OBSTRUCTIVE SLEEP APNEA TREATED WITH BIPAP: ICD-10-CM

## 2023-11-16 PROCEDURE — 3074F SYST BP LT 130 MM HG: CPT | Performed by: NURSE PRACTITIONER

## 2023-11-16 PROCEDURE — 3079F DIAST BP 80-89 MM HG: CPT | Performed by: NURSE PRACTITIONER

## 2023-11-16 PROCEDURE — 99213 OFFICE O/P EST LOW 20 MIN: CPT | Performed by: NURSE PRACTITIONER

## 2023-11-16 PROCEDURE — 1159F MED LIST DOCD IN RCRD: CPT | Performed by: NURSE PRACTITIONER

## 2023-11-16 PROCEDURE — 1160F RVW MEDS BY RX/DR IN RCRD: CPT | Performed by: NURSE PRACTITIONER

## 2023-11-16 RX ORDER — ALBUTEROL SULFATE 90 UG/1
2 AEROSOL, METERED RESPIRATORY (INHALATION) EVERY 4 HOURS PRN
Qty: 18 G | Refills: 2 | Status: SHIPPED | OUTPATIENT
Start: 2023-11-16

## 2023-11-16 RX ORDER — TADALAFIL 10 MG/1
10 TABLET ORAL EVERY 24 HOURS
COMMUNITY

## 2023-11-16 RX ORDER — BUDESONIDE, GLYCOPYRROLATE, AND FORMOTEROL FUMARATE 160; 9; 4.8 UG/1; UG/1; UG/1
2 AEROSOL, METERED RESPIRATORY (INHALATION) 2 TIMES DAILY
Qty: 10.7 G | Refills: 5 | Status: SHIPPED | OUTPATIENT
Start: 2023-11-16

## 2023-11-16 NOTE — PROGRESS NOTES
" VLADISLAV Seo  [facility]   Pulmonary and Critical Care  [address]  [place] [address]  Phone: [phone]  Fax: [phone]           Chief Complaint  COPD    Subjective    History of Present Illness     Carlos Hayes presents to [facility] PULMONARY & CRITICAL CARE MEDICINE   History of Present Illness  Mr. Hayes is a 68-year-old male patient of Dr. Simmons with known chronic respiratory failure with hypoxemia, restrictive lung disease, Elevated diaphragm,obstructive sleep apnea with BiPAP, history of COVID-19 (2020, 2021), restless leg syndrome, morbid obesity, CKD, arthritis, anxiety, PTSD, tremor, sinusitis, hypertension, chronic back pain with degeneration. He has had bronchitis/ pneumonia on/off most of his life. Short term memory loss. He is a former smoker. He is compliant with his BiPAP.  He is on 2-3 liters per minute of supplemental oxygen for which he uses nightly bleed into PAP. He has postnasal drip/allergies for which he uses Astelin nasal spray, Flonase nasal spray, loratadine and finds some benefit.  He uses Breztri inhaler and finds good benefit. He uses albuterol rescue inhaler once a day. He has a DuoNeb nebulizer he uses-1-2 X a day. He was referred at last visit to weight loss management and notes he will not be going back. He liked the office ok but has personally decided to not go back. He has lost 11 lbs since last being seen here. His LDCT showed stable 5 mm and less nodules x 4 and moderate thoracic kyphosis with disc space narrowing and endplate spurring. He has fallen twice in the last 6 weeks. Once due to soap in shower. Second time getting off of the commode and related to his neuropathy. He feels  his breathing is doing much better.          Objective   Vital Signs:   /80   Pulse 87   Ht 189.9 cm (74.75\")   Wt (!) 163 kg (359 lb)   SpO2 95% Comment: RA  BMI 45.17 kg/m²     Physical Exam  Vitals reviewed. "   Constitutional:       Appearance: Normal appearance. He is morbidly obese.   Cardiovascular:      Rate and Rhythm: Normal rate and regular rhythm.   Pulmonary:      Effort: Pulmonary effort is normal.      Breath sounds: Normal breath sounds.   Neurological:      General: No focal deficit present.      Mental Status: He is alert and oriented to person, place, and time.   Psychiatric:         Mood and Affect: Mood normal.         Behavior: Behavior normal.          Result Review :  The following data was reviewed by: VLADISLAV Seo on 11/16/2023:    My interpretation of imaging:  no new   My interpretation of labs: no new   CT Chest Low Dose Cancer Screening WO (09/18/2023 15:06)     My interpretation of the PFT : no new     Results for orders placed during the hospital encounter of 08/09/23    Pulmonary Function Test    [facility] - Pulmonary Function Test    [address] [place] [address]  [address]  [phone]    Patient : Carlos Hayes  MRN : [id number]  CSN : [id number]  Pulmonologist : Earl Shearer MD  Date : 8/9/2023    ______________________________________________________________________    Interpretation :  1.  Spirometry is consistent with a mild bordering on moderate restrictive ventilatory defect with a coexisting decrease in midflows and peak expiratory flow.  2.  There is some improvement in midflows and peak expiratory flow postbronchodilator.  However postbronchodilator spirometry is still consistent with a mild bordering on moderate restrictive ventilatory defect with a decrease in midflows.  Peak expiratory flow is now within normal limits.  3.  Lung volumes reveal a low normal total lung capacity and vital capacity suggestive of a borderline restrictive ventilatory defect.  There is also a decreased expiratory reserve volume.  4.  Diffusion capacity is within normal limits and when corrected for alveolar volume is supranormal.  5.  Arterial blood gases on  room air reveal mild hypoxemia.  A mild metabolic alkalosis is also present.  6.  When current studies are compared to studies performed on June 25, 2021, the patient's current pre and postbronchodilator spirometry reveals a slight decline in both the FVC and FEV1 compared to previous baseline values.  There has been slight improvement in the patient's total lung capacity compared to previous.  When corrected for alveolar volume there is no significant change in diffusion capacity compared to previous.      Earl Shearer MD      Results for orders placed during the hospital encounter of 06/25/21    Full Pulmonary Function Test With Bronchodilator    [facility] Pulmonary Function Test    [address] [place] [address]  [address]  [phone]    Patient : Carlos Hayes  MRN : [id number]  CSN : [id number]  Pulmonologist : Earl Shearer MD  Date : 6/28/2021    ______________________________________________________________________    Interpretation :  1.  Spirometry is consistent with a mild restrictive ventilatory defect with coexisting small airways disease.  2.  Lung volumes confirm a mild restrictive ventilatory defect.  There is also a decrease in inspiratory capacity.  3.  Diffusion capacity is within normal limits and when corrected for alveolar volume is supranormal.  4.  When current studies are compared to studies from December 7 of 2018, there has been a drop in the patient's forced vital capacity and FEV1 compared to previous.  There has also been a decrease in total lung capacity compared to previous.  When corrected for alveolar volume there has been an improvement in diffusion capacity compared to previous.      Earl Shearer MD      Results for orders placed during the hospital encounter of 12/07/18    Full Pulmonary Function Test With Bronchodilator    [facility] Pulmonary Function Test    [address] [place] [address]   [address]  [phone]    Patient : Carlos Hayes  MRN : [id number]  CSN : [id number]  Pulmonologist : Earl Shearer MD  Date : 12/10/2018    ______________________________________________________________________    Interpretation :  1.  Spirometry is within normal limits with the exception of a mild decrease in the patient's forced inspiratory vital capacity.  2.  Lung volumes reveal a mild decrease in expiratory reserve volume and otherwise are within normal limits.  3.  Diffusion capacity is within normal limits.      Earl Shearer MD          Assessment and Plan   Diagnoses and all orders for this visit:    1. Restrictive lung disease (Primary)    2. Small airways disease    3. Obstructive sleep apnea treated with BiPAP    4. Personal history of nicotine dependence    5. Nocturnal hypoxia    6. Non-seasonal allergic rhinitis, unspecified trigger    7. Class 3 severe obesity due to excess calories with serious comorbidity and body mass index (BMI) of 45.0 to 49.9 in adult    Other orders  -     albuterol sulfate  (90 Base) MCG/ACT inhaler; Inhale 2 puffs Every 4 (Four) Hours As Needed for Wheezing or Shortness of Air.  Dispense: 18 g; Refill: 2  -     Budeson-Glycopyrrol-Formoterol (Breztri Aerosphere) 160-9-4.8 MCG/ACT aerosol inhaler; Inhale 2 puffs 2 (Two) Times a Day.  Dispense: 10.7 g; Refill: 5      Continue current medications. Refills sent to [facility] Continue to work on weight loss. He potentially has some upcoming surgeries. He is advised given his most recent FEV1, DLCO and that he wears a bipap with oxygen he would be moderate risk for anesthesia. He will let us know if he would need anything further from us. We discussed possibly doing an overnight on room air to assess as well. He is asked to follow  up in 6 months.     Follow Up   Return in about 6 months (around 5/16/2024).  Patient was given instructions and counseling regarding his condition or for health maintenance advice. Please  see specific information pulled into the AVS if appropriate.     Mary Christian, VLADISLAV  11/16/2023  16:21 CST

## 2023-11-17 ENCOUNTER — TREATMENT (OUTPATIENT)
Dept: PHYSICAL THERAPY | Facility: CLINIC | Age: 68
End: 2023-11-17
Payer: MEDICARE

## 2023-11-17 DIAGNOSIS — M25.552 LEFT HIP PAIN: ICD-10-CM

## 2023-11-17 DIAGNOSIS — S16.1XXA STRAIN OF NECK MUSCLE, INITIAL ENCOUNTER: Primary | ICD-10-CM

## 2023-11-17 NOTE — PROGRESS NOTES
"                                                                Physical Therapy Treatment Note  [address] [place] [address]    Patient: Carlos Hayes                                                 Visit Date: 2023  :     1955    Referring practitioner:    Chris Russ MD  Date of Initial Visit:          Type: THERAPY  Noted: 10/26/2023    Patient seen for 6 sessions    Visit Diagnoses:    ICD-10-CM ICD-9-CM   1. Strain of neck muscle, initial encounter  S16.1XXA 847.0   2. Left hip pain  M25.552 719.45     SUBJECTIVE     Subjective:  He says his neck is bothering him worse today.    PAIN: 8-9/10 neck; LB 7/10       OBJECTIVE     Objective     Manual Therapy     [address]210  Comments   Percussive IASTM using Powerboost to oliver UT and oliver piriformis at L1 using spade attachment      Prone CT ext mobs    HL subocc release and man cerv traction    Internal PF assessment and deep sustained pressure oliver OI and LA More tender on left OI and oliver anterior LA       Timed Minutes 55     Dry Needle Location [ (1-2 muscles)/ (3 or more muscles)]   Location Depth (\") Comment   Oliver hypogastic/genitourinal 0.5 supf 8 needles oliver             Time 20     Therapy Education/Self Care [id number]   Education offered today Discussed pelvic floor and orgasm and the possibility of a thoracic/autonomic issue contributing to this.    i[employer] Code Access Code: [email or web address]  URL: [email or web address]   Ongoing HEP     Date: 10/26/2023  Prepared by: Juarez Hartmann    Exercises  - Forward and Backward Stepping at Pool Wall  - 1 x daily - 7 x weekly - 2 sets - 10 reps  - Standing Hip Abduction Adduction at Pool Wall  - 1 x daily - 7 x weekly - 2 sets - 10 reps  - Standing Hip Abduction Adduction at Pool Wall (Mirrored)  - 1 x daily - 7 x weekly - 2 sets - 10 reps  - Standing Hip Flexion Extension at Pool Wall  - 1 x daily - 7 x weekly - 2 sets - 10 reps  - Standing Hip Flexion Extension at Pool Wall (Mirrored)  - 1 x " daily - 7 x weekly - 2 sets - 10 reps  - Bilateral Shoulder Horizontal Abduction Adduction AROM at Pool Wall  - 1 x daily - 7 x weekly - 2 sets - 10 reps  - Bilateral Shoulder Flexion Extension AROM at Pool Wall  - 1 x daily - 7 x weekly - 2 sets - 10 reps  - Shoulder Flexion Extension Side to Side with Pool Noodle  - 1 x daily - 7 x weekly - 2 sets - 10 reps  - Plank with Hip Extension at Pool Wall  - 1 x daily - 7 x weekly - 2 sets - 10 reps   Timed Minutes      Total Timed Treatment:     55   mins  Total Time of Visit:            75   mins    ASSESSMENT/PLAN     GOALS:  Goals                                          Progress Note due by 11/25/23                                                      Recert due by 1/23/24   LTG by: 12 weeks Comments Date Status   Improve oliver cervical rotation to 50 deg      Able to stand more upright with more neutral cervical Still quite stooped 11/17 ongoing   Improve passive hip ext to 10 deg      Able to walk x 20 min without rollator before sitting      Improve oliver shoulder elevation to 130 deg      Ind with HEP for flexibility and stability         Assessment/Plan     ASSESSMENT:   Today we tried an internal PF release and the DN of his groin to see if this might help with his urination issues and ejaculation issues.     PLAN:   Assess if DN had any affect for him and pursue if it does. Cont with PF manual therapy is effective.    SIGNATURE: Juarez Hartmann, PT, [place] License #: [id number]  Electronically Signed on 11/17/2023        [address] [place] [address] [place] [address]  [phone]

## 2023-12-01 ENCOUNTER — TREATMENT (OUTPATIENT)
Dept: PHYSICAL THERAPY | Facility: CLINIC | Age: 68
End: 2023-12-01
Payer: MEDICARE

## 2023-12-01 DIAGNOSIS — M25.552 LEFT HIP PAIN: ICD-10-CM

## 2023-12-01 DIAGNOSIS — S16.1XXA STRAIN OF NECK MUSCLE, INITIAL ENCOUNTER: Primary | ICD-10-CM

## 2023-12-01 NOTE — PROGRESS NOTES
"                                                                Physical Therapy Treatment Note and 30 Day Progress Note  [address] [place] [address]    Patient: Carlos Hayes                                                 Visit Date: 2023  :     1955    Referring practitioner:    Chris Russ MD  Date of Initial Visit:          Type: THERAPY  Noted: 10/26/2023    Patient seen for 7 sessions    Visit Diagnoses:    ICD-10-CM ICD-9-CM   1. Strain of neck muscle, initial encounter  S16.1XXA 847.0   2. Left hip pain  M25.552 719.45     SUBJECTIVE     Subjective:  [place] was in [place] this past week and his oliver legs have swollen quite a bit. He ca/    PAIN: 8-9/10 neck; LB 7/10       OBJECTIVE     Objective     Manual Therapy     40386  Comments       Prone CT ext mobs    HL subocc release and man cerv traction    Internal PF assessment and deep sustained pressure oliver OI and LA More tender on left OI and oliver anterior LA       Timed Minutes 55     Dry Needle Location [ (1-2 muscles)/ (3 or more muscles)]   Location Depth (\") Comment   Oliver hypogastic/genitourinal 0.5 supf 8 needles oliver             Time 20     Therapy Education/Self Care 11665   Education offered today Discussed pelvic floor and orgasm and the possibility of a thoracic/autonomic issue contributing to this.    [employer] Code Access Code: [email or web address]  URL: [email or web address]   Ongoing HEP     Date: 10/26/2023  Prepared by: Juarez Hartmann    Exercises  - Forward and Backward Stepping at Pool Wall  - 1 x daily - 7 x weekly - 2 sets - 10 reps  - Standing Hip Abduction Adduction at Pool Wall  - 1 x daily - 7 x weekly - 2 sets - 10 reps  - Standing Hip Abduction Adduction at Pool Wall (Mirrored)  - 1 x daily - 7 x weekly - 2 sets - 10 reps  - Standing Hip Flexion Extension at Pool Wall  - 1 x daily - 7 x weekly - 2 sets - 10 reps  - Standing Hip Flexion Extension at Pool Wall (Mirrored)  - 1 x daily - 7 x weekly - 2 sets - 10 " reps  - Bilateral Shoulder Horizontal Abduction Adduction AROM at Pool Wall  - 1 x daily - 7 x weekly - 2 sets - 10 reps  - Bilateral Shoulder Flexion Extension AROM at Pool Wall  - 1 x daily - 7 x weekly - 2 sets - 10 reps  - Shoulder Flexion Extension Side to Side with Pool Noodle  - 1 x daily - 7 x weekly - 2 sets - 10 reps  - Plank with Hip Extension at Pool Wall  - 1 x daily - 7 x weekly - 2 sets - 10 reps   Timed Minutes      Total Timed Treatment:     55   mins  Total Time of Visit:            75   mins    ASSESSMENT/PLAN     GOALS:  Goals                                          Progress Note due by 12/31/23                                                      Recert due by 1/23/24   LTG by: 12 weeks Comments Date Status   Improve oliver cervical rotation to 50 deg  12/1 ongoing   Able to stand more upright with more neutral cervical Still quite stooped 12/1 ongoing   Improve passive hip ext to 10 deg  12/1 ongoing   Able to walk x 20 min without rollator before sitting  12/1 ongoing   Improve oliver shoulder elevation to 130 deg  12/1 ongoing   Ind with HEP for flexibility and stability  12/1 ongoing      Assessment & Plan       Assessment  Impairments: abnormal muscle firing, abnormal muscle tone, abnormal or restricted ROM, activity intolerance, impaired physical strength, lacks appropriate home exercise program and pain with function   Functional limitations: walking, uncomfortable because of pain, moving in bed, sitting, standing, reaching overhead and unable to perform repetitive tasks   Prognosis: good    Plan  Therapy options: will be seen for skilled therapy services  Planned modality interventions: dry needling, low level laser therapy, TENS and traction  Planned therapy interventions: manual therapy, neuromuscular re-education, spinal/joint mobilization, home exercise program, body mechanics training, stretching, therapeutic activities, strengthening, soft tissue mobilization and postural  training  Frequency: 3x week  Duration in weeks: 12  Treatment plan discussed with: patient         ASSESSMENT:   The PF/OI release and/or the DN needling appears to have had a positive effect on his ejaculating as he could the next day. His neck was particularly stiff after his trip to [place]. What is also concerning is the severe edema in his oliver LE extending up to his thighs. He is also having a cough worrisome for left CHF and pulmonary edema. He is seeing his MD. I suggested lyphedema PT but our MLD PT is on FMLA currently.     PLAN:   Cont with DN and manual therapy.     SIGNATURE: Juarez Hartmann, PT, [place] License #: [id number]  Electronically Signed on 12/1/2023        [address] [place] [address], [place] [address]  [phone]

## 2023-12-04 ENCOUNTER — TREATMENT (OUTPATIENT)
Dept: PHYSICAL THERAPY | Facility: CLINIC | Age: 68
End: 2023-12-04
Payer: MEDICARE

## 2023-12-04 DIAGNOSIS — S16.1XXA STRAIN OF NECK MUSCLE, INITIAL ENCOUNTER: Primary | ICD-10-CM

## 2023-12-04 DIAGNOSIS — M25.552 LEFT HIP PAIN: ICD-10-CM

## 2023-12-04 PROCEDURE — 97140 MANUAL THERAPY 1/> REGIONS: CPT | Performed by: PHYSICAL THERAPIST

## 2023-12-04 PROCEDURE — 20560 NDL INSJ W/O NJX 1 OR 2 MUSC: CPT | Performed by: PHYSICAL THERAPIST

## 2023-12-05 NOTE — PROGRESS NOTES
"                                                                Physical Therapy Treatment Note  [address] [place] [address]    Patient: Carlos Hayes                                                 Visit Date: 2023  :     1955    Referring practitioner:    Chris Russ MD  Date of Initial Visit:          Type: THERAPY  Noted: 10/26/2023    Patient seen for 8 sessions    Visit Diagnoses:    ICD-10-CM ICD-9-CM   1. Strain of neck muscle, initial encounter  S16.1XXA 847.0   2. Left hip pain  M25.552 719.45     SUBJECTIVE     Subjective:  He didn't try to have sex but he was able to urinate better after the PF release Friday.     PAIN: 8-9/10 neck; LB 7/10       OBJECTIVE     Objective     Manual Therapy     16579  Comments   Percussive IASTM using Powerboost to oliver UT/lower cervical at L2 using spade attachment      Prone CT ext mobs    HL subocc release and man cerv traction    Internal PF assessment and deep sustained pressure oliver OI and LA More tender on left OI and oliver anterior LA   Oliver IP deep tissue release    Timed Minutes 70     Dry Needle Location [ (1-2 muscles)/ (3 or more muscles)]   Location Depth (\") Comment   Oliver hypogastic/genitourinal 0.5 supf 8 needles oliver             Time 20     Therapy Education/Self Care 60322   Education offered today Discussed pelvic floor and orgasm and the possibility of a thoracic/autonomic issue contributing to this.    Se[employer] Code Access Code: [email or web address]  URL: [email or web address]   Ongoing HEP     Date: 10/26/2023  Prepared by: Juarez Hartmann    Exercises  - Forward and Backward Stepping at Pool Wall  - 1 x daily - 7 x weekly - 2 sets - 10 reps  - Standing Hip Abduction Adduction at Pool Wall  - 1 x daily - 7 x weekly - 2 sets - 10 reps  - Standing Hip Abduction Adduction at Pool Wall (Mirrored)  - 1 x daily - 7 x weekly - 2 sets - 10 reps  - Standing Hip Flexion Extension at Pool Wall  - 1 x daily - 7 x weekly - 2 sets - 10 " reps  - Standing Hip Flexion Extension at Pool Wall (Mirrored)  - 1 x daily - 7 x weekly - 2 sets - 10 reps  - Bilateral Shoulder Horizontal Abduction Adduction AROM at Pool Wall  - 1 x daily - 7 x weekly - 2 sets - 10 reps  - Bilateral Shoulder Flexion Extension AROM at Pool Wall  - 1 x daily - 7 x weekly - 2 sets - 10 reps  - Shoulder Flexion Extension Side to Side with Pool Noodle  - 1 x daily - 7 x weekly - 2 sets - 10 reps  - Plank with Hip Extension at Pool Wall  - 1 x daily - 7 x weekly - 2 sets - 10 reps   Timed Minutes      Total Timed Treatment:     70   mins  Total Time of Visit:            80   mins    ASSESSMENT/PLAN     GOALS:  Goals                                          Progress Note due by 12/31/23                                                      Recert due by 1/23/24   LTG by: 12 weeks Comments Date Status   Improve oliver cervical rotation to 50 deg  12/1 ongoing   Able to stand more upright with more neutral cervical Still quite stooped 12/1 ongoing   Improve passive hip ext to 10 deg  12/1 ongoing   Able to walk x 20 min without rollator before sitting  12/1 ongoing   Improve oliver shoulder elevation to 130 deg  12/1 ongoing   Ind with HEP for flexibility and stability  12/1 ongoing      Assessment/Plan     ASSESSMENT:   The PF release seems to be helping. I may hold on the DN the next time to see how much it vs the PF work is helping.     PLAN:   Hold on DN and continue with PF and neck manual therapy.    SIGNATURE: Juarez Hartmann, PT, [place] License #: [id number]  Electronically Signed on 12/4/2023        [address] [place] [address]. [address]  [phone]

## 2023-12-06 ENCOUNTER — TRANSCRIBE ORDERS (OUTPATIENT)
Dept: ADMINISTRATIVE | Facility: HOSPITAL | Age: 68
End: 2023-12-06
Payer: MEDICARE

## 2023-12-06 DIAGNOSIS — R60.9 EDEMA, UNSPECIFIED TYPE: ICD-10-CM

## 2023-12-06 DIAGNOSIS — R06.02 SHORTNESS OF BREATH: Primary | ICD-10-CM

## 2023-12-07 ENCOUNTER — TREATMENT (OUTPATIENT)
Dept: PHYSICAL THERAPY | Facility: CLINIC | Age: 68
End: 2023-12-07
Payer: MEDICARE

## 2023-12-07 DIAGNOSIS — S16.1XXA STRAIN OF NECK MUSCLE, INITIAL ENCOUNTER: Primary | ICD-10-CM

## 2023-12-07 DIAGNOSIS — M25.552 LEFT HIP PAIN: ICD-10-CM

## 2023-12-07 NOTE — PROGRESS NOTES
Physical Therapy Treatment Note  [address] [place] [address]    Patient: Carlos Hayes                                                 Visit Date: 2023  :     1955    Referring practitioner:    Chris Russ MD  Date of Initial Visit:          Type: THERAPY  Noted: 10/26/2023    Patient seen for 9 sessions    Visit Diagnoses:    ICD-10-CM ICD-9-CM   1. Strain of neck muscle, initial encounter  S16.1XXA 847.0   2. Left hip pain  M25.552 719.45     SUBJECTIVE     Subjective:  He says he was able to stand more erect after working on his hips the last visit.     PAIN: 8-9/10 neck; LB 7/10       OBJECTIVE     Objective     Manual Therapy     [address]390  Comments   Percussive IASTM using Powerboost to oliver UT/lower cervical at L2 using spade attachment      Prone CT ext mobs    Prone left lumbar/upper glute STM        Oliver IP deep tissue release    Timed Minutes 60     Therapy Education/Self Care 59763   Education offered today Discussed pelvic floor and orgasm and the possibility of a thoracic/autonomic issue contributing to this.    [employer] Code Access Code: [email or web address]  URL: [email or web address]   Ongoing HEP     Date: 10/26/2023  Prepared by: Juarez Hartmann    Exercises  - Forward and Backward Stepping at Pool Wall  - 1 x daily - 7 x weekly - 2 sets - 10 reps  - Standing Hip Abduction Adduction at Pool Wall  - 1 x daily - 7 x weekly - 2 sets - 10 reps  - Standing Hip Abduction Adduction at Pool Wall (Mirrored)  - 1 x daily - 7 x weekly - 2 sets - 10 reps  - Standing Hip Flexion Extension at Pool Wall  - 1 x daily - 7 x weekly - 2 sets - 10 reps  - Standing Hip Flexion Extension at Pool Wall (Mirrored)  - 1 x daily - 7 x weekly - 2 sets - 10 reps  - Bilateral Shoulder Horizontal Abduction Adduction AROM at Pool Wall  - 1 x daily - 7 x weekly - 2 sets - 10 reps  - Bilateral Shoulder Flexion Extension AROM at Pool Wall  - 1 x daily - 7  x weekly - 2 sets - 10 reps  - Shoulder Flexion Extension Side to Side with Pool Noodle  - 1 x daily - 7 x weekly - 2 sets - 10 reps  - Plank with Hip Extension at Pool Wall  - 1 x daily - 7 x weekly - 2 sets - 10 reps   Timed Minutes      Total Timed Treatment:     60   mins  Total Time of Visit:            60   mins    ASSESSMENT/PLAN     GOALS:  Goals                                          Progress Note due by 12/31/23                                                      Recert due by 1/23/24   LTG by: 12 weeks Comments Date Status   Improve oliver cervical rotation to 50 deg  12/1 ongoing   Able to stand more upright with more neutral cervical Able to stand more upright after working on hip flexors 12/7 ongoing   Improve passive hip ext to 10 deg  12/1 ongoing   Able to walk x 20 min without rollator before sitting  12/1 ongoing   Improve oliver shoulder elevation to 130 deg  12/1 ongoing   Ind with HEP for flexibility and stability  12/1 ongoing      Assessment/Plan     ASSESSMENT:   The hip flexor release made a difference on him standing more upright and taking some pressure off his back.     PLAN:   Hold on DN and continue with PF and neck manual therapy.    SIGNATURE: Juarez Hartmann, PT, [place] License #: [id number]  Electronically Signed on 12/7/2023        [address] [place] [address]. [address]  [phone]

## 2023-12-11 ENCOUNTER — APPOINTMENT (OUTPATIENT)
Dept: GENERAL RADIOLOGY | Facility: HOSPITAL | Age: 68
End: 2023-12-11
Payer: MEDICARE

## 2023-12-11 ENCOUNTER — HOSPITAL ENCOUNTER (EMERGENCY)
Facility: HOSPITAL | Age: 68
End: 2023-12-11
Payer: MEDICARE

## 2023-12-11 ENCOUNTER — HOSPITAL ENCOUNTER (INPATIENT)
Facility: HOSPITAL | Age: 68
LOS: 2 days | Discharge: HOME | End: 2023-12-13
Attending: FAMILY MEDICINE | Admitting: FAMILY MEDICINE
Payer: MEDICARE

## 2023-12-11 LAB
ALBUMIN SERPL-MCNC: 4.5 G/DL (ref 3.5–5.2)
ALBUMIN/GLOB SERPL: 1.9 G/DL
ALP SERPL-CCNC: 85 U/L (ref 39–117)
ALT SERPL W P-5'-P-CCNC: 29 U/L (ref 1–41)
ANION GAP SERPL CALCULATED.3IONS-SCNC: 10 MMOL/L (ref 5–15)
AST SERPL-CCNC: 43 U/L (ref 1–40)
BASOPHILS # BLD AUTO: 0.03 10*3/MM3 (ref 0–0.2)
BASOPHILS NFR BLD AUTO: 0.5 % (ref 0–1.5)
BILIRUB SERPL-MCNC: 0.4 MG/DL (ref 0–1.2)
BILIRUB UR QL STRIP: NEGATIVE
BUN SERPL-MCNC: 16 MG/DL (ref 8–23)
BUN/CREAT SERPL: 13.4 (ref 7–25)
CALCIUM SPEC-SCNC: 9.4 MG/DL (ref 8.6–10.5)
CHLORIDE SERPL-SCNC: 99 MMOL/L (ref 98–107)
CLARITY UR: CLEAR
CO2 SERPL-SCNC: 27 MMOL/L (ref 22–29)
COLOR UR: YELLOW
CREAT SERPL-MCNC: 1.19 MG/DL (ref 0.76–1.27)
D DIMER PPP FEU-MCNC: 0.46 MCGFEU/ML (ref 0–0.66)
DEPRECATED RDW RBC AUTO: 45.5 FL (ref 37–54)
EGFRCR SERPLBLD CKD-EPI 2021: 67.4 ML/MIN/1.73
EOSINOPHIL # BLD AUTO: 0.11 10*3/MM3 (ref 0–0.4)
EOSINOPHIL NFR BLD AUTO: 1.8 % (ref 0.3–6.2)
ERYTHROCYTE [DISTWIDTH] IN BLOOD BY AUTOMATED COUNT: 13.4 % (ref 12.3–15.4)
GLOBULIN UR ELPH-MCNC: 2.4 GM/DL
GLUCOSE SERPL-MCNC: 123 MG/DL (ref 65–99)
GLUCOSE UR STRIP-MCNC: NEGATIVE MG/DL
HCT VFR BLD AUTO: 48.2 % (ref 37.5–51)
HGB BLD-MCNC: 15.8 G/DL (ref 13–17.7)
HGB UR QL STRIP.AUTO: NEGATIVE
IMM GRANULOCYTES # BLD AUTO: 0.02 10*3/MM3 (ref 0–0.05)
IMM GRANULOCYTES NFR BLD AUTO: 0.3 % (ref 0–0.5)
KETONES UR QL STRIP: NEGATIVE
LEUKOCYTE ESTERASE UR QL STRIP.AUTO: NEGATIVE
LYMPHOCYTES # BLD AUTO: 1.19 10*3/MM3 (ref 0.7–3.1)
LYMPHOCYTES NFR BLD AUTO: 19.2 % (ref 19.6–45.3)
MCH RBC QN AUTO: 30.1 PG (ref 26.6–33)
MCHC RBC AUTO-ENTMCNC: 32.8 G/DL (ref 31.5–35.7)
MCV RBC AUTO: 91.8 FL (ref 79–97)
MONOCYTES # BLD AUTO: 0.76 10*3/MM3 (ref 0.1–0.9)
MONOCYTES NFR BLD AUTO: 12.2 % (ref 5–12)
NEUTROPHILS NFR BLD AUTO: 4.1 10*3/MM3 (ref 1.7–7)
NEUTROPHILS NFR BLD AUTO: 66 % (ref 42.7–76)
NITRITE UR QL STRIP: NEGATIVE
NRBC BLD AUTO-RTO: 0 /100 WBC (ref 0–0.2)
NT-PROBNP SERPL-MCNC: <36 PG/ML (ref 0–900)
PH UR STRIP.AUTO: 6 [PH] (ref 5–8)
PLATELET # BLD AUTO: 170 10*3/MM3 (ref 140–450)
PMV BLD AUTO: 9.6 FL (ref 6–12)
POTASSIUM SERPL-SCNC: 4 MMOL/L (ref 3.5–5.2)
PROT SERPL-MCNC: 6.9 G/DL (ref 6–8.5)
PROT UR QL STRIP: NEGATIVE
RBC # BLD AUTO: 5.25 10*6/MM3 (ref 4.14–5.8)
SODIUM SERPL-SCNC: 136 MMOL/L (ref 136–145)
SP GR UR STRIP: 1.02 (ref 1–1.03)
TROPONIN T SERPL HS-MCNC: 21 NG/L
TSH SERPL DL<=0.05 MIU/L-ACNC: 1.03 UIU/ML (ref 0.27–4.2)
UROBILINOGEN UR QL STRIP: NORMAL
WBC NRBC COR # BLD AUTO: 6.21 10*3/MM3 (ref 3.4–10.8)

## 2023-12-11 PROCEDURE — 94799 UNLISTED PULMONARY SVC/PX: CPT

## 2023-12-11 PROCEDURE — 84443 ASSAY THYROID STIM HORMONE: CPT | Performed by: FAMILY MEDICINE

## 2023-12-11 PROCEDURE — 71046 X-RAY EXAM CHEST 2 VIEWS: CPT

## 2023-12-11 PROCEDURE — 94640 AIRWAY INHALATION TREATMENT: CPT

## 2023-12-11 PROCEDURE — 81003 URINALYSIS AUTO W/O SCOPE: CPT | Performed by: FAMILY MEDICINE

## 2023-12-11 PROCEDURE — 85025 COMPLETE CBC W/AUTO DIFF WBC: CPT | Performed by: FAMILY MEDICINE

## 2023-12-11 PROCEDURE — 85379 FIBRIN DEGRADATION QUANT: CPT | Performed by: FAMILY MEDICINE

## 2023-12-11 PROCEDURE — 25010000002 FUROSEMIDE PER 20 MG: Performed by: FAMILY MEDICINE

## 2023-12-11 PROCEDURE — 25010000002 ENOXAPARIN PER 10 MG: Performed by: FAMILY MEDICINE

## 2023-12-11 PROCEDURE — 94664 DEMO&/EVAL PT USE INHALER: CPT

## 2023-12-11 PROCEDURE — 83880 ASSAY OF NATRIURETIC PEPTIDE: CPT | Performed by: FAMILY MEDICINE

## 2023-12-11 PROCEDURE — 84484 ASSAY OF TROPONIN QUANT: CPT | Performed by: FAMILY MEDICINE

## 2023-12-11 PROCEDURE — 80053 COMPREHEN METABOLIC PANEL: CPT | Performed by: FAMILY MEDICINE

## 2023-12-11 RX ORDER — ALBUTEROL SULFATE 2.5 MG/3ML
2.5 SOLUTION RESPIRATORY (INHALATION) EVERY 4 HOURS PRN
Status: DISCONTINUED | OUTPATIENT
Start: 2023-12-11 | End: 2023-12-13 | Stop reason: HOSPADM

## 2023-12-11 RX ORDER — UBIDECARENONE 75 MG
50 CAPSULE ORAL DAILY
Status: DISCONTINUED | OUTPATIENT
Start: 2023-12-12 | End: 2023-12-13 | Stop reason: HOSPADM

## 2023-12-11 RX ORDER — FLUTICASONE PROPIONATE 50 MCG
2 SPRAY, SUSPENSION (ML) NASAL DAILY
Status: DISCONTINUED | OUTPATIENT
Start: 2023-12-12 | End: 2023-12-13 | Stop reason: HOSPADM

## 2023-12-11 RX ORDER — CETIRIZINE HYDROCHLORIDE 10 MG/1
10 TABLET ORAL DAILY
Status: DISCONTINUED | OUTPATIENT
Start: 2023-12-12 | End: 2023-12-13 | Stop reason: HOSPADM

## 2023-12-11 RX ORDER — LOSARTAN POTASSIUM 50 MG/1
100 TABLET ORAL
Status: DISCONTINUED | OUTPATIENT
Start: 2023-12-12 | End: 2023-12-13 | Stop reason: HOSPADM

## 2023-12-11 RX ORDER — CLONIDINE HYDROCHLORIDE 0.1 MG/1
0.1 TABLET ORAL EVERY 6 HOURS PRN
Status: DISCONTINUED | OUTPATIENT
Start: 2023-12-11 | End: 2023-12-13 | Stop reason: HOSPADM

## 2023-12-11 RX ORDER — AZELASTINE 1 MG/ML
2 SPRAY, METERED NASAL 2 TIMES DAILY
Status: DISCONTINUED | OUTPATIENT
Start: 2023-12-11 | End: 2023-12-13 | Stop reason: HOSPADM

## 2023-12-11 RX ORDER — GABAPENTIN 300 MG/1
600 CAPSULE ORAL EVERY 6 HOURS SCHEDULED
Status: DISCONTINUED | OUTPATIENT
Start: 2023-12-12 | End: 2023-12-13 | Stop reason: HOSPADM

## 2023-12-11 RX ORDER — VALACYCLOVIR HYDROCHLORIDE 500 MG/1
500 TABLET, FILM COATED ORAL DAILY
Status: DISCONTINUED | OUTPATIENT
Start: 2023-12-12 | End: 2023-12-13 | Stop reason: HOSPADM

## 2023-12-11 RX ORDER — FUROSEMIDE 40 MG/1
40 TABLET ORAL
Status: DISCONTINUED | OUTPATIENT
Start: 2023-12-11 | End: 2023-12-11

## 2023-12-11 RX ORDER — FUROSEMIDE 40 MG/1
40 TABLET ORAL 2 TIMES DAILY
COMMUNITY
End: 2023-12-13 | Stop reason: HOSPADM

## 2023-12-11 RX ORDER — ATORVASTATIN CALCIUM 10 MG/1
10 TABLET, FILM COATED ORAL NIGHTLY
Status: DISCONTINUED | OUTPATIENT
Start: 2023-12-11 | End: 2023-12-13 | Stop reason: HOSPADM

## 2023-12-11 RX ORDER — MULTIPLE VITAMINS W/ MINERALS TAB 9MG-400MCG
1 TAB ORAL DAILY
Status: DISCONTINUED | OUTPATIENT
Start: 2023-12-12 | End: 2023-12-13 | Stop reason: HOSPADM

## 2023-12-11 RX ORDER — TRAZODONE HYDROCHLORIDE 100 MG/1
300 TABLET ORAL NIGHTLY
Status: DISCONTINUED | OUTPATIENT
Start: 2023-12-11 | End: 2023-12-13 | Stop reason: HOSPADM

## 2023-12-11 RX ORDER — FINASTERIDE 5 MG/1
5 TABLET, FILM COATED ORAL DAILY
Status: DISCONTINUED | OUTPATIENT
Start: 2023-12-12 | End: 2023-12-13 | Stop reason: HOSPADM

## 2023-12-11 RX ORDER — L.ACID,PARA/B.BIFIDUM/S.THERM 8B CELL
1 CAPSULE ORAL DAILY
Status: DISCONTINUED | OUTPATIENT
Start: 2023-12-12 | End: 2023-12-13 | Stop reason: HOSPADM

## 2023-12-11 RX ORDER — BUDESONIDE AND FORMOTEROL FUMARATE DIHYDRATE 160; 4.5 UG/1; UG/1
2 AEROSOL RESPIRATORY (INHALATION)
Status: DISCONTINUED | OUTPATIENT
Start: 2023-12-11 | End: 2023-12-13 | Stop reason: HOSPADM

## 2023-12-11 RX ORDER — NITROGLYCERIN 0.4 MG/1
0.4 TABLET SUBLINGUAL
Status: DISCONTINUED | OUTPATIENT
Start: 2023-12-11 | End: 2023-12-13 | Stop reason: HOSPADM

## 2023-12-11 RX ORDER — MODAFINIL 100 MG/1
200 TABLET ORAL DAILY
Status: DISCONTINUED | OUTPATIENT
Start: 2023-12-12 | End: 2023-12-13 | Stop reason: HOSPADM

## 2023-12-11 RX ORDER — OXYBUTYNIN CHLORIDE 5 MG/1
5 TABLET, EXTENDED RELEASE ORAL DAILY
Status: DISCONTINUED | OUTPATIENT
Start: 2023-12-12 | End: 2023-12-13 | Stop reason: HOSPADM

## 2023-12-11 RX ORDER — DILTIAZEM HYDROCHLORIDE 240 MG/1
240 CAPSULE, COATED, EXTENDED RELEASE ORAL DAILY
Status: DISCONTINUED | OUTPATIENT
Start: 2023-12-12 | End: 2023-12-13 | Stop reason: HOSPADM

## 2023-12-11 RX ORDER — TAMSULOSIN HYDROCHLORIDE 0.4 MG/1
0.8 CAPSULE ORAL NIGHTLY
Status: DISCONTINUED | OUTPATIENT
Start: 2023-12-11 | End: 2023-12-13 | Stop reason: HOSPADM

## 2023-12-11 RX ORDER — PANTOPRAZOLE SODIUM 40 MG/1
40 TABLET, DELAYED RELEASE ORAL DAILY
Status: DISCONTINUED | OUTPATIENT
Start: 2023-12-12 | End: 2023-12-13 | Stop reason: HOSPADM

## 2023-12-11 RX ORDER — OXYCODONE HYDROCHLORIDE 15 MG/1
15 TABLET, FILM COATED, EXTENDED RELEASE ORAL 3 TIMES DAILY
Status: DISCONTINUED | OUTPATIENT
Start: 2023-12-11 | End: 2023-12-13 | Stop reason: HOSPADM

## 2023-12-11 RX ORDER — MELATONIN
2000 DAILY
Status: DISCONTINUED | OUTPATIENT
Start: 2023-12-12 | End: 2023-12-13 | Stop reason: HOSPADM

## 2023-12-11 RX ORDER — OXYCODONE HYDROCHLORIDE AND ACETAMINOPHEN 5; 325 MG/1; MG/1
1 TABLET ORAL EVERY 6 HOURS PRN
Status: DISCONTINUED | OUTPATIENT
Start: 2023-12-11 | End: 2023-12-13 | Stop reason: HOSPADM

## 2023-12-11 RX ORDER — GUAIFENESIN 600 MG/1
600 TABLET, EXTENDED RELEASE ORAL EVERY 12 HOURS SCHEDULED
Status: DISCONTINUED | OUTPATIENT
Start: 2023-12-11 | End: 2023-12-13 | Stop reason: HOSPADM

## 2023-12-11 RX ORDER — AMLODIPINE BESYLATE 10 MG/1
10 TABLET ORAL DAILY
Status: DISCONTINUED | OUTPATIENT
Start: 2023-12-12 | End: 2023-12-11

## 2023-12-11 RX ORDER — FUROSEMIDE 10 MG/ML
80 INJECTION INTRAMUSCULAR; INTRAVENOUS ONCE
Status: COMPLETED | OUTPATIENT
Start: 2023-12-11 | End: 2023-12-11

## 2023-12-11 RX ORDER — DULOXETIN HYDROCHLORIDE 30 MG/1
60 CAPSULE, DELAYED RELEASE ORAL DAILY
Status: DISCONTINUED | OUTPATIENT
Start: 2023-12-12 | End: 2023-12-13 | Stop reason: HOSPADM

## 2023-12-11 RX ORDER — ENOXAPARIN SODIUM 100 MG/ML
40 INJECTION SUBCUTANEOUS EVERY 12 HOURS
Status: DISCONTINUED | OUTPATIENT
Start: 2023-12-11 | End: 2023-12-13 | Stop reason: HOSPADM

## 2023-12-11 RX ORDER — ALPRAZOLAM 0.5 MG/1
1 TABLET ORAL 2 TIMES DAILY PRN
Status: DISCONTINUED | OUTPATIENT
Start: 2023-12-11 | End: 2023-12-13 | Stop reason: HOSPADM

## 2023-12-11 RX ADMIN — AZELASTINE HYDROCHLORIDE 2 SPRAY: 137 SPRAY, METERED NASAL at 21:34

## 2023-12-11 RX ADMIN — FUROSEMIDE 80 MG: 10 INJECTION, SOLUTION INTRAVENOUS at 23:22

## 2023-12-11 RX ADMIN — TRAZODONE HYDROCHLORIDE 300 MG: 100 TABLET ORAL at 21:33

## 2023-12-11 RX ADMIN — ALBUTEROL SULFATE 2.5 MG: 2.5 SOLUTION RESPIRATORY (INHALATION) at 23:04

## 2023-12-11 RX ADMIN — OXYCODONE HYDROCHLORIDE 15 MG: 15 TABLET, FILM COATED, EXTENDED RELEASE ORAL at 21:33

## 2023-12-11 RX ADMIN — FUROSEMIDE 10 MG/HR: 10 INJECTION, SOLUTION INTRAVENOUS at 23:21

## 2023-12-11 RX ADMIN — ATORVASTATIN CALCIUM 10 MG: 10 TABLET, FILM COATED ORAL at 21:33

## 2023-12-11 RX ADMIN — GABAPENTIN 600 MG: 300 CAPSULE ORAL at 23:20

## 2023-12-11 RX ADMIN — ENOXAPARIN SODIUM 40 MG: 100 INJECTION SUBCUTANEOUS at 23:20

## 2023-12-11 RX ADMIN — BUDESONIDE AND FORMOTEROL FUMARATE DIHYDRATE 2 PUFF: 160; 4.5 AEROSOL RESPIRATORY (INHALATION) at 22:57

## 2023-12-11 RX ADMIN — OXYCODONE HYDROCHLORIDE AND ACETAMINOPHEN 1 TABLET: 5; 325 TABLET ORAL at 21:32

## 2023-12-11 RX ADMIN — TAMSULOSIN HYDROCHLORIDE 0.8 MG: 0.4 CAPSULE ORAL at 21:33

## 2023-12-12 ENCOUNTER — APPOINTMENT (OUTPATIENT)
Dept: CARDIOLOGY | Facility: HOSPITAL | Age: 68
End: 2023-12-12
Payer: MEDICARE

## 2023-12-12 ENCOUNTER — APPOINTMENT (OUTPATIENT)
Dept: ULTRASOUND IMAGING | Facility: HOSPITAL | Age: 68
End: 2023-12-12
Payer: MEDICARE

## 2023-12-12 PROBLEM — I50.31 ACUTE DIASTOLIC CHF (CONGESTIVE HEART FAILURE), NYHA CLASS 4: Status: ACTIVE | Noted: 2023-12-12

## 2023-12-12 LAB
ANION GAP SERPL CALCULATED.3IONS-SCNC: 10 MMOL/L (ref 5–15)
BH CV ECHO MEAS - AO MAX PG: 9.5 MMHG
BH CV ECHO MEAS - AO MEAN PG: 6 MMHG
BH CV ECHO MEAS - AO V2 MAX: 154 CM/SEC
BH CV ECHO MEAS - AO V2 VTI: 30.1 CM
BH CV ECHO MEAS - AVA(I,D): 2.8 CM2
BH CV ECHO MEAS - EDV(MOD-SP4): 106 ML
BH CV ECHO MEAS - EF(MOD-SP4): 59.4 %
BH CV ECHO MEAS - ESV(MOD-SP4): 43 ML
BH CV ECHO MEAS - LA DIMENSION: 4.3 CM
BH CV ECHO MEAS - LV DIASTOLIC VOL/BSA (35-75): 37.1 CM2
BH CV ECHO MEAS - LV MAX PG: 5.4 MMHG
BH CV ECHO MEAS - LV MEAN PG: 4 MMHG
BH CV ECHO MEAS - LV SYSTOLIC VOL/BSA (12-30): 15 CM2
BH CV ECHO MEAS - LV V1 MAX: 116 CM/SEC
BH CV ECHO MEAS - LV V1 VTI: 24 CM
BH CV ECHO MEAS - LVOT AREA: 3.5 CM2
BH CV ECHO MEAS - LVOT DIAM: 2.1 CM
BH CV ECHO MEAS - MV A MAX VEL: 80 CM/SEC
BH CV ECHO MEAS - MV DEC TIME: 0.31 SEC
BH CV ECHO MEAS - MV E MAX VEL: 48.4 CM/SEC
BH CV ECHO MEAS - MV E/A: 0.61
BH CV ECHO MEAS - SI(MOD-SP4): 22 ML/M2
BH CV ECHO MEAS - SV(LVOT): 83.1 ML
BH CV ECHO MEAS - SV(MOD-SP4): 63 ML
BH CV REST NUCLEAR ISOTOPE DOSE: 12 MCI
BH CV STRESS BP STAGE 1: NORMAL
BH CV STRESS COMMENTS STAGE 1: NORMAL
BH CV STRESS DOSE REGADENOSON STAGE 1: 0.4
BH CV STRESS DURATION MIN STAGE 1: 0
BH CV STRESS DURATION SEC STAGE 1: 10
BH CV STRESS HR STAGE 1: 80
BH CV STRESS NUCLEAR ISOTOPE DOSE: 36 MCI
BH CV STRESS PROTOCOL 1: NORMAL
BH CV STRESS RECOVERY BP: NORMAL MMHG
BH CV STRESS RECOVERY HR: 73 BPM
BH CV STRESS STAGE 1: 1
BUN SERPL-MCNC: 14 MG/DL (ref 8–23)
BUN/CREAT SERPL: 11.5 (ref 7–25)
CALCIUM SPEC-SCNC: 9 MG/DL (ref 8.6–10.5)
CHLORIDE SERPL-SCNC: 96 MMOL/L (ref 98–107)
CO2 SERPL-SCNC: 31 MMOL/L (ref 22–29)
CREAT SERPL-MCNC: 1.22 MG/DL (ref 0.76–1.27)
EGFRCR SERPLBLD CKD-EPI 2021: 65.4 ML/MIN/1.73
GEN 5 2HR TROPONIN T REFLEX: 24 NG/L
GLUCOSE SERPL-MCNC: 113 MG/DL (ref 65–99)
LEFT ATRIUM VOLUME INDEX: 23.4 ML/M2
MAXIMAL PREDICTED HEART RATE: 154 BPM
PERCENT MAX PREDICTED HR: 51.95 %
POTASSIUM SERPL-SCNC: 3.7 MMOL/L (ref 3.5–5.2)
SODIUM SERPL-SCNC: 137 MMOL/L (ref 136–145)
STRESS BASELINE BP: NORMAL MMHG
STRESS BASELINE HR: 72 BPM
STRESS PERCENT HR: 61 %
STRESS POST EXERCISE DUR SEC: 10 SEC
STRESS POST PEAK BP: NORMAL MMHG
STRESS POST PEAK HR: 80 BPM
STRESS TARGET HR: 131 BPM
TROPONIN T DELTA: 3 NG/L

## 2023-12-12 PROCEDURE — 0 HYDROMORPHONE 1 MG/ML SOLUTION: Performed by: FAMILY MEDICINE

## 2023-12-12 PROCEDURE — 84484 ASSAY OF TROPONIN QUANT: CPT | Performed by: FAMILY MEDICINE

## 2023-12-12 PROCEDURE — 93018 CV STRESS TEST I&R ONLY: CPT | Performed by: EMERGENCY MEDICINE

## 2023-12-12 PROCEDURE — 93005 ELECTROCARDIOGRAM TRACING: CPT | Performed by: NURSE PRACTITIONER

## 2023-12-12 PROCEDURE — 99221 1ST HOSP IP/OBS SF/LOW 40: CPT | Performed by: NURSE PRACTITIONER

## 2023-12-12 PROCEDURE — 25510000001 PERFLUTREN 6.52 MG/ML SUSPENSION: Performed by: FAMILY MEDICINE

## 2023-12-12 PROCEDURE — 93325 DOPPLER ECHO COLOR FLOW MAPG: CPT | Performed by: EMERGENCY MEDICINE

## 2023-12-12 PROCEDURE — 78452 HT MUSCLE IMAGE SPECT MULT: CPT

## 2023-12-12 PROCEDURE — 93010 ELECTROCARDIOGRAM REPORT: CPT | Performed by: INTERNAL MEDICINE

## 2023-12-12 PROCEDURE — 93308 TTE F-UP OR LMTD: CPT | Performed by: EMERGENCY MEDICINE

## 2023-12-12 PROCEDURE — 93308 TTE F-UP OR LMTD: CPT

## 2023-12-12 PROCEDURE — 93325 DOPPLER ECHO COLOR FLOW MAPG: CPT

## 2023-12-12 PROCEDURE — 93017 CV STRESS TEST TRACING ONLY: CPT

## 2023-12-12 PROCEDURE — 94799 UNLISTED PULMONARY SVC/PX: CPT

## 2023-12-12 PROCEDURE — 78452 HT MUSCLE IMAGE SPECT MULT: CPT | Performed by: EMERGENCY MEDICINE

## 2023-12-12 PROCEDURE — 0 TECHNETIUM TETROFOSMIN KIT: Performed by: FAMILY MEDICINE

## 2023-12-12 PROCEDURE — 93970 EXTREMITY STUDY: CPT

## 2023-12-12 PROCEDURE — 93970 EXTREMITY STUDY: CPT | Performed by: SURGERY

## 2023-12-12 PROCEDURE — 93321 DOPPLER ECHO F-UP/LMTD STD: CPT

## 2023-12-12 PROCEDURE — 80048 BASIC METABOLIC PNL TOTAL CA: CPT | Performed by: NURSE PRACTITIONER

## 2023-12-12 PROCEDURE — 25010000002 ENOXAPARIN PER 10 MG: Performed by: FAMILY MEDICINE

## 2023-12-12 PROCEDURE — 93321 DOPPLER ECHO F-UP/LMTD STD: CPT | Performed by: EMERGENCY MEDICINE

## 2023-12-12 PROCEDURE — A9502 TC99M TETROFOSMIN: HCPCS | Performed by: FAMILY MEDICINE

## 2023-12-12 PROCEDURE — 25010000002 REGADENOSON 0.4 MG/5ML SOLUTION: Performed by: EMERGENCY MEDICINE

## 2023-12-12 RX ORDER — TAMSULOSIN HYDROCHLORIDE 0.4 MG/1
2 CAPSULE ORAL NIGHTLY
COMMUNITY

## 2023-12-12 RX ORDER — AZELASTINE 1 MG/ML
2 SPRAY, METERED NASAL 2 TIMES DAILY
COMMUNITY

## 2023-12-12 RX ORDER — DULOXETIN HYDROCHLORIDE 60 MG/1
60 CAPSULE, DELAYED RELEASE ORAL 2 TIMES DAILY
COMMUNITY

## 2023-12-12 RX ORDER — FLUTICASONE PROPIONATE 50 MCG
2 SPRAY, SUSPENSION (ML) NASAL DAILY
COMMUNITY

## 2023-12-12 RX ORDER — BUMETANIDE 1 MG/1
1 TABLET ORAL 2 TIMES DAILY
Status: ON HOLD | COMMUNITY
End: 2023-12-13 | Stop reason: SDUPTHER

## 2023-12-12 RX ORDER — REGADENOSON 0.08 MG/ML
0.4 INJECTION, SOLUTION INTRAVENOUS ONCE
Status: COMPLETED | OUTPATIENT
Start: 2023-12-12 | End: 2023-12-12

## 2023-12-12 RX ADMIN — Medication 2000 UNITS: at 08:28

## 2023-12-12 RX ADMIN — FLUTICASONE PROPIONATE 2 SPRAY: 50 SPRAY, METERED NASAL at 08:29

## 2023-12-12 RX ADMIN — GABAPENTIN 600 MG: 300 CAPSULE ORAL at 17:03

## 2023-12-12 RX ADMIN — OXYCODONE HYDROCHLORIDE 15 MG: 15 TABLET, FILM COATED, EXTENDED RELEASE ORAL at 17:03

## 2023-12-12 RX ADMIN — FINASTERIDE 5 MG: 5 TABLET, FILM COATED ORAL at 08:28

## 2023-12-12 RX ADMIN — Medication 1 TABLET: at 08:28

## 2023-12-12 RX ADMIN — TETROFOSMIN 1 DOSE: 1.38 INJECTION, POWDER, LYOPHILIZED, FOR SOLUTION INTRAVENOUS at 11:25

## 2023-12-12 RX ADMIN — ALBUTEROL SULFATE 2.5 MG: 2.5 SOLUTION RESPIRATORY (INHALATION) at 06:25

## 2023-12-12 RX ADMIN — TRAZODONE HYDROCHLORIDE 300 MG: 100 TABLET ORAL at 20:17

## 2023-12-12 RX ADMIN — ALBUTEROL SULFATE 2.5 MG: 2.5 SOLUTION RESPIRATORY (INHALATION) at 19:31

## 2023-12-12 RX ADMIN — OXYCODONE HYDROCHLORIDE 15 MG: 15 TABLET, FILM COATED, EXTENDED RELEASE ORAL at 08:28

## 2023-12-12 RX ADMIN — VITAM B12 50 MCG: 100 TAB at 08:28

## 2023-12-12 RX ADMIN — OXYCODONE HYDROCHLORIDE AND ACETAMINOPHEN 1 TABLET: 5; 325 TABLET ORAL at 06:48

## 2023-12-12 RX ADMIN — GABAPENTIN 600 MG: 300 CAPSULE ORAL at 23:18

## 2023-12-12 RX ADMIN — PANTOPRAZOLE SODIUM 40 MG: 40 TABLET, DELAYED RELEASE ORAL at 08:28

## 2023-12-12 RX ADMIN — MAGNESIUM GLUCONATE 500 MG ORAL TABLET 400 MG: 500 TABLET ORAL at 08:28

## 2023-12-12 RX ADMIN — PERFLUTREN 6.52 MG: 6.52 INJECTION, SUSPENSION INTRAVENOUS at 12:00

## 2023-12-12 RX ADMIN — TETROFOSMIN 1 DOSE: 1.38 INJECTION, POWDER, LYOPHILIZED, FOR SOLUTION INTRAVENOUS at 13:10

## 2023-12-12 RX ADMIN — ATORVASTATIN CALCIUM 10 MG: 10 TABLET, FILM COATED ORAL at 20:16

## 2023-12-12 RX ADMIN — DILTIAZEM HYDROCHLORIDE 240 MG: 240 CAPSULE, EXTENDED RELEASE ORAL at 08:28

## 2023-12-12 RX ADMIN — CETIRIZINE HYDROCHLORIDE 10 MG: 10 TABLET ORAL at 08:27

## 2023-12-12 RX ADMIN — GUAIFENESIN 600 MG: 600 TABLET, EXTENDED RELEASE ORAL at 08:27

## 2023-12-12 RX ADMIN — LOSARTAN POTASSIUM 100 MG: 50 TABLET, FILM COATED ORAL at 08:27

## 2023-12-12 RX ADMIN — REGADENOSON 0.4 MG: 0.08 INJECTION, SOLUTION INTRAVENOUS at 13:02

## 2023-12-12 RX ADMIN — BUDESONIDE AND FORMOTEROL FUMARATE DIHYDRATE 2 PUFF: 160; 4.5 AEROSOL RESPIRATORY (INHALATION) at 19:31

## 2023-12-12 RX ADMIN — HYDROMORPHONE HYDROCHLORIDE 1 MG: 1 INJECTION, SOLUTION INTRAMUSCULAR; INTRAVENOUS; SUBCUTANEOUS at 14:14

## 2023-12-12 RX ADMIN — TAMSULOSIN HYDROCHLORIDE 0.8 MG: 0.4 CAPSULE ORAL at 20:16

## 2023-12-12 RX ADMIN — AZELASTINE HYDROCHLORIDE 2 SPRAY: 137 SPRAY, METERED NASAL at 20:18

## 2023-12-12 RX ADMIN — HYDROMORPHONE HYDROCHLORIDE 1 MG: 1 INJECTION, SOLUTION INTRAMUSCULAR; INTRAVENOUS; SUBCUTANEOUS at 06:51

## 2023-12-12 RX ADMIN — OXYCODONE HYDROCHLORIDE 15 MG: 15 TABLET, FILM COATED, EXTENDED RELEASE ORAL at 20:16

## 2023-12-12 RX ADMIN — VALACYCLOVIR 500 MG: 500 TABLET, FILM COATED ORAL at 08:28

## 2023-12-12 RX ADMIN — OXYCODONE HYDROCHLORIDE AND ACETAMINOPHEN 1 TABLET: 5; 325 TABLET ORAL at 14:14

## 2023-12-12 RX ADMIN — GABAPENTIN 600 MG: 300 CAPSULE ORAL at 14:14

## 2023-12-12 RX ADMIN — ENOXAPARIN SODIUM 40 MG: 100 INJECTION SUBCUTANEOUS at 20:17

## 2023-12-12 RX ADMIN — MODAFINIL 200 MG: 100 TABLET ORAL at 08:27

## 2023-12-12 RX ADMIN — BUDESONIDE AND FORMOTEROL FUMARATE DIHYDRATE 2 PUFF: 160; 4.5 AEROSOL RESPIRATORY (INHALATION) at 06:25

## 2023-12-12 RX ADMIN — GABAPENTIN 600 MG: 300 CAPSULE ORAL at 06:48

## 2023-12-12 RX ADMIN — DULOXETINE HYDROCHLORIDE 60 MG: 30 CAPSULE, DELAYED RELEASE ORAL at 08:27

## 2023-12-12 RX ADMIN — Medication 1 CAPSULE: at 08:28

## 2023-12-12 RX ADMIN — OXYBUTYNIN CHLORIDE 5 MG: 5 TABLET, EXTENDED RELEASE ORAL at 08:28

## 2023-12-12 RX ADMIN — ENOXAPARIN SODIUM 40 MG: 100 INJECTION SUBCUTANEOUS at 08:27

## 2023-12-12 RX ADMIN — AZELASTINE HYDROCHLORIDE 2 SPRAY: 137 SPRAY, METERED NASAL at 08:28

## 2023-12-12 NOTE — CONSULTS
"[facility] CONSULT [facility]    Referring Provider: Carlos Elliott MD    Reason for Consultation: \"edema and elevated troponin with multiple risk factors\"    Chief complaint: No chief complaint on file.      Subjective .     History of present illness:  Carlos Hayes is a 68 y.o. male who was direct admitted from his PCP office. It appears he presented to his PCP for a routine physical. He was noted to have leg swelling and increasing shortness of breath. These both appears to be chronic and recurrent problems. He was direct admitted. His initial troponin was negative and repeat was 24. No EKG has been done. He had an echo and a lexiscan completed. Lexiscan results are pending. He had a negative BNP. Chest X-ray with mild congestion. He was started on a Lasix drip and he reports his leg swelling is much improved. He notes breathing better. He tolerated nuclear well. He has had similar episodes before. He was previously seen by cardiology but was advised could see PRN in the past. He has morbid obesity, bronchitis, anxiety, chronic back pain, hypertension, chronic leg swelling, sleep apne, PTSD.     History  Past Medical History:   Diagnosis Date    Acute bronchitis     Amnesia     Anxiety     Arthritis     Asthma     Back pain     Chest pain     CKD (chronic kidney disease)     Concussion     Contusion of chest wall     COPD (chronic obstructive pulmonary disease)     COVID-19     Degeneration of intervertebral disc of lumbar region     Fall     Gait disturbance     Gastro-esophageal reflux     Headache     Hearing impaired     Hypersomnia     Hypertension     Hypotestosteronemia     Insomnia     Leg cramp     Lumbar stenosis     Obesity     Obesity     KATLYN (obstructive sleep apnea)     KATLYN treated with BiPAP     2 liters of oxygen at night    Potential difficult airway on pre-intubation assessment 12/14/2021    PTSD (post-traumatic stress disorder)     Radiculopathy of lumbosacral region     " Sinusitis     Testicular hypofunction     Tremor    ,   Past Surgical History:   Procedure Laterality Date    APPENDECTOMY      BACK SURGERY      L3-4 fusion    BACK SURGERY      L3-S1 fusion    BLEPHAROPLASTY Bilateral 2021    Procedure: UPPER LID BLEPHAROPLASTY (08446), WITH BROWLIFT (39391);  Surgeon: Jagdish Gregg MD;  Location:  [facility];  Service: Plastics;  Laterality: Bilateral;    BROW LIFT Bilateral 2021    Procedure: BROWLIFT (31208);  Surgeon: Jagdish Gregg MD;  Location:  [facility];  Service: Plastics;  Laterality: Bilateral;    CARDIAC CATHETERIZATION      CARDIAC CATHETERIZATION N/A 2018    Procedure: Coronary angiography;  Surgeon: Lizandro Quiroz MD;  Location:  [facility];  Service: Cardiology    CARDIAC CATHETERIZATION N/A 2018    Procedure: Percutaneous Coronary Intervention;  Surgeon: Lizandro Quiroz MD;  Location:  [facility];  Service: Cardiology    CARPAL TUNNEL RELEASE Bilateral     COLONOSCOPY      ENDOSCOPY      FRACTURE SURGERY      LEFT HAND    HERNIA REPAIR      KNEE SURGERY Right     SPINAL CORD STIMULATOR REMOVAL N/A 2018    Procedure: SPINAL CORD STIMULATOR REMOVAL Removal of generator and placment of new generator;  Surgeon: Miguel Hall MD;  Location:  [facility];  Service: Neurosurgery    SPINAL CORD STIMULATOR REMOVAL N/A 2018    Procedure: SPINAL CORD STIMULATOR BATTERY CHANGE;  Surgeon: Miguel Hall MD;  Location:  [facility];  Service: Neurosurgery    THORACIC LAMINECTOMY WITH PLACEMENT OF DORSAL COLUMN STIMULATOR  2010   ,   Family History   Problem Relation Age of Onset    Stroke Mother     Arrhythmia Mother     Arrhythmia Father    ,   Social History     Tobacco Use    Smoking status: Former     Packs/day: 0.75     Years: 41.00     Additional pack years: 0.00     Total pack years: 30.75     Types: Cigarettes     Start date: 1975     Quit date: 3/23/2016     Years since quittin.7      Passive exposure: Current    Smokeless tobacco: Never   Vaping Use    Vaping Use: Never used   Substance Use Topics    Alcohol use: Yes     Comment: occ    Drug use: No   ,     Medications    Prior to Admission medications    Medication Sig Start Date End Date Taking? Authorizing Provider   ALPRAZolam (XANAX) 1 MG tablet Take 1 tablet by mouth 2 (Two) Times a Day As Needed for Anxiety. 2/16/17  Yes Aung Gonzalez MD   amLODIPine (NORVASC) 10 MG tablet Take 1 tablet by mouth Daily. 2/4/22  Yes Aung Gonzalez MD   atorvastatin (LIPITOR) 10 MG tablet Take 1 tablet by mouth Daily.   Yes Aung Gonzalez MD   azelastine (ASTELIN) 0.1 % nasal spray 2 sprays into the nostril(s) as directed by provider 2 (Two) Times a Day. Use in each nostril as directed   Yes Aung Gonzalez MD   Budeson-Glycopyrrol-Formoterol (Breztri Aerosphere) 160-9-4.8 MCG/ACT aerosol inhaler Inhale 2 puffs 2 (Two) Times a Day. 11/16/23  Yes Mary Christian APRN   bumetanide (BUMEX) 1 MG tablet Take 1 tablet by mouth 2 (Two) Times a Day.   Yes Aung Gonzalez MD   diltiaZEM CD (CARDIZEM CD) 240 MG 24 hr capsule Take 1 capsule by mouth Daily.   Yes Aung Gonzalez MD   dutasteride (AVODART) 0.5 MG capsule Take 1 capsule by mouth Daily. 1/20/22  Yes Aung Gonzalez MD   fluticasone (FLONASE) 50 MCG/ACT nasal spray 2 sprays into the nostril(s) as directed by provider Daily.   Yes Aung Gonzalez MD   gabapentin (NEURONTIN) 600 MG tablet Take 1 tablet by mouth Every 6 (Six) Hours. 1/22/22  Yes Aung Gonzalez MD   loratadine (CLARITIN) 10 MG tablet Take 1 tablet by mouth Daily. 2/7/22  Yes Humphrey Scherer MD   losartan (COZAAR) 100 MG tablet Take 1 tablet by mouth Daily. 12/13/21  Yes Aung Gonzalez MD   magnesium oxide (MAGOX) 400 (241.3 Mg) MG tablet tablet Take 1 tablet by mouth Daily.   Yes Aung Gonzalez MD   Mirabegron ER (MYRBETRIQ) 25 MG tablet sustained-release 24 hour  24 hr tablet Take 1 tablet by mouth Daily.   Yes Aung Gonzalez MD   modafinil (PROVIGIL) 200 MG tablet Take 1 tablet by mouth Daily.   Yes Aung Gonzalez MD   Multiple Vitamins-Minerals (MULTI COMPLETE PO) Take 1 tablet by mouth Daily.   Yes Aung Gonzalez MD   OXYCONTIN 15 MG tablet extended-release 12 hour Take 1 tablet by mouth Every 8 (Eight) Hours. 4/27/18  Yes Aung Gonzalez MD   pantoprazole (PROTONIX) 40 MG EC tablet Take 1 tablet by mouth Daily.   Yes Aung Gonzalez MD   Probiotic Product (PROBIOTIC ADVANCED PO) Take 1 tablet/day by mouth Daily.   Yes Aung Gonzalez MD   tamsulosin (FLOMAX) 0.4 MG capsule 24 hr capsule Take 2 capsules by mouth Every Night.   Yes Aung Gonzalez MD   Testosterone Cypionate (DEPOTESTOTERONE CYPIONATE) 200 MG/ML injection Inject 1 mL into the appropriate muscle as directed by prescriber Every 14 (Fourteen) Days. 2/1/22  Yes Aung Gonzalez MD   traZODone (DESYREL) 150 MG tablet Take 2 tablets by mouth Every Night. 3/15/17  Yes Aung Gonzalez MD   TURMERIC PO Take 1 tablet by mouth Daily.   Yes Aung Gonzalez MD   valACYclovir (VALTREX) 500 MG tablet Take 1 tablet by mouth Daily.   Yes Aung Gonzalez MD   vitamin B-12 (CYANOCOBALAMIN) 100 MCG tablet Take 1 tablet by mouth Daily.   Yes Aung Gonzalez MD   albuterol (PROVENTIL) (2.5 MG/3ML) 0.083% nebulizer solution Take 2.5 mg by nebulization Every 4 (Four) Hours As Needed for Wheezing. 5/28/21   Humphrey Scherer MD   albuterol sulfate  (90 Base) MCG/ACT inhaler Inhale 2 puffs Every 4 (Four) Hours As Needed for Wheezing or Shortness of Air. 11/16/23   Mary Christian APRN   cloNIDine (CATAPRES) 0.1 MG tablet Take 1 tablet by mouth Every 6 (Six) Hours As Needed for High Blood Pressure (for bp 150/90 or greater).    Aung Gonzalez MD   DULoxetine (CYMBALTA) 60 MG capsule Take 1 capsule by mouth 2 (Two) Times a Day.    Carlos  MD Aung   furosemide (LASIX) 40 MG tablet Take 1 tablet by mouth 2 (Two) Times a Day.    Aung Gonzalez MD   oxyCODONE-acetaminophen (PERCOCET) 5-325 MG per tablet Take 1 tablet by mouth Every 6 (Six) Hours As Needed for Moderate Pain or Severe Pain. 4 x a day    Aung Gonzalez MD   tadalafil (ADCIRCA) 20 MG tablet tablet Take 12 mg by mouth Daily As Needed (sexual activity).    Aung Gonzalez MD   azelastine (ASTELIN) 0.1 % nasal spray USE 2 SPRAYS IN EACH NOSTRIL TWICE A DAY 7/25/23 12/12/23  Mary Christian APRN   cholecalciferol (VITAMIN D3) 25 MCG (1000 UT) tablet Take 2 tablets by mouth Daily.  Patient not taking: Reported on 12/12/2023 10/31/20 12/12/23  Stevo Kumar MD   DULoxetine (CYMBALTA) 60 MG capsule Take 1 capsule by mouth Daily.  12/12/23  Aung Gonzalez MD   fluticasone (FLONASE) 50 MCG/ACT nasal spray USE 2 SPRAYS IN EACH NOSTRIL AS DIRECTED BY PROVIDER DAILY 3/6/23 12/12/23  Humphrey Scherer MD   O2 (OXYGEN) Inhale 2 L/min Every Night. With Bipap  12/12/23  Aung Gonzalez MD   pseudoephedrine-guaifenesin (MUCINEX D)  MG per 12 hr tablet Take 1 tablet by mouth Every 12 (Twelve) Hours.  Patient not taking: Reported on 12/12/2023 12/12/23  Aung Gonzalez MD   Semaglutide,0.25 or 0.5MG/DOS, (Ozempic, 0.25 or 0.5 MG/DOSE,) 2 MG/1.5ML solution pen-injector Inject 0.5 mg under the skin into the appropriate area as directed 1 (One) Time Per Week.  Patient not taking: Reported on 11/16/2023 12/12/23  Aung Gonzalez MD   tadalafil (CIALIS) 10 MG tablet Take 1 tablet by mouth Daily.  12/12/23  Aung Gonzalez MD   tamsulosin (FLOMAX) 0.4 MG capsule 24 hr capsule TAKE TWO CAPSULES BY MOUTH EVERY NIGHT 11/6/23 12/12/23  Deedee Prieto APRN       Current Facility-Administered Medications   Medication Dose Route Frequency Provider Last Rate Last Admin    albuterol (PROVENTIL) nebulizer solution 0.083% 2.5 mg/3mL  2.5 mg  Nebulization Q4H PRN Melissa Forbes PA   2.5 mg at 12/12/23 0625    ALPRAZolam (XANAX) tablet 1 mg  1 mg Oral BID PRN Melissa Forbes PA        atorvastatin (LIPITOR) tablet 10 mg  10 mg Oral Nightly Melissa Forbes PA   10 mg at 12/11/23 2133    azelastine (ASTELIN) nasal spray 2 spray  2 spray Each Nare BID Melissa Forbes PA   2 spray at 12/12/23 0828    budesonide-formoterol (SYMBICORT) 160-4.5 MCG/ACT inhaler 2 puff  2 puff Inhalation BID - RT Melissa Forbes PA   2 puff at 12/12/23 0625    cetirizine (zyrTEC) tablet 10 mg  10 mg Oral Daily Melissa Forbes PA   10 mg at 12/12/23 0827    cholecalciferol (VITAMIN D3) tablet 2,000 Units  2,000 Units Oral Daily Melissa Forbes PA   2,000 Units at 12/12/23 0828    cloNIDine (CATAPRES) tablet 0.1 mg  0.1 mg Oral Q6H PRN Melissa Forbes PA        dilTIAZem CD (CARDIZEM CD) 24 hr capsule 240 mg  240 mg Oral Daily Melissa Forbes PA   240 mg at 12/12/23 0828    DULoxetine (CYMBALTA) DR capsule 60 mg  60 mg Oral Daily Melissa Forbes PA   60 mg at 12/12/23 0827    Enoxaparin Sodium (LOVENOX) syringe 40 mg  40 mg Subcutaneous Q12H Carlos Elliott MD   40 mg at 12/12/23 0827    finasteride (PROSCAR) tablet 5 mg  5 mg Oral Daily Melissa Forbes PA   5 mg at 12/12/23 0828    fluticasone (FLONASE) 50 MCG/ACT nasal spray 2 spray  2 spray Each Nare Daily Melissa Forbes PA   2 spray at 12/12/23 0829    furosemide (LASIX) 500 mg in 50mL infusion  10 mg/hr Intravenous Continuous Carlos Elliott MD 1 mL/hr at 12/11/23 2321 10 mg/hr at 12/11/23 2321    gabapentin (NEURONTIN) capsule 600 mg  600 mg Oral Q6H Melissa Forbes PA   600 mg at 12/12/23 1414    guaiFENesin (MUCINEX) 12 hr tablet 600 mg  600 mg Oral Q12H Melissa Forbes PA   600 mg at 12/12/23 0874    HYDROmorphone (DILAUDID) injection 1 mg  1 mg Intravenous Q4H PRN Carlos Elliott MD   1 mg at  12/12/23 1414    lactobacillus acidophilus (RISAQUAD) capsule 1 capsule  1 capsule Oral Daily Melissa Forbes PA   1 capsule at 12/12/23 0828    losartan (COZAAR) tablet 100 mg  100 mg Oral Q24H Melissa Forbes PA   100 mg at 12/12/23 0827    magnesium oxide (MAG-OX) tablet 400 mg  400 mg Oral Daily Melissa Forbes PA   400 mg at 12/12/23 0828    modafinil (PROVIGIL) tablet 200 mg  200 mg Oral Daily Melissa Forbes PA   200 mg at 12/12/23 0827    multivitamin with minerals 1 tablet  1 tablet Oral Daily Melissa Forbes PA   1 tablet at 12/12/23 0828    nitroglycerin (NITROSTAT) SL tablet 0.4 mg  0.4 mg Sublingual Q5 Min PRN Carlos Elliott MD        oxybutynin XL (DITROPAN-XL) 24 hr tablet 5 mg  5 mg Oral Daily Melissa Forbes PA   5 mg at 12/12/23 0828    oxyCODONE ER (oxyCONTIN) 12 hr tablet 15 mg  15 mg Oral TID Melissa Forbes PA   15 mg at 12/12/23 0828    oxyCODONE-acetaminophen (PERCOCET) 5-325 MG per tablet 1 tablet  1 tablet Oral Q6H PRN Melissa Forbes PA   1 tablet at 12/12/23 1414    pantoprazole (PROTONIX) EC tablet 40 mg  40 mg Oral Daily Melissa Forbes PA   40 mg at 12/12/23 0828    tamsulosin (FLOMAX) 24 hr capsule 0.8 mg  0.8 mg Oral Nightly Melissa Forbes PA   0.8 mg at 12/11/23 2133    traZODone (DESYREL) tablet 300 mg  300 mg Oral Nightly Melissa Forbes PA   300 mg at 12/11/23 2133    valACYclovir (VALTREX) tablet 500 mg  500 mg Oral Daily Melissa Forbes PA   500 mg at 12/12/23 0828    vitamin B-12 (CYANOCOBALAMIN) tablet 50 mcg  50 mcg Oral Daily Melissa Forbes PA   50 mcg at 12/12/23 0889       Allergies:  Eszopiclone and Levofloxacin    Review of Systems  Review of Systems   Constitutional: Positive for malaise/fatigue. Negative for chills, decreased appetite, fever, weight gain and weight loss.   HENT:  Negative for nosebleeds.    Eyes:  Negative for visual disturbance.  "  Cardiovascular:  Positive for chest pain and leg swelling. Negative for dyspnea on exertion, near-syncope, orthopnea, palpitations, paroxysmal nocturnal dyspnea and syncope.   Respiratory:  Positive for shortness of breath. Negative for cough, hemoptysis and snoring.    Endocrine: Negative for cold intolerance and heat intolerance.   Hematologic/Lymphatic: Negative for bleeding problem. Does not bruise/bleed easily.   Skin:  Negative for rash.   Musculoskeletal:  Negative for back pain and falls.   Gastrointestinal:  Negative for abdominal pain, constipation, diarrhea, heartburn, melena, nausea and vomiting.   Genitourinary:  Negative for hematuria.   Neurological:  Negative for dizziness, headaches and light-headedness.   Psychiatric/Behavioral:  Negative for altered mental status.    Allergic/Immunologic: Negative for persistent infections.       Objective     Physical Exam:  Patient Vitals for the past 24 hrs:   BP Temp Temp src Pulse Resp SpO2 Height Weight   12/12/23 1305 (!) 129/107 -- -- 70 -- -- -- --   12/12/23 1124 140/84 -- -- -- -- -- 190.5 cm (75\") (!) 169 kg (373 lb)   12/12/23 1016 -- -- -- 80 18 95 % -- --   12/12/23 0803 140/84 98.2 °F (36.8 °C) Oral 79 18 94 % -- --   12/12/23 0625 -- -- -- 71 18 93 % -- --   12/12/23 0435 144/77 97.6 °F (36.4 °C) Oral 69 20 94 % -- --   12/12/23 0007 134/65 97.9 °F (36.6 °C) Oral 63 20 91 % -- --   12/11/23 2310 -- -- -- 81 18 91 % -- --   12/11/23 2304 -- -- -- 77 16 94 % -- --   12/11/23 2257 -- -- -- 74 18 94 % -- --   12/11/23 1959 132/76 98.2 °F (36.8 °C) Oral 81 20 92 % -- (!) 169 kg (373 lb 3.8 oz)     Constitutional:       Appearance: Well-developed. Morbidly obese.   Eyes:      Pupils: Pupils are equal, round, and reactive to light.   HENT:      Head: Normocephalic and atraumatic.   Neck:      Vascular: No carotid bruit or JVD.   Pulmonary:      Effort: Pulmonary effort is normal.      Breath sounds: Normal breath sounds.   Cardiovascular:      Normal " rate. Regular rhythm.      Murmurs: There is no murmur.   Pulses:     Intact distal pulses.   Edema:     Thigh: bilateral non-pitting edema of the thigh.     Pretibial: bilateral 1+ edema of the pretibial area.     Ankle: bilateral 1+ edema of the ankle.  Abdominal:      General: Bowel sounds are normal.      Palpations: Abdomen is soft.   Musculoskeletal: Normal range of motion.      Cervical back: Normal range of motion and neck supple. Skin:     General: Skin is warm and dry.   Neurological:      Mental Status: Alert and oriented to person, place, and time.      Deep Tendon Reflexes: Reflexes are normal and symmetric.   Psychiatric:         Behavior: Behavior normal.         Thought Content: Thought content normal.         Judgment: Judgment normal.         Results Review:   I reviewed the patient's new clinical results.    Lab Results (last 72 hours)       Procedure Component Value Units Date/Time    High Sensitivity Troponin T 2Hr [[id number]]  (Abnormal) Collected: 12/12/23 0031    Specimen: Blood Updated: 12/12/23 0100     HS Troponin T 24 ng/L      Troponin T Delta 3 ng/L     Narrative:      High Sensitive Troponin T Reference Range:  <14.0 ng/L- Negative Female for AMI  <22.0 ng/L- Negative Male for AMI  >=14 - Abnormal Female indicating possible myocardial injury.  >=22 - Abnormal Male indicating possible myocardial injury.   Clinicians would have to utilize clinical acumen, EKG, Troponin, and serial changes to determine if it is an Acute Myocardial Infarction or myocardial injury due to an underlying chronic condition.         Urinalysis With Culture If Indicated - Urine, Clean Catch [[id number]]  (Normal) Collected: 12/11/23 2343    Specimen: Urine, Clean Catch Updated: 12/11/23 2353     Color, UA Yellow     Appearance, UA Clear     pH, UA 6.0     Specific Gravity, UA 1.018     Glucose, UA Negative     Ketones, UA Negative     Bilirubin, UA Negative     Blood, UA Negative     Protein, UA Negative     Leuk  Esterase, UA Negative     Nitrite, UA Negative     Urobilinogen, UA 1.0 E.U./dL    Narrative:      In absence of clinical symptoms, the presence of pyuria, bacteria, and/or nitrites on the urinalysis result does not correlate with infection.  Urine microscopic not indicated.    TSH [[id number]]  (Normal) Collected: 12/11/23 2233    Specimen: Blood Updated: 12/11/23 2306     TSH 1.030 uIU/mL     Comprehensive Metabolic Panel [[id number]]  (Abnormal) Collected: 12/11/23 2233    Specimen: Blood Updated: 12/11/23 2300     Glucose 123 mg/dL      BUN 16 mg/dL      Creatinine 1.19 mg/dL      Sodium 136 mmol/L      Potassium 4.0 mmol/L      Comment: Slight hemolysis detected by analyzer. Result may be falsely elevated.        Chloride 99 mmol/L      CO2 27.0 mmol/L      Calcium 9.4 mg/dL      Total Protein 6.9 g/dL      Albumin 4.5 g/dL      ALT (SGPT) 29 U/L      AST (SGOT) 43 U/L      Alkaline Phosphatase 85 U/L      Total Bilirubin 0.4 mg/dL      Globulin 2.4 gm/dL      A/G Ratio 1.9 g/dL      BUN/Creatinine Ratio 13.4     Anion Gap 10.0 mmol/L      eGFR 67.4 mL/min/1.73     Narrative:      GFR Normal >60  Chronic Kidney Disease <60  Kidney Failure <15      BNP [[id number]]  (Normal) Collected: 12/11/23 2233    Specimen: Blood Updated: 12/11/23 2257     proBNP <36.0 pg/mL     Narrative:      This assay is used as an aid in the diagnosis of individuals suspected of having heart failure. It can be used as an aid in the diagnosis of acute decompensated heart failure (ADHF) in patients presenting with signs and symptoms of ADHF to the emergency department (ED). In addition, NT-proBNP of <300 pg/mL indicates ADHF is not likely.    Age Range Result Interpretation  NT-proBNP Concentration (pg/mL:      <50             Positive            >450                   Gray                 300-450                    Negative             <300    50-75           Positive            >900                  Gray                300-900              "     Negative            <300      >75             Positive            >1800                  Gray                300-1800                  Negative            <300    High Sensitivity Troponin T [[id number]]  (Normal) Collected: 12/11/23 2233    Specimen: Blood Updated: 12/11/23 2256     HS Troponin T 21 ng/L     Narrative:      High Sensitive Troponin T Reference Range:  <14.0 ng/L- Negative Female for AMI  <22.0 ng/L- Negative Male for AMI  >=14 - Abnormal Female indicating possible myocardial injury.  >=22 - Abnormal Male indicating possible myocardial injury.   Clinicians would have to utilize clinical acumen, EKG, Troponin, and serial changes to determine if it is an Acute Myocardial Infarction or myocardial injury due to an underlying chronic condition.         D-dimer, Quantitative [[id number]]  (Normal) Collected: 12/11/23 2233    Specimen: Blood Updated: 12/11/23 2251     D-Dimer, Quantitative 0.46 MCGFEU/mL     Narrative:      According to the assay 's published package insert, a normal (<0.50 MCGFEU/mL) D-dimer result in conjunction with a non-high clinical probability assessment, excludes deep vein thrombosis (DVT) and pulmonary embolism (PE) with high sensitivity.    D-dimer values increase with age and this can make VTE exclusion of an older population difficult. To address this, the [employer], based on best available evidence and recent guidelines, recommends that clinicians use age-adjusted D-dimer thresholds in patients greater than 50 years of age with: a) a low probability of PE who do not meet all Pulmonary Embolism Rule Out Criteria, or b) in those with intermediate probability of PE.   The formula for an age-adjusted D-dimer cut-off is \"age/100\".  For example, a 60 year old patient would have an age-adjusted cut-off of 0.60 MCGFEU/mL and an 80 year old 0.80 MCGFEU/mL.    CBC & Differential [[id number]]  (Abnormal) Collected: 12/11/23 2233    Specimen: Blood " Updated: 12/11/23 2240    Narrative:      The following orders were created for panel order CBC & Differential.  Procedure                               Abnormality         Status                     ---------                               -----------         ------                     CBC Auto Differential[[id number]]        Abnormal            Final result                 Please view results for these tests on the individual orders.    CBC Auto Differential [[id number]]  (Abnormal) Collected: 12/11/23 2233    Specimen: Blood Updated: 12/11/23 2240     WBC 6.21 10*3/mm3      RBC 5.25 10*6/mm3      Hemoglobin 15.8 g/dL      Hematocrit 48.2 %      MCV 91.8 fL      MCH 30.1 pg      MCHC 32.8 g/dL      RDW 13.4 %      RDW-SD 45.5 fl      MPV 9.6 fL      Platelets 170 10*3/mm3      Neutrophil % 66.0 %      Lymphocyte % 19.2 %      Monocyte % 12.2 %      Eosinophil % 1.8 %      Basophil % 0.5 %      Immature Grans % 0.3 %      Neutrophils, Absolute 4.10 10*3/mm3      Lymphocytes, Absolute 1.19 10*3/mm3      Monocytes, Absolute 0.76 10*3/mm3      Eosinophils, Absolute 0.11 10*3/mm3      Basophils, Absolute 0.03 10*3/mm3      Immature Grans, Absolute 0.02 10*3/mm3      nRBC 0.0 /100 WBC           Results for orders placed during the hospital encounter of 12/11/23    Adult Transthoracic Echo Limited W/ Cont if Necessary Per Protocol    Interpretation Summary    The study is technically suboptimal for diagnosis.    Left ventricular systolic function is normal. Left ventricular ejection fraction appears to be 56 - 60%.    Left ventricular diastolic function was normal.    Difficult to visualize the valves on the study, however, no dynamically significant stenosis or regurgitation appreciated by Doppler exam.         Imaging Results (Last 72 Hours)       Procedure Component Value Units Date/Time    US Venous Doppler Lower Extremity Bilateral (duplex) [[id number]] Collected: 12/12/23 1335     Updated: 12/12/23 1338    Narrative:       History: Swelling       Impression:      Impression: There is no evidence of deep venous thrombosis or  superficial thrombophlebitis of right or left lower extremities.     Comments: Bilateral lower extremity venous duplex exam was performed  using color Doppler flow, Doppler waveform analysis, and grayscale  imaging, with and without compression. There is no evidence of deep  venous thrombosis in the common femoral, superficial femoral, popliteal,  peroneal, anterior tibial, and posterior tibial veins bilaterally. No  thrombus is identified in the saphenofemoral junctions and greater  saphenous veins bilaterally.            This report was signed and finalized on 12/12/2023 1:35 PM by Dr. Charles Ivey MD.       XR Chest PA & Lateral [[id number]] Collected: 12/12/23 0700     Updated: 12/12/23 0705    Narrative:      EXAMINATION:  XR CHEST PA AND LATERAL-  12/11/2023 9:42 PM     HISTORY: Congestive heart failure. Hypertension.     COMPARISON: 11/7/2022.     TECHNIQUE: 2 views were obtained.     FINDINGS: The inspiration is fairly good. There is minimal vascular  crowding. There is no dense infiltrate or effusion. There is mild stable  bronchial wall thickening. The heart is normal in size. There are  degenerative changes of the spine and right AC joint. The left AC joint  is not included on the image. Dorsal column stimulator wires are noted  overlying the mid to lower thoracic spine.          Impression:      1. Mild vascular crowding. No dense infiltrate or effusion.  2. Stable mild bronchial wall thickening.           This report was signed and finalized on 12/12/2023 7:02 AM by Dr. Konrad Calderón MD.               Assessment & Plan       Acute diastolic CHF (congestive heart failure), NYHA class 4    Class 3 severe obesity due to excess calories with serious comorbidity and body mass index (BMI) of 45.0 to 49.9 in adult    Hypertension    Obstructive sleep apnea treated with BiPAP    Bilateral lower  extremity edema    Acute Diastolic Congestive Heart Failure- however BNP is normal, Chest X-Ray stable, no documented output over night. He notes significant improvement in lower extremity swelling. Low Salt Diet. On Lasix drip per primary. Consider Aldactone. He was last seen by Dr. Mitchell in March of 2022 and was told could see PRN.    Shortness of Breath- nuclear stress ordered by primary team. Check EKG. I do not see one was done thus far. Troponin reviewed.     Leg swelling/weakness and pain- this is chronic and recurring. He reports significant improvement. Ruled out for DVTs. Consider outpatient vascular work up. Consider adding aldactone to regimen.     Hypertension- blood pressure stable    Further orders per Dr. Ayala    Thank you for asking us to follow this patient with you.       Electronically signed by VLADISLAV Olguin, 12/12/23, 2:35 PM CST.

## 2023-12-12 NOTE — H&P
History and Physical    Patient:  Carlos Hayes  MRN: [id number]    CHIEF COMPLAINT: Lower extremity swelling shortness of breath dyspnea with exertion and chest tightness    History Obtained From: the patient   PCP: Carlos Elliott MD    HISTORY OF PRESENT ILLNESS:   The patient is a 68 y.o. male who presents to my office yesterday afternoon for annual medical Medicare wellness examination when during this examination when he reported a 1 week history of progressive shortness of breath lower extremity swelling chest tightness and dyspnea with exertion.  Examination was concerning for congestive heart failure exceptionally directly admitted for workup of the same.  Overnight he was given 80 mg of Lasix and started on Lasix drip at 10 mg an hour.  He has noted to have a slight bump in his troponin level but his BNP and D-dimer levels were normal.  Chest x-ray revealed some vascular crowding but no other acute findings.  He has had minimal improvement with his Lasix overnight.  Although, he does feel like his shortness of breath is some better.    REVIEW OF SYSTEMS:    Review of Systems   Constitutional:  Positive for activity change and fatigue.   HENT:  Positive for congestion.    Respiratory:  Positive for chest tightness and shortness of breath.    Cardiovascular:  Positive for chest pain and leg swelling.   Gastrointestinal: Negative.    Genitourinary:  Positive for difficulty urinating.   Musculoskeletal:  Positive for arthralgias, back pain, gait problem and myalgias.   Neurological:  Positive for weakness.   Hematological: Negative.           Past Medical History:  Past Medical History:   Diagnosis Date    Acute bronchitis     Amnesia     Anxiety     Arthritis     Asthma     Back pain     Chest pain     CKD (chronic kidney disease)     Concussion     Contusion of chest wall     COPD (chronic obstructive pulmonary disease)     COVID-19     Degeneration of intervertebral disc of lumbar region     Fall      Gait disturbance     Gastro-esophageal reflux     Headache     Hearing impaired     Hypersomnia     Hypertension     Hypotestosteronemia     Insomnia     Leg cramp     Lumbar stenosis     Obesity     Obesity     KATLYN (obstructive sleep apnea)     KATLYN treated with BiPAP     2 liters of oxygen at night    Potential difficult airway on pre-intubation assessment 12/14/2021    PTSD (post-traumatic stress disorder)     Radiculopathy of lumbosacral region     Sinusitis     Testicular hypofunction     Tremor        Past Surgical History:  Past Surgical History:   Procedure Laterality Date    APPENDECTOMY      BACK SURGERY  1994    L3-4 fusion    BACK SURGERY  2009    L3-S1 fusion    BLEPHAROPLASTY Bilateral 12/1/2021    Procedure: UPPER LID BLEPHAROPLASTY (40627), WITH BROWLIFT (57373);  Surgeon: Jagdish Gregg MD;  Location:  [facility];  Service: Plastics;  Laterality: Bilateral;    BROW LIFT Bilateral 12/1/2021    Procedure: BROWLIFT (68945);  Surgeon: Jagdish Gregg MD;  Location:  [facility];  Service: Plastics;  Laterality: Bilateral;    CARDIAC CATHETERIZATION      CARDIAC CATHETERIZATION N/A 12/14/2018    Procedure: Coronary angiography;  Surgeon: Lizandro Quiroz MD;  Location:  [facility];  Service: Cardiology    CARDIAC CATHETERIZATION N/A 12/14/2018    Procedure: Percutaneous Coronary Intervention;  Surgeon: Lizandro Quiroz MD;  Location:  [facility];  Service: Cardiology    CARPAL TUNNEL RELEASE Bilateral     COLONOSCOPY      ENDOSCOPY      FRACTURE SURGERY      LEFT HAND    HERNIA REPAIR      KNEE SURGERY Right     SPINAL CORD STIMULATOR REMOVAL N/A 5/2/2018    Procedure: SPINAL CORD STIMULATOR REMOVAL Removal of generator and placment of new generator;  Surgeon: Miguel Hall MD;  Location:  [facility];  Service: Neurosurgery    SPINAL CORD STIMULATOR REMOVAL N/A 7/18/2018    Procedure: SPINAL CORD STIMULATOR BATTERY CHANGE;  Surgeon: Miguel Hall MD;  Location:   [facility];  Service: Neurosurgery    THORACIC LAMINECTOMY WITH PLACEMENT OF DORSAL COLUMN STIMULATOR  2010       Medications Prior to Admission:    Medications Prior to Admission   Medication Sig Dispense Refill Last Dose    albuterol (PROVENTIL) (2.5 MG/3ML) 0.083% nebulizer solution Take 2.5 mg by nebulization Every 4 (Four) Hours As Needed for Wheezing. 360 mL 3     albuterol sulfate  (90 Base) MCG/ACT inhaler Inhale 2 puffs Every 4 (Four) Hours As Needed for Wheezing or Shortness of Air. 18 g 2     ALPRAZolam (XANAX) 1 MG tablet Take 1 tablet by mouth 2 (Two) Times a Day As Needed for Anxiety.       amLODIPine (NORVASC) 10 MG tablet Take 1 tablet by mouth Daily.       atorvastatin (LIPITOR) 10 MG tablet Take 1 tablet by mouth Daily.       azelastine (ASTELIN) 0.1 % nasal spray USE 2 SPRAYS IN EACH NOSTRIL TWICE A DAY 30 mL 5     Budeson-Glycopyrrol-Formoterol (Breztri Aerosphere) 160-9-4.8 MCG/ACT aerosol inhaler Inhale 2 puffs 2 (Two) Times a Day. 10.7 g 5     cholecalciferol (VITAMIN D3) 25 MCG (1000 UT) tablet Take 2 tablets by mouth Daily. 30 tablet 0     cloNIDine (CATAPRES) 0.1 MG tablet Take 1 tablet by mouth Every 6 (Six) Hours As Needed for High Blood Pressure (for bp 150/90 or greater).       diltiaZEM CD (CARDIZEM CD) 240 MG 24 hr capsule Take 1 capsule by mouth Daily.       DULoxetine (CYMBALTA) 60 MG capsule Take 1 capsule by mouth Daily.       dutasteride (AVODART) 0.5 MG capsule        fluticasone (FLONASE) 50 MCG/ACT nasal spray USE 2 SPRAYS IN EACH NOSTRIL AS DIRECTED BY PROVIDER DAILY 16 g 5     furosemide (LASIX) 40 MG tablet Take 1 tablet by mouth 2 (Two) Times a Day.       gabapentin (NEURONTIN) 600 MG tablet Take 1 tablet by mouth Every 6 (Six) Hours.       loratadine (CLARITIN) 10 MG tablet Take 1 tablet by mouth Daily. 90 tablet 3     losartan (COZAAR) 100 MG tablet        magnesium oxide (MAGOX) 400 (241.3 Mg) MG tablet tablet Take 1 tablet by mouth Daily.       Mirabegron ER  (MYRBETRIQ) 25 MG tablet sustained-release 24 hour 24 hr tablet Take 1 tablet by mouth Daily.       modafinil (PROVIGIL) 200 MG tablet Take 1 tablet by mouth Daily.       Multiple Vitamins-Minerals (MULTI COMPLETE PO) Take  by mouth Daily.       O2 (OXYGEN) Inhale 2 L/min Every Night. With Bipap       oxyCODONE-acetaminophen (PERCOCET) 5-325 MG per tablet Take 1 tablet by mouth Every 6 (Six) Hours As Needed for Moderate Pain or Severe Pain. 4 x a day       OXYCONTIN 15 MG tablet extended-release 12 hour Take 1 tablet by mouth 3 (Three) Times a Day. 2 x a day  0     pantoprazole (PROTONIX) 40 MG EC tablet Take 1 tablet by mouth Daily.       Probiotic Product (PROBIOTIC ADVANCED PO) Take 1 tablet/day by mouth Daily.       pseudoephedrine-guaifenesin (MUCINEX D)  MG per 12 hr tablet Take 1 tablet by mouth Every 12 (Twelve) Hours.       Semaglutide,0.25 or 0.5MG/DOS, (Ozempic, 0.25 or 0.5 MG/DOSE,) 2 MG/1.5ML solution pen-injector Inject 0.5 mg under the skin into the appropriate area as directed 1 (One) Time Per Week. (Patient not taking: Reported on 11/16/2023)       tadalafil (ADCIRCA) 20 MG tablet tablet Take 10 mg by mouth Daily As Needed.       tadalafil (CIALIS) 10 MG tablet Take 1 tablet by mouth Daily.       tamsulosin (FLOMAX) 0.4 MG capsule 24 hr capsule TAKE TWO CAPSULES BY MOUTH EVERY NIGHT 60 capsule 2     Testosterone Cypionate (DEPOTESTOTERONE CYPIONATE) 200 MG/ML injection        traZODone (DESYREL) 150 MG tablet Take 2 tablets by mouth Every Night.       valACYclovir (VALTREX) 500 MG tablet Take 1 tablet by mouth Daily.       vitamin B-12 (CYANOCOBALAMIN) 100 MCG tablet Take 0.5 tablets by mouth Daily.          Allergies:  Eszopiclone and Levofloxacin    Social History:   Social History     Socioeconomic History    Marital status:    Tobacco Use    Smoking status: Former     Packs/day: 0.75     Years: 41.00     Additional pack years: 0.00     Total pack years: 30.75     Types: Cigarettes      Start date: 1975     Quit date: 3/23/2016     Years since quittin.7     Passive exposure: Current    Smokeless tobacco: Never   Vaping Use    Vaping Use: Never used   Substance and Sexual Activity    Alcohol use: Yes     Comment: occ    Drug use: No    Sexual activity: Defer       Family History:   Family History   Problem Relation Age of Onset    Stroke Mother     Arrhythmia Mother     Arrhythmia Father            Physical Exam:    Vitals: /84 (BP Location: Left arm, Patient Position: Sitting)   Pulse 79   Temp 98.2 °F (36.8 °C) (Oral)   Resp 18   Wt (!) 169 kg (373 lb 3.8 oz)   SpO2 94%   BMI 46.96 kg/m²   Physical Exam  Vitals and nursing note reviewed.   Constitutional:       Appearance: Normal appearance.   HENT:      Head: Normocephalic and atraumatic.      Mouth/Throat:      Mouth: Mucous membranes are moist.   Eyes:      Pupils: Pupils are equal, round, and reactive to light.   Cardiovascular:      Rate and Rhythm: Normal rate and regular rhythm.      Pulses: Normal pulses.   Pulmonary:      Effort: Respiratory distress present.      Breath sounds: Wheezing present.   Abdominal:      General: Abdomen is flat. Bowel sounds are normal.      Palpations: Abdomen is soft.   Musculoskeletal:         General: Swelling present.      Right lower leg: Edema present.      Left lower leg: Edema present.   Skin:     General: Skin is warm.      Capillary Refill: Capillary refill takes less than 2 seconds.   Neurological:      General: No focal deficit present.      Mental Status: He is alert.           Lab Results (last 24 hours)       Procedure Component Value Units Date/Time    High Sensitivity Troponin T 2Hr [[id number]]  (Abnormal) Collected: 23 0031    Specimen: Blood Updated: 23     HS Troponin T 24 ng/L      Troponin T Delta 3 ng/L     Narrative:      High Sensitive Troponin T Reference Range:  <14.0 ng/L- Negative Female for AMI  <22.0 ng/L- Negative Male for AMI  >=14 -  Abnormal Female indicating possible myocardial injury.  >=22 - Abnormal Male indicating possible myocardial injury.   Clinicians would have to utilize clinical acumen, EKG, Troponin, and serial changes to determine if it is an Acute Myocardial Infarction or myocardial injury due to an underlying chronic condition.         Urinalysis With Culture If Indicated - Urine, Clean Catch [[id number]]  (Normal) Collected: 12/11/23 2343    Specimen: Urine, Clean Catch Updated: 12/11/23 2353     Color, UA Yellow     Appearance, UA Clear     pH, UA 6.0     Specific Gravity, UA 1.018     Glucose, UA Negative     Ketones, UA Negative     Bilirubin, UA Negative     Blood, UA Negative     Protein, UA Negative     Leuk Esterase, UA Negative     Nitrite, UA Negative     Urobilinogen, UA 1.0 E.U./dL    Narrative:      In absence of clinical symptoms, the presence of pyuria, bacteria, and/or nitrites on the urinalysis result does not correlate with infection.  Urine microscopic not indicated.    TSH [[id number]]  (Normal) Collected: 12/11/23 2233    Specimen: Blood Updated: 12/11/23 2306     TSH 1.030 uIU/mL     Comprehensive Metabolic Panel [[id number]]  (Abnormal) Collected: 12/11/23 2233    Specimen: Blood Updated: 12/11/23 2300     Glucose 123 mg/dL      BUN 16 mg/dL      Creatinine 1.19 mg/dL      Sodium 136 mmol/L      Potassium 4.0 mmol/L      Comment: Slight hemolysis detected by analyzer. Result may be falsely elevated.        Chloride 99 mmol/L      CO2 27.0 mmol/L      Calcium 9.4 mg/dL      Total Protein 6.9 g/dL      Albumin 4.5 g/dL      ALT (SGPT) 29 U/L      AST (SGOT) 43 U/L      Alkaline Phosphatase 85 U/L      Total Bilirubin 0.4 mg/dL      Globulin 2.4 gm/dL      A/G Ratio 1.9 g/dL      BUN/Creatinine Ratio 13.4     Anion Gap 10.0 mmol/L      eGFR 67.4 mL/min/1.73     Narrative:      GFR Normal >60  Chronic Kidney Disease <60  Kidney Failure <15      BNP [[id number]]  (Normal) Collected: 12/11/23 2233    Specimen: Blood  Updated: 12/11/23 2257     proBNP <36.0 pg/mL     Narrative:      This assay is used as an aid in the diagnosis of individuals suspected of having heart failure. It can be used as an aid in the diagnosis of acute decompensated heart failure (ADHF) in patients presenting with signs and symptoms of ADHF to the emergency department (ED). In addition, NT-proBNP of <300 pg/mL indicates ADHF is not likely.    Age Range Result Interpretation  NT-proBNP Concentration (pg/mL:      <50             Positive            >450                   Gray                 300-450                    Negative             <300    50-75           Positive            >900                  Gray                300-900                  Negative            <300      >75             Positive            >1800                  Gray                300-1800                  Negative            <300    High Sensitivity Troponin T [[id number]]  (Normal) Collected: 12/11/23 2233    Specimen: Blood Updated: 12/11/23 2256     HS Troponin T 21 ng/L     Narrative:      High Sensitive Troponin T Reference Range:  <14.0 ng/L- Negative Female for AMI  <22.0 ng/L- Negative Male for AMI  >=14 - Abnormal Female indicating possible myocardial injury.  >=22 - Abnormal Male indicating possible myocardial injury.   Clinicians would have to utilize clinical acumen, EKG, Troponin, and serial changes to determine if it is an Acute Myocardial Infarction or myocardial injury due to an underlying chronic condition.         D-dimer, Quantitative [[id number]]  (Normal) Collected: 12/11/23 2233    Specimen: Blood Updated: 12/11/23 2251     D-Dimer, Quantitative 0.46 MCGFEU/mL     Narrative:      According to the assay 's published package insert, a normal (<0.50 MCGFEU/mL) D-dimer result in conjunction with a non-high clinical probability assessment, excludes deep vein thrombosis (DVT) and pulmonary embolism (PE) with high sensitivity.    D-dimer values increase with  "age and this can make VTE exclusion of an older population difficult. To address this, the [employer], based on best available evidence and recent guidelines, recommends that clinicians use age-adjusted D-dimer thresholds in patients greater than 50 years of age with: a) a low probability of PE who do not meet all Pulmonary Embolism Rule Out Criteria, or b) in those with intermediate probability of PE.   The formula for an age-adjusted D-dimer cut-off is \"age/100\".  For example, a 60 year old patient would have an age-adjusted cut-off of 0.60 MCGFEU/mL and an 80 year old 0.80 MCGFEU/mL.    CBC & Differential [[id number]]  (Abnormal) Collected: 12/11/23 2233    Specimen: Blood Updated: 12/11/23 2240    Narrative:      The following orders were created for panel order CBC & Differential.  Procedure                               Abnormality         Status                     ---------                               -----------         ------                     CBC Auto Differential[[id number]]        Abnormal            Final result                 Please view results for these tests on the individual orders.    CBC Auto Differential [[id number]]  (Abnormal) Collected: 12/11/23 2233    Specimen: Blood Updated: 12/11/23 2240     WBC 6.21 10*3/mm3      RBC 5.25 10*6/mm3      Hemoglobin 15.8 g/dL      Hematocrit 48.2 %      MCV 91.8 fL      MCH 30.1 pg      MCHC 32.8 g/dL      RDW 13.4 %      RDW-SD 45.5 fl      MPV 9.6 fL      Platelets 170 10*3/mm3      Neutrophil % 66.0 %      Lymphocyte % 19.2 %      Monocyte % 12.2 %      Eosinophil % 1.8 %      Basophil % 0.5 %      Immature Grans % 0.3 %      Neutrophils, Absolute 4.10 10*3/mm3      Lymphocytes, Absolute 1.19 10*3/mm3      Monocytes, Absolute 0.76 10*3/mm3      Eosinophils, Absolute 0.11 10*3/mm3      Basophils, Absolute 0.03 10*3/mm3      Immature Grans, Absolute 0.02 10*3/mm3      nRBC 0.0 /100 WBC          "     -----------------------------------------------------------------  EKG: Nonacute  Radiology:     XR Chest PA & Lateral    Result Date: 12/12/2023  EXAMINATION:  XR CHEST PA AND LATERAL-  12/11/2023 9:42 PM  HISTORY: Congestive heart failure. Hypertension.  COMPARISON: 11/7/2022.  TECHNIQUE: 2 views were obtained.  FINDINGS: The inspiration is fairly good. There is minimal vascular crowding. There is no dense infiltrate or effusion. There is mild stable bronchial wall thickening. The heart is normal in size. There are degenerative changes of the spine and right AC joint. The left AC joint is not included on the image. Dorsal column stimulator wires are noted overlying the mid to lower thoracic spine.       1. Mild vascular crowding. No dense infiltrate or effusion. 2. Stable mild bronchial wall thickening.    This report was signed and finalized on 12/12/2023 7:02 AM by Dr. Konrad Calderón MD.        Assessment and Plan     Primary Problem:  Acute diastolic CHF (congestive heart failure), NYHA class 4  Chest pain  Dyspnea with exertion  Lower extremity edema  Morbid obesity  Hypertension  Dyslipidemia  Obstructive sleep apnea  COPD  Chronic tobacco abuse    Hospital Problem list:    Acute diastolic CHF (congestive heart failure), NYHA class 4      PMH:  Past Medical History:   Diagnosis Date    Acute bronchitis     Amnesia     Anxiety     Arthritis     Asthma     Back pain     Chest pain     CKD (chronic kidney disease)     Concussion     Contusion of chest wall     COPD (chronic obstructive pulmonary disease)     COVID-19     Degeneration of intervertebral disc of lumbar region     Fall     Gait disturbance     Gastro-esophageal reflux     Headache     Hearing impaired     Hypersomnia     Hypertension     Hypotestosteronemia     Insomnia     Leg cramp     Lumbar stenosis     Obesity     Obesity     KATLYN (obstructive sleep apnea)     KATLYN treated with BiPAP     2 liters of oxygen at night    Potential difficult  airway on pre-intubation assessment 12/14/2021    PTSD (post-traumatic stress disorder)     Radiculopathy of lumbosacral region     Sinusitis     Testicular hypofunction     Tremor        Treatment Plan:  Admit for IV diuresis  2D echocardiogram  Lexiscan  Cardiology consultation  Lower extremity venous scans  DVT prophylaxis  Follow clinical course expectantly      Disposition: Home    Carlos Elliott MD 12/12/2023 08:42 CST

## 2023-12-12 NOTE — CASE MANAGEMENT/SOCIAL WORK
Discharge Planning Assessment  [facility]     Patient Name: Carlos Hayes  MRN: [id number]  Today's Date: 12/12/2023    Admit Date: 12/11/2023        Discharge Needs Assessment       Row Name 12/12/23 1053       Living Environment    People in Home spouse    Current Living Arrangements home    Primary Care Provided by self    Family Caregiver if Needed spouse    Quality of Family Relationships supportive    Able to Return to Prior Arrangements yes       Transition Planning    Patient/Family Anticipates Transition to home       Discharge Needs Assessment    Equipment Currently Used at Home rollator;scales    Discharge Coordination/Progress Patient lives with spouse who is a nurse and can assist as needed.  Patient having increased lower extremity edema and weakness from fluid. Using a rollator at home for ambulation. Has pcp and Rx plan. Will continue to follow for dc needs.                   Discharge Plan    No documentation.                 Continued Care and Services - Admitted Since 12/11/2023    Coordination has not been started for this encounter.          Demographic Summary    No documentation.                  Functional Status    No documentation.                  Psychosocial    No documentation.                  Abuse/Neglect    No documentation.                  Legal    No documentation.                  Substance Abuse    No documentation.                  Patient Forms    No documentation.                     Merlina A Fletcher, RN

## 2023-12-13 ENCOUNTER — READMISSION MANAGEMENT (OUTPATIENT)
Dept: CALL CENTER | Facility: HOSPITAL | Age: 68
End: 2023-12-13
Payer: MEDICARE

## 2023-12-13 VITALS
TEMPERATURE: 98.2 F | HEIGHT: 75 IN | WEIGHT: 315 LBS | HEART RATE: 70 BPM | DIASTOLIC BLOOD PRESSURE: 71 MMHG | RESPIRATION RATE: 16 BRPM | OXYGEN SATURATION: 95 % | SYSTOLIC BLOOD PRESSURE: 135 MMHG | BODY MASS INDEX: 39.17 KG/M2

## 2023-12-13 PROBLEM — I50.31 ACUTE DIASTOLIC CHF (CONGESTIVE HEART FAILURE), NYHA CLASS 4: Status: RESOLVED | Noted: 2023-12-12 | Resolved: 2023-12-13

## 2023-12-13 LAB
QT INTERVAL: 420 MS
QTC INTERVAL: 422 MS

## 2023-12-13 PROCEDURE — 94799 UNLISTED PULMONARY SVC/PX: CPT

## 2023-12-13 PROCEDURE — 0 HYDROMORPHONE 1 MG/ML SOLUTION: Performed by: FAMILY MEDICINE

## 2023-12-13 PROCEDURE — 94664 DEMO&/EVAL PT USE INHALER: CPT

## 2023-12-13 PROCEDURE — 25010000002 ENOXAPARIN PER 10 MG: Performed by: FAMILY MEDICINE

## 2023-12-13 RX ORDER — BUMETANIDE 2 MG/1
2 TABLET ORAL 2 TIMES DAILY
Qty: 60 TABLET | Refills: 0 | Status: SHIPPED | OUTPATIENT
Start: 2023-12-13 | End: 2024-01-12

## 2023-12-13 RX ORDER — SPIRONOLACTONE 50 MG/1
50 TABLET, FILM COATED ORAL DAILY
Qty: 30 TABLET | Refills: 0 | Status: SHIPPED | OUTPATIENT
Start: 2023-12-13 | End: 2024-01-12

## 2023-12-13 RX ORDER — SPIRONOLACTONE 50 MG/1
50 TABLET, FILM COATED ORAL DAILY
Status: DISCONTINUED | OUTPATIENT
Start: 2023-12-13 | End: 2023-12-13 | Stop reason: HOSPADM

## 2023-12-13 RX ADMIN — ENOXAPARIN SODIUM 40 MG: 100 INJECTION SUBCUTANEOUS at 11:07

## 2023-12-13 RX ADMIN — FINASTERIDE 5 MG: 5 TABLET, FILM COATED ORAL at 08:37

## 2023-12-13 RX ADMIN — DILTIAZEM HYDROCHLORIDE 240 MG: 240 CAPSULE, EXTENDED RELEASE ORAL at 08:37

## 2023-12-13 RX ADMIN — Medication 1 TABLET: at 08:36

## 2023-12-13 RX ADMIN — SPIRONOLACTONE 50 MG: 50 TABLET ORAL at 11:06

## 2023-12-13 RX ADMIN — MAGNESIUM GLUCONATE 500 MG ORAL TABLET 400 MG: 500 TABLET ORAL at 08:37

## 2023-12-13 RX ADMIN — GABAPENTIN 600 MG: 300 CAPSULE ORAL at 11:06

## 2023-12-13 RX ADMIN — OXYCODONE HYDROCHLORIDE 15 MG: 15 TABLET, FILM COATED, EXTENDED RELEASE ORAL at 08:37

## 2023-12-13 RX ADMIN — VALACYCLOVIR 500 MG: 500 TABLET, FILM COATED ORAL at 08:36

## 2023-12-13 RX ADMIN — OXYBUTYNIN CHLORIDE 5 MG: 5 TABLET, EXTENDED RELEASE ORAL at 08:36

## 2023-12-13 RX ADMIN — ALBUTEROL SULFATE 2.5 MG: 2.5 SOLUTION RESPIRATORY (INHALATION) at 09:43

## 2023-12-13 RX ADMIN — VITAM B12 50 MCG: 100 TAB at 08:37

## 2023-12-13 RX ADMIN — Medication 1 CAPSULE: at 08:37

## 2023-12-13 RX ADMIN — PANTOPRAZOLE SODIUM 40 MG: 40 TABLET, DELAYED RELEASE ORAL at 08:37

## 2023-12-13 RX ADMIN — HYDROMORPHONE HYDROCHLORIDE 1 MG: 1 INJECTION, SOLUTION INTRAMUSCULAR; INTRAVENOUS; SUBCUTANEOUS at 04:10

## 2023-12-13 RX ADMIN — LOSARTAN POTASSIUM 100 MG: 50 TABLET, FILM COATED ORAL at 08:37

## 2023-12-13 RX ADMIN — MODAFINIL 200 MG: 100 TABLET ORAL at 08:37

## 2023-12-13 RX ADMIN — DULOXETINE HYDROCHLORIDE 60 MG: 30 CAPSULE, DELAYED RELEASE ORAL at 08:37

## 2023-12-13 RX ADMIN — GUAIFENESIN 600 MG: 600 TABLET, EXTENDED RELEASE ORAL at 08:36

## 2023-12-13 RX ADMIN — CETIRIZINE HYDROCHLORIDE 10 MG: 10 TABLET ORAL at 08:37

## 2023-12-13 RX ADMIN — GABAPENTIN 600 MG: 300 CAPSULE ORAL at 05:55

## 2023-12-13 RX ADMIN — FLUTICASONE PROPIONATE 2 SPRAY: 50 SPRAY, METERED NASAL at 08:37

## 2023-12-13 RX ADMIN — Medication 2000 UNITS: at 08:37

## 2023-12-13 RX ADMIN — BUDESONIDE AND FORMOTEROL FUMARATE DIHYDRATE 2 PUFF: 160; 4.5 AEROSOL RESPIRATORY (INHALATION) at 06:09

## 2023-12-13 RX ADMIN — AZELASTINE HYDROCHLORIDE 2 SPRAY: 137 SPRAY, METERED NASAL at 08:38

## 2023-12-13 NOTE — OUTREACH NOTE
Prep Survey      Flowsheet Row Responses   [facility] facility patient discharged from? [place]   Is LACE score < 7 ? No   Eligibility Readm Mgmt   Discharge diagnosis Acute CHF   Does the patient have one of the following disease processes/diagnoses(primary or secondary)? CHF   Does the patient have Home health ordered? No   Is there a DME ordered? No   Prep survey completed? Yes            Fany MORALES - Registered Nurse

## 2023-12-13 NOTE — PLAN OF CARE
Goal Outcome Evaluation:  Plan of Care Reviewed With: patient        Progress: no change  Outcome Evaluation: VSS. Pt resting well this shift. Cont on IV lasix drip. C/o pain relieved by PRN medications. Down 20 pounds this morning. Voiding per urinal.  Tele- NSR [phone]

## 2023-12-13 NOTE — DISCHARGE SUMMARY
Hospital Discharge Summary    Carlos Hayes  :  1955  MRN:  [id number]    Admit date:  2023  Discharge date:  2023    Admitting Physician:    Carlos Elliott MD    Discharge Diagnoses:      Class 3 severe obesity due to excess calories with serious comorbidity and body mass index (BMI) of 45.0 to 49.9 in adult    Hypertension    Obstructive sleep apnea treated with BiPAP    Bilateral lower extremity edema  Bilateral lower ext venous insufficiency    Hospital Course:   The patient is a 68 y.o. male who presents to my office yesterday afternoon for annual medical Medicare wellness examination when during this examination when he reported a 1 week history of progressive shortness of breath lower extremity swelling chest tightness and dyspnea with exertion.  Examination was concerning for congestive heart failure exceptionally directly admitted for workup of the same.  Overnight he was given 80 mg of Lasix and started on Lasix drip at 10 mg an hour.  He has noted to have a slight bump in his troponin level but his BNP and D-dimer levels were normal.  Chest x-ray revealed some vascular crowding but no other acute findings.  He has had minimal improvement with his Lasix overnight.  Although, he does feel like his shortness of breath is some better. Work up included cardiac monitoring, serial cardiac labs, 2 d echo, Lexiscan and was treated with IV lasix and lasix qtt with 21 lbs weight loss in 24 hrs. His lower ext swelling and sob dramatically improved. Seen by cardiology who recommended spironolactone. Norvasc discontinued. Recommend weight reduction and compression stockings.     Please see daily progress note for further details concerning their stay. The patient improved throughout their stay and reached maximum medical improvement on the day of discharge. The patient/family agree with the treatment plan as outlined above. All questions concerning their stay were answered prior to discharge.  They understand the importance of follow up concerning any abnormal test results.     Physical Exam  Vitals and nursing note reviewed.   Constitutional:       Appearance: Normal appearance.   HENT:      Head: Normocephalic and atraumatic.   Cardiovascular:      Rate and Rhythm: Normal rate and regular rhythm.      Pulses: Normal pulses.   Pulmonary:      Effort: Pulmonary effort is normal.      Breath sounds: Normal breath sounds.   Abdominal:      General: Abdomen is flat. Bowel sounds are normal.   Musculoskeletal:         General: Normal range of motion.   Skin:     General: Skin is warm.      Capillary Refill: Capillary refill takes less than 2 seconds.   Neurological:      General: No focal deficit present.      Mental Status: He is alert.           Discharge Medications:         Discharge Medications        New Medications        Instructions Start Date   spironolactone 50 MG tablet  Commonly known as: ALDACTONE   50 mg, Oral, Daily             Changes to Medications        Instructions Start Date   albuterol (2.5 MG/3ML) 0.083% nebulizer solution  Commonly known as: PROVENTIL  What changed: Another medication with the same name was removed. Continue taking this medication, and follow the directions you see here.   2.5 mg, Nebulization, Every 4 Hours PRN      bumetanide 1 MG tablet  Commonly known as: BUMEX  What changed: how much to take   2 mg, Oral, 2 Times Daily             Continue These Medications        Instructions Start Date   ALPRAZolam 1 MG tablet  Commonly known as: XANAX   1 mg, Oral, 2 Times Daily PRN      atorvastatin 10 MG tablet  Commonly known as: LIPITOR   10 mg, Oral, Daily      azelastine 0.1 % nasal spray  Commonly known as: ASTELIN   2 sprays, Nasal, 2 Times Daily, Use in each nostril as directed      Breztri Aerosphere 160-9-4.8 MCG/ACT aerosol inhaler  Generic drug: Budeson-Glycopyrrol-Formoterol   2 puffs, Inhalation, 2 Times Daily      dilTIAZem  MG 24 hr capsule  Commonly  known as: CARDIZEM CD   240 mg, Oral, Daily      DULoxetine 60 MG capsule  Commonly known as: CYMBALTA   60 mg, Oral, 2 Times Daily      dutasteride 0.5 MG capsule  Commonly known as: AVODART   0.5 mg, Oral, Daily      fluticasone 50 MCG/ACT nasal spray  Commonly known as: FLONASE   2 sprays, Nasal, Daily      gabapentin 600 MG tablet  Commonly known as: NEURONTIN   600 mg, Oral, Every 6 Hours      loratadine 10 MG tablet  Commonly known as: CLARITIN   10 mg, Oral, Daily      losartan 100 MG tablet  Commonly known as: COZAAR   100 mg, Oral, Daily      magnesium oxide 400 (241.3 Mg) MG tablet tablet  Commonly known as: MAGOX   400 mg, Oral, Daily      Mirabegron ER 25 MG tablet sustained-release 24 hour 24 hr tablet  Commonly known as: MYRBETRIQ   25 mg, Oral, Daily      modafinil 200 MG tablet  Commonly known as: PROVIGIL   200 mg, Oral, Daily      MULTI COMPLETE PO   1 tablet, Oral, Daily      oxyCODONE-acetaminophen 5-325 MG per tablet  Commonly known as: PERCOCET   1 tablet, Oral, Every 6 Hours PRN, 4 x a day      OxyCONTIN 15 MG tablet extended-release 12 hour  Generic drug: oxyCODONE ER   15 mg, Oral, Every 8 Hours Scheduled      pantoprazole 40 MG EC tablet  Commonly known as: PROTONIX   40 mg, Oral, Daily      PROBIOTIC ADVANCED PO   1 tablet/day, Oral, Daily      tamsulosin 0.4 MG capsule 24 hr capsule  Commonly known as: FLOMAX   2 capsules, Oral, Nightly      Testosterone Cypionate 200 MG/ML injection  Commonly known as: DEPOTESTOTERONE CYPIONATE   200 mg, Intramuscular, Every 14 Days      traZODone 150 MG tablet  Commonly known as: DESYREL   300 mg, Oral, Nightly      TURMERIC PO   1 tablet, Oral, Daily      valACYclovir 500 MG tablet  Commonly known as: VALTREX   500 mg, Oral, Daily      vitamin B-12 100 MCG tablet  Commonly known as: CYANOCOBALAMIN   100 mcg, Oral, Daily             Stop These Medications      amLODIPine 10 MG tablet  Commonly known as: NORVASC     cloNIDine 0.1 MG tablet  Commonly  known as: CATAPRES     furosemide 40 MG tablet  Commonly known as: LASIX     tadalafil 20 MG tablet tablet  Commonly known as: ADCIRCA              Activity: as tolerated    Diet: low sodium    Consults: cardiology    Significant Diagnostic Studies:    US Venous Doppler Lower Extremity Bilateral (duplex)    Result Date: 12/12/2023  Impression: There is no evidence of deep venous thrombosis or superficial thrombophlebitis of right or left lower extremities.  Comments: Bilateral lower extremity venous duplex exam was performed using color Doppler flow, Doppler waveform analysis, and grayscale imaging, with and without compression. There is no evidence of deep venous thrombosis in the common femoral, superficial femoral, popliteal, peroneal, anterior tibial, and posterior tibial veins bilaterally. No thrombus is identified in the saphenofemoral junctions and greater saphenous veins bilaterally.    This report was signed and finalized on 12/12/2023 1:35 PM by Dr. Charles Ivey MD.      XR Chest PA & Lateral    Result Date: 12/12/2023  1. Mild vascular crowding. No dense infiltrate or effusion. 2. Stable mild bronchial wall thickening.    This report was signed and finalized on 12/12/2023 7:02 AM by Dr. Konrad Calderón MD.        Interpretation Summary         Overall, this is a low risk test for ischemia.    No ECG evidence of myocardial ischemia. Negative clinical evidence of myocardial ischemia. Findings consistent with a normal ECG stress test.    There is a small size, mild intensity inferior perfusion defect that is present at rest and stress and completely corrects out with attenuation correction software and likely represents artifact    There is a small size, mild intensity apical septal perfusion defect that is present at stress only and partially corrects out with attenuation correction software and may represent a small area of ischemia    No significant wall motion abnormalities appreciated    Normal left  ventricular function with an ejection fraction calculated at 56%    Interpretation Summary         The study is technically suboptimal for diagnosis.    Left ventricular systolic function is normal. Left ventricular ejection fraction appears to be 56 - 60%.    Left ventricular diastolic function was normal.    Difficult to visualize the valves on the study, however, no dynamically significant stenosis or regurgitation appreciated by Doppler exam.       Treatments:   as above    Disposition:   home with     Time spent on discharge including discussion with patient/family, , and coordination of care.     Follow up with Carlos Elliott MD in 1 week.    Signed:  Carlos Elliott MD   12/13/2023, 09:11 CST

## 2023-12-15 ENCOUNTER — TREATMENT (OUTPATIENT)
Dept: PHYSICAL THERAPY | Facility: CLINIC | Age: 68
End: 2023-12-15
Payer: MEDICARE

## 2023-12-15 DIAGNOSIS — M25.552 LEFT HIP PAIN: ICD-10-CM

## 2023-12-15 DIAGNOSIS — S16.1XXA STRAIN OF NECK MUSCLE, INITIAL ENCOUNTER: Primary | ICD-10-CM

## 2023-12-15 NOTE — PROGRESS NOTES
Physical Therapy Treatment Note  [address] [place] [address]    Patient: Carlos Hayes                                                 Visit Date: 12/15/2023  :     1955    Referring practitioner:    Chris Russ MD  Date of Initial Visit:          Type: THERAPY  Noted: 10/26/2023    Patient seen for 10 sessions    Visit Diagnoses:    ICD-10-CM ICD-9-CM   1. Strain of neck muscle, initial encounter  S16.1XXA 847.0   2. Left hip pain  M25.552 719.45     SUBJECTIVE     Subjective:  He says he was able to stand more erect after working on his hips the last visit.     PAIN: 8-9/10 neck; LB 7/10       OBJECTIVE     Objective     Manual Therapy     90996  Comments   Prone CT ext mobs    Prone oliver C1 PA mobs    Prone oliver UT TrP release    Prone left PSIS mobs    Prone left lumbar/hip external rotators deep tissue release    Timed Minutes 60     Therapy Education/Self Care 67601   Education offered today Discussed pelvic floor and orgasm and the possibility of a thoracic/autonomic issue contributing to this.    [employer] Code Access Code: [email or web address]  URL: [email or web address]   Ongoing HEP     Date: 10/26/2023  Prepared by: Juarez Hatrmann    Exercises  - Forward and Backward Stepping at Pool Wall  - 1 x daily - 7 x weekly - 2 sets - 10 reps  - Standing Hip Abduction Adduction at Pool Wall  - 1 x daily - 7 x weekly - 2 sets - 10 reps  - Standing Hip Abduction Adduction at Pool Wall (Mirrored)  - 1 x daily - 7 x weekly - 2 sets - 10 reps  - Standing Hip Flexion Extension at Pool Wall  - 1 x daily - 7 x weekly - 2 sets - 10 reps  - Standing Hip Flexion Extension at Pool Wall (Mirrored)  - 1 x daily - 7 x weekly - 2 sets - 10 reps  - Bilateral Shoulder Horizontal Abduction Adduction AROM at Pool Wall  - 1 x daily - 7 x weekly - 2 sets - 10 reps  - Bilateral Shoulder Flexion Extension AROM at Pool Wall  - 1 x daily - 7 x weekly - 2 sets - 10  reps  - Shoulder Flexion Extension Side to Side with Pool Noodle  - 1 x daily - 7 x weekly - 2 sets - 10 reps  - Plank with Hip Extension at Pool Wall  - 1 x daily - 7 x weekly - 2 sets - 10 reps   Timed Minutes      Total Timed Treatment:     60   mins  Total Time of Visit:             60   mins    ASSESSMENT/PLAN     GOALS:  Goals                                          Progress Note due by 12/31/23                                                      Recert due by 1/23/24   LTG by: 12 weeks Comments Date Status   Improve oliver cervical rotation to 50 deg  12/1 ongoing   Able to stand more upright with more neutral cervical Able to stand more upright after working on hip flexors 12/15 ongoing   Improve passive hip ext to 10 deg  12/1 ongoing   Able to walk x 20 min without rollator before sitting  12/1 ongoing   Improve oliver shoulder elevation to 130 deg  12/1 ongoing   Ind with HEP for flexibility and stability  12/1 ongoing      Assessment/Plan     ASSESSMENT:   His back pain appears to be more of an SIJ strain after lying in the hospital.     PLAN:   Hold on DN and continue with PF and neck manual therapy.    SIGNATURE: Juarez Hartmann, PT, [place] License #: [id number]  Electronically Signed on 12/15/2023        [address] [place] [address]. [address]  [phone]

## 2023-12-18 ENCOUNTER — TREATMENT (OUTPATIENT)
Dept: PHYSICAL THERAPY | Facility: CLINIC | Age: 68
End: 2023-12-18
Payer: MEDICARE

## 2023-12-18 DIAGNOSIS — S16.1XXA STRAIN OF NECK MUSCLE, INITIAL ENCOUNTER: Primary | ICD-10-CM

## 2023-12-18 DIAGNOSIS — M25.552 LEFT HIP PAIN: ICD-10-CM

## 2023-12-19 ENCOUNTER — OFFICE VISIT (OUTPATIENT)
Dept: CARDIOLOGY | Facility: CLINIC | Age: 68
End: 2023-12-19
Payer: MEDICARE

## 2023-12-19 VITALS
DIASTOLIC BLOOD PRESSURE: 78 MMHG | SYSTOLIC BLOOD PRESSURE: 132 MMHG | OXYGEN SATURATION: 98 % | HEART RATE: 85 BPM | WEIGHT: 315 LBS | HEIGHT: 74 IN | RESPIRATION RATE: 18 BRPM | BODY MASS INDEX: 40.43 KG/M2

## 2023-12-19 DIAGNOSIS — I10 PRIMARY HYPERTENSION: Chronic | ICD-10-CM

## 2023-12-19 DIAGNOSIS — I50.32 CHRONIC DIASTOLIC HEART FAILURE: Primary | ICD-10-CM

## 2023-12-19 PROBLEM — G47.00 INSOMNIA: Status: RESOLVED | Noted: 2021-12-14 | Resolved: 2023-12-19

## 2023-12-19 PROBLEM — S40.019A CONTUSION OF SHOULDER REGION: Status: RESOLVED | Noted: 2021-12-14 | Resolved: 2023-12-19

## 2023-12-19 PROBLEM — Z87.01 HISTORY OF PNEUMONIA: Status: RESOLVED | Noted: 2021-05-28 | Resolved: 2023-12-19
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
## 2023-12-19 PROBLEM — Z91.89 POTENTIAL DIFFICULT AIRWAY ON PRE-INTUBATION ASSESSMENT: Status: RESOLVED | Noted: 2021-12-14 | Resolved: 2023-12-19

## 2023-12-19 PROBLEM — Z01.818 POTENTIAL DIFFICULT AIRWAY ON PRE-INTUBATION ASSESSMENT: Status: RESOLVED | Noted: 2021-12-14 | Resolved: 2023-12-19

## 2023-12-19 PROBLEM — S06.0XAA BRAIN CONCUSSION: Status: RESOLVED | Noted: 2021-12-14 | Resolved: 2023-12-19

## 2023-12-19 PROBLEM — M54.30 SCIATICA: Chronic | Status: ACTIVE | Noted: 2021-12-14

## 2023-12-19 PROBLEM — Z23 COVID-19 VACCINE ADMINISTERED: Status: RESOLVED | Noted: 2021-05-28 | Resolved: 2023-12-19

## 2023-12-19 PROBLEM — N18.2 STAGE 2 CHRONIC KIDNEY DISEASE: Status: RESOLVED | Noted: 2021-12-14 | Resolved: 2023-12-19

## 2023-12-19 PROBLEM — R51.9 DAILY HEADACHE: Status: RESOLVED | Noted: 2021-12-14 | Resolved: 2023-12-19

## 2023-12-19 PROBLEM — J30.89 NON-SEASONAL ALLERGIC RHINITIS: Status: RESOLVED | Noted: 2021-05-28 | Resolved: 2023-12-19

## 2023-12-19 PROBLEM — R06.00 DYSPNEA: Status: RESOLVED | Noted: 2021-10-25 | Resolved: 2023-12-19

## 2023-12-19 PROBLEM — G47.33 OBSTRUCTIVE SLEEP APNEA TREATED WITH BIPAP: Chronic | Status: ACTIVE | Noted: 2021-05-28

## 2023-12-19 PROBLEM — R79.89 ELEVATED SERUM CREATININE: Status: RESOLVED | Noted: 2019-08-16 | Resolved: 2023-12-19

## 2023-12-19 PROBLEM — I83.10 VARICOSE VEINS OF LOWER EXTREMITY WITH INFLAMMATION: Status: RESOLVED | Noted: 2021-12-14 | Resolved: 2023-12-19

## 2023-12-19 PROBLEM — R35.1 NOCTURIA: Status: RESOLVED | Noted: 2021-12-14 | Resolved: 2023-12-19

## 2023-12-19 PROBLEM — R07.89 OTHER CHEST PAIN: Status: RESOLVED | Noted: 2018-12-12 | Resolved: 2023-12-19

## 2023-12-19 PROBLEM — E66.01 CLASS 3 SEVERE OBESITY DUE TO EXCESS CALORIES WITH SERIOUS COMORBIDITY AND BODY MASS INDEX (BMI) OF 45.0 TO 49.9 IN ADULT: Chronic | Status: ACTIVE | Noted: 2018-04-02

## 2023-12-19 PROBLEM — R26.9 ABNORMAL GAIT: Status: RESOLVED | Noted: 2021-12-14 | Resolved: 2023-12-19

## 2023-12-19 PROBLEM — R60.0 BILATERAL LOWER EXTREMITY EDEMA: Chronic | Status: ACTIVE | Noted: 2021-10-26

## 2023-12-19 PROBLEM — R79.89 ABNORMAL C-REACTIVE PROTEIN: Status: RESOLVED | Noted: 2021-12-14 | Resolved: 2023-12-19

## 2023-12-19 PROBLEM — E86.0 DEHYDRATION: Status: RESOLVED | Noted: 2019-01-04 | Resolved: 2023-12-19

## 2023-12-19 PROBLEM — R33.9 URINARY RETENTION: Status: RESOLVED | Noted: 2021-12-14 | Resolved: 2023-12-19

## 2023-12-19 PROBLEM — S20.212A CONTUSION OF LEFT CHEST WALL: Status: RESOLVED | Noted: 2021-12-14 | Resolved: 2023-12-19

## 2023-12-19 PROBLEM — R39.11 HESITANCY OF MICTURITION: Status: RESOLVED | Noted: 2021-12-14 | Resolved: 2023-12-19

## 2023-12-19 PROBLEM — E66.813 CLASS 3 SEVERE OBESITY DUE TO EXCESS CALORIES WITH SERIOUS COMORBIDITY AND BODY MASS INDEX (BMI) OF 45.0 TO 49.9 IN ADULT: Chronic | Status: ACTIVE | Noted: 2018-04-02

## 2023-12-19 PROBLEM — R94.39 ABNORMAL STRESS TEST: Status: RESOLVED | Noted: 2018-12-12 | Resolved: 2023-12-19

## 2023-12-19 RX ORDER — CLONIDINE HYDROCHLORIDE 0.1 MG/1
0.1 TABLET ORAL AS NEEDED
COMMUNITY

## 2023-12-19 NOTE — PROGRESS NOTES
Physical Therapy Treatment Note  [address] [place] [address]    Patient: Carlos Hayes                                                 Visit Date: 2023  :     1955    Referring practitioner:    Chris Russ MD  Date of Initial Visit:          Type: THERAPY  Noted: 10/26/2023    Patient seen for 11 sessions    Visit Diagnoses:    ICD-10-CM ICD-9-CM   1. Strain of neck muscle, initial encounter  S16.1XXA 847.0   2. Left hip pain  M25.552 719.45     SUBJECTIVE     Subjective:  He says his MD did a numbing injection in his SIJ. He felt better for a couple of hours but then later, he couldn't bend forward.     PAIN: 8-9/10 neck; LB 7/10       OBJECTIVE     Objective     Manual Therapy     [id number]  Comments   Percussive IASTM using Powerboost to oliver UT, piri at L2 using spade attachment      Prone CT ext mobs    Prone left PSIS mobs    Prone left lumbar/hip external rotators deep tissue release    Timed Minutes 60     Therapeutic Exercises    96660 Units Comments   Passive stretch oliver piri/OI                         Timed Minutes 8       Therapy Education/Self Care 50592   Education offered today Discussed pelvic floor and orgasm and the possibility of a thoracic/autonomic issue contributing to this.    [employer] Code Access Code: [email or web address]  URL: [email or web address]   Ongoing HEP     Date: 10/26/2023  Prepared by: Juarez Hratmann    Exercises  - Forward and Backward Stepping at Pool Wall  - 1 x daily - 7 x weekly - 2 sets - 10 reps  - Standing Hip Abduction Adduction at Pool Wall  - 1 x daily - 7 x weekly - 2 sets - 10 reps  - Standing Hip Abduction Adduction at Pool Wall (Mirrored)  - 1 x daily - 7 x weekly - 2 sets - 10 reps  - Standing Hip Flexion Extension at Pool Wall  - 1 x daily - 7 x weekly - 2 sets - 10 reps  - Standing Hip Flexion Extension at Pool Wall (Mirrored)  - 1 x daily - 7 x weekly - 2 sets - 10 reps  -  Bilateral Shoulder Horizontal Abduction Adduction AROM at Pool Wall  - 1 x daily - 7 x weekly - 2 sets - 10 reps  - Bilateral Shoulder Flexion Extension AROM at Pool Wall  - 1 x daily - 7 x weekly - 2 sets - 10 reps  - Shoulder Flexion Extension Side to Side with Pool Noodle  - 1 x daily - 7 x weekly - 2 sets - 10 reps  - Plank with Hip Extension at Pool Wall  - 1 x daily - 7 x weekly - 2 sets - 10 reps   Timed Minutes      Total Timed Treatment:     68   mins  Total Time of Visit:             68   mins    ASSESSMENT/PLAN     GOALS:  Goals                                          Progress Note due by 12/31/23                                                      Recert due by 1/23/24   LTG by: 12 weeks Comments Date Status   Improve oliver cervical rotation to 50 deg  12/1 ongoing   Able to stand more upright with more neutral cervical Able to stand more upright after working on hip flexors 12/15 ongoing   Improve passive hip ext to 10 deg  12/1 ongoing   Able to walk x 20 min without rollator before sitting Walking more upright today and was moving much better 12/18 ongoing   Improve oliver shoulder elevation to 130 deg  12/1 ongoing   Ind with HEP for flexibility and stability  12/1 ongoing      Assessment/Plan     ASSESSMENT:   His back pain was better today and he could move much better. He is driving to [place] this week.    PLAN:   Hold on DN and continue with PF and neck manual therapy.    SIGNATURE: Juarez Hartmann, PT, [place] License #: [id number]  Electronically Signed on 12/18/2023        [address] [place] [address]. [address]  [phone]

## 2023-12-19 NOTE — PROGRESS NOTES
Subjective:     Encounter Date:12/19/2023      Patient ID: Carlos Hayes is a 68 y.o. male with known hypertension who was recently seen in the hospital with complaints of lower leg swelling. He was treated with IV diuretics, had cardiac testing to include echo and stress testing.  He presents to the office today for discharge follow up after recent fluid volume management.    Chief Complaint: discharge follow up  History of Present Illness    Mr. Hayes presents today for follow up. He was admitted to the hospital this month by his PCP for management of lower extremity swelling, shortness of breath, and chest tightness. He was treated with IV diuretics and continuous lasix infusion. BNP was normal. Chest x ray did mention possible vascular crowding.     After overnight treatment with continuous diuretics he had subsequent weight loss and improvement of his symptoms. Given the complaints of lower leg edema it was recommended that the patient avoid amlodipine and use spironolactone for fluid and blood pressure management.     He tells me that he has gained a few pounds since discharge. He has had some medication changes made, and thinks that this is consistent with his current medication list. He has shortness of breath with exertion. He has current aches and pains in his back suffering with what he describes as sciatica. He has chronic pain with neck and back issues, shoulder pain, headaches.    He saw his PCP yesterday. He tells me no changes were made to his medications yesterday. He was recommended that he continue his current medications with the changes that were advised at his discharge.     The following portions of the patient's history were reviewed and updated as appropriate: allergies, current medications, past family history, past medical history, past social history, and past surgical history.    Allergies   Allergen Reactions    Eszopiclone Other (See Comments)     Flu like symptoms      Levofloxacin Rash and Unknown - High Severity              Current Outpatient Medications:     albuterol (PROVENTIL) (2.5 MG/3ML) 0.083% nebulizer solution, Take 2.5 mg by nebulization Every 4 (Four) Hours As Needed for Wheezing., Disp: 360 mL, Rfl: 3    ALPRAZolam (XANAX) 1 MG tablet, Take 1 tablet by mouth 2 (Two) Times a Day As Needed for Anxiety., Disp: , Rfl:     atorvastatin (LIPITOR) 10 MG tablet, Take 1 tablet by mouth Daily., Disp: , Rfl:     azelastine (ASTELIN) 0.1 % nasal spray, 2 sprays into the nostril(s) as directed by provider 2 (Two) Times a Day. Use in each nostril as directed, Disp: , Rfl:     Budeson-Glycopyrrol-Formoterol (Breztri Aerosphere) 160-9-4.8 MCG/ACT aerosol inhaler, Inhale 2 puffs 2 (Two) Times a Day., Disp: 10.7 g, Rfl: 5    bumetanide (BUMEX) 2 MG tablet, Take 1 tablet by mouth 2 (Two) Times a Day for 30 days.---DOSE INCREASE, Disp: 60 tablet, Rfl: 0    cloNIDine (CATAPRES) 0.1 MG tablet, Take 1 tablet by mouth As Needed for High Blood Pressure., Disp: , Rfl:     diltiaZEM CD (CARDIZEM CD) 240 MG 24 hr capsule, Take 1 capsule by mouth Daily., Disp: , Rfl:     DULoxetine (CYMBALTA) 60 MG capsule, Take 1 capsule by mouth 2 (Two) Times a Day., Disp: , Rfl:     dutasteride (AVODART) 0.5 MG capsule, Take 1 capsule by mouth Daily., Disp: , Rfl:     fluticasone (FLONASE) 50 MCG/ACT nasal spray, 2 sprays into the nostril(s) as directed by provider Daily., Disp: , Rfl:     gabapentin (NEURONTIN) 600 MG tablet, Take 1 tablet by mouth Every 6 (Six) Hours., Disp: , Rfl:     loratadine (CLARITIN) 10 MG tablet, Take 1 tablet by mouth Daily., Disp: 90 tablet, Rfl: 3    losartan (COZAAR) 100 MG tablet, Take 1 tablet by mouth Daily., Disp: , Rfl:     magnesium oxide (MAGOX) 400 (241.3 Mg) MG tablet tablet, Take 1 tablet by mouth Daily., Disp: , Rfl:     Mirabegron ER (MYRBETRIQ) 25 MG tablet sustained-release 24 hour 24 hr tablet, Take 1 tablet by mouth Daily., Disp: , Rfl:     modafinil (PROVIGIL)  "200 MG tablet, Take 1 tablet by mouth Daily., Disp: , Rfl:     Multiple Vitamins-Minerals (MULTI COMPLETE PO), Take 1 tablet by mouth Daily., Disp: , Rfl:     oxyCODONE-acetaminophen (PERCOCET) 5-325 MG per tablet, Take 1 tablet by mouth Every 6 (Six) Hours As Needed for Moderate Pain or Severe Pain. 4 x a day, Disp: , Rfl:     OXYCONTIN 15 MG tablet extended-release 12 hour, Take 1 tablet by mouth Every 8 (Eight) Hours., Disp: , Rfl: 0    pantoprazole (PROTONIX) 40 MG EC tablet, Take 1 tablet by mouth Daily., Disp: , Rfl:     Probiotic Product (PROBIOTIC ADVANCED PO), Take 1 tablet/day by mouth Daily., Disp: , Rfl:     spironolactone (ALDACTONE) 50 MG tablet, Take 1 tablet by mouth Daily for 30 days., Disp: 30 tablet, Rfl: 0    tamsulosin (FLOMAX) 0.4 MG capsule 24 hr capsule, Take 2 capsules by mouth Every Night., Disp: , Rfl:     Testosterone Cypionate (DEPOTESTOTERONE CYPIONATE) 200 MG/ML injection, Inject 1 mL into the appropriate muscle as directed by prescriber Every 14 (Fourteen) Days., Disp: , Rfl:     traZODone (DESYREL) 150 MG tablet, Take 2 tablets by mouth Every Night., Disp: , Rfl:     TURMERIC PO, Take 1 tablet by mouth Daily., Disp: , Rfl:     valACYclovir (VALTREX) 500 MG tablet, Take 1 tablet by mouth Daily., Disp: , Rfl:     vitamin B-12 (CYANOCOBALAMIN) 100 MCG tablet, Take 1 tablet by mouth Daily., Disp: , Rfl:       Review of Systems   Constitutional: Negative for diaphoresis, fever and malaise/fatigue.   Cardiovascular:  Negative for chest pain and syncope.   Musculoskeletal:  Positive for arthritis, back pain and falls.   Gastrointestinal:  Negative for nausea and vomiting.   Neurological:  Positive for paresthesias. Negative for dizziness, headaches and light-headedness.       Procedures    /78 (BP Location: Right arm, Patient Position: Sitting, Cuff Size: Large Adult)   Pulse 85   Resp 18   Ht 188 cm (74\")   Wt (!) 161 kg (356 lb)   SpO2 98%   BMI 45.71 kg/m²        Objective: "     Vitals reviewed.   Constitutional:       General: Awake. Not in acute distress.     Appearance: Normal and healthy appearance. Well-developed and well-groomed. Morbidly obese. Not ill-appearing.   HENT:      Head: Normocephalic and atraumatic.   Pulmonary:      Effort: Pulmonary effort is normal.      Breath sounds: Normal breath sounds. No wheezing. No rhonchi. No rales.   Cardiovascular:      Normal rate. Regular rhythm.      Murmurs: There is no murmur.      No gallop.  No rub.   Edema:     Pretibial: bilateral trace edema of the pretibial area.  Musculoskeletal:      Cervical back: Normal range of motion and neck supple. Skin:     General: Skin is warm and dry.   Neurological:      Mental Status: Alert, oriented to person, place, and time and oriented to person, place and time.   Psychiatric:         Attention and Perception: Attention normal.         Mood and Affect: Mood normal.         Speech: Speech normal.         Behavior: Behavior normal. Behavior is cooperative.         Thought Content: Thought content normal.         Cognition and Memory: Cognition and memory normal.         Judgment: Judgment normal.       Lab Review  Interpretation Summary  Myocardial Perfusion Testing  12/12/2023       Overall, this is a low risk test for ischemia.    No ECG evidence of myocardial ischemia. Negative clinical evidence of myocardial ischemia. Findings consistent with a normal ECG stress test.    There is a small size, mild intensity inferior perfusion defect that is present at rest and stress and completely corrects out with attenuation correction software and likely represents artifact    There is a small size, mild intensity apical septal perfusion defect that is present at stress only and partially corrects out with attenuation correction software and may represent a small area of ischemia    No significant wall motion abnormalities appreciated    Normal left ventricular function with an ejection fraction  calculated at 56%    Results for orders placed during the hospital encounter of 12/11/23    Adult Transthoracic Echo Limited W/ Cont if Necessary Per Protocol    Interpretation Summary    The study is technically suboptimal for diagnosis.    Left ventricular systolic function is normal. Left ventricular ejection fraction appears to be 56 - 60%.    Left ventricular diastolic function was normal.    Difficult to visualize the valves on the study, however, no dynamically significant stenosis or regurgitation appreciated by Doppler exam.    Cardiac Cath 12/2018:     Hemodynamics:  Nd=784/65, m87, GF=353, LVEDP=13, AV grad=none     Left ventriculography:  1. The contractility of the left ventricle is normal.  Estimated ejection fraction >60%.  2. The left ventricle is normal in wall thickness and chamber size.  3. There is no significant mitral regurgitation or aortic insufficiency.     Selective coronary angiography:   Dominance: right  Left Main coronary artery: Large vessel arising normally from the left cusp and bifurcates.  Angiographically normal.   Left anterior descending artery: Large vessel arising normally from the distal left main the supplies two large diagonal branches.  Angiographic normal.   Left circumflex:  Large vessel arising normally from the distal left main that is not dominant for posterior circulation.  Supplies a bifurcating OM1 and then a posterior lateral branch.  Very minor luminal irregularities.  Right coronary artery:  Large vessel arising normally from the right cusp that is dominant for posterior circulation.  It is extremely tortuous in its midsegment and continues on in the posterior AV groove until supplying the PDA.  Minor luminal irregularities throughout the system      Assessment:          Diagnosis Plan   1. Chronic diastolic heart failure        2. Primary hypertension               Plan:       He has had recent medication changes with addition of diuretic therapy  spironolactone, increased dose of bumex at hospital stay. He also was encouraged to stop amlodipine due to his lower leg edema, and duplicate CCB therapy with diltiazem, thinking that perhaps this could be worsening his swelling. He tells me that he wants to monitor how he does on his new medications and will call us back if he would like to try other therapies such as entresto.     He likely has some component of diastolic dysfunction which was previously documented in his chart. His most recent echo notes normal diastolic function which confuses things a bit.  He recently had normal BNP during hospital stay but did improve with aggressive diuresis, thus, oral diuretic management seems appropriate.     Avoid sodium in diet. Continue with recent medications    Follow up if no improvement to discuss Entresto.

## 2023-12-22 ENCOUNTER — READMISSION MANAGEMENT (OUTPATIENT)
Dept: CALL CENTER | Facility: HOSPITAL | Age: 68
End: 2023-12-22
Payer: MEDICARE

## 2023-12-22 NOTE — OUTREACH NOTE
CHF Week 2 Survey      Flowsheet Row Responses   [facility] patient discharged from? [place]   Does the patient have one of the following disease processes/diagnoses(primary or secondary)? CHF   Week 2 attempt successful? No   Unsuccessful attempts Attempt 1            Suyapa CONCEPCION - Registered Nurse

## 2023-12-26 ENCOUNTER — TREATMENT (OUTPATIENT)
Dept: PHYSICAL THERAPY | Facility: CLINIC | Age: 68
End: 2023-12-26
Payer: MEDICARE

## 2023-12-26 DIAGNOSIS — M25.552 LEFT HIP PAIN: ICD-10-CM

## 2023-12-26 DIAGNOSIS — S16.1XXA STRAIN OF NECK MUSCLE, INITIAL ENCOUNTER: Primary | ICD-10-CM

## 2023-12-26 NOTE — PROGRESS NOTES
Physical Therapy Treatment Note  [address] [place] [address]    Patient: Carlos Hayes                                                 Visit Date: 2023  :     1955    Referring practitioner:    Chris Russ MD  Date of Initial Visit:          Type: THERAPY  Noted: 10/26/2023    Patient seen for 12 sessions    Visit Diagnoses:    ICD-10-CM ICD-9-CM   1. Strain of neck muscle, initial encounter  S16.1XXA 847.0   2. Left hip pain  M25.552 719.45     SUBJECTIVE     Subjective:  He says his left hip was really bothering him today after his trip to [place].     PAIN: 8-9/10 neck; LB 7/10       OBJECTIVE     Objective     Manual Therapy     [id number]  Comments   Percussive IASTM using Powerboost to left piriformis/SIJ      Prone left ilium depression mob    Prone left upper glute STM    Prone CT ext mobs    Supine subocc release and man cervical traction        Timed Minutes 60     Therapy Education/Self Care [id number]   Education offered today Discussed pelvic floor and orgasm and the possibility of a thoracic/autonomic issue contributing to this.    [employer] Code Access Code: [email or web address]  URL: [email or web address]   Ongoing HEP     Date: 10/26/2023  Prepared by: Juarez Hartmann    Exercises  - Forward and Backward Stepping at Pool Wall  - 1 x daily - 7 x weekly - 2 sets - 10 reps  - Standing Hip Abduction Adduction at Pool Wall  - 1 x daily - 7 x weekly - 2 sets - 10 reps  - Standing Hip Abduction Adduction at Pool Wall (Mirrored)  - 1 x daily - 7 x weekly - 2 sets - 10 reps  - Standing Hip Flexion Extension at Pool Wall  - 1 x daily - 7 x weekly - 2 sets - 10 reps  - Standing Hip Flexion Extension at Pool Wall (Mirrored)  - 1 x daily - 7 x weekly - 2 sets - 10 reps  - Bilateral Shoulder Horizontal Abduction Adduction AROM at Pool Wall  - 1 x daily - 7 x weekly - 2 sets - 10 reps  - Bilateral Shoulder Flexion Extension AROM at Pool  Wall  - 1 x daily - 7 x weekly - 2 sets - 10 reps  - Shoulder Flexion Extension Side to Side with Pool Noodle  - 1 x daily - 7 x weekly - 2 sets - 10 reps  - Plank with Hip Extension at Pool Wall  - 1 x daily - 7 x weekly - 2 sets - 10 reps   Timed Minutes      Total Timed Treatment:     60   mins  Total Time of Visit:             60   mins    ASSESSMENT/PLAN     GOALS:  Goals                                          Progress Note due by 12/31/23                                                      Recert due by 1/23/24   LTG by: 12 weeks Comments Date Status   Improve oliver cervical rotation to 50 deg  12/1 ongoing   Able to stand more upright with more neutral cervical Able to stand more upright after working on hip flexors 12/15 ongoing   Improve passive hip ext to 10 deg  12/1 ongoing   Able to walk x 20 min without rollator before sitting Walking more upright today after PT 12/26 ongoing   Improve oliver shoulder elevation to 130 deg  12/1 ongoing   Ind with HEP for flexibility and stability  12/1 ongoing      Assessment/Plan     ASSESSMENT:   His back pain was better today after PT and he got quite a bit of relief. The primary pain today appeared to be his left SIJ strained.     PLAN:   Hold on DN and continue with PF and neck manual therapy.    SIGNATURE: Juarez Hartmann, PT, [place] License #: [id number]  Electronically Signed on 12/26/2023        [facility] [place] [address]  [phone]

## 2023-12-27 ENCOUNTER — READMISSION MANAGEMENT (OUTPATIENT)
Dept: CALL CENTER | Facility: HOSPITAL | Age: 68
End: 2023-12-27
Payer: MEDICARE

## 2023-12-27 NOTE — OUTREACH NOTE
CHF Week 2 Survey      Flowsheet Row Responses   [facility] patient discharged from? [place]   Does the patient have one of the following disease processes/diagnoses(primary or secondary)? CHF   Week 2 attempt successful? Yes   Call end time 1747   Discharge diagnosis Acute CHF   Person spoke with today (if not patient) and relationship patient   Meds reviewed with patient/caregiver? Yes   Prescription comments no concerns or question   Does the patient have a primary care provider?  Yes   Comments regarding PCP Seen Dr. Elliott- He has another upcoming appt   Has home health visited the patient within 72 hours of discharge? N/A   Has all DME been delivered? No   Psychosocial issues? No   What is the patient's perception of their health status since discharge? Improving   CHF Zone this Call Green Zone   Green Zone Patient reports doing well, No new or worsening shortness of breath   Green Zone Interventions Daily weight check, Meds as directed, Low sodium diet, Follow up visits planned   CHF Week 2 call completed? Yes   Is the patient interested in additional calls from an ambulatory ? No   Would this patient benefit from a Referral to Amb Social Work? No   Wrap up additional comments Patient reports doing well- he has seen cardiology and will be seeing pcp next week. no concerns or questions noted.   Call end time 1747            Nancie AMARO - Registered Nurse

## 2024-01-02 ENCOUNTER — TREATMENT (OUTPATIENT)
Dept: PHYSICAL THERAPY | Facility: CLINIC | Age: 69
End: 2024-01-02
Payer: MEDICARE

## 2024-01-02 DIAGNOSIS — M25.552 LEFT HIP PAIN: ICD-10-CM

## 2024-01-02 DIAGNOSIS — S16.1XXA STRAIN OF NECK MUSCLE, INITIAL ENCOUNTER: Primary | ICD-10-CM
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
## 2024-01-05 ENCOUNTER — TREATMENT (OUTPATIENT)
Dept: PHYSICAL THERAPY | Facility: CLINIC | Age: 69
End: 2024-01-05
Payer: MEDICARE

## 2024-01-05 DIAGNOSIS — M25.552 LEFT HIP PAIN: ICD-10-CM

## 2024-01-05 DIAGNOSIS — S16.1XXA STRAIN OF NECK MUSCLE, INITIAL ENCOUNTER: Primary | ICD-10-CM

## 2024-01-05 NOTE — PROGRESS NOTES
Physical Therapy Treatment Note  [address] [place] [address]    Patient: Carlos Hayes                                                 Visit Date: 2024  :     1955    Referring practitioner:    Chris Russ MD  Date of Initial Visit:          Type: THERAPY  Noted: 10/26/2023    Patient seen for 14 sessions    Visit Diagnoses:    ICD-10-CM ICD-9-CM   1. Strain of neck muscle, initial encounter  S16.1XXA 847.0   2. Left hip pain  M25.552 719.45     SUBJECTIVE     Subjective:  He had injections in his SIJs this morning. He could barely get off the table but is better now.     PAIN: LB 7/10       OBJECTIVE     Objective     Manual Therapy     [id number]  Comments   Percussive IASTM using Powerboost to left piriformis/SIJ      Prone left ilium depression mob    Prone left upper glute STM    Prone CT ext mob    HL oliver IP STM        Timed Minutes 60     Therapy Education/Self Care [id number]   Education offered today Discussed pelvic floor and orgasm and the possibility of a thoracic/autonomic issue contributing to this.    [employer] Code Access Code: [email or web address]  URL: [email or web address]   Ongoing HEP     Date: 10/26/2023  Prepared by: Juarez Hartmann    Exercises  - Forward and Backward Stepping at Pool Wall  - 1 x daily - 7 x weekly - 2 sets - 10 reps  - Standing Hip Abduction Adduction at Pool Wall  - 1 x daily - 7 x weekly - 2 sets - 10 reps  - Standing Hip Abduction Adduction at Pool Wall (Mirrored)  - 1 x daily - 7 x weekly - 2 sets - 10 reps  - Standing Hip Flexion Extension at Pool Wall  - 1 x daily - 7 x weekly - 2 sets - 10 reps  - Standing Hip Flexion Extension at Pool Wall (Mirrored)  - 1 x daily - 7 x weekly - 2 sets - 10 reps  - Bilateral Shoulder Horizontal Abduction Adduction AROM at Pool Wall  - 1 x daily - 7 x weekly - 2 sets - 10 reps  - Bilateral Shoulder Flexion Extension AROM at Pool Wall  - 1 x daily - 7 x weekly - 2  sets - 10 reps  - Shoulder Flexion Extension Side to Side with Pool Noodle  - 1 x daily - 7 x weekly - 2 sets - 10 reps  - Plank with Hip Extension at Pool Wall  - 1 x daily - 7 x weekly - 2 sets - 10 reps   Timed Minutes      Total Timed Treatment:     60   mins  Total Time of Visit:             60   mins    ASSESSMENT/PLAN     GOALS:  Goals                                          Progress Note due by 2/1/24                                                      Recert due by 1/23/24   LTG by: 12 weeks Comments Date Status   Improve oliver cervical rotation to 50 deg Less than 50 deg 1/2 ongoing   Able to stand more upright with more neutral cervical Able to stand more upright after working on hip flexors 1/2 ongoing   Improve passive hip ext to 10 deg Unable to asses due to pain 1/2 ongoing   Able to walk x 20 min without rollator before sitting Had been walking more upright before this but was stooped more today 1/2 ongoing   Improve oliver shoulder elevation to 130 deg Unable today due to soreness 1/2 ongoing   Ind with HEP for flexibility and stability Gentle postural flexibility 1/2 ongoing      Assessment/Plan     ASSESSMENT:   He was able to stand more erect today after PT.     PLAN:   Hold on DN and continue with PF and neck manual therapy.    SIGNATURE: Juarez Hartmann, PT, [place] License #: [id number]  Electronically Signed on 1/5/2024        [facility] [place] [address]  [phone]

## 2024-01-08 ENCOUNTER — TREATMENT (OUTPATIENT)
Dept: PHYSICAL THERAPY | Facility: CLINIC | Age: 69
End: 2024-01-08
Payer: MEDICARE

## 2024-01-08 DIAGNOSIS — M25.552 LEFT HIP PAIN: ICD-10-CM

## 2024-01-08 DIAGNOSIS — S16.1XXA STRAIN OF NECK MUSCLE, INITIAL ENCOUNTER: Primary | ICD-10-CM

## 2024-01-08 NOTE — PROGRESS NOTES
Physical Therapy Treatment Note  [address] [place] [address]    Patient: Carlos Hayes                                                 Visit Date: 2024  :     1955    Referring practitioner:    Chris Russ MD  Date of Initial Visit:          Type: THERAPY  Noted: 10/26/2023    Patient seen for 15 sessions    Visit Diagnoses:    ICD-10-CM ICD-9-CM   1. Strain of neck muscle, initial encounter  S16.1XXA 847.0   2. Left hip pain  M25.552 719.45     SUBJECTIVE     Subjective:  He says he's been having trouble sleeping but he realized he hasn't been sleeping with a pillow under his knees. He walked 6 laps in the pool and got in the hot tub. He felt good til his back got cold.     PAIN: LB 9/10       OBJECTIVE     Objective     Manual Therapy     [id number]  Comments   Percussive IASTM using Powerboost to oliver piriformis/SIJ   Sidelying with pillow between knees   Sidelying oliver UT STM    Sidelying  CT ext mob    HL oliver hip flexor deep tissue release        Timed Minutes 75     Therapy Education/Self Care [id number]   Education offered today Discussed pelvic floor and orgasm and the possibility of a thoracic/autonomic issue contributing to this.    [employer] Code Access Code: [email or web address]  URL: [email or web address]   Ongoing HEP     Date: 10/26/2023  Prepared by: Juarez Hartmann    Exercises  - Forward and Backward Stepping at Pool Wall  - 1 x daily - 7 x weekly - 2 sets - 10 reps  - Standing Hip Abduction Adduction at Pool Wall  - 1 x daily - 7 x weekly - 2 sets - 10 reps  - Standing Hip Abduction Adduction at Pool Wall (Mirrored)  - 1 x daily - 7 x weekly - 2 sets - 10 reps  - Standing Hip Flexion Extension at Pool Wall  - 1 x daily - 7 x weekly - 2 sets - 10 reps  - Standing Hip Flexion Extension at Pool Wall (Mirrored)  - 1 x daily - 7 x weekly - 2 sets - 10 reps  - Bilateral Shoulder Horizontal Abduction Adduction AROM at Pool Wall  - 1 x  daily - 7 x weekly - 2 sets - 10 reps  - Bilateral Shoulder Flexion Extension AROM at Pool Wall  - 1 x daily - 7 x weekly - 2 sets - 10 reps  - Shoulder Flexion Extension Side to Side with Pool Noodle  - 1 x daily - 7 x weekly - 2 sets - 10 reps  - Plank with Hip Extension at Pool Wall  - 1 x daily - 7 x weekly - 2 sets - 10 reps   Timed Minutes      Total Timed Treatment:     75   mins  Total Time of Visit:             75   mins    ASSESSMENT/PLAN     GOALS:  Goals                                          Progress Note due by 2/1/24                                                      Recert due by 1/23/24   LTG by: 12 weeks Comments Date Status   Improve oliver cervical rotation to 50 deg Less than 50 deg 1/2 ongoing   Able to stand more upright with more neutral cervical Able to stand more upright after working on hip flexors 1/8 ongoing   Improve passive hip ext to 10 deg Unable to asses due to pain 1/2 ongoing   Able to walk x 20 min without rollator before sitting Had been walking more upright before this but was stooped more today 1/2 ongoing   Improve oliver shoulder elevation to 130 deg Unable today due to soreness 1/2 ongoing   Ind with HEP for flexibility and stability Gentle postural flexibility 1/2 ongoing      Assessment/Plan     ASSESSMENT:   He was struggling at first bit this was better after we finished and he was able to walk out easier.     PLAN:   Hold on DN and continue with PF and neck manual therapy.    SIGNATURE: Juarez Hartmann, PT, [place] License #: [id number]  Electronically Signed on 1/8/2024        [address] [place] [address]. [address]  [phone]

## 2024-01-12 ENCOUNTER — TREATMENT (OUTPATIENT)
Dept: PHYSICAL THERAPY | Facility: CLINIC | Age: 69
End: 2024-01-12
Payer: MEDICARE

## 2024-01-12 DIAGNOSIS — M25.552 LEFT HIP PAIN: ICD-10-CM

## 2024-01-12 DIAGNOSIS — S16.1XXA STRAIN OF NECK MUSCLE, INITIAL ENCOUNTER: Primary | ICD-10-CM

## 2024-01-12 NOTE — PROGRESS NOTES
Physical Therapy Treatment Note  [address] [place] [address]    Patient: Carlos Hayes                                                 Visit Date: 2024  :     1955    Referring practitioner:    Chris Russ MD  Date of Initial Visit:          Type: THERAPY  Noted: 10/26/2023    Patient seen for 16 sessions    Visit Diagnoses:    ICD-10-CM ICD-9-CM   1. Strain of neck muscle, initial encounter  S16.1XXA 847.0   2. Left hip pain  M25.552 719.45     SUBJECTIVE     Subjective:  He says he has a flare when he gets out of his hot tub.      PAIN: LB 9/10       OBJECTIVE     Objective     Manual Therapy     [id number]  Comments   Percussive IASTM using Powerboost to oliver piriformis and UT   Sidelying with pillow between knees   Sidelying oliver UT STM    Sidelying  CT ext mob    HL oliver hip flexor deep tissue release        Timed Minutes 60     Therapy Education/Self Care 72845   Education offered today Discussed pelvic floor and orgasm and the possibility of a thoracic/autonomic issue contributing to this.    [employer] Code Access Code: [email or web address]  URL: [email or web address]   Ongoing HEP     Date: 10/26/2023  Prepared by: Juarez Hartmann    Exercises  - Forward and Backward Stepping at Pool Wall  - 1 x daily - 7 x weekly - 2 sets - 10 reps  - Standing Hip Abduction Adduction at Pool Wall  - 1 x daily - 7 x weekly - 2 sets - 10 reps  - Standing Hip Abduction Adduction at Pool Wall (Mirrored)  - 1 x daily - 7 x weekly - 2 sets - 10 reps  - Standing Hip Flexion Extension at Pool Wall  - 1 x daily - 7 x weekly - 2 sets - 10 reps  - Standing Hip Flexion Extension at Pool Wall (Mirrored)  - 1 x daily - 7 x weekly - 2 sets - 10 reps  - Bilateral Shoulder Horizontal Abduction Adduction AROM at Pool Wall  - 1 x daily - 7 x weekly - 2 sets - 10 reps  - Bilateral Shoulder Flexion Extension AROM at Pool Wall  - 1 x daily - 7 x weekly - 2 sets - 10  reps  - Shoulder Flexion Extension Side to Side with Pool Noodle  - 1 x daily - 7 x weekly - 2 sets - 10 reps  - Plank with Hip Extension at Pool Wall  - 1 x daily - 7 x weekly - 2 sets - 10 reps   Timed Minutes      Total Timed Treatment:     60   mins  Total Time of Visit:             60   mins    ASSESSMENT/PLAN     GOALS:  Goals                                          Progress Note due by 2/1/24                                                      Recert due by 1/23/24   LTG by: 12 weeks Comments Date Status   Improve oliver cervical rotation to 50 deg Less than 50 deg 1/2 ongoing   Able to stand more upright with more neutral cervical Able to stand more upright after working on hip flexors 1/11 ongoing   Improve passive hip ext to 10 deg Unable to asses due to pain 1/2 ongoing   Able to walk x 20 min without rollator before sitting Had been walking more upright before this but was stooped more today 1/2 ongoing   Improve oliver shoulder elevation to 130 deg Unable today due to soreness 1/2 ongoing   Ind with HEP for flexibility and stability Gentle postural flexibility 1/2 ongoing      Assessment/Plan     ASSESSMENT:   He was pretty stiff but felt much better after PT    PLAN:   Hold on DN and continue with PF and neck manual therapy.    SIGNATURE: Juarez Hartmann, PT, [place] License #: [id number]  Electronically Signed on 1/12/2024        [address] [address]  [phone]

## 2024-01-15 ENCOUNTER — TREATMENT (OUTPATIENT)
Dept: PHYSICAL THERAPY | Facility: CLINIC | Age: 69
End: 2024-01-15
Payer: MEDICARE

## 2024-01-15 DIAGNOSIS — M25.552 LEFT HIP PAIN: ICD-10-CM

## 2024-01-15 DIAGNOSIS — S16.1XXA STRAIN OF NECK MUSCLE, INITIAL ENCOUNTER: Primary | ICD-10-CM

## 2024-01-15 NOTE — PROGRESS NOTES
Physical Therapy Treatment Note  [address] [place] [address]    Patient: Carlos Hayes                                                 Visit Date: 1/15/2024  :     1955    Referring practitioner:    Chris Russ MD  Date of Initial Visit:          Type: THERAPY  Noted: 10/26/2023    Patient seen for 17 sessions    Visit Diagnoses:    ICD-10-CM ICD-9-CM   1. Strain of neck muscle, initial encounter  S16.1XXA 847.0   2. Left hip pain  M25.552 719.45     SUBJECTIVE     Subjective:  He says he's been doing better since his last visit. He is standing more erect.    PAIN: LB 9/10       OBJECTIVE     Objective     Manual Therapy     [id number]  Comments   Percussive IASTM using Powerboost to oliver piriformis, IP and UT   Sidelying with pillow between knees and HL   Sidelying oliver UT STM    Sidelying  CT ext mob    HL oliver hip flexor deep tissue release        Timed Minutes 75     Therapy Education/Self Care 11896   Education offered today Discussed pelvic floor and orgasm and the possibility of a thoracic/autonomic issue contributing to this.    [employer] Code Access Code: [email or web address]  URL: [email or web address]   Ongoing HEP     Date: 10/26/2023  Prepared by: Juarez Hartmann    Exercises  - Forward and Backward Stepping at Pool Wall  - 1 x daily - 7 x weekly - 2 sets - 10 reps  - Standing Hip Abduction Adduction at Pool Wall  - 1 x daily - 7 x weekly - 2 sets - 10 reps  - Standing Hip Abduction Adduction at Pool Wall (Mirrored)  - 1 x daily - 7 x weekly - 2 sets - 10 reps  - Standing Hip Flexion Extension at Pool Wall  - 1 x daily - 7 x weekly - 2 sets - 10 reps  - Standing Hip Flexion Extension at Pool Wall (Mirrored)  - 1 x daily - 7 x weekly - 2 sets - 10 reps  - Bilateral Shoulder Horizontal Abduction Adduction AROM at Pool Wall  - 1 x daily - 7 x weekly - 2 sets - 10 reps  - Bilateral Shoulder Flexion Extension AROM at Pool Wall  - 1 x daily  - 7 x weekly - 2 sets - 10 reps  - Shoulder Flexion Extension Side to Side with Pool Noodle  - 1 x daily - 7 x weekly - 2 sets - 10 reps  - Plank with Hip Extension at Pool Wall  - 1 x daily - 7 x weekly - 2 sets - 10 reps   Timed Minutes      Total Timed Treatment:     75   mins  Total Time of Visit:             75   mins    ASSESSMENT/PLAN     GOALS:  Goals                                          Progress Note due by 2/1/24                                                      Recert due by 1/23/24   LTG by: 12 weeks Comments Date Status   Improve oliver cervical rotation to 50 deg Less than 50 deg 1/2 ongoing   Able to stand more upright with more neutral cervical Able to stand more upright after working on hip flexors 1/11 ongoing   Improve passive hip ext to 10 deg Unable to asses due to pain 1/2 ongoing   Able to walk x 20 min without rollator before sitting Had been walking more upright before this but was stooped more today 1/2 ongoing   Improve oliver shoulder elevation to 130 deg Unable today due to soreness 1/2 ongoing   Ind with HEP for flexibility and stability Gentle postural flexibility 1/2 ongoing      Assessment/Plan     ASSESSMENT:   He is slowly loosening up and able to stand more erect. He will still lock up some.     PLAN:   Hold on DN and continue with PF and neck manual therapy.    SIGNATURE: Juarez Hartmann, PT, [place] License #: [id number]  Electronically Signed on 1/15/2024        [facility]Patrick Stevens. [address]  [phone]

## 2024-01-19 ENCOUNTER — TREATMENT (OUTPATIENT)
Dept: PHYSICAL THERAPY | Facility: CLINIC | Age: 69
End: 2024-01-19
Payer: MEDICARE

## 2024-01-19 DIAGNOSIS — M25.552 LEFT HIP PAIN: ICD-10-CM

## 2024-01-19 DIAGNOSIS — S16.1XXA STRAIN OF NECK MUSCLE, INITIAL ENCOUNTER: Primary | ICD-10-CM

## 2024-01-19 NOTE — PROGRESS NOTES
Physical Therapy Treatment Note  [address] [place] [address]    Patient: Carlos Hayes                                                 Visit Date: 2024  :     1955    Referring practitioner:    Chris Russ MD  Date of Initial Visit:          Type: THERAPY  Noted: 10/26/2023    Patient seen for 18 sessions    Visit Diagnoses:    ICD-10-CM ICD-9-CM   1. Strain of neck muscle, initial encounter  S16.1XXA 847.0   2. Left hip pain  M25.552 719.45     SUBJECTIVE     Subjective:  He says he had to walk a lot before coming here and his back is hurting worse, particularly on the right side.     PAIN: LB 9/10       OBJECTIVE     Objective     Manual Therapy     [id number]  Comments   Percussive IASTM using Powerboost to oliver piriformis, IP and UT   Sidelying with pillow between knees and HL   Sidelying oliver UT STM    Sidelying  CT ext mob    HL oliver hip flexor deep tissue release    Right subscap and rhomboid MFR    Timed Minutes 75     Therapy Education/Self Care 86771   Education offered today Discussed pelvic floor and orgasm and the possibility of a thoracic/autonomic issue contributing to this.    [employer] Code Access Code: [email or web address]  URL: [email or web address]   Ongoing HEP     Date: 10/26/2023  Prepared by: Juarez Hartmann    Exercises  - Forward and Backward Stepping at Pool Wall  - 1 x daily - 7 x weekly - 2 sets - 10 reps  - Standing Hip Abduction Adduction at Pool Wall  - 1 x daily - 7 x weekly - 2 sets - 10 reps  - Standing Hip Abduction Adduction at Pool Wall (Mirrored)  - 1 x daily - 7 x weekly - 2 sets - 10 reps  - Standing Hip Flexion Extension at Pool Wall  - 1 x daily - 7 x weekly - 2 sets - 10 reps  - Standing Hip Flexion Extension at Pool Wall (Mirrored)  - 1 x daily - 7 x weekly - 2 sets - 10 reps  - Bilateral Shoulder Horizontal Abduction Adduction AROM at Pool Wall  - 1 x daily - 7 x weekly - 2 sets - 10 reps  -  Bilateral Shoulder Flexion Extension AROM at Pool Wall  - 1 x daily - 7 x weekly - 2 sets - 10 reps  - Shoulder Flexion Extension Side to Side with Pool Noodle  - 1 x daily - 7 x weekly - 2 sets - 10 reps  - Plank with Hip Extension at Pool Wall  - 1 x daily - 7 x weekly - 2 sets - 10 reps   Timed Minutes      Total Timed Treatment:     75   mins  Total Time of Visit:            75   mins    ASSESSMENT/PLAN     GOALS:  Goals                                          Progress Note due by 2/1/24                                                      Recert due by 1/23/24   LTG by: 12 weeks Comments Date Status   Improve oliver cervical rotation to 50 deg Less than 50 deg 1/2 ongoing   Able to stand more upright with more neutral cervical Able to stand more upright after working on hip flexors 1/11 ongoing   Improve passive hip ext to 10 deg Unable to asses due to pain 1/2 ongoing   Able to walk x 20 min without rollator before sitting Had been walking more upright before this but was stooped more today 1/2 ongoing   Improve oliver shoulder elevation to 130 deg Unable today due to soreness 1/2 ongoing   Ind with HEP for flexibility and stability Gentle postural flexibility 1/2 ongoing      Assessment/Plan     ASSESSMENT:   He is felt better but was still quite stiff after PT    PLAN:   Hold on DN and continue with PF and neck manual therapy.    SIGNATURE: Juarez Hartmann, PT, [place] License #: [id number]  Electronically Signed on 1/19/2024        [facility] [address]. [address]  [phone]

## 2024-01-23 ENCOUNTER — TREATMENT (OUTPATIENT)
Dept: PHYSICAL THERAPY | Facility: CLINIC | Age: 69
End: 2024-01-23
Payer: MEDICARE

## 2024-01-23 DIAGNOSIS — S16.1XXA STRAIN OF NECK MUSCLE, INITIAL ENCOUNTER: Primary | ICD-10-CM

## 2024-01-23 NOTE — PROGRESS NOTES
Physical Therapy Treatment Note  [address] [place] [address]    Patient: Carlos Hayes                                                 Visit Date: 2024  :     1955    Referring practitioner:    Chris Russ MD  Date of Initial Visit:          Type: THERAPY  Noted: 10/26/2023    Patient seen for 19 sessions    Visit Diagnoses:    ICD-10-CM ICD-9-CM   1. Strain of neck muscle, initial encounter  S16.1XXA 847.0     SUBJECTIVE     Subjective:  He says his left back is better but the right is still hurting. He swapped beds which was more comfortable.     PAIN: LB 10       OBJECTIVE     Objective     Manual Therapy     [id number]  Comments   Percussive IASTM using Powerboost to right piriformis, lower lumbar, oliver UT with spade and right rhomboid with ball   Sidelying with pillow between knees and HL   Sidelying oliver UT STM    Sidelying  CT ext mob    HL oliver hip flexor deep tissue release    Colon massage    Timed Minutes 75     Therapy Education/Self Care 54973   Education offered today Discussed pelvic floor and orgasm and the possibility of a thoracic/autonomic issue contributing to this.    R[employer] Code Access Code: [email or web address]  URL: [email or web address]   Ongoing HEP     Date: 10/26/2023  Prepared by: Juarez Hartmann    Exercises  - Forward and Backward Stepping at Pool Wall  - 1 x daily - 7 x weekly - 2 sets - 10 reps  - Standing Hip Abduction Adduction at Pool Wall  - 1 x daily - 7 x weekly - 2 sets - 10 reps  - Standing Hip Abduction Adduction at Pool Wall (Mirrored)  - 1 x daily - 7 x weekly - 2 sets - 10 reps  - Standing Hip Flexion Extension at Pool Wall  - 1 x daily - 7 x weekly - 2 sets - 10 reps  - Standing Hip Flexion Extension at Pool Wall (Mirrored)  - 1 x daily - 7 x weekly - 2 sets - 10 reps  - Bilateral Shoulder Horizontal Abduction Adduction AROM at Pool Wall  - 1 x daily - 7 x weekly - 2 sets - 10 reps  -  Bilateral Shoulder Flexion Extension AROM at Pool Wall  - 1 x daily - 7 x weekly - 2 sets - 10 reps  - Shoulder Flexion Extension Side to Side with Pool Noodle  - 1 x daily - 7 x weekly - 2 sets - 10 reps  - Plank with Hip Extension at Pool Wall  - 1 x daily - 7 x weekly - 2 sets - 10 reps   Timed Minutes      Total Timed Treatment:     75   mins  Total Time of Visit:             75   mins    ASSESSMENT/PLAN     GOALS:  Goals                                          Progress Note due by 2/1/24                                                      Recert due by 1/23/24   LTG by: 12 weeks Comments Date Status   Improve oliver cervical rotation to 50 deg Less than 50 deg 1/2 ongoing   Able to stand more upright with more neutral cervical Able to stand more upright after working on hip flexors 1/11 ongoing   Improve passive hip ext to 10 deg Unable to asses due to pain 1/2 ongoing   Able to walk x 20 min without rollator before sitting Had been walking more upright before this but was stooped more today 1/2 ongoing   Improve oliver shoulder elevation to 130 deg Unable today due to soreness 1/2 ongoing   Ind with HEP for flexibility and stability Gentle postural flexibility 1/2 ongoing      Assessment/Plan     ASSESSMENT:   He is still trying to figure out what combination works for him to nor flare him up. Some of his belly pain may have been from his colon being blocked up.     PLAN:   Hold on DN and continue with PF and neck manual therapy.    SIGNATURE: Juarez Hartmann, PT, [place] License #: [id number]  Electronically Signed on 1/23/2024        [address] [place] [address] [place]. [address]  [phone]

## 2024-01-26 ENCOUNTER — TREATMENT (OUTPATIENT)
Dept: PHYSICAL THERAPY | Facility: CLINIC | Age: 69
End: 2024-01-26
Payer: MEDICARE

## 2024-01-26 DIAGNOSIS — S16.1XXA STRAIN OF NECK MUSCLE, INITIAL ENCOUNTER: Primary | ICD-10-CM

## 2024-01-26 DIAGNOSIS — M25.552 LEFT HIP PAIN: ICD-10-CM

## 2024-01-26 NOTE — PROGRESS NOTES
Physical Therapy Treatment Note  [address] [place] [address]    Patient: Carlos Hayes                                                 Visit Date: 2024  :     1955    Referring practitioner:    Chris Russ MD  Date of Initial Visit:          Type: THERAPY  Noted: 10/26/2023    Patient seen for 20 sessions    Visit Diagnoses:    ICD-10-CM ICD-9-CM   1. Strain of neck muscle, initial encounter  S16.1XXA 847.0   2. Left hip pain  M25.552 719.45     SUBJECTIVE     Subjective:  He says he could barely get in his truck after his last session. He says that when he squats in the pool he locks up. He has also noticed more incontinence of urine lately.     PAIN: LB 9/10       OBJECTIVE     Objective     Manual Therapy     [id number]  Comments   Percussive IASTM using Powerboost to oliver piriformis, lower lumbar, oliver UT with spade    Sidelying with pillow between knees and HL   Internal PF assessment No palpable tenderness OI or LA   Right piriformis STM            Timed Minutes 53     Therapy Education/Self Care [id number]   Education offered today Try hip abd and gentle hip flex/ext in pool instead of squats   [employer] Code Access Code: [email or web address]  URL: [email or web address]   Ongoing HEP     Date: 10/26/2023  Prepared by: Juarez Hartmann    Exercises  - Forward and Backward Stepping at Pool Wall  - 1 x daily - 7 x weekly - 2 sets - 10 reps  - Standing Hip Abduction Adduction at Pool Wall  - 1 x daily - 7 x weekly - 2 sets - 10 reps  - Standing Hip Abduction Adduction at Pool Wall (Mirrored)  - 1 x daily - 7 x weekly - 2 sets - 10 reps  - Standing Hip Flexion Extension at Pool Wall  - 1 x daily - 7 x weekly - 2 sets - 10 reps  - Standing Hip Flexion Extension at Pool Wall (Mirrored)  - 1 x daily - 7 x weekly - 2 sets - 10 reps  - Bilateral Shoulder Horizontal Abduction Adduction AROM at Pool Wall  - 1 x daily - 7 x weekly - 2 sets - 10 reps  -  Bilateral Shoulder Flexion Extension AROM at Pool Wall  - 1 x daily - 7 x weekly - 2 sets - 10 reps  - Shoulder Flexion Extension Side to Side with Pool Noodle  - 1 x daily - 7 x weekly - 2 sets - 10 reps  - Plank with Hip Extension at Pool Wall  - 1 x daily - 7 x weekly - 2 sets - 10 reps   Timed Minutes      Total Timed Treatment:     53   mins  Total Time of Visit:             53   mins    ASSESSMENT/PLAN     GOALS:  Goals                                          Progress Note due by 2/1/24                                                      Recert due by 1/23/24   LTG by: 12 weeks Comments Date Status   Improve oliver cervical rotation to 50 deg Less than 50 deg 1/2 ongoing   Able to stand more upright with more neutral cervical Able to stand more upright after working on hip flexors 1/11 ongoing   Improve passive hip ext to 10 deg Unable to asses due to pain 1/2 ongoing   Able to walk x 20 min without rollator before sitting Had been walking more upright before this but was stooped more today 1/2 ongoing   Improve oliver shoulder elevation to 130 deg Unable today due to soreness 1/2 ongoing   Ind with HEP for flexibility and stability Gentle postural flexibility 1/2 ongoing      Assessment/Plan     ASSESSMENT:   He was more flared. I'm concerned about his incontinence. He is having a CT to check. His PF was not guarded today which was good.     PLAN:   Hold on DN and continue with PF and neck manual therapy.    SIGNATURE: Juarez Hartmann, PT, [place] License #: [id number]  Electronically Signed on 1/26/2024        [address] [place] [address]  [place]. [address]  [phone]

## 2024-02-02 ENCOUNTER — TREATMENT (OUTPATIENT)
Dept: PHYSICAL THERAPY | Facility: CLINIC | Age: 69
End: 2024-02-02
Payer: MEDICARE

## 2024-02-02 DIAGNOSIS — S16.1XXA STRAIN OF NECK MUSCLE, INITIAL ENCOUNTER: Primary | ICD-10-CM

## 2024-02-02 DIAGNOSIS — M25.552 LEFT HIP PAIN: ICD-10-CM

## 2024-02-02 NOTE — PROGRESS NOTES
Physical Therapy Treatment Note, 30 Day Progress Note, and 90 Day Recertification Note  [address] [place] [address]    Patient: Carlos Hayes                                                 Visit Date: 2024  :     1955    Referring practitioner:    Chris Russ MD  Date of Initial Visit:          Type: THERAPY  Noted: 10/26/2023    Patient seen for 21 sessions    Visit Diagnoses:    ICD-10-CM ICD-9-CM   1. Strain of neck muscle, initial encounter  S16.1XXA 847.0   2. Left hip pain  M25.552 719.45     SUBJECTIVE     Subjective:  He says he has been doing better since his last visit. He has to go to [place] for about 3 weeks so he had to cancel his next few visits.     PAIN: LB 7/10       OBJECTIVE     Objective     Manual Therapy     [id number]  Comments   Percussive IASTM using Powerboost to oliver piriformis, lower lumbar, oliver UT with spade    Sidelying with pillow between knees and HL                   Timed Minutes 60     Therapy Education/Self Care [id number]   Education offered today Try hip abd and gentle hip flex/ext in pool instead of squats   Yappn Code Access Code: [email or web address]  URL: [email or web address]   Ongoing HEP     Date: 10/26/2023  Prepared by: Juarez Hartmann    Exercises  - Forward and Backward Stepping at Pool Wall  - 1 x daily - 7 x weekly - 2 sets - 10 reps  - Standing Hip Abduction Adduction at Pool Wall  - 1 x daily - 7 x weekly - 2 sets - 10 reps  - Standing Hip Abduction Adduction at Pool Wall (Mirrored)  - 1 x daily - 7 x weekly - 2 sets - 10 reps  - Standing Hip Flexion Extension at Pool Wall  - 1 x daily - 7 x weekly - 2 sets - 10 reps  - Standing Hip Flexion Extension at Pool Wall (Mirrored)  - 1 x daily - 7 x weekly - 2 sets - 10 reps  - Bilateral Shoulder Horizontal Abduction Adduction AROM at Pool Wall  - 1 x daily - 7 x weekly - 2 sets - 10 reps  - Bilateral Shoulder Flexion Extension AROM at Pool Wall   - 1 x daily - 7 x weekly - 2 sets - 10 reps  - Shoulder Flexion Extension Side to Side with Pool Noodle  - 1 x daily - 7 x weekly - 2 sets - 10 reps  - Plank with Hip Extension at Pool Wall  - 1 x daily - 7 x weekly - 2 sets - 10 reps   Timed Minutes      Total Timed Treatment:     60   mins  Total Time of Visit:            60   mins    ASSESSMENT/PLAN     GOALS:  Goals                                          Progress Note due by 3/3/24                                                      Recert due by 1/23/24   LTG by: 12 weeks Comments Date Status   Improve oliver cervical rotation to 50 deg Less than 50 deg 2/2 ongoing   Able to stand more upright with more neutral cervical Able to stand more upright after working on hip flexors 2/2 ongoing   Improve passive hip ext to 10 deg Unable to asses due to pain 2/2 ongoing   Able to walk x 20 min without rollator before sitting He is now able to walk without his Rollator but limited due to back pain 2/2 ongoing   Improve oliver shoulder elevation to 130 deg Not assessed 2/2 ongoing   Ind with HEP for flexibility and stability Gentle postural flexibility 2/2 ongoing      Assessment & Plan       Assessment  Impairments: abnormal muscle firing, abnormal muscle tone, abnormal or restricted ROM, activity intolerance, impaired physical strength, lacks appropriate home exercise program and pain with function   Functional limitations: walking, uncomfortable because of pain, moving in bed, sitting, standing, reaching overhead and unable to perform repetitive tasks   Prognosis: good    Plan  Therapy options: will be seen for skilled therapy services  Planned modality interventions: dry needling, low level laser therapy, TENS and traction  Planned therapy interventions: manual therapy, neuromuscular re-education, spinal/joint mobilization, home exercise program, body mechanics training, stretching, therapeutic activities, strengthening, soft tissue mobilization and postural  training  Frequency: 2x week  Duration in weeks: 12  Treatment plan discussed with: patient         ASSESSMENT:   He had a severe flare a couple of week ago and was struggling with walking and transfers. We avoided having him lie supine and have worked more on his hips and pelvic floor and this helped him move better.     PLAN:   Hold on DN and continue with PF and neck manual therapy.    SIGNATURE: Juarez Hartmann, PT, [place] License #: [id number]  Electronically Signed on 2/2/2024    Clinical Progress: improved  Home Program Compliance: Yes  Progress toward previous goals: Partially Met      90 Day Recertification  Certification Period: 2/2/2024 through 5/1/2024  I certify that the therapy services are furnished while this patient is under my care.  The services outlined above are required by this patient, and will be reviewed every 90 days.     PHYSICIAN: Chris Russ MD (NPI: [id number])    Signature:___________________________________________DATE: _________    Please sign and return via fax to [phone].   Thank you so much for letting us work with Carlos. I appreciate your letting us work with your patients. If you have any questions or concerns, please don't hesitate to contact me.              [address] [address]  [phone]

## 2024-02-06 ENCOUNTER — TELEPHONE (OUTPATIENT)
Dept: NEUROLOGY | Age: 69
End: 2024-02-06

## 2024-02-06 ENCOUNTER — TRANSCRIBE ORDERS (OUTPATIENT)
Dept: ADMINISTRATIVE | Facility: HOSPITAL | Age: 69
End: 2024-02-06
Payer: MEDICARE

## 2024-02-14 LAB
MAXIMAL PREDICTED HEART RATE: 156 BPM
STRESS TARGET HR: 133 BPM

## 2024-02-29 ENCOUNTER — TREATMENT (OUTPATIENT)
Dept: PHYSICAL THERAPY | Facility: CLINIC | Age: 69
End: 2024-02-29
Payer: MEDICARE

## 2024-02-29 DIAGNOSIS — S16.1XXA STRAIN OF NECK MUSCLE, INITIAL ENCOUNTER: Primary | ICD-10-CM

## 2024-02-29 DIAGNOSIS — M25.552 LEFT HIP PAIN: ICD-10-CM

## 2024-03-04 ENCOUNTER — TELEPHONE (OUTPATIENT)
Dept: NEUROSURGERY | Facility: CLINIC | Age: 69
End: 2024-03-04
Payer: MEDICARE

## 2024-03-04 ENCOUNTER — TREATMENT (OUTPATIENT)
Dept: PHYSICAL THERAPY | Facility: CLINIC | Age: 69
End: 2024-03-04
Payer: MEDICARE

## 2024-03-04 ENCOUNTER — TRANSCRIBE ORDERS (OUTPATIENT)
Dept: ADMINISTRATIVE | Facility: HOSPITAL | Age: 69
End: 2024-03-04
Payer: MEDICARE

## 2024-03-04 DIAGNOSIS — M54.50 LOW BACK PAIN, UNSPECIFIED BACK PAIN LATERALITY, UNSPECIFIED CHRONICITY, UNSPECIFIED WHETHER SCIATICA PRESENT: Primary | ICD-10-CM

## 2024-03-04 DIAGNOSIS — S16.1XXA STRAIN OF NECK MUSCLE, INITIAL ENCOUNTER: Primary | ICD-10-CM

## 2024-03-04 DIAGNOSIS — M25.552 LEFT HIP PAIN: ICD-10-CM

## 2024-03-04 NOTE — PROGRESS NOTES
Physical Therapy Treatment Note and 30 Day Progress Note  [address] [place] [address]    Patient: Carlos Hayes                                                 Visit Date: 2024  :     1955    Referring practitioner:    Chris Russ MD  Date of Initial Visit:          Type: THERAPY  Noted: 10/26/2023    Patient seen for 22 sessions    Visit Diagnoses:    ICD-10-CM ICD-9-CM   1. Strain of neck muscle, initial encounter  S16.1XXA 847.0   2. Left hip pain  M25.552 719.45     SUBJECTIVE     Subjective:  He says he has been gone to [place] to settle some house issues. He's been doing better, partly from doing stairs and the heat.      PAIN: LB 7/10       OBJECTIVE     Objective     Manual Therapy     05014  Comments   Percussive IASTM using Powerboost to oliver piriformis, lower lumbar, oliver UT with spade    Sidelying with pillow between knees and HL   Supine oliver UT STM    HL oliver hip flexor STM            Timed Minutes 60     Therapy Education/Self Care [id number]   Education offered today Try hip abd and gentle hip flex/ext in pool instead of squats   FolioDynamix Code Access Code: [email or web address]  URL: [email or web address]   Ongoing HEP     Date: 10/26/2023  Prepared by: Juarez Hartmann    Exercises  - Forward and Backward Stepping at Pool Wall  - 1 x daily - 7 x weekly - 2 sets - 10 reps  - Standing Hip Abduction Adduction at Pool Wall  - 1 x daily - 7 x weekly - 2 sets - 10 reps  - Standing Hip Abduction Adduction at Pool Wall (Mirrored)  - 1 x daily - 7 x weekly - 2 sets - 10 reps  - Standing Hip Flexion Extension at Pool Wall  - 1 x daily - 7 x weekly - 2 sets - 10 reps  - Standing Hip Flexion Extension at Pool Wall (Mirrored)  - 1 x daily - 7 x weekly - 2 sets - 10 reps  - Bilateral Shoulder Horizontal Abduction Adduction AROM at Pool Wall  - 1 x daily - 7 x weekly - 2 sets - 10 reps  - Bilateral Shoulder Flexion Extension AROM at Pool Wall   - 1 x daily - 7 x weekly - 2 sets - 10 reps  - Shoulder Flexion Extension Side to Side with Pool Noodle  - 1 x daily - 7 x weekly - 2 sets - 10 reps  - Plank with Hip Extension at Pool Wall  - 1 x daily - 7 x weekly - 2 sets - 10 reps   Timed Minutes      Total Timed Treatment:     60   mins  Total Time of Visit:            60   mins    ASSESSMENT/PLAN     GOALS:  Goals                                          Progress Note due by 3/30/24                                                      Recert due by 5/1/24   LTG by: 12 weeks Comments Date Status   Improve oliver cervical rotation to 50 deg Less than 50 deg 2/29 ongoing   Able to stand more upright with more neutral cervical Able to stand more upright after working on hip flexors 2/29 ongoing   Improve passive hip ext to 10 deg Unable to asses due to pain 2/29 ongoing   Able to walk x 20 min without rollator before sitting He is now able to walk without his Rollator but limited due to back pain 2/29 ongoing   Improve oliver shoulder elevation to 130 deg Not assessed 2/29 ongoing   Ind with HEP for flexibility and stability Gentle postural flexibility 2/29 ongoing      Assessment & Plan       Assessment  Impairments: abnormal muscle firing, abnormal muscle tone, abnormal or restricted ROM, activity intolerance, impaired physical strength, lacks appropriate home exercise program and pain with function   Functional limitations: walking, uncomfortable because of pain, moving in bed, sitting, standing, reaching overhead and unable to perform repetitive tasks   Prognosis: good    Plan  Therapy options: will be seen for skilled therapy services  Planned modality interventions: dry needling, low level laser therapy, TENS and traction  Planned therapy interventions: manual therapy, neuromuscular re-education, spinal/joint mobilization, home exercise program, body mechanics training, stretching, therapeutic activities, strengthening, soft tissue mobilization and postural  training  Frequency: 2x week  Duration in weeks: 12  Treatment plan discussed with: patient       ASSESSMENT:   He is standing more erect since being in [place]. The heat helped I'm sure as well as him staying active and going up and down quite a few stairs.     PLAN:   Hold on DN and continue with PF and neck manual therapy.    SIGNATURE: Juarez Hartmann, PT, [place] License #: [id number]  Electronically Signed on 3/4/2024          [address] [place] [address]  [phone]

## 2024-03-04 NOTE — TELEPHONE ENCOUNTER
In review of the chart it is noted that this patient has more than what can be taken care of in our office.    I notified the patient by phone that he does not need to keep this appt today and that we will let Dr Elliott know so he can make a referral to a university setting or to another neurosurgery office.  Patient agreed and expressed understanding.    AAYUSH LINCOLN  [facility]      *of note: Luis Eduardo LOOMIS was present for the conversation      Due to the complexity of the patient's spinal condition it is recommended that he go to a tertiary care center for further evaluation and treatment.    Luis Eduardo Coles, DUSTY

## 2024-03-04 NOTE — PROGRESS NOTES
Physical Therapy Treatment Note  [address] [place] [address]    Patient: Carlos Hayes                                                 Visit Date: 3/4/2024  :     1955    Referring practitioner:    Chris Russ MD  Date of Initial Visit:          Type: THERAPY  Noted: 10/26/2023    Patient seen for 22 sessions    Visit Diagnoses:    ICD-10-CM ICD-9-CM   1. Strain of neck muscle, initial encounter  S16.1XXA 847.0   2. Left hip pain  M25.552 719.45     SUBJECTIVE     Subjective:  He says is sore but still standing erect.    PAIN: LB 7/10       OBJECTIVE     Objective     Manual Therapy     08449  Comments   Percussive IASTM using Powerboost to oliver piriformis, lower lumbar, oliver UT with spade    Sidelying with pillow between knees and HL   Oliver IP MFR; left oblique MFR                Timed Minutes 45     Therapy Education/Self Care [id number]   Education offered today Try hip abd and gentle hip flex/ext in pool instead of squats   [employer] Code Access Code: [email or web address]  URL: [email or web address]   Ongoing HEP     Date: 10/26/2023  Prepared by: Juarez Hartmann    Exercises  - Forward and Backward Stepping at Pool Wall  - 1 x daily - 7 x weekly - 2 sets - 10 reps  - Standing Hip Abduction Adduction at Pool Wall  - 1 x daily - 7 x weekly - 2 sets - 10 reps  - Standing Hip Abduction Adduction at Pool Wall (Mirrored)  - 1 x daily - 7 x weekly - 2 sets - 10 reps  - Standing Hip Flexion Extension at Pool Wall  - 1 x daily - 7 x weekly - 2 sets - 10 reps  - Standing Hip Flexion Extension at Pool Wall (Mirrored)  - 1 x daily - 7 x weekly - 2 sets - 10 reps  - Bilateral Shoulder Horizontal Abduction Adduction AROM at Pool Wall  - 1 x daily - 7 x weekly - 2 sets - 10 reps  - Bilateral Shoulder Flexion Extension AROM at Pool Wall  - 1 x daily - 7 x weekly - 2 sets - 10 reps  - Shoulder Flexion Extension Side to Side with Pool Noodle  - 1 x daily - 7 x  weekly - 2 sets - 10 reps  - Plank with Hip Extension at Pool Wall  - 1 x daily - 7 x weekly - 2 sets - 10 reps   Timed Minutes      Total Timed Treatment:     45   mins  Total Time of Visit:            45   mins    ASSESSMENT/PLAN     GOALS:  Goals                                          Progress Note due by 3/3/24                                                      Recert due by 1/23/24   LTG by: 12 weeks Comments Date Status   Improve oliver cervical rotation to 50 deg Less than 50 deg 2/2 ongoing   Able to stand more upright with more neutral cervical Able to stand more upright after working on hip flexors 2/2 ongoing   Improve passive hip ext to 10 deg Unable to asses due to pain 2/2 ongoing   Able to walk x 20 min without rollator before sitting He is now able to walk without his Rollator but limited due to back pain 2/2 ongoing   Improve oliver shoulder elevation to 130 deg Not assessed 2/2 ongoing   Ind with HEP for flexibility and stability Gentle postural flexibility 2/2 ongoing      Assessment/Plan     ASSESSMENT:   He is still walking well and more upright.     PLAN:   Hold on DN and continue with PF and neck manual therapy.    SIGNATURE: Juarez Hartmann, PT, [place] License #: [id number]  Electronically Signed on 3/4/2024          [address] [place] [address], [place] [address]  [phone]

## 2024-03-06 NOTE — TELEPHONE ENCOUNTER
I called and let Raymon know about my conversation with Mr Hayes.  She states the patient has an upcoming appointment and will make a note.    AAYUSH LINCOLN  [facility]UR[facility]

## 2024-03-07 ENCOUNTER — TREATMENT (OUTPATIENT)
Dept: PHYSICAL THERAPY | Facility: CLINIC | Age: 69
End: 2024-03-07
Payer: MEDICARE

## 2024-03-07 DIAGNOSIS — M25.552 LEFT HIP PAIN: ICD-10-CM

## 2024-03-07 DIAGNOSIS — S16.1XXA STRAIN OF NECK MUSCLE, INITIAL ENCOUNTER: Primary | ICD-10-CM

## 2024-03-07 NOTE — PROGRESS NOTES
Physical Therapy Treatment Note and 30 Day Progress Note  [address] [place] [address]    Patient: Carlos Hayes                                                 Visit Date: 3/7/2024  :     1955    Referring practitioner:    Chris Russ MD  Date of Initial Visit:          Type: THERAPY  Noted: 10/26/2023    Patient seen for 24 sessions    Visit Diagnoses:    ICD-10-CM ICD-9-CM   1. Strain of neck muscle, initial encounter  S16.1XXA 847.0   2. Left hip pain  M25.552 719.45     SUBJECTIVE     Subjective:  He says his neck is really sore today.     PAIN: LB 7/10       OBJECTIVE     Objective     Manual Therapy     19373  Comments   Percussive IASTM using Powerboost to oliver piriformis, lower lumbar, oliver UT with spade    Sidelying with pillow between knees and HL   Oliver glute/lumbar mms MFR    HL oliver PF MFR            Timed Minutes 55     Therapy Education/Self Care [id number]   Education offered today Try hip abd and gentle hip flex/ext in pool instead of squats   Triplify Code Access Code: [email or web address]  URL: [email or web address]   Ongoing HEP     Date: 10/26/2023  Prepared by: Juarez Hartmann    Exercises  - Forward and Backward Stepping at Pool Wall  - 1 x daily - 7 x weekly - 2 sets - 10 reps  - Standing Hip Abduction Adduction at Pool Wall  - 1 x daily - 7 x weekly - 2 sets - 10 reps  - Standing Hip Abduction Adduction at Pool Wall (Mirrored)  - 1 x daily - 7 x weekly - 2 sets - 10 reps  - Standing Hip Flexion Extension at Pool Wall  - 1 x daily - 7 x weekly - 2 sets - 10 reps  - Standing Hip Flexion Extension at Pool Wall (Mirrored)  - 1 x daily - 7 x weekly - 2 sets - 10 reps  - Bilateral Shoulder Horizontal Abduction Adduction AROM at Pool Wall  - 1 x daily - 7 x weekly - 2 sets - 10 reps  - Bilateral Shoulder Flexion Extension AROM at Pool Wall  - 1 x daily - 7 x weekly - 2 sets - 10 reps  - Shoulder Flexion Extension Side to Side  with Pool Noodle  - 1 x daily - 7 x weekly - 2 sets - 10 reps  - Plank with Hip Extension at Pool Wall  - 1 x daily - 7 x weekly - 2 sets - 10 reps   Timed Minutes      Total Timed Treatment:     55   mins  Total Time of Visit:            55   mins    ASSESSMENT/PLAN     GOALS:  Goals                                          Progress Note due by 4/6/24                                                      Recert due by 1/23/24   LTG by: 12 weeks Comments Date Status   Improve oliver cervical rotation to 50 deg Less than 50 deg 3/7 ongoing   Able to stand more upright with more neutral cervical Able to stand more upright after working on hip flexors 3/7 ongoing   Improve passive hip ext to 10 deg Unable to asses due to pain 3/7 ongoing   Able to walk x 20 min without rollator before sitting He is now able to walk without his Rollator but limited due to back pain 3/7 ongoing   Improve oliver shoulder elevation to 130 deg Not assessed 3/7 ongoing   Ind with HEP for flexibility and stability Gentle postural flexibility 3/7 ongoing      Assessment & Plan       Assessment  Impairments: abnormal muscle firing, abnormal muscle tone, abnormal or restricted ROM, activity intolerance, impaired physical strength, lacks appropriate home exercise program and pain with function   Functional limitations: walking, uncomfortable because of pain, moving in bed, sitting, standing, reaching overhead and unable to perform repetitive tasks   Prognosis: good    Plan  Therapy options: will be seen for skilled therapy services  Planned modality interventions: dry needling, low level laser therapy, TENS and traction  Planned therapy interventions: manual therapy, neuromuscular re-education, spinal/joint mobilization, home exercise program, body mechanics training, stretching, therapeutic activities, strengthening, soft tissue mobilization and postural training  Frequency: 2x week  Duration in weeks: 12  Treatment plan discussed with: patient          ASSESSMENT:   He is still walking well and more upright but was more sore. He is concerned about his bladder control so I worked more on PF to release it.     PLAN:   Hold on DN and continue with PF and neck manual therapy.    SIGNATURE: Juarez Hartmann, PT, [place] License #: [id number]  Electronically Signed on 3/7/2024          [address] [address]  [phone]

## 2024-03-11 ENCOUNTER — TREATMENT (OUTPATIENT)
Dept: PHYSICAL THERAPY | Facility: CLINIC | Age: 69
End: 2024-03-11
Payer: MEDICARE

## 2024-03-11 DIAGNOSIS — S16.1XXA STRAIN OF NECK MUSCLE, INITIAL ENCOUNTER: Primary | ICD-10-CM

## 2024-03-11 DIAGNOSIS — M25.552 LEFT HIP PAIN: ICD-10-CM

## 2024-03-11 NOTE — PROGRESS NOTES
Physical Therapy Treatment Note  [address] [place] [address]    Patient: Carlos Hayes                                                 Visit Date: 3/11/2024  :     1955    Referring practitioner:    Chris Russ MD  Date of Initial Visit:          Type: THERAPY  Noted: 10/26/2023    Patient seen for 25 sessions    Visit Diagnoses:    ICD-10-CM ICD-9-CM   1. Strain of neck muscle, initial encounter  S16.1XXA 847.0   2. Left hip pain  M25.552 719.45     SUBJECTIVE     Subjective:  He just came from the dentist. He says overall, he's feeling better.     PAIN: LB 7/10       OBJECTIVE     Objective     Manual Therapy     [id number]  Comments   Percussive IASTM using Powerboost to oliver piriformis, lower lumbar, oliver UT with spade    Sidelying with pillow between knees and HL   Oliver glute/lumbar mms MFR    HL oliver PF MFR            Timed Minutes 75     Therapy Education/Self Care [id number]   Education offered today Try hip abd and gentle hip flex/ext in pool instead of squats   [employer] Code Access Code: [email or web address]  URL: [email or web address]   Ongoing HEP     Date: 10/26/2023  Prepared by: Juarez Hartmann    Exercises  - Forward and Backward Stepping at Pool Wall  - 1 x daily - 7 x weekly - 2 sets - 10 reps  - Standing Hip Abduction Adduction at Pool Wall  - 1 x daily - 7 x weekly - 2 sets - 10 reps  - Standing Hip Abduction Adduction at Pool Wall (Mirrored)  - 1 x daily - 7 x weekly - 2 sets - 10 reps  - Standing Hip Flexion Extension at Pool Wall  - 1 x daily - 7 x weekly - 2 sets - 10 reps  - Standing Hip Flexion Extension at Pool Wall (Mirrored)  - 1 x daily - 7 x weekly - 2 sets - 10 reps  - Bilateral Shoulder Horizontal Abduction Adduction AROM at Pool Wall  - 1 x daily - 7 x weekly - 2 sets - 10 reps  - Bilateral Shoulder Flexion Extension AROM at Pool Wall  - 1 x daily - 7 x weekly - 2 sets - 10 reps  - Shoulder Flexion Extension Side to  Side with Pool Noodle  - 1 x daily - 7 x weekly - 2 sets - 10 reps  - Plank with Hip Extension at Pool Wall  - 1 x daily - 7 x weekly - 2 sets - 10 reps   Timed Minutes      Total Timed Treatment:     75   mins  Total Time of Visit:            75   mins    ASSESSMENT/PLAN     GOALS:  Goals                                          Progress Note due by 4/6/24                                                      Recert due by 1/23/24   LTG by: 12 weeks Comments Date Status   Improve oliver cervical rotation to 50 deg Less than 50 deg 3/7 ongoing   Able to stand more upright with more neutral cervical Able to stand more upright after working on hip flexors 3/7 ongoing   Improve passive hip ext to 10 deg Unable to asses due to pain 3/7 ongoing   Able to walk x 20 min without rollator before sitting He is now able to walk without his Rollator but limited due to back pain 3/7 ongoing   Improve oliver shoulder elevation to 130 deg Not assessed 3/7 ongoing   Ind with HEP for flexibility and stability Gentle postural flexibility 3/7 ongoing      Assessment/Plan     ASSESSMENT:   He is still walking better and more upright. His legs swelled more yesterday so he is having this looked at.     PLAN:   Hold on DN and continue with PF and neck manual therapy.    SIGNATURE: Juarez Hartmann, PT, [place] License #: [id number]  Electronically Signed on 3/11/2024          [address]. [address]  [phone]

## 2024-03-12 ENCOUNTER — TRANSCRIBE ORDERS (OUTPATIENT)
Dept: ADMINISTRATIVE | Facility: HOSPITAL | Age: 69
End: 2024-03-12
Payer: MEDICARE

## 2024-03-14 ENCOUNTER — TREATMENT (OUTPATIENT)
Dept: PHYSICAL THERAPY | Facility: CLINIC | Age: 69
End: 2024-03-14
Payer: MEDICARE

## 2024-03-14 DIAGNOSIS — S16.1XXA STRAIN OF NECK MUSCLE, INITIAL ENCOUNTER: Primary | ICD-10-CM

## 2024-03-14 DIAGNOSIS — M25.552 LEFT HIP PAIN: ICD-10-CM

## 2024-03-14 NOTE — PROGRESS NOTES
Physical Therapy Treatment Note  [address] [place] [address]    Patient: Carlos Hayes                                                 Visit Date: 3/14/2024  :     1955    Referring practitioner:    Chris Russ MD  Date of Initial Visit:          Type: THERAPY  Noted: 10/26/2023    Patient seen for 26 sessions    Visit Diagnoses:    ICD-10-CM ICD-9-CM   1. Strain of neck muscle, initial encounter  S16.1XXA 847.0   2. Left hip pain  M25.552 719.45     SUBJECTIVE     Subjective:  He just came from the dentist. He says overall, he's feeling better.     PAIN: LB 7/10       OBJECTIVE     Objective     Manual Therapy     28004  Comments   Percussive IASTM using Powerboost to oliver  oliver UT with spade    Sidelying with pillow between knees   Oliver glute/lumbar mms MFR sidelying               Timed Minutes 50     Therapeutic Exercises    08433 Units Comments   Sidelying hip abd kickout 10x4 Oliver AA                       Timed Minutes 10       Therapy Education/Self Care 54760   Education offered today Try hip abd and gentle hip flex/ext in pool instead of squats   Stio Code Access Code: [email or web address]  URL: [email or web address]   Ongoing HEP     Date: 10/26/2023  Prepared by: Juarez Hartmann    Exercises  - Forward and Backward Stepping at Pool Wall  - 1 x daily - 7 x weekly - 2 sets - 10 reps  - Standing Hip Abduction Adduction at Pool Wall  - 1 x daily - 7 x weekly - 2 sets - 10 reps  - Standing Hip Abduction Adduction at Pool Wall (Mirrored)  - 1 x daily - 7 x weekly - 2 sets - 10 reps  - Standing Hip Flexion Extension at Pool Wall  - 1 x daily - 7 x weekly - 2 sets - 10 reps  - Standing Hip Flexion Extension at Pool Wall (Mirrored)  - 1 x daily - 7 x weekly - 2 sets - 10 reps  - Bilateral Shoulder Horizontal Abduction Adduction AROM at Pool Wall  - 1 x daily - 7 x weekly - 2 sets - 10 reps  - Bilateral Shoulder Flexion Extension AROM at  Pool Wall  - 1 x daily - 7 x weekly - 2 sets - 10 reps  - Shoulder Flexion Extension Side to Side with Pool Noodle  - 1 x daily - 7 x weekly - 2 sets - 10 reps  - Plank with Hip Extension at Pool Wall  - 1 x daily - 7 x weekly - 2 sets - 10 reps   Timed Minutes      Total Timed Treatment:     60   mins  Total Time of Visit:            60   mins    ASSESSMENT/PLAN     GOALS:  Goals                                          Progress Note due by 4/6/24                                                      Recert due by 1/23/24   LTG by: 12 weeks Comments Date Status   Improve oliver cervical rotation to 50 deg Less than 50 deg 3/7 ongoing   Able to stand more upright with more neutral cervical Able to stand more upright after working on hip flexors 3/7 ongoing   Improve passive hip ext to 10 deg Unable to asses due to pain 3/7 ongoing   Able to walk x 20 min without rollator before sitting He is now able to walk without his Rollator but limited due to back pain 3/7 ongoing   Improve oliver shoulder elevation to 130 deg Not assessed 3/7 ongoing   Ind with HEP for flexibility and stability Gentle postural flexibility 3/7 ongoing      Assessment/Plan     ASSESSMENT:   He is walking more upright. He still has radicular symptoms that will flare. The percussion gun has helped his neck quite a bit.     PLAN:   Try to keep off his back as much as we can and progress with core and hip stability.     SIGNATURE: Juarez Hartmann, PT, [place] License #: [id number]  Electronically Signed on 3/14/2024          [address] [place] [address] [place]  [place] [address]  [phone]

## 2024-03-18 ENCOUNTER — TRANSCRIBE ORDERS (OUTPATIENT)
Dept: ADMINISTRATIVE | Facility: HOSPITAL | Age: 69
End: 2024-03-18
Payer: MEDICARE

## 2024-03-18 ENCOUNTER — TREATMENT (OUTPATIENT)
Dept: PHYSICAL THERAPY | Facility: CLINIC | Age: 69
End: 2024-03-18
Payer: MEDICARE

## 2024-03-18 DIAGNOSIS — M25.552 LEFT HIP PAIN: Primary | ICD-10-CM

## 2024-03-18 DIAGNOSIS — S16.1XXA STRAIN OF NECK MUSCLE, INITIAL ENCOUNTER: ICD-10-CM

## 2024-03-18 DIAGNOSIS — M47.24 THORACIC RADICULOPATHY DUE TO DEGENERATIVE JOINT DISEASE OF SPINE: ICD-10-CM

## 2024-03-18 DIAGNOSIS — M54.12 CERVICAL RADICULOPATHY: Primary | ICD-10-CM

## 2024-03-18 NOTE — PROGRESS NOTES
Physical Therapy Treatment Note  [address] [place] [address]    Patient: Carlos Hayes                                                 Visit Date: 3/18/2024  :     1955    Referring practitioner:    Chris Russ MD  Date of Initial Visit:          Type: THERAPY  Noted: 10/26/2023    Patient seen for 27 sessions    Visit Diagnoses:    ICD-10-CM ICD-9-CM   1. Left hip pain  M25.552 719.45   2. Strain of neck muscle, initial encounter  S16.1XXA 847.0     SUBJECTIVE     Subjective:  He stated he is pretty tight today, noting he has been busy at the house today.     PAIN: LB 7/10       OBJECTIVE     Objective     Manual Therapy     [id number]  Comments   Percussive IASTM using Powerboost to oliver  UT with spade    Sidelying with pillow between knees   STM to B UT/LS    Oliver glute/lumbar mms MFR sidelying   IASTM w/ Powerboost to Lx tess using ball Sidelying with pillow between knees       Timed Minutes 68       Therapy Education/Self Care 49612   Education offered today Try hip abd and gentle hip flex/ext in pool instead of squats   [employer] Code Access Code: [email or web address]  URL: [email or web address]   Ongoing HEP     Date: 10/26/2023  Prepared by: Juarez Hartmann    Exercises  - Forward and Backward Stepping at Pool Wall  - 1 x daily - 7 x weekly - 2 sets - 10 reps  - Standing Hip Abduction Adduction at Pool Wall  - 1 x daily - 7 x weekly - 2 sets - 10 reps  - Standing Hip Abduction Adduction at Pool Wall (Mirrored)  - 1 x daily - 7 x weekly - 2 sets - 10 reps  - Standing Hip Flexion Extension at Pool Wall  - 1 x daily - 7 x weekly - 2 sets - 10 reps  - Standing Hip Flexion Extension at Pool Wall (Mirrored)  - 1 x daily - 7 x weekly - 2 sets - 10 reps  - Bilateral Shoulder Horizontal Abduction Adduction AROM at Pool Wall  - 1 x daily - 7 x weekly - 2 sets - 10 reps  - Bilateral Shoulder Flexion Extension AROM at Pool Wall  - 1 x daily - 7 x  weekly - 2 sets - 10 reps  - Shoulder Flexion Extension Side to Side with Pool Noodle  - 1 x daily - 7 x weekly - 2 sets - 10 reps  - Plank with Hip Extension at Pool Wall  - 1 x daily - 7 x weekly - 2 sets - 10 reps   Timed Minutes      Total Timed Treatment:     68   mins  Total Time of Visit:            68   mins    ASSESSMENT/PLAN     GOALS:  Goals                                          Progress Note due by 4/6/24                                                      Recert due by 5/1/24   LTG by: 12 weeks Comments Date Status   Improve oliver cervical rotation to 50 deg Less than 50 deg 3/7 ongoing   Able to stand more upright with more neutral cervical Able to stand more upright after working on hip flexors 3/7 ongoing   Improve passive hip ext to 10 deg Unable to asses due to pain 3/7 ongoing   Able to walk x 20 min without rollator before sitting He is now able to walk without his Rollator but limited due to back pain 3/7 ongoing   Improve oliver shoulder elevation to 130 deg Not assessed 3/7 ongoing   Ind with HEP for flexibility and stability Gentle postural flexibility 3/7 ongoing      Assessment/Plan     ASSESSMENT:   Pt reported cont pain today, noting he is still having rad s/s down his BLE. Stated the more he is on his feet the more pain he will have. He cont to have increased tightness and TP activity in his Lx/Cx paraspinals. He noted some decreased tightness following treatment, with slight improved upright posture.      PLAN:   Try to keep off his back as much as we can and progress with core and hip stability.     SIGNATURE: Irving Calderón PTA, [place] License #: [id number]  Electronically Signed on 3/18/2024          [address] [place] [address]. [address]  [phone]

## 2024-03-19 ENCOUNTER — TRANSCRIBE ORDERS (OUTPATIENT)
Dept: ADMINISTRATIVE | Facility: HOSPITAL | Age: 69
End: 2024-03-19
Payer: MEDICARE

## 2024-03-19 DIAGNOSIS — M47.24 THORACIC RADICULOPATHY DUE TO DEGENERATIVE JOINT DISEASE OF SPINE: Primary | ICD-10-CM

## 2024-03-19 DIAGNOSIS — M54.12 CERVICAL RADICULOPATHY: ICD-10-CM

## 2024-03-21 ENCOUNTER — TREATMENT (OUTPATIENT)
Dept: PHYSICAL THERAPY | Facility: CLINIC | Age: 69
End: 2024-03-21
Payer: MEDICARE

## 2024-03-21 DIAGNOSIS — S16.1XXA STRAIN OF NECK MUSCLE, INITIAL ENCOUNTER: Primary | ICD-10-CM

## 2024-03-21 DIAGNOSIS — M25.552 LEFT HIP PAIN: ICD-10-CM

## 2024-03-21 NOTE — PROGRESS NOTES
Physical Therapy Treatment Note  [address] [place] [address]    Patient: Carlos Hayes                                                 Visit Date: 3/21/2024  :     1955    Referring practitioner:    Chris Russ MD  Date of Initial Visit:          Type: THERAPY  Noted: 10/26/2023    Patient seen for 28 sessions    Visit Diagnoses:    ICD-10-CM ICD-9-CM   1. Strain of neck muscle, initial encounter  S16.1XXA 847.0   2. Left hip pain  M25.552 719.45     SUBJECTIVE     Subjective:  He stated he ghas been in a lot of pain since his last visit. He felt OK during and after but by the time he got home, his pain was much worse.    PAIN: neck 8-9/10       OBJECTIVE     Objective     Manual Therapy     23873  Comments   Percussive IASTM using Powerboost to oliver  UT with spade    Sidelying with pillow between knees   STM to B UT/LS    Sidelying CT extension mob Gr 3           Timed Minutes 53       Therapy Education/Self Care 70521   Education offered today Try hip abd and gentle hip flex/ext in pool instead of squats   Slinky Code Access Code: [email or web address]  URL: [email or web address]   Ongoing HEP     Date: 10/26/2023  Prepared by: Juarez Hartmann    Exercises  - Forward and Backward Stepping at Pool Wall  - 1 x daily - 7 x weekly - 2 sets - 10 reps  - Standing Hip Abduction Adduction at Pool Wall  - 1 x daily - 7 x weekly - 2 sets - 10 reps  - Standing Hip Abduction Adduction at Pool Wall (Mirrored)  - 1 x daily - 7 x weekly - 2 sets - 10 reps  - Standing Hip Flexion Extension at Pool Wall  - 1 x daily - 7 x weekly - 2 sets - 10 reps  - Standing Hip Flexion Extension at Pool Wall (Mirrored)  - 1 x daily - 7 x weekly - 2 sets - 10 reps  - Bilateral Shoulder Horizontal Abduction Adduction AROM at Pool Wall  - 1 x daily - 7 x weekly - 2 sets - 10 reps  - Bilateral Shoulder Flexion Extension AROM at Pool Wall  - 1 x daily - 7 x weekly - 2 sets  - 10 reps  - Shoulder Flexion Extension Side to Side with Pool Noodle  - 1 x daily - 7 x weekly - 2 sets - 10 reps  - Plank with Hip Extension at Pool Wall  - 1 x daily - 7 x weekly - 2 sets - 10 reps   Timed Minutes      Total Timed Treatment:     53   mins  Total Time of Visit:            53   mins    ASSESSMENT/PLAN     GOALS:  Goals                                          Progress Note due by 4/6/24                                                      Recert due by 5/1/24   LTG by: 12 weeks Comments Date Status   Improve oliver cervical rotation to 50 deg Less than 50 deg 3/7 ongoing   Able to stand more upright with more neutral cervical Not able to stand as erect due to pain 3/21 ongoing   Improve passive hip ext to 10 deg Unable to asses due to pain 3/7 ongoing   Able to walk x 20 min without rollator before sitting He is now able to walk without his Rollator but limited due to back pain 3/7 ongoing   Improve oliver shoulder elevation to 130 deg Not assessed 3/7 ongoing   Ind with HEP for flexibility and stability Gentle postural flexibility 3/7 ongoing      Assessment/Plan     ASSESSMENT:   Pt thinks his neck flared after not being supported properly the last session    PLAN:   Try to keep off his back as much as we can and progress with core and hip stability.     SIGNATURE: Juarez Hartmann, PT, [place] License #: [id number]  Electronically Signed on 3/21/2024          [place]. [address]  [phone]

## 2024-03-25 ENCOUNTER — TREATMENT (OUTPATIENT)
Dept: PHYSICAL THERAPY | Facility: CLINIC | Age: 69
End: 2024-03-25
Payer: MEDICARE

## 2024-03-25 DIAGNOSIS — M25.552 LEFT HIP PAIN: ICD-10-CM

## 2024-03-25 DIAGNOSIS — S16.1XXA STRAIN OF NECK MUSCLE, INITIAL ENCOUNTER: Primary | ICD-10-CM

## 2024-03-25 NOTE — PROGRESS NOTES
Physical Therapy Treatment Note  [address] [place] [address]    Patient: Carlos Hayes                                                 Visit Date: 3/25/2024  :     1955    Referring practitioner:    Chris Russ MD  Date of Initial Visit:          Type: THERAPY  Noted: 10/26/2023    Patient seen for 29 sessions    Visit Diagnoses:    ICD-10-CM ICD-9-CM   1. Strain of neck muscle, initial encounter  S16.1XXA 847.0   2. Left hip pain  M25.552 719.45     SUBJECTIVE     Subjective:  He says his neck is still really hurting. His knees are better after taking glucosamine and chondroitin. His back is also better.     PAIN: neck 8-9/10       OBJECTIVE     Objective     Manual Therapy     01758  Comments   Percussive IASTM using Powerboost to oliver  UT with spade    Sidelying with pillow between knees   STM to B UT/LS    Sidelying CT extension mob Gr 3   HL oliver IP MFR Gentle PROM OI stretch       Timed Minutes 53       Therapy Education/Self Care 86330   Education offered today Try hip abd and gentle hip flex/ext in pool instead of squats   F[employer]camp Code Access Code: [email or web address]  URL: [email or web address]   Ongoing HEP     Date: 10/26/2023  Prepared by: Juarez Hartmann    Exercises  - Forward and Backward Stepping at Pool Wall  - 1 x daily - 7 x weekly - 2 sets - 10 reps  - Standing Hip Abduction Adduction at Pool Wall  - 1 x daily - 7 x weekly - 2 sets - 10 reps  - Standing Hip Abduction Adduction at Pool Wall (Mirrored)  - 1 x daily - 7 x weekly - 2 sets - 10 reps  - Standing Hip Flexion Extension at Pool Wall  - 1 x daily - 7 x weekly - 2 sets - 10 reps  - Standing Hip Flexion Extension at Pool Wall (Mirrored)  - 1 x daily - 7 x weekly - 2 sets - 10 reps  - Bilateral Shoulder Horizontal Abduction Adduction AROM at Pool Wall  - 1 x daily - 7 x weekly - 2 sets - 10 reps  - Bilateral Shoulder Flexion Extension AROM at Pool Wall  - 1 x daily  - 7 x weekly - 2 sets - 10 reps  - Shoulder Flexion Extension Side to Side with Pool Noodle  - 1 x daily - 7 x weekly - 2 sets - 10 reps  - Plank with Hip Extension at Pool Wall  - 1 x daily - 7 x weekly - 2 sets - 10 reps   Timed Minutes      Total Timed Treatment:     53   mins  Total Time of Visit:            53   mins    ASSESSMENT/PLAN     GOALS:  Goals                                          Progress Note due by 4/6/24                                                      Recert due by 5/1/24   LTG by: 12 weeks Comments Date Status   Improve oliver cervical rotation to 50 deg Less than 50 deg 3/7 ongoing   Able to stand more upright with more neutral cervical Not able to stand as erect due to pain 3/21 ongoing   Improve passive hip ext to 10 deg Unable to asses due to pain 3/7 ongoing   Able to walk x 20 min without rollator before sitting He is now able to walk without his Rollator but limited due to back pain 3/7 ongoing   Improve oliver shoulder elevation to 130 deg Not assessed 3/7 ongoing   Ind with HEP for flexibility and stability Gentle postural flexibility 3/25 ongoing      Assessment/Plan     ASSESSMENT:   His neck is still the primary issue.     PLAN:   Try to keep off his back as much as we can and progress with core and hip stability.     SIGNATURE: Juarez Hartmann, PT, [place] License #: [id number]  Electronically Signed on 3/25/2024          [place]. [address]  [phone]

## 2024-03-28 ENCOUNTER — TREATMENT (OUTPATIENT)
Dept: PHYSICAL THERAPY | Facility: CLINIC | Age: 69
End: 2024-03-28
Payer: MEDICARE

## 2024-03-28 DIAGNOSIS — M25.552 LEFT HIP PAIN: ICD-10-CM

## 2024-03-28 DIAGNOSIS — S16.1XXA STRAIN OF NECK MUSCLE, INITIAL ENCOUNTER: Primary | ICD-10-CM

## 2024-03-28 NOTE — PROGRESS NOTES
Physical Therapy Treatment Note  [address] [place] [address]    Patient: Carlos Hayes                                                 Visit Date: 3/28/2024  :     1955    Referring practitioner:    Chris Russ MD  Date of Initial Visit:          Type: THERAPY  Noted: 10/26/2023    Patient seen for 30 sessions    Visit Diagnoses:    ICD-10-CM ICD-9-CM   1. Strain of neck muscle, initial encounter  S16.1XXA 847.0   2. Left hip pain  M25.552 719.45     SUBJECTIVE     Subjective:  He mention trying [employer] DC to adjust his neck.     PAIN: neck 8-9/10       OBJECTIVE     Objective     Manual Therapy     09247  Comments   Percussive IASTM using Powerboost to oliver  UT with spade    Sidelying with pillow between knees   STM to B UT/LS    Sidelying CT extension mob Gr 3           Timed Minutes 45       Therapy Education/Self Care 65894   Education offered today Try hip abd and gentle hip flex/ext in pool instead of squats   [employer] Code Access Code: [email or web address]  URL: [email or web address]   Ongoing HEP     Date: 10/26/2023  Prepared by: Juarez Hartmann    Exercises  - Forward and Backward Stepping at Pool Wall  - 1 x daily - 7 x weekly - 2 sets - 10 reps  - Standing Hip Abduction Adduction at Pool Wall  - 1 x daily - 7 x weekly - 2 sets - 10 reps  - Standing Hip Abduction Adduction at Pool Wall (Mirrored)  - 1 x daily - 7 x weekly - 2 sets - 10 reps  - Standing Hip Flexion Extension at Pool Wall  - 1 x daily - 7 x weekly - 2 sets - 10 reps  - Standing Hip Flexion Extension at Pool Wall (Mirrored)  - 1 x daily - 7 x weekly - 2 sets - 10 reps  - Bilateral Shoulder Horizontal Abduction Adduction AROM at Pool Wall  - 1 x daily - 7 x weekly - 2 sets - 10 reps  - Bilateral Shoulder Flexion Extension AROM at Pool Wall  - 1 x daily - 7 x weekly - 2 sets - 10 reps  - Shoulder Flexion Extension Side to Side with Pool Noodle  - 1 x daily - 7  x weekly - 2 sets - 10 reps  - Plank with Hip Extension at Pool Wall  - 1 x daily - 7 x weekly - 2 sets - 10 reps   Timed Minutes      Total Timed Treatment:     45   mins  Total Time of Visit:            45   mins    ASSESSMENT/PLAN     GOALS:  Goals                                          Progress Note due by 4/6/24                                                      Recert due by 5/1/24   LTG by: 12 weeks Comments Date Status   Improve oliver cervical rotation to 50 deg Less than 50 deg 3/7 ongoing   Able to stand more upright with more neutral cervical Not able to stand as erect due to pain 3/21 ongoing   Improve passive hip ext to 10 deg Unable to asses due to pain 3/7 ongoing   Able to walk x 20 min without rollator before sitting He is now able to walk without his Rollator but limited due to back pain 3/7 ongoing   Improve oliver shoulder elevation to 130 deg Not assessed 3/7 ongoing   Ind with HEP for flexibility and stability Gentle postural flexibility 3/25 ongoing      Assessment/Plan     ASSESSMENT:   His neck is still the primary issue but he felt much better leaving today. I think Dr. Branch could be good for his neck because I know he won't hurt him.     PLAN:   Try to keep off his back as much as we can and progress with core and hip stability.     SIGNATURE: Juarez Hartmann, PT, [place] License #: [id number]  Electronically Signed on 3/28/2024          [facility] [place]. [address]  [phone]

## 2024-03-29 ENCOUNTER — TRANSCRIBE ORDERS (OUTPATIENT)
Dept: ADMINISTRATIVE | Facility: HOSPITAL | Age: 69
End: 2024-03-29
Payer: MEDICARE

## 2024-03-29 DIAGNOSIS — I65.23 CALCIFICATION OF BOTH CAROTID ARTERIES: Primary | ICD-10-CM

## 2024-04-10 ENCOUNTER — OFFICE VISIT (OUTPATIENT)
Dept: GASTROENTEROLOGY | Age: 69
End: 2024-04-10
Payer: MEDICARE

## 2024-04-10 VITALS
HEIGHT: 76 IN | SYSTOLIC BLOOD PRESSURE: 135 MMHG | BODY MASS INDEX: 38.36 KG/M2 | WEIGHT: 315 LBS | HEART RATE: 90 BPM | DIASTOLIC BLOOD PRESSURE: 70 MMHG | OXYGEN SATURATION: 93 %

## 2024-04-10 DIAGNOSIS — K21.9 GASTROESOPHAGEAL REFLUX DISEASE, UNSPECIFIED WHETHER ESOPHAGITIS PRESENT: Primary | ICD-10-CM

## 2024-04-10 DIAGNOSIS — R19.4 CHANGE IN BOWEL HABITS: ICD-10-CM

## 2024-04-10 DIAGNOSIS — K22.70 BARRETT'S ESOPHAGUS WITHOUT DYSPLASIA: ICD-10-CM

## 2024-04-10 DIAGNOSIS — R19.5 LOOSE STOOLS: ICD-10-CM

## 2024-04-10 PROCEDURE — 3075F SYST BP GE 130 - 139MM HG: CPT | Performed by: NURSE PRACTITIONER

## 2024-04-10 PROCEDURE — G8417 CALC BMI ABV UP PARAM F/U: HCPCS | Performed by: NURSE PRACTITIONER

## 2024-04-10 PROCEDURE — G8427 DOCREV CUR MEDS BY ELIG CLIN: HCPCS | Performed by: NURSE PRACTITIONER

## 2024-04-10 PROCEDURE — 1123F ACP DISCUSS/DSCN MKR DOCD: CPT | Performed by: NURSE PRACTITIONER

## 2024-04-10 PROCEDURE — 3078F DIAST BP <80 MM HG: CPT | Performed by: NURSE PRACTITIONER

## 2024-04-10 PROCEDURE — 1036F TOBACCO NON-USER: CPT | Performed by: NURSE PRACTITIONER

## 2024-04-10 PROCEDURE — 99214 OFFICE O/P EST MOD 30 MIN: CPT | Performed by: NURSE PRACTITIONER

## 2024-04-10 PROCEDURE — 3017F COLORECTAL CA SCREEN DOC REV: CPT | Performed by: NURSE PRACTITIONER

## 2024-04-10 RX ORDER — TIZANIDINE 4 MG/1
4 TABLET ORAL EVERY 6 HOURS PRN
COMMUNITY

## 2024-04-10 RX ORDER — BUMETANIDE 1 MG/1
1 TABLET ORAL 2 TIMES DAILY
COMMUNITY

## 2024-04-10 RX ORDER — ATORVASTATIN CALCIUM 20 MG/1
20 TABLET, FILM COATED ORAL DAILY
COMMUNITY

## 2024-04-10 RX ORDER — CYCLOBENZAPRINE HCL 5 MG
5 TABLET ORAL 3 TIMES DAILY PRN
COMMUNITY

## 2024-04-10 RX ORDER — DOXEPIN HYDROCHLORIDE 25 MG/1
25 CAPSULE ORAL NIGHTLY
COMMUNITY

## 2024-04-10 RX ORDER — LORATADINE 10 MG/1
10 TABLET ORAL DAILY
COMMUNITY

## 2024-04-10 RX ORDER — CIMETIDINE 400 MG/1
400 TABLET, FILM COATED ORAL NIGHTLY
COMMUNITY

## 2024-04-10 RX ORDER — PANTOPRAZOLE SODIUM 40 MG/1
40 TABLET, DELAYED RELEASE ORAL
Qty: 60 TABLET | Refills: 5 | Status: SHIPPED | OUTPATIENT
Start: 2024-04-10

## 2024-04-10 NOTE — PROGRESS NOTES
Normal breath sounds. No wheezing or rales.   Abdominal:      General: Bowel sounds are normal. There is no distension.      Palpations: Abdomen is soft. There is no mass.      Tenderness: There is no abdominal tenderness. There is no guarding or rebound.   Musculoskeletal:         General: Normal range of motion.      Cervical back: Normal range of motion and neck supple.   Skin:     General: Skin is warm and dry.      Coloration: Skin is not pale.   Neurological:      Mental Status: He is alert and oriented to person, place, and time.   Psychiatric:         Behavior: Behavior normal.

## 2024-04-11 ENCOUNTER — HOSPITAL ENCOUNTER (OUTPATIENT)
Dept: GENERAL RADIOLOGY | Facility: HOSPITAL | Age: 69
Discharge: HOME | End: 2024-04-11
Payer: MEDICARE

## 2024-04-11 ENCOUNTER — HOSPITAL ENCOUNTER (OUTPATIENT)
Dept: CT IMAGING | Facility: HOSPITAL | Age: 69
Discharge: HOME | End: 2024-04-11
Payer: MEDICARE

## 2024-04-11 ENCOUNTER — APPOINTMENT (OUTPATIENT)
Dept: GENERAL RADIOLOGY | Facility: HOSPITAL | Age: 69
End: 2024-04-11
Payer: MEDICARE

## 2024-04-11 VITALS
WEIGHT: 315 LBS | TEMPERATURE: 97.8 F | SYSTOLIC BLOOD PRESSURE: 153 MMHG | DIASTOLIC BLOOD PRESSURE: 89 MMHG | HEART RATE: 77 BPM | OXYGEN SATURATION: 92 % | BODY MASS INDEX: 40.43 KG/M2 | HEIGHT: 74 IN | RESPIRATION RATE: 20 BRPM

## 2024-04-11 DIAGNOSIS — M54.12 CERVICAL RADICULOPATHY: ICD-10-CM

## 2024-04-11 DIAGNOSIS — M47.24 THORACIC RADICULOPATHY DUE TO DEGENERATIVE JOINT DISEASE OF SPINE: ICD-10-CM

## 2024-04-11 LAB
APTT PPP: 29.5 SECONDS (ref 24.5–36)
BASOPHILS # BLD AUTO: 0.03 10*3/MM3 (ref 0–0.2)
BASOPHILS NFR BLD AUTO: 0.7 % (ref 0–1.5)
DEPRECATED RDW RBC AUTO: 46.2 FL (ref 37–54)
EOSINOPHIL # BLD AUTO: 0.14 10*3/MM3 (ref 0–0.4)
EOSINOPHIL NFR BLD AUTO: 3.1 % (ref 0.3–6.2)
ERYTHROCYTE [DISTWIDTH] IN BLOOD BY AUTOMATED COUNT: 13.3 % (ref 12.3–15.4)
HCT VFR BLD AUTO: 47.1 % (ref 37.5–51)
HGB BLD-MCNC: 16.2 G/DL (ref 13–17.7)
IMM GRANULOCYTES # BLD AUTO: 0.01 10*3/MM3 (ref 0–0.05)
IMM GRANULOCYTES NFR BLD AUTO: 0.2 % (ref 0–0.5)
INR PPP: 1.06 (ref 0.91–1.09)
LYMPHOCYTES # BLD AUTO: 1.01 10*3/MM3 (ref 0.7–3.1)
LYMPHOCYTES NFR BLD AUTO: 22.5 % (ref 19.6–45.3)
MCH RBC QN AUTO: 32.7 PG (ref 26.6–33)
MCHC RBC AUTO-ENTMCNC: 34.4 G/DL (ref 31.5–35.7)
MCV RBC AUTO: 95.2 FL (ref 79–97)
MONOCYTES # BLD AUTO: 0.7 10*3/MM3 (ref 0.1–0.9)
MONOCYTES NFR BLD AUTO: 15.6 % (ref 5–12)
NEUTROPHILS NFR BLD AUTO: 2.6 10*3/MM3 (ref 1.7–7)
NEUTROPHILS NFR BLD AUTO: 57.9 % (ref 42.7–76)
NRBC BLD AUTO-RTO: 0 /100 WBC (ref 0–0.2)
PLATELET # BLD AUTO: 148 10*3/MM3 (ref 140–450)
PMV BLD AUTO: 9.2 FL (ref 6–12)
PROTHROMBIN TIME: 14.3 SECONDS (ref 11.8–14.8)
RBC # BLD AUTO: 4.95 10*6/MM3 (ref 4.14–5.8)
WBC NRBC COR # BLD AUTO: 4.49 10*3/MM3 (ref 3.4–10.8)

## 2024-04-11 PROCEDURE — 62284 INJECTION FOR MYELOGRAM: CPT

## 2024-04-11 PROCEDURE — 25510000001 IOPAMIDOL 61 % SOLUTION: Performed by: FAMILY MEDICINE

## 2024-04-11 PROCEDURE — 72132 CT LUMBAR SPINE W/DYE: CPT

## 2024-04-11 PROCEDURE — 72126 CT NECK SPINE W/DYE: CPT

## 2024-04-11 PROCEDURE — 72129 CT CHEST SPINE W/DYE: CPT

## 2024-04-11 PROCEDURE — 85730 THROMBOPLASTIN TIME PARTIAL: CPT | Performed by: RADIOLOGY

## 2024-04-11 PROCEDURE — 62305 MYELOGRAPHY LUMBAR INJECTION: CPT

## 2024-04-11 PROCEDURE — 85025 COMPLETE CBC W/AUTO DIFF WBC: CPT | Performed by: RADIOLOGY

## 2024-04-11 PROCEDURE — 85610 PROTHROMBIN TIME: CPT | Performed by: RADIOLOGY

## 2024-04-11 PROCEDURE — 72240 MYELOGRAPHY NECK SPINE: CPT

## 2024-04-11 PROCEDURE — 77003 FLUOROGUIDE FOR SPINE INJECT: CPT

## 2024-04-11 RX ORDER — LIDOCAINE HYDROCHLORIDE 10 MG/ML
5 INJECTION, SOLUTION EPIDURAL; INFILTRATION; INTRACAUDAL; PERINEURAL ONCE
Status: COMPLETED | OUTPATIENT
Start: 2024-04-11 | End: 2024-04-11

## 2024-04-11 RX ORDER — IOPAMIDOL 612 MG/ML
15 INJECTION, SOLUTION INTRATHECAL
Status: COMPLETED | OUTPATIENT
Start: 2024-04-11 | End: 2024-04-11

## 2024-04-11 RX ADMIN — LIDOCAINE HYDROCHLORIDE 5 ML: 10 INJECTION, SOLUTION EPIDURAL; INFILTRATION; INTRACAUDAL; PERINEURAL at 12:57

## 2024-04-11 RX ADMIN — IOPAMIDOL 15 ML: 612 INJECTION, SOLUTION INTRATHECAL at 12:57

## 2024-04-12 ENCOUNTER — TREATMENT (OUTPATIENT)
Dept: PHYSICAL THERAPY | Facility: CLINIC | Age: 69
End: 2024-04-12
Payer: MEDICARE

## 2024-04-12 DIAGNOSIS — S16.1XXA STRAIN OF NECK MUSCLE, INITIAL ENCOUNTER: Primary | ICD-10-CM

## 2024-04-12 DIAGNOSIS — M25.552 LEFT HIP PAIN: ICD-10-CM

## 2024-04-12 ASSESSMENT — ENCOUNTER SYMPTOMS
NAUSEA: 0
DIARRHEA: 1
VOMITING: 0
CHOKING: 0
CONSTIPATION: 0
ABDOMINAL PAIN: 0
SHORTNESS OF BREATH: 0
COUGH: 0
TROUBLE SWALLOWING: 0
RECTAL PAIN: 0
BLOOD IN STOOL: 0
ANAL BLEEDING: 0

## 2024-04-12 NOTE — PROGRESS NOTES
Physical Therapy Treatment Note and 30 Day Progress Note  [address] [place] [address]    Patient: Carlos Hayes                                                 Visit Date: 2024  :     1955    Referring practitioner:    Chris Russ MD  Date of Initial Visit:          Type: THERAPY  Noted: 10/26/2023    Patient seen for 31 sessions    Visit Diagnoses:    ICD-10-CM ICD-9-CM   1. Strain of neck muscle, initial encounter  S16.1XXA 847.0   2. Left hip pain  M25.552 719.45     SUBJECTIVE     Subjective:  He mention trying [employer] DC to adjust his neck.     PAIN: neck 8-9/10       OBJECTIVE     Objective     Manual Therapy     71689  Comments   Percussive IASTM using Powerboost to oliver  UT with spade    Sidelying with pillow between knees   STM to B UT/LS    Sidelying CT extension mob Gr 3           Timed Minutes 45       Therapy Education/Self Care 79165   Education offered today Try hip abd and gentle hip flex/ext in pool instead of squats   [employer] Code Access Code: [email or web address]  URL: [email or web address]   Ongoing HEP     Date: 10/26/2023  Prepared by: Juarez Hartmann    Exercises  - Forward and Backward Stepping at Pool Wall  - 1 x daily - 7 x weekly - 2 sets - 10 reps  - Standing Hip Abduction Adduction at Pool Wall  - 1 x daily - 7 x weekly - 2 sets - 10 reps  - Standing Hip Abduction Adduction at Pool Wall (Mirrored)  - 1 x daily - 7 x weekly - 2 sets - 10 reps  - Standing Hip Flexion Extension at Pool Wall  - 1 x daily - 7 x weekly - 2 sets - 10 reps  - Standing Hip Flexion Extension at Pool Wall (Mirrored)  - 1 x daily - 7 x weekly - 2 sets - 10 reps  - Bilateral Shoulder Horizontal Abduction Adduction AROM at Pool Wall  - 1 x daily - 7 x weekly - 2 sets - 10 reps  - Bilateral Shoulder Flexion Extension AROM at Pool Wall  - 1 x daily - 7 x weekly - 2 sets - 10 reps  - Shoulder Flexion Extension Side to Side with Pool  Noodle  - 1 x daily - 7 x weekly - 2 sets - 10 reps  - Plank with Hip Extension at Pool Wall  - 1 x daily - 7 x weekly - 2 sets - 10 reps   Timed Minutes      Total Timed Treatment:     45   mins  Total Time of Visit:            45   mins    ASSESSMENT/PLAN     GOALS:  Goals                                          Progress Note due by 4/24/24                                                      Recert due by 5/1/24   LTG by: 12 weeks Comments Date Status   Improve oliver cervical rotation to 50 deg Less than 50 deg 4/12 ongoing   Able to stand more upright with more neutral cervical Not able to stand as 4/12/24erect due to pain 4/12 ongoing   Improve passive hip ext to 10 deg Unable to asses due to pain 4/12 ongoing   Able to walk x 20 min without rollator before sitting He is not using his rollator and most days, he is more erect 4/12 ongoing   Improve oliver shoulder elevation to 130 deg Not assessed 4/12 ongoing   Ind with HEP for flexibility and stability Gentle postural flexibility 4/12 ongoing      Assessment & Plan       Assessment  Impairments: abnormal muscle firing, abnormal muscle tone, abnormal or restricted ROM, activity intolerance, impaired physical strength, lacks appropriate home exercise program and pain with function   Functional limitations: walking, uncomfortable because of pain, moving in bed, sitting, standing, reaching overhead and unable to perform repetitive tasks   Prognosis: good    Plan  Therapy options: will be seen for skilled therapy services  Planned modality interventions: dry needling, low level laser therapy, TENS and traction  Planned therapy interventions: manual therapy, neuromuscular re-education, spinal/joint mobilization, home exercise program, body mechanics training, stretching, therapeutic activities, strengthening, soft tissue mobilization and postural training  Frequency: 2x week  Duration in weeks: 12  Treatment plan discussed with: patient         ASSESSMENT:   His neck is  the primary issue that we have been focusing on. His imaging didn't show anything particularly alarming. He has pain radiating into his groin. His imaging showed a retrolisthesis of L1 which might account for some of the groin pain he has.     PLAN:   Try to keep off his back as much as we can and progress with core and hip stability.     SIGNATURE: Juarez Hartmann, PT, [place] License #: [id number]  Electronically Signed on 4/12/2024          [address] [place] [address] [place] [address]  [phone]

## 2024-04-15 ENCOUNTER — TREATMENT (OUTPATIENT)
Dept: PHYSICAL THERAPY | Facility: CLINIC | Age: 69
End: 2024-04-15
Payer: MEDICARE

## 2024-04-15 DIAGNOSIS — S16.1XXA STRAIN OF NECK MUSCLE, INITIAL ENCOUNTER: Primary | ICD-10-CM

## 2024-04-15 DIAGNOSIS — M25.552 LEFT HIP PAIN: ICD-10-CM

## 2024-04-15 PROCEDURE — 97140 MANUAL THERAPY 1/> REGIONS: CPT | Performed by: PHYSICAL THERAPIST

## 2024-04-15 NOTE — PROGRESS NOTES
Physical Therapy Treatment Note  [address] [place] [address]    Patient: Carlos Hayes                                                 Visit Date: 4/15/2024  :     1955    Referring practitioner:    Chris Russ MD  Date of Initial Visit:          Type: THERAPY  Noted: 10/26/2023    Patient seen for 32 sessions    Visit Diagnoses:    ICD-10-CM ICD-9-CM   1. Strain of neck muscle, initial encounter  S16.1XXA 847.0   2. Left hip pain  M25.552 719.45     SUBJECTIVE     Subjective:  He says his neck is better. His left lumbar is still hurting.     PAIN: neck 7/10       OBJECTIVE     Objective     Manual Therapy     00749  Comments   Percussive IASTM using Powerboost to oliver  UT with spade    Sidelying with pillow between knees   STM to B UT/LS    Sidelying CT extension mob Gr 3   Sidelying left LS MFR        Timed Minutes 45       Therapy Education/Self Care 22970   Education offered today Try hip abd and gentle hip flex/ext in pool instead of squats   [employer] Code Access Code: [email or web address]  URL: [email or web address]   Ongoing HEP     Date: 10/26/2023  Prepared by: Juarez Hartmann    Exercises  - Forward and Backward Stepping at Pool Wall  - 1 x daily - 7 x weekly - 2 sets - 10 reps  - Standing Hip Abduction Adduction at Pool Wall  - 1 x daily - 7 x weekly - 2 sets - 10 reps  - Standing Hip Abduction Adduction at Pool Wall (Mirrored)  - 1 x daily - 7 x weekly - 2 sets - 10 reps  - Standing Hip Flexion Extension at Pool Wall  - 1 x daily - 7 x weekly - 2 sets - 10 reps  - Standing Hip Flexion Extension at Pool Wall (Mirrored)  - 1 x daily - 7 x weekly - 2 sets - 10 reps  - Bilateral Shoulder Horizontal Abduction Adduction AROM at Pool Wall  - 1 x daily - 7 x weekly - 2 sets - 10 reps  - Bilateral Shoulder Flexion Extension AROM at Pool Wall  - 1 x daily - 7 x weekly - 2 sets - 10 reps  - Shoulder Flexion Extension Side to Side with  Pool Noodle  - 1 x daily - 7 x weekly - 2 sets - 10 reps  - Plank with Hip Extension at Pool Wall  - 1 x daily - 7 x weekly - 2 sets - 10 reps   Timed Minutes      Total Timed Treatment:     45   mins  Total Time of Visit:            45   mins    ASSESSMENT/PLAN     GOALS:  Goals                                          Progress Note due by 4/24/24                                                      Recert due by 5/1/24   LTG by: 12 weeks Comments Date Status   Improve oliver cervical rotation to 50 deg Less than 50 deg 4/12 ongoing   Able to stand more upright with more neutral cervical Not able to stand as 4/12/24erect due to pain 4/12 ongoing   Improve passive hip ext to 10 deg Unable to asses due to pain 4/12 ongoing   Able to walk x 20 min without rollator before sitting He is not using his rollator and most days, he is more erect 4/12 ongoing   Improve oliver shoulder elevation to 130 deg Not assessed 4/12 ongoing   Ind with HEP for flexibility and stability Gentle postural flexibility 4/12 ongoing      Assessment/Plan     ASSESSMENT:   His neck is better today. His left lumbar pain appears to be a rib hitting his pelvis.     PLAN:   Try to keep off his back as much as we can and progress with core and hip stability.     SIGNATURE: Juarez Hartmann, PT, [place] License #: [id number]  Electronically Signed on 4/15/2024          [address]. [id number]  [phone]

## 2024-04-19 ENCOUNTER — TELEPHONE (OUTPATIENT)
Dept: UROLOGY | Facility: CLINIC | Age: 69
End: 2024-04-19
Payer: MEDICARE

## 2024-04-19 NOTE — TELEPHONE ENCOUNTER
Received call via the telephone answering service from patient.  He was scheduled to see Andre at 1:15pm on 4/19/24.  He said that he would be unable to make it back in town before his appointment and needs to cancel.  Same day cancel letter sent.      Hub is okay to reschedule patient's appointment from 4/19/24 if he calls back.

## 2024-04-25 ENCOUNTER — TREATMENT (OUTPATIENT)
Dept: PHYSICAL THERAPY | Facility: CLINIC | Age: 69
End: 2024-04-25
Payer: MEDICARE

## 2024-04-25 DIAGNOSIS — S16.1XXA STRAIN OF NECK MUSCLE, INITIAL ENCOUNTER: Primary | ICD-10-CM

## 2024-04-25 DIAGNOSIS — M25.552 LEFT HIP PAIN: ICD-10-CM

## 2024-04-25 NOTE — PROGRESS NOTES
Physical Therapy Treatment Note  [address] [place] [address]    Patient: Carlos Hayes                                                 Visit Date: 2024  :     1955    Referring practitioner:    Chris Russ MD  Date of Initial Visit:          Type: THERAPY  Noted: 10/26/2023    Patient seen for 33 sessions    Visit Diagnoses:    ICD-10-CM ICD-9-CM   1. Strain of neck muscle, initial encounter  S16.1XXA 847.0   2. Left hip pain  M25.552 719.45     SUBJECTIVE     Subjective:  He says his neck is better. He had a massage and she suggested we might need to assess internally again. He had to  a heavy load during an emergency and his back and hips have been hurting severely since.     PAIN: neck 7/10       OBJECTIVE     Objective     Manual Therapy     15187  Comments   Percussive IASTM using Powerboost to oliver  UT with spade    Sidelying with pillow between knees   HL oliver IP MFR Pressure on right would hurt into his left lateral ilium   Internal PF/OI release Not tender or guarded around LA. OI was more guarded left worse than right           Timed Minutes 40     Therapeutic Activities    [id number] Comments   Assisted walking out to car Needed oliver HHA assistance; when first sat up; had a severe spasm in his left lumbar that wouldn't let him; needed mod asst initially but as he walked, his back loosened and only needed CGA                   Timed Minutes 15       Therapy Education/Self Care 96338   Education offered today Try hip abd and gentle hip flex/ext in pool instead of squats   [employer] Code Access Code: [email or web address]  URL: [email or web address]   Ongoing HEP     Date: 10/26/2023  Prepared by: Juarez Hartmann    Exercises  - Forward and Backward Stepping at Pool Wall  - 1 x daily - 7 x weekly - 2 sets - 10 reps  - Standing Hip Abduction Adduction at Pool Wall  - 1 x daily - 7 x weekly - 2 sets - 10 reps  - Standing Hip  Abduction Adduction at Pool Wall (Mirrored)  - 1 x daily - 7 x weekly - 2 sets - 10 reps  - Standing Hip Flexion Extension at Pool Wall  - 1 x daily - 7 x weekly - 2 sets - 10 reps  - Standing Hip Flexion Extension at Pool Wall (Mirrored)  - 1 x daily - 7 x weekly - 2 sets - 10 reps  - Bilateral Shoulder Horizontal Abduction Adduction AROM at Pool Wall  - 1 x daily - 7 x weekly - 2 sets - 10 reps  - Bilateral Shoulder Flexion Extension AROM at Pool Wall  - 1 x daily - 7 x weekly - 2 sets - 10 reps  - Shoulder Flexion Extension Side to Side with Pool Noodle  - 1 x daily - 7 x weekly - 2 sets - 10 reps  - Plank with Hip Extension at Pool Wall  - 1 x daily - 7 x weekly - 2 sets - 10 reps   Timed Minutes      Total Timed Treatment:     55   mins  Total Time of Visit:            55   mins    ASSESSMENT/PLAN     GOALS:  Goals                                          Progress Note due by 5/11/24                                                      Recert due by 5/1/24   LTG by: 12 weeks Comments Date Status   Improve oliver cervical rotation to 50 deg Less than 50 deg 4/12 ongoing   Able to stand more upright with more neutral cervical Not able to stand as 4/12/24erect due to pain 4/25 ongoing   Improve passive hip ext to 10 deg Unable to asses due to pain 4/12 ongoing   Able to walk x 20 min without rollator before sitting He is not using his rollator and most days, he is more erect 4/12 ongoing   Improve oliver shoulder elevation to 130 deg Not assessed 4/12 ongoing   Ind with HEP for flexibility and stability Gentle postural flexibility 4/12 ongoing      Assessment/Plan     ASSESSMENT:   His back is very flared since lifting the person. His symptoms appear to be radicular from his upper lumbar.     PLAN:   Try to keep off his back as much as we can and progress with core and hip stability.     SIGNATURE: Juarez Hartmann, PT, [place] License #: [id number]  Electronically Signed on 4/25/2024          [address] [place]  [address]  [phone]

## 2024-04-29 ENCOUNTER — TREATMENT (OUTPATIENT)
Dept: PHYSICAL THERAPY | Facility: CLINIC | Age: 69
End: 2024-04-29
Payer: MEDICARE

## 2024-04-29 DIAGNOSIS — S16.1XXA STRAIN OF NECK MUSCLE, INITIAL ENCOUNTER: Primary | ICD-10-CM

## 2024-04-29 DIAGNOSIS — M25.552 LEFT HIP PAIN: ICD-10-CM

## 2024-04-29 PROCEDURE — 97140 MANUAL THERAPY 1/> REGIONS: CPT | Performed by: PHYSICAL THERAPIST

## 2024-04-29 NOTE — PROGRESS NOTES
Physical Therapy Treatment Note  [address] [place] [address]    Patient: Carlos Hayes                                                 Visit Date: 2024  :     1955    Referring practitioner:    Chris Russ MD  Date of Initial Visit:          Type: THERAPY  Noted: 10/26/2023    Patient seen for 34 sessions    Visit Diagnoses:    ICD-10-CM ICD-9-CM   1. Strain of neck muscle, initial encounter  S16.1XXA 847.0   2. Left hip pain  M25.552 719.45     SUBJECTIVE     Subjective:  He says his c/c today is his right groin and inner thigh.     PAIN: neck 7/10; right groin/inner thigh 910       OBJECTIVE     Objective     Manual Therapy     88807  Comments   Percussive IASTM using Powerboost to oliver  UT with spade    Sidelying with pillow between knees   Sideyling right pelvic posterior rotation mob Gr 3 sustained; abolished groin pain               Timed Minutes 40     Therapy Education/Self Care 32004   Education offered today Try hip abd and gentle hip flex/ext in pool instead of squats   [employer] Code Access Code: [email or web address]  URL: [email or web address]   Ongoing HEP     Date: 10/26/2023  Prepared by: Juarez Hartmann    Exercises  - Forward and Backward Stepping at Pool Wall  - 1 x daily - 7 x weekly - 2 sets - 10 reps  - Standing Hip Abduction Adduction at Pool Wall  - 1 x daily - 7 x weekly - 2 sets - 10 reps  - Standing Hip Abduction Adduction at Pool Wall (Mirrored)  - 1 x daily - 7 x weekly - 2 sets - 10 reps  - Standing Hip Flexion Extension at Pool Wall  - 1 x daily - 7 x weekly - 2 sets - 10 reps  - Standing Hip Flexion Extension at Pool Wall (Mirrored)  - 1 x daily - 7 x weekly - 2 sets - 10 reps  - Bilateral Shoulder Horizontal Abduction Adduction AROM at Pool Wall  - 1 x daily - 7 x weekly - 2 sets - 10 reps  - Bilateral Shoulder Flexion Extension AROM at Pool Wall  - 1 x daily - 7 x weekly - 2 sets - 10 reps  - Shoulder  Flexion Extension Side to Side with Pool Noodle  - 1 x daily - 7 x weekly - 2 sets - 10 reps  - Plank with Hip Extension at Pool Wall  - 1 x daily - 7 x weekly - 2 sets - 10 reps   Timed Minutes      Total Timed Treatment:     40   mins  Total Time of Visit:            40   mins    ASSESSMENT/PLAN     GOALS:  Goals                                          Progress Note due by 5/11/24                                                      Recert due by 5/1/24   LTG by: 12 weeks Comments Date Status   Improve oliver cervical rotation to 50 deg Less than 50 deg 4/12 ongoing   Able to stand more upright with more neutral cervical Not able to stand as 4/12/24erect due to pain 4/25 ongoing   Improve passive hip ext to 10 deg Unable to asses due to pain 4/12 ongoing   Able to walk x 20 min without rollator before sitting He is not using his rollator and most days, he is more erect 4/12 ongoing   Improve oliver shoulder elevation to 130 deg Not assessed 4/12 ongoing   Ind with HEP for flexibility and stability Gentle postural flexibility 4/12 ongoing      Assessment/Plan     ASSESSMENT:   Doing a post mob of his right pelvis helped his right groin pain pointing to a pelvic malalignment. He was going to a chiro after this to see if if might help more.     PLAN:   Try to keep off his back as much as we can and progress with core and hip stability.     SIGNATURE: Juarez Hartmann, PT, [place] License #: [id number]  Electronically Signed on 4/29/2024          [place]ett. [id number]  [phone]

## 2024-05-03 RX ORDER — SEMAGLUTIDE 0.68 MG/ML
0.25 INJECTION, SOLUTION SUBCUTANEOUS WEEKLY
COMMUNITY
Start: 2024-03-04

## 2024-05-03 RX ORDER — DILTIAZEM HYDROCHLORIDE 180 MG/1
1 CAPSULE, EXTENDED RELEASE ORAL DAILY
COMMUNITY
Start: 2024-03-28

## 2024-05-03 RX ORDER — MELATONIN
1 DAILY
COMMUNITY

## 2024-05-03 RX ORDER — DOXEPIN HYDROCHLORIDE 25 MG/1
1 CAPSULE ORAL NIGHTLY
COMMUNITY

## 2024-05-03 RX ORDER — TIZANIDINE 4 MG/1
1 TABLET ORAL EVERY 6 HOURS PRN
COMMUNITY

## 2024-05-03 RX ORDER — CIMETIDINE 400 MG/1
1 TABLET, FILM COATED ORAL NIGHTLY
COMMUNITY

## 2024-05-03 RX ORDER — CYCLOBENZAPRINE HCL 5 MG
5 TABLET ORAL 3 TIMES DAILY PRN
COMMUNITY

## 2024-05-03 RX ORDER — FLUOXETINE HYDROCHLORIDE 20 MG/1
1 CAPSULE ORAL DAILY
COMMUNITY
Start: 2024-03-20

## 2024-05-03 RX ORDER — ERGOCALCIFEROL 1.25 MG/1
1 CAPSULE ORAL WEEKLY
COMMUNITY
Start: 2024-03-20

## 2024-05-06 ENCOUNTER — TREATMENT (OUTPATIENT)
Dept: PHYSICAL THERAPY | Facility: CLINIC | Age: 69
End: 2024-05-06
Payer: MEDICARE

## 2024-05-06 DIAGNOSIS — M25.552 LEFT HIP PAIN: ICD-10-CM

## 2024-05-06 DIAGNOSIS — S16.1XXA STRAIN OF NECK MUSCLE, INITIAL ENCOUNTER: Primary | ICD-10-CM

## 2024-05-06 PROCEDURE — 97140 MANUAL THERAPY 1/> REGIONS: CPT | Performed by: PHYSICAL THERAPIST

## 2024-05-15 NOTE — PROGRESS NOTES
" VLADISLAV Seo  [facility]   Pulmonary and Critical Care  [address]  [place] [address]  Phone: [phone]  Fax: [phone]           Chief Complaint  Restrictive lung disease    Subjective    History of Present Illness     Carlos Hayes presents to [facility] PULMONARY & CRITICAL CARE MEDICINE   History of Present Illness  Mr. Hayes is a 69-year-old male patient with known chronic respiratory failure with hypoxemia, restrictive lung disease, Elevated diaphragm,obstructive sleep apnea with BiPAP, history of COVID-19 (2020, 2021), restless leg syndrome, morbid obesity, CKD, arthritis, anxiety, PTSD, tremor, sinusitis, hypertension, chronic back pain with degeneration. He has had bronchitis/ pneumonia on/off most of his life. Short term memory loss. He is a former smoker. He is compliant with his BiPAP.  He is on 2-3 liters per minute of supplemental oxygen for which he uses nightly bleed into PAP. He has postnasal drip/allergies for which he uses Astelin nasal spray, Flonase nasal spray, loratadine and finds benefit. He uses Breztri inhaler and finds benefit. He uses albuterol rescue inhaler once a day. He has a DuoNeb nebulizer he uses-1-2 X a day. He indicates he is in pain today with his back. He has fallen a couple of more times. He has not had back surgery thus far. His breathing is stable. No fever, chills or night sweats.          Objective   Vital Signs:   /74   Pulse 85   Ht 189 cm (74.41\")   Wt (!) 162 kg (358 lb)   SpO2 93% Comment: RA  BMI 45.46 kg/m²     Physical Exam  Vitals reviewed.   Constitutional:       Appearance: Normal appearance. He is morbidly obese.   Cardiovascular:      Rate and Rhythm: Normal rate and regular rhythm.   Pulmonary:      Effort: Pulmonary effort is normal.      Breath sounds: Decreased breath sounds present.   Neurological:      General: No focal deficit present.      Mental Status: He is alert and " oriented to person, place, and time.   Psychiatric:         Mood and Affect: Mood normal.         Behavior: Behavior normal.          Result Review :  The following data was reviewed by: VLADISLAV Seo on 05/16/2024:    My interpretation of imaging:  no new   My interpretation of labs: no new      My interpretation of the PFT : no new     Results for orders placed during the hospital encounter of 08/09/23    Pulmonary Function Test    [facility] - Pulmonary Function Test    [address] [facility]  [address]  [phone]    Patient : Carlos Hayes  MRN : [id number]  CSN : [id number]  Pulmonologist : Earl Shearer MD  Date : 8/9/2023    ______________________________________________________________________    Interpretation :  1.  Spirometry is consistent with a mild bordering on moderate restrictive ventilatory defect with a coexisting decrease in midflows and peak expiratory flow.  2.  There is some improvement in midflows and peak expiratory flow postbronchodilator.  However postbronchodilator spirometry is still consistent with a mild bordering on moderate restrictive ventilatory defect with a decrease in midflows.  Peak expiratory flow is now within normal limits.  3.  Lung volumes reveal a low normal total lung capacity and vital capacity suggestive of a borderline restrictive ventilatory defect.  There is also a decreased expiratory reserve volume.  4.  Diffusion capacity is within normal limits and when corrected for alveolar volume is supranormal.  5.  Arterial blood gases on room air reveal mild hypoxemia.  A mild metabolic alkalosis is also present.  6.  When current studies are compared to studies performed on June 25, 2021, the patient's current pre and postbronchodilator spirometry reveals a slight decline in both the FVC and FEV1 compared to previous baseline values.  There has been slight improvement in the patient's total lung capacity compared to previous.   When corrected for alveolar volume there is no significant change in diffusion capacity compared to previous.      Earl Shearer MD      Results for orders placed during the hospital encounter of 06/25/21    Full Pulmonary Function Test With Bronchodilator    [facility] Pulmonary Function Test    [address]  [place]  [place]  [address]  [phone]    Patient : Carlos Hayes  MRN : [id number]  CSN : [id number]  Pulmonologist : Earl Shearer MD  Date : 6/28/2021    ______________________________________________________________________    Interpretation :  1.  Spirometry is consistent with a mild restrictive ventilatory defect with coexisting small airways disease.  2.  Lung volumes confirm a mild restrictive ventilatory defect.  There is also a decrease in inspiratory capacity.  3.  Diffusion capacity is within normal limits and when corrected for alveolar volume is supranormal.  4.  When current studies are compared to studies from December 7 of 2018, there has been a drop in the patient's forced vital capacity and FEV1 compared to previous.  There has also been a decrease in total lung capacity compared to previous.  When corrected for alveolar volume there has been an improvement in diffusion capacity compared to previous.      Earl Shearer MD      Results for orders placed during the hospital encounter of 12/07/18    Full Pulmonary Function Test With Bronchodilator    [facility] Pulmonary Function Test    [facility] [address]  [place]  [place]  [address]  [phone]    Patient : Carlos Hayes  MRN : [id number]  CSN : [id number]  Pulmonologist : Earl Shearer MD  Date : 12/10/2018    ______________________________________________________________________    Interpretation :  1.  Spirometry is within normal limits with the exception of a mild decrease in the patient's forced inspiratory vital capacity.  2.  Lung volumes reveal a mild decrease in  expiratory reserve volume and otherwise are within normal limits.  3.  Diffusion capacity is within normal limits.      Earl Shearer MD        Assessment and Plan   Diagnoses and all orders for this visit:    1. Restrictive lung disease (Primary)  -     Spirometry with Diffusion Capacity; Future    2. Small airways disease  -     Spirometry with Diffusion Capacity; Future    3. Obstructive sleep apnea treated with BiPAP    4. Personal history of nicotine dependence  -      CT Chest Low Dose Cancer Screening WO; Future    5. Nocturnal hypoxia    6. Non-seasonal allergic rhinitis, unspecified trigger      From a pulmonary standpoint he is stable.  He will continue his Breztri and albuterol as needed.  Continue his nebulizers.  Does not need refills as he notes he just got some from Dr. Henderson.  He is asked to return in 6 months with flow-volume loop and diffusion capacity.  He will also be due his annual low-dose CT at that time.  Continue BiPAP with oxygen bled in.    Alpha 1: none  LDCT: Due September 2024  Smoking Cessation:, Quit 2016  Vaccinations: Flu: Due in the fall PNA: PPSV23 RSV: Completed         Follow Up   Return in about 6 months (around 11/16/2024) for FVL w DIF, CT.  Patient was given instructions and counseling regarding his condition or for health maintenance advice. Please see specific information pulled into the AVS if appropriate.     Mary Christian, APRN  5/17/2024  09:43 CDT

## 2024-05-16 ENCOUNTER — TREATMENT (OUTPATIENT)
Dept: PHYSICAL THERAPY | Facility: CLINIC | Age: 69
End: 2024-05-16
Payer: MEDICARE

## 2024-05-16 ENCOUNTER — OFFICE VISIT (OUTPATIENT)
Dept: PULMONOLOGY | Facility: CLINIC | Age: 69
End: 2024-05-16
Payer: MEDICARE

## 2024-05-16 VITALS
HEIGHT: 74 IN | WEIGHT: 315 LBS | SYSTOLIC BLOOD PRESSURE: 130 MMHG | DIASTOLIC BLOOD PRESSURE: 74 MMHG | BODY MASS INDEX: 40.43 KG/M2 | OXYGEN SATURATION: 93 % | HEART RATE: 85 BPM

## 2024-05-16 DIAGNOSIS — G47.33 OBSTRUCTIVE SLEEP APNEA TREATED WITH BIPAP: Chronic | ICD-10-CM

## 2024-05-16 DIAGNOSIS — G47.34 NOCTURNAL HYPOXIA: Chronic | ICD-10-CM

## 2024-05-16 DIAGNOSIS — S16.1XXA STRAIN OF NECK MUSCLE, INITIAL ENCOUNTER: Primary | ICD-10-CM

## 2024-05-16 DIAGNOSIS — J98.4 SMALL AIRWAYS DISEASE: Chronic | ICD-10-CM

## 2024-05-16 DIAGNOSIS — J30.89 NON-SEASONAL ALLERGIC RHINITIS, UNSPECIFIED TRIGGER: ICD-10-CM

## 2024-05-16 DIAGNOSIS — J98.4 RESTRICTIVE LUNG DISEASE: Primary | ICD-10-CM

## 2024-05-16 DIAGNOSIS — M25.552 LEFT HIP PAIN: ICD-10-CM

## 2024-05-16 DIAGNOSIS — Z87.891 PERSONAL HISTORY OF NICOTINE DEPENDENCE: ICD-10-CM

## 2024-05-16 PROCEDURE — 99214 OFFICE O/P EST MOD 30 MIN: CPT | Performed by: NURSE PRACTITIONER

## 2024-05-16 PROCEDURE — 3078F DIAST BP <80 MM HG: CPT | Performed by: NURSE PRACTITIONER

## 2024-05-16 PROCEDURE — 3075F SYST BP GE 130 - 139MM HG: CPT | Performed by: NURSE PRACTITIONER

## 2024-05-16 NOTE — PROGRESS NOTES
Physical Therapy Treatment Note, 30 Day Progress Note, and 90 Day Recertification Note  [address] [place] [address]    Patient: Carlos Hayes                                                 Visit Date: 2024  :     1955    Referring practitioner:    Chris Russ MD  Date of Initial Visit:          Type: THERAPY  Noted: 10/26/2023    Patient seen for 36 sessions    Visit Diagnoses:    ICD-10-CM ICD-9-CM   1. Strain of neck muscle, initial encounter  S16.1XXA 847.0   2. Left hip pain  M25.552 719.45     SUBJECTIVE     Subjective:  He has had a severe flare of his back and hip pain since trying to lift his wife off the ground. His pain was about intolerable. He had an injection of his left hypogastric n and also a pain and steroid shot. This has helped some but he's still in quite a bit of pain.     PAIN: right groin/inner thigh 9/10       OBJECTIVE     Objective     Manual Therapy     96578  Comments   Sidelying right piriformis STM    Sideyling right pelvic posterior and anterior rotation mob Gr 3 sustained; gentle   Sidelying right pelvis caudal mob Gr 3 sustained   Sidelying left pelvic post mob This gave some relief   Sidelying ilium caudal mob Gr 3 sustained   Taped oliver SIJ with cover roll and leukotape    Timed Minutes 55     Therapy Education/Self Care 80408   Education offered today Try hip abd and gentle hip flex/ext in pool instead of squats   Anna Code Access Code: [email or web address]  URL: [email or web address]   Ongoing HEP     Date: 10/26/2023  Prepared by: Juarez Hartmann    Exercises  - Forward and Backward Stepping at Pool Wall  - 1 x daily - 7 x weekly - 2 sets - 10 reps  - Standing Hip Abduction Adduction at Pool Wall  - 1 x daily - 7 x weekly - 2 sets - 10 reps  - Standing Hip Abduction Adduction at Pool Wall (Mirrored)  - 1 x daily - 7 x weekly - 2 sets - 10 reps  - Standing Hip Flexion Extension at Pool Wall  -  1 x daily - 7 x weekly - 2 sets - 10 reps  - Standing Hip Flexion Extension at Pool Wall (Mirrored)  - 1 x daily - 7 x weekly - 2 sets - 10 reps  - Bilateral Shoulder Horizontal Abduction Adduction AROM at Pool Wall  - 1 x daily - 7 x weekly - 2 sets - 10 reps  - Bilateral Shoulder Flexion Extension AROM at Pool Wall  - 1 x daily - 7 x weekly - 2 sets - 10 reps  - Shoulder Flexion Extension Side to Side with Pool Noodle  - 1 x daily - 7 x weekly - 2 sets - 10 reps  - Plank with Hip Extension at Pool Wall  - 1 x daily - 7 x weekly - 2 sets - 10 reps   Timed Minutes      Total Timed Treatment:     55   mins  Total Time of Visit:            55   mins    ASSESSMENT/PLAN     GOALS:  Goals                                          Progress Note due by 6/15/24                                                      Recert due by 8/13/24   LTG by: 12 weeks Comments Date Status   Improve oliver cervical rotation to 50 deg Less than 50 deg 5/16 ongoing   Able to stand more upright with more neutral cervical Not able to stand as 4/12/24erect due to pain 5/16 ongoing   Improve passive hip ext to 10 deg Unable to asses due to pain 5/16 ongoing   Able to walk x 20 min without rollator before sitting He is not using his rollator and most days, he is more erect 5/16 ongoing   Improve oliver shoulder elevation to 130 deg Not assessed 5/16 ongoing   Ind with HEP for flexibility and stability Gentle postural flexibility 5/16 ongoing      Assessment & Plan       Assessment  Impairments: abnormal muscle firing, abnormal muscle tone, abnormal or restricted ROM, activity intolerance, impaired physical strength, lacks appropriate home exercise program and pain with function   Functional limitations: walking, uncomfortable because of pain, moving in bed, sitting, standing, reaching overhead and unable to perform repetitive tasks   Prognosis: good    Plan  Therapy options: will be seen for skilled therapy services  Planned modality interventions: dry  needling, low level laser therapy, TENS and traction  Planned therapy interventions: manual therapy, neuromuscular re-education, spinal/joint mobilization, home exercise program, body mechanics training, stretching, therapeutic activities, strengthening, soft tissue mobilization and postural training  Frequency: 2x week  Duration in weeks: 12  Treatment plan discussed with: patient       ASSESSMENT:   His pain was better until he tried to  his wife off the ground. His primary issue appears to stem from her oliver pelvis. His lumbosacral is fused so his stooping over and pulling could really pull on his SIs and pelvis. When I move one side of his pelvis, this will reproduce some of his hip or groin pain. The injections seem to have helped some as he is now more mobile. Today I taped his SIJ's to see if this might help.     PLAN:   Cont to slow try to mobilize his pelvis and progress with stability.     SIGNATURE: Juarez Hartmann PT, [place] License #: [id number]  Electronically Signed on 5/16/2024    Clinical Progress: worse  Home Program Compliance: Yes  Progress toward previous goals: Partially Met      90 Day Recertification  Certification Period: 5/16/2024 through 8/13/2024  I certify that the therapy services are furnished while this patient is under my care.  The services outlined above are required by this patient, and will be reviewed every 90 days.     PHYSICIAN: Chris Russ MD (NPI: [id number])    Signature:___________________________________________DATE: _________    Please sign and return via fax to [phone].   Thank you so much for letting us work with Carlos. I appreciate your letting us work with your patients. If you have any questions or concerns, please don't hesitate to contact me.                [address] [address]  [phone]

## 2024-05-20 ENCOUNTER — TREATMENT (OUTPATIENT)
Dept: PHYSICAL THERAPY | Facility: CLINIC | Age: 69
End: 2024-05-20
Payer: MEDICARE

## 2024-05-20 ENCOUNTER — TELEPHONE (OUTPATIENT)
Dept: GASTROENTEROLOGY | Age: 69
End: 2024-05-20

## 2024-05-20 DIAGNOSIS — M25.552 LEFT HIP PAIN: ICD-10-CM

## 2024-05-20 DIAGNOSIS — S16.1XXA STRAIN OF NECK MUSCLE, INITIAL ENCOUNTER: Primary | ICD-10-CM

## 2024-05-20 PROCEDURE — 97140 MANUAL THERAPY 1/> REGIONS: CPT | Performed by: PHYSICAL THERAPIST

## 2024-05-20 PROCEDURE — 97530 THERAPEUTIC ACTIVITIES: CPT | Performed by: PHYSICAL THERAPIST

## 2024-05-20 NOTE — TELEPHONE ENCOUNTER
05- Patient called for his procedure is on Friday with Dr England.  He is needing his prep instructions and rx sent to [facility]       Faxed to [facility] F [phone]      Sent my chart with Colon prep instructions

## 2024-05-20 NOTE — PROGRESS NOTES
Physical Therapy Treatment Note  [address] [place] [address]    Patient: Carlos Hayes                                                 Visit Date: 2024  :     1955    Referring practitioner:    Chris Russ MD  Date of Initial Visit:          Type: THERAPY  Noted: 10/26/2023    Patient seen for 37 sessions    Visit Diagnoses:    ICD-10-CM ICD-9-CM   1. Strain of neck muscle, initial encounter  S16.1XXA 847.0   2. Left hip pain  M25.552 719.45     SUBJECTIVE     Subjective:  He tape helped some but he's still in a lot of pain. He tried to get an appointment for another injection but has to wait to be seen Wednesday for assessment for injection. His neck is also tightening up.     PAIN: right groin/inner thigh 8-9/10       OBJECTIVE     Objective     Manual Therapy     [id number]  Comments   Percussive IASTM using Powerboost to right CT junction at L2 using spade attachment      Sidelying right piriformis STM    Sideyling right pelvic posterior and anterior rotation mob Gr 3 sustained; gentle   Sidelying right pelvis caudal mob Gr 3 sustained   Sidelying left pelvic post mob This gave some relief   Sidelying ilium caudal mob Gr 3 sustained   HL right pubis superior mob Gr 3 sustained   Sidelying right hip extension stretch with OP at pelvis for anterior mob Leg resting on small swiss ball sustained   Timed Minutes 60     Therapeutic Activities    10208 Comments   Transfer and walk with wide rwx Needed SBA but as he walked more the pain loosened up and he felt better                   Timed Minutes 15       Therapy Education/Self Care 06724   Education offered today Try hip abd and gentle hip flex/ext in pool instead of squats   Anna Code Access Code: [email or web address]  URL: [email or web address]   Ongoing HEP     Date: 10/26/2023  Prepared by: Juarez Hartmann    Exercises  - Forward and Backward Stepping at Pool Wall  - 1 x daily - 7  x weekly - 2 sets - 10 reps  - Standing Hip Abduction Adduction at Pool Wall  - 1 x daily - 7 x weekly - 2 sets - 10 reps  - Standing Hip Abduction Adduction at Pool Wall (Mirrored)  - 1 x daily - 7 x weekly - 2 sets - 10 reps  - Standing Hip Flexion Extension at Pool Wall  - 1 x daily - 7 x weekly - 2 sets - 10 reps  - Standing Hip Flexion Extension at Pool Wall (Mirrored)  - 1 x daily - 7 x weekly - 2 sets - 10 reps  - Bilateral Shoulder Horizontal Abduction Adduction AROM at Pool Wall  - 1 x daily - 7 x weekly - 2 sets - 10 reps  - Bilateral Shoulder Flexion Extension AROM at Pool Wall  - 1 x daily - 7 x weekly - 2 sets - 10 reps  - Shoulder Flexion Extension Side to Side with Pool Noodle  - 1 x daily - 7 x weekly - 2 sets - 10 reps  - Plank with Hip Extension at Pool Wall  - 1 x daily - 7 x weekly - 2 sets - 10 reps   Timed Minutes      Total Timed Treatment:     75   mins  Total Time of Visit:            75   mins    ASSESSMENT/PLAN     GOALS:  Goals                                          Progress Note due by 6/15/24                                                      Recert due by 8/13/24   LTG by: 12 weeks Comments Date Status   Improve oliver cervical rotation to 50 deg Less than 50 deg 5/16 ongoing   Able to stand more upright with more neutral cervical Not able to stand as 4/12/24erect due to pain 5/16 ongoing   Improve passive hip ext to 10 deg Unable to asses due to pain 5/16 ongoing   Able to walk x 20 min without rollator before sitting He walked with a std rwx and this helped quite a bit 5/20 ongoing   Improve oliver shoulder elevation to 130 deg Not assessed 5/16 ongoing   Ind with HEP for flexibility and stability Gentle postural flexibility 5/16 ongoing      Assessment/Plan     ASSESSMENT:   His pain still seems to involve his right pelvis. I tried different mobs to find the right angle to release. He felt better leaving but still in quite a bit of pain. I wonder if he may do well with a AUGUSTO to his  pelvis as now he is so guarded it's difficult.     PLAN:   Cont to slow try to mobilize his pelvis and progress with stability.     SIGNATURE: Juarez Hartmann, PT, [place] License #: [id number]  Electronically Signed on 5/20/2024        [address]  [place] [address]  [phone]

## 2024-05-23 ENCOUNTER — TRANSCRIBE ORDERS (OUTPATIENT)
Dept: ADMINISTRATIVE | Facility: HOSPITAL | Age: 69
End: 2024-05-23
Payer: MEDICARE

## 2024-05-23 ENCOUNTER — TELEPHONE (OUTPATIENT)
Dept: NEUROLOGY | Age: 69
End: 2024-05-23

## 2024-05-23 DIAGNOSIS — M54.16 LUMBAR RADICULOPATHY: Primary | ICD-10-CM

## 2024-05-23 NOTE — TELEPHONE ENCOUNTER
Pt called about his appointment today, and was surprised to see it was cancelled, he said he revcd no call, The appt has been moved to September and his supplies for bi-PAP are coming in June and he needs to been before then , please call patient , thanks !

## 2024-05-23 NOTE — TELEPHONE ENCOUNTER
05- Patient called stated that he took his 4 dulcolax tablets last night but has not had a bm yet.  Stated that he does have issues with constipation.  Questioned if needed to drink a bottle of Mag Citrate that he has on hand?     Sent message to Ayush    She recommended that he proceed with the Bottle of mag citrate.       Advised patient as that he will start his other prep this afternoon.

## 2024-05-24 ENCOUNTER — ANESTHESIA (OUTPATIENT)
Dept: ENDOSCOPY | Age: 69
End: 2024-05-24
Payer: MEDICARE

## 2024-05-24 ENCOUNTER — HOSPITAL ENCOUNTER (OUTPATIENT)
Age: 69
Setting detail: OUTPATIENT SURGERY
Discharge: HOME | End: 2024-05-24
Attending: INTERNAL MEDICINE | Admitting: INTERNAL MEDICINE
Payer: MEDICARE

## 2024-05-24 ENCOUNTER — ANESTHESIA EVENT (OUTPATIENT)
Dept: ENDOSCOPY | Age: 69
End: 2024-05-24
Payer: MEDICARE

## 2024-05-24 VITALS
BODY MASS INDEX: 39.17 KG/M2 | HEART RATE: 77 BPM | RESPIRATION RATE: 16 BRPM | TEMPERATURE: 97.5 F | DIASTOLIC BLOOD PRESSURE: 75 MMHG | WEIGHT: 315 LBS | SYSTOLIC BLOOD PRESSURE: 111 MMHG | HEIGHT: 75 IN | OXYGEN SATURATION: 96 %

## 2024-05-24 DIAGNOSIS — R19.4 CHANGE IN BOWEL HABITS: ICD-10-CM

## 2024-05-24 DIAGNOSIS — R10.13 EPIGASTRIC PAIN: ICD-10-CM

## 2024-05-24 DIAGNOSIS — K21.9 GASTROESOPHAGEAL REFLUX DISEASE, UNSPECIFIED WHETHER ESOPHAGITIS PRESENT: ICD-10-CM

## 2024-05-24 PROCEDURE — 3609010700 HC COLONOSCOPY POLYPECTOMY REMOVAL SNARE/STOMA: Performed by: INTERNAL MEDICINE

## 2024-05-24 PROCEDURE — 3700000000 HC ANESTHESIA ATTENDED CARE: Performed by: INTERNAL MEDICINE

## 2024-05-24 PROCEDURE — 3700000001 HC ADD 15 MINUTES (ANESTHESIA): Performed by: INTERNAL MEDICINE

## 2024-05-24 PROCEDURE — 3609012400 HC EGD TRANSORAL BIOPSY SINGLE/MULTIPLE: Performed by: INTERNAL MEDICINE

## 2024-05-24 PROCEDURE — 7100000010 HC PHASE II RECOVERY - FIRST 15 MIN: Performed by: INTERNAL MEDICINE

## 2024-05-24 PROCEDURE — 88342 IMHCHEM/IMCYTCHM 1ST ANTB: CPT

## 2024-05-24 PROCEDURE — 2709999900 HC NON-CHARGEABLE SUPPLY: Performed by: INTERNAL MEDICINE

## 2024-05-24 PROCEDURE — 6360000002 HC RX W HCPCS: Performed by: NURSE ANESTHETIST, CERTIFIED REGISTERED

## 2024-05-24 PROCEDURE — 88305 TISSUE EXAM BY PATHOLOGIST: CPT

## 2024-05-24 PROCEDURE — 7100000011 HC PHASE II RECOVERY - ADDTL 15 MIN: Performed by: INTERNAL MEDICINE

## 2024-05-24 PROCEDURE — 2500000003 HC RX 250 WO HCPCS: Performed by: NURSE ANESTHETIST, CERTIFIED REGISTERED

## 2024-05-24 PROCEDURE — 45385 COLONOSCOPY W/LESION REMOVAL: CPT | Performed by: INTERNAL MEDICINE

## 2024-05-24 PROCEDURE — 2580000003 HC RX 258: Performed by: INTERNAL MEDICINE

## 2024-05-24 PROCEDURE — 45380 COLONOSCOPY AND BIOPSY: CPT | Performed by: INTERNAL MEDICINE

## 2024-05-24 PROCEDURE — 43239 EGD BIOPSY SINGLE/MULTIPLE: CPT | Performed by: INTERNAL MEDICINE

## 2024-05-24 RX ORDER — SODIUM CHLORIDE, SODIUM LACTATE, POTASSIUM CHLORIDE, CALCIUM CHLORIDE 600; 310; 30; 20 MG/100ML; MG/100ML; MG/100ML; MG/100ML
INJECTION, SOLUTION INTRAVENOUS CONTINUOUS
Status: DISCONTINUED | OUTPATIENT
Start: 2024-05-24 | End: 2024-05-24 | Stop reason: HOSPADM

## 2024-05-24 RX ORDER — LIDOCAINE HYDROCHLORIDE 10 MG/ML
INJECTION, SOLUTION EPIDURAL; INFILTRATION; INTRACAUDAL; PERINEURAL PRN
Status: DISCONTINUED | OUTPATIENT
Start: 2024-05-24 | End: 2024-05-24 | Stop reason: SDUPTHER

## 2024-05-24 RX ORDER — PROPOFOL 10 MG/ML
INJECTION, EMULSION INTRAVENOUS PRN
Status: DISCONTINUED | OUTPATIENT
Start: 2024-05-24 | End: 2024-05-24 | Stop reason: SDUPTHER

## 2024-05-24 RX ORDER — FENTANYL CITRATE 50 UG/ML
INJECTION, SOLUTION INTRAMUSCULAR; INTRAVENOUS PRN
Status: DISCONTINUED | OUTPATIENT
Start: 2024-05-24 | End: 2024-05-24 | Stop reason: SDUPTHER

## 2024-05-24 RX ADMIN — FENTANYL CITRATE 100 MCG: 50 INJECTION INTRAMUSCULAR; INTRAVENOUS at 10:20

## 2024-05-24 RX ADMIN — SODIUM CHLORIDE, POTASSIUM CHLORIDE, SODIUM LACTATE AND CALCIUM CHLORIDE: 600; 310; 30; 20 INJECTION, SOLUTION INTRAVENOUS at 09:46

## 2024-05-24 RX ADMIN — PROPOFOL 330 MG: 10 INJECTION, EMULSION INTRAVENOUS at 10:24

## 2024-05-24 RX ADMIN — LIDOCAINE HYDROCHLORIDE 50 MG: 10 INJECTION, SOLUTION EPIDURAL; INFILTRATION; INTRACAUDAL; PERINEURAL at 10:24

## 2024-05-24 ASSESSMENT — PAIN DESCRIPTION - DESCRIPTORS: DESCRIPTORS: ACHING;DISCOMFORT

## 2024-05-24 ASSESSMENT — PAIN - FUNCTIONAL ASSESSMENT: PAIN_FUNCTIONAL_ASSESSMENT: 0-10

## 2024-05-24 NOTE — DISCHARGE INSTRUCTIONS
Continue Pantoprazole as prescribed   Please call with any questions. Okay to repeat EGD if new symptoms arise.   Repeat Colonoscopy in 5 yrs due presence of polyps   Await pathology results.      Upper GI Endoscopy and Colonoscopy: What to Expect at Home  Your Recovery    After an upper GI endoscopy, you may have a sore throat for a day or two after the test.    After a colonoscopy you may be bloated or have gas pains. You may need to pass gas. If a biopsy was done or a polyp was removed, you may have streaks of blood in your stool (feces) for a few days. Problems such as heavy rectal bleeding may not occur until several weeks after the test. This isn't common. But it can happen after polyps are removed.    How can you care for yourself at home?  Activity   Rest as much as you need to after you go home.  You should be able to go back to your usual activities the day after the test.  You should not drive or operate equipment for the remainder of today.    Diet   Follow your doctor's directions for eating after the test.  Unless your doctor has told you not to, drink plenty of fluids. This helps to replace the fluids that were lost during the colon prep.  Do not drink alcohol for 24 hours    Medications   If you have a sore throat the day after the test, use an over-the-counter spray to numb your throat.  If polyps were removed or biopsies removed, your doctor may tell you to continue holding your anticoagulants or NSAIDS. Check with your doctor to see when you can resume these types of medications.       Follow-up care is a key part of your treatment and safety. Be sure to make and go to all appointments, and call your doctor if you are having problems. It's also a good idea to know your test results and keep a list of the medicines you take.  When should you call for help?   Call 911 anytime you think you may need emergency care. For example, call if:    You passed out (lost consciousness).     You have trouble

## 2024-05-24 NOTE — OP NOTE
Patient: Yeyo Norton : 1955  Med Rec#: [id number] Acc#: [id number]   Primary Care Provider Yeyo Ulloa MD    Date of Procedure:  2024    Endoscopist: Elijah England MD    Referring Provider: Yeyo Ulloa MD,     Operation Performed: Colonoscopy with snare polypectomy    Indications: screening    Anesthesia:  Sedation was administered by anesthesia who monitored the patient during the procedure.    I met with Yeyo Norton prior to procedure. We discussed the procedure itself, and I have discussed the risks of endoscopy (including-- but not limited to-- pain, discomfort, bleeding potentially requiring second endoscopic procedure and/or blood transfusion, organ perforation requiring operative repair, damage to organs near the colon, infection, aspiration, cardiopulmonary/allergic reaction), benefits, indications to endoscopy. Additionally, we discussed options other than colonoscopy. The patient expressed understanding. All questions answered. The patient decided to proceed with the procedure.  Signed informed consent was placed on the chart.    Blood Loss: minimal    Withdrawal time: > 6 minutes  Bowel Prep: adequate     Complications: no immediate complications    DESCRIPTION OF PROCEDURE:     A time out was performed. After written informed consent was obtained, the patient was placed in the left lateral position.     The perianal area was inspected, and a digital rectal exam was performed. A rectal exam was performed: normal tone, no palpable lesions. At this point, a forward viewing Olympus colonoscope was inserted into the anus and carefully advanced to the cecum.  The cecum was identified by the ileocecal valve and the appendiceal orifice. The colonoscope was then slowly withdrawn with careful inspection of the mucosa in a linear and circumferential fashion. The scope was retroflexed in the rectum. Suction was utilized during the procedure to remove as much air as possible from  the bowel. The colonoscope was removed from the patient, and the procedure was terminated.  Findings are listed below.        Findings:     The mucosa appeared normal throughout the entire examined colon     In the ascending colon, two sessile polyps (4-7mm) were removed with cold snare polypectomy    In the the transverse colon, a 4 mm sessile polyp was removed completely with forceps polypectomy.    There was evidence of a small amount of diverticular disease throughout the sigmoid colon.     Retroflexion in the rectum was normal and revealed no further abnormalities         Recommendations:  1. Repeat colonoscopy: pending pathology - max of 5 yrs for screening  2. Await biopsy results.     Findings and recommendations were discussed w/ the patient. A copy of the images was provided.    Elijah England MD  5/24/2024  11:29 AM

## 2024-05-24 NOTE — OP NOTE
Endoscopic Procedure Note    Patient: Yeyo Norton : 1955  Med Rec#: [id number] Acc#: [id number]     Primary Care Provider Yeyo Ulloa MD  Referring Provider:     Endoscopist: Elijah England MD    Date of Procedure:  2024    Procedure:   1. EGD with biopsy    Indications:   1. reflux  2. Dyspepsia     Anesthesia:  Sedation was administered by anesthesia who monitored the patient during the procedure.    Estimated Blood Loss: minimal    Procedure:   After reviewing the patient's chart and obtaining informed consent, the patient was placed in the left lateral decubitus position.  A forward-viewing Olympus endoscope was lubricated and inserted through the mouth into the oropharynx. Under direct visualization, the upper esophagus was intubated. The scope was advanced to the level of the third portion of duodenum. Scope was slowly withdrawn with careful inspection of the mucosal surfaces. The scope was retroflexed for inspection of the gastric fundus and incisura. Findings and maneuvers are listed in impression below. The patient tolerated the procedure well. The scope was removed. There were no immediate complications.    Findings:   Esophagus: abnormal: no obvious ulcerations, however, there are questionable mucosal changes of possible Juan's esophagus in the distal esophagus. Biopsied to r/o Juan's   There is no hiatal hernia present.      Stomach:  abnormal: mild mucosal changes suggestive of gastritis noted -  Gastric biopsies were taken from the antrum and body to rule out Helicobacter pylori infection.      Duodenum: normal      IMPRESSION:  1. S/p esophageal and gastric biopsies        RECOMMENDATIONS:    1.  Await path results  2.  Continue PPI as prescribed  3.  If biopsies show Juan's, we will plan to repeat EGD in 3 yrs.     The results were discussed with the patient and family.  A copy of the images obtained were given to the patient.     Elijah England MD  2024  11:27

## 2024-05-24 NOTE — ANESTHESIA PRE PROCEDURE
Department of Anesthesiology  Preprocedure Note       Name:  Yeyo Norton   Age:  69 y.o.  :  1955                                          MRN:  [id number]         Date:  2024      Surgeon: Surgeon(s):  Elijah England MD    Procedure: Procedure(s):  ESOPHAGOGASTRODUODENOSCOPY BIOPSY  COLONOSCOPY DIAGNOSTIC    Medications prior to admission:   Prior to Admission medications    Medication Sig Start Date End Date Taking? Authorizing Provider   atorvastatin (LIPITOR) 20 MG tablet Take 1 tablet by mouth daily    Melina Watkins MD   bumetanide (BUMEX) 1 MG tablet Take 1 tablet by mouth 2 times daily    Melina Watkins MD   vitamin D (CHOLECALCIFEROL) 25 MCG (1000 UT) TABS tablet Take 1 tablet by mouth daily    Melina Watkins MD   cimetidine (TAGAMET) 400 MG tablet Take 1 tablet by mouth nightly    Melina Watkins MD   cyclobenzaprine (FLEXERIL) 5 MG tablet Take 1 tablet by mouth 3 times daily as needed for Muscle spasms    Melina Watkins MD   doxepin (SINEQUAN) 25 MG capsule Take 1 capsule by mouth nightly    Melina Watkins MD   Glucosamine-Chondroit-Vit C-Mn (GLUCOSAMINE 1500 COMPLEX PO) Take by mouth    Melina Watkins MD   loratadine (CLARITIN) 10 MG tablet Take 1 tablet by mouth daily    Melina Watkins MD   tiZANidine (ZANAFLEX) 4 MG tablet Take 1 tablet by mouth every 6 hours as needed    Melina Watkins MD   pantoprazole (PROTONIX) 40 MG tablet Take 1 tablet by mouth 2 times daily (before meals) 4/10/24   Enrique Pedersen, BOUBACAR   DULoxetine (CYMBALTA) 60 MG extended release capsule TAKE 1 CAPSULE BY MOUTH 2 TIMES DAILY 23   Isaiah Padgett MD   Semaglutide,0.25 or 0.5MG/DOS, (OZEMPIC, 0.25 OR 0.5 MG/DOSE,) 2 MG/3ML SOPN as directed Subcutaneous once weekly 23   Melina Watkins MD   fluticasone (FLONASE) 50 MCG/ACT nasal spray USE 2 SPRAYS IN EACH NOSTRIL AS DIRECTED BY PROVIDER DAILY 3/6/23   Melina Watkins MD

## 2024-05-24 NOTE — ANESTHESIA POSTPROCEDURE EVALUATION
Department of Anesthesiology  Postprocedure Note    Patient: Yeyo Norton  MRN: [id number]  YOB: 1955  Date of evaluation: 5/24/2024    Procedure Summary       Date: 05/24/24 Room / Location: [place] [address] / [facility]    Anesthesia Start: 1014 Anesthesia Stop: 1057    Procedures:       ESOPHAGOGASTRODUODENOSCOPY BIOPSY      COLONOSCOPY POLYPECTOMY REMOVAL SNARE Diagnosis:       Gastroesophageal reflux disease, unspecified whether esophagitis present      Epigastric pain      Change in bowel habits      (Gastroesophageal reflux disease, unspecified whether esophagitis present [K21.9])      (Epigastric pain [R10.13])      (Change in bowel habits [R19.4])    Surgeons: Elijah England MD Responsible Provider: Mita George APRN - CRNA    Anesthesia Type: general, TIVA ASA Status: 3            Anesthesia Type: No value filed.    Bruno Phase I: Bruno Score: 10    Bruno Phase II:      Anesthesia Post Evaluation    Patient location during evaluation: bedside  Patient participation: complete - patient participated  Level of consciousness: sleepy but conscious  Pain score: 0  Airway patency: patent  Nausea & Vomiting: no nausea and no vomiting  Cardiovascular status: hemodynamically stable  Respiratory status: acceptable  Hydration status: stable  Pain management: adequate    No notable events documented.

## 2024-05-24 NOTE — H&P
Patient Name: Yeyo Norton  : 1955  MRN: [id number]  DATE: 24    Allergies:   Allergies   Allergen Reactions    Lunesta [Eszopiclone] Other (See Comments)     Flu like symptoms     Levofloxacin Other (See Comments)     Pt can not remember the reaction        ENDOSCOPY  History and Physical    Procedure:    [x] Diagnostic Colonoscopy       [] Screening Colonoscopy  [x] EGD      [] ERCP      [] EUS       [] Other    [x] Previous office notes/History and Physical reviewed from the patients chart. Please see EMR for further details of HPI. I have examined the patient's status immediately prior to the procedure and:      Indications/HPI:    [x]Abdominal Pain   [x]Barretts  []Screening/Surveillance   []History of Polyps  []Dysphagia            [] +Cologard/DNA testing  []Abnormal Imaging              []EOE Hx              [] Family Hx of CRC/Polyps  []Anemia                            []Food Impaction       []Recent Poor Prep  []GI Bleed             []Lymphadenopathy  []History of Polyps  [x]Change in bowel habits []Heartburn/Reflux  []Cancer- GI/Lung  []Chest Pain - Non Cardiac []Heme (+) Stool []Ulcers  []Constipation  []Hemoptysis  []Incontinence    []Diarrhea  []Hypoxemia  []Rectal Bleed (BRBPR)  []Nausea/Vomiting   [] Varices  []Crohns/Colitis  []Pancreatic Cyst   [] Cirrhosis   []Pancreatitis    []Abnormal MRCP  []Elevated LFT [] Stent Removal, Previous ERCP  []Other:     Anesthesia:   [x] MAC [] Moderate Sedation   [] General   [] None     ROS: 12 pt Review of Symptoms was negative unless mentioned above    Medications:   Prior to Admission medications    Medication Sig Start Date End Date Taking? Authorizing Provider   atorvastatin (LIPITOR) 20 MG tablet Take 1 tablet by mouth daily    Melina Watkins MD   bumetanide (BUMEX) 1 MG tablet Take 1 tablet by mouth 2 times daily    Melina Watkins MD   vitamin D (CHOLECALCIFEROL) 25 MCG (1000 UT) TABS tablet Take 1 tablet by mouth daily     Melina Watkins MD   cimetidine (TAGAMET) 400 MG tablet Take 1 tablet by mouth nightly    Melina Watkins MD   cyclobenzaprine (FLEXERIL) 5 MG tablet Take 1 tablet by mouth 3 times daily as needed for Muscle spasms    Melina Watkins MD   doxepin (SINEQUAN) 25 MG capsule Take 1 capsule by mouth nightly    Melina Watkins MD   Glucosamine-Chondroit-Vit C-Mn (GLUCOSAMINE 1500 COMPLEX PO) Take by mouth    Melina Watkins MD   loratadine (CLARITIN) 10 MG tablet Take 1 tablet by mouth daily    Melina Watkins MD   tiZANidine (ZANAFLEX) 4 MG tablet Take 1 tablet by mouth every 6 hours as needed    Melina Watkins MD   pantoprazole (PROTONIX) 40 MG tablet Take 1 tablet by mouth 2 times daily (before meals) 4/10/24   Enrique Pedersen APRN   DULoxetine (CYMBALTA) 60 MG extended release capsule TAKE 1 CAPSULE BY MOUTH 2 TIMES DAILY 11/6/23   Isaiah Padgett MD   Semaglutide,0.25 or 0.5MG/DOS, (OZEMPIC, 0.25 OR 0.5 MG/DOSE,) 2 MG/3ML SOPN as directed Subcutaneous once weekly 6/19/23   Melina Watkins MD   fluticasone (FLONASE) 50 MCG/ACT nasal spray USE 2 SPRAYS IN EACH NOSTRIL AS DIRECTED BY PROVIDER DAILY 3/6/23   Melina Watkins MD   azelastine (ASTELIN) 0.1 % nasal spray  5/2/23   Melina Watkins MD   albuterol sulfate HFA (PROVENTIL;VENTOLIN;PROAIR) 108 (90 Base) MCG/ACT inhaler  5/26/23   Melina Watkins MD   oxyCODONE (OXYCONTIN) 15 MG T12A extended release tablet Take 1 tablet by mouth every 8 hours as needed for Pain.    Melina Watkins MD   Budeson-Glycopyrrol-Formoterol (BREZTRI AEROSPHERE) 160-9-4.8 MCG/ACT AERO 2 puffs 5/28/21   Melina Watkins MD   gabapentin (NEURONTIN) 600 MG tablet TAKE ONE TABLET BY MOUTH FOUR TIMES A DAY **MAY MAKE DROWSY**    Melina Watkins MD   ipratropium-albuterol (DUONEB) 0.5-2.5 (3) MG/3ML SOLN nebulizer solution 3 ml as needed    Provider, MD Melina   magnesium 30 MG tablet Take 40 mg

## 2024-05-28 ENCOUNTER — TREATMENT (OUTPATIENT)
Dept: PHYSICAL THERAPY | Facility: CLINIC | Age: 69
End: 2024-05-28
Payer: MEDICARE

## 2024-05-28 DIAGNOSIS — S16.1XXA STRAIN OF NECK MUSCLE, INITIAL ENCOUNTER: Primary | ICD-10-CM

## 2024-05-28 DIAGNOSIS — M25.552 LEFT HIP PAIN: ICD-10-CM

## 2024-05-28 PROCEDURE — 97140 MANUAL THERAPY 1/> REGIONS: CPT | Performed by: PHYSICAL THERAPIST

## 2024-05-28 NOTE — PROGRESS NOTES
Physical Therapy Treatment Note  [address] [place] [address]    Patient: Carlos Hayes                                                 Visit Date: 2024  :     1955    Referring practitioner:    Chris Russ MD  Date of Initial Visit:          Type: THERAPY  Noted: 10/26/2023    Patient seen for 38 sessions    Visit Diagnoses:    ICD-10-CM ICD-9-CM   1. Strain of neck muscle, initial encounter  S16.1XXA 847.0   2. Left hip pain  M25.552 719.45     SUBJECTIVE     Subjective:  He felt like the last treatment really helped him. He slept in his bed for 6 hours but struggled getting OOB.     PAIN: right groin/inner thigh 6-/10; 5-6 after       OBJECTIVE     Objective     Manual Therapy     [id number]  Comments   Percussive IASTM using Powerboost to right piriformis, right PSIS and IP at L2 using ball attachment      Sideyling right pelvic anterior rotation mob Gr 3 sustained; gentle   Sidelying ilium caudal mob Gr 3 sustained   Sidelying right hip extension stretch with OP at pelvis for anterior mob Leg resting on small swiss ball sustained   Timed Minutes 45     Therapy Education/Self Care 34846   Education offered today Try hip abd and gentle hip flex/ext in pool instead of squats   [employer] Code Access Code: [email or web address]  URL: [email or web address]   Ongoing HEP     Date: 10/26/2023  Prepared by: Juarez Hartmann    Exercises  - Forward and Backward Stepping at Pool Wall  - 1 x daily - 7 x weekly - 2 sets - 10 reps  - Standing Hip Abduction Adduction at Pool Wall  - 1 x daily - 7 x weekly - 2 sets - 10 reps  - Standing Hip Abduction Adduction at Pool Wall (Mirrored)  - 1 x daily - 7 x weekly - 2 sets - 10 reps  - Standing Hip Flexion Extension at Pool Wall  - 1 x daily - 7 x weekly - 2 sets - 10 reps  - Standing Hip Flexion Extension at Pool Wall (Mirrored)  - 1 x daily - 7 x weekly - 2 sets - 10 reps  - Bilateral Shoulder Horizontal  Abduction Adduction AROM at Pool Wall  - 1 x daily - 7 x weekly - 2 sets - 10 reps  - Bilateral Shoulder Flexion Extension AROM at Pool Wall  - 1 x daily - 7 x weekly - 2 sets - 10 reps  - Shoulder Flexion Extension Side to Side with Pool Noodle  - 1 x daily - 7 x weekly - 2 sets - 10 reps  - Plank with Hip Extension at Pool Wall  - 1 x daily - 7 x weekly - 2 sets - 10 reps   Timed Minutes      Total Timed Treatment:     45   mins  Total Time of Visit:            45   mins    ASSESSMENT/PLAN     GOALS:  Goals                                          Progress Note due by 6/15/24                                                      Recert due by 8/13/24   LTG by: 12 weeks Comments Date Status   Improve oliver cervical rotation to 50 deg Less than 50 deg 5/16 ongoing   Able to stand more upright with more neutral cervical Not able to stand as 4/12/24erect due to pain 5/16 ongoing   Improve passive hip ext to 10 deg Unable to asses due to pain 5/16 ongoing   Able to walk x 20 min without rollator before sitting He walked more upright without a rwx today 5/28 ongoing   Improve oliver shoulder elevation to 130 deg Not assessed 5/16 ongoing   Ind with HEP for flexibility and stability Gentle postural flexibility 5/16 ongoing      Assessment/Plan     ASSESSMENT:   His pain has been better since we focused on his right pelvis. He has been able to stand and walk better. I've been emphasizing anterior mobs of the right pelvis but being really gentle.     PLAN:   Cont to slow try to mobilize his pelvis and progress with stability.     SIGNATURE: Juarez Hartmann, PT, [place] License #: [id number]  Electronically Signed on 5/28/2024        [address] [place] [address]  [place] [address]  [phone]

## 2024-05-30 ENCOUNTER — TRANSCRIBE ORDERS (OUTPATIENT)
Dept: ADMINISTRATIVE | Facility: HOSPITAL | Age: 69
End: 2024-05-30
Payer: MEDICARE

## 2024-05-30 ENCOUNTER — TRANSCRIBE ORDERS (OUTPATIENT)
Dept: OTHER | Facility: HOSPITAL | Age: 69
End: 2024-05-30
Payer: MEDICARE

## 2024-05-30 DIAGNOSIS — M54.12 BRACHIAL NEURITIS: ICD-10-CM

## 2024-05-30 DIAGNOSIS — M54.16 LUMBAR RADICULOPATHY: Primary | ICD-10-CM

## 2024-06-04 ENCOUNTER — TREATMENT (OUTPATIENT)
Dept: PHYSICAL THERAPY | Facility: CLINIC | Age: 69
End: 2024-06-04
Payer: MEDICARE

## 2024-06-04 DIAGNOSIS — M25.552 LEFT HIP PAIN: ICD-10-CM

## 2024-06-04 DIAGNOSIS — S16.1XXA STRAIN OF NECK MUSCLE, INITIAL ENCOUNTER: Primary | ICD-10-CM

## 2024-06-04 NOTE — PROGRESS NOTES
Physical Therapy Treatment Note  [address] [place] [address]    Patient: Carlos Hayes                                                 Visit Date: 2024  :     1955    Referring practitioner:    Chris Russ MD  Date of Initial Visit:          Type: THERAPY  Noted: 10/26/2023    Patient seen for 39 sessions    Visit Diagnoses:    ICD-10-CM ICD-9-CM   1. Strain of neck muscle, initial encounter  S16.1XXA 847.0   2. Left hip pain  M25.552 719.45     SUBJECTIVE     Subjective:  He felt like his pain is a bit better but it can lock up on him quickly. He feels like he could stretch his hip flexor or do a BKFO but is afraid to try. He tried lying in his bed but this could only about 10 minutes.     PAIN: right groin/inner thigh 6-7/10; 5-6 after       OBJECTIVE     Objective     Manual Therapy     [id number]  Comments   Percussive IASTM using Powerboost to right piriformis, right PSIS and IP at L2 using ball attachment      Sideyling right pelvic anterior rotation mob Gr 3 sustained; gentle   Sidelying ilium caudal mob Gr 3 sustained   Sidelying right hip extension stretch with OP at pelvis for anterior mob Leg resting on small swiss ball sustained   Timed Minutes 55     Therapy Education/Self Care [id number]   Education offered today Try hip abd and gentle hip flex/ext in pool instead of squats   Anna Code Access Code: [email or web address]  URL: [email or web address]   Ongoing HEP     Date: 10/26/2023  Prepared by: Juarez Hartmann    Exercises  - Forward and Backward Stepping at Pool Wall  - 1 x daily - 7 x weekly - 2 sets - 10 reps  - Standing Hip Abduction Adduction at Pool Wall  - 1 x daily - 7 x weekly - 2 sets - 10 reps  - Standing Hip Abduction Adduction at Pool Wall (Mirrored)  - 1 x daily - 7 x weekly - 2 sets - 10 reps  - Standing Hip Flexion Extension at Pool Wall  - 1 x daily - 7 x weekly - 2 sets - 10 reps  - Standing Hip Flexion  Extension at Pool Wall (Mirrored)  - 1 x daily - 7 x weekly - 2 sets - 10 reps  - Bilateral Shoulder Horizontal Abduction Adduction AROM at Pool Wall  - 1 x daily - 7 x weekly - 2 sets - 10 reps  - Bilateral Shoulder Flexion Extension AROM at Pool Wall  - 1 x daily - 7 x weekly - 2 sets - 10 reps  - Shoulder Flexion Extension Side to Side with Pool Noodle  - 1 x daily - 7 x weekly - 2 sets - 10 reps  - Plank with Hip Extension at Pool Wall  - 1 x daily - 7 x weekly - 2 sets - 10 reps   Timed Minutes      Total Timed Treatment:     55   mins  Total Time of Visit:            55   mins    ASSESSMENT/PLAN     GOALS:  Goals                                          Progress Note due by 6/15/24                                                      Recert due by 8/13/24   LTG by: 12 weeks Comments Date Status   Improve oliver cervical rotation to 50 deg Less than 50 deg 5/16 ongoing   Able to stand more upright with more neutral cervical Not able to stand as 4/12/24erect due to pain 5/16 ongoing   Improve passive hip ext to 10 deg Unable to asses due to pain 5/16 ongoing   Able to walk x 20 min without rollator before sitting He walked more upright without a rwx today 6/4 ongoing   Improve oliver shoulder elevation to 130 deg Not assessed 5/16 ongoing   Ind with HEP for flexibility and stability Gentle postural flexibility 5/16 ongoing      Assessment/Plan     ASSESSMENT:   His pain has been better since we focused on his right pelvis. His pelvis appears to want to stretch into an anterior rotation.    PLAN:   Cont to slow try to mobilize his pelvis and progress with stability.     SIGNATURE: Juarez Hartmann, PT, [place] License #: [id number]  Electronically Signed on 6/4/2024        [address] [place] [address], [place] [address]  [phone]

## 2024-06-07 ENCOUNTER — HOSPITAL ENCOUNTER (OUTPATIENT)
Dept: GENERAL RADIOLOGY | Facility: HOSPITAL | Age: 69
Discharge: HOME | End: 2024-06-07
Payer: MEDICARE

## 2024-06-11 ENCOUNTER — TREATMENT (OUTPATIENT)
Dept: PHYSICAL THERAPY | Facility: CLINIC | Age: 69
End: 2024-06-11
Payer: MEDICARE

## 2024-06-11 DIAGNOSIS — S16.1XXA STRAIN OF NECK MUSCLE, INITIAL ENCOUNTER: Primary | ICD-10-CM

## 2024-06-11 DIAGNOSIS — M25.552 LEFT HIP PAIN: ICD-10-CM

## 2024-06-11 NOTE — PROGRESS NOTES
Physical Therapy Treatment Note  [address] [place] [address]    Patient: Carlos Hayes                                                 Visit Date: 2024  :     1955    Referring practitioner:    Chris Russ MD  Date of Initial Visit:          Type: THERAPY  Noted: 10/26/2023    Patient seen for 40 sessions    Visit Diagnoses:    ICD-10-CM ICD-9-CM   1. Strain of neck muscle, initial encounter  S16.1XXA 847.0   2. Left hip pain  M25.552 719.45     SUBJECTIVE     Subjective:  He says he started having more right groin pain today. He also needs a root canal done.     PAIN: right groin/inner thigh 6-7/10; 5-6 after       OBJECTIVE     Objective     Manual Therapy     [id number]  Comments   Percussive IASTM using Powerboost to right piriformis, right PSIS and IP at L2 using ball attachment      Sideyling right pelvic anterior rotation mob Gr 3 sustained; gentle   Sidelying ilium caudal mob Gr 3 sustained   Right anterior pelvi rotation stretch Leg of table   Left knee to chest with abd    MET HL hip abd/add No cavitation   Timed Minutes 55     Therapy Education/Self Care [id number]   Education offered today Try hip abd and gentle hip flex/ext in pool instead of squats   Gravie Code Access Code: [email or web address]  URL: [email or web address]   Ongoing HEP     Date: 10/26/2023  Prepared by: Juarez Hartmann    Exercises  - Forward and Backward Stepping at Pool Wall  - 1 x daily - 7 x weekly - 2 sets - 10 reps  - Standing Hip Abduction Adduction at Pool Wall  - 1 x daily - 7 x weekly - 2 sets - 10 reps  - Standing Hip Abduction Adduction at Pool Wall (Mirrored)  - 1 x daily - 7 x weekly - 2 sets - 10 reps  - Standing Hip Flexion Extension at Pool Wall  - 1 x daily - 7 x weekly - 2 sets - 10 reps  - Standing Hip Flexion Extension at Pool Wall (Mirrored)  - 1 x daily - 7 x weekly - 2 sets - 10 reps  - Bilateral Shoulder Horizontal Abduction Adduction  AROM at Pool Wall  - 1 x daily - 7 x weekly - 2 sets - 10 reps  - Bilateral Shoulder Flexion Extension AROM at Pool Wall  - 1 x daily - 7 x weekly - 2 sets - 10 reps  - Shoulder Flexion Extension Side to Side with Pool Noodle  - 1 x daily - 7 x weekly - 2 sets - 10 reps  - Plank with Hip Extension at Pool Wall  - 1 x daily - 7 x weekly - 2 sets - 10 reps   Timed Minutes      Total Timed Treatment:     55   mins  Total Time of Visit:            55   mins    ASSESSMENT/PLAN     GOALS:  Goals                                          Progress Note due by 6/15/24                                                      Recert due by 8/13/24   LTG by: 12 weeks Comments Date Status   Improve oliver cervical rotation to 50 deg Less than 50 deg 5/16 ongoing   Able to stand more upright with more neutral cervical Not able to stand as 4/12/24erect due to pain 5/16 ongoing   Improve passive hip ext to 10 deg Unable to asses due to pain 5/16 ongoing   Able to walk x 20 min without rollator before sitting He walked more upright without a rwx today 6/4 ongoing   Improve oliver shoulder elevation to 130 deg Not assessed 5/16 ongoing   Ind with HEP for flexibility and stability Gentle postural flexibility 5/16 ongoing      Assessment/Plan     ASSESSMENT:   His pain has been better since we focused on his right pelvis. His pelvis appears to want to stretch into an anterior rotation.    PLAN:   Cont to slow try to mobilize his pelvis and progress with stability.     SIGNATURE: Juarez Hartmann, PT, [place] License #: [id number]  Electronically Signed on 6/11/2024        [address] [place] [address]. [address]  [phone]

## 2024-06-14 ENCOUNTER — TREATMENT (OUTPATIENT)
Dept: PHYSICAL THERAPY | Facility: CLINIC | Age: 69
End: 2024-06-14
Payer: MEDICARE

## 2024-06-14 DIAGNOSIS — S16.1XXA STRAIN OF NECK MUSCLE, INITIAL ENCOUNTER: Primary | ICD-10-CM

## 2024-06-14 DIAGNOSIS — M25.552 LEFT HIP PAIN: ICD-10-CM

## 2024-06-14 NOTE — PROGRESS NOTES
Physical Therapy Treatment Note and 30 Day Progress Note  [address] [place] [address]    Patient: Carlos Hayes                                                 Visit Date: 2024  :     1955    Referring practitioner:    Chris Russ MD  Date of Initial Visit:          Type: THERAPY  Noted: 10/26/2023    Patient seen for 41 sessions    Visit Diagnoses:    ICD-10-CM ICD-9-CM   1. Strain of neck muscle, initial encounter  S16.1XXA 847.0   2. Left hip pain  M25.552 719.45     SUBJECTIVE     Subjective:  He had a spinal injection which seems to have helped a bit.     PAIN: right groin/inner thigh 5/10; 5-6 after       OBJECTIVE     Objective     Manual Therapy     [id number]  Comments   Percussive IASTM using Powerboost to right piriformis, right PSIS and IP at L2 using ball attachment      Sideyling right pelvic anterior rotation mob Gr 3 sustained; gentle   Sidelying ilium caudal mob Gr 3 sustained   Right anterior pelvi rotation stretch Sidelying with right hip ext and PSIS PA mobs           Timed Minutes 40     Therapy Education/Self Care [id number]   Education offered today Try hip abd and gentle hip flex/ext in pool instead of squats   Hooked Code Access Code: [email or web address]  URL: [email or web address]   Ongoing HEP     Date: 10/26/2023  Prepared by: Juarez Hartmann    Exercises  - Forward and Backward Stepping at Pool Wall  - 1 x daily - 7 x weekly - 2 sets - 10 reps  - Standing Hip Abduction Adduction at Pool Wall  - 1 x daily - 7 x weekly - 2 sets - 10 reps  - Standing Hip Abduction Adduction at Pool Wall (Mirrored)  - 1 x daily - 7 x weekly - 2 sets - 10 reps  - Standing Hip Flexion Extension at Pool Wall  - 1 x daily - 7 x weekly - 2 sets - 10 reps  - Standing Hip Flexion Extension at Pool Wall (Mirrored)  - 1 x daily - 7 x weekly - 2 sets - 10 reps  - Bilateral Shoulder Horizontal Abduction Adduction AROM at Pool Wall  - 1 x daily  - 7 x weekly - 2 sets - 10 reps  - Bilateral Shoulder Flexion Extension AROM at Pool Wall  - 1 x daily - 7 x weekly - 2 sets - 10 reps  - Shoulder Flexion Extension Side to Side with Pool Noodle  - 1 x daily - 7 x weekly - 2 sets - 10 reps  - Plank with Hip Extension at Pool Wall  - 1 x daily - 7 x weekly - 2 sets - 10 reps   Timed Minutes      Total Timed Treatment:     40  mins  Total Time of Visit:            40   mins    ASSESSMENT/PLAN     GOALS:  Goals                                          Progress Note due by 7/14/24                                                      Recert due by 8/13/24   LTG by: 12 weeks Comments Date Status   Improve oliver cervical rotation to 50 deg Less than 50 deg 6/14 ongoing   Able to stand more upright with more neutral cervical Not able to stand as 4/12/24erect due to pain 6/14 ongoing   Improve passive hip ext to 10 deg Right hip ext to -5 deg 6/14 ongoing   Able to walk x 20 min without rollator before sitting He walked more upright without a rwx today 6/14 ongoing   Improve oliver shoulder elevation to 130 deg Not assessed 6/14 ongoing   Ind with HEP for flexibility and stability Working on anterior mobs of right pelvis in pool with a lunges stretch 6/14 ongoing      Assessment & Plan       Assessment  Impairments: abnormal muscle firing, abnormal muscle tone, abnormal or restricted ROM, activity intolerance, impaired physical strength, lacks appropriate home exercise program and pain with function   Functional limitations: walking, uncomfortable because of pain, moving in bed, sitting, standing, reaching overhead and unable to perform repetitive tasks   Prognosis: good    Plan  Therapy options: will be seen for skilled therapy services  Planned modality interventions: dry needling, low level laser therapy, TENS and traction  Planned therapy interventions: manual therapy, neuromuscular re-education, spinal/joint mobilization, home exercise program, body mechanics training,  stretching, therapeutic activities, strengthening, soft tissue mobilization and postural training  Frequency: 2x week  Duration in weeks: 12  Treatment plan discussed with: patient         ASSESSMENT:   His pain has been responding to the mobs of his pelvis. We've had to go slow because his pain can spasm very quickly. Anterior mob of his right pelvis has given us the best responses but we have to be careful to not strain his back.     PLAN:   Cont to slow try to mobilize his pelvis and progress with stability.     SIGNATURE: Juarez Hartmann, PT, [place] License #: [id number]  Electronically Signed on 6/14/2024        [address] [place] [address]. [address]  [phone]

## 2024-06-25 ENCOUNTER — TREATMENT (OUTPATIENT)
Dept: PHYSICAL THERAPY | Facility: CLINIC | Age: 69
End: 2024-06-25
Payer: MEDICARE

## 2024-06-25 DIAGNOSIS — S16.1XXA STRAIN OF NECK MUSCLE, INITIAL ENCOUNTER: Primary | ICD-10-CM

## 2024-06-25 DIAGNOSIS — M25.552 LEFT HIP PAIN: ICD-10-CM
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
## 2024-06-28 ENCOUNTER — TREATMENT (OUTPATIENT)
Dept: PHYSICAL THERAPY | Facility: CLINIC | Age: 69
End: 2024-06-28
Payer: MEDICARE

## 2024-06-28 DIAGNOSIS — M25.552 LEFT HIP PAIN: ICD-10-CM

## 2024-06-28 DIAGNOSIS — S16.1XXA STRAIN OF NECK MUSCLE, INITIAL ENCOUNTER: Primary | ICD-10-CM

## 2024-06-28 NOTE — PROGRESS NOTES
Physical Therapy Treatment Note  [address] [place] [address]    Patient: Carlos Hayes                                                 Visit Date: 2024  :     1955    Referring practitioner:    Chris Russ MD  Date of Initial Visit:          Type: THERAPY  Noted: 10/26/2023    Patient seen for 43 sessions    Visit Diagnoses:    ICD-10-CM ICD-9-CM   1. Strain of neck muscle, initial encounter  S16.1XXA 847.0   2. Left hip pain  M25.552 719.45     SUBJECTIVE     Subjective:  He was    PAIN: right groin/inner thigh 5/10; 5-6 after       OBJECTIVE     Objective     Manual Therapy     [id number]  Comments   Percussive IASTM using Powerboost to right piriformis, right PSIS and IP at L2 using ball attachment      Sideyling right pelvic anterior rotation mob Gr 3 sustained; gentle   Sidelying ilium caudal mob Gr 3 sustained   Right anterior pelvi rotation stretch Sidelying with right hip ext and PSIS PA mobs           Timed Minutes 35     Therapy Education/Self Care 58557   Education offered today Try hip abd and gentle hip flex/ext in pool instead of squats   [employer] Code Access Code: [email or web address]  URL: [email or web address]   Ongoing HEP     Date: 10/26/2023  Prepared by: Juarez Hartmann    Exercises  - Forward and Backward Stepping at Pool Wall  - 1 x daily - 7 x weekly - 2 sets - 10 reps  - Standing Hip Abduction Adduction at Pool Wall  - 1 x daily - 7 x weekly - 2 sets - 10 reps  - Standing Hip Abduction Adduction at Pool Wall (Mirrored)  - 1 x daily - 7 x weekly - 2 sets - 10 reps  - Standing Hip Flexion Extension at Pool Wall  - 1 x daily - 7 x weekly - 2 sets - 10 reps  - Standing Hip Flexion Extension at Pool Wall (Mirrored)  - 1 x daily - 7 x weekly - 2 sets - 10 reps  - Bilateral Shoulder Horizontal Abduction Adduction AROM at Pool Wall  - 1 x daily - 7 x weekly - 2 sets - 10 reps  - Bilateral Shoulder Flexion Extension AROM  at Pool Wall  - 1 x daily - 7 x weekly - 2 sets - 10 reps  - Shoulder Flexion Extension Side to Side with Pool Noodle  - 1 x daily - 7 x weekly - 2 sets - 10 reps  - Plank with Hip Extension at Pool Wall  - 1 x daily - 7 x weekly - 2 sets - 10 reps   Timed Minutes      Total Timed Treatment:     35  mins  Total Time of Visit:             35   mins    ASSESSMENT/PLAN     GOALS:  Goals                                          Progress Note due by 7/14/24                                                      Recert due by 8/13/24   LTG by: 12 weeks Comments Date Status   Improve oliver cervical rotation to 50 deg Less than 50 deg 6/14 ongoing   Able to stand more upright with more neutral cervical Not able to stand as 4/12/24erect due to pain 6/14 ongoing   Improve passive hip ext to 10 deg Right hip ext to -5 deg 6/14 ongoing   Able to walk x 20 min without rollator before sitting He walked more upright without a rwx today 6/14 ongoing   Improve oliver shoulder elevation to 130 deg Not assessed 6/14 ongoing   Ind with HEP for flexibility and stability Working on anterior mobs of right pelvis in pool with a lunges stretch 6/14 ongoing      Assessment/Plan     ASSESSMENT:   He feels like he is loosening and moving better with the current regimen we are working on.    PLAN:   Cont to slow try to mobilize his pelvis and progress with stability.     SIGNATURE: Juarez Hartmann, PT, [place] License #: [id number]  Electronically Signed on 6/28/2024        [address] [place] [address] [place] [address]  [phone]

## 2024-07-02 ENCOUNTER — TREATMENT (OUTPATIENT)
Dept: PHYSICAL THERAPY | Facility: CLINIC | Age: 69
End: 2024-07-02
Payer: MEDICARE

## 2024-07-02 DIAGNOSIS — M25.552 LEFT HIP PAIN: ICD-10-CM

## 2024-07-02 DIAGNOSIS — S16.1XXA STRAIN OF NECK MUSCLE, INITIAL ENCOUNTER: Primary | ICD-10-CM

## 2024-07-02 NOTE — PROGRESS NOTES
Physical Therapy Treatment Note  [address] [place] [address]    Patient: Carlos Hayes                                                 Visit Date: 2024  :     1955    Referring practitioner:    Chris Russ MD  Date of Initial Visit:          Type: THERAPY  Noted: 10/26/2023    Patient seen for 44 sessions    Visit Diagnoses:    ICD-10-CM ICD-9-CM   1. Strain of neck muscle, initial encounter  S16.1XXA 847.0   2. Left hip pain  M25.552 719.45     SUBJECTIVE     Subjective:  He says he is back to sleeping in the chair.     PAIN: right groin/inner thigh 5/10; 5-6 after       OBJECTIVE     Objective     Manual Therapy     [id number]  Comments   Percussive IASTM using Powerboost to right piriformis, right PSIS and at L2 using ball attachment      Sideyling right pelvic anterior rotation mob Gr 3 sustained; gentle   Sidelying ilium caudal mob Gr 3 sustained   Right anterior pelvis rotation stretch Sidelying with right hip ext and PSIS PA mobs   Right knee to chest with abd sustained       Timed Minutes 55     Therapy Education/Self Care [id number]   Education offered today Try hip abd and gentle hip flex/ext in pool instead of squats   [employer] Code Access Code: [email or web address]  URL: [email or web address]   Ongoing HEP     Date: 10/26/2023  Prepared by: Juarez Hartmann    Exercises  - Forward and Backward Stepping at Pool Wall  - 1 x daily - 7 x weekly - 2 sets - 10 reps  - Standing Hip Abduction Adduction at Pool Wall  - 1 x daily - 7 x weekly - 2 sets - 10 reps  - Standing Hip Abduction Adduction at Pool Wall (Mirrored)  - 1 x daily - 7 x weekly - 2 sets - 10 reps  - Standing Hip Flexion Extension at Pool Wall  - 1 x daily - 7 x weekly - 2 sets - 10 reps  - Standing Hip Flexion Extension at Pool Wall (Mirrored)  - 1 x daily - 7 x weekly - 2 sets - 10 reps  - Bilateral Shoulder Horizontal Abduction Adduction AROM at Pool Wall  - 1 x daily - 7 x  weekly - 2 sets - 10 reps  - Bilateral Shoulder Flexion Extension AROM at Pool Wall  - 1 x daily - 7 x weekly - 2 sets - 10 reps  - Shoulder Flexion Extension Side to Side with Pool Noodle  - 1 x daily - 7 x weekly - 2 sets - 10 reps  - Plank with Hip Extension at Pool Wall  - 1 x daily - 7 x weekly - 2 sets - 10 reps   Timed Minutes      Total Timed Treatment:     55  mins  Total Time of Visit:             55   mins    ASSESSMENT/PLAN     GOALS:  Goals                                          Progress Note due by 7/14/24                                                      Recert due by 8/13/24   LTG by: 12 weeks Comments Date Status   Improve oliver cervical rotation to 50 deg Less than 50 deg 6/14 ongoing   Able to stand more upright with more neutral cervical Not able to stand as 4/12/24erect due to pain 6/14 ongoing   Improve passive hip ext to 10 deg Right hip ext to -5 deg 6/14 ongoing   Able to walk x 20 min without rollator before sitting He walked more upright without a rwx today 6/14 ongoing   Improve oliver shoulder elevation to 130 deg Not assessed 6/14 ongoing   Ind with HEP for flexibility and stability Working on anterior mobs of right pelvis in pool with a lunges stretch 6/14 ongoing      Assessment/Plan     ASSESSMENT:   He is still in quite a bit of pain but standing more upright now.     PLAN:   Cont to slow try to mobilize his pelvis and progress with stability.     SIGNATURE: Juarez Hartmann, PT, [place] License #: [id number]  Electronically Signed on 7/2/2024        [address] [place] [address]  [place] [address]  [phone]

## 2024-07-05 ENCOUNTER — TREATMENT (OUTPATIENT)
Dept: PHYSICAL THERAPY | Facility: CLINIC | Age: 69
End: 2024-07-05
Payer: MEDICARE

## 2024-07-05 DIAGNOSIS — M25.552 LEFT HIP PAIN: ICD-10-CM

## 2024-07-05 DIAGNOSIS — S16.1XXA STRAIN OF NECK MUSCLE, INITIAL ENCOUNTER: Primary | ICD-10-CM

## 2024-07-05 NOTE — PROGRESS NOTES
Physical Therapy Treatment Note and 30 Day Progress Note  [address] [place] [address]    Patient: Carlos Hayes                                                 Visit Date: 2024  :     1955    Referring practitioner:    Chris Russ MD  Date of Initial Visit:          Type: THERAPY  Noted: 10/26/2023    Patient seen for 45 sessions    Visit Diagnoses:    ICD-10-CM ICD-9-CM   1. Strain of neck muscle, initial encounter  S16.1XXA 847.0   2. Left hip pain  M25.552 719.45     SUBJECTIVE     Subjective:  He says he is back to sleeping in the chair.     PAIN: right groin/inner thigh 5/10; 5-6 after       OBJECTIVE     Objective     Manual Therapy     [id number]  Comments   Percussive IASTM using Powerboost to right piriformis, right IPand at L2 using ball attachment      Sideyling right pelvic anterior rotation mob Gr 3 sustained; gentle   Sidelying ilium caudal mob Gr 3 sustained   Right anterior pelvis rotation stretch Sidelying with right hip ext and PSIS PA mobs   Right knee to chest with abd sustained       Timed Minutes 45     Therapy Education/Self Care [id number]   Education offered today Try hip abd and gentle hip flex/ext in pool instead of squats   [employer] Code Access Code: [email or web address]  URL: [email or web address]   Ongoing HEP     Date: 10/26/2023  Prepared by: Juarez Hartmann    Exercises  - Forward and Backward Stepping at Pool Wall  - 1 x daily - 7 x weekly - 2 sets - 10 reps  - Standing Hip Abduction Adduction at Pool Wall  - 1 x daily - 7 x weekly - 2 sets - 10 reps  - Standing Hip Abduction Adduction at Pool Wall (Mirrored)  - 1 x daily - 7 x weekly - 2 sets - 10 reps  - Standing Hip Flexion Extension at Pool Wall  - 1 x daily - 7 x weekly - 2 sets - 10 reps  - Standing Hip Flexion Extension at Pool Wall (Mirrored)  - 1 x daily - 7 x weekly - 2 sets - 10 reps  - Bilateral Shoulder Horizontal Abduction Adduction AROM at Pool  Wall  - 1 x daily - 7 x weekly - 2 sets - 10 reps  - Bilateral Shoulder Flexion Extension AROM at Pool Wall  - 1 x daily - 7 x weekly - 2 sets - 10 reps  - Shoulder Flexion Extension Side to Side with Pool Noodle  - 1 x daily - 7 x weekly - 2 sets - 10 reps  - Plank with Hip Extension at Pool Wall  - 1 x daily - 7 x weekly - 2 sets - 10 reps   Timed Minutes      Total Timed Treatment:     45  mins  Total Time of Visit:             45   mins    ASSESSMENT/PLAN     GOALS:  Goals                                          Progress Note due by 8/4/24                                                      Recert due by 8/13/24   LTG by: 12 weeks Comments Date Status   Improve oliver cervical rotation to 50 deg Less than 50 deg 7/5 ongoing   Able to stand more upright with more neutral cervical Not able to stand as 4/12/24erect due to pain 7/5 ongoing   Improve passive hip ext to 10 deg Right hip ext to -5 deg 7/5 ongoing   Able to walk x 20 min without rollator before sitting He walked more upright without a rwx today 7/5 ongoing   Improve oliver shoulder elevation to 130 deg Not assessed 7/5 ongoing   Ind with HEP for flexibility and stability Working on anterior mobs of right pelvis in pool with a lunges stretch 7/5 ongoing      Assessment & Plan       Assessment  Impairments: abnormal muscle firing, abnormal muscle tone, abnormal or restricted ROM, activity intolerance, impaired physical strength, lacks appropriate home exercise program and pain with function   Functional limitations: walking, uncomfortable because of pain, moving in bed, sitting, standing, reaching overhead and unable to perform repetitive tasks   Prognosis: good    Plan  Therapy options: will be seen for skilled therapy services  Planned modality interventions: dry needling, low level laser therapy, TENS and traction  Planned therapy interventions: manual therapy, neuromuscular re-education, spinal/joint mobilization, home exercise program, body mechanics  "training, stretching, therapeutic activities, strengthening, soft tissue mobilization and postural training  Frequency: 2x week  Duration in weeks: 12  Treatment plan discussed with: patient       ASSESSMENT:   He is slowly improving with his mobility. He is standing as upright as I\"ve seen him since before this episode started.     PLAN:   Cont to slow try to mobilize his pelvis and progress with stability.     SIGNATURE: Juarez Hartmann, PT, [place] License #: [id number]  Electronically Signed on 7/5/2024        [address] [place] [address] [place] [address]  [phone]  "

## 2024-07-25 ENCOUNTER — TREATMENT (OUTPATIENT)
Dept: PHYSICAL THERAPY | Facility: CLINIC | Age: 69
End: 2024-07-25
Payer: MEDICARE

## 2024-07-25 DIAGNOSIS — M25.552 LEFT HIP PAIN: ICD-10-CM

## 2024-07-25 DIAGNOSIS — S16.1XXA STRAIN OF NECK MUSCLE, INITIAL ENCOUNTER: Primary | ICD-10-CM

## 2024-07-25 NOTE — PROGRESS NOTES
Physical Therapy Treatment Note  [address] [place] [address]    Patient: Carlos Hayes                                                 Visit Date: 2024  :     1955    Referring practitioner:    Chris Russ MD  Date of Initial Visit:          Type: THERAPY  Noted: 10/26/2023    Patient seen for 46 sessions    Visit Diagnoses:    ICD-10-CM ICD-9-CM   1. Strain of neck muscle, initial encounter  S16.1XXA 847.0   2. Left hip pain  M25.552 719.45     SUBJECTIVE     Subjective:  He says his right groin and back and neck are hurting worse today.     PAIN: right groin/inner thigh 7/10; 5-6 after       OBJECTIVE     Objective     Manual Therapy     [id number]  Comments   Percussive IASTM using Powerboost to right piriformis, right IPand at L2 using ball attachment      Sideyling right pelvic anterior rotation mob Gr 3 sustained; gentle   Sidelying ilium caudal mob Gr 3 sustained   Sidelying right CT ext mobs            Timed Minutes 45     Therapy Education/Self Care [id number]   Education offered today Try hip abd and gentle hip flex/ext in pool instead of squats   [employer] Code Access Code: [email or web address]  URL: [email or web address]   Ongoing HEP     Date: 10/26/2023  Prepared by: Juarez Hartmann    Exercises  - Forward and Backward Stepping at Pool Wall  - 1 x daily - 7 x weekly - 2 sets - 10 reps  - Standing Hip Abduction Adduction at Pool Wall  - 1 x daily - 7 x weekly - 2 sets - 10 reps  - Standing Hip Abduction Adduction at Pool Wall (Mirrored)  - 1 x daily - 7 x weekly - 2 sets - 10 reps  - Standing Hip Flexion Extension at Pool Wall  - 1 x daily - 7 x weekly - 2 sets - 10 reps  - Standing Hip Flexion Extension at Pool Wall (Mirrored)  - 1 x daily - 7 x weekly - 2 sets - 10 reps  - Bilateral Shoulder Horizontal Abduction Adduction AROM at Pool Wall  - 1 x daily - 7 x weekly - 2 sets - 10 reps  - Bilateral Shoulder Flexion Extension AROM  at Pool Wall  - 1 x daily - 7 x weekly - 2 sets - 10 reps  - Shoulder Flexion Extension Side to Side with Pool Noodle  - 1 x daily - 7 x weekly - 2 sets - 10 reps  - Plank with Hip Extension at Pool Wall  - 1 x daily - 7 x weekly - 2 sets - 10 reps   Timed Minutes      Total Timed Treatment:     45  mins  Total Time of Visit:             45   mins    ASSESSMENT/PLAN     GOALS:  Goals                                          Progress Note due by 8/4/24                                                      Recert due by 8/13/24   LTG by: 12 weeks Comments Date Status   Improve oliver cervical rotation to 50 deg Less than 50 deg 7/5 ongoing   Able to stand more upright with more neutral cervical Not able to stand as erect due to pain 7/25 ongoing   Improve passive hip ext to 10 deg Right hip ext to -5 deg 7/5 ongoing   Able to walk x 20 min without rollator before sitting He walked more upright without a rwx today 7/5 ongoing   Improve oliver shoulder elevation to 130 deg Not assessed 7/5 ongoing   Ind with HEP for flexibility and stability Working on anterior mobs of right pelvis in pool with a lunges stretch 7/5 ongoing      Assessment/Plan     ASSESSMENT:   He is slowly improving and standing more upright.     PLAN:   Cont to slow try to mobilize his pelvis and progress with stability.     SIGNATURE: Juarez Hartmann, PT, [place] License #: [id number]  Electronically Signed on 7/25/2024        [address] [place] [address], [place] [address]  [phone]

## 2024-07-30 ENCOUNTER — TREATMENT (OUTPATIENT)
Dept: PHYSICAL THERAPY | Facility: CLINIC | Age: 69
End: 2024-07-30
Payer: MEDICARE

## 2024-07-30 DIAGNOSIS — S16.1XXA STRAIN OF NECK MUSCLE, INITIAL ENCOUNTER: Primary | ICD-10-CM

## 2024-07-30 DIAGNOSIS — M25.552 LEFT HIP PAIN: ICD-10-CM

## 2024-07-30 NOTE — PROGRESS NOTES
Physical Therapy Treatment Note  [address] [place] [address]    Patient: Carlos Hayes                                                 Visit Date: 2024  :     1955    Referring practitioner:    Chris Russ MD  Date of Initial Visit:          Type: THERAPY  Noted: 10/26/2023    Patient seen for 47 sessions    Visit Diagnoses:    ICD-10-CM ICD-9-CM   1. Strain of neck muscle, initial encounter  S16.1XXA 847.0   2. Left hip pain  M25.552 719.45     SUBJECTIVE     Subjective:  He has been talking with his pain management MD to try to get off some pain meds. This didn't work for him and he wanted to go back to his original dose but he wasn't allowed. So now his pain is worse.     PAIN: right groin/inner thigh 7/10; 5-6 after           OBJECTIVE     Objective     Manual Therapy     11804  Comments   Percussive IASTM using Powerboost to right piriformis, right IPand at L2 using ball attachment and right UT using spade attachment      Sideyling right pelvic anterior rotation mob with right hip passive ext Gr 3 sustained; gentle       Sidelying right CT ext mobs            Timed Minutes 45     Therapy Education/Self Care 25727   Education offered today Try hip abd and gentle hip flex/ext in pool instead of squats   [employer] Code Access Code: [email or web address]  URL: [email or web address]   Ongoing HEP     Date: 10/26/2023  Prepared by: Juarez Hartmann    Exercises  - Forward and Backward Stepping at Pool Wall  - 1 x daily - 7 x weekly - 2 sets - 10 reps  - Standing Hip Abduction Adduction at Pool Wall  - 1 x daily - 7 x weekly - 2 sets - 10 reps  - Standing Hip Abduction Adduction at Pool Wall (Mirrored)  - 1 x daily - 7 x weekly - 2 sets - 10 reps  - Standing Hip Flexion Extension at Pool Wall  - 1 x daily - 7 x weekly - 2 sets - 10 reps  - Standing Hip Flexion Extension at Pool Wall (Mirrored)  - 1 x daily - 7 x weekly - 2 sets - 10  reps  - Bilateral Shoulder Horizontal Abduction Adduction AROM at Pool Wall  - 1 x daily - 7 x weekly - 2 sets - 10 reps  - Bilateral Shoulder Flexion Extension AROM at Pool Wall  - 1 x daily - 7 x weekly - 2 sets - 10 reps  - Shoulder Flexion Extension Side to Side with Pool Noodle  - 1 x daily - 7 x weekly - 2 sets - 10 reps  - Plank with Hip Extension at Pool Wall  - 1 x daily - 7 x weekly - 2 sets - 10 reps   Timed Minutes      Total Timed Treatment:     45  mins  Total Time of Visit:             45   mins    ASSESSMENT/PLAN     GOALS:  Goals                                          Progress Note due by 8/4/24                                                      Recert due by 8/13/24   LTG by: 12 weeks Comments Date Status   Improve oliver cervical rotation to 50 deg Less than 50 deg 7/5 ongoing   Able to stand more upright with more neutral cervical Not able to stand as erect due to pain 7/25 ongoing   Improve passive hip ext to 10 deg Right hip ext to -5 deg 7/5 ongoing   Able to walk x 20 min without rollator before sitting He walked more upright without a rwx today 7/5 ongoing   Improve oliver shoulder elevation to 130 deg Not assessed 7/5 ongoing   Ind with HEP for flexibility and stability Working on anterior mobs of right pelvis in pool with a lunges stretch 7/5 ongoing      Assessment/Plan     ASSESSMENT:   He wasn't as bad today on his hip but his neck was stiffer    PLAN:   Cont to slow try to mobilize his pelvis and progress with stability.     SIGNATURE: Juarez Hartmann, PT, [place] License #: [id number]  Electronically Signed on 7/30/2024        [address] [place] [address] [place] [address]  [phone]   not applicable

## 2024-08-02 ENCOUNTER — TREATMENT (OUTPATIENT)
Dept: PHYSICAL THERAPY | Facility: CLINIC | Age: 69
End: 2024-08-02
Payer: MEDICARE

## 2024-08-02 DIAGNOSIS — S16.1XXA STRAIN OF NECK MUSCLE, INITIAL ENCOUNTER: Primary | ICD-10-CM

## 2024-08-02 DIAGNOSIS — M25.552 LEFT HIP PAIN: ICD-10-CM

## 2024-08-02 NOTE — PROGRESS NOTES
Physical Therapy Treatment Note  [address] [address]    Patient: Carlos Hayes                                                 Visit Date: 2024  :     1955    Referring practitioner:    Chris Russ MD  Date of Initial Visit:          Type: THERAPY  Noted: 10/26/2023    Patient seen for 48 sessions    Visit Diagnoses:    ICD-10-CM ICD-9-CM   1. Strain of neck muscle, initial encounter  S16.1XXA 847.0   2. Left hip pain  M25.552 719.45     SUBJECTIVE     Subjective:  He says his pain management MD has asked him to seek another facility. Overall, right now, he's doing OK.     PAIN: right groin/inner thigh 7/10; 5-6 after       OBJECTIVE     Objective     Manual Therapy     99162  Comments   Percussive IASTM using Powerboost to right piriformis, right IPand at L2 using ball attachment and right UT using spade attachment              Sidelying right CT ext mobs            Timed Minutes 45     Therapy Education/Self Care 27643   Education offered today Try hip abd and gentle hip flex/ext in pool instead of squats   [employer] Code Access Code: [email or web address]  URL: [email or web address]   Ongoing HEP     Date: 10/26/2023  Prepared by: Juarez Hartmann    Exercises  - Forward and Backward Stepping at Pool Wall  - 1 x daily - 7 x weekly - 2 sets - 10 reps  - Standing Hip Abduction Adduction at Pool Wall  - 1 x daily - 7 x weekly - 2 sets - 10 reps  - Standing Hip Abduction Adduction at Pool Wall (Mirrored)  - 1 x daily - 7 x weekly - 2 sets - 10 reps  - Standing Hip Flexion Extension at Pool Wall  - 1 x daily - 7 x weekly - 2 sets - 10 reps  - Standing Hip Flexion Extension at Pool Wall (Mirrored)  - 1 x daily - 7 x weekly - 2 sets - 10 reps  - Bilateral Shoulder Horizontal Abduction Adduction AROM at Pool Wall  - 1 x daily - 7 x weekly - 2 sets - 10 reps  - Bilateral Shoulder Flexion Extension AROM at Pool Wall  - 1 x daily - 7 x  weekly - 2 sets - 10 reps  - Shoulder Flexion Extension Side to Side with Pool Noodle  - 1 x daily - 7 x weekly - 2 sets - 10 reps  - Plank with Hip Extension at Pool Wall  - 1 x daily - 7 x weekly - 2 sets - 10 reps   Timed Minutes      Total Timed Treatment:     45  mins  Total Time of Visit:             45   mins    ASSESSMENT/PLAN     GOALS:  Goals                                          Progress Note due by 8/4/24                                                      Recert due by 8/13/24   LTG by: 12 weeks Comments Date Status   Improve oliver cervical rotation to 50 deg Less than 50 deg 7/5 ongoing   Able to stand more upright with more neutral cervical Not able to stand as erect due to pain 7/25 ongoing   Improve passive hip ext to 10 deg Right hip ext to -5 deg 7/5 ongoing   Able to walk x 20 min without rollator before sitting He walked more upright without a rwx today 8/3 ongoing   Improve oliver shoulder elevation to 130 deg Not assessed 7/5 ongoing   Ind with HEP for flexibility and stability Working on anterior mobs of right pelvis in pool with a lunges stretch 7/5 ongoing      Assessment/Plan     ASSESSMENT:   He is moving better now with his pain meds on board. He has been trying to exercise in the pool as well.     PLAN:   Cont to slow try to mobilize his pelvis and progress with stability.     SIGNATURE: Juarez Hartmann, PT, [place] License #: [id number]  Electronically Signed on 8/2/2024        [address]. [id number]  [phone]

## 2024-09-10 ENCOUNTER — HOSPITAL ENCOUNTER (OUTPATIENT)
Dept: PHYSICAL THERAPY | Facility: HOSPITAL | Age: 69
Setting detail: THERAPIES SERIES
Discharge: HOME | End: 2024-09-10
Payer: MEDICARE

## 2024-09-10 ENCOUNTER — TRANSCRIBE ORDERS (OUTPATIENT)
Dept: PHYSICAL THERAPY | Facility: HOSPITAL | Age: 69
End: 2024-09-10
Payer: MEDICARE

## 2024-09-10 DIAGNOSIS — M25.552 LEFT HIP PAIN: ICD-10-CM

## 2024-09-10 DIAGNOSIS — M54.2 NECK PAIN: Primary | ICD-10-CM

## 2024-09-10 DIAGNOSIS — S16.1XXA STRAIN OF NECK MUSCLE, INITIAL ENCOUNTER: Primary | ICD-10-CM

## 2024-09-10 PROCEDURE — 97110 THERAPEUTIC EXERCISES: CPT | Performed by: PHYSICAL THERAPIST

## 2024-09-10 PROCEDURE — 97140 MANUAL THERAPY 1/> REGIONS: CPT | Performed by: PHYSICAL THERAPIST

## 2024-09-10 NOTE — THERAPY PROGRESS REPORT/RE-CERT
Physical Therapy Treatment Note, 30 Day Progress Note, and 90 Day Recertification Note  [address] [place] [address]    Patient: Carlos Hayes                                                 Visit Date: 9/10/2024  :     1955    Referring practitioner:    Chris Russ MD  Date of Initial Visit:          Type: THERAPY  Episode: Neck strain; left hip pain    Visit Diagnoses:    ICD-10-CM ICD-9-CM   1. Strain of neck muscle, initial encounter  S16.1XXA 847.0   2. Left hip pain  M25.552 719.45     SUBJECTIVE     Subjective:  He went on a hunting trip to [place] and has been off the last several weeks. He says he got stronger while he was there but he fell a couple of times. His right groin is bothering him again. His neck is his primary pain.     PAIN: 7-8/10       OBJECTIVE     Objective     Manual Therapy     77298  Comments   Percussive IASTM using Powerboost to right piriformis, right IPand at L2 using ball attachment and right UT using spade attachment        Sidelying right CT ext mobs      sidelying left post ilium inf mob Gr 3 sustained         Timed Minutes 45      Therapeutic Exercises    07650 Units Comments   Sidelying left hip ext stretch  sustained                       Timed Minutes 8       Therapy Education/Self Care 09023   Education offered today Try hip abd and gentle hip flex/ext in pool instead of squats   [employer] Code Access Code: [email or web address]  URL: [email or web address]   Ongoing HEP       Timed Minutes        Total Timed Treatment:     53  mins  Total Time of Visit:             53   mins     ASSESSMENT/PLAN      GOALS:        Goals                                          Progress Note due by 10/9/24                                                      Recert due by 24   LTG by: 12 weeks Comments Date Status   Improve oliver cervical rotation to 50 deg LR 25, RR 30 deg 9/10 ongoing   Able to stand more upright  with more neutral cervical Not able to stand as erect due to pain 9/10 ongoing   Improve passive hip ext to 10 deg Right hip ext to -10 deg 9/10 ongoing   Able to walk x 20 min without rollator before sitting He walked more upright without a rwx t 9/10 ongoing   Improve olivre shoulder elevation to 130 deg Not assessed 9/10 ongoing   Ind with HEP for flexibility and stability Working on anterior mobs of right pelvis in pool with a lunges stretch 9/10 ongoing     Assessment & Plan       Assessment  Impairments: abnormal muscle firing, abnormal muscle tone, abnormal or restricted ROM, activity intolerance, impaired physical strength, lacks appropriate home exercise program and pain with function   Functional limitations: lifting, walking, uncomfortable because of pain, sitting and standing   Prognosis: good    Plan  Therapy options: will be seen for skilled therapy services  Planned modality interventions: dry needling, low level laser therapy, TENS and traction  Planned therapy interventions: abdominal trunk stabilization, flexibility, functional ROM exercises, home exercise program, joint mobilization, manual therapy, motor coordination training, neuromuscular re-education, postural training, soft tissue mobilization, spinal/joint mobilization, strengthening, stretching and therapeutic activities  Frequency: 2x week  Duration in weeks: 12  Treatment plan discussed with: patient         ASSESSMENT:   He returns to PT after a long break from being out of the country. His neck was his primary issue today. He is still walking without the rwx and is finally recovering from his fall but is still quite stiff. PT is focusing on left hip extension mobility and flexibility through his upper girdle and thoracic spine.     PLAN:   Continue working on mobility and progress to strengthening.     SIGNATURE: Juarez Hartmann, PT, [place] License #: [id number]  Electronically Signed on 9/10/2024      Clinical Progress: worse  Home Program  Compliance: Yes  Progress toward previous goals: Partially Met      90 Day Recertification  Certification Period: 9/10/2024 through 12/8/2024  I certify that the therapy services are furnished while this patient is under my care.  The services outlined above are required by this patient, and will be reviewed every 90 days.     PHYSICIAN: Chris Russ MD (NPI: [id number])    Signature:___________________________________________DATE: _________    Please sign and return via fax to [phone].   Thank you so much for letting us work with Carlos. I appreciate your letting us work with your patients. If you have any questions or concerns, please don't hesitate to contact me.              [address] [address]  [phone]

## 2024-09-12 ENCOUNTER — HOSPITAL ENCOUNTER (OUTPATIENT)
Dept: PHYSICAL THERAPY | Facility: HOSPITAL | Age: 69
Setting detail: THERAPIES SERIES
Discharge: HOME | End: 2024-09-12
Payer: MEDICARE

## 2024-09-12 DIAGNOSIS — S16.1XXA STRAIN OF NECK MUSCLE, INITIAL ENCOUNTER: ICD-10-CM

## 2024-09-12 DIAGNOSIS — M25.552 LEFT HIP PAIN: Primary | ICD-10-CM

## 2024-09-12 PROCEDURE — 97110 THERAPEUTIC EXERCISES: CPT | Performed by: PHYSICAL THERAPIST

## 2024-09-12 PROCEDURE — 97140 MANUAL THERAPY 1/> REGIONS: CPT | Performed by: PHYSICAL THERAPIST

## 2024-09-12 NOTE — THERAPY TREATMENT NOTE
Physical Therapy Treatment Note  [facility]  [address], [place] [address]    Patient: Carlos Hayes                                                 Visit Date: 2024  :     1955    Referring practitioner:    Chris Russ MD  Date of Initial Visit:          Type: THERAPY  Episode: Neck strain; left hip pain    Visit Diagnoses:    ICD-10-CM ICD-9-CM   1. Left hip pain  M25.552 719.45   2. Strain of neck muscle, initial encounter  S16.1XXA 847.0     SUBJECTIVE     Subjective:  His neck is still his c/c but it's better than it was last time.     PAIN: 7.5/10       OBJECTIVE     Objective     Manual Therapy     84060  Comments   Percussive IASTM using Powerboost to right piriformis, right IPand at L2 using ball attachment and right UT using spade attachment        Sidelying right CT ext mobs      sidelying left post ilium inf mob Gr 3 sustained         Timed Minutes 40      Therapeutic Exercises    35187 Units Comments   Sidelying left hip ext and ER stretch   sustained                                   Timed Minutes 15         Therapy Education/Self Care 49539   Education offered today Try hip abd and gentle hip flex/ext in pool instead of squats   [employer] Code Access Code: [email or web address]  URL: [email or web address]   Ongoing HEP       Timed Minutes        Total Timed Treatment:     55  mins  Total Time of Visit:             55   mins     ASSESSMENT/PLAN      GOALS:            Goals                                          Progress Note due by 10/9/24                                                      Recert due by 24   LTG by: 12 weeks Comments Date Status   Improve oliver cervical rotation to 50 deg LR 25, RR 30 deg 9/10 ongoing   Able to stand more upright with more neutral cervical Not able to stand as erect due to pain 9/10 ongoing   Improve passive hip ext to 10 deg Right hip ext to -10 deg 9/10 ongoing   Able to  walk x 20 min without rollator before sitting He walked more upright without a rwx t 9/12 ongoing   Improve oliver shoulder elevation to 130 deg Not assessed 9/10 ongoing   Ind with HEP for flexibility and stability Working on anterior mobs of right pelvis in pool with a lunges stretch 9/10 ongoing      Anticipated CPT codes: Therapeutic Exercise 88485, Manual Therapy 77783, Therapeutic Activity 57668, Neuromuscular ReEducation 24175, and Self Care/Home Management 82854      Assessment/Plan     ASSESSMENT:   Today was better than his last visit. He was able to walk more upright and felt like the hip extension stretch made a big difference.     PLAN:   Continue focus on CT joint mobs and stretch of his hip and progress with stability exs.     SIGNATURE: Juarez Hartmann, PT, [place] License #: [id number]  Electronically Signed on 9/12/2024        [address] [place] [address]  [address]. [address]  [phone]

## 2024-09-13 ENCOUNTER — APPOINTMENT (OUTPATIENT)
Dept: PHYSICAL THERAPY | Facility: HOSPITAL | Age: 69
End: 2024-09-13
Payer: MEDICARE

## 2024-09-17 ENCOUNTER — HOSPITAL ENCOUNTER (OUTPATIENT)
Dept: PHYSICAL THERAPY | Facility: HOSPITAL | Age: 69
Setting detail: THERAPIES SERIES
Discharge: HOME | End: 2024-09-17
Payer: MEDICARE

## 2024-09-17 DIAGNOSIS — S16.1XXA STRAIN OF NECK MUSCLE, INITIAL ENCOUNTER: ICD-10-CM

## 2024-09-17 DIAGNOSIS — M25.552 LEFT HIP PAIN: Primary | ICD-10-CM

## 2024-09-17 PROCEDURE — 97140 MANUAL THERAPY 1/> REGIONS: CPT | Performed by: PHYSICAL THERAPIST

## 2024-09-19 ENCOUNTER — HOSPITAL ENCOUNTER (OUTPATIENT)
Dept: CT IMAGING | Facility: HOSPITAL | Age: 69
Discharge: HOME | End: 2024-09-19
Admitting: NURSE PRACTITIONER
Payer: MEDICARE

## 2024-09-19 DIAGNOSIS — Z87.891 PERSONAL HISTORY OF NICOTINE DEPENDENCE: ICD-10-CM

## 2024-09-19 PROCEDURE — 71271 CT THORAX LUNG CANCER SCR C-: CPT

## 2024-09-20 ENCOUNTER — TELEPHONE (OUTPATIENT)
Dept: PULMONOLOGY | Facility: CLINIC | Age: 69
End: 2024-09-20
Payer: MEDICARE

## 2024-09-20 ENCOUNTER — HOSPITAL ENCOUNTER (OUTPATIENT)
Dept: PHYSICAL THERAPY | Facility: HOSPITAL | Age: 69
Setting detail: THERAPIES SERIES
Discharge: HOME | End: 2024-09-20
Payer: MEDICARE

## 2024-09-20 DIAGNOSIS — M25.552 LEFT HIP PAIN: Primary | ICD-10-CM

## 2024-09-20 DIAGNOSIS — S16.1XXA STRAIN OF NECK MUSCLE, INITIAL ENCOUNTER: ICD-10-CM

## 2024-09-20 PROCEDURE — 97140 MANUAL THERAPY 1/> REGIONS: CPT | Performed by: PHYSICAL THERAPIST

## 2024-09-24 ENCOUNTER — APPOINTMENT (OUTPATIENT)
Dept: PHYSICAL THERAPY | Facility: HOSPITAL | Age: 69
End: 2024-09-24
Payer: MEDICARE

## 2024-09-24 ENCOUNTER — OFFICE VISIT (OUTPATIENT)
Dept: NEUROLOGY | Age: 69
End: 2024-09-24
Payer: MEDICARE

## 2024-09-24 VITALS
HEART RATE: 76 BPM | WEIGHT: 315 LBS | RESPIRATION RATE: 20 BRPM | HEIGHT: 76 IN | BODY MASS INDEX: 38.36 KG/M2 | DIASTOLIC BLOOD PRESSURE: 65 MMHG | SYSTOLIC BLOOD PRESSURE: 116 MMHG

## 2024-09-24 DIAGNOSIS — G47.10 HYPERSOMNOLENCE: ICD-10-CM

## 2024-09-24 DIAGNOSIS — G47.31 CENTRAL SLEEP APNEA: ICD-10-CM

## 2024-09-24 DIAGNOSIS — M54.42 CHRONIC BILATERAL LOW BACK PAIN WITH BILATERAL SCIATICA: ICD-10-CM

## 2024-09-24 DIAGNOSIS — G47.33 OBSTRUCTIVE SLEEP APNEA: Primary | ICD-10-CM

## 2024-09-24 DIAGNOSIS — M54.41 CHRONIC BILATERAL LOW BACK PAIN WITH BILATERAL SCIATICA: ICD-10-CM

## 2024-09-24 DIAGNOSIS — G89.29 CHRONIC BILATERAL LOW BACK PAIN WITH BILATERAL SCIATICA: ICD-10-CM

## 2024-09-24 PROCEDURE — G8417 CALC BMI ABV UP PARAM F/U: HCPCS | Performed by: PSYCHIATRY & NEUROLOGY

## 2024-09-24 PROCEDURE — 1036F TOBACCO NON-USER: CPT | Performed by: PSYCHIATRY & NEUROLOGY

## 2024-09-24 PROCEDURE — 3078F DIAST BP <80 MM HG: CPT | Performed by: PSYCHIATRY & NEUROLOGY

## 2024-09-24 PROCEDURE — 1123F ACP DISCUSS/DSCN MKR DOCD: CPT | Performed by: PSYCHIATRY & NEUROLOGY

## 2024-09-24 PROCEDURE — 99213 OFFICE O/P EST LOW 20 MIN: CPT | Performed by: PSYCHIATRY & NEUROLOGY

## 2024-09-24 PROCEDURE — G8427 DOCREV CUR MEDS BY ELIG CLIN: HCPCS | Performed by: PSYCHIATRY & NEUROLOGY

## 2024-09-24 PROCEDURE — 3017F COLORECTAL CA SCREEN DOC REV: CPT | Performed by: PSYCHIATRY & NEUROLOGY

## 2024-09-24 PROCEDURE — 3074F SYST BP LT 130 MM HG: CPT | Performed by: PSYCHIATRY & NEUROLOGY

## 2024-09-27 ENCOUNTER — HOSPITAL ENCOUNTER (OUTPATIENT)
Dept: PHYSICAL THERAPY | Facility: HOSPITAL | Age: 69
Setting detail: THERAPIES SERIES
Discharge: HOME | End: 2024-09-27
Payer: MEDICARE

## 2024-09-27 DIAGNOSIS — S16.1XXA STRAIN OF NECK MUSCLE, INITIAL ENCOUNTER: ICD-10-CM

## 2024-09-27 DIAGNOSIS — M25.552 LEFT HIP PAIN: Primary | ICD-10-CM

## 2024-09-27 PROCEDURE — 97140 MANUAL THERAPY 1/> REGIONS: CPT | Performed by: PHYSICAL THERAPIST

## 2024-10-01 ENCOUNTER — HOSPITAL ENCOUNTER (OUTPATIENT)
Dept: PHYSICAL THERAPY | Facility: HOSPITAL | Age: 69
Setting detail: THERAPIES SERIES
Discharge: HOME | End: 2024-10-01
Payer: MEDICARE

## 2024-10-01 DIAGNOSIS — S16.1XXA STRAIN OF NECK MUSCLE, INITIAL ENCOUNTER: ICD-10-CM

## 2024-10-01 DIAGNOSIS — M25.552 LEFT HIP PAIN: Primary | ICD-10-CM

## 2024-10-01 PROCEDURE — 97140 MANUAL THERAPY 1/> REGIONS: CPT | Performed by: PHYSICAL THERAPIST

## 2024-10-01 NOTE — THERAPY TREATMENT NOTE
Physical Therapy Treatment Note  [facility] Outpatient Therapy Services  A department [address], [place] [address]    Patient: Carlos Hayes                                                 Visit Date: 10/1/2024  :     1955    Referring practitioner:    Chris Russ MD  Date of Initial Visit:          Type: THERAPY  Episode: Neck strain; left hip pain    Visit Diagnoses:    ICD-10-CM ICD-9-CM   1. Left hip pain  M25.552 719.45   2. Strain of neck muscle, initial encounter  S16.1XXA 847.0       SUBJECTIVE     Subjective:  He says last night his back locked up on him.     PAIN: 8/10       OBJECTIVE     Objective     Manual Therapy     53520  Comments   Percussive IASTM using Powerboost to right piriformis, right IPand at L2 using ball attachment and right UT using spade attachment        Sidelying right CT ext mobs      sidelying left post ilium inf mob Gr 3 sustained    sidelying left anterior ilium mob Left leg into ext   Timed Minutes 45          Therapy Education/Self Care 48806   Education offered today Try hip abd and gentle hip flex/ext in pool instead of squats   Anna Code Access Code: [email or web address]  URL: [email or web address]   Ongoing HEP       Timed Minutes        Total Timed Treatment:     45  mins  Total Time of Visit:             45   mins     ASSESSMENT/PLAN      GOALS:            Goals                                          Progress Note due by 10/9/24                                                      Recert due by 24   LTG by: 12 weeks Comments Date Status   Improve oliver cervical rotation to 50 deg LR 25, RR 30 deg 9/10 ongoing   Able to stand more upright with more neutral cervical Not able to stand as erect due to pain 9/10 ongoing   Improve passive hip ext to 10 deg Right hip ext to -10 deg 9/10 ongoing   Able to walk x 20 min without rollator before sitting He walked more upright without a rwx t  ongoing   Improve oliver shoulder  elevation to 130 deg Not assessed 9/10 ongoing   Ind with HEP for flexibility and stability Working on anterior mobs of right pelvis in pool with a lunges stretch 9/10 ongoing      Anticipated CPT codes: Therapeutic Exercise 33120, Manual Therapy 50999, Therapeutic Activity 07246, Neuromuscular ReEducation 14010, and Self Care/Home Management 22815      Assessment/Plan     ASSESSMENT:   His left SIJ was more stuck today than it has been    PLAN:   Continue focus on CT joint mobs and stretch of his hip and progress with stability exs.     SIGNATURE: Juarez Hartmann, PT, [place] License #: [id number]  Electronically Signed on 10/1/2024        [address] [place] [address]. [address]  [phone]

## 2024-10-04 ENCOUNTER — HOSPITAL ENCOUNTER (OUTPATIENT)
Dept: PHYSICAL THERAPY | Facility: HOSPITAL | Age: 69
Setting detail: THERAPIES SERIES
Discharge: HOME | End: 2024-10-04
Payer: MEDICARE

## 2024-10-04 DIAGNOSIS — M25.552 LEFT HIP PAIN: Primary | ICD-10-CM

## 2024-10-04 DIAGNOSIS — S16.1XXA STRAIN OF NECK MUSCLE, INITIAL ENCOUNTER: ICD-10-CM

## 2024-10-04 PROCEDURE — 97140 MANUAL THERAPY 1/> REGIONS: CPT | Performed by: PHYSICAL THERAPIST

## 2024-10-04 NOTE — THERAPY TREATMENT NOTE
Physical Therapy Treatment Note  [facility]  A department [address], [place] [address]    Patient: Carlos Hayes                                                 Visit Date: 10/4/2024  :     1955    Referring practitioner:    Chris Russ MD  Date of Initial Visit:          Type: THERAPY  Episode: Neck strain; left hip pain    Visit Diagnoses:    ICD-10-CM ICD-9-CM   1. Left hip pain  M25.552 719.45   2. Strain of neck muscle, initial encounter  S16.1XXA 847.0       SUBJECTIVE     Subjective:  He says he just had injections in his neck.     PAIN: 8/10       OBJECTIVE     Objective     Manual Therapy     13584  Comments   Sidelying right UT STM        Sidelying right CT ext mobs      sidelying left post ilium inf mob Gr 3 sustained    sidelying left anterior ilium mob Left leg into ext   Timed Minutes 45          Therapy Education/Self Care 04240   Education offered today Try hip abd and gentle hip flex/ext in pool instead of squats   [employer] Code Access Code: [email or web address]  URL: [email or web address]   Ongoing HEP       Timed Minutes        Total Timed Treatment:     45  mins  Total Time of Visit:             45   mins     ASSESSMENT/PLAN      GOALS:            Goals                                          Progress Note due by 10/9/24                                                      Recert due by 24   LTG by: 12 weeks Comments Date Status   Improve oliver cervical rotation to 50 deg LR 25, RR 30 deg 9/10 ongoing   Able to stand more upright with more neutral cervical Not able to stand as erect due to pain 9/10 ongoing   Improve passive hip ext to 10 deg Right hip ext to -10 deg 9/10 ongoing   Able to walk x 20 min without rollator before sitting He walked more upright without a rwx t  ongoing   Improve oliver shoulder elevation to 130 deg Not assessed 9/10 ongoing   Ind with HEP for flexibility and stability Working on anterior  mobs of right pelvis in pool with a lunges stretch 9/10 ongoing      Anticipated CPT codes: Therapeutic Exercise 08618, Manual Therapy 28904, Therapeutic Activity 08797, Neuromuscular ReEducation 60261, and Self Care/Home Management 81020      Assessment/Plan     ASSESSMENT:   I was more gentle today given his injections and soreness.     PLAN:   Continue focus on CT joint mobs and stretch of his hip and progress with stability exs.     SIGNATURE: Juarez Hartmann, PT, [place] License #: [id number]  Electronically Signed on 10/4/2024        [address] [place] [address] [place] [address]  [phone]

## 2024-10-08 ENCOUNTER — HOSPITAL ENCOUNTER (OUTPATIENT)
Dept: PHYSICAL THERAPY | Facility: HOSPITAL | Age: 69
Setting detail: THERAPIES SERIES
Discharge: HOME | End: 2024-10-08
Payer: MEDICARE

## 2024-10-08 DIAGNOSIS — S16.1XXA STRAIN OF NECK MUSCLE, INITIAL ENCOUNTER: ICD-10-CM

## 2024-10-08 DIAGNOSIS — M25.552 LEFT HIP PAIN: Primary | ICD-10-CM

## 2024-10-08 PROCEDURE — 97140 MANUAL THERAPY 1/> REGIONS: CPT | Performed by: PHYSICAL THERAPIST

## 2024-10-08 NOTE — THERAPY TREATMENT NOTE
Physical Therapy Treatment Note and 30 Day Progress Note  [facility] Outpatient Therapy Services  A department [address], [place] [address]    Patient: Carlos Hayes                                                 Visit Date: 10/8/2024  :     1955    Referring practitioner:    Chris Russ MD  Date of Initial Visit:          Type: THERAPY  Episode: Neck strain; left hip pain    Visit Diagnoses:    ICD-10-CM ICD-9-CM   1. Left hip pain  M25.552 719.45   2. Strain of neck muscle, initial encounter  S16.1XXA 847.0       SUBJECTIVE     Subjective:  He says his back and hip flared up. He exercised in the pool.     PAIN: 8/10       OBJECTIVE     Objective     Manual Therapy     25136  Comments   Sidelying right UT STM        Sidelying right CT ext mobs      sidelying left post ilium inf mob Gr 3 sustained    sidelying left anterior ilium mob Left leg into ext   Timed Minutes 45          Therapy Education/Self Care 02932   Education offered today Try hip abd and gentle hip flex/ext in pool instead of squats   Anna Code Access Code: [email or web address]  URL: [email or web address]   Ongoing HEP       Timed Minutes        Total Timed Treatment:     45  mins  Total Time of Visit:             45   mins     ASSESSMENT/PLAN      GOALS:            Goals                                          Progress Note due by 24                                                      Recert due by 24   LTG by: 12 weeks Comments Date Status   Improve oliver cervical rotation to 50 deg LR 25, RR 30 deg 10/8 ongoing   Able to stand more upright with more neutral cervical Not able to stand as erect due to pain 10/8 ongoing   Improve passive hip ext to 10 deg Right hip ext to -10 deg 10/8 ongoing   Able to walk x 20 min without rollator before sitting Approaching 20 min but pain makes him sit; some days better than others 10/8 ongoing   Improve oliver shoulder elevation to 130 deg Not assessed  10/8 ongoing   Ind with HEP for flexibility and stability Working on anterior mobs of right pelvis in pool with a lunges stretch 10/8 ongoing      Anticipated CPT codes: Therapeutic Exercise 34574, Manual Therapy 22058, Therapeutic Activity 29274, Neuromuscular ReEducation 13024, and Self Care/Home Management 12225      Assessment & Plan       Assessment  Impairments: abnormal muscle firing, abnormal muscle tone, abnormal or restricted ROM, activity intolerance, impaired physical strength, lacks appropriate home exercise program and pain with function   Functional limitations: lifting, walking, uncomfortable because of pain, sitting and standing   Prognosis: good    Plan  Therapy options: will be seen for skilled therapy services  Planned modality interventions: dry needling, low level laser therapy, TENS and traction  Planned therapy interventions: abdominal trunk stabilization, flexibility, functional ROM exercises, home exercise program, joint mobilization, manual therapy, motor coordination training, neuromuscular re-education, postural training, soft tissue mobilization, spinal/joint mobilization, strengthening, stretching and therapeutic activities  Frequency: 2x week  Duration in weeks: 12  Treatment plan discussed with: patient         ASSESSMENT:   His back was locked up more but his neck is doing better. His new combination of pillos seems to be helping.     PLAN:   Continue focus on CT joint mobs and stretch of his hip and progress with stability exs.     SIGNATURE: Juarez Hartmann, PT, [place] License #: [id number]  Electronically Signed on 10/8/2024        [place] [address]  [phone]

## 2024-10-11 ENCOUNTER — HOSPITAL ENCOUNTER (OUTPATIENT)
Dept: PHYSICAL THERAPY | Facility: HOSPITAL | Age: 69
Setting detail: THERAPIES SERIES
Discharge: HOME | End: 2024-10-11
Payer: MEDICARE

## 2024-10-11 DIAGNOSIS — S16.1XXA STRAIN OF NECK MUSCLE, INITIAL ENCOUNTER: ICD-10-CM

## 2024-10-11 DIAGNOSIS — M25.552 LEFT HIP PAIN: Primary | ICD-10-CM

## 2024-10-11 PROCEDURE — 97140 MANUAL THERAPY 1/> REGIONS: CPT | Performed by: PHYSICAL THERAPIST

## 2024-10-11 NOTE — THERAPY TREATMENT NOTE
Physical Therapy Treatment Note  [facility] Outpatient Therapy Services  A department [address], [place] [address]    Patient: Carlos Hayes                                                 Visit Date: 10/11/2024  :     1955    Referring practitioner:    Chris Russ MD  Date of Initial Visit:          Type: THERAPY  Episode: Neck strain; left hip pain    Visit Diagnoses:    ICD-10-CM ICD-9-CM   1. Left hip pain  M25.552 719.45   2. Strain of neck muscle, initial encounter  S16.1XXA 847.0       SUBJECTIVE     Subjective:  He says after PT the last time, he vomitted several times. His neck is better overall and his back is better too.     PAIN: 7/10       OBJECTIVE     Objective     Manual Therapy     45382  Comments   Sidelying right UT STM        Sidelying right CT ext mobs      sidelying left post ilium inf mob Gr 3 sustained    sidelying left anterior ilium mob Left leg into ext   Timed Minutes 45          Therapy Education/Self Care 86272   Education offered today Try hip abd and gentle hip flex/ext in pool instead of squats   [employer] Code Access Code: [email or web address]  URL: [email or web address]   Ongoing HEP       Timed Minutes        Total Timed Treatment:     45  mins  Total Time of Visit:             45   mins     ASSESSMENT/PLAN      GOALS:             Goals                                          Progress Note due by 24                                                      Recert due by 24   LTG by: 12 weeks Comments Date Status   Improve oliver cervical rotation to 50 deg LR 25, RR 30 deg 10/8 ongoing   Able to stand more upright with more neutral cervical Not able to stand as erect due to pain 10/8 ongoing   Improve passive hip ext to 10 deg Right hip ext to -10 deg 10/8 ongoing   Able to walk x 20 min without rollator before sitting Approaching 20 min but pain makes him sit; some days better than others 10/8 ongoing   Improve oliver shoulder  elevation to 130 deg Not assessed 10/8 ongoing   Ind with HEP for flexibility and stability Working on anterior mobs of right pelvis in pool with a lunges stretch 10/8 ongoing       Anticipated CPT codes: Therapeutic Exercise 38501, Manual Therapy 53021, Therapeutic Activity 52220, Neuromuscular ReEducation 19207, and Self Care/Home Management 64131      Assessment/Plan     ASSESSMENT:   His back and neck are slowly improving.     PLAN:   Continue focus on CT joint mobs and stretch of his hip and progress with stability exs.     SIGNATURE: Juarez Hartmann, PT, [place] License #: [id number]  Electronically Signed on 10/11/2024        [address] [place] [address] [place] [address]  [phone]

## 2024-10-21 ENCOUNTER — HOSPITAL ENCOUNTER (OUTPATIENT)
Dept: PHYSICAL THERAPY | Facility: HOSPITAL | Age: 69
Setting detail: THERAPIES SERIES
Discharge: HOME | End: 2024-10-21
Payer: MEDICARE

## 2024-10-21 DIAGNOSIS — M25.552 LEFT HIP PAIN: Primary | ICD-10-CM

## 2024-10-21 DIAGNOSIS — S16.1XXA STRAIN OF NECK MUSCLE, INITIAL ENCOUNTER: ICD-10-CM

## 2024-10-21 PROCEDURE — 97110 THERAPEUTIC EXERCISES: CPT | Performed by: PHYSICAL THERAPIST

## 2024-10-21 PROCEDURE — 97140 MANUAL THERAPY 1/> REGIONS: CPT | Performed by: PHYSICAL THERAPIST

## 2024-10-21 NOTE — THERAPY TREATMENT NOTE
"Physical Therapy Treatment Note  [facility] Therapy [facility]  A department [address], [place] [address]    Patient: Carlos Hayes                                                 Visit Date: 10/21/2024  :     1955    Referring practitioner:    Chris Russ MD  Date of Initial Visit:          Type: THERAPY  Episode: Neck strain; left hip pain    Visit Diagnoses:    ICD-10-CM ICD-9-CM   1. Left hip pain  M25.552 719.45   2. Strain of neck muscle, initial encounter  S16.1XXA 847.0       SUBJECTIVE     Subjective:  He says his hips have been tightening up from walking today.     PAIN: 8/10       OBJECTIVE     Objective     Manual Therapy     96699  Comments   Sidelying right UT STM        Sidelying right CT ext mobs      sidelying left post ilium inf mob Gr 3 sustained    sidelying left anterior ilium mob Left leg into ext   Timed Minutes 30          Therapeutic Exercises    98414 Units Comments   Left SL isometric clams R 15\" x 5    Left SL hip ER stretch   sustained   Left SL hip ext stretch               Timed Minutes 15       Therapy Education/Self Care 22364   Education offered today Try hip abd and gentle hip flex/ext in pool instead of squats   D[employer] Code Access Code: [email or web address]  URL: [email or web address]   Ongoing HEP       Timed Minutes        Total Timed Treatment:     45  mins  Total Time of Visit:             45   mins     ASSESSMENT/PLAN      GOALS:             Goals                                          Progress Note due by 24                                                      Recert due by 24   LTG by: 12 weeks Comments Date Status   Improve oliver cervical rotation to 50 deg LR 25, RR 30 deg 10/8 ongoing   Able to stand more upright with more neutral cervical Not able to stand as erect due to pain 10/8 ongoing   Improve passive hip ext to 10 deg Right hip ext to -10 deg 10/21 ongoing   Able to walk x 20 min without rollator " before sitting Approaching 20 min but pain makes him sit; some days better than others 10/8 ongoing   Improve oliver shoulder elevation to 130 deg Not assessed 10/8 ongoing   Ind with HEP for flexibility and stability Working on anterior mobs of right pelvis in pool with a lunges stretch 10/8 ongoing       Anticipated CPT codes: Therapeutic Exercise 01078, Manual Therapy 54762, Therapeutic Activity 31859, Neuromuscular ReEducation 59961, and Self Care/Home Management 63053      Assessment/Plan     ASSESSMENT:   He continues to slowly get over his strain he suffered a few months ago. His hip is able to hold his stability better and he is doing his HEP to maintain.     PLAN:   Continue focus on CT joint mobs and stretch of his hip and progress with stability exs.     SIGNATURE: Juarez Hartmann, PT, [place] License #: [id number]  Electronically Signed on 10/21/2024        [address] [place] [address] [place]. [address]  [phone]

## 2024-10-25 ENCOUNTER — HOSPITAL ENCOUNTER (OUTPATIENT)
Dept: PHYSICAL THERAPY | Facility: HOSPITAL | Age: 69
Setting detail: THERAPIES SERIES
Discharge: HOME | End: 2024-10-25
Payer: MEDICARE

## 2024-10-25 DIAGNOSIS — S16.1XXA STRAIN OF NECK MUSCLE, INITIAL ENCOUNTER: ICD-10-CM

## 2024-10-25 DIAGNOSIS — M25.552 LEFT HIP PAIN: Primary | ICD-10-CM

## 2024-10-25 PROCEDURE — 97140 MANUAL THERAPY 1/> REGIONS: CPT | Performed by: PHYSICAL THERAPIST

## 2024-10-25 NOTE — THERAPY TREATMENT NOTE
Physical Therapy Treatment Note  [facility] Outpatient Therapy Services  A department [address], [place] [address]    Patient: Carlos Hayes                                                 Visit Date: 10/25/2024  :     1955    Referring practitioner:    Chris Russ MD  Date of Initial Visit:          Type: THERAPY  Episode: Neck strain; left hip pain    Visit Diagnoses:    ICD-10-CM ICD-9-CM   1. Left hip pain  M25.552 719.45   2. Strain of neck muscle, initial encounter  S16.1XXA 847.0       SUBJECTIVE     Subjective:  He says he feels like the PT is helping to keep him moving.     PAIN:        OBJECTIVE     Objective     Manual Therapy     24162  Comments   Percussive IASTM using Powerboost to right CT/UT/piri at L2 using spade attachment      Sidelying right UT STM        Sidelying right CT ext mobs      sidelying left post ilium inf mob Gr 3 sustained       Timed Minutes 45          Therapy Education/Self Care 86157   Education offered today Try hip abd and gentle hip flex/ext in pool instead of squats   [employer] Code Access Code: [email or web address]  URL: [email or web address]   Ongoing HEP       Timed Minutes        Total Timed Treatment:     45  mins  Total Time of Visit:             45   mins     ASSESSMENT/PLAN      GOALS:             Goals                                          Progress Note due by 24                                                      Recert due by 24   LTG by: 12 weeks Comments Date Status   Improve oliver cervical rotation to 50 deg LR 25, RR 30 deg 10/8 ongoing   Able to stand more upright with more neutral cervical Not able to stand as erect due to pain 10/8 ongoing   Improve passive hip ext to 10 deg Right hip ext to -10 deg 10/21 ongoing   Able to walk x 20 min without rollator before sitting Approaching 20 min but pain makes him sit; some days better than others 10/8 ongoing   Improve oliver shoulder elevation to 130 deg  Not assessed 10/8 ongoing   Ind with HEP for flexibility and stability Working on anterior mobs of right pelvis in pool with a lunges stretch 10/25 ongoing       Anticipated CPT codes: Therapeutic Exercise 86404, Manual Therapy 98946, Therapeutic Activity 83872, Neuromuscular ReEducation 52683, and Self Care/Home Management 58263      Assessment/Plan     ASSESSMENT:   We continued to work on relaxation of his neck and shoulder. He is moving better with overall less pain.     PLAN:   Continue focus on CT joint mobs and stretch of his hip and progress with stability exs.     SIGNATURE: Juarez Hartmann, PT, [place] License #: [id number]  Electronically Signed on 10/25/2024        [facility] [place] [address]  [phone]

## 2024-10-29 ENCOUNTER — APPOINTMENT (OUTPATIENT)
Dept: PHYSICAL THERAPY | Facility: HOSPITAL | Age: 69
End: 2024-10-29
Payer: MEDICARE

## 2024-11-04 ENCOUNTER — HOSPITAL ENCOUNTER (OUTPATIENT)
Dept: PHYSICAL THERAPY | Facility: HOSPITAL | Age: 69
Setting detail: THERAPIES SERIES
Discharge: HOME | End: 2024-11-04
Payer: MEDICARE

## 2024-11-04 DIAGNOSIS — S16.1XXA STRAIN OF NECK MUSCLE, INITIAL ENCOUNTER: ICD-10-CM

## 2024-11-04 DIAGNOSIS — M25.552 LEFT HIP PAIN: Primary | ICD-10-CM

## 2024-11-04 PROCEDURE — 97140 MANUAL THERAPY 1/> REGIONS: CPT | Performed by: PHYSICAL THERAPIST

## 2024-11-04 PROCEDURE — 97110 THERAPEUTIC EXERCISES: CPT | Performed by: PHYSICAL THERAPIST
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
## 2024-11-08 ENCOUNTER — HOSPITAL ENCOUNTER (OUTPATIENT)
Dept: PHYSICAL THERAPY | Facility: HOSPITAL | Age: 69
Setting detail: THERAPIES SERIES
Discharge: HOME | End: 2024-11-08
Payer: MEDICARE

## 2024-11-08 DIAGNOSIS — S16.1XXA STRAIN OF NECK MUSCLE, INITIAL ENCOUNTER: ICD-10-CM

## 2024-11-08 DIAGNOSIS — M25.552 LEFT HIP PAIN: Primary | ICD-10-CM

## 2024-11-08 PROCEDURE — 97140 MANUAL THERAPY 1/> REGIONS: CPT | Performed by: PHYSICAL THERAPIST

## 2024-11-08 NOTE — THERAPY TREATMENT NOTE
Physical Therapy Treatment Note and 30 Day Progress Note  [facility] Outpatient Therapy Services  A department [address], [place] [address]    Patient: Carlos Hayes                                                 Visit Date: 2024  :     1955    Referring practitioner:    Chris Russ MD  Date of Initial Visit:          Type: THERAPY  Episode: Neck strain; left hip pain    Visit Diagnoses:    ICD-10-CM ICD-9-CM   1. Left hip pain  M25.552 719.45   2. Strain of neck muscle, initial encounter  S16.1XXA 847.0       SUBJECTIVE     Subjective:  His pain in worse in his groin today. He turned his DCS up.    PAIN: 7/10       OBJECTIVE     Objective     Manual Therapy     24920  Comments   Percussive IASTM using Powerboost to right CT/UT/piri at L2 using spade attachment      Sidelying right UT STM        Sidelying right CT ext mobs      sidelying left post ilium inf mob        Timed Minutes 35          Therapeutic Exercises    42884 Units Comments   Passive stretch right hip flexion and ER in supine  Sustained, low load                       Timed Minutes 10       Therapy Education/Self Care 06485   Education offered today Try hip abd and gentle hip flex/ext in pool instead of squats   [employer] Code Access Code: [email or web address]  URL: [email or web address]   Ongoing HEP       Timed Minutes        Total Timed Treatment:     45  mins  Total Time of Visit:             45   mins     ASSESSMENT/PLAN      GOALS:             Goals                                          Progress Note due by 24                                                      Recert due by 24   LTG by: 12 weeks Comments Date Status   Improve oliver cervical rotation to 50 deg LR 25, RR 30 deg 10/8 ongoing   Able to stand more upright with more neutral cervical Not able to stand as erect due to the pain  ongoing   Improve passive hip ext to 10 deg Right hip ext to -10 deg 10/21 ongoing   Able to  walk x 20 min without rollator before sitting Approaching 20 min but pain makes him sit; some days better than others 10/8 ongoing   Improve oliver shoulder elevation to 130 deg Not assessed 10/8 ongoing   Ind with HEP for flexibility and stability Working on anterior mobs of right pelvis in pool with a lunges stretch 10/25 ongoing       Anticipated CPT codes: Therapeutic Exercise 67818, Manual Therapy 93904, Therapeutic Activity 07995, Neuromuscular ReEducation 03090, and Self Care/Home Management 77001      Assessment/Plan     ASSESSMENT:    He was still quite flared around his groin.     PLAN:   Continue focus on CT joint mobs and stretch of his hip and progress with stability exs.     SIGNATURE: Juarez Hartmann, PT, [place] License #: [id number]  Electronically Signed on 11/8/2024        [address] [place] [address], [place] [address]  [phone]

## 2024-11-11 ENCOUNTER — HOSPITAL ENCOUNTER (OUTPATIENT)
Dept: PHYSICAL THERAPY | Facility: HOSPITAL | Age: 69
Setting detail: THERAPIES SERIES
Discharge: HOME | End: 2024-11-11
Payer: MEDICARE

## 2024-11-11 DIAGNOSIS — S16.1XXA STRAIN OF NECK MUSCLE, INITIAL ENCOUNTER: ICD-10-CM

## 2024-11-11 DIAGNOSIS — M25.552 LEFT HIP PAIN: Primary | ICD-10-CM

## 2024-11-11 PROCEDURE — 97140 MANUAL THERAPY 1/> REGIONS: CPT | Performed by: PHYSICAL THERAPIST

## 2024-11-11 NOTE — THERAPY TREATMENT NOTE
Physical Therapy Treatment Note  [facility]  [address], [place] [address]    Patient: Carlos Hayes                                                 Visit Date: 2024  :     1955    Referring practitioner:    Chris Russ MD  Date of Initial Visit:          Type: THERAPY  Episode: Neck strain; left hip pain    Visit Diagnoses:    ICD-10-CM ICD-9-CM   1. Left hip pain  M25.552 719.45   2. Strain of neck muscle, initial encounter  S16.1XXA 847.0       SUBJECTIVE     Subjective:  He says he's sore, particular in his groin across his pubic bone.     PAIN: 7.5/10       OBJECTIVE     Objective     Manual Therapy     28007  Comments   Percussive IASTM using Powerboost to right CT/UT/piri at L2 using spade attachment      Sidelying right UT STM        Sidelying right CT ext mobs      sidelying left post ilium inf mob        Timed Minutes 40          Therapy Education/Self Care 80633   Education offered today Try hip abd and gentle hip flex/ext in pool instead of squats   [employer] Code Access Code: [email or web address]  URL: [email or web address]   Ongoing HEP       Timed Minutes        Total Timed Treatment:     40  mins  Total Time of Visit:             40   mins     ASSESSMENT/PLAN      GOALS:             Goals                                          Progress Note due by 24                                                      Recert due by 24   LTG by: 12 weeks Comments Date Status   Improve oliver cervical rotation to 50 deg LR 25, RR 30 deg 10/8 ongoing   Able to stand more upright with more neutral cervical Not able to stand as erect due to the pain  ongoing   Improve passive hip ext to 10 deg Right hip ext to -10 deg 10/21 ongoing   Able to walk x 20 min without rollator before sitting Approaching 20 min but pain makes him sit; some days better than others 10/8 ongoing   Improve oliver shoulder elevation to 130 deg Not  assessed 10/8 ongoing   Ind with HEP for flexibility and stability Working on anterior mobs of right pelvis in pool with a lunges stretch 10/25 ongoing       Anticipated CPT codes: Therapeutic Exercise 41037, Manual Therapy 84282, Therapeutic Activity 17579, Neuromuscular ReEducation 09218, and Self Care/Home Management 07722      Assessment/Plan     ASSESSMENT:    He was still painful around his pubic bone.     PLAN:   Continue focus on CT joint mobs and stretch of his hip and progress with stability exs.     SIGNATURE: Juarez Hartmann, PT, [place] License #: [id number]  Electronically Signed on 11/11/2024        [address] [place] [address] [place] [address]  [phone]

## 2024-11-15 ENCOUNTER — HOSPITAL ENCOUNTER (OUTPATIENT)
Dept: PHYSICAL THERAPY | Facility: HOSPITAL | Age: 69
Setting detail: THERAPIES SERIES
Discharge: HOME | End: 2024-11-15
Payer: MEDICARE

## 2024-11-15 DIAGNOSIS — M25.552 LEFT HIP PAIN: Primary | ICD-10-CM

## 2024-11-15 DIAGNOSIS — S16.1XXA STRAIN OF NECK MUSCLE, INITIAL ENCOUNTER: ICD-10-CM

## 2024-11-15 PROCEDURE — 97140 MANUAL THERAPY 1/> REGIONS: CPT | Performed by: PHYSICAL THERAPIST

## 2024-11-15 NOTE — PROGRESS NOTES
" VLADISLAV Seo  [facility]   Pulmonary and Critical Care  [address]  [place] [address]  Phone: [phone]  Fax: [phone]           Chief Complaint  Restrictive lung disease, COPD, and COVID-19 virus infection    Subjective         Carlos Hayes presents to [facility] PULMONARY & CRITICAL CARE MEDICINE      History of Present Illness  Mr. Hayes is a 69-year-old male patient with known chronic respiratory failure with hypoxemia, restrictive lung disease, Elevated diaphragm,obstructive sleep apnea with BiPAP, history of COVID-19 (2020, 2021), restless leg syndrome, morbid obesity, CKD, arthritis, anxiety, PTSD, tremor, sinusitis, hypertension, chronic back pain with degeneration. He has had bronchitis/ pneumonia on/off most of his life. Short term memory loss.    He reports that his shortness of breath varies, sometimes worsening with exertion or at rest, but occasionally it is not present. He experiences shortness of breath during sexual activity. He uses a nebulizer machine that vibrates his neck, aiding in the expulsion of mucus. He uses albuterol daily and wears a BiPAP machine at night with 2 to 3 L of oxygen. He did not take Mucinex this morning. He is in need of a refill for his nebulizer medication.    He also reports experiencing night sweats, which he attributes to salt intake. He has a daily cough, which he believes is due to sinus issues. He is currently using Flonase and Astelin nasal sprays.     He had a stress test in 12/2023 and was informed that his heart and arteries are in good condition. He attempts to walk every few days, but knee pain limits his ability to do so daily. He recently traveled to [place] for a month, where the dry climate affected his breathing. He reports no leg swelling.          Objective   Vital Signs:   /72   Pulse 92   Ht 189 cm (74.41\")   Wt (!) 166 kg (365 lb)   SpO2 93% Comment: DONNA  BMI 46.35 kg/m²   "   Physical Exam  Vitals reviewed.   Constitutional:       Appearance: Normal appearance. He is morbidly obese.   Cardiovascular:      Rate and Rhythm: Normal rate and regular rhythm.   Pulmonary:      Effort: Pulmonary effort is normal.      Breath sounds: Normal breath sounds.   Neurological:      General: No focal deficit present.      Mental Status: He is alert and oriented to person, place, and time.   Psychiatric:         Mood and Affect: Mood normal.         Behavior: Behavior normal.          Result Review :  The following data was reviewed by: VLADISLAV Seo on 11/18/2024:    Data reviewed : Radiologic studies low-dose CT September 2024    My interpretation of imaging: Stable small nodules 3 to 4 mm in size.  No new or enlarging nodules.  Mild dilation of the ascending aorta.  My interpretation of labs: No new  CT Chest Low Dose Cancer Screening WO (09/19/2024 15:51)   PFT Values          11/18/2024    13:45   Pre Drug PFT Results   FVC 66   FEV1 63   FEF 25-75% 56   FEV1/FVC 71   Other Tests PFT Results   DLCO 113   D/VAsb 144     My interpretation of the PFT : as below     Results for orders placed in visit on 11/18/24    Spirometry with Diffusion Capacity    Narrative  Spirometry with Diffusion Capacity    Performed by: Roseann Sethi, RRT  Authorized by: Mary Christian APRN  Pre Drug % Predicted  FVC: 66%  FEV1: 63%  FEF 25-75%: 56%  FEV1/FVC: 71%  DLCO: 113%  D/VAsb: 144%    Interpretation  Spirometry  Spirometry shows moderate restriction. There is reduced midflow suggesting small airway/airflow obstruction.  Review of FVL curve  Patient's effort is normal.  Diffusion Capacity  The patient's diffusion capacity is normal.  Diffusion capacity is normal when corrected for alveolar volume.  Overall comments: Compared to August 2023 his FEV1 has slightly dropped from 68 to 63% predicted, FVC has dropped from 73 to 66% predicted.  Diffusion capacity remains supranormal when  corrected for alveolar volume      Results for orders placed during the hospital encounter of 08/09/23    Pulmonary Function Test    [facility] - Pulmonary Function Test    [address]  [facility]  [address]  [phone]    Patient : Carlos Hayes  MRN : [id number]  CSN : [id number]  Pulmonologist : Earl Shearer MD  Date : 8/9/2023    ______________________________________________________________________    Interpretation :  1.  Spirometry is consistent with a mild bordering on moderate restrictive ventilatory defect with a coexisting decrease in midflows and peak expiratory flow.  2.  There is some improvement in midflows and peak expiratory flow postbronchodilator.  However postbronchodilator spirometry is still consistent with a mild bordering on moderate restrictive ventilatory defect with a decrease in midflows.  Peak expiratory flow is now within normal limits.  3.  Lung volumes reveal a low normal total lung capacity and vital capacity suggestive of a borderline restrictive ventilatory defect.  There is also a decreased expiratory reserve volume.  4.  Diffusion capacity is within normal limits and when corrected for alveolar volume is supranormal.  5.  Arterial blood gases on room air reveal mild hypoxemia.  A mild metabolic alkalosis is also present.  6.  When current studies are compared to studies performed on June 25, 2021, the patient's current pre and postbronchodilator spirometry reveals a slight decline in both the FVC and FEV1 compared to previous baseline values.  There has been slight improvement in the patient's total lung capacity compared to previous.  When corrected for alveolar volume there is no significant change in diffusion capacity compared to previous.      Earl Shearer MD      Results for orders placed during the hospital encounter of 06/25/21    Full Pulmonary Function Test With Bronchodilator    [facility] - Pulmonary  Function Test    [facility] [place]  [place]  [address]  [phone]    Patient : Carlos Hayes  MRN : [id number]  CSN : [id number]  Pulmonologist : Earl Shearer MD  Date : 6/28/2021    ______________________________________________________________________    Interpretation :  1.  Spirometry is consistent with a mild restrictive ventilatory defect with coexisting small airways disease.  2.  Lung volumes confirm a mild restrictive ventilatory defect.  There is also a decrease in inspiratory capacity.  3.  Diffusion capacity is within normal limits and when corrected for alveolar volume is supranormal.  4.  When current studies are compared to studies from December 7 of 2018, there has been a drop in the patient's forced vital capacity and FEV1 compared to previous.  There has also been a decrease in total lung capacity compared to previous.  When corrected for alveolar volume there has been an improvement in diffusion capacity compared to previous.      Earl Shearer MD        Assessment and Plan   Diagnoses and all orders for this visit:    1. Restrictive lung disease (Primary)    2. Small airways disease    3. Obstructive sleep apnea treated with BiPAP    4. Personal history of nicotine dependence    5. Nocturnal hypoxia    6. Allergic rhinitis, unspecified seasonality, unspecified trigger      Continue BiPAP with oxygen bled in.  Continue Astelin nasal spray, Flonase and loratadine.  Continue Breztri and as needed albuterol HFA or DuoNeb's.  He does pretreat at times if he is going to be doing exertional activities.  He does note benefit from that.  He is advised to pretreat with concerns about shortness of breath with sexual activity as well.  Reviewed his spirometry and diffusion capacity with him he has had a slight drop in his FEV1 however he remains moderately restricted.  He will be due a low-dose CT in September 2025.      Alpha 1: none  LDCT: Due September 2025  Smoking Cessation:, Quit  2016  Vaccinations: Flu: Completed  PNA: PCP follows RSV: Completed         Follow Up   No follow-ups on file.  Patient was given instructions and counseling regarding his condition or for health maintenance advice. Please see specific information pulled into the AVS if appropriate.     VLADISLAV Seo  11/18/2024  14:42 CST    Please note that portions of this note were completed with a voice recognition program.    Patient or patient representative verbalized consent for the use of Ambient Listening during the visit with  VLADISLAV Seo for chart documentation. 11/19/2024  08:40 CST

## 2024-11-18 ENCOUNTER — OFFICE VISIT (OUTPATIENT)
Dept: PULMONOLOGY | Facility: CLINIC | Age: 69
End: 2024-11-18
Payer: MEDICARE

## 2024-11-18 ENCOUNTER — TELEPHONE (OUTPATIENT)
Dept: PULMONOLOGY | Facility: CLINIC | Age: 69
End: 2024-11-18

## 2024-11-18 ENCOUNTER — PROCEDURE VISIT (OUTPATIENT)
Dept: PULMONOLOGY | Facility: CLINIC | Age: 69
End: 2024-11-18
Payer: MEDICARE

## 2024-11-18 VITALS
BODY MASS INDEX: 40.43 KG/M2 | OXYGEN SATURATION: 93 % | DIASTOLIC BLOOD PRESSURE: 72 MMHG | SYSTOLIC BLOOD PRESSURE: 136 MMHG | WEIGHT: 315 LBS | HEIGHT: 74 IN | HEART RATE: 92 BPM

## 2024-11-18 DIAGNOSIS — J98.4 SMALL AIRWAYS DISEASE: Chronic | ICD-10-CM

## 2024-11-18 DIAGNOSIS — G47.33 OBSTRUCTIVE SLEEP APNEA TREATED WITH BIPAP: Chronic | ICD-10-CM

## 2024-11-18 DIAGNOSIS — Z87.891 PERSONAL HISTORY OF NICOTINE DEPENDENCE: ICD-10-CM

## 2024-11-18 DIAGNOSIS — J30.9 ALLERGIC RHINITIS, UNSPECIFIED SEASONALITY, UNSPECIFIED TRIGGER: ICD-10-CM

## 2024-11-18 DIAGNOSIS — G47.34 NOCTURNAL HYPOXIA: Chronic | ICD-10-CM

## 2024-11-18 DIAGNOSIS — J98.4 RESTRICTIVE LUNG DISEASE: Primary | ICD-10-CM

## 2024-11-18 DIAGNOSIS — J98.4 RESTRICTIVE LUNG DISEASE: ICD-10-CM

## 2024-11-18 PROCEDURE — 3078F DIAST BP <80 MM HG: CPT | Performed by: NURSE PRACTITIONER

## 2024-11-18 PROCEDURE — 99214 OFFICE O/P EST MOD 30 MIN: CPT | Performed by: NURSE PRACTITIONER

## 2024-11-18 PROCEDURE — 94375 RESPIRATORY FLOW VOLUME LOOP: CPT | Performed by: NURSE PRACTITIONER

## 2024-11-18 PROCEDURE — 94729 DIFFUSING CAPACITY: CPT | Performed by: NURSE PRACTITIONER

## 2024-11-18 PROCEDURE — 3075F SYST BP GE 130 - 139MM HG: CPT | Performed by: NURSE PRACTITIONER

## 2024-11-18 RX ORDER — IPRATROPIUM BROMIDE AND ALBUTEROL SULFATE 2.5; .5 MG/3ML; MG/3ML
SOLUTION RESPIRATORY (INHALATION) EVERY 6 HOURS PRN
COMMUNITY
Start: 2024-08-05

## 2024-11-18 RX ORDER — ALBUTEROL SULFATE 0.83 MG/ML
2.5 SOLUTION RESPIRATORY (INHALATION) EVERY 4 HOURS PRN
Qty: 360 ML | Refills: 3 | Status: SHIPPED | OUTPATIENT
Start: 2024-11-18

## 2024-11-18 NOTE — PROCEDURES
Spirometry with Diffusion Capacity    Performed by: Roseann Sethi, RRT  Authorized by: Mary Christian APRN     Pre Drug % Predicted    FVC: 66%   FEV1: 63%   FEF 25-75%: 56%   FEV1/FVC: 71%   DLCO: 113%   D/VAsb: 144%    Interpretation   Spirometry   Spirometry shows moderate restriction. There is reduced midflow suggesting small airway/airflow obstruction.   Review of FVL curve   Patient's effort is normal.   Diffusion Capacity  The patient's diffusion capacity is normal.  Diffusion capacity is normal when corrected for alveolar volume.   Overall comments: Compared to August 2023 his FEV1 has slightly dropped from 68 to 63% predicted, FVC has dropped from 73 to 66% predicted.  Diffusion capacity remains supranormal when corrected for alveolar volume

## 2024-11-18 NOTE — TELEPHONE ENCOUNTER
Spoke with patient and notified him not to use his inhalers today since you cannot use them 4 hours prior to PFT. He voiced understanding.

## 2024-11-18 NOTE — TELEPHONE ENCOUNTER
"  Caller: Carlos Hayes \"Edin\"    Relationship: Self    Best call back number: [phone]    What is the best time to reach you: ANYTIME    Who are you requesting to speak with (clinical staff, provider,  specific staff member): CLINICAL STAFF / PROVIDER     What was the call regarding: PT NEEDING A ANSWER ON WETHER HE SHOULD OR SHOULD NOT TAKE HIS MEDICATIONS AND BREATHING TEST TODAY SINCE HIS APT IS SCHEDULED FOR TODAY. PLEASE ADVISE.     "

## 2024-11-19 ENCOUNTER — HOSPITAL ENCOUNTER (OUTPATIENT)
Dept: PHYSICAL THERAPY | Facility: HOSPITAL | Age: 69
Setting detail: THERAPIES SERIES
Discharge: HOME | End: 2024-11-19
Payer: MEDICARE

## 2024-11-19 DIAGNOSIS — M25.552 LEFT HIP PAIN: Primary | ICD-10-CM

## 2024-11-19 DIAGNOSIS — S16.1XXA STRAIN OF NECK MUSCLE, INITIAL ENCOUNTER: ICD-10-CM

## 2024-11-19 PROCEDURE — 97140 MANUAL THERAPY 1/> REGIONS: CPT | Performed by: PHYSICAL THERAPIST

## 2024-11-19 PROCEDURE — 97110 THERAPEUTIC EXERCISES: CPT | Performed by: PHYSICAL THERAPIST

## 2024-11-19 NOTE — THERAPY TREATMENT NOTE
"Physical Therapy Treatment Note and 30 Day Progress Note  [facility]  A department of [address], [place] [address]    Patient: Carlos Hayes                                                 Visit Date: 2024  :     1955    Referring practitioner:    Chris Russ MD  Date of Initial Visit:          Type: THERAPY  Episode: Neck strain; left hip pain    Visit Diagnoses:    ICD-10-CM ICD-9-CM   1. Left hip pain  M25.552 719.45   2. Strain of neck muscle, initial encounter  S16.1XXA 847.0       SUBJECTIVE     Subjective:  He all weekend, he was having close to migraines after his injections. His oliver glutes are also hurting worse. He reported he is having trouble emptying his bladder and also climaxing again.     PAIN: 8.5/10 before, improved after       OBJECTIVE     Objective     Manual Therapy     76918  Comments   Percussive IASTM using Powerboost to right CT junction at L2 using spade attachment      Right UT STM     Supine oliver OA distraction mob Gentle gr 3 repetitive, no cavitation; abolished HA           Timed Minutes 35     Therapeutic Exercises    56252 Units Comments   HL oliver hip abd man resis 10\" x 6    Right hip ext stretch off edge of mat 3\" Leg supported, left leg in HL                  Timed Minutes 10      Therapy Education/Self Care 70059   Education offered today Try hip abd and gentle hip flex/ext in pool instead of squats   Anna Code Access Code: [email or web address]  URL: [email or web address]   Ongoing HEP       Timed Minutes        Total Timed Treatment:     45  mins  Total Time of Visit:             45   mins     ASSESSMENT/PLAN      GOALS:  Goals                                          Progress Note due by 24                                                      Recert due by 24   LTG by: 12 weeks Comments Date Status   Improve oliver cervical rotation to 50 deg LR 25, RR 30 deg  ongoing   Able to stand " more upright with more neutral cervical More upright than a couple of weeks ago 11/18 ongoing   Improve passive hip ext to 10 deg Right hip ext to -10 deg 11/18 ongoing   Able to walk x 20 min without rollator before sitting Approaching 20 min but pain makes him sit; some days better than others 11/18 ongoing   Improve oliver shoulder elevation to 130 deg Not assessed 11/18 ongoing   Ind with HEP for flexibility and stability Working on anterior mobs of right pelvis in pool with a lunges stretch 11/18 ongoing       Anticipated CPT codes: Therapeutic Exercise 48015, Manual Therapy 01885, Therapeutic Activity 71027, Neuromuscular ReEducation 89119, and Self Care/Home Management 02430      Assessment & Plan       Assessment  Impairments: abnormal muscle firing, abnormal muscle tone, abnormal or restricted ROM, activity intolerance, impaired physical strength, lacks appropriate home exercise program and pain with function   Functional limitations: lifting, walking, uncomfortable because of pain, sitting and standing   Prognosis: good    Plan  Therapy options: will be seen for skilled therapy services  Planned modality interventions: dry needling, low level laser therapy, TENS and traction  Planned therapy interventions: abdominal trunk stabilization, flexibility, functional ROM exercises, home exercise program, joint mobilization, manual therapy, motor coordination training, neuromuscular re-education, postural training, soft tissue mobilization, spinal/joint mobilization, strengthening, stretching and therapeutic activities  Frequency: 2x week  Duration in weeks: 12  Treatment plan discussed with: patient         ASSESSMENT:    He could tolerate more today with PT as the injection was helping. The OA distraction immediately abolished his headache but it returned quickly when he moved. His PF wasn't particular guarded but I didn't assess OI to see if it might be tight. He should continue with PT as without, he tends to  stiffen very quickly and could lose function fast. We are able to help him open his hips and keep him more upright.     PLAN:   Continue focus on CT joint mobs and stretch of his hip and progress with stability exs. Assess PF and OI.     SIGNATURE: Juarez Hartmann, PT, [place] License #: [id number]  Electronically Signed on 11/19/2024        [address] [place] [address] [place] [address]  [phone]

## 2024-11-22 ENCOUNTER — HOSPITAL ENCOUNTER (OUTPATIENT)
Dept: PHYSICAL THERAPY | Facility: HOSPITAL | Age: 69
Setting detail: THERAPIES SERIES
Discharge: HOME | End: 2024-11-22
Payer: MEDICARE

## 2024-11-22 DIAGNOSIS — M25.552 LEFT HIP PAIN: Primary | ICD-10-CM

## 2024-11-22 DIAGNOSIS — S16.1XXA STRAIN OF NECK MUSCLE, INITIAL ENCOUNTER: ICD-10-CM

## 2024-11-22 PROCEDURE — 97140 MANUAL THERAPY 1/> REGIONS: CPT | Performed by: PHYSICAL THERAPIST

## 2024-11-22 NOTE — THERAPY TREATMENT NOTE
Physical Therapy Treatment Note  [facility]  A [address], [place] [address]    Patient: Carlos Hayes                                                 Visit Date: 2024  :     1955    Referring practitioner:    Chirs Russ MD  Date of Initial Visit:          Type: THERAPY  Episode: Neck strain; left hip pain    Visit Diagnoses:    ICD-10-CM ICD-9-CM   1. Left hip pain  M25.552 719.45   2. Strain of neck muscle, initial encounter  S16.1XXA 847.0       SUBJECTIVE     Subjective:  He all weekend, he was having close to migraines after his injections. His oliver glutes are also hurting worse. He reported he is having trouble emptying his bladder and also climaxing again.     PAIN: 7.5/10 before, improved after       OBJECTIVE     Objective     Manual Therapy     42556  Comments   Percussive IASTM using Powerboost to right CT junction and right piri at L2 using spade attachment      Right UT STM     SL oliver lumbar QL    Oliver internal PF assessment OI and LA both relaxed and not tender       Timed Minutes 55        Therapy Education/Self Care 72270   Education offered today Try hip abd and gentle hip flex/ext in pool instead of squats   [employer] Code Access Code: [email or web address]  URL: [email or web address]   Ongoing HEP       Timed Minutes        Total Timed Treatment:     55  mins  Total Time of Visit:             55   mins     ASSESSMENT/PLAN      GOALS:  Goals                                          Progress Note due by 24                                                      Recert due by 24   LTG by: 12 weeks Comments Date Status   Improve oliver cervical rotation to 50 deg LR 25, RR 30 deg  ongoing   Able to stand more upright with more neutral cervical More upright than a couple of weeks ago  ongoing   Improve passive hip ext to 10 deg Right hip ext to -10 deg  ongoing   Able to walk x 20 min without  rollator before sitting Approaching 20 min but pain makes him sit; some days better than others 11/22 ongoing   Improve oliver shoulder elevation to 130 deg Not assessed 11/18 ongoing   Ind with HEP for flexibility and stability Working on anterior mobs of right pelvis in pool with a lunges stretch 11/18 ongoing       Anticipated CPT codes: Therapeutic Exercise 80306, Manual Therapy 63971, Therapeutic Activity 77524, Neuromuscular ReEducation 92046, and Self Care/Home Management 28912      Assessment/Plan     ASSESSMENT:    His PF was not guarded so this doesn't appear to be an issue with his hips and bladder as well as his difficulty with ejaculations.     PLAN:   Continue focus on CT joint mobs and stretch of his hip and progress with stability exs. Assess PF and OI.     SIGNATURE: Juarez Hartmann, PT, [place] License #: [id number]  Electronically Signed on 11/22/2024        [address] [place] [address]  [place]. [address]  [phone]

## 2024-11-25 ENCOUNTER — APPOINTMENT (OUTPATIENT)
Dept: PHYSICAL THERAPY | Facility: HOSPITAL | Age: 69
End: 2024-11-25
Payer: MEDICARE

## 2024-12-02 ENCOUNTER — APPOINTMENT (OUTPATIENT)
Dept: PHYSICAL THERAPY | Facility: HOSPITAL | Age: 69
End: 2024-12-02
Payer: MEDICARE

## 2024-12-03 RX ORDER — DULOXETIN HYDROCHLORIDE 60 MG/1
CAPSULE, DELAYED RELEASE ORAL
Qty: 180 CAPSULE | Refills: 3 | Status: SHIPPED | OUTPATIENT
Start: 2024-12-03

## 2024-12-03 NOTE — TELEPHONE ENCOUNTER
Requested Prescriptions     Pending Prescriptions Disp Refills    DULoxetine (CYMBALTA) 60 MG extended release capsule [Pharmacy Med Name: DULOXETINE HYDROCHLORIDE 60MG CAPSULE DELAYED RELEASE PARTICLES] 180 capsule 0     Sig: TAKE (1) CAPSULE BY MOUTH TWICE DAILY.       Last Office Visit: 9/24/2024  Next Office Visit: Visit date not found  Last Medication Refill: 11/6/2023 with 3 RF

## 2024-12-10 ENCOUNTER — HOSPITAL ENCOUNTER (OUTPATIENT)
Dept: PHYSICAL THERAPY | Facility: HOSPITAL | Age: 69
Setting detail: THERAPIES SERIES
Discharge: HOME | End: 2024-12-10
Payer: MEDICARE

## 2024-12-10 DIAGNOSIS — G89.29 CHRONIC BILATERAL LOW BACK PAIN WITH BILATERAL SCIATICA: ICD-10-CM

## 2024-12-10 DIAGNOSIS — M54.2 CERVICALGIA: ICD-10-CM

## 2024-12-10 DIAGNOSIS — M54.42 CHRONIC BILATERAL LOW BACK PAIN WITH BILATERAL SCIATICA: ICD-10-CM

## 2024-12-10 DIAGNOSIS — M54.41 CHRONIC BILATERAL LOW BACK PAIN WITH BILATERAL SCIATICA: ICD-10-CM

## 2024-12-10 DIAGNOSIS — M25.552 LEFT HIP PAIN: Primary | ICD-10-CM

## 2024-12-10 DIAGNOSIS — S16.1XXA STRAIN OF NECK MUSCLE, INITIAL ENCOUNTER: ICD-10-CM

## 2024-12-10 PROCEDURE — 97110 THERAPEUTIC EXERCISES: CPT | Performed by: PHYSICAL THERAPIST

## 2024-12-10 PROCEDURE — 97140 MANUAL THERAPY 1/> REGIONS: CPT | Performed by: PHYSICAL THERAPIST

## 2024-12-10 NOTE — THERAPY TREATMENT NOTE
Physical Therapy Treatment Note, 30 Day Progress Note, and 90 Day Recertification Note  [facility]  A department [address], [place] [address]    Patient: Carlos Hayes                                                 Visit Date: 12/10/2024  :     1955    Referring practitioner:    Chris Russ MD  Date of Initial Visit:          Type: THERAPY  Episode: Neck strain; left hip pain    Visit Diagnoses:    ICD-10-CM ICD-9-CM   1. Left hip pain  M25.552 719.45   2. Strain of neck muscle, initial encounter  S16.1XXA 847.0       SUBJECTIVE     Subjective:  He has been traveling and struggled more with pain after sleeping in hotel beds a lot.     PAIN: 7.5/10 before, improved after       OBJECTIVE     Objective     Manual Therapy     01167  Comments   Percussive IASTM using Powerboost to right CT junction and right piri at L2 using spade attachment      Right UT STM     Supine oliver OA distraction mob Gr 3 gentle repetitive           Timed Minutes 35     Therapeutic Exercises    34390 Units Comments   Oliver knee to chest with abd  sustained   Supine hip abd stretch                    Timed Minutes 10          Therapy Education/Self Care 41999   Education offered today Try hip abd and gentle hip flex/ext in pool instead of squats   [employer] Code Access Code: [email or web address]  URL: [email or web address]   Ongoing HEP       Timed Minutes        Total Timed Treatment:     45  mins  Total Time of Visit:             45   mins     ASSESSMENT/PLAN      GOALS:  Goals                                          Progress Note due by 25                                                      Recert due by 3/9/25   LTG by: 12 weeks Comments Date Status   Improve oliver cervical rotation to 50 deg LR 25, RR 30 deg 12/10 ongoing   Able to stand more upright with more neutral cervical More upright than a couple of weeks ago 12/10 ongoing   Improve passive hip ext to 10 deg  Right hip ext to -10 deg 12/10 ongoing   Able to walk x 20 min without rollator before sitting  12/10 ongoing   Improve oliver shoulder elevation to 130 deg Not assessed 12/10 ongoing   Ind with HEP for flexibility and stability Working on anterior mobs of right pelvis in pool with a lunges stretch 12/10 ongoing       Anticipated CPT codes: Therapeutic Exercise 78138, Manual Therapy 22386, Therapeutic Activity 83946, Neuromuscular ReEducation 19192, and Self Care/Home Management 61003      Assessment & Plan       Assessment  Impairments: abnormal muscle firing, abnormal muscle tone, abnormal or restricted ROM, activity intolerance, impaired physical strength, lacks appropriate home exercise program and pain with function   Functional limitations: lifting, walking, uncomfortable because of pain, sitting and standing   Prognosis: good    Plan  Therapy options: will be seen for skilled therapy services  Planned modality interventions: dry needling, low level laser therapy, TENS and traction  Planned therapy interventions: abdominal trunk stabilization, flexibility, functional ROM exercises, home exercise program, joint mobilization, manual therapy, motor coordination training, neuromuscular re-education, postural training, soft tissue mobilization, spinal/joint mobilization, strengthening, stretching and therapeutic activities  Frequency: 2x week  Duration in weeks: 12  Treatment plan discussed with: patient         ASSESSMENT:    He has been slowly progressing with PT but his long trip and stays in the hotel rooms have flared his pain quite a bit. Our focus has been working on loosening his upper neck and CT junction to help to relax the neck spasms as well as mobilizations through his pelvis and hips with hip stability exercises to help to stabilize. Given all his issues and past surgeries, I anticipate him need PT for a long time to keep him mobile. Fortunately he looks like he's over his severe flare he had a few  months ago after picking up his wife.     PLAN:   Continue focus on CT joint mobs and stretch of his hip and progress with stability exs.     SIGNATURE: Juarez Hartmann PT, [place] License #: [id number]  Electronically Signed on 12/10/2024    90 Day Recertification  Certification Period: 12/10/2024 through 3/9/2025  I certify that the therapy services are furnished while this patient is under my care.  The services outlined above are required by this patient, and will be reviewed every 90 days.     PHYSICIAN: Chris Russ MD (NPI: [id number])    Signature:___________________________________________DATE: _________    Please sign and return via fax to [phone].   Thank you so much for letting us work with Carlos. I appreciate your letting us work with your patients. If you have any questions or concerns, please don't hesitate to contact me.              [address] [address]  [phone]

## 2024-12-17 ENCOUNTER — HOSPITAL ENCOUNTER (OUTPATIENT)
Dept: PHYSICAL THERAPY | Facility: HOSPITAL | Age: 69
Setting detail: THERAPIES SERIES
Discharge: HOME | End: 2024-12-17
Payer: MEDICARE

## 2024-12-17 DIAGNOSIS — M54.2 CERVICALGIA: ICD-10-CM

## 2024-12-17 DIAGNOSIS — G89.29 CHRONIC BILATERAL LOW BACK PAIN WITH BILATERAL SCIATICA: ICD-10-CM

## 2024-12-17 DIAGNOSIS — S16.1XXA STRAIN OF NECK MUSCLE, INITIAL ENCOUNTER: ICD-10-CM

## 2024-12-17 DIAGNOSIS — M54.42 CHRONIC BILATERAL LOW BACK PAIN WITH BILATERAL SCIATICA: ICD-10-CM

## 2024-12-17 DIAGNOSIS — M54.41 CHRONIC BILATERAL LOW BACK PAIN WITH BILATERAL SCIATICA: ICD-10-CM

## 2024-12-17 DIAGNOSIS — M25.552 LEFT HIP PAIN: Primary | ICD-10-CM

## 2024-12-17 PROCEDURE — 97110 THERAPEUTIC EXERCISES: CPT | Performed by: PHYSICAL THERAPIST

## 2024-12-17 PROCEDURE — 97140 MANUAL THERAPY 1/> REGIONS: CPT | Performed by: PHYSICAL THERAPIST

## 2024-12-17 NOTE — THERAPY TREATMENT NOTE
Physical Therapy Treatment Note  [facility]  A department [address], [place] [address]    Patient: Carlos Hayes                                                 Visit Date: 2024  :     1955    Referring practitioner:    Chris Russ MD  Date of Initial Visit:          Type: THERAPY  Episode: Neck strain; left hip pain    Visit Diagnoses:    ICD-10-CM ICD-9-CM   1. Left hip pain  M25.552 719.45   2. Strain of neck muscle, initial encounter  S16.1XXA 847.0   3. Cervicalgia  M54.2 723.1   4. Chronic bilateral low back pain with bilateral sciatica  M54.42 724.2    M54.41 724.3    G89.29 338.29       SUBJECTIVE     Subjective:  He thinks his DCS hasn't been on for about 3 weeks. He had a steroid injection in buttock which he feels didn't help as much as when the injection is in his neck.      PAIN: 7.5/10 before, improved after       OBJECTIVE     Objective     Manual Therapy     63755  Comments   Percussive IASTM using Powerboost to right CT junction and right piri at L2 using spade attachment      Right UT STM     Supine oliver OA distraction mob Gr 3 gentle repetitive           Timed Minutes 35     Therapeutic Exercises    77910 Units Comments   Oliver knee to chest with abd  sustained   Supine hip abd stretch                    Timed Minutes 10          Therapy Education/Self Care 10243   Education offered today Try hip abd and gentle hip flex/ext in pool instead of squats   [employer] Code Access Code: [email or web address]  URL: [email or web address]   Ongoing HEP       Timed Minutes        Total Timed Treatment:     45  mins  Total Time of Visit:             45   mins     ASSESSMENT/PLAN      GOALS:  Goals                                          Progress Note due by 25                                                      Recert due by 3/9/25   LTG by: 12 weeks Comments Date Status   Improve oliver cervical rotation to 50 deg LR 25, RR 30 deg  12/10 ongoing   Able to stand more upright with more neutral cervical More upright than a couple of weeks ago 12/10 ongoing   Improve passive hip ext to 10 deg Right hip ext to -10 deg 12/17 ongoing   Able to walk x 20 min without rollator before sitting  12/10 ongoing   Improve oliver shoulder elevation to 130 deg Not assessed 12/10 ongoing   Ind with HEP for flexibility and stability Working on anterior mobs of right pelvis in pool with a lunges stretch 12/10 ongoing       Anticipated CPT codes: Therapeutic Exercise 79159, Manual Therapy 24193, Therapeutic Activity 73329, Neuromuscular ReEducation 36797, and Self Care/Home Management 68807      Assessment/Plan     ASSESSMENT:    He continues to slowly improve with his mobility. His legs will quickly fatigue if he tries to walk too far and it's difficult to know if this is from muscles or from a pinched nerve.     PLAN:   Continue focus on CT joint mobs and stretch of his hip and progress with stability exs.     SIGNATURE: Juarez Hartmann, PT, [place] License #: [id number]  Electronically Signed on 12/17/2024    90 Day Recertification  Certification Period: 12/17/2024 through 3/16/2025  I certify that the therapy services are furnished while this patient is under my care.  The services outlined above are required by this patient, and will be reviewed every 90 days.     PHYSICIAN: Chris Russ MD (NPI: [id number])    Signature:___________________________________________DATE: _________    Please sign and return via fax to [phone].   Thank you so much for letting us work with Carlos. I appreciate your letting us work with your patients. If you have any questions or concerns, please don't hesitate to contact me.              [address] [address]  [phone]

## 2024-12-18 ENCOUNTER — TELEPHONE (OUTPATIENT)
Dept: PULMONOLOGY | Facility: CLINIC | Age: 69
End: 2024-12-18
Payer: MEDICARE

## 2024-12-18 NOTE — TELEPHONE ENCOUNTER
"      Hub staff attempted to follow warm transfer process and was unsuccessful     Caller: Carlos Hayes \"Edin\"    Relationship to patient: Self    Best call back number: [phone]     Patient is needing: MILOEINT HAS VERY RASPY VOICE, WHEEZING, AND DIFFICULTY BREATHING UPON EXERTION . PATIENT WENT TO PCP OFFICE VISIT AND WAS TOLD IT MAY BE CAUSED BY IRITATION FROM C-PAP MACHINE. PLEASE CALL TO ADVISE AS ANILA GARBER'S NEXT APPT IS 1/2/2025 AND HE WOULD LIKE TO BE SEEN ASAP.       "

## 2024-12-19 RX ORDER — PREDNISONE 20 MG/1
40 TABLET ORAL DAILY
Qty: 10 TABLET | Refills: 0 | Status: SHIPPED | OUTPATIENT
Start: 2024-12-19 | End: 2024-12-24

## 2024-12-19 RX ORDER — AZITHROMYCIN 250 MG/1
TABLET, FILM COATED ORAL
Qty: 6 TABLET | Refills: 0 | Status: SHIPPED | OUTPATIENT
Start: 2024-12-19

## 2024-12-19 NOTE — TELEPHONE ENCOUNTER
"Per patient he states he has noticed his oxygen levels during the day have been \"lower than usual\". He states his sats at rest is 91-92% at rest and \"drops some when he walks\".  He has also been having more wheezing and his voice is \"raspy\".  No cough or congestion. No fever or chills  He states he does rinse and gargle after using his inhalers.  Please advise.     "

## 2024-12-23 ENCOUNTER — HOSPITAL ENCOUNTER (OUTPATIENT)
Dept: PHYSICAL THERAPY | Facility: HOSPITAL | Age: 69
Setting detail: THERAPIES SERIES
Discharge: HOME | End: 2024-12-23
Payer: MEDICARE

## 2024-12-23 DIAGNOSIS — M54.2 CERVICALGIA: ICD-10-CM

## 2024-12-23 DIAGNOSIS — M25.552 LEFT HIP PAIN: Primary | ICD-10-CM

## 2024-12-23 DIAGNOSIS — M54.42 CHRONIC BILATERAL LOW BACK PAIN WITH BILATERAL SCIATICA: ICD-10-CM

## 2024-12-23 DIAGNOSIS — M54.41 CHRONIC BILATERAL LOW BACK PAIN WITH BILATERAL SCIATICA: ICD-10-CM

## 2024-12-23 DIAGNOSIS — S16.1XXA STRAIN OF NECK MUSCLE, INITIAL ENCOUNTER: ICD-10-CM

## 2024-12-23 DIAGNOSIS — G89.29 CHRONIC BILATERAL LOW BACK PAIN WITH BILATERAL SCIATICA: ICD-10-CM

## 2024-12-23 PROCEDURE — 97140 MANUAL THERAPY 1/> REGIONS: CPT | Performed by: PHYSICAL THERAPIST

## 2024-12-23 NOTE — PROGRESS NOTES
VLADISLAV Seo  [facility]   Pulmonary and Critical Care  [address] [place] [address]  Phone: [phone]  Fax: [phone]           Chief Complaint  Small airways disease    Subjective        Carlos Hayes presents to [facility] PULMONARY & CR[facility]ICAL [facility]ARE MEDICINE     History of Present Illness  Mr. Hayes is a 69-year-old male patient with known chronic respiratory failure with hypoxemia, restrictive lung disease, Elevated diaphragm,obstructive sleep apnea with BiPAP, history of COVID-19 (2020, 2021), restless leg syndrome, morbid obesity, CKD, arthritis, anxiety, PTSD, tremor, sinusitis, hypertension, chronic back pain with degeneration. He has had bronchitis/ pneumonia on/off most of his life. Short term memory loss.     He reports experiencing excessive tearing and nasal discharge upon awakening, which he attributes to his BiPAP machine. He was prescribed a nasal spray to be used once nightly, but the accompanying instructions recommended 3 times daily usage. Adhering to the latter resulted in excessive dryness. He also reports a recent episode of coughing, reminiscent of having a foreign body lodged in his throat, which has since resolved. He has been informed that his wheezing is not pulmonary in origin, but rather from his throat. He has not noticed any tenderness or swollen lymph nodes. He has been using the nasal spray to alleviate his symptoms, which he finds beneficial.    He has been diagnosed with seborrheic dermatitis, affecting his face, ears, and beard. He describes the sensation as akin to sunburn without the associated burning. He has been using a medicated lotion for relief, which he applies in the morning to prevent interference with his mask seal at night. He also uses a foam mask, which he finds more effective. He has been using a facial wash, which he applies before bedtime. He has noticed an improvement in his symptoms with these  "treatments. He has observed dandruff on his dark shirts and glasses, which he finds distressing. He has been applying the medicated lotion after his evening shower and refrains from further application throughout the day.    Supplemental Information  He experiences frequent urination at night due to his medication regimen. He attends physical therapy sessions once weekly, where they focus on his neck and sinuses using a Theragun.        Objective   Vital Signs:   /62   Pulse 79   Ht 189 cm (74.41\")   Wt (!) 164 kg (361 lb)   SpO2 93% Comment: RA  BMI 45.84 kg/m²     Physical Exam  Vitals reviewed.   Constitutional:       Appearance: Normal appearance. He is morbidly obese.   Cardiovascular:      Rate and Rhythm: Normal rate and regular rhythm.   Pulmonary:      Effort: Pulmonary effort is normal.      Breath sounds: Normal breath sounds.   Neurological:      General: No focal deficit present.      Mental Status: He is alert and oriented to person, place, and time.   Psychiatric:         Mood and Affect: Mood normal.         Behavior: Behavior normal.            Result Review :  The following data was reviewed by: VLADISLAV Seo on 01/02/2025:    My interpretation of imaging:  no new   My interpretation of labs: no new     PFT Values          11/18/2024    13:45   Pre Drug PFT Results   FVC 66   FEV1 63   FEF 25-75% 56   FEV1/FVC 71   Other Tests PFT Results   DLCO 113   D/VAsb 144     My interpretation of the PFT : no new     Results for orders placed in visit on 11/18/24    Spirometry with Diffusion Capacity    Narrative  Spirometry with Diffusion Capacity    Performed by: Roseann Sethi, RRT  Authorized by: Mary Christian APRN  Pre Drug % Predicted  FVC: 66%  FEV1: 63%  FEF 25-75%: 56%  FEV1/FVC: 71%  DLCO: 113%  D/VAsb: 144%    Interpretation  Spirometry  Spirometry shows moderate restriction. There is reduced midflow suggesting small airway/airflow obstruction.  Review of " FVL curve  Patient's effort is normal.  Diffusion Capacity  The patient's diffusion capacity is normal.  Diffusion capacity is normal when corrected for alveolar volume.  Overall comments: Compared to August 2023 his FEV1 has slightly dropped from 68 to 63% predicted, FVC has dropped from 73 to 66% predicted.  Diffusion capacity remains supranormal when corrected for alveolar volume      Results for orders placed during the hospital encounter of 08/09/23    Pulmonary Function Test    [facility] - Pulmonary Function Test    [place] [facility]  [address]  [phone]    Patient : Carlos Hayes  MRN : [id number]  CSN : [id number]  Pulmonologist : Earl Shearer MD  Date : 8/9/2023    ______________________________________________________________________    Interpretation :  1.  Spirometry is consistent with a mild bordering on moderate restrictive ventilatory defect with a coexisting decrease in midflows and peak expiratory flow.  2.  There is some improvement in midflows and peak expiratory flow postbronchodilator.  However postbronchodilator spirometry is still consistent with a mild bordering on moderate restrictive ventilatory defect with a decrease in midflows.  Peak expiratory flow is now within normal limits.  3.  Lung volumes reveal a low normal total lung capacity and vital capacity suggestive of a borderline restrictive ventilatory defect.  There is also a decreased expiratory reserve volume.  4.  Diffusion capacity is within normal limits and when corrected for alveolar volume is supranormal.  5.  Arterial blood gases on room air reveal mild hypoxemia.  A mild metabolic alkalosis is also present.  6.  When current studies are compared to studies performed on June 25, 2021, the patient's current pre and postbronchodilator spirometry reveals a slight decline in both the FVC and FEV1 compared to previous baseline values.  There has been slight improvement in the patient's  total lung capacity compared to previous.  When corrected for alveolar volume there is no significant change in diffusion capacity compared to previous.      Earl Shearer MD      Results for orders placed during the hospital encounter of 06/25/21    Full Pulmonary Function Test With Bronchodilator    Narrative  [facility] - Pulmonary Function Test    [place]  [place]  [place]  [address]  [phone]    Patient : Carlos Hayes  MRN : [id number]  CSN : [id number]  Pulmonologist : Earl Shearer MD  Date : 6/28/2021    ______________________________________________________________________    Interpretation :  1.  Spirometry is consistent with a mild restrictive ventilatory defect with coexisting small airways disease.  2.  Lung volumes confirm a mild restrictive ventilatory defect.  There is also a decrease in inspiratory capacity.  3.  Diffusion capacity is within normal limits and when corrected for alveolar volume is supranormal.  4.  When current studies are compared to studies from December 7 of 2018, there has been a drop in the patient's forced vital capacity and FEV1 compared to previous.  There has also been a decrease in total lung capacity compared to previous.  When corrected for alveolar volume there has been an improvement in diffusion capacity compared to previous.      Earl Shearer MD    Rest/Exercise Pulse Ox Values          1/2/2025    13:15   Rest/Exercise Pulse Ox Results   Rest room air SAT % 94   Exercise room air SAT % 91         Assessment and Plan   Diagnoses and all orders for this visit:    1. Small airways disease (Primary)    2. Restrictive lung disease  -     Walking Oximetry    3. Nocturnal hypoxia    4. Obstructive sleep apnea treated with BiPAP    5. Personal history of nicotine dependence    6. Centrilobular emphysema      He was ambulated today and did not qualify for supplemental oxygen.  His raspy voice/wheezing in his throat has improved since   [place] added in the Atrovent nasal spray.  He is advised to continue all 3 of his nasal sprays at this time.  He is cautioned about the timing of applying his topical medications to his face and making sure they are well dried so that they are not easy to get into his eyes with wearing the PAP device.  He is asked to keep his appointment as scheduled in May.      Alpha 1: none  LDCT: Due September 2025  Smoking Cessation:, Quit 2016  Vaccinations: Flu: Completed  PNA: PCP follows RSV: Completed    Follow Up   No follow-ups on file.  Patient was given instructions and counseling regarding his condition or for health maintenance advice. Please see specific information pulled into the AVS if appropriate.     VLADISLAV Seo  1/2/2025  14:02 CST    everardo note that portions of this note were completed with a voice recognition program.    Patient or patient representative verbalized consent for the use of Ambient Listening during the visit with  VLADISLAV Seo for chart documentation. 1/2/2025  14:01 CST

## 2024-12-23 NOTE — THERAPY TREATMENT NOTE
Physical Therapy Treatment Note  [facility]  [address], [place] [address]    Patient: Carlos Hayes                                                 Visit Date: 2024  :     1955    Referring practitioner:    Chris Russ MD  Date of Initial Visit:          Type: THERAPY  Episode: Neck strain; left hip pain    Visit Diagnoses:    ICD-10-CM ICD-9-CM   1. Left hip pain  M25.552 719.45   2. Strain of neck muscle, initial encounter  S16.1XXA 847.0   3. Cervicalgia  M54.2 723.1   4. Chronic bilateral low back pain with bilateral sciatica  M54.42 724.2    M54.41 724.3    G89.29 338.29       SUBJECTIVE     Subjective:  He fell 2 days ago and hurt his right shin and heel and banged up other parts like his shoulder and neck.     PAIN: 8/10 before, improved after       OBJECTIVE     Objective     Manual Therapy     75053  Comments   Percussive IASTM using Powerboost to right CT junction and oliver piri at L2 using spade attachment      Oliver  UT STM     Sidelying CT ext/rotation mobs oliver    SL oliver lumbar STM        Timed Minutes 45      Therapy Education/Self Care 43528   Education offered today Try hip abd and gentle hip flex/ext in pool instead of squats   [employer] Code Access Code: [email or web address]  URL: [email or web address]   Ongoing HEP       Timed Minutes        Total Timed Treatment:     45  mins  Total Time of Visit:             45   mins     ASSESSMENT/PLAN      GOALS:  Goals                                          Progress Note due by 25                                                      Recert due by 3/9/25   LTG by: 12 weeks Comments Date Status   Improve oliver cervical rotation to 50 deg LR 25, RR 30 deg 12/10 ongoing   Able to stand more upright with more neutral cervical More upright than a couple of weeks ago 12/10 ongoing   Improve passive hip ext to 10 deg Right hip ext to -10 deg  ongoing   Able to walk x 20  min without rollator before sitting  12/10 ongoing   Improve oliver shoulder elevation to 130 deg Not assessed 12/10 ongoing   Ind with HEP for flexibility and stability Working on anterior mobs of right pelvis in pool with a lunges stretch 12/10 ongoing       Anticipated CPT codes: Therapeutic Exercise 12907, Manual Therapy 99293, Therapeutic Activity 59818, Neuromuscular ReEducation 23551, and Self Care/Home Management 06068      Assessment/Plan     ASSESSMENT:    He was pretty sore when he arrived so we focused on pain relief and muscle relaxation today. He said he felt looser with less pain after.     PLAN:   Continue focus on CT joint mobs and stretch of his hip and progress with stability exs.     SIGNATURE: Juarez Hartmann, PT, [place] License #: [id number]  Electronically Signed on 12/23/2024            [address] [place] [address], [place] [address]  [phone]

## 2024-12-31 ENCOUNTER — HOSPITAL ENCOUNTER (OUTPATIENT)
Dept: PHYSICAL THERAPY | Facility: HOSPITAL | Age: 69
Setting detail: THERAPIES SERIES
Discharge: HOME | End: 2024-12-31
Payer: MEDICARE

## 2024-12-31 DIAGNOSIS — S16.1XXA STRAIN OF NECK MUSCLE, INITIAL ENCOUNTER: ICD-10-CM

## 2024-12-31 DIAGNOSIS — M54.42 CHRONIC BILATERAL LOW BACK PAIN WITH BILATERAL SCIATICA: ICD-10-CM

## 2024-12-31 DIAGNOSIS — M54.41 CHRONIC BILATERAL LOW BACK PAIN WITH BILATERAL SCIATICA: ICD-10-CM

## 2024-12-31 DIAGNOSIS — G89.29 CHRONIC BILATERAL LOW BACK PAIN WITH BILATERAL SCIATICA: ICD-10-CM

## 2024-12-31 DIAGNOSIS — M25.552 LEFT HIP PAIN: Primary | ICD-10-CM

## 2024-12-31 DIAGNOSIS — M54.2 CERVICALGIA: ICD-10-CM

## 2024-12-31 PROCEDURE — 97140 MANUAL THERAPY 1/> REGIONS: CPT | Performed by: PHYSICAL THERAPIST

## 2024-12-31 NOTE — THERAPY TREATMENT NOTE
Physical Therapy Treatment Note  [facility]  [address], [place] [address]    Patient: Carlos Hayes                                                 Visit Date: 2024  :     1955    Referring practitioner:    Chris Russ MD  Date of Initial Visit:          Type: THERAPY  Episode: Neck strain; left hip pain    Visit Diagnoses:    ICD-10-CM ICD-9-CM   1. Left hip pain  M25.552 719.45   2. Strain of neck muscle, initial encounter  S16.1XXA 847.0   3. Cervicalgia  M54.2 723.1   4. Chronic bilateral low back pain with bilateral sciatica  M54.42 724.2    M54.41 724.3    G89.29 338.29       SUBJECTIVE     Subjective:  He almost tripped over his Rollator and hurt his back.     PAIN: 7.5/10 before       OBJECTIVE     Objective     Manual Therapy     15471  Comments   Percussive IASTM using Powerboost to right CT junction and oliver piri at L2 using spade attachment      Oliver  UT STM     Sidelying CT ext/rotation mobs oliver    SL right lumbar STM        Timed Minutes 45      Therapy Education/Self Care 54688   Education offered today Try hip abd and gentle hip flex/ext in pool instead of squats   [employer] Code Access Code: [email or web address]  URL: [email or web address]   Ongoing HEP       Timed Minutes        Total Timed Treatment:     45  mins  Total Time of Visit:             45   mins     ASSESSMENT/PLAN      GOALS:  Goals                                          Progress Note due by 25                                                      Recert due by 3/9/25   LTG by: 12 weeks Comments Date Status   Improve oliver cervical rotation to 50 deg LR 25, RR 30 deg 12/10 ongoing   Able to stand more upright with more neutral cervical More upright than a couple of weeks ago 12/10 ongoing   Improve passive hip ext to 10 deg Right hip ext to -10 deg  ongoing   Able to walk x 20 min without rollator before sitting Fell recently   ongoing   Improve oliver shoulder elevation to 130 deg Not assessed 12/10 ongoing   Ind with HEP for flexibility and stability Working on anterior mobs of right pelvis in pool with a lunges stretch 12/10 ongoing       Anticipated CPT codes: Therapeutic Exercise 02474, Manual Therapy 91347, Therapeutic Activity 56413, Neuromuscular ReEducation 07409, and Self Care/Home Management 84757      Assessment/Plan     ASSESSMENT:    His neck is doing better overall. His lumbar has become his bigger issue.     PLAN:   Continue focus on CT joint mobs and stretch of his hip and progress with stability exs.     SIGNATURE: Juarez Hartmann, PT, [place] License #: [id number]  Electronically Signed on 12/31/2024            [address] [place] [address]. [address]  [phone]

## 2025-01-02 ENCOUNTER — OFFICE VISIT (OUTPATIENT)
Dept: PULMONOLOGY | Facility: CLINIC | Age: 70
End: 2025-01-02
Payer: MEDICARE

## 2025-01-02 VITALS
OXYGEN SATURATION: 93 % | DIASTOLIC BLOOD PRESSURE: 62 MMHG | HEART RATE: 79 BPM | WEIGHT: 315 LBS | BODY MASS INDEX: 40.43 KG/M2 | SYSTOLIC BLOOD PRESSURE: 126 MMHG | HEIGHT: 74 IN

## 2025-01-02 DIAGNOSIS — J98.4 SMALL AIRWAYS DISEASE: Primary | Chronic | ICD-10-CM

## 2025-01-02 DIAGNOSIS — G47.34 NOCTURNAL HYPOXIA: Chronic | ICD-10-CM

## 2025-01-02 DIAGNOSIS — J43.2 CENTRILOBULAR EMPHYSEMA: ICD-10-CM

## 2025-01-02 DIAGNOSIS — G47.33 OBSTRUCTIVE SLEEP APNEA TREATED WITH BIPAP: Chronic | ICD-10-CM

## 2025-01-02 DIAGNOSIS — J98.4 RESTRICTIVE LUNG DISEASE: ICD-10-CM

## 2025-01-02 DIAGNOSIS — Z87.891 PERSONAL HISTORY OF NICOTINE DEPENDENCE: ICD-10-CM

## 2025-01-02 PROBLEM — J40 BRONCHITIS: Status: RESOLVED | Noted: 2021-05-28 | Resolved: 2025-01-02

## 2025-01-02 RX ORDER — IPRATROPIUM BROMIDE 21 UG/1
2 SPRAY, METERED NASAL EVERY 12 HOURS
COMMUNITY

## 2025-01-02 NOTE — PROCEDURES
Walking Oximetry    Performed by: Viv Leon MA  Authorized by: Mary Christian APRN    Rest room air SAT %:  94  Exercise room air SAT %:  91

## 2025-01-06 ENCOUNTER — HOSPITAL ENCOUNTER (OUTPATIENT)
Dept: PHYSICAL THERAPY | Facility: HOSPITAL | Age: 70
Setting detail: THERAPIES SERIES
Discharge: HOME | End: 2025-01-06
Payer: MEDICARE

## 2025-01-06 DIAGNOSIS — M54.41 CHRONIC BILATERAL LOW BACK PAIN WITH BILATERAL SCIATICA: ICD-10-CM

## 2025-01-06 DIAGNOSIS — M54.2 CERVICALGIA: ICD-10-CM

## 2025-01-06 DIAGNOSIS — M25.552 LEFT HIP PAIN: Primary | ICD-10-CM

## 2025-01-06 DIAGNOSIS — G89.29 CHRONIC BILATERAL LOW BACK PAIN WITH BILATERAL SCIATICA: ICD-10-CM

## 2025-01-06 DIAGNOSIS — M54.42 CHRONIC BILATERAL LOW BACK PAIN WITH BILATERAL SCIATICA: ICD-10-CM

## 2025-01-06 DIAGNOSIS — S16.1XXA STRAIN OF NECK MUSCLE, INITIAL ENCOUNTER: ICD-10-CM

## 2025-01-06 PROCEDURE — 97110 THERAPEUTIC EXERCISES: CPT | Performed by: PHYSICAL THERAPIST

## 2025-01-06 PROCEDURE — 97140 MANUAL THERAPY 1/> REGIONS: CPT | Performed by: PHYSICAL THERAPIST

## 2025-01-06 NOTE — THERAPY PROGRESS REPORT/RE-CERT
Physical Therapy Treatment Note and 30 Day Progress Note  [facility] Therapy [facility]  A department [address], [place] [address]    Patient: Carlos Hayes                                                 Visit Date: 2025  :     1955    Referring practitioner:    Chris Russ MD  Date of Initial Visit:          Type: THERAPY  Episode: Neck strain; left hip pain    Visit Diagnoses:    ICD-10-CM ICD-9-CM   1. Left hip pain  M25.552 719.45   2. Strain of neck muscle, initial encounter  S16.1XXA 847.0   3. Cervicalgia  M54.2 723.1   4. Chronic bilateral low back pain with bilateral sciatica  M54.42 724.2    M54.41 724.3    G89.29 338.29       SUBJECTIVE     Subjective:  He says after his last PT visit, he walked in his pool and did some other things and was hurting for 4 days.     PAIN: 7.5/10 before       OBJECTIVE     Objective     Manual Therapy     [id number]  Comments   Percussive IASTM using Powerboost to right CT junction and oliver piri at L2 using spade attachment      Oliver  UT STM     Sidelying CT ext/rotation mobs oliver    SL right lumbar STM        Timed Minutes 25      Therapeutic Exercises    81761 Units Comments   Clamshell man resisted 10x2    SL right hip ER stretch                    Timed Minutes 10       Therapy Education/Self Care 41464   Education offered today Try hip abd and gentle hip flex/ext in pool instead of squats   [employer] Code Access Code: [email or web address]  URL: [email or web address]   Ongoing HEP       Timed Minutes        Total Timed Treatment:     35  mins  Total Time of Visit:             35   mins     ASSESSMENT/PLAN      GOALS:  Goals                                          Progress Note due by 25                                                      Recert due by 3/9/25   LTG by: 12 weeks Comments Date Status   Improve oliver cervical rotation to 50 deg LR 25, RR 30 deg  ongoing   Able to stand more upright with more neutral  cervical More upright than a couple of weeks ago 1/6 ongoing   Improve passive hip ext to 10 deg Right hip ext to -10 deg 1/6 ongoing   Able to walk x 20 min without rollator before sitting Fell recently 1/6 ongoing   Improve oliver shoulder elevation to 130 deg Not assessed 1/6 ongoing   Ind with HEP for flexibility and stability Working on anterior mobs of right pelvis in pool with a lunges stretch 1/6 ongoing       Anticipated CPT codes: Therapeutic Exercise 98175, Manual Therapy 60836, Therapeutic Activity 33746, Neuromuscular ReEducation 49123, and Self Care/Home Management 42356      Assessment & Plan       Assessment  Impairments: abnormal muscle firing, abnormal muscle tone, abnormal or restricted ROM, activity intolerance, impaired physical strength, lacks appropriate home exercise program and pain with function   Functional limitations: lifting, walking, uncomfortable because of pain, sitting and standing   Prognosis: good    Plan  Therapy options: will be seen for skilled therapy services  Planned modality interventions: dry needling, low level laser therapy, TENS and traction  Planned therapy interventions: abdominal trunk stabilization, flexibility, functional ROM exercises, home exercise program, joint mobilization, manual therapy, motor coordination training, neuromuscular re-education, postural training, soft tissue mobilization, spinal/joint mobilization, strengthening, stretching and therapeutic activities  Frequency: 2x week  Duration in weeks: 12  Treatment plan discussed with: patient         ASSESSMENT:    His pain was a bit more flared. Overall, we need to work his hip stability more to keep his hip from impinging and stabilize for his knee.     PLAN:   Continue focus on CT joint mobs and stretch of his hip and progress with stability exs.     SIGNATURE: Juarez Hartmann, PT, [place] License #: [id number]  Electronically Signed on 1/6/2025            [address] [place] [address]. [address]  [phone]

## 2025-01-14 ENCOUNTER — HOSPITAL ENCOUNTER (OUTPATIENT)
Dept: PHYSICAL THERAPY | Facility: HOSPITAL | Age: 70
Setting detail: THERAPIES SERIES
Discharge: HOME | End: 2025-01-14
Payer: MEDICARE

## 2025-01-14 DIAGNOSIS — M25.552 LEFT HIP PAIN: Primary | ICD-10-CM

## 2025-01-14 DIAGNOSIS — M54.41 CHRONIC BILATERAL LOW BACK PAIN WITH BILATERAL SCIATICA: ICD-10-CM

## 2025-01-14 DIAGNOSIS — S16.1XXA STRAIN OF NECK MUSCLE, INITIAL ENCOUNTER: ICD-10-CM

## 2025-01-14 DIAGNOSIS — M54.42 CHRONIC BILATERAL LOW BACK PAIN WITH BILATERAL SCIATICA: ICD-10-CM

## 2025-01-14 DIAGNOSIS — M54.2 CERVICALGIA: ICD-10-CM

## 2025-01-14 DIAGNOSIS — G89.29 CHRONIC BILATERAL LOW BACK PAIN WITH BILATERAL SCIATICA: ICD-10-CM

## 2025-01-14 PROCEDURE — 97140 MANUAL THERAPY 1/> REGIONS: CPT | Performed by: PHYSICAL THERAPIST

## 2025-01-14 PROCEDURE — 97110 THERAPEUTIC EXERCISES: CPT | Performed by: PHYSICAL THERAPIST

## 2025-01-14 NOTE — THERAPY TREATMENT NOTE
Physical Therapy Treatment Note  [facility]  [address], [place] [address]    Patient: Carlos Hayes                                                 Visit Date: 2025  :     1955    Referring practitioner:    Chris uRss MD  Date of Initial Visit:          Type: THERAPY  Episode: Neck strain; left hip pain    Visit Diagnoses:    ICD-10-CM ICD-9-CM   1. Left hip pain  M25.552 719.45   2. Strain of neck muscle, initial encounter  S16.1XXA 847.0   3. Cervicalgia  M54.2 723.1   4. Chronic bilateral low back pain with bilateral sciatica  M54.42 724.2    M54.41 724.3    G89.29 338.29       SUBJECTIVE     Subjective:  He says after his last PT visit, he walked in his pool and did some other things and was hurting for 4 days.     PAIN: 7.5/10 before       OBJECTIVE     Objective     Manual Therapy     65589  Comments   Percussive IASTM using Powerboost piri at L2 using ball attachment      Oliver  UT STM     Sidelying CT ext/rotation mobs oliver    SL right lumbar STM        Timed Minutes 25      Therapeutic Exercises    50424 Units Comments   Charlyl man resisted 10x2    SL right hip ER stretch     Oliver hip ext/anterior rotation mob  Gentle sustained mob             Timed Minutes 15     Therapy Education/Self Care 31740   Education offered today Try hip abd and gentle hip flex/ext in pool instead of squats   [employer] Code Access Code: [email or web address]  URL: [email or web address]   Ongoing HEP       Timed Minutes        Total Timed Treatment:     40  mins  Total Time of Visit:             40   mins     ASSESSMENT/PLAN      GOALS:  Goals                                          Progress Note due by 25                                                      Recert due by 3/9/25   LTG by: 12 weeks Comments Date Status   Improve oliver cervical rotation to 50 deg LR 25, RR 30 deg / ongoing   Able to stand more upright with more neutral  cervical More upright than a couple of weeks ago 1/6 ongoing   Improve passive hip ext to 10 deg Right hip ext to -10 deg 1/6 ongoing   Able to walk x 20 min without rollator before sitting Jaziel forward 1/14 ongoing   Improve oliver shoulder elevation to 130 deg Not assessed 1/6 ongoing   Ind with HEP for flexibility and stability Working on anterior mobs of right pelvis in pool with a lunges stretch 1/6 ongoing       Anticipated CPT codes: Therapeutic Exercise 30376, Manual Therapy 37868, Therapeutic Activity 95596, Neuromuscular ReEducation 73211, and Self Care/Home Management 65505      Assessment/Plan     ASSESSMENT:    His pain was a bit more flared. Overall, we need to work his hip stability more to keep his hip from impinging and stabilize for his knee.     PLAN:   Continue focus on CT joint mobs and stretch of his hip and progress with stability exs.     SIGNATURE: Juarez Hartmann, PT, [place] License #: [id number]  Electronically Signed on 1/14/2025            [address] [place] [address] [place] [address]  [phone]

## 2025-01-21 ENCOUNTER — HOSPITAL ENCOUNTER (OUTPATIENT)
Dept: PHYSICAL THERAPY | Facility: HOSPITAL | Age: 70
Setting detail: THERAPIES SERIES
Discharge: HOME | End: 2025-01-21
Payer: MEDICARE

## 2025-01-21 DIAGNOSIS — M25.552 LEFT HIP PAIN: Primary | ICD-10-CM

## 2025-01-21 DIAGNOSIS — M54.2 CERVICALGIA: ICD-10-CM

## 2025-01-21 DIAGNOSIS — M54.41 CHRONIC BILATERAL LOW BACK PAIN WITH BILATERAL SCIATICA: ICD-10-CM

## 2025-01-21 DIAGNOSIS — G89.29 CHRONIC BILATERAL LOW BACK PAIN WITH BILATERAL SCIATICA: ICD-10-CM

## 2025-01-21 DIAGNOSIS — M54.42 CHRONIC BILATERAL LOW BACK PAIN WITH BILATERAL SCIATICA: ICD-10-CM

## 2025-01-21 DIAGNOSIS — S16.1XXA STRAIN OF NECK MUSCLE, INITIAL ENCOUNTER: ICD-10-CM

## 2025-01-21 PROCEDURE — 97140 MANUAL THERAPY 1/> REGIONS: CPT | Performed by: PHYSICAL THERAPIST

## 2025-01-28 ENCOUNTER — APPOINTMENT (OUTPATIENT)
Dept: PHYSICAL THERAPY | Facility: HOSPITAL | Age: 70
End: 2025-01-28
Payer: MEDICARE

## 2025-02-14 ENCOUNTER — HOSPITAL ENCOUNTER (OUTPATIENT)
Dept: PHYSICAL THERAPY | Facility: HOSPITAL | Age: 70
Setting detail: THERAPIES SERIES
Discharge: HOME | End: 2025-02-14
Payer: MEDICARE

## 2025-02-14 DIAGNOSIS — G89.29 CHRONIC BILATERAL LOW BACK PAIN WITH BILATERAL SCIATICA: ICD-10-CM

## 2025-02-14 DIAGNOSIS — S16.1XXA STRAIN OF NECK MUSCLE, INITIAL ENCOUNTER: ICD-10-CM

## 2025-02-14 DIAGNOSIS — M25.552 LEFT HIP PAIN: Primary | ICD-10-CM

## 2025-02-14 DIAGNOSIS — M54.41 CHRONIC BILATERAL LOW BACK PAIN WITH BILATERAL SCIATICA: ICD-10-CM

## 2025-02-14 DIAGNOSIS — M54.2 CERVICALGIA: ICD-10-CM

## 2025-02-14 DIAGNOSIS — M54.42 CHRONIC BILATERAL LOW BACK PAIN WITH BILATERAL SCIATICA: ICD-10-CM

## 2025-02-14 PROCEDURE — 97140 MANUAL THERAPY 1/> REGIONS: CPT | Performed by: PHYSICAL THERAPIST

## 2025-02-14 PROCEDURE — 97110 THERAPEUTIC EXERCISES: CPT | Performed by: PHYSICAL THERAPIST

## 2025-02-14 NOTE — THERAPY TREATMENT NOTE
"Physical Therapy Treatment Note and 30 Day Progress Note  [facility] Outpatient Therapy Services  A department [address], [place] [address]    Patient: Carlos Hayes                                                 Visit Date: 2025  :     1955    Referring practitioner:    Chris Russ MD  Date of Initial Visit:          Type: THERAPY  Episode: Neck strain; left hip pain    Visit Diagnoses:    ICD-10-CM ICD-9-CM   1. Left hip pain  M25.552 719.45   2. Strain of neck muscle, initial encounter  S16.1XXA 847.0   3. Cervicalgia  M54.2 723.1   4. Chronic bilateral low back pain with bilateral sciatica  M54.42 724.2    M54.41 724.3    G89.29 338.29       SUBJECTIVE     Subjective:  He says he slipped on a slick floor and has been sore. His left lumbar is \"good\" but his right LS/glute are more sore today. Right now, his pain isn't as bad but at night, it gets up to 8-9/10.     PAIN: 7.5-8/10 before       OBJECTIVE     Objective     Manual Therapy     79477  Comments   SL Percussive IASTM using Powerboost to left piriformis at L2 using flat attachment     SL right pelvis anterior mob                Timed Minutes 35      Therapeutic Exercises    09398 Units Comments   Stretch right hip IP     Clamshells with light sustained manual OP                    Timed Minutes 10         Therapy Education/Self Care 58662   Education offered today Try hip abd and gentle hip flex/ext in pool instead of squats   [employer] Code Access Code: [email or web address]  URL: [email or web address]   Ongoing HEP       Timed Minutes        Total Timed Treatment:     45  mins  Total Time of Visit:             45   mins     ASSESSMENT/PLAN      GOALS:  Goals                                          Progress Note due by 3/16/25                                                      Recert due by 3/9/25   LTG by: 12 weeks Comments Date Status   Improve oliver cervical rotation to 50 deg LR 25, RR 30 deg  " ongoing   Able to stand more upright with more neutral cervical More upright than a couple of weeks ago 2/14 ongoing   Improve passive hip ext to 10 deg Right hip ext to -10 deg 2/14 ongoing   Able to walk x 20 min without rollator before sitting Jaziel forward 2/14 ongoing   Improve oliver shoulder elevation to 130 deg Not assessed 2/14 ongoing   Ind with HEP for flexibility and stability Working on anterior mobs of right pelvis in pool with a lunges stretch 2/14 ongoing       Anticipated CPT codes: Therapeutic Exercise 77020, Manual Therapy 17701, Therapeutic Activity 97357, Neuromuscular ReEducation 84318, and Self Care/Home Management 88271      Assessment & Plan       Assessment  Impairments: abnormal muscle firing, abnormal muscle tone, abnormal or restricted ROM, activity intolerance, impaired physical strength, lacks appropriate home exercise program and pain with function   Functional limitations: lifting, walking, uncomfortable because of pain, sitting and standing   Prognosis: good    Plan  Therapy options: will be seen for skilled therapy services  Planned modality interventions: dry needling, low level laser therapy, TENS and traction  Planned therapy interventions: abdominal trunk stabilization, flexibility, functional ROM exercises, home exercise program, joint mobilization, manual therapy, motor coordination training, neuromuscular re-education, postural training, soft tissue mobilization, spinal/joint mobilization, strengthening, stretching and therapeutic activities  Frequency: 2x week  Duration in weeks: 12  Treatment plan discussed with: patient         ASSESSMENT:    His neck has been better but his right hip was more flared after slipping. We have focused on mobility of his CT and pelvis/hip as well as working glute firing.     PLAN:   Continue focus on CT joint mobs and stretch of his hip and progress with stability exs. Back off to every other week but be ready to fit him in as needed.      SIGNATURE: Juarez Hartmann, PT, [place] License #: [id number]  Electronically Signed on 2/14/2025            [address] [facility]  [place]. [address]  [phone]

## 2025-02-28 ENCOUNTER — HOSPITAL ENCOUNTER (OUTPATIENT)
Dept: PHYSICAL THERAPY | Facility: HOSPITAL | Age: 70
Setting detail: THERAPIES SERIES
Discharge: HOME | End: 2025-02-28
Payer: MEDICARE

## 2025-02-28 DIAGNOSIS — M54.2 CERVICALGIA: ICD-10-CM

## 2025-02-28 DIAGNOSIS — G89.29 CHRONIC BILATERAL LOW BACK PAIN WITH BILATERAL SCIATICA: ICD-10-CM

## 2025-02-28 DIAGNOSIS — M54.41 CHRONIC BILATERAL LOW BACK PAIN WITH BILATERAL SCIATICA: ICD-10-CM

## 2025-02-28 DIAGNOSIS — M54.42 CHRONIC BILATERAL LOW BACK PAIN WITH BILATERAL SCIATICA: ICD-10-CM

## 2025-02-28 DIAGNOSIS — M25.552 LEFT HIP PAIN: Primary | ICD-10-CM

## 2025-02-28 DIAGNOSIS — S16.1XXA STRAIN OF NECK MUSCLE, INITIAL ENCOUNTER: ICD-10-CM

## 2025-02-28 PROCEDURE — 97140 MANUAL THERAPY 1/> REGIONS: CPT | Performed by: PHYSICAL THERAPIST

## 2025-02-28 NOTE — THERAPY TREATMENT NOTE
Physical Therapy Treatment Note  [facility] Therapy [facility]  A department [address], [place] [address]    Patient: Carlos Hayes                                                 Visit Date: 2025  :     1955    Referring practitioner:    Chris Russ MD  Date of Initial Visit:          Type: THERAPY  Episode: Neck strain; left hip pain    Visit Diagnoses:    ICD-10-CM ICD-9-CM   1. Left hip pain  M25.552 719.45   2. Strain of neck muscle, initial encounter  S16.1XXA 847.0   3. Cervicalgia  M54.2 723.1   4. Chronic bilateral low back pain with bilateral sciatica  M54.42 724.2    M54.41 724.3    G89.29 338.29       SUBJECTIVE     Subjective:  He says he had a vein injection and his right lower leg is very sore. He can tell he is very weak because he had to sustained a quad contraction and it cramped.    PAIN: 7.5-8/10        OBJECTIVE     Objective     Manual Therapy     81714  Comments   SL right CT ext mob and UT STM    SL Percussive IASTM using Powerboost to left piriformis at L2 using flat attachment     SL right pelvis anterior and post mob    SL right LS man distraction    Rewrapped right lower leg in fig 8 pattern with his compression wrap        Timed Minutes 45      Therapy Education/Self Care 96474   Education offered today Try hip abd and gentle hip flex/ext in pool instead of squats   [employer] Code Access Code: [email or web address]  URL: [email or web address]   Ongoing HEP       Timed Minutes        Total Timed Treatment:     45  mins  Total Time of Visit:             45   mins     ASSESSMENT/PLAN      GOALS:  Goals                                          Progress Note due by 3/16/25                                                      Recert due by 3/9/25   LTG by: 12 weeks Comments Date Status   Improve oliver cervical rotation to 50 deg LR 25, RR 30 deg  ongoing   Able to stand more upright with more neutral cervical More upright than a  couple of weeks ago 2/14 ongoing   Improve passive hip ext to 10 deg Right hip ext to -10 deg 2/14 ongoing   Able to walk x 20 min without rollator before sitting Jaziel forward 2/14 ongoing   Improve oliver shoulder elevation to 130 deg Not assessed 2/14 ongoing   Ind with HEP for flexibility and stability Working on anterior mobs of right pelvis in pool with a lunges stretch 2/14 ongoing       Anticipated CPT codes: Therapeutic Exercise 59406, Manual Therapy 06239, Therapeutic Activity 92414, Neuromuscular ReEducation 19384, and Self Care/Home Management 91117      Assessment/Plan     ASSESSMENT:    We need to start working on core and hip stability. His neck is better so we can back off this for now.     PLAN:   Emphasize hip and LE stability.     SIGNATURE: Juarez Hartmann, PT, [place] License #: [id number]  Electronically Signed on 2/28/2025            [address] [place] [address] [place]. [address]  [phone]

## 2025-03-14 ENCOUNTER — HOSPITAL ENCOUNTER (OUTPATIENT)
Dept: PHYSICAL THERAPY | Facility: HOSPITAL | Age: 70
Setting detail: THERAPIES SERIES
Discharge: HOME | End: 2025-03-14
Payer: MEDICARE

## 2025-03-14 DIAGNOSIS — M54.41 CHRONIC BILATERAL LOW BACK PAIN WITH BILATERAL SCIATICA: ICD-10-CM

## 2025-03-14 DIAGNOSIS — M25.552 LEFT HIP PAIN: Primary | ICD-10-CM

## 2025-03-14 DIAGNOSIS — S16.1XXA STRAIN OF NECK MUSCLE, INITIAL ENCOUNTER: ICD-10-CM

## 2025-03-14 DIAGNOSIS — M54.2 CERVICALGIA: ICD-10-CM

## 2025-03-14 DIAGNOSIS — G89.29 CHRONIC BILATERAL LOW BACK PAIN WITH BILATERAL SCIATICA: ICD-10-CM

## 2025-03-14 DIAGNOSIS — M54.42 CHRONIC BILATERAL LOW BACK PAIN WITH BILATERAL SCIATICA: ICD-10-CM

## 2025-03-14 PROCEDURE — 97110 THERAPEUTIC EXERCISES: CPT | Performed by: PHYSICAL THERAPIST

## 2025-03-14 PROCEDURE — 97140 MANUAL THERAPY 1/> REGIONS: CPT | Performed by: PHYSICAL THERAPIST

## 2025-03-14 NOTE — THERAPY TREATMENT NOTE
Physical Therapy Treatment Note, 30 Day Progress Note, and 90 Day Recertification Note  [facility] Outpatient Therapy Services  A department [address], [place] [address]    Patient: Carlos Hayes                                                 Visit Date: 3/14/2025  :     1955    Referring practitioner:    Chris Russ MD  Date of Initial Visit:          Type: THERAPY  Episode: Neck strain; left hip pain    Visit Diagnoses:    ICD-10-CM ICD-9-CM   1. Left hip pain  M25.552 719.45   2. Strain of neck muscle, initial encounter  S16.1XXA 847.0   3. Cervicalgia  M54.2 723.1   4. Chronic bilateral low back pain with bilateral sciatica  M54.42 724.2    M54.41 724.3    G89.29 338.29       SUBJECTIVE     Subjective:  He went to get a massage Wednesday and slept for 4 hours after. He says his left glute will get sore after sitting. He felt like he overdid the pool the other day. His neck is OK.     PAIN: 8/10        OBJECTIVE     Objective     Manual Therapy     62912  Comments   SL Percussive IASTM using Powerboost to right CT junction and right piriformis at L2 using flat attachment     SL right pelvis anterior and post mob    SL right LS man distraction            Timed Minutes 30      Therapeutic Exercises    65152 Units Comments   Passive stretch right hip abduction and knee-to-chest     Hook lying isometric AB and adduction                    Timed Minutes 10       Therapy Education/Self Care 14849   Education offered today Try hip abd and gentle hip flex/ext in pool instead of squats   [employer] Code Access Code: [email or web address]  URL: [email or web address]   Ongoing HEP       Timed Minutes        Total Timed Treatment:     40  mins  Total Time of Visit:             40   mins     ASSESSMENT/PLAN      GOALS:  Goals                                          Progress Note due by 25                                                      Recert due by 25   LT by: 12  weeks Comments Date Status   Improve oliver cervical rotation to 50 deg LR 25, RR 30 deg 3/14 ongoing   Able to stand more upright with more neutral cervical He was more stooped over today than he was the last time that I saw him 4 weeks ago 3/14 ongoing   Improve passive hip ext to 10 deg Right hip ext to -10 deg 3/14 ongoing   Able to walk x 20 min without rollator before sitting Jaziel forward 3/14 ongoing   Improve oliver shoulder elevation to 130 deg Not assessed 3/14 ongoing   Ind with HEP for flexibility and stability Working on anterior mobs of right pelvis in pool with a lunges stretch 3/14 ongoing       Anticipated CPT codes: Therapeutic Exercise 60996, Manual Therapy 73125, Therapeutic Activity 06612, Neuromuscular ReEducation 46092, and Self Care/Home Management 58661      Assessment & Plan       Assessment  Impairments: abnormal muscle firing, abnormal muscle tone, abnormal or restricted ROM, activity intolerance, impaired physical strength, lacks appropriate home exercise program and pain with function   Functional limitations: lifting, walking, uncomfortable because of pain, sitting and standing   Prognosis: good    Plan  Therapy options: will be seen for skilled therapy services  Planned modality interventions: dry needling, low level laser therapy, TENS and traction  Planned therapy interventions: abdominal trunk stabilization, flexibility, functional ROM exercises, home exercise program, joint mobilization, manual therapy, motor coordination training, neuromuscular re-education, postural training, soft tissue mobilization, spinal/joint mobilization, strengthening, stretching and therapeutic activities  Frequency: 2x week  Duration in weeks: 12  Treatment plan discussed with: patient         ASSESSMENT:    We continue to focus on his right neck and his pelvis/hip mobility and stability.  The severe spasms that he was having before seem to have calm down but his hips are still quite tight.  He carries a  bilateral hip flexion contracture which tends to keep him stooped over more and add more stress to his mid back.  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  There will be a limit to how upright and flexible he will be able to come because of the amount of degeneration throughout his entire spine but if we can keep as much hip flexibility and strength as we can then hopefully we can keep him mobile and off of an assistive device    PLAN:   Continue with emphasis on hip flexibility and glutes strengthening as well as core stability.  Start to work toward more upright trunk strengthening and scapular stabilization    SIGNATURE: Juarez Hartmann, PT, [place] License #: [id number]  Electronically Signed on 3/14/2025      90 Day Recertification  Certification Period: 3/14/2025 through 6/11/2025  I certify that the therapy services are furnished while this patient is under my care.  The services outlined above are required by this patient, and will be reviewed every 90 days.     PHYSICIAN: Chris Russ MD (NPI: [id number])    Signature:___________________________________________DATE: _________    Please sign and return via fax to [phone].   Thank you so much for letting us work with Carlos. I appreciate your letting us work with your patients. If you have any questions or concerns, please don't hesitate to contact me.                [address] [place]. [address]  [phone]

## 2025-03-28 ENCOUNTER — HOSPITAL ENCOUNTER (OUTPATIENT)
Dept: PHYSICAL THERAPY | Facility: HOSPITAL | Age: 70
Setting detail: THERAPIES SERIES
Discharge: HOME | End: 2025-03-28
Payer: MEDICARE

## 2025-03-28 DIAGNOSIS — M54.41 CHRONIC BILATERAL LOW BACK PAIN WITH BILATERAL SCIATICA: ICD-10-CM

## 2025-03-28 DIAGNOSIS — M25.552 LEFT HIP PAIN: Primary | ICD-10-CM

## 2025-03-28 DIAGNOSIS — M54.2 CERVICALGIA: ICD-10-CM

## 2025-03-28 DIAGNOSIS — M54.42 CHRONIC BILATERAL LOW BACK PAIN WITH BILATERAL SCIATICA: ICD-10-CM

## 2025-03-28 DIAGNOSIS — G89.29 CHRONIC BILATERAL LOW BACK PAIN WITH BILATERAL SCIATICA: ICD-10-CM

## 2025-03-28 DIAGNOSIS — S16.1XXA STRAIN OF NECK MUSCLE, INITIAL ENCOUNTER: ICD-10-CM

## 2025-03-28 PROCEDURE — 97140 MANUAL THERAPY 1/> REGIONS: CPT | Performed by: PHYSICAL THERAPIST

## 2025-03-28 NOTE — THERAPY TREATMENT NOTE
Physical Therapy Treatment Note  [facility] Outpatient Therapy Services  A department [address], [place] [address]    Patient: Carlos Hayes                                                 Visit Date: 3/28/2025  :     1955    Referring practitioner:    Chris Russ MD  Date of Initial Visit:          Type: THERAPY  Episode: Neck strain; left hip pain    Visit Diagnoses:    ICD-10-CM ICD-9-CM   1. Left hip pain  M25.552 719.45   2. Strain of neck muscle, initial encounter  S16.1XXA 847.0   3. Cervicalgia  M54.2 723.1   4. Chronic bilateral low back pain with bilateral sciatica  M54.42 724.2    M54.41 724.3    G89.29 338.29       SUBJECTIVE     Subjective:  He had has been hurting in his neck and back.     PAIN: 8/10        OBJECTIVE     Objective     Manual Therapy     78410  Comments   SL Percussive IASTM using Powerboost to right CT junction and right CT junction and piriformis at L2 using spade attachment         SL right LS man distraction            Timed Minutes 40          Therapy Education/Self Care 11606   Education offered today Try hip abd and gentle hip flex/ext in pool instead of squats   [employer] Code Access Code: [email or web address]  URL: [email or web address]   Ongoing HEP       Timed Minutes        Total Timed Treatment:     40  mins  Total Time of Visit:             40   mins     ASSESSMENT/PLAN      GOALS:  Goals                                          Progress Note due by 25                                                      Recert due by 25   LTG by: 12 weeks Comments Date Status   Improve oliver cervical rotation to 50 deg LR 25, RR 30 deg 3/14 ongoing   Able to stand more upright with more neutral cervical He was more stooped over today than he was the last time that I saw him 4 weeks ago 3/28 ongoing   Improve passive hip ext to 10 deg Right hip ext to -10 deg 3/14 ongoing   Able to walk x 20 min without rollator before sitting Jaziel  forward 3/14 ongoing   Improve oliver shoulder elevation to 130 deg Not assessed 3/14 ongoing   Ind with HEP for flexibility and stability Working on anterior mobs of right pelvis in pool with a lunges stretch 3/14 ongoing       Anticipated CPT codes: Therapeutic Exercise 96031, Manual Therapy 10987, Therapeutic Activity 02929, Neuromuscular ReEducation 90353, and Self Care/Home Management 74099      Assessment/Plan     ASSESSMENT:    He is still moving about the same. Not any worse but still stooped over.     PLAN:   Continue with emphasis on hip flexibility and glutes strengthening as well as core stability.  Start to work toward more upright trunk strengthening and scapular stabilization    SIGNATURE: Juarez Hartmann, PT, [place] License #: [id number]  Electronically Signed on 3/28/2025            [address] [facility] [place] [address]  [phone]

## 2025-04-22 RX ORDER — TIRZEPATIDE 2.5 MG/.5ML
INJECTION, SOLUTION SUBCUTANEOUS
COMMUNITY
Start: 2025-03-26

## 2025-04-22 RX ORDER — FINASTERIDE 5 MG/1
1 TABLET, FILM COATED ORAL DAILY
COMMUNITY
Start: 2025-03-18

## 2025-04-22 RX ORDER — CELECOXIB 200 MG/1
200 CAPSULE ORAL DAILY
COMMUNITY
Start: 2025-02-20

## 2025-05-09 ENCOUNTER — HOSPITAL ENCOUNTER (OUTPATIENT)
Dept: PHYSICAL THERAPY | Facility: HOSPITAL | Age: 70
Setting detail: THERAPIES SERIES
Discharge: HOME | End: 2025-05-09
Payer: MEDICARE

## 2025-05-09 DIAGNOSIS — M25.552 LEFT HIP PAIN: Primary | ICD-10-CM

## 2025-05-09 DIAGNOSIS — M54.42 CHRONIC BILATERAL LOW BACK PAIN WITH BILATERAL SCIATICA: ICD-10-CM

## 2025-05-09 DIAGNOSIS — M54.2 CERVICALGIA: ICD-10-CM

## 2025-05-09 DIAGNOSIS — S16.1XXA STRAIN OF NECK MUSCLE, INITIAL ENCOUNTER: ICD-10-CM

## 2025-05-09 DIAGNOSIS — G89.29 CHRONIC BILATERAL LOW BACK PAIN WITH BILATERAL SCIATICA: ICD-10-CM

## 2025-05-09 DIAGNOSIS — M54.41 CHRONIC BILATERAL LOW BACK PAIN WITH BILATERAL SCIATICA: ICD-10-CM

## 2025-05-09 PROCEDURE — 97140 MANUAL THERAPY 1/> REGIONS: CPT | Performed by: PHYSICAL THERAPIST

## 2025-05-09 PROCEDURE — 97110 THERAPEUTIC EXERCISES: CPT | Performed by: PHYSICAL THERAPIST

## 2025-05-09 NOTE — THERAPY TREATMENT NOTE
"Physical Therapy Treatment Note and 30 Day Progress Note  [facility] Outpatient Therapy Services  A department [address], [place] [address]    Patient: Carlos Hayes                                                 Visit Date: 2025  :     1955    Referring practitioner:    Chris Russ MD  Date of Initial Visit:          Type: THERAPY  Episode: Neck strain; left hip pain    Visit Diagnoses:    ICD-10-CM ICD-9-CM   1. Left hip pain  M25.552 719.45   2. Strain of neck muscle, initial encounter  S16.1XXA 847.0   3. Cervicalgia  M54.2 723.1   4. Chronic bilateral low back pain with bilateral sciatica  M54.42 724.2    M54.41 724.3    G89.29 338.29       SUBJECTIVE     Subjective:  He fell yesterday and hit his head. His oliver glutes have hurt when he sit. He has been out the last few weeks due to conflicts including getting . He had a bruise and pain on his left hamstring and was told it might be a torn vein.     PAIN: 8/10        OBJECTIVE     Objective     Manual Therapy     44386  Comments   SL Percussive IASTM using Powerboost to right CT junction and right CT junction and lumbar/piriformis at L60% using spade attachment         SL right LS man distraction            Timed Minutes 40      Therapeutic Exercises    27288 Units Comments   Oliver knee to chest with gentle IR/ER repetitive stretch of hips     HL oliver hip abd/add against man resistance 5\" x 10 ea    Oliver sciatic neural flossing               Timed Minutes 10         Therapy Education/Self Care 29379   Education offered today Try hip abd and gentle hip flex/ext in pool instead of squats   nAna Code Access Code: [email or web address]  URL: [email or web address]   Ongoing HEP       Timed Minutes        Total Timed Treatment:     50  mins  Total Time of Visit:             50   mins     ASSESSMENT/PLAN      GOALS:  Goals                                          Progress Note due by 25                      "                                 Recert due by 6/11/25   LTG by: 12 weeks Comments Date Status   Improve oliver cervical rotation to 50 deg LR 25, RR 30 deg 5/9 ongoing   Able to stand more upright with more neutral cervical He was more stooped over today than he was the last time that I saw him 4 weeks ago 5/9 ongoing   Improve passive hip ext to 10 deg Right hip ext to -10 deg 5/9 ongoing   Able to walk x 20 min without rollator before sitting Jaziel forward 5/9 ongoing   Improve oliver shoulder elevation to 130 deg Not assessed 5/9 ongoing   Ind with HEP for flexibility and stability Working on gentle hip/glute stability 5/9 ongoing       Anticipated CPT codes: Therapeutic Exercise 13074, Manual Therapy 69327, Therapeutic Activity 21516, Neuromuscular ReEducation 77059, and Self Care/Home Management 92442      Assessment & Plan       Assessment  Impairments: abnormal muscle firing, abnormal muscle tone, abnormal or restricted ROM, activity intolerance, impaired physical strength, lacks appropriate home exercise program and pain with function   Functional limitations: lifting, walking, uncomfortable because of pain, sitting and standing   Prognosis: good    Plan  Therapy options: will be seen for skilled therapy services  Planned modality interventions: dry needling, low level laser therapy, TENS and traction  Planned therapy interventions: abdominal trunk stabilization, flexibility, functional ROM exercises, home exercise program, joint mobilization, manual therapy, motor coordination training, neuromuscular re-education, postural training, soft tissue mobilization, spinal/joint mobilization, strengthening, stretching and therapeutic activities  Frequency: 2x week  Duration in weeks: 12  Treatment plan discussed with: patient       ASSESSMENT:    He is moving better than the last time I saw him. He is still stooped forward. He said the numbness in his feet was better after the exercises today.     PLAN:   Continue with  emphasis on hip flexibility and glutes strengthening as well as core stability.  Start to work toward more upright trunk strengthening and scapular stabilization    SIGNATURE: Juarez Hartmann, PT, [place] License #: [id number]  Electronically Signed on 5/9/2025            [address] [place] [address]  [phone]

## 2025-05-21 ENCOUNTER — OFFICE VISIT (OUTPATIENT)
Dept: PULMONOLOGY | Facility: CLINIC | Age: 70
End: 2025-05-21
Payer: MEDICARE

## 2025-05-21 VITALS
HEIGHT: 74 IN | BODY MASS INDEX: 40.43 KG/M2 | HEART RATE: 77 BPM | SYSTOLIC BLOOD PRESSURE: 110 MMHG | OXYGEN SATURATION: 93 % | WEIGHT: 315 LBS | DIASTOLIC BLOOD PRESSURE: 68 MMHG

## 2025-05-21 DIAGNOSIS — J98.4 SMALL AIRWAYS DISEASE: Primary | Chronic | ICD-10-CM

## 2025-05-21 DIAGNOSIS — G47.34 NOCTURNAL HYPOXIA: Chronic | ICD-10-CM

## 2025-05-21 DIAGNOSIS — J43.2 CENTRILOBULAR EMPHYSEMA: Chronic | ICD-10-CM

## 2025-05-21 DIAGNOSIS — J98.4 RESTRICTIVE LUNG DISEASE: ICD-10-CM

## 2025-05-21 DIAGNOSIS — G47.33 OBSTRUCTIVE SLEEP APNEA TREATED WITH BIPAP: Chronic | ICD-10-CM

## 2025-05-21 DIAGNOSIS — Z87.891 PERSONAL HISTORY OF NICOTINE DEPENDENCE: ICD-10-CM

## 2025-05-21 PROCEDURE — 3078F DIAST BP <80 MM HG: CPT | Performed by: NURSE PRACTITIONER

## 2025-05-21 PROCEDURE — 99214 OFFICE O/P EST MOD 30 MIN: CPT | Performed by: NURSE PRACTITIONER

## 2025-05-21 PROCEDURE — 3074F SYST BP LT 130 MM HG: CPT | Performed by: NURSE PRACTITIONER

## 2025-05-21 PROCEDURE — 1160F RVW MEDS BY RX/DR IN RCRD: CPT | Performed by: NURSE PRACTITIONER

## 2025-05-21 PROCEDURE — 1159F MED LIST DOCD IN RCRD: CPT | Performed by: NURSE PRACTITIONER

## 2025-05-21 RX ORDER — CIMETIDINE 400 MG
400 TABLET ORAL DAILY
COMMUNITY

## 2025-05-21 RX ORDER — SODIUM PHOSPHATE,MONO-DIBASIC 19G-7G/118
1 ENEMA (ML) RECTAL
COMMUNITY

## 2025-05-21 RX ORDER — SUCRALFATE 1 G/1
1 TABLET ORAL 4 TIMES DAILY
COMMUNITY

## 2025-05-21 RX ORDER — MOMETASONE FUROATE 1 MG/G
1 CREAM TOPICAL DAILY
COMMUNITY

## 2025-05-21 RX ORDER — DOXEPIN HYDROCHLORIDE 25 MG/1
25 CAPSULE ORAL NIGHTLY
COMMUNITY

## 2025-05-21 RX ORDER — TADALAFIL 20 MG/1
20 TABLET ORAL DAILY PRN
COMMUNITY
Start: 2024-03-08 | End: 2025-09-06

## 2025-05-21 RX ORDER — ASCORBIC ACID 500 MG
500 TABLET ORAL DAILY
COMMUNITY

## 2025-05-21 RX ORDER — MIRABEGRON 25 MG/1
1 TABLET, FILM COATED, EXTENDED RELEASE ORAL DAILY
COMMUNITY
Start: 2025-04-28

## 2025-05-21 RX ORDER — BIOTIN 10 MG
TABLET ORAL
COMMUNITY

## 2025-05-21 NOTE — PROGRESS NOTES
" VLADISLAV Seo  [facility]   Pulmonary and Critical Care  [address]  [place] [address]  Phone: [phone]  Fax: [phone]           Chief Complaint  Small airways disease    Subjective    History of Present Illness     Carlos Hayes presents to [facility] PULMONARY & CRITICAL CARE MEDICINE     History of Present Illness  Mr. Hayes is a 70-year-old male patient with known chronic respiratory failure with hypoxemia, restrictive lung disease, Elevated diaphragm,obstructive sleep apnea with BiPAP, history of COVID-19 (2020, 2021), restless leg syndrome, morbid obesity, CKD, arthritis, anxiety, PTSD, tremor, sinusitis, hypertension, chronic back pain with degeneration. He has had bronchitis/ pneumonia on/off most of his life. Short term memory loss.     He is contemplating an idiag expedition at an altitude of 7500 feet next year and is seeking advice on the potential impact of this on his respiratory health. He recalls a previous experience in his late 20s when he encountered breathing difficulties at a similar altitude, which he attributes to his smoking habit at the time. He also reports occasional shortness of breath during flights. He plans to carry his portable oxygen concentrator, Inogen, during his upcoming trip to [place], which involves a 16-hour flight. He continues to use his BiPAP machine but notes that it causes his eyes to water. He has been prescribed Atrovent nasal spray, which he uses once daily before bedtime. He has not yet tried using eye drops.    Continues Breztri and albuterol HFA with benefit.        Objective   Vital Signs:   /68   Pulse 77   Ht 189 cm (74.41\")   Wt (!) 161 kg (356 lb) Comment: Pt stated  SpO2 93% Comment: RA  BMI 45.21 kg/m²     Physical Exam  Vitals reviewed.   Constitutional:       Appearance: Normal appearance. He is morbidly obese.   Cardiovascular:      Rate and Rhythm: Normal rate and regular " rhythm.   Pulmonary:      Effort: Pulmonary effort is normal.      Breath sounds: Normal breath sounds.   Neurological:      General: No focal deficit present.      Mental Status: He is alert and oriented to person, place, and time.   Psychiatric:         Mood and Affect: Mood normal.         Behavior: Behavior normal.            Result Review :  The following data was reviewed by: VLADISLAV Seo on 05/21/2025:    My interpretation of imaging: No new  My interpretation of labs: No new    PFT Values          11/18/2024    13:45   Pre Drug PFT Results   FVC 66   FEV1 63   FEF 25-75% 56   FEV1/FVC 71   Other Tests PFT Results   DLCO 113   D/VAsb 144     My interpretation of the PFT : No new    Results for orders placed in visit on 11/18/24    Spirometry with Diffusion Capacity    Narrative  Spirometry with Diffusion Capacity    Performed by: Roseann Sethi, RRT  Authorized by: Mary Christian APRN  Pre Drug % Predicted  FVC: 66%  FEV1: 63%  FEF 25-75%: 56%  FEV1/FVC: 71%  DLCO: 113%  D/VAsb: 144%    Interpretation  Spirometry  Spirometry shows moderate restriction. There is reduced midflow suggesting small airway/airflow obstruction.  Review of FVL curve  Patient's effort is normal.  Diffusion Capacity  The patient's diffusion capacity is normal.  Diffusion capacity is normal when corrected for alveolar volume.  Overall comments: Compared to August 2023 his FEV1 has slightly dropped from 68 to 63% predicted, FVC has dropped from 73 to 66% predicted.  Diffusion capacity remains supranormal when corrected for alveolar volume      Results for orders placed during the hospital encounter of 08/09/23    Pulmonary Function Test    [facility] - Pulmonary Function Test    [address] [place] [address]  [place]  [address]  [phone]    Patient : Carlos Hayes  MRN : [id number]  CSN : [id number]  Pulmonologist : Earl Shearer MD  Date :  8/9/2023    ______________________________________________________________________    Interpretation :  1.  Spirometry is consistent with a mild bordering on moderate restrictive ventilatory defect with a coexisting decrease in midflows and peak expiratory flow.  2.  There is some improvement in midflows and peak expiratory flow postbronchodilator.  However postbronchodilator spirometry is still consistent with a mild bordering on moderate restrictive ventilatory defect with a decrease in midflows.  Peak expiratory flow is now within normal limits.  3.  Lung volumes reveal a low normal total lung capacity and vital capacity suggestive of a borderline restrictive ventilatory defect.  There is also a decreased expiratory reserve volume.  4.  Diffusion capacity is within normal limits and when corrected for alveolar volume is supranormal.  5.  Arterial blood gases on room air reveal mild hypoxemia.  A mild metabolic alkalosis is also present.  6.  When current studies are compared to studies performed on June 25, 2021, the patient's current pre and postbronchodilator spirometry reveals a slight decline in both the FVC and FEV1 compared to previous baseline values.  There has been slight improvement in the patient's total lung capacity compared to previous.  When corrected for alveolar volume there is no significant change in diffusion capacity compared to previous.      Earl Shearer MD      Results for orders placed during the hospital encounter of 06/25/21    Full Pulmonary Function Test With Bronchodilator    Narrative  [facility] - Pulmonary Function Test    [address] [place] [address]  [place]  [address]  [phone]    Patient : Carlos Hayes  MRN : [id number]  CSN : [id number]  Pulmonologist : Earl Shearer MD  Date : 6/28/2021    ______________________________________________________________________    Interpretation :  1.  Spirometry is consistent with a mild restrictive ventilatory defect with  coexisting small airways disease.  2.  Lung volumes confirm a mild restrictive ventilatory defect.  There is also a decrease in inspiratory capacity.  3.  Diffusion capacity is within normal limits and when corrected for alveolar volume is supranormal.  4.  When current studies are compared to studies from December 7 of 2018, there has been a drop in the patient's forced vital capacity and FEV1 compared to previous.  There has also been a decrease in total lung capacity compared to previous.  When corrected for alveolar volume there has been an improvement in diffusion capacity compared to previous.      Earl Shearer MD    Rest/Exercise Pulse Ox Values          1/2/2025    13:15   Rest/Exercise Pulse Ox Results   Rest room air SAT % 94   Exercise room air SAT % 91         Assessment and Plan   Diagnoses and all orders for this visit:    1. Small airways disease (Primary)    2. Restrictive lung disease    3. Centrilobular emphysema    4. Nocturnal hypoxia    5. Obstructive sleep apnea treated with BiPAP    6. Personal history of nicotine dependence      Stable from a pulmonary standpoint.  Continue to use Breztri and as needed albuterol HFA.  We discussed his increased risk with higher elevations.  He is also planning to do some diving at his home in [place].  He has multiple dives under his belt and also understands the risks of diving to certain depths as well.  I have asked him to return in 6 months with a flow-volume loop with diffusion capacity which will allow us to assess his pulmonary function prior to his trip.      Carlos Hayes  reports that he quit smoking about 9 years ago. His smoking use included cigarettes. He started smoking about 50 years ago. He has a 30.9 pack-year smoking history. He has been exposed to tobacco smoke. He has never used smokeless tobacco.          Alpha 1: none  LDCT: Due September 2025, order placed  Smoking Cessation:, Quit 2016  Vaccinations: Flu: Due fall 2025 PNA: PCP  follows RSV: Completed      Follow Up   No follow-ups on file.  Patient was given instructions and counseling regarding his condition or for health maintenance advice. Please see specific information pulled into the AVS if appropriate.     VLADISLAV Seo  5/21/2025  14:40 CDT    Please note that portions of this note were completed with a voice recognition program.    Patient or patient representative verbalized consent for the use of Ambient Listening during the visit with  VLADISLAV Seo for chart documentation. 5/21/2025  15:23 CDT

## 2025-05-23 ENCOUNTER — HOSPITAL ENCOUNTER (OUTPATIENT)
Dept: PHYSICAL THERAPY | Facility: HOSPITAL | Age: 70
Setting detail: THERAPIES SERIES
Discharge: HOME | End: 2025-05-23
Payer: MEDICARE

## 2025-05-23 PROCEDURE — 97140 MANUAL THERAPY 1/> REGIONS: CPT | Performed by: PHYSICAL THERAPIST

## 2025-05-23 NOTE — THERAPY TREATMENT NOTE
Physical Therapy Treatment Note  [facility] Outpatient Therapy Services  A department [address], [place] [address]    Patient: Carlos Hayes                                                 Visit Date: 2025  :     1955    Referring practitioner:    Chris Russ MD  Date of Initial Visit:          No linked episodes    Visit Diagnoses:  No diagnosis found.      SUBJECTIVE     Subjective:  He says his neck hurts him a bit. He thinks his butt hurts from the lift chair.     PAIN: 7/10        OBJECTIVE     Objective     Manual Therapy     97464  Comments   SL Percussive IASTM using Powerboost to right CT junction and right CT junction and lumbar/piriformis at L60% using spade attachment         SL right LS man distraction            Timed Minutes 55        Therapy Education/Self Care 68686   Education offered today Try hip abd and gentle hip flex/ext in pool instead of squats   [employer] Code Access Code: [email or web address]  URL: [email or web address]   Ongoing HEP       Timed Minutes        Total Timed Treatment:     55  mins  Total Time of Visit:             55   mins     ASSESSMENT/PLAN      GOALS:  Goals                                          Progress Note due by 25                                                      Recert due by 25   LTG by: 12 weeks Comments Date Status   Improve oliver cervical rotation to 50 deg LR 25, RR 30 deg  ongoing   Able to stand more upright with more neutral cervical He was more stooped over today than he was the last time that I saw him 4 weeks ago  ongoing   Improve passive hip ext to 10 deg Right hip ext to -10 deg  ongoing   Able to walk x 20 min without rollator before sitting Jaziel forward  ongoing   Improve oliver shoulder elevation to 130 deg Not assessed  ongoing   Ind with HEP for flexibility and stability Working on gentle hip/glute stability  ongoing       Anticipated CPT codes: Therapeutic Exercise  80209, Manual Therapy 66371, Therapeutic Activity 60743, Neuromuscular ReEducation 51121, and Self Care/Home Management 14966      Assessment & Plan       Assessment  Impairments: abnormal muscle firing, abnormal muscle tone, abnormal or restricted ROM, activity intolerance, impaired physical strength, lacks appropriate home exercise program and pain with function   Functional limitations: lifting, walking, uncomfortable because of pain, sitting and standing   Prognosis: good    Plan  Therapy options: will be seen for skilled therapy services  Planned modality interventions: dry needling, low level laser therapy, TENS and traction  Planned therapy interventions: abdominal trunk stabilization, flexibility, functional ROM exercises, home exercise program, joint mobilization, manual therapy, motor coordination training, neuromuscular re-education, postural training, soft tissue mobilization, spinal/joint mobilization, strengthening, stretching and therapeutic activities  Frequency: 2x week  Duration in weeks: 12  Treatment plan discussed with: patient       ASSESSMENT:    He is standing a bit more erect than he typically does and his neck is more relaxed today.      PLAN:   Continue with emphasis on hip flexibility and glutes strengthening as well as core stability.  Start to work toward more upright trunk strengthening and scapular stabilization    SIGNATURE: Juarez Hartmann, PT, [place] License #: [id number]  Electronically Signed on 5/23/2025            [facility] [place] [address]  [phone]

## 2025-06-06 ENCOUNTER — HOSPITAL ENCOUNTER (OUTPATIENT)
Dept: PHYSICAL THERAPY | Facility: HOSPITAL | Age: 70
Setting detail: THERAPIES SERIES
Discharge: HOME | End: 2025-06-06
Payer: MEDICARE

## 2025-06-06 DIAGNOSIS — M54.2 CERVICALGIA: ICD-10-CM

## 2025-06-06 DIAGNOSIS — M54.42 CHRONIC BILATERAL LOW BACK PAIN WITH BILATERAL SCIATICA: ICD-10-CM

## 2025-06-06 DIAGNOSIS — G89.29 CHRONIC BILATERAL LOW BACK PAIN WITH BILATERAL SCIATICA: ICD-10-CM

## 2025-06-06 DIAGNOSIS — M54.41 CHRONIC BILATERAL LOW BACK PAIN WITH BILATERAL SCIATICA: ICD-10-CM

## 2025-06-06 DIAGNOSIS — S16.1XXA STRAIN OF NECK MUSCLE, INITIAL ENCOUNTER: ICD-10-CM

## 2025-06-06 DIAGNOSIS — M25.552 LEFT HIP PAIN: Primary | ICD-10-CM

## 2025-06-06 PROCEDURE — 97140 MANUAL THERAPY 1/> REGIONS: CPT | Performed by: PHYSICAL THERAPIST

## 2025-06-06 NOTE — THERAPY TREATMENT NOTE
Physical Therapy Treatment Note, 30 Day Progress Note, and 90 Day Recertification Note  [facility] Outpatient Therapy Services  [address], [place] [address]    Patient: Carlos Hayes                                                 Visit Date: 2025  :     1955    Referring practitioner:    Chris Russ MD  Date of Initial Visit:          Type: THERAPY  Episode: Neck strain; left hip pain    Visit Diagnoses:    ICD-10-CM ICD-9-CM   1. Left hip pain  M25.552 719.45   2. Strain of neck muscle, initial encounter  S16.1XXA 847.0   3. Cervicalgia  M54.2 723.1   4. Chronic bilateral low back pain with bilateral sciatica  M54.42 724.2    M54.41 724.3    G89.29 338.29         SUBJECTIVE     Subjective:  He says his neck hurts him a bit. He thinks his butt hurts from the lift chair.     PAIN: 7/10        OBJECTIVE     Objective     Manual Therapy     82669  Comments   SL Percussive IASTM using Powerboost to right CT junction and oliver lumbar/piriformis at L60% using thumb attachment     SL PA mob  and rhomboid deep tissue release mid thoracic Rolled over to opposite side when hip started hurting.           Timed Minutes 55        Therapy Education/Self Care 89342   Education offered today Try hip abd and gentle hip flex/ext in pool instead of squats   Anna Code Access Code: [email or web address]  URL: [email or web address]   Ongoing HEP       Timed Minutes        Total Timed Treatment:     55  mins  Total Time of Visit:             55   mins     ASSESSMENT/PLAN      GOALS:  Goals                                          Progress Note due by 25                                                      Recert due by 9/3/25   LTG by: 12 weeks Comments Date Status   Improve oliver cervical rotation to 50 deg LR 25, RR 30 deg  ongoing   Able to stand more upright with more neutral cervical He's a bit more upright today  ongoing   Improve passive hip ext to 10 deg  Right hip ext to -10 deg 6/6 ongoing   Able to walk x 20 min without rollator before sitting Jaziel forward 6/6 ongoing   Improve oliver shoulder elevation to 130 deg 90 deg 6/6 ongoing   Ind with HEP for flexibility and stability Working gentle postural exs; had to hold on clams as this hurt his back 6/6 ongoing       Anticipated CPT codes: Therapeutic Exercise 51783, Manual Therapy 49677, Therapeutic Activity 29432, Neuromuscular ReEducation 46584, and Self Care/Home Management 32148      Assessment & Plan       Assessment  Impairments: abnormal muscle firing, abnormal muscle tone, abnormal or restricted ROM, activity intolerance, impaired physical strength, lacks appropriate home exercise program and pain with function   Functional limitations: lifting, walking, uncomfortable because of pain, sitting and standing   Prognosis: good    Plan  Therapy options: will be seen for skilled therapy services  Planned modality interventions: dry needling, low level laser therapy, TENS and traction  Planned therapy interventions: abdominal trunk stabilization, flexibility, functional ROM exercises, home exercise program, joint mobilization, manual therapy, motor coordination training, neuromuscular re-education, postural training, soft tissue mobilization, spinal/joint mobilization, strengthening, stretching and therapeutic activities  Frequency: 2x week  Duration in weeks: 12  Treatment plan discussed with: patient       ASSESSMENT:    He is moving better overall but it's been slow. He's had a couple of setback where his neck or back will lock up on his but he is working daily on continuing to walk and improve his posture.     PLAN:   Continue with emphasis on hip flexibility and glutes strengthening as well as core stability.  Start to work toward more upright trunk strengthening and scapular stabilization    SIGNATURE: Juarez Hartmann, PT, [place] License #: [id number]  Electronically Signed on 6/6/2025 90 Day  Recertification  Certification Period: 6/6/2025 through 9/3/2025  I certify that the therapy services are furnished while this patient is under my care.  The services outlined above are required by this patient, and will be reviewed every 90 days.     PHYSICIAN: Chris Russ MD (NPI: [id number])    Signature:___________________________________________DATE: _________    Please sign and return via fax to [phone].   Thank you so much for letting us work with Carlos. I appreciate your letting us work with your patients. If you have any questions or concerns, please don't hesitate to contact me.                [address] [address]  [phone]

## 2025-06-20 ENCOUNTER — HOSPITAL ENCOUNTER (OUTPATIENT)
Dept: PHYSICAL THERAPY | Facility: HOSPITAL | Age: 70
Setting detail: THERAPIES SERIES
Discharge: HOME | End: 2025-06-20
Payer: MEDICARE

## 2025-06-20 DIAGNOSIS — M54.41 CHRONIC BILATERAL LOW BACK PAIN WITH BILATERAL SCIATICA: ICD-10-CM

## 2025-06-20 DIAGNOSIS — S16.1XXA STRAIN OF NECK MUSCLE, INITIAL ENCOUNTER: ICD-10-CM

## 2025-06-20 DIAGNOSIS — M54.42 CHRONIC BILATERAL LOW BACK PAIN WITH BILATERAL SCIATICA: ICD-10-CM

## 2025-06-20 DIAGNOSIS — G89.29 CHRONIC BILATERAL LOW BACK PAIN WITH BILATERAL SCIATICA: ICD-10-CM

## 2025-06-20 DIAGNOSIS — M25.552 LEFT HIP PAIN: Primary | ICD-10-CM

## 2025-06-20 DIAGNOSIS — M54.2 CERVICALGIA: ICD-10-CM

## 2025-06-20 PROCEDURE — 97140 MANUAL THERAPY 1/> REGIONS: CPT | Performed by: PHYSICAL THERAPIST

## 2025-06-20 NOTE — THERAPY TREATMENT NOTE
Physical Therapy Treatment Note  [facility] Outpatient Therapy Services  A department [address], [place] [address]    Patient: Carlos Hayes                                                 Visit Date: 2025  :     1955    Referring practitioner:    Chris Russ MD  Date of Initial Visit:          Type: THERAPY  Episode: Neck strain; left hip pain    Visit Diagnoses:    ICD-10-CM ICD-9-CM   1. Left hip pain  M25.552 719.45   2. Strain of neck muscle, initial encounter  S16.1XXA 847.0   3. Cervicalgia  M54.2 723.1   4. Chronic bilateral low back pain with bilateral sciatica  M54.42 724.2    M54.41 724.3    G89.29 338.29         SUBJECTIVE     Subjective:  He had a heart monitor on because his HR was dropping low. His SpO2 has been dropping as well. He has had a lot of congestion and coughing. His neck has been stiff.     PAIN: 7/10        OBJECTIVE     Objective     Manual Therapy     09390  Comments   SL Percussive IASTM using Powerboost to right CT junction and oliver lumbar/piriformis at L60% using thumb attachment     SL PA mob  and rhomboid deep tissue release mid thoracic Rolled over to opposite side when hip started hurting.           Timed Minutes 45        Therapy Education/Self Care 42888   Education offered today Try hip abd and gentle hip flex/ext in pool instead of squats   [employer] Code Access Code: [email or web address]  URL: [email or web address]   Ongoing HEP       Timed Minutes        Total Timed Treatment:     45  mins  Total Time of Visit:             45   mins     ASSESSMENT/PLAN      GOALS:  Goals                                          Progress Note due by 25                                                      Recert due by 9/3/25   LTG by: 12 weeks Comments Date Status   Improve oliver cervical rotation to 50 deg LR 25, RR 30 deg  ongoing   Able to stand more upright with more neutral cervical He's a bit more upright today  ongoing    Improve passive hip ext to 10 deg Right hip ext to -10 deg 6/6 ongoing   Able to walk x 20 min without rollator before sitting Jaziel forward 6/6 ongoing   Improve oliver shoulder elevation to 130 deg 90 deg 6/6 ongoing   Ind with HEP for flexibility and stability Working gentle postural exs; had to hold on clams as this hurt his back 6/6 ongoing       Anticipated CPT codes: Therapeutic Exercise 03222, Manual Therapy 05549, Therapeutic Activity 98206, Neuromuscular ReEducation 46588, and Self Care/Home Management 28971      Assessment/Plan     ASSESSMENT:    He is moving better and more upright but his persistent cough has made his neck tighter.     PLAN:   Continue with emphasis on hip flexibility and glutes strengthening as well as core stability.  Start to work toward more upright trunk strengthening and scapular stabilization    SIGNATURE: Juarez Hartmann, PT, [place] License #: [id number]  Electronically Signed on 6/20/2025                  [address] [place] [address] [place]  [place] [address]  [phone]

## 2025-08-11 ENCOUNTER — TELEPHONE (OUTPATIENT)
Age: 70
End: 2025-08-11

## 2025-08-13 ENCOUNTER — TRANSCRIBE ORDERS (OUTPATIENT)
Dept: ADMINISTRATIVE | Facility: HOSPITAL | Age: 70
End: 2025-08-13
Payer: MEDICARE

## 2025-08-13 DIAGNOSIS — L81.9 HYPERPIGMENTATION: ICD-10-CM

## 2025-08-13 DIAGNOSIS — I87.2 VENOUS STASIS DERMATITIS OF BOTH LOWER EXTREMITIES: Primary | ICD-10-CM

## 2025-08-14 ENCOUNTER — TELEPHONE (OUTPATIENT)
Age: 70
End: 2025-08-14

## 2025-08-22 ENCOUNTER — TRANSCRIBE ORDERS (OUTPATIENT)
Dept: ADMINISTRATIVE | Facility: HOSPITAL | Age: 70
End: 2025-08-22
Payer: MEDICARE

## 2025-08-22 DIAGNOSIS — M79.604 BILATERAL LEG PAIN: Primary | ICD-10-CM

## 2025-08-22 DIAGNOSIS — M79.605 LEG PAIN, BILATERAL: Primary | ICD-10-CM

## 2025-08-22 DIAGNOSIS — M79.605 BILATERAL LEG PAIN: Primary | ICD-10-CM

## 2025-08-22 DIAGNOSIS — I87.2 PERIPHERAL VENOUS INSUFFICIENCY: ICD-10-CM

## 2025-08-22 DIAGNOSIS — M79.604 LEG PAIN, BILATERAL: Primary | ICD-10-CM

## 2025-08-22 DIAGNOSIS — L81.9 HYPERPIGMENTATION: ICD-10-CM

## 2025-08-22 DIAGNOSIS — M79.604 PAIN OF RIGHT LEG: ICD-10-CM

## 2025-08-22 DIAGNOSIS — M79.605 LEFT LEG PAIN: ICD-10-CM

## 2025-08-25 ENCOUNTER — TRANSCRIBE ORDERS (OUTPATIENT)
Dept: ADMINISTRATIVE | Facility: HOSPITAL | Age: 70
End: 2025-08-25
Payer: MEDICARE

## (undated) DEVICE — BANDAGE,GAUZE,BULKEE II,4.5"X4.1YD,STRL: Brand: MEDLINE

## (undated) DEVICE — DECANTER: Brand: UNBRANDED

## (undated) DEVICE — PK ENT HD AND NK 30

## (undated) DEVICE — SPK10277 JACKSON/PRO-AXIS KIT: Brand: SPK10277 JACKSON/PRO-AXIS KIT

## (undated) DEVICE — MARKR SKIN W/RULR AND LBL

## (undated) DEVICE — CHRGR BATRY STIM INTELLIS NEUROSTM SYS

## (undated) DEVICE — ENDO KIT,LOURDES HOSPITAL: Brand: MEDLINE INDUSTRIES, INC.

## (undated) DEVICE — ELECTRD BLD EDGE/INSUL1P 2.4X5.1MM STRL

## (undated) DEVICE — 4.0MM PRECISION ROUND

## (undated) DEVICE — PK TURNOVER RM ADV

## (undated) DEVICE — PK CATH CARD 30

## (undated) DEVICE — SUT VIC 5/0 PS3 18IN UD VCP500G

## (undated) DEVICE — SUT SILK 2/0 SUTUPAK TIES 24IN SA75H

## (undated) DEVICE — CONN FLX BREATHE CIRCT

## (undated) DEVICE — TRY PREP SCRB VAG PVP

## (undated) DEVICE — SUP ARMBRD ART/LINE BLU

## (undated) DEVICE — TR BAND RADIAL ARTERY COMPRESSION DEVICE: Brand: TR BAND

## (undated) DEVICE — SUT ETHIB 2/0 RB1 DA 30IN GRN MX553

## (undated) DEVICE — GLIDESHEATH SLENDER STAINLESS STEEL KIT: Brand: GLIDESHEATH SLENDER

## (undated) DEVICE — Z DUPLICATE USE 2738952 SYSTEM VENT M AD NSL PAP DEV HD STRP 2L HYPRINFL BG MRI

## (undated) DEVICE — PROXIMATE RH ROTATING HEAD SKIN STAPLERS (35 WIDE) CONTAINS 35 STAINLESS STEEL STAPLES: Brand: PROXIMATE

## (undated) DEVICE — PREP SOL POVIDONE/IODINE BT 4OZ

## (undated) DEVICE — MASK VENTILATOR MED AD SUPERNOVA ET

## (undated) DEVICE — GLV SURG TRIUMPH GREEN W/ALOE PF LTX 8 STRL

## (undated) DEVICE — GW STARTER FXD CORE J .035 3X260CM 3MM

## (undated) DEVICE — FORCEPS BX L240CM JAW DIA2.4MM ORNG L CAP W/ NDL DISP RAD

## (undated) DEVICE — CATH IV ANGIO FEP 12G 3IN LTBLU 10PK

## (undated) DEVICE — SUT ETHLN 6/0 PC3 18IN 1866G

## (undated) DEVICE — ANTIBACTERIAL VIOLET BRAIDED (POLYGLACTIN 910), SYNTHETIC ABSORBABLE SUTURE: Brand: COATED VICRYL

## (undated) DEVICE — INTENDED FOR TISSUE SEPARATION, AND OTHER PROCEDURES THAT REQUIRE A SHARP SURGICAL BLADE TO PUNCTURE OR CUT.: Brand: BARD-PARKER ® STAINLESS STEEL BLADES

## (undated) DEVICE — CATH F6INF PIG 145 110CM 6SH: Brand: INFINITI

## (undated) DEVICE — NDL HYPO PRECISIONGLIDE/REG 25G 5/8 BLU

## (undated) DEVICE — GLV SURG SIGNATURE TOUCH PF LTX 6.5 STRL

## (undated) DEVICE — ELECTRD PAD DEFIB A/

## (undated) DEVICE — PK SPINE POST 30

## (undated) DEVICE — FORCEPS BX L240CM JAW DIA2.8MM L CAP W/ NDL MIC MESH TOOTH

## (undated) DEVICE — CATH F6 INF TL JL 3.5 100 CM: Brand: INFINITI

## (undated) DEVICE — SOLIDIFIER LIQUI LOC PLUS 2000CC

## (undated) DEVICE — GLV SURG BIOGEL LTX PF 6 1/2

## (undated) DEVICE — DISPOSABLE IRRIGATION CASSETTE: Brand: CORE

## (undated) DEVICE — Device: Brand: MEDEX

## (undated) DEVICE — SPNG GZ WOVN 4X4IN 12PLY 10/BX STRL

## (undated) DEVICE — Device

## (undated) DEVICE — PROGRAMMER NEUROSTM PT MYSTIM EXT

## (undated) DEVICE — RADIFOCUS OPTITORQUE ANGIOGRAPHIC CATHETER: Brand: OPTITORQUE

## (undated) DEVICE — SOL IRR BSS 15ML STRL

## (undated) DEVICE — CANN CO2/O2 NASL A/

## (undated) DEVICE — BNDR ABD 4PANEL 12IN 46 TO 62IN

## (undated) DEVICE — SOL IRR NACL 0.9PCT BT 1000ML

## (undated) DEVICE — 3M™ STERI-STRIP™ REINFORCED ADHESIVE SKIN CLOSURES, R1547, 1/2 IN X 4 IN (12 MM X 100 MM), 6 STRIPS/ENVELOPE: Brand: 3M™ STERI-STRIP™

## (undated) DEVICE — TIBURON BRACHIAL ANGIOGRAPHY DRAPE: Brand: CONVERTORS

## (undated) DEVICE — DRP C/ARMOR

## (undated) DEVICE — PROGRAMMER CONTRL INTELLIS NEUROSTM

## (undated) DEVICE — SUT MNCRYL 4/0 PS2 27IN UD MCP426H

## (undated) DEVICE — ADHS LIQ MASTISOL 2/3ML

## (undated) DEVICE — GLV SURG BIOGEL LTX PF 8

## (undated) DEVICE — CATH F6INF TL JR 5 100CM: Brand: INFINITI

## (undated) DEVICE — PROXIMATE RH ROTATING HEAD SKIN STAPLERS (35 REGULAR) CONTAINS 35 STAINLESS STEEL STAPLES: Brand: PROXIMATE

## (undated) DEVICE — SUT VIC 0 MO4 CR8 18IN VCP701D

## (undated) DEVICE — SINGLE-USE POLYPECTOMY SNARE: Brand: CAPTIVATOR

## (undated) DEVICE — GLV SURG SIGNATURE TOUCH PF LTX 8 STRL